# Patient Record
Sex: FEMALE | Race: WHITE | Employment: OTHER | URBAN - METROPOLITAN AREA
[De-identification: names, ages, dates, MRNs, and addresses within clinical notes are randomized per-mention and may not be internally consistent; named-entity substitution may affect disease eponyms.]

---

## 2017-06-19 ENCOUNTER — HOSPITAL ENCOUNTER (OUTPATIENT)
Dept: PREADMISSION TESTING | Age: 72
Discharge: HOME | DRG: 470 | End: 2017-06-19
Payer: MEDICARE

## 2017-06-19 VITALS
WEIGHT: 202.8 LBS | TEMPERATURE: 97.7 F | OXYGEN SATURATION: 93 % | HEART RATE: 82 BPM | HEIGHT: 66 IN | RESPIRATION RATE: 18 BRPM | DIASTOLIC BLOOD PRESSURE: 48 MMHG | BODY MASS INDEX: 32.59 KG/M2 | SYSTOLIC BLOOD PRESSURE: 109 MMHG

## 2017-06-19 LAB
ABO + RH BLD: NORMAL
ALBUMIN SERPL BCP-MCNC: 3.3 G/DL (ref 3.5–5)
ALBUMIN/GLOB SERPL: 0.9 {RATIO} (ref 1.1–2.2)
ALP SERPL-CCNC: 33 U/L (ref 45–117)
ALT SERPL-CCNC: 20 U/L (ref 12–78)
ANION GAP BLD CALC-SCNC: 5 MMOL/L (ref 5–15)
APPEARANCE UR: CLEAR
AST SERPL W P-5'-P-CCNC: 16 U/L (ref 15–37)
BACTERIA URNS QL MICRO: NEGATIVE /HPF
BILIRUB SERPL-MCNC: 0.3 MG/DL (ref 0.2–1)
BILIRUB UR QL: NEGATIVE
BLOOD GROUP ANTIBODIES SERPL: NORMAL
BUN SERPL-MCNC: 32 MG/DL (ref 6–20)
BUN/CREAT SERPL: 29 (ref 12–20)
CALCIUM SERPL-MCNC: 8.8 MG/DL (ref 8.5–10.1)
CHLORIDE SERPL-SCNC: 107 MMOL/L (ref 97–108)
CO2 SERPL-SCNC: 29 MMOL/L (ref 21–32)
COLOR UR: ABNORMAL
CREAT SERPL-MCNC: 1.11 MG/DL (ref 0.55–1.02)
EPITH CASTS URNS QL MICRO: ABNORMAL /LPF
ERYTHROCYTE [DISTWIDTH] IN BLOOD BY AUTOMATED COUNT: 14.1 % (ref 11.5–14.5)
EST. AVERAGE GLUCOSE BLD GHB EST-MCNC: 131 MG/DL
GLOBULIN SER CALC-MCNC: 3.6 G/DL (ref 2–4)
GLUCOSE SERPL-MCNC: 114 MG/DL (ref 65–100)
GLUCOSE UR STRIP.AUTO-MCNC: NEGATIVE MG/DL
HBA1C MFR BLD: 6.2 % (ref 4.2–6.3)
HCT VFR BLD AUTO: 42.4 % (ref 35–47)
HGB BLD-MCNC: 12.8 G/DL (ref 11.5–16)
HGB UR QL STRIP: NEGATIVE
INR PPP: 1 (ref 0.9–1.1)
KETONES UR QL STRIP.AUTO: NEGATIVE MG/DL
LEUKOCYTE ESTERASE UR QL STRIP.AUTO: ABNORMAL
MCH RBC QN AUTO: 28.4 PG (ref 26–34)
MCHC RBC AUTO-ENTMCNC: 30.2 G/DL (ref 30–36.5)
MCV RBC AUTO: 94 FL (ref 80–99)
MUCOUS THREADS URNS QL MICRO: ABNORMAL /LPF
NITRITE UR QL STRIP.AUTO: NEGATIVE
PH UR STRIP: 5.5 [PH] (ref 5–8)
PLATELET # BLD AUTO: 249 K/UL (ref 150–400)
POTASSIUM SERPL-SCNC: 4.2 MMOL/L (ref 3.5–5.1)
PROT SERPL-MCNC: 6.9 G/DL (ref 6.4–8.2)
PROT UR STRIP-MCNC: NEGATIVE MG/DL
PROTHROMBIN TIME: 10.2 SEC (ref 9–11.1)
RBC # BLD AUTO: 4.51 M/UL (ref 3.8–5.2)
RBC #/AREA URNS HPF: ABNORMAL /HPF (ref 0–5)
SODIUM SERPL-SCNC: 141 MMOL/L (ref 136–145)
SP GR UR REFRACTOMETRY: 1.02 (ref 1–1.03)
SPECIMEN EXP DATE BLD: NORMAL
UA: UC IF INDICATED,UAUC: ABNORMAL
UROBILINOGEN UR QL STRIP.AUTO: 0.2 EU/DL (ref 0.2–1)
WBC # BLD AUTO: 5.9 K/UL (ref 3.6–11)
WBC URNS QL MICRO: ABNORMAL /HPF (ref 0–4)

## 2017-06-19 RX ORDER — MECLIZINE HCL 12.5 MG 12.5 MG/1
12.5 TABLET ORAL
COMMUNITY
End: 2018-05-14 | Stop reason: SDUPTHER

## 2017-06-19 RX ORDER — ACETAMINOPHEN 500 MG
1000 TABLET ORAL ONCE
Status: CANCELLED | OUTPATIENT
Start: 2017-06-22 | End: 2017-06-22

## 2017-06-19 RX ORDER — OMEPRAZOLE 20 MG/1
20 CAPSULE, DELAYED RELEASE ORAL DAILY
COMMUNITY
End: 2017-11-28 | Stop reason: SDUPTHER

## 2017-06-19 RX ORDER — FENOFIBRATE 145 MG/1
145 TABLET, COATED ORAL DAILY
COMMUNITY
End: 2018-05-31 | Stop reason: SDUPTHER

## 2017-06-19 RX ORDER — CELECOXIB 200 MG/1
400 CAPSULE ORAL ONCE
Status: CANCELLED | OUTPATIENT
Start: 2017-06-22 | End: 2017-06-22

## 2017-06-19 RX ORDER — ASPIRIN 81 MG/1
81 TABLET ORAL DAILY
Status: ON HOLD | COMMUNITY
End: 2017-06-23

## 2017-06-19 RX ORDER — DEXTROMETHORPHAN HYDROBROMIDE, GUAIFENESIN 5; 100 MG/5ML; MG/5ML
650 LIQUID ORAL
COMMUNITY
End: 2017-06-25

## 2017-06-19 RX ORDER — SODIUM CHLORIDE, SODIUM LACTATE, POTASSIUM CHLORIDE, CALCIUM CHLORIDE 600; 310; 30; 20 MG/100ML; MG/100ML; MG/100ML; MG/100ML
25 INJECTION, SOLUTION INTRAVENOUS CONTINUOUS
Status: CANCELLED | OUTPATIENT
Start: 2017-06-22

## 2017-06-19 RX ORDER — FUROSEMIDE 20 MG/1
30 TABLET ORAL DAILY
Status: ON HOLD | COMMUNITY
End: 2017-06-23

## 2017-06-19 RX ORDER — CEFAZOLIN SODIUM IN 0.9 % NACL 2 G/100 ML
2 PLASTIC BAG, INJECTION (ML) INTRAVENOUS ONCE
Status: CANCELLED | OUTPATIENT
Start: 2017-06-22 | End: 2017-06-22

## 2017-06-19 RX ORDER — LISINOPRIL 10 MG/1
10 TABLET ORAL 2 TIMES DAILY
Status: ON HOLD | COMMUNITY
End: 2017-06-23

## 2017-06-19 RX ORDER — FLUTICASONE PROPIONATE 220 UG/1
2 AEROSOL, METERED RESPIRATORY (INHALATION) DAILY
COMMUNITY
End: 2018-05-21 | Stop reason: SDUPTHER

## 2017-06-19 RX ORDER — PREGABALIN 75 MG/1
75 CAPSULE ORAL ONCE
Status: CANCELLED | OUTPATIENT
Start: 2017-06-22 | End: 2017-06-22

## 2017-06-19 NOTE — PERIOP NOTES
[facility]  Preoperative Instructions        Surgery Date 6/22/2017          Time of Arrival : To Be Called per Dr Julienne Henning    1. On the day of your surgery, please report to the Surgical Services Registration Desk and sign in at your designated time. The Surgery Center is located to the right of the Emergency Room. 2. You must have someone with you to drive you home. You should not drive a car for 24 hours following surgery. Please make arrangements for a friend or family member to stay with you for the first 24 hours after your surgery. 3. Do not have anything to eat or drink (including water, gum, mints, coffee, juice) after midnight 6/21/2017              . This may not apply to medications prescribed by your physician. Please note special instructions, if applicable. If you are currently taking Plavix, Coumadin, or other blood-thinning agents, contact your surgeon for instructions. 4. We recommend you do not drink any alcoholic beverages for 24 hours before and after your surgery. 5. Have a list of all current medications, including vitamins, herbal supplements and any other over the counter medications. Stop all Aspirin and non-steroidal anti-inflammatory drugs (I.e. Advil, Aleve), as directed by your surgeon's office. Stop all vitamins and herbal supplements seven days prior to your surgery. 6. Wear comfortable clothes. Wear glasses instead of contacts. Do not bring any money or jewelry. Please bring picture ID, insurance card, and any prearranged co-payment or hospital payment. Do not wear make-up, particularly mascara the morning of your surgery. Do not wear nail polish, particularly if you are having foot /hand surgery. Wear your hair loose or down, no ponytails, buns, allegra pins or clips. All body piercings must be removed.   Please shower with antibacterial soap for three consecutive days before and on the morning of surgery, but do not apply any lotions, powders or deodorants after the shower on the day of surgery. Please use a fresh towels after each shower. Please sleep in clean clothes and change bed linens the night before surgery. Please do not shave for 48 hours prior to surgery. Shaving of the face is acceptable. 7. You should understand that if you do not follow these instructions your surgery may be cancelled. If your physical condition changes (I.e. fever, cold or flu) please contact your surgeon as soon as possible. 8. It is important that you be on time. If a situation occurs where you may be late, please call [phone] (OR Holding Area). 9. If you have any questions and or problems, please call [phone] (Pre-admission Testing). 10. Your surgery time may be subject to change. You will receive a phone call the evening prior if your time changes. 11.  If having outpatient surgery, you must have someone to drive you here, stay with you during the duration of your stay, and to drive you home at time of discharge. Special Instructions: Use your inhaler wether you need it or not and bring both with you to the hospital.  Stop Aspirin per Dr Manuel Dominguez instruction. MEDICATIONS TO TAKE THE MORNING OF SURGERY WITH A SIP OF WATER:Coreg, Meclizine as needed, omeprazole       I understand a pre-operative phone call will be made to verify my surgery time. In the event that I am not available, I give permission for a message to be left on my answering service and/or with another person?   Yes [phone]         ___________________      __________   _________    (Signature of Patient)             (Witness)                (Date and Time)

## 2017-06-19 NOTE — PROGRESS NOTES
[address] [facility]  Physical Therapy Pre-surgery evaluation  [address] [facility], [address] [facility]    physical Therapy pre TKR surgery EVALUATION  Patient: Ysabel Limon (43 y.o. female)  Date: 6/19/2017  Primary Diagnosis: rt total knee        Precautions:        ASSESSMENT :  Based on the objective data described below, the patient presents with impaired gait, balance, pain, and overall high level functional mobility due to end stage degenerative joint disease in the right knee. Pt reports independence w/ ambulation and ADLs however history of 2 falls over previous year. Pt reports baseline dizziness and SOB as a result of her CHF. Discussed anticipated disposition to home with possible discharge within a 1 to 2 day time frame post-surgery. Patient in agreement. Patient lives alone and states that she does not have any family who could stay with her or assist as daughter and granddaughter both work full-time. States she will be if any Jew friends/neightbors could assist.     GOALS: (Goals have been discussed and agreed upon with patient.)  DISCHARGE GOALS: Time Frame: 1 DAY  1. Patient will demonstrate increased strength, range of motion, and pain control via a home exercise program in order to minimize functional deficits in preparation for their upcoming surgery. This will be achieved by using education, demonstration and through the use of an informational handout including a home exercise program.  REHABILITATION POTENTIAL FOR STATED GOALS: Good     RECOMMENDATIONS AND PLANNED INTERVENTIONS: (Benefits and precautions of physical therapy have been discussed with the patient.)  1. Home Exercise Program  TREATMENT PLAN EFFECTIVE DATES: 6/19/2017 TO 6/19/2017  FREQUENCY/DURATION: Patient to continue to perform home exercise program at least twice daily until her surgery.      SUBJECTIVE:   Patient stated I've been going to [address] [place] [address] for PT.    OBJECTIVE DATA SUMMARY:   HISTORY: Past Medical History:   Diagnosis Date    Arthritis     Asthma     Mild to Moderate     Cancer ([facility] [address] 1997    Breast left    Chronic pain     Right knee    Diabetes ([facility] [address]     Pre-diabetic    GERD (gastroesophageal reflux disease)     Heart failure (HCC)     Cardiomyopathy, HX of MI 1999    Ill-defined condition     Vertigo    Ill-defined condition 2003    HX of Urosepsis complicated by respiratory failure and pneumonnia    Thromboembolus ([facility] [address] 1969    during 2nd pregnancy     Past Surgical History:   Procedure Laterality Date    HX GYN  1988    JONNY    HX GYN      Left Breast Mastectomy    HX HEENT  2015    Bilateral Cataract     HX VASCULAR ACCESS      Port a cath on the right     Prior Level of Function/Home Situation: Pt lives alone at home. Reports independence w/ ambulation and ADLs however history of 2 falls. Retired. Still driving. States baseline dizziness and SOB due to CHF  Personal factors and/or comorbidities impacting plan of care:     Patient [x]   does  []   does not state signs/symptoms of shortness of breath/dyspnea on exertion/respiratory distress. - baseline    Home Situation  Home Environment: Apartment  # Steps to Enter: 0  Wheelchair Ramp: No  One/Two Story Residence: One story  Living Alone: Yes  Support Systems: Family member(s)  Patient Expects to be Discharged to[de-identified] Private residence  Current DME Used/Available at Home: None    EXAMINATION/PRESENTATION/DECISION MAKING:     ADLs (Current Functional Status):    Bathing/Showering:   [x] Independent  [] Requires Assistance from Someone  [] Sponge Bath Only   Ambulation:  [x] Independent  [] Walk Indoors Only  [] Walk Outdoors  [] Use Assistive Device  [] Use Wheelchair Only     Dressing:  [x] Independent    Requires Assistance from Someone for:  [] Sock/Shoes  [] Pants  [] Everything   Household Activities:  [] Routine house and yard work  [x] Light Housework Only  [] None       Critical Behavior:                Strength: Strength: Within functional limits                    Tone & Sensation:   Tone: Normal              Sensation: Intact               Range Of Motion:  AROM: Within functional limits                       Coordination:  Coordination: Within functional limits    Functional Mobility:  Transfers:  Sit to Stand: Independent  Stand to Sit: Independent                       Balance:   Sitting: Intact  Standing: Intact  Ambulation/Gait Training:  Distance (ft): 40 Feet (ft)     Ambulation - Level of Assistance: Independent        Gait Abnormalities: Decreased step clearance        Base of Support: Widened     Speed/Tamiko: Pace decreased (<100 feet/min)                     Independent ambulation  Therapeutic Exercises:   The patient was educated in, has demonstrated, and has received written instructions to complete for their home exercise program per total knee replacement protocol. Functional Measure:  Lower Extremity Functional Scale (LEFS):      Score 17/80     Percentage of impairment CH  0% CI  1-19% CJ  20-39% CK  40-59% CL  60-79% CM  80-99% CN  100%   LEFS score:  0-80 80 64-79 47-63 31-46 16-30 1-15 0     Cutt-offs: None established  TKA and EJ:   (Sage et al, 2000)  MDC = 9 points   MCID = 9 points           G codes: In compliance with CMSs Claims Based Outcome Reporting, the following G-code set was chosen for this patient based on their primary functional limitation being treated: The outcome measure chosen to determine the severity of the functional limitation was the LEFS with a score of 17/80 which was correlated with the impairment scale.     ? Mobility - Walking and Moving Around:     - CURRENT STATUS: CL - 60%-79% impaired, limited or restricted    - GOAL STATUS: CL - 60%-79% impaired, limited or restricted    - D/C STATUS:  CL - 60%-79% impaired, limited or restricted     Pain:  Pain Scale 1: Numeric (0 - 10)  Pain Intensity 1: 0                Activity Tolerance:   VSS throughout on RA     COMMUNICATION/EDUCATION:   The patient was educated on:  [x]         Importance of post-operative mobility to achieve their desired outcomes and restore biological function  [x]         The key post-operative time frame to address ROM to prevent additional complications    The patients plan of care was discussed with:   [x]         The patient verbalized understanding of her plan in preparation for their upcoming surgery  []         The patient's  was present for this session  []         The patient reports that he/she does not have a  identified at this time  []         The  verbalized understanding of the education regarding the patient's upcoming surgery  [x]         Patient/family agree to work toward stated goals and plan of care. []         Patient understands intent and goals of therapy, but is neutral about his/her participation. []         Patient is unable to participate in goal setting and plan of care.     Thank you for this referral.  Zak Lutz, PT, DPT   Time Calculation: 23 mins

## 2017-06-20 LAB
BACTERIA SPEC CULT: ABNORMAL
BACTERIA SPEC CULT: ABNORMAL
BACTERIA SPEC CULT: NORMAL
CC UR VC: NORMAL
SERVICE CMNT-IMP: ABNORMAL
SERVICE CMNT-IMP: NORMAL

## 2017-06-21 ENCOUNTER — ANESTHESIA EVENT (OUTPATIENT)
Dept: SURGERY | Age: 72
DRG: 470 | End: 2017-06-21
Payer: MEDICARE

## 2017-06-21 NOTE — PERIOP NOTES
Called and spoke with Abdiel at Dr Garrett Jones' office about UC results tx or not and also about whether pt has been started on meds for MSSA, she said that she gave results to him but havent received any info back yet, he is in surgery now, but when he comes in, she will be sure to ask him.

## 2017-06-21 NOTE — PERIOP NOTES
Preop call made and patient given TOA. Patient instructed- may have water up to 0330 am and then NPO. Patient verbalizes understanding. Pt stated that she was on the way to get bactroban nasal ointment.  She is aware on how to start,

## 2017-06-21 NOTE — PERIOP NOTES
Spoke to Gloria in Dr. Janae Langford' office regarding apparent Staph Aureus nasal culture. Gloria will call in Mupirocin bid x 5days for patient to start today. Patient needs 2 applications today and 1 prior to arrival tomorrow. Will place on PTA med list to continue inpatient. Patient will receive Ancef only preop.

## 2017-06-22 ENCOUNTER — ANESTHESIA (OUTPATIENT)
Dept: SURGERY | Age: 72
DRG: 470 | End: 2017-06-22
Payer: MEDICARE

## 2017-06-22 ENCOUNTER — HOSPITAL ENCOUNTER (INPATIENT)
Age: 72
LOS: 3 days | Discharge: HOME HEALTH | DRG: 470 | End: 2017-06-25
Attending: ORTHOPAEDIC SURGERY | Admitting: ORTHOPAEDIC SURGERY
Payer: MEDICARE

## 2017-06-22 PROBLEM — M17.9 OA (OSTEOARTHRITIS) OF KNEE: Status: ACTIVE | Noted: 2017-06-22

## 2017-06-22 LAB
GLUCOSE BLD STRIP.AUTO-MCNC: 103 MG/DL (ref 65–100)
GLUCOSE BLD STRIP.AUTO-MCNC: 120 MG/DL (ref 65–100)
GLUCOSE BLD STRIP.AUTO-MCNC: 142 MG/DL (ref 65–100)
SERVICE CMNT-IMP: ABNORMAL

## 2017-06-22 PROCEDURE — 74011250636 HC RX REV CODE- 250/636: Performed by: ORTHOPAEDIC SURGERY

## 2017-06-22 PROCEDURE — C1713 ANCHOR/SCREW BN/BN,TIS/BN: HCPCS | Performed by: ORTHOPAEDIC SURGERY

## 2017-06-22 PROCEDURE — 0SRC0J9 REPLACEMENT OF RIGHT KNEE JOINT WITH SYNTHETIC SUBSTITUTE, CEMENTED, OPEN APPROACH: ICD-10-PCS | Performed by: ORTHOPAEDIC SURGERY

## 2017-06-22 PROCEDURE — 8E0YXBZ COMPUTER ASSISTED PROCEDURE OF LOWER EXTREMITY: ICD-10-PCS | Performed by: ORTHOPAEDIC SURGERY

## 2017-06-22 PROCEDURE — 74011250637 HC RX REV CODE- 250/637: Performed by: ORTHOPAEDIC SURGERY

## 2017-06-22 PROCEDURE — 74011000258 HC RX REV CODE- 258: Performed by: ORTHOPAEDIC SURGERY

## 2017-06-22 PROCEDURE — 77030016547 HC BLD SAW SAG1 STRY -B: Performed by: ORTHOPAEDIC SURGERY

## 2017-06-22 PROCEDURE — 97161 PT EVAL LOW COMPLEX 20 MIN: CPT

## 2017-06-22 PROCEDURE — 77030018673: Performed by: ORTHOPAEDIC SURGERY

## 2017-06-22 PROCEDURE — 76210000016 HC OR PH I REC 1 TO 1.5 HR: Performed by: ORTHOPAEDIC SURGERY

## 2017-06-22 PROCEDURE — 77030019707 HC TBNG LAVG CRBJT KINM -C: Performed by: ORTHOPAEDIC SURGERY

## 2017-06-22 PROCEDURE — 74011250636 HC RX REV CODE- 250/636: Performed by: ANESTHESIOLOGY

## 2017-06-22 PROCEDURE — 82962 GLUCOSE BLOOD TEST: CPT

## 2017-06-22 PROCEDURE — 77030033138 HC SUT PGA STRATFX J&J -B: Performed by: ORTHOPAEDIC SURGERY

## 2017-06-22 PROCEDURE — C1776 JOINT DEVICE (IMPLANTABLE): HCPCS | Performed by: ORTHOPAEDIC SURGERY

## 2017-06-22 PROCEDURE — 74011250636 HC RX REV CODE- 250/636

## 2017-06-22 PROCEDURE — 77030011640 HC PAD GRND REM COVD -A: Performed by: ORTHOPAEDIC SURGERY

## 2017-06-22 PROCEDURE — 76010000162 HC OR TIME 1.5 TO 2 HR INTENSV-TIER 1: Performed by: ORTHOPAEDIC SURGERY

## 2017-06-22 PROCEDURE — 77030018846 HC SOL IRR STRL H20 ICUM -A: Performed by: ORTHOPAEDIC SURGERY

## 2017-06-22 PROCEDURE — 77030008684 HC TU ET CUF COVD -B: Performed by: NURSE ANESTHETIST, CERTIFIED REGISTERED

## 2017-06-22 PROCEDURE — 77030020371 HC SUT POLYGLY VLOC COVD -B: Performed by: ORTHOPAEDIC SURGERY

## 2017-06-22 PROCEDURE — 77030034354: Performed by: ORTHOPAEDIC SURGERY

## 2017-06-22 PROCEDURE — 97530 THERAPEUTIC ACTIVITIES: CPT

## 2017-06-22 PROCEDURE — 77030020268 HC MISC GENERAL SUPPLY: Performed by: ORTHOPAEDIC SURGERY

## 2017-06-22 PROCEDURE — 74011250636 HC RX REV CODE- 250/636: Performed by: PHYSICIAN ASSISTANT

## 2017-06-22 PROCEDURE — 65270000029 HC RM PRIVATE

## 2017-06-22 PROCEDURE — 74011250637 HC RX REV CODE- 250/637: Performed by: PHYSICIAN ASSISTANT

## 2017-06-22 PROCEDURE — 74011000258 HC RX REV CODE- 258

## 2017-06-22 PROCEDURE — 74011250637 HC RX REV CODE- 250/637: Performed by: NURSE PRACTITIONER

## 2017-06-22 PROCEDURE — 77030031139 HC SUT VCRL2 J&J -A: Performed by: ORTHOPAEDIC SURGERY

## 2017-06-22 PROCEDURE — 77030036722: Performed by: ORTHOPAEDIC SURGERY

## 2017-06-22 PROCEDURE — 76060000034 HC ANESTHESIA 1.5 TO 2 HR: Performed by: ORTHOPAEDIC SURGERY

## 2017-06-22 PROCEDURE — 77030018836 HC SOL IRR NACL ICUM -A: Performed by: ORTHOPAEDIC SURGERY

## 2017-06-22 PROCEDURE — 77030032490 HC SLV COMPR SCD KNE COVD -B: Performed by: ORTHOPAEDIC SURGERY

## 2017-06-22 PROCEDURE — 77030034479 HC ADH SKN CLSR PRINEO J&J -B: Performed by: ORTHOPAEDIC SURGERY

## 2017-06-22 PROCEDURE — 77030033067 HC SUT PDO STRATFX SPIR J&J -B: Performed by: ORTHOPAEDIC SURGERY

## 2017-06-22 PROCEDURE — 74011000250 HC RX REV CODE- 250

## 2017-06-22 PROCEDURE — 77030010785: Performed by: ORTHOPAEDIC SURGERY

## 2017-06-22 DEVICE — COBALT HV BONE CEMENT 40GM
Type: IMPLANTABLE DEVICE | Site: KNEE | Status: FUNCTIONAL
Brand: DJO SURGICAL

## 2017-06-22 DEVICE — COMPONENT TIB CEM FIT 2.5F 2.5T LOGIC: Type: IMPLANTABLE DEVICE | Site: KNEE | Status: FUNCTIONAL

## 2017-06-22 DEVICE — CEMENT BNE GENTAMC HV R+G 40GM -- PALACOS R+G 5036964: Type: IMPLANTABLE DEVICE | Site: KNEE | Status: FUNCTIONAL

## 2017-06-22 DEVICE — IMPLANTABLE DEVICE: Type: IMPLANTABLE DEVICE | Site: KNEE | Status: FUNCTIONAL

## 2017-06-22 DEVICE — COMPONENT KNEE CEM STD POLYETH: Type: IMPLANTABLE DEVICE | Site: KNEE | Status: FUNCTIONAL

## 2017-06-22 RX ORDER — DIPHENHYDRAMINE HCL 12.5MG/5ML
12.5 ELIXIR ORAL
Status: ACTIVE | OUTPATIENT
Start: 2017-06-22 | End: 2017-06-23

## 2017-06-22 RX ORDER — SODIUM CHLORIDE, SODIUM LACTATE, POTASSIUM CHLORIDE, CALCIUM CHLORIDE 600; 310; 30; 20 MG/100ML; MG/100ML; MG/100ML; MG/100ML
125 INJECTION, SOLUTION INTRAVENOUS CONTINUOUS
Status: DISCONTINUED | OUTPATIENT
Start: 2017-06-22 | End: 2017-06-22 | Stop reason: HOSPADM

## 2017-06-22 RX ORDER — SODIUM CHLORIDE 0.9 % (FLUSH) 0.9 %
5-10 SYRINGE (ML) INJECTION AS NEEDED
Status: DISCONTINUED | OUTPATIENT
Start: 2017-06-22 | End: 2017-06-25 | Stop reason: HOSPADM

## 2017-06-22 RX ORDER — ONDANSETRON 2 MG/ML
INJECTION INTRAMUSCULAR; INTRAVENOUS AS NEEDED
Status: DISCONTINUED | OUTPATIENT
Start: 2017-06-22 | End: 2017-06-22 | Stop reason: HOSPADM

## 2017-06-22 RX ORDER — HYDROMORPHONE HYDROCHLORIDE 2 MG/ML
INJECTION, SOLUTION INTRAMUSCULAR; INTRAVENOUS; SUBCUTANEOUS AS NEEDED
Status: DISCONTINUED | OUTPATIENT
Start: 2017-06-22 | End: 2017-06-22 | Stop reason: HOSPADM

## 2017-06-22 RX ORDER — SODIUM CHLORIDE 0.9 % (FLUSH) 0.9 %
5-10 SYRINGE (ML) INJECTION EVERY 8 HOURS
Status: DISCONTINUED | OUTPATIENT
Start: 2017-06-22 | End: 2017-06-22 | Stop reason: HOSPADM

## 2017-06-22 RX ORDER — ONDANSETRON 2 MG/ML
4 INJECTION INTRAMUSCULAR; INTRAVENOUS
Status: DISPENSED | OUTPATIENT
Start: 2017-06-22 | End: 2017-06-23

## 2017-06-22 RX ORDER — LIDOCAINE HYDROCHLORIDE 20 MG/ML
INJECTION, SOLUTION EPIDURAL; INFILTRATION; INTRACAUDAL; PERINEURAL AS NEEDED
Status: DISCONTINUED | OUTPATIENT
Start: 2017-06-22 | End: 2017-06-22 | Stop reason: HOSPADM

## 2017-06-22 RX ORDER — SODIUM CHLORIDE, SODIUM LACTATE, POTASSIUM CHLORIDE, CALCIUM CHLORIDE 600; 310; 30; 20 MG/100ML; MG/100ML; MG/100ML; MG/100ML
25 INJECTION, SOLUTION INTRAVENOUS CONTINUOUS
Status: DISCONTINUED | OUTPATIENT
Start: 2017-06-22 | End: 2017-06-22 | Stop reason: HOSPADM

## 2017-06-22 RX ORDER — SODIUM CHLORIDE 9 MG/ML
125 INJECTION, SOLUTION INTRAVENOUS CONTINUOUS
Status: DISPENSED | OUTPATIENT
Start: 2017-06-22 | End: 2017-06-23

## 2017-06-22 RX ORDER — PHENYLEPHRINE HCL IN 0.9% NACL 0.4MG/10ML
SYRINGE (ML) INTRAVENOUS AS NEEDED
Status: DISCONTINUED | OUTPATIENT
Start: 2017-06-22 | End: 2017-06-22 | Stop reason: HOSPADM

## 2017-06-22 RX ORDER — AMOXICILLIN 250 MG
1 CAPSULE ORAL 2 TIMES DAILY
Status: DISCONTINUED | OUTPATIENT
Start: 2017-06-22 | End: 2017-06-25 | Stop reason: HOSPADM

## 2017-06-22 RX ORDER — ADHESIVE BANDAGE
30 BANDAGE TOPICAL DAILY PRN
Status: DISCONTINUED | OUTPATIENT
Start: 2017-06-23 | End: 2017-06-25 | Stop reason: HOSPADM

## 2017-06-22 RX ORDER — CARVEDILOL 12.5 MG/1
12.5 TABLET ORAL 2 TIMES DAILY
Status: DISCONTINUED | OUTPATIENT
Start: 2017-06-22 | End: 2017-06-25 | Stop reason: HOSPADM

## 2017-06-22 RX ORDER — OXYCODONE HYDROCHLORIDE 5 MG/1
5 TABLET ORAL
Status: DISCONTINUED | OUTPATIENT
Start: 2017-06-22 | End: 2017-06-23

## 2017-06-22 RX ORDER — FENTANYL CITRATE 50 UG/ML
INJECTION, SOLUTION INTRAMUSCULAR; INTRAVENOUS AS NEEDED
Status: DISCONTINUED | OUTPATIENT
Start: 2017-06-22 | End: 2017-06-22 | Stop reason: HOSPADM

## 2017-06-22 RX ORDER — ACETAMINOPHEN 325 MG/1
650 TABLET ORAL EVERY 6 HOURS
Status: DISCONTINUED | OUTPATIENT
Start: 2017-06-22 | End: 2017-06-23

## 2017-06-22 RX ORDER — NALOXONE HYDROCHLORIDE 0.4 MG/ML
0.4 INJECTION, SOLUTION INTRAMUSCULAR; INTRAVENOUS; SUBCUTANEOUS AS NEEDED
Status: DISCONTINUED | OUTPATIENT
Start: 2017-06-22 | End: 2017-06-25 | Stop reason: HOSPADM

## 2017-06-22 RX ORDER — MIDAZOLAM HYDROCHLORIDE 1 MG/ML
0.5 INJECTION, SOLUTION INTRAMUSCULAR; INTRAVENOUS
Status: DISCONTINUED | OUTPATIENT
Start: 2017-06-22 | End: 2017-06-22 | Stop reason: HOSPADM

## 2017-06-22 RX ORDER — HYDROMORPHONE HYDROCHLORIDE 1 MG/ML
0.2 INJECTION, SOLUTION INTRAMUSCULAR; INTRAVENOUS; SUBCUTANEOUS
Status: DISCONTINUED | OUTPATIENT
Start: 2017-06-22 | End: 2017-06-22 | Stop reason: HOSPADM

## 2017-06-22 RX ORDER — HYDROMORPHONE HYDROCHLORIDE 1 MG/ML
0.5 INJECTION, SOLUTION INTRAMUSCULAR; INTRAVENOUS; SUBCUTANEOUS
Status: ACTIVE | OUTPATIENT
Start: 2017-06-22 | End: 2017-06-23

## 2017-06-22 RX ORDER — ASPIRIN 325 MG
325 TABLET, DELAYED RELEASE (ENTERIC COATED) ORAL 2 TIMES DAILY
Status: DISCONTINUED | OUTPATIENT
Start: 2017-06-22 | End: 2017-06-23

## 2017-06-22 RX ORDER — ACETAMINOPHEN 500 MG
1000 TABLET ORAL ONCE
Status: COMPLETED | OUTPATIENT
Start: 2017-06-22 | End: 2017-06-22

## 2017-06-22 RX ORDER — FENTANYL CITRATE 50 UG/ML
50 INJECTION, SOLUTION INTRAMUSCULAR; INTRAVENOUS AS NEEDED
Status: DISCONTINUED | OUTPATIENT
Start: 2017-06-22 | End: 2017-06-22 | Stop reason: HOSPADM

## 2017-06-22 RX ORDER — LIDOCAINE HYDROCHLORIDE 10 MG/ML
0.1 INJECTION, SOLUTION EPIDURAL; INFILTRATION; INTRACAUDAL; PERINEURAL AS NEEDED
Status: DISCONTINUED | OUTPATIENT
Start: 2017-06-22 | End: 2017-06-22 | Stop reason: HOSPADM

## 2017-06-22 RX ORDER — MIDAZOLAM HYDROCHLORIDE 1 MG/ML
1 INJECTION, SOLUTION INTRAMUSCULAR; INTRAVENOUS AS NEEDED
Status: DISCONTINUED | OUTPATIENT
Start: 2017-06-22 | End: 2017-06-22 | Stop reason: HOSPADM

## 2017-06-22 RX ORDER — FENTANYL CITRATE 50 UG/ML
25 INJECTION, SOLUTION INTRAMUSCULAR; INTRAVENOUS
Status: DISCONTINUED | OUTPATIENT
Start: 2017-06-22 | End: 2017-06-22 | Stop reason: HOSPADM

## 2017-06-22 RX ORDER — SODIUM CHLORIDE 0.9 % (FLUSH) 0.9 %
5-10 SYRINGE (ML) INJECTION EVERY 8 HOURS
Status: DISCONTINUED | OUTPATIENT
Start: 2017-06-23 | End: 2017-06-25 | Stop reason: HOSPADM

## 2017-06-22 RX ORDER — PREGABALIN 75 MG/1
75 CAPSULE ORAL ONCE
Status: COMPLETED | OUTPATIENT
Start: 2017-06-22 | End: 2017-06-22

## 2017-06-22 RX ORDER — OXYCODONE AND ACETAMINOPHEN 5; 325 MG/1; MG/1
1 TABLET ORAL AS NEEDED
Status: DISCONTINUED | OUTPATIENT
Start: 2017-06-22 | End: 2017-06-22 | Stop reason: HOSPADM

## 2017-06-22 RX ORDER — PANTOPRAZOLE SODIUM 40 MG/1
40 TABLET, DELAYED RELEASE ORAL DAILY
Status: DISCONTINUED | OUTPATIENT
Start: 2017-06-23 | End: 2017-06-25 | Stop reason: HOSPADM

## 2017-06-22 RX ORDER — OXYCODONE HYDROCHLORIDE 5 MG/1
10 TABLET ORAL
Status: DISCONTINUED | OUTPATIENT
Start: 2017-06-22 | End: 2017-06-23

## 2017-06-22 RX ORDER — FENOFIBRATE 145 MG/1
145 TABLET, COATED ORAL DAILY
Status: DISCONTINUED | OUTPATIENT
Start: 2017-06-22 | End: 2017-06-25 | Stop reason: HOSPADM

## 2017-06-22 RX ORDER — PROPOFOL 10 MG/ML
INJECTION, EMULSION INTRAVENOUS AS NEEDED
Status: DISCONTINUED | OUTPATIENT
Start: 2017-06-22 | End: 2017-06-22 | Stop reason: HOSPADM

## 2017-06-22 RX ORDER — DEXAMETHASONE SODIUM PHOSPHATE 4 MG/ML
10 INJECTION, SOLUTION INTRA-ARTICULAR; INTRALESIONAL; INTRAMUSCULAR; INTRAVENOUS; SOFT TISSUE DAILY
Status: COMPLETED | OUTPATIENT
Start: 2017-06-23 | End: 2017-06-23

## 2017-06-22 RX ORDER — CEFAZOLIN SODIUM IN 0.9 % NACL 2 G/100 ML
2 PLASTIC BAG, INJECTION (ML) INTRAVENOUS EVERY 8 HOURS
Status: DISCONTINUED | OUTPATIENT
Start: 2017-06-22 | End: 2017-06-23 | Stop reason: DRUGHIGH

## 2017-06-22 RX ORDER — ONDANSETRON 2 MG/ML
4 INJECTION INTRAMUSCULAR; INTRAVENOUS AS NEEDED
Status: DISCONTINUED | OUTPATIENT
Start: 2017-06-22 | End: 2017-06-22 | Stop reason: HOSPADM

## 2017-06-22 RX ORDER — CEFAZOLIN SODIUM IN 0.9 % NACL 2 G/100 ML
2 PLASTIC BAG, INJECTION (ML) INTRAVENOUS EVERY 8 HOURS
Status: DISCONTINUED | OUTPATIENT
Start: 2017-06-22 | End: 2017-06-22

## 2017-06-22 RX ORDER — GLYCOPYRROLATE 0.2 MG/ML
INJECTION INTRAMUSCULAR; INTRAVENOUS AS NEEDED
Status: DISCONTINUED | OUTPATIENT
Start: 2017-06-22 | End: 2017-06-22 | Stop reason: HOSPADM

## 2017-06-22 RX ORDER — ROCURONIUM BROMIDE 10 MG/ML
INJECTION, SOLUTION INTRAVENOUS AS NEEDED
Status: DISCONTINUED | OUTPATIENT
Start: 2017-06-22 | End: 2017-06-22 | Stop reason: HOSPADM

## 2017-06-22 RX ORDER — CELECOXIB 200 MG/1
400 CAPSULE ORAL ONCE
Status: COMPLETED | OUTPATIENT
Start: 2017-06-22 | End: 2017-06-22

## 2017-06-22 RX ORDER — SODIUM CHLORIDE 0.9 % (FLUSH) 0.9 %
5-10 SYRINGE (ML) INJECTION AS NEEDED
Status: DISCONTINUED | OUTPATIENT
Start: 2017-06-22 | End: 2017-06-22 | Stop reason: HOSPADM

## 2017-06-22 RX ORDER — MUPIROCIN 20 MG/G
1 OINTMENT TOPICAL 2 TIMES DAILY
Status: DISCONTINUED | OUTPATIENT
Start: 2017-06-22 | End: 2017-06-25 | Stop reason: HOSPADM

## 2017-06-22 RX ORDER — GENTAMICIN SULFATE 80 MG/100ML
INJECTION, SOLUTION INTRAVENOUS AS NEEDED
Status: DISCONTINUED | OUTPATIENT
Start: 2017-06-22 | End: 2017-06-22 | Stop reason: HOSPADM

## 2017-06-22 RX ORDER — FAMOTIDINE 20 MG/1
10 TABLET, FILM COATED ORAL 2 TIMES DAILY
Status: DISCONTINUED | OUTPATIENT
Start: 2017-06-22 | End: 2017-06-23

## 2017-06-22 RX ORDER — VANCOMYCIN HYDROCHLORIDE 1 G/20ML
INJECTION, POWDER, LYOPHILIZED, FOR SOLUTION INTRAVENOUS AS NEEDED
Status: DISCONTINUED | OUTPATIENT
Start: 2017-06-22 | End: 2017-06-22 | Stop reason: HOSPADM

## 2017-06-22 RX ORDER — SODIUM CHLORIDE 9 MG/ML
25 INJECTION, SOLUTION INTRAVENOUS CONTINUOUS
Status: DISCONTINUED | OUTPATIENT
Start: 2017-06-22 | End: 2017-06-22 | Stop reason: HOSPADM

## 2017-06-22 RX ORDER — FACIAL-BODY WIPES
10 EACH TOPICAL DAILY PRN
Status: DISCONTINUED | OUTPATIENT
Start: 2017-06-24 | End: 2017-06-25 | Stop reason: HOSPADM

## 2017-06-22 RX ORDER — NEOSTIGMINE METHYLSULFATE 1 MG/ML
INJECTION INTRAVENOUS AS NEEDED
Status: DISCONTINUED | OUTPATIENT
Start: 2017-06-22 | End: 2017-06-22 | Stop reason: HOSPADM

## 2017-06-22 RX ORDER — DIPHENHYDRAMINE HYDROCHLORIDE 50 MG/ML
12.5 INJECTION, SOLUTION INTRAMUSCULAR; INTRAVENOUS AS NEEDED
Status: DISCONTINUED | OUTPATIENT
Start: 2017-06-22 | End: 2017-06-22 | Stop reason: HOSPADM

## 2017-06-22 RX ORDER — POLYETHYLENE GLYCOL 3350 17 G/17G
17 POWDER, FOR SOLUTION ORAL DAILY
Status: DISCONTINUED | OUTPATIENT
Start: 2017-06-22 | End: 2017-06-25 | Stop reason: HOSPADM

## 2017-06-22 RX ORDER — CEFAZOLIN SODIUM IN 0.9 % NACL 2 G/100 ML
2 PLASTIC BAG, INJECTION (ML) INTRAVENOUS ONCE
Status: COMPLETED | OUTPATIENT
Start: 2017-06-22 | End: 2017-06-22

## 2017-06-22 RX ORDER — MORPHINE SULFATE 10 MG/ML
2 INJECTION, SOLUTION INTRAMUSCULAR; INTRAVENOUS
Status: DISCONTINUED | OUTPATIENT
Start: 2017-06-22 | End: 2017-06-22 | Stop reason: HOSPADM

## 2017-06-22 RX ORDER — ALBUTEROL SULFATE 90 UG/1
1-2 AEROSOL, METERED RESPIRATORY (INHALATION)
Status: DISCONTINUED | OUTPATIENT
Start: 2017-06-22 | End: 2017-06-25 | Stop reason: HOSPADM

## 2017-06-22 RX ADMIN — FENOFIBRATE 145 MG: 145 TABLET ORAL at 13:01

## 2017-06-22 RX ADMIN — REGULAR STRENGTH 325 MG: 325 TABLET ORAL at 13:02

## 2017-06-22 RX ADMIN — CARVEDILOL 12.5 MG: 12.5 TABLET, FILM COATED ORAL at 17:37

## 2017-06-22 RX ADMIN — ACETAMINOPHEN 650 MG: 325 TABLET, FILM COATED ORAL at 23:28

## 2017-06-22 RX ADMIN — REGULAR STRENGTH 325 MG: 325 TABLET ORAL at 17:37

## 2017-06-22 RX ADMIN — SODIUM CHLORIDE, POTASSIUM CHLORIDE, SODIUM LACTATE AND CALCIUM CHLORIDE: 600; 310; 30; 20 INJECTION, SOLUTION INTRAVENOUS at 07:25

## 2017-06-22 RX ADMIN — FAMOTIDINE 10 MG: 20 TABLET ORAL at 13:02

## 2017-06-22 RX ADMIN — SODIUM CHLORIDE, SODIUM LACTATE, POTASSIUM CHLORIDE, AND CALCIUM CHLORIDE 25 ML/HR: 600; 310; 30; 20 INJECTION, SOLUTION INTRAVENOUS at 07:00

## 2017-06-22 RX ADMIN — NEOSTIGMINE METHYLSULFATE 3 MG: 1 INJECTION INTRAVENOUS at 08:57

## 2017-06-22 RX ADMIN — GLYCOPYRROLATE 0.6 MG: 0.2 INJECTION INTRAMUSCULAR; INTRAVENOUS at 08:51

## 2017-06-22 RX ADMIN — CELECOXIB 400 MG: 200 CAPSULE ORAL at 06:49

## 2017-06-22 RX ADMIN — LIDOCAINE HYDROCHLORIDE 100 MG: 20 INJECTION, SOLUTION EPIDURAL; INFILTRATION; INTRACAUDAL; PERINEURAL at 07:55

## 2017-06-22 RX ADMIN — FAMOTIDINE 10 MG: 20 TABLET ORAL at 17:36

## 2017-06-22 RX ADMIN — Medication 80 MCG: at 08:09

## 2017-06-22 RX ADMIN — FENTANYL CITRATE 50 MCG: 50 INJECTION, SOLUTION INTRAMUSCULAR; INTRAVENOUS at 07:55

## 2017-06-22 RX ADMIN — CEFAZOLIN 2 G: 10 INJECTION, POWDER, FOR SOLUTION INTRAVENOUS; PARENTERAL at 16:53

## 2017-06-22 RX ADMIN — MUPIROCIN 1 G: 20 OINTMENT TOPICAL at 21:18

## 2017-06-22 RX ADMIN — ACETAMINOPHEN 1000 MG: 500 TABLET ORAL at 06:48

## 2017-06-22 RX ADMIN — ONDANSETRON 4 MG: 2 INJECTION INTRAMUSCULAR; INTRAVENOUS at 08:40

## 2017-06-22 RX ADMIN — SODIUM CHLORIDE, POTASSIUM CHLORIDE, SODIUM LACTATE AND CALCIUM CHLORIDE: 600; 310; 30; 20 INJECTION, SOLUTION INTRAVENOUS at 09:00

## 2017-06-22 RX ADMIN — ROCURONIUM BROMIDE 30 MG: 10 INJECTION, SOLUTION INTRAVENOUS at 07:55

## 2017-06-22 RX ADMIN — ONDANSETRON HYDROCHLORIDE 4 MG: 2 INJECTION, SOLUTION INTRAMUSCULAR; INTRAVENOUS at 10:05

## 2017-06-22 RX ADMIN — FENTANYL CITRATE 50 MCG: 50 INJECTION, SOLUTION INTRAMUSCULAR; INTRAVENOUS at 07:51

## 2017-06-22 RX ADMIN — ACETAMINOPHEN 650 MG: 325 TABLET, FILM COATED ORAL at 17:36

## 2017-06-22 RX ADMIN — CEFAZOLIN 2 G: 10 INJECTION, POWDER, FOR SOLUTION INTRAVENOUS; PARENTERAL at 23:29

## 2017-06-22 RX ADMIN — PREGABALIN 75 MG: 75 CAPSULE ORAL at 06:49

## 2017-06-22 RX ADMIN — OXYCODONE HYDROCHLORIDE 5 MG: 5 TABLET ORAL at 13:02

## 2017-06-22 RX ADMIN — GENTAMICIN SULFATE 80 MG: 80 INJECTION, SOLUTION INTRAVENOUS at 07:54

## 2017-06-22 RX ADMIN — ACETAMINOPHEN 650 MG: 325 TABLET, FILM COATED ORAL at 13:02

## 2017-06-22 RX ADMIN — FLUTICASONE FUROATE 1 PUFF: 100 POWDER RESPIRATORY (INHALATION) at 13:15

## 2017-06-22 RX ADMIN — POLYETHYLENE GLYCOL 3350 17 G: 17 POWDER, FOR SOLUTION ORAL at 13:02

## 2017-06-22 RX ADMIN — CEFAZOLIN 2 G: 10 INJECTION, POWDER, FOR SOLUTION INTRAVENOUS; PARENTERAL at 07:51

## 2017-06-22 RX ADMIN — ROPIVACAINE HYDROCHLORIDE: 5 INJECTION, SOLUTION EPIDURAL; INFILTRATION; PERINEURAL at 09:03

## 2017-06-22 RX ADMIN — HYDROMORPHONE HYDROCHLORIDE 0.5 MG: 2 INJECTION, SOLUTION INTRAMUSCULAR; INTRAVENOUS; SUBCUTANEOUS at 08:28

## 2017-06-22 RX ADMIN — PROPOFOL 200 MG: 10 INJECTION, EMULSION INTRAVENOUS at 07:55

## 2017-06-22 RX ADMIN — SENNOSIDES AND DOCUSATE SODIUM 1 TABLET: 8.6; 5 TABLET ORAL at 13:02

## 2017-06-22 RX ADMIN — OXYCODONE HYDROCHLORIDE 5 MG: 5 TABLET ORAL at 17:36

## 2017-06-22 RX ADMIN — SODIUM CHLORIDE 125 ML/HR: 900 INJECTION, SOLUTION INTRAVENOUS at 09:40

## 2017-06-22 RX ADMIN — SENNOSIDES AND DOCUSATE SODIUM 1 TABLET: 8.6; 5 TABLET ORAL at 17:37

## 2017-06-22 RX ADMIN — OXYCODONE HYDROCHLORIDE 10 MG: 5 TABLET ORAL at 21:16

## 2017-06-22 NOTE — ANESTHESIA PREPROCEDURE EVALUATION
Anesthetic History   No history of anesthetic complications            Review of Systems / Medical History  Patient summary reviewed, nursing notes reviewed and pertinent labs reviewed    Pulmonary            Asthma        Neuro/Psych   Within defined limits           Cardiovascular          CHF: NYHA Classification I    Past MI    Exercise tolerance: >4 METS  Comments: No CP, SOB; EF now 45%, by MUGA 6/16: 50%; EKG: NSST changes, 1 PVC   GI/Hepatic/Renal     GERD: well controlled           Endo/Other        Arthritis     Other Findings              Physical Exam    Airway  Mallampati: II  TM Distance: > 6 cm  Neck ROM: normal range of motion   Mouth opening: Normal     Cardiovascular  Regular rate and rhythm,  S1 and S2 normal,  no murmur, click, rub, or gallop             Dental    Dentition: Edentulous and Full upper dentures     Pulmonary  Breath sounds clear to auscultation               Abdominal  GI exam deferred       Other Findings            Anesthetic Plan    ASA: 3  Anesthesia type: general          Induction: Intravenous  Anesthetic plan and risks discussed with: Patient      Disc mildly elevated cardiac risk with pt; all questions answered. Pt agrees to proceed with GA and surg.

## 2017-06-22 NOTE — PERIOP NOTES
TRANSFER - OUT REPORT:    Verbal report given to Gladis Verma RN on Silas Harmon  being transferred to 3212(unit) for routine post - op       Report consisted of patients Situation, Background, Assessment and   Recommendations(SBAR). Information from the following report(s) SBAR, OR Summary, Procedure Summary, Intake/Output, MAR and Cardiac Rhythm NSR WITH PVCS was reviewed with the receiving nurse. Opportunity for questions and clarification was provided.       Patient transported with:   O2 @ 3 liters  Tech

## 2017-06-22 NOTE — PERIOP NOTES
9028:  Dr. Vin Machado in to see the patient    0715:  Daughter &  at bedside    6332:  Dr. Kunal Stephens in to see the patient & discuss with her & daughter plan of care

## 2017-06-22 NOTE — H&P
Date of Surgery Update:  Megan Oliver was seen and examined on the day of surgery prior to the procedure. There were no significant clinical changes since the completion of the History and Physical.    Exam today prior to surgery showed no acute cardiac findings, no respiratory difficulty, and no abdominal complaints or pain. This patient is not a candidate for TXA. Documentation of Medical Necessity:    Symptoms: pain with activity and at rest, antalgia, interferes with ADLs  Conservative Treatment: activity modification, multiple medications, injections  Physical Findings: pain at joint line, antalgia on ambulation, crepitation  Imaging: significant OA, sclerosis and osteophytes    Indications:   Failure of conservative treatments with daily pain and functional limitations. Appropriate imaging demonstrating significant disease. Appropriate physical findings consistent with significant degenerative joint disease. All pertinent risks, benefits, and alternatives to operative management including continued conservative care were explained at length. The patient has elected to proceed with appropriately indicated and medically necessary total joint arthroplasty. They understand no guarantees can be given about the outcome.     Signed By: TORSTEN Gonzalez     June 22, 2017 7:21 AM

## 2017-06-22 NOTE — PERIOP NOTES
10:11 made Dr. Talia Alvarez made aware of pt c/o numbness right foot, pulses palpable, foot warm. Dr. Talia Alvarez reports numbness r/t local anesthetic injected in knee, and will monitor pt.    10:19 pt daughter Tabatha Velasquez received post op update)    10:20 awaiting return call from floor.

## 2017-06-22 NOTE — PROGRESS NOTES
Ortho/ NeuroSurgery NP Note    POD# 0  s/p RIGHT TOTAL KNEE ARTHROPLASTY WITH NAVIGATION     Pt resting in bed. No complaints. A little a pain relieved by oral pain med. A&O x4   VSS Afebrile. Patient has had something to eat. No nausea. Most Recent Labs:   Lab Results   Component Value Date/Time    HGB 12.8 06/19/2017 09:29 AM    Hemoglobin A1c 6.2 06/19/2017 09:29 AM     [facility] reviewed and no concerns noted. MRSA Screen Pre-op Negative Apparent staph noted on culture. Bactroban ordered per protocol. Patient did three doses outpatient and then med continued inpatient. U/A Screen Pre-Op Positive- mixed urogenital juwan. Body mass index is 32.91 kg/(m^2). BMI greater than 30 is classified as obesity and greater than 40 is classified as morbid obesity. STOP BANG Score: 4    Social History: No significant history. Bangura out. Patient needs to void today. Dressing c.d.i    Calves soft and supple; No pain with passive stretch  Patient still numb and immobile from surgery. SCDs for mechanical DVT proph    Plan:  1) PT BID starting today  2) Sintia-op Antibiotics Ancef  3) Aspirin 325 mg PO BID for DVT Prophylaxis   4) Discharge plans to home. Patient lives alone but has family support.      Kira Rosario NP

## 2017-06-22 NOTE — OP NOTES
Alejandra Medina MD - Adult Reconstruction and Total Joint Replacement    Megan Oliver - MRN [id number] - : 1945 (89 y.o.)    Date: 2017    PREOPERATIVE DIAGNOSIS:  Right knee degenerative joint disease    POSTOPERATIVE DIAGNOSIS:  Same    PROCEDURE: Total knee replacement with imageless computer navigation    Implants Used:     Implant Name Type Inv. Item Serial No.  Lot No. LRB No. Used Action   INSERT FEM PS STEFAN SZ2.5 RT -- [employer] - [id number]  INSERT FEM PS STEFAN SZ2.5 RT -- [employer] [id number] [employer] N/A Right 1 Implanted   TRAY FIT TIB STEFAN SZ 2.5F/2.5T -- [employer] REV - [id number]  TRAY FIT TIB STEFAN SZ 2.5F/2.5T -- [employer] REV [id number] [employer] N/A Right 1 Implanted   INSERT TIB PSC SZ2.5 13MM -- [employer] - [id number]  INSERT TIB PSC SZ2.5 13MM -- [employer] [id number] [employer] N/A Right 1 Implanted   CEMENT BNE SOFTPACK 40/20GM -- [employer] - [id number] - [id number]  CEMENT BNE SOFTPACK 40/20GM -- [employer] [address] [id number] Right 1 Implanted   CEMENT BNE GENTAMC HV R+G 40GM -- [employer] - [id number]   CEMENT BNE GENTAMC HV R+G 40GM -- [employer] -[id number] [employer] [id number] Right 1 Implanted       Anesthesia: General + local    Surgeon: Alejandra Medina     Assistant: TORSTEN Garcia    EBL: minimal    Drains: none     Specimens: none     Complications: none     Condition: stable to PACU     INDICATIONS: Longstanding knee pain unresponsive to conservative measures. The risks, benefits, and alternatives to the procedure were explained to the patient and they wished to proceed. They understood no guarantees could be given about the outcome of the procedure. DESCRIPTION OF PROCEDURE:  The patient was brought in to the operating room and placed supine on a standard OR table. Anesthesia was provided by the anesthesia team without difficulty.   A thigh tourniquet was applied to the operative limb which was then prepped and draped in the usual fashion. Pre-operative antibiotics were administered. An appropriate time-out was performed. The limb was exsanguinated with an Esmarch and tourniquet inflated. A standard medial parapatellar arthrotomy was used. The fat pad was excised. The patella was then subluxed laterally and attention turned to the femur. Navigation was used after the registration sequence to make the appropriate distal femoral cut, and drill for the lugs of the 4-in-1 guide. The femoral cut was confirmed with navigation. The cruciate ligaments were excised along with the anterior portions of the menisci. The  4-in-1 guide position was confirmed with navigation and pinned in parallel to the epicondylar axis and perpendicular to Leake's line. The anterior, posterior and chamfer cuts were performed, protecting the collateral ligaments at all times. The posterior capsule was cleared and any osteophytes were removed. The box cut was then made. Attention was then turned to the tibia. The navigation system was used to perform the tibial cut perpendicular to the mechanical axis. The remainder of the menisci were excised and the tibia sized. The patella was noted to be in good condition and was therefore not resurfaced. Selective denervation was performed. Trial components were then placed and the knee taken through a range of motion and found to have excellent range of motion, stability, and ligamentous balance. The rotation of the tibial tray was marked and the trials removed. The trial tibial tray was reinserted and the tibial prepared for the keel of the tray. All cut surfaces were thoroughly lavaged and dried. The final implants were then cemented into place with antibiotic cement and excess cement removed with a curette. The tibial tray liner was inserted into the locking mechanism and the knee reduced.  It was again taken through a range of motion and found to be stable in all planes with excellent tracking of the patella. The wound was thoroughly lavaged again. The extensor mechanism was repaired with heavy interrupted suture and a running stitch. Periarticular injection was performed. Skin closure was then performed in layers. Sterile compressive dressings were applied. The patient was awakened, moved to the stretcher and taken to the recovery room in stable condition. At the conclusion of the procedure, all counts were correct. There no immediate complications.

## 2017-06-22 NOTE — PERIOP NOTES
Handoff Report from Operating Room to PACU    Report received from [facility] and Elenita ANDREWS regarding Lizzeth Caruso. Surgeon(s):  Alicja Syed MD  And Procedure(s) (LRB):  RIGHT TOTAL KNEE ARTHROPLASTY WITH NAVIGATION (Right)  confirmed   with allergies and dressings discussed. Anesthesia type, drugs, patient history, complications, estimated blood loss, vital signs, intake and output, and last pain medication and reversal medications were reviewed.

## 2017-06-22 NOTE — IP AVS SNAPSHOT
[address] [facility] 
[phone] Patient: Clau Parker MRN: [id number] JBL:1[birth year]55[birth year]227 You are allergic to the following Allergen Reactions Morphine Anaphylaxis Recent Documentation Height Weight BMI OB Status Smoking Status 1.664 m 99.9 kg 36.1 kg/m2 Hysterectomy Former Smoker Emergency Contacts Name Discharge Info Relation Home Work Mobile Adri Panchal DISCHARGE CAREGIVER [3] Daughter [21] [phone] About your hospitalization You were admitted on:  June 22, 2017 You last received care in the:  [facility] 3 ORTHOPEDICS You were discharged on:  June 25, 2017 Unit phone number:  [phone] Why you were hospitalized Your primary diagnosis was:  Not on File Your diagnoses also included:  Oa (Osteoarthritis) Of Knee Providers Seen During Your Hospitalizations Provider Role Specialty Primary office phone Randy Alexander MD Attending Provider Orthopedic Surgery [phone] Your Primary Care Physician (PCP) Primary Care Physician Office Phone Office Fax Aleida Shawna [phone] Follow-up Information Follow up With Details Comments Contact Info Randy Alexander MD Schedule an appointment as soon as possible for a visit in 2 weeks  [address] [facility] [address] [facility] 
[phone] [address] [place] [address] Rd. 
1st Floor [facility] [address] 
[phone] Abby Ch MD   [address] [facility] [address] [place] [address] 
[phone] Current Discharge Medication List  
  
START taking these medications Dose & Instructions Dispensing Information Comments Morning Noon Evening Bedtime  
 cephALEXin 250 mg capsule Commonly known as:  Anise Curia Your last dose was:     
   
Your next dose is:    
   
   
 Dose:  500 mg  
 Take 2 Caps by mouth four (4) times daily for 14 days. Quantity:  112 Cap Refills:  0 HYDROcodone-acetaminophen 5-325 mg per tablet Commonly known as:  Trina Siddiqui Your last dose was: Your next dose is:    
   
   
 Dose:  1-2 Tab Take 1-2 Tabs by mouth every four (4) hours as needed. Max Daily Amount: 12 Tabs. Quantity:  80 Tab Refills:  0  
     
   
   
   
  
 polyethylene glycol 17 gram/dose powder Commonly known as:  Pitosukhi Goldsmith Your last dose was: Your next dose is:    
   
   
 Dose:  17 g Take 17 g by mouth daily for 15 days. Quantity:  255 g Refills:  0  
     
   
   
   
  
 senna-docusate 8.6-50 mg per tablet Commonly known as:  SENNA PLUS Your last dose was: Your next dose is:    
   
   
 Dose:  1 Tab Take 1 Tab by mouth two (2) times a day. Quantity:  60 Tab Refills:  0 CONTINUE these medications which have CHANGED Dose & Instructions Dispensing Information Comments Morning Noon Evening Bedtime * aspirin delayed-release 81 mg tablet What changed:  additional instructions Your last dose was: Your next dose is:    
   
   
 Dose:  81 mg Take 1 Tab by mouth daily. May resume in 30 days after completing twice daily Aspirin. Refills:  0  
     
   
   
   
  
 * aspirin delayed-release 81 mg tablet What changed: You were already taking a medication with the same name, and this prescription was added. Make sure you understand how and when to take each. Your last dose was: Your next dose is:    
   
   
 Dose:  81 mg Take 1 Tab by mouth two (2) times a day for 30 days. Quantity:  60 Tab Refills:  0  
     
   
   
   
  
 furosemide 20 mg tablet Commonly known as:  LASIX What changed:  additional instructions Your last dose was:     
   
Your next dose is:    
   
   
 Dose:  30 mg  
 Take 1.5 Tabs by mouth daily. May resume medication when b/p greater than 120/80. Refills:  0  
     
   
   
   
  
 lisinopril 10 mg tablet Commonly known as:  Elizabeth Valdes What changed:  additional instructions Your last dose was: Your next dose is:    
   
   
 Dose:  10 mg Take 1 Tab by mouth two (2) times a day. May resume medication when b/p greater than 120/80. Refills:  0  
     
   
   
   
  
 * Notice: This list has 2 medication(s) that are the same as other medications prescribed for you. Read the directions carefully, and ask your doctor or other care provider to review them with you. CONTINUE these medications which have NOT CHANGED Dose & Instructions Dispensing Information Comments Morning Noon Evening Bedtime  
 albuterol 90 mcg/actuation inhaler Commonly known as:  Bridger Ornelas Your last dose was: Your next dose is:    
   
   
 Dose:  1-2 Puff Take 1-2 Puffs by inhalation every four (4) hours as needed for Wheezing. Quantity:  17 g Refills:  0 COREG 12.5 mg tablet Generic drug:  carvedilol Your last dose was: Your next dose is:    
   
   
 Dose:  40 mg Take 40 mg by mouth daily. Refills:  0  
     
   
   
   
  
 fenofibrate nanocrystallized 145 mg tablet Commonly known as:  [employer] Your last dose was: Your next dose is:    
   
   
 Dose:  145 mg Take 145 mg by mouth daily. Refills:  0  
     
   
   
   
  
 FLOVENT  mcg/actuation inhaler Generic drug:  fluticasone Your last dose was: Your next dose is:    
   
   
 Dose:  2 Puff Take 2 Puffs by inhalation daily. Refills:  0  
     
   
   
   
  
 meclizine 12.5 mg tablet Commonly known as:  ANTIVERT Your last dose was: Your next dose is:    
   
   
 Dose:  12.5 mg Take 12.5 mg by mouth three (3) times daily as needed. Refills:  0 mupirocin calcium 2 % nasal ointment Commonly known as:  BigML [facility] Your last dose was: Your next dose is:    
   
   
 by Both Nostrils route two (2) times a day. Plain Staph Aureus nasal colonization  Patient to use twice today 6/21 and prior to arrival 6/22 will continue inpatient. Indications: Use bid x 5 days Refills:  0  
     
   
   
   
  
 omeprazole 20 mg capsule Commonly known as:  PRILOSEC Your last dose was: Your next dose is:    
   
   
 Dose:  20 mg Take 20 mg by mouth daily. Refills:  0 SINUS DECONGESTANT PO Your last dose was: Your next dose is:    
   
   
 Dose:  1 Tab Take 1 Tab by mouth. Refills:  0 STOP taking these medications TYLENOL ARTHRITIS PAIN 650 mg CR tablet Generic drug:  acetaminophen Where to Get Your Medications Information on where to get these meds will be given to you by the nurse or doctor. ! Ask your nurse or doctor about these medications  
  aspirin delayed-release 81 mg tablet  
 cephALEXin 250 mg capsule HYDROcodone-acetaminophen 5-325 mg per tablet  
 polyethylene glycol 17 gram/dose powder  
 senna-docusate 8.6-50 mg per tablet Discharge Instructions Terra Lagunas Surgery: Total Knee Replacement Surgeon:   Richard Russell MD 
Surgery Date:  6/22/2017 ? To relieve pain: ? Use ice/gel packs. ? Put the ice pack directly over the wound, or anywhere you are hurting or swollen. ? To control pain and swelling, keep ice on regularly, especially after physical activity. ? The packs should stay cold for 3-4 hours. When it is not cold anymore, rotate with the packs in the freezer. ? Elevate your leg. This will also keep swelling down. ? Rest for at least 20 minutes between activity or exercises.  
 
? To keep track of your pain medications, write down what you take and when you take it. ? The last dose of pain medication you got in the hospital was:    
 
Medication Dose Date & Time ? Choose your medications based on the pain scale below: ? To keep your pain under control, take Tylenol every 6 hours for 14 days - even if you feel like you dont need it. ? For mild to moderate pain (1-6 on pain scale), take one pain pill every 3-4 hours as needed. ? For severe pain (7-10 on pain scale), take two pain pills every 3-4 hours as needed. ? To prevent nausea, take your pain medications with food. Pain Scale ? As your pain lessens: 
 
? Slowly start taking less pain medication. You may do this by waiting longer between doses or by taking smaller doses. ? Stop using the pain medications as soon as you no longer need it, usually in 2-3 weeks. ? Aspirin ? To prevent blood clots, you will need to take Aspirin 325 mg twice a day for 30 days. ? To prevent stomach upset or bleeding: ? Do not take non-steroidal anti-inflammatory medications (Ibuprofen, Advil, Motrin, Naproxen, etc.) ? Take Pepcid 20 mg twice a day, or a similar home medication, while you are taking a blood thinner. OPSITE (Honeycomb dressing) ? Keep your clear, waterproof dressing in place for 5-7 days after your leave the hospital. 
 
? If you are still having drainage, you will need to change your dressing in 5-7 days. You will be given one extra dressing to use at home. ? If there is no more drainage from the wound, you may leave it open to the air. PRINEO DRESSING INSTRUCTIONS ? [place]field Muskrat is a type of skin glue and mesh that is keeping your wound together. ? Leave this covering alone. Do not peel it off.  
 
? Do not apply oils or lotions over it. ? You may shower with this dressing but do not soak it.   Gently pat your wound dry when you get out of the shower. ? DO NOTs: 
? Do not rub your surgical wound ? Do not put lotion or oils on your wound. ? Do not take a tub bath or go swimming until your doctor says it is ok. 
 
? You may shower with this dressing over your wound. ? After showering pat the dressing dry. ? If you have staples a home health nurse will remove them in about 10 days. ? To increase and promote healing: 
? Stop Smoking (or at least cut back on 
     Smoking). ? Eat a well-balanced diet (high in protein 
     and vitamin C). ? If you have a poor appetite, drink Ensure, Glucerna, or  
      [place] Instant Breakfast for the next 30 days. ? If you are diabetic, you should control your blood  
      sugars to prevent infection and help your wound  
      to heal. 
               
 
 
 
? To prevent constipation, stay active and drink plenty of fluid. ? While using pain medications, you should also take stool softeners and laxatives, such as Pericolace 
     and Miralax. ? If you are having too many bowel Movements, then you may need 
     to stop taking the laxatives. ? You should have a bowel movement 3-4 days  
     after surgery and then at least every other day while 
     on pain medication. ? To improve your recovery, you must be active! ? Use your walker and take short walks (in your home)  
      about every 2 hours during the day. ? Try to increase how far you walk each day. ? You can put as much weight on your leg as you can tolerate while walking. WBAT   
 
? To avoid getting a stiff knee, work on getting your knee bent and straight as soon as possible. ? Home health physical therapy will come to your 
      home a few times per week to teach you how to 
      get out of bed, to safely walk in your home, and 
      to do your exercises. ? NO DRIVING until your surgeon tells you it is ok. ? You can return to work when cleared by a physician. ? Please call your physician immediately if you have: 
 
? Constant bleeding from your wound. ? Increasing redness or swelling around your wound (Some warmth, bruising and swelling is normal). ? Change in wound drainage (increase in amount, color, or bad smell). ? Change in mental status (unusual behavior ? Temperature over 101.5 degrees Fahrenheit ? Pain or redness in the calf (back of your lower leg  see picture) ? Increased swelling of the thigh, ankle, calf, or foot. ? Emergency: CALL 911 if you have: 
 
? Shortness of breath ? Chest pain when you cough or taking a deep breath ? Please call your surgeons office at [phone] for a follow up appointment. _ 
? You should call as soon as you get home or the next day after discharge. Ask to make an appointment in 2 weeks. ? If you have questions or concerns during normal business hours, you may reach Dr. Eliazar Estes' Team at [phone]. Discharge Orders None Introducing [facility]! Teresa York introduces [employer] patient portal. Now you can access parts of your medical record, email your doctor's office, and request medication refills online. 1. In your internet browser, go to [email or web address] 2. Click on the First Time User? Click Here link in the Sign In box. You will see the New Member Sign Up page. 3. Enter your [employer] Access Code exactly as it appears below. You will not need to use this code after youve completed the sign-up process. If you do not sign up before the expiration date, you must request a new code. · [employer] Access Code: [email or web address] Expires: 9/17/2017  8:53 AM 
 
4. Enter the last four digits of your Social Security Number (xxxx) and Date of Birth (mm/dd/yyyy) as indicated and click Submit. You will be taken to the next sign-up page. 5. Create a [employer] ID. This will be your [employer] login ID and cannot be changed, so think of one that is secure and easy to remember. 6. Create a [employer] password. You can change your password at any time. 7. Enter your Password Reset Question and Answer. This can be used at a later time if you forget your password. 8. Enter your e-mail address. You will receive e-mail notification when new information is available in [address] 9. Click Sign Up. You can now view and download portions of your medical record. 10. Click the Download Summary menu link to download a portable copy of your medical information. If you have questions, please visit the Frequently Asked Questions section of the [employer] website. Remember, [employer] is NOT to be used for urgent needs. For medical emergencies, dial 911. Now available from your iPhone and Android! General Information Please provide this summary of care documentation to your next provider. Patient Signature:  ____________________________________________________________ Date:  ____________________________________________________________  
  
[facility] Provider Signature:  ____________________________________________________________ Date:  ____________________________________________________________

## 2017-06-22 NOTE — PROGRESS NOTES
Problem: Mobility Impaired (Adult and Pediatric)  Goal: *Acute Goals and Plan of Care (Insert Text)  Physical Therapy Goals  Initiated 6/22/2017    1. Patient will move from supine to sit and sit to supine in bed with modified independence within 4 days. 2. Patient will perform sit to stand with modified independence within 4 days. 3. Patient will ambulate with modified independence for 150 feet with the least restrictive device within 4 days. 4. Patient will perform home exercise program per protocol with supervision/set-up within 4 days. 5. Patient will demonstrate AROM 0-90 degrees in operative joint within 4 days. PHYSICAL THERAPY KNEE EVALUATION  Patient: Darling Prasad (77 y.o. female)  Date: 6/22/2017  Primary Diagnosis: RIGHT KNEE OSTEOARTHRITIS  OA (osteoarthritis) of knee  Procedure(s) (LRB):  RIGHT TOTAL KNEE ARTHROPLASTY WITH NAVIGATION (Right) Day of Surgery   Precautions:   WBAT, Fall      ASSESSMENT :  Based on the objective data described below, the patient presents with decreased ROM and strength in RLE, impaired functional mobility. Patient presented with absent R ankle dorsiflexion, poor to no quad contraction, impaired sensation from knee distally. Patient received supine in bed and agreeable to therapy. Patient completed supine to sit edge of bed with min assist to manage RLE on and off the bed. Patient able to sit edge of bed with supervision, no LOB, and VSS. OOB and standing deferred at this time given patient's inability to complete a quad contraction and absent dorsiflexion. Patient will continue to benefit from PT to progress mobility as tolerated and reach highest level of independence. D/C rec: anticipate home with HHPT if patient is able to progress well with acute therapies. Will continue to evaluate as patient is able to progress OOB. If patient regains strength and sensation would recommend RN staff attempt bed<>BSC transfer later this evening.       Patient will benefit from skilled intervention to address the above impairments. Patients rehabilitation potential is considered to be Good  Factors which may influence rehabilitation potential include:   [X]         None noted  [ ]         Mental ability/status  [ ]         Medical condition  [ ]         Home/family situation and support systems  [ ]         Safety awareness  [ ]         Pain tolerance/management  [ ]         Other:        PLAN :  Recommendations and Planned Interventions:  [X]           Bed Mobility Training             [X]    Neuromuscular Re-Education  [X]           Transfer Training                   [ ]    Orthotic/Prosthetic Training  [X]           Gait Training                         [ ]    Modalities  [X]           Therapeutic Exercises           [ ]    Edema Management/Control  [X]           Therapeutic Activities            [X]    Patient and Family Training/Education  [ ]           Other (comment):     Frequency/Duration: Patient will be followed by physical therapy twice daily to address goals. Discharge Recommendations: Home Health  Further Equipment Recommendations for Discharge: pt owns RW       SUBJECTIVE:   Patient stated I wish my leg was moving so I could get OOB.       OBJECTIVE DATA SUMMARY:   HISTORY:    Past Medical History:   Diagnosis Date    Arthritis      Asthma       Mild to Moderate     Cancer ([facility] [address] 1997     Breast left    Chronic pain       Right knee    Diabetes ([facility] [address]       Pre-diabetic    GERD (gastroesophageal reflux disease)      Heart failure (HCC)       Cardiomyopathy, HX of MI 1999    Ill-defined condition       Vertigo    Ill-defined condition 2003     HX of Urosepsis complicated by respiratory failure and pneumonnia    Thromboembolus ([facility] [address] 1969     during 2nd pregnancy     Past Surgical History:   Procedure Laterality Date    HX GYN   1988     JONNY    HX GYN         Left Breast Mastectomy    HX HEENT   2015     Bilateral Cataract     HX VASCULAR ACCESS         Port a cath on the right     Prior Level of Function/Home Situation: independent, ambulatory without assistive device, lives alone  Personal factors and/or comorbidities impacting plan of care:      Home Situation  Home Environment: Apartment  # Steps to Enter: 0  One/Two Story Residence: One story  Living Alone: Yes  Support Systems: Family member(s), Friends \ neighbors, [facility]  Patient Expects to be Discharged to[de-identified] Private residence  Current DME Used/Available at Home: Walker, rolling     EXAMINATION/PRESENTATION/DECISION MAKING:   Critical Behavior:  Neurologic State: Alert  Orientation Level: Oriented to person, Oriented to situation  Cognition: Follows commands     Hearing: Auditory  Auditory Impairment: None  Hearing Aids/Status: Does not own  Skin:    Range Of Motion:  AROM: Generally decreased, functional                       Strength:    Strength: Generally decreased, functional (no active dorsiflexion; no quad contraction)                    Tone & Sensation:                  Sensation: Impaired (impaired to light touch from knee to foot)               Coordination:     Vision:      Functional Mobility:  Bed Mobility:     Supine to Sit: Minimum assistance (to mobilize RLE)  Sit to Supine: Minimum assistance (to mobilize RLE)     Transfers:  Sit to Stand:  (not tested)     Balance:   Sitting: Intact  Ambulation/Gait Training:        Therapeutic Exercises:         Functional Measure:  Barthel Index:      Bathin  Bladder: 10  Bowels: 10  Groomin  Dressin  Feeding: 10  Mobility: 0  Stairs: 0  Toilet Use: 5  Transfer (Bed to Chair and Back): 5  Total: 50         Barthel and G-code impairment scale:  Percentage of impairment CH  0% CI  1-19% CJ  20-39% CK  40-59% CL  60-79% CM  80-99% CN  100%   Barthel Score 0-100 100 99-80 79-60 59-40 20-39 1-19    0   Barthel Score 0-20 20 17-19 13-16 9-12 5-8 1-4 0      The Barthel ADL Index: Guidelines  1.  The index should be used as a record of what a patient does, not as a record of what a patient could do. 2. The main aim is to establish degree of independence from any help, physical or verbal, however minor and for whatever reason. 3. The need for supervision renders the patient not independent. 4. A patient's performance should be established using the best available evidence. Asking the patient, friends/relatives and nurses are the usual sources, but direct observation and common sense are also important. However direct testing is not needed. 5. Usually the patient's performance over the preceding 24-48 hours is important, but occasionally longer periods will be relevant. 6. Middle categories imply that the patient supplies over 50 per cent of the effort. 7. Use of aids to be independent is allowed. Nora Brown., Barthel, DWiltonW. (0205). Functional evaluation: the Barthel Index. [address] [facility] (14)2. BRENDA Gallegos, Akira Eduardo., [facility] [place], [address] [facility] (1999). Measuring the change indisability after inpatient rehabilitation; comparison of the responsiveness of the Barthel Index and Functional [place] Measure. Journal of Neurology, Neurosurgery, and Psychiatry, 66(4), 050-174. Nile Skiff, N.J.A, MELVIN Oquendo.J.ANTHONY, & Louie Razo, M.A. (2004.) Assessment of post-stroke quality of life in cost-effectiveness studies: The usefulness of the Barthel Index and the EuroQoL-5D. Quality of Life Research, 13, 200-52            G codes: In compliance with CMSs Claims Based Outcome Reporting, the following G-code set was chosen for this patient based on their primary functional limitation being treated: The outcome measure chosen to determine the severity of the functional limitation was the Barthel index with a score of 50/100 which was correlated with the impairment scale.       · Mobility - Walking and Moving Around:               - CURRENT STATUS:    CK - 40%-59% impaired, limited or restricted               - GOAL STATUS:           CJ - 20%-39% impaired, limited or restricted               - D/C STATUS:                       ---------------To be determined---------------          Based on the above components, the patient evaluation is determined to be of the following complexity level: LOW      Pain:  Pain Scale 1: Numeric (0 - 10)  Pain Intensity 1: 6  Pain Location 1: Knee  Pain Orientation 1: Right  Pain Description 1: Aching; Sore  Pain Intervention(s) 1: Medication (see MAR); Ice  Activity Tolerance:   Good. VSS  Please refer to the flowsheet for vital signs taken during this treatment. After treatment:   [ ]         Patient left in no apparent distress sitting up in chair  [X]         Patient left in no apparent distress in bed  [X]         Call bell left within reach  [X]         Nursing notified  [X]         Caregiver present  [ ]         Bed alarm activated      COMMUNICATION/EDUCATION:   The patients plan of care was discussed with: Registered Nurse.  [X]         Fall prevention education was provided and the patient/caregiver indicated understanding. [X]         Patient/family have participated as able in goal setting and plan of care. [X]         Patient/family agree to work toward stated goals and plan of care. [ ]         Patient understands intent and goals of therapy, but is neutral about his/her participation. [ ]         Patient is unable to participate in goal setting and plan of care.      Thank you for this referral.  Junior Le, PT , DPT   Time Calculation: 20 mins

## 2017-06-22 NOTE — ANESTHESIA POSTPROCEDURE EVALUATION
Post-Anesthesia Evaluation and Assessment    Patient: Regan Bertrand MRN: [id number]  SSN: [id number]    YOB: 1945  Age: 67 y.o. Sex: female       Cardiovascular Function/Vital Signs  Visit Vitals    /65    Pulse 73    Temp 36.7 °C (98 °F)    Resp 9    Ht 5' 5.5\" (1.664 m)    Wt 91.1 kg (200 lb 13.4 oz)    SpO2 93%    BMI 32.91 kg/m2       Patient is status post general anesthesia for Procedure(s):  RIGHT TOTAL KNEE ARTHROPLASTY WITH NAVIGATION. Nausea/Vomiting: None    Postoperative hydration reviewed and adequate. Pain:  Pain Scale 1: Numeric (0 - 10) (06/22/17 1021)  Pain Intensity 1: 0 (06/22/17 1021)   Managed    Neurological Status:   Neuro (WDL): Within Defined Limits (06/22/17 4652)  Neuro  Neurologic State: Alert (06/22/17 1021)  Orientation Level: Oriented to person;Oriented to place;Oriented to situation (06/22/17 1021)  Cognition: Follows commands (06/22/17 1021)  Speech: Clear (06/22/17 1021)  LUE Motor Response: Purposeful (06/22/17 1021)  LLE Motor Response: Purposeful (06/22/17 1021)  RUE Motor Response: Purposeful (06/22/17 1021)  RLE Motor Response: Purposeful (06/22/17 1021)   At baseline    Mental Status and Level of Consciousness: Arousable    Pulmonary Status:   O2 Device: Nasal mask (06/22/17 0940)   Adequate oxygenation and airway patent    Complications related to anesthesia: None    Post-anesthesia assessment completed.  No concerns    Signed By: Sherry Whitfield MD     June 22, 2017

## 2017-06-23 LAB
ANION GAP BLD CALC-SCNC: 6 MMOL/L (ref 5–15)
BUN SERPL-MCNC: 34 MG/DL (ref 6–20)
BUN/CREAT SERPL: 21 (ref 12–20)
CALCIUM SERPL-MCNC: 8 MG/DL (ref 8.5–10.1)
CHLORIDE SERPL-SCNC: 107 MMOL/L (ref 97–108)
CO2 SERPL-SCNC: 27 MMOL/L (ref 21–32)
CREAT SERPL-MCNC: 1.61 MG/DL (ref 0.55–1.02)
GLUCOSE BLD STRIP.AUTO-MCNC: 126 MG/DL (ref 65–100)
GLUCOSE BLD STRIP.AUTO-MCNC: 138 MG/DL (ref 65–100)
GLUCOSE BLD STRIP.AUTO-MCNC: 145 MG/DL (ref 65–100)
GLUCOSE SERPL-MCNC: 112 MG/DL (ref 65–100)
HGB BLD-MCNC: 11 G/DL (ref 11.5–16)
POTASSIUM SERPL-SCNC: 4 MMOL/L (ref 3.5–5.1)
SERVICE CMNT-IMP: ABNORMAL
SODIUM SERPL-SCNC: 140 MMOL/L (ref 136–145)

## 2017-06-23 PROCEDURE — G8989 SELF CARE D/C STATUS: HCPCS | Performed by: OCCUPATIONAL THERAPIST

## 2017-06-23 PROCEDURE — 74011250637 HC RX REV CODE- 250/637: Performed by: PHYSICIAN ASSISTANT

## 2017-06-23 PROCEDURE — 82962 GLUCOSE BLOOD TEST: CPT

## 2017-06-23 PROCEDURE — 97535 SELF CARE MNGMENT TRAINING: CPT | Performed by: OCCUPATIONAL THERAPIST

## 2017-06-23 PROCEDURE — G8987 SELF CARE CURRENT STATUS: HCPCS | Performed by: OCCUPATIONAL THERAPIST

## 2017-06-23 PROCEDURE — 36415 COLL VENOUS BLD VENIPUNCTURE: CPT | Performed by: PHYSICIAN ASSISTANT

## 2017-06-23 PROCEDURE — 65270000029 HC RM PRIVATE

## 2017-06-23 PROCEDURE — 97530 THERAPEUTIC ACTIVITIES: CPT | Performed by: OCCUPATIONAL THERAPIST

## 2017-06-23 PROCEDURE — 97110 THERAPEUTIC EXERCISES: CPT

## 2017-06-23 PROCEDURE — 74011250636 HC RX REV CODE- 250/636: Performed by: PHYSICIAN ASSISTANT

## 2017-06-23 PROCEDURE — 74011250636 HC RX REV CODE- 250/636: Performed by: ORTHOPAEDIC SURGERY

## 2017-06-23 PROCEDURE — 74011250637 HC RX REV CODE- 250/637: Performed by: NURSE PRACTITIONER

## 2017-06-23 PROCEDURE — 97165 OT EVAL LOW COMPLEX 30 MIN: CPT | Performed by: OCCUPATIONAL THERAPIST

## 2017-06-23 PROCEDURE — 77030027138 HC INCENT SPIROMETER -A

## 2017-06-23 PROCEDURE — 80048 BASIC METABOLIC PNL TOTAL CA: CPT | Performed by: PHYSICIAN ASSISTANT

## 2017-06-23 PROCEDURE — 74011000258 HC RX REV CODE- 258: Performed by: ORTHOPAEDIC SURGERY

## 2017-06-23 PROCEDURE — G8988 SELF CARE GOAL STATUS: HCPCS | Performed by: OCCUPATIONAL THERAPIST

## 2017-06-23 PROCEDURE — 97116 GAIT TRAINING THERAPY: CPT

## 2017-06-23 PROCEDURE — 85018 HEMOGLOBIN: CPT | Performed by: PHYSICIAN ASSISTANT

## 2017-06-23 RX ORDER — AMOXICILLIN 250 MG
1 CAPSULE ORAL 2 TIMES DAILY
Qty: 60 TAB | Refills: 0 | Status: SHIPPED | OUTPATIENT
Start: 2017-06-23 | End: 2020-01-14 | Stop reason: ALTCHOICE

## 2017-06-23 RX ORDER — ASPIRIN 81 MG/1
81 TABLET ORAL 2 TIMES DAILY
Status: DISCONTINUED | OUTPATIENT
Start: 2017-06-23 | End: 2017-06-25 | Stop reason: HOSPADM

## 2017-06-23 RX ORDER — ASPIRIN 81 MG/1
81 TABLET ORAL DAILY
Status: SHIPPED | COMMUNITY
Start: 2017-06-23 | End: 2020-01-01

## 2017-06-23 RX ORDER — LISINOPRIL 10 MG/1
10 TABLET ORAL 2 TIMES DAILY
Status: SHIPPED | COMMUNITY
Start: 2017-06-23 | End: 2020-01-14 | Stop reason: ALTCHOICE

## 2017-06-23 RX ORDER — POLYETHYLENE GLYCOL 3350 17 G/17G
17 POWDER, FOR SOLUTION ORAL DAILY
Qty: 255 G | Refills: 0 | Status: SHIPPED | OUTPATIENT
Start: 2017-06-23 | End: 2017-07-08

## 2017-06-23 RX ORDER — HYDROCODONE BITARTRATE AND ACETAMINOPHEN 5; 325 MG/1; MG/1
2 TABLET ORAL
Status: DISCONTINUED | OUTPATIENT
Start: 2017-06-23 | End: 2017-06-25 | Stop reason: HOSPADM

## 2017-06-23 RX ORDER — FUROSEMIDE 20 MG/1
30 TABLET ORAL DAILY
Status: SHIPPED | COMMUNITY
Start: 2017-06-23 | End: 2020-01-14 | Stop reason: ALTCHOICE

## 2017-06-23 RX ORDER — CEPHALEXIN 250 MG/1
500 CAPSULE ORAL 4 TIMES DAILY
Qty: 112 CAP | Refills: 0 | Status: SHIPPED | OUTPATIENT
Start: 2017-06-23 | End: 2017-07-07

## 2017-06-23 RX ORDER — ASPIRIN 81 MG/1
81 TABLET ORAL 2 TIMES DAILY
Qty: 60 TAB | Refills: 0 | Status: SHIPPED | OUTPATIENT
Start: 2017-06-23 | End: 2017-07-23

## 2017-06-23 RX ORDER — HYDROCODONE BITARTRATE AND ACETAMINOPHEN 5; 325 MG/1; MG/1
1 TABLET ORAL
Status: DISCONTINUED | OUTPATIENT
Start: 2017-06-23 | End: 2017-06-25 | Stop reason: HOSPADM

## 2017-06-23 RX ORDER — HYDROCODONE BITARTRATE AND ACETAMINOPHEN 5; 325 MG/1; MG/1
1-2 TABLET ORAL
Qty: 80 TAB | Refills: 0 | Status: SHIPPED | OUTPATIENT
Start: 2017-06-23 | End: 2020-01-14 | Stop reason: ALTCHOICE

## 2017-06-23 RX ADMIN — Medication 10 ML: at 05:58

## 2017-06-23 RX ADMIN — ACETAMINOPHEN 650 MG: 325 TABLET, FILM COATED ORAL at 05:59

## 2017-06-23 RX ADMIN — HYDROCODONE BITARTRATE AND ACETAMINOPHEN 2 TABLET: 5; 325 TABLET ORAL at 20:46

## 2017-06-23 RX ADMIN — DEXAMETHASONE SODIUM PHOSPHATE 10 MG: 4 INJECTION, SOLUTION INTRAMUSCULAR; INTRAVENOUS at 08:38

## 2017-06-23 RX ADMIN — CEFAZOLIN SODIUM 1 G: 1 INJECTION, POWDER, FOR SOLUTION INTRAMUSCULAR; INTRAVENOUS at 20:46

## 2017-06-23 RX ADMIN — OXYCODONE HYDROCHLORIDE 10 MG: 5 TABLET ORAL at 04:25

## 2017-06-23 RX ADMIN — FLUTICASONE FUROATE 1 PUFF: 100 POWDER RESPIRATORY (INHALATION) at 08:55

## 2017-06-23 RX ADMIN — SENNOSIDES AND DOCUSATE SODIUM 1 TABLET: 8.6; 5 TABLET ORAL at 17:32

## 2017-06-23 RX ADMIN — OXYCODONE HYDROCHLORIDE 10 MG: 5 TABLET ORAL at 08:40

## 2017-06-23 RX ADMIN — FENOFIBRATE 145 MG: 145 TABLET ORAL at 08:39

## 2017-06-23 RX ADMIN — CEFAZOLIN 2 G: 10 INJECTION, POWDER, FOR SOLUTION INTRAVENOUS; PARENTERAL at 08:37

## 2017-06-23 RX ADMIN — POLYETHYLENE GLYCOL 3350 17 G: 17 POWDER, FOR SOLUTION ORAL at 08:37

## 2017-06-23 RX ADMIN — MUPIROCIN 1 G: 20 OINTMENT TOPICAL at 17:34

## 2017-06-23 RX ADMIN — PANTOPRAZOLE SODIUM 40 MG: 40 TABLET, DELAYED RELEASE ORAL at 08:37

## 2017-06-23 RX ADMIN — CARVEDILOL 12.5 MG: 12.5 TABLET, FILM COATED ORAL at 08:38

## 2017-06-23 RX ADMIN — ONDANSETRON HYDROCHLORIDE 4 MG: 2 INJECTION, SOLUTION INTRAMUSCULAR; INTRAVENOUS at 08:49

## 2017-06-23 RX ADMIN — CARVEDILOL 12.5 MG: 12.5 TABLET, FILM COATED ORAL at 17:32

## 2017-06-23 RX ADMIN — Medication 10 ML: at 20:47

## 2017-06-23 RX ADMIN — Medication 10 ML: at 13:35

## 2017-06-23 RX ADMIN — REGULAR STRENGTH 325 MG: 325 TABLET ORAL at 08:39

## 2017-06-23 RX ADMIN — FAMOTIDINE 10 MG: 20 TABLET ORAL at 08:39

## 2017-06-23 RX ADMIN — HYDROCODONE BITARTRATE AND ACETAMINOPHEN 2 TABLET: 5; 325 TABLET ORAL at 15:55

## 2017-06-23 RX ADMIN — ASPIRIN 81 MG: 81 TABLET, COATED ORAL at 17:32

## 2017-06-23 RX ADMIN — SENNOSIDES AND DOCUSATE SODIUM 1 TABLET: 8.6; 5 TABLET ORAL at 08:40

## 2017-06-23 RX ADMIN — MUPIROCIN 1 G: 20 OINTMENT TOPICAL at 08:56

## 2017-06-23 RX ADMIN — SODIUM CHLORIDE 500 ML: 900 INJECTION, SOLUTION INTRAVENOUS at 10:11

## 2017-06-23 NOTE — PROGRESS NOTES
Problem: Mobility Impaired (Adult and Pediatric)  Goal: *Acute Goals and Plan of Care (Insert Text)  Physical Therapy Goals  Initiated 6/22/2017    1. Patient will move from supine to sit and sit to supine in bed with modified independence within 4 days. 2. Patient will perform sit to stand with modified independence within 4 days. 3. Patient will ambulate with modified independence for 150 feet with the least restrictive device within 4 days. 4. Patient will perform home exercise program per protocol with supervision/set-up within 4 days. 5. Patient will demonstrate AROM 0-90 degrees in operative joint within 4 days. PHYSICAL THERAPY TREATMENT  Patient: Larry Rivera (45 y.o. female)  Date: 6/23/2017  Diagnosis: RIGHT KNEE OSTEOARTHRITIS  OA (osteoarthritis) of knee <principal problem not specified>  Procedure(s) (LRB):  RIGHT TOTAL KNEE ARTHROPLASTY WITH NAVIGATION (Right) 1 Day Post-Op  Precautions: WBAT      ASSESSMENT:  Pt was received in supine and cleared by nursing florinda mobilize. Noted BP has been low and receiving IV bolus. She performed supine exercises then came to EOB at a mod I level. BPs taken and listed in flow sheet. Sit<>stand was performed with CGA/SBA to come to full stand with cues for hand placement. When she stood BP dropped and she was cued to perform standing marches. Ambulated around the bed and returned to supine due to increased dizziness and BP. She will likely do well with therapy once BP under control. Recommending HHPT and she has RW. Progression toward goals:  [ ]      Improving appropriately and progressing toward goals  [X]      Improving slowly and progressing toward goals  [ ]      Not making progress toward goals and plan of care will be adjusted       PLAN:  Patient continues to benefit from skilled intervention to address the above impairments. Continue treatment per established plan of care.   Discharge Recommendations:  Home Health  Further Equipment Recommendations for Discharge: has rolling walker       SUBJECTIVE:   Patient stated Prince Cabrera is this happening.       OBJECTIVE DATA SUMMARY:   Critical Behavior:  Neurologic State: Alert  Orientation Level: Oriented X4  Cognition: Appropriate decision making, Appropriate for age attention/concentration, Appropriate safety awareness, Follows commands  Safety/Judgement: Awareness of environment, Insight into deficits  Range of Motion:  AROM: Within functional limits (with the exception of the R knee)                       Functional Mobility Training:  Bed Mobility:     Supine to Sit: Modified independent  Sit to Supine: Minimum assistance  Scooting: Independent        Transfers:  Sit to Stand: Supervision  Stand to Sit: Supervision        Bed to Chair: Supervision                    Balance:  Sitting: Intact  Standing: Intact; With support  Ambulation/Gait Training:  Distance (ft): 15 Feet (ft)  Assistive Device: Gait belt;Walker, rolling  Ambulation - Level of Assistance: Contact guard assistance        Gait Abnormalities: Decreased step clearance        Base of Support: Widened     Speed/Tamiko: Pace decreased (<100 feet/min)                    Therapeutic Exercises:     EXERCISE   Sets   Reps   Active Active Assist   Passive Self ROM   Comments   Ankle Pumps 1 20 [X]                                        [ ]                                        [ ]                                        [ ]                                            Quad Sets 1 15 [X]                                        [ ]                                        [ ]                                        [ ]                                            Hamstring Sets     [ ]                                        [ ]                                        [ ]                                        [ ]                                            Wadsworth Bud     [ ]                                        [ ]                                        [ ] [ ]                                            Alysha Hernandez       [ ]                                          [ ]                                          [ ]                                          [ ]                                            Michael Vickers     [ ]                                        [ ]                                        [ ]                                        [ ]                                            Lisette Concepcion     [ ]                                        [ ]                                        [ ]                                        [ ]                                            Simón Hidalgo     [ ]                                        [ ]                                        [ ]                                        [ ]                                            Straight Leg Raises 1 5 [X]                                        [ ]                                        [ ]                                        [ ]                                               Pain:  Pain Scale 1: Numeric (0 - 10)  Pain Intensity 1: 3  Pain Location 1: Knee  Pain Orientation 1: Left  Pain Description 1: Aching  Pain Intervention(s) 1: Medication (see MAR)  Activity Tolerance:   Hypotensive   Please refer to the flowsheet for vital signs taken during this treatment.   After treatment:   [ ] Patient left in no apparent distress sitting up in chair  [X] Patient left in no apparent distress in bed  [X] Call bell left within reach  [X] Nursing notified  [ ] Caregiver present  [ ] Bed alarm activated      COMMUNICATION/COLLABORATION:   The patients plan of care was discussed with: Registered Nurse     Suhail Villanueva, PT, DPT   Time Calculation: 24 mins

## 2017-06-23 NOTE — PROGRESS NOTES
Bedside and Verbal shift change report given to Mar LUNA (oncoming nurse) by Leanne Castellon (offgoing nurse). Report included the following information SBAR, Kardex, Intake/Output, MAR and Recent Results.

## 2017-06-23 NOTE — PROGRESS NOTES
Patient's course of treatment, hospital stay and discharge planning were discussed by the interdisciplinary team which includes, nursing, nurse manager, nurse practitioner, care management, education, quality department representative, spiritual care, rehab therapy representative, rehab liason, and pharmacy.

## 2017-06-23 NOTE — PROGRESS NOTES
Problem: Mobility Impaired (Adult and Pediatric)  Goal: *Acute Goals and Plan of Care (Insert Text)  Physical Therapy Goals  Initiated 6/22/2017    1. Patient will move from supine to sit and sit to supine in bed with modified independence within 4 days. 2. Patient will perform sit to stand with modified independence within 4 days. 3. Patient will ambulate with modified independence for 150 feet with the least restrictive device within 4 days. 4. Patient will perform home exercise program per protocol with supervision/set-up within 4 days. 5. Patient will demonstrate AROM 0-90 degrees in operative joint within 4 days. PHYSICAL THERAPY TREATMENT  Patient: Tigist Phillips (45 y.o. female)  Date: 6/23/2017  Diagnosis: RIGHT KNEE OSTEOARTHRITIS  OA (osteoarthritis) of knee <principal problem not specified>  Procedure(s) (LRB):  RIGHT TOTAL KNEE ARTHROPLASTY WITH NAVIGATION (Right) 1 Day Post-Op  Precautions: WBAT      ASSESSMENT:  Pt was received in supine and cleared by nursing to mobilize. Noted pt now on 2L of oxygen which she was on RA earlier in the day. Attempted to take pt off O2 and oxygen saturation dropped ti 83% on RA and HR 40. NP not present and nursing did not seem concerned, nursing stated she still smokes but pt denies smoking for 20 years. Vitals monitored closely and listed in flow sheet. Pt came to EOB, stood and ambulated around the bed with RW and CGA, good step through pattern. Oxygen saturation dropped to 87% on 2L with activity. (CONCERNED FOR PE????)  She sat in the chair and cued to PLB, oxygen increased to 93%. AROM 0-93 degrees. BP taken and dropped. Nursing made aware. Feet reclined and performed LE exercises listed below. She was left sitting up in the chair at the end of the session. Ice and compression applied. Recommending HHPT.       When left oxygen 95% on 2L, HR 45  and /65 Nursing aware of entire session and vitals     Progression toward goals:  [ ]      Improving appropriately and progressing toward goals  [X]      Improving slowly and progressing toward goals  [ ]      Not making progress toward goals and plan of care will be adjusted       PLAN:  Patient continues to benefit from skilled intervention to address the above impairments. Continue treatment per established plan of care. Discharge Recommendations:  Home Health  Further Equipment Recommendations for Discharge:  Has RW       SUBJECTIVE:   40 is low for my HR      OBJECTIVE DATA SUMMARY:   Range of Motion:  AROM: Within functional limits (with the exception of the R knee)                       Functional Mobility Training:  Bed Mobility:     Supine to Sit: Modified independent  Sit to Supine: Minimum assistance  Scooting: Independent        Transfers:  Sit to Stand: Supervision  Stand to Sit: Supervision        Bed to Chair: Supervision                    Ambulation/Gait Training:  Distance (ft): 20 Feet (ft)  Assistive Device: Gait belt;Walker, rolling  Ambulation - Level of Assistance: Contact guard assistance        Gait Abnormalities: Decreased step clearance        Base of Support: Widened     Speed/Tamiko: Pace decreased (<100 feet/min)                 Therapeutic Exercises:   Exercises performed per protocol. See morning treatment note for description. Pain:  Pain Scale 1: Numeric (0 - 10)  Pain Intensity 1: 2  Pain Location 1: Knee  Pain Orientation 1: Left  Pain Description 1: Aching  Pain Intervention(s) 1: Medication (see MAR)  Activity Tolerance:   Hypoxic, hypotensive, bradycardia   Please refer to the flowsheet for vital signs taken during this treatment.   After treatment:   [X] Patient left in no apparent distress sitting up in chair  [ ] Patient left in no apparent distress in bed  [X] Call bell left within reach  [X] Nursing notified  [ ] Caregiver present  [ ] Bed alarm activated      COMMUNICATION/COLLABORATION:   The patients plan of care was discussed with: Registered Nurse Gregorio Centeno Kevin Hauser, PT, DPT   Time Calculation: 31 mins

## 2017-06-23 NOTE — PROGRESS NOTES
Bedside and Verbal shift change report given to Aarti Garcia (oncoming nurse) by Erin Rodríguez (offgoing nurse). Report included the following information SBAR, Kardex, Intake/Output, MAR, Accordion, Recent Results and Med Rec Status.

## 2017-06-23 NOTE — DISCHARGE INSTRUCTIONS
[facility]  Surgery: Total Knee Replacement  Surgeon:   Mira Verduzco MD  Surgery Date:  6/22/2017     To relieve pain:   Use ice/gel packs.  Put the ice pack directly over the wound, or anywhere you are hurting or swollen.  To control pain and swelling, keep ice on regularly, especially after physical activity.  The packs should stay cold for 3-4 hours. When it is not cold anymore, rotate with the packs in the freezer.  Elevate your leg. This will also keep swelling down.  Rest for at least 20 minutes between activity or exercises.  To keep track of your pain medications, write down what you take and when you take it.  The last dose of pain medication you got in the hospital was:       Medication    Dose    Date & Time             Choose your medications based on the pain scale below:     To keep your pain under control, take Tylenol every 6 hours for 14 days - even if you feel like you dont need it.  For mild to moderate pain (1-6 on pain scale), take one pain pill every 3-4 hours as needed.  For severe pain (7-10 on pain scale), take two pain pills every 3-4 hours as needed.  To prevent nausea, take your pain medications with food. Pain Scale                   As your pain lessens:     Slowly start taking less pain medication. You may do this by waiting longer between doses or by taking smaller doses.  Stop using the pain medications as soon as you no longer need it, usually in 2-3 weeks.  Aspirin   To prevent blood clots, you will need to take Aspirin 325 mg twice a day for 30 days.  To prevent stomach upset or bleeding:   Do not take non-steroidal anti-inflammatory medications (Ibuprofen, Advil, Motrin, Naproxen, etc.)    Take Pepcid 20 mg twice a day, or a similar home medication, while you are taking a blood thinner.             OPSITE (Honeycomb dressing)     Keep your clear, waterproof dressing in place for 5-7 days after your leave the hospital.     If you are still having drainage, you will need to change your dressing in 5-7 days. You will be given one extra dressing to use at home.  If there is no more drainage from the wound, you may leave it open to the air. [address] is a type of skin glue and mesh that is keeping your wound together.  Leave this covering alone. Do not peel it off.  Do not apply oils or lotions over it.  You may shower with this dressing but do not soak it. Gently pat your wound dry when you get out of the shower.  DO NOTs:   Do not rub your surgical wound   Do not put lotion or oils on your wound.  Do not take a tub bath or go swimming until your doctor says it is ok.  You may shower with this dressing over your wound.  After showering pat the dressing dry.  If you have staples a home health nurse will remove them in about 10 days.  To increase and promote healing:   Stop Smoking (or at least cut back on       Smoking).  Eat a well-balanced diet (high in protein       and vitamin C).  If you have a poor appetite, drink Ensure, Glucerna, or         [place] Instant Breakfast for the next 30 days.  If you are diabetic, you should control your blood         sugars to prevent infection and help your wound         to heal.                         To prevent constipation, stay active and drink plenty of fluid.  While using pain medications, you should also take stool softeners and laxatives, such as Pericolace       and Miralax.  If you are having too many bowel       Movements, then you may need       to stop taking the laxatives.  You should have a bowel movement 3-4 days        after surgery and then at least every other day while       on pain medication.        To improve your recovery, you must be active!  Use your walker and take short walks (in your home)         about every 2 hours during the day.  Try to increase how far you walk each day.  You can put as much weight on your leg as you can tolerate while walking. WBAT       To avoid getting a stiff knee, work on getting your knee bent and straight as soon as possible.  Home health physical therapy will come to your        home a few times per week to teach you how to        get out of bed, to safely walk in your home, and        to do your exercises.  NO DRIVING until your surgeon tells you it is ok.  You can return to work when cleared by a physician.  Please call your physician immediately if you have:     Constant bleeding from your wound.  Increasing redness or swelling around your wound (Some warmth, bruising and swelling is normal).  Change in wound drainage (increase in amount, color, or bad smell).  Change in mental status (unusual behavior   Temperature over 101.5 degrees Fahrenheit      Pain or redness in the calf (back of your lower leg - see picture)     Increased swelling of the thigh, ankle, calf, or foot.  Emergency: CALL 911 if you have:     Shortness of breath     Chest pain when you cough or taking a deep breath     Please call your surgeons office at [phone] for a follow up appointment. _   You should call as soon as you get home or the next day after discharge. Ask to make an appointment in 2 weeks.  If you have questions or concerns during normal business hours, you may reach Dr. Lul Richard' Team at [phone].

## 2017-06-23 NOTE — PROGRESS NOTES
Occupational Therapy EVALUATION/discharge  Patient: Daniela Braden (89 y.o. female)  Date: 6/23/2017  Primary Diagnosis: RIGHT KNEE OSTEOARTHRITIS  OA (osteoarthritis) of knee  Procedure(s) (LRB):  RIGHT TOTAL KNEE ARTHROPLASTY WITH NAVIGATION (Right) 1 Day Post-Op   Precautions:  WBAT    ASSESSMENT:   Based on the objective data described below, the patient presents with mildly decreased balance and decreased safety awareness, as well as post op decreased R knee ROM and pain which is impacting her functional independence. Upon completion of all training and education provided during this evaluation the patient was able to perform ADLs at a supervision level, is mod I for becd mobility and is supervision for functional mobility with the exception of CGA being needed for tub transfers. Patient noted to demonstrate fair overall safety awareness during functional mobility, standing ADLs and when performing ADL setup and mobilizing with a RW, needing occasional safety cues. Further skilled acute occupational therapy is not indicated at this time, but she will benefit from [facility] for continued ADL and IADL related safety training upon anticipated discharge tomorrow. Also recommend supervision of family during her first few days at home, as she normally lives alone.    Discharge Recommendations: Home Health OT and PT, with supervision   Further Equipment Recommendations for Discharge: none        OBJECTIVE DATA SUMMARY:   HISTORY:   Past Medical History:   Diagnosis Date    Arthritis     Asthma     Mild to Moderate     Cancer ([address] 1997    Breast left    Chronic pain     Right knee    Diabetes ([address]     Pre-diabetic    GERD (gastroesophageal reflux disease)     Heart failure ([address]     Cardiomyopathy, HX of MI 1999    Ill-defined condition     Vertigo    Ill-defined condition 2003    HX of Urosepsis complicated by respiratory failure and pneumonnia    Thromboembolus ([address] 1969    during 2nd pregnancy     Past Surgical History:   Procedure Laterality Date    HX GYN  1988    JONNY    HX GYN      Left Breast Mastectomy    HX HEENT  2015    Bilateral Cataract     HX VASCULAR ACCESS      Port a cath on the right       Prior Level of Function/Home Situation: independent  Expanded or extensive additional review of patient history:     Home Situation  Home Environment: Apartment  # Steps to Enter: 0  One/Two Story Residence: One story  Living Alone: Yes  Support Systems: Family member(s), Friends \ neighbors, [place] / paty community  Patient Expects to be Discharged to[de-identified] Private residence  Current DME Used/Available at Home: Walker, rolling (raised toilet)  [x]  Right hand dominant   []  Left hand dominant    EXAMINATION OF PERFORMANCE DEFICITS:  Cognitive/Behavioral Status:  Neurologic State: Alert  Orientation Level: Oriented X4  Cognition: Appropriate decision making; Appropriate for age attention/concentration; Appropriate safety awareness; Follows commands  Perception: Appears intact  Perseveration: No perseveration noted  Safety/Judgement: Awareness of environment; Insight into deficits    Hearing: Auditory  Auditory Impairment: None  Hearing Aids/Status: Does not own    Vision/Perceptual:    Acuity: Within Defined Limits    Corrective Lenses: Reading glasses    Range of Motion:  AROM: Within functional limits (with the exception of the R knee)                         Strength:  Strength: Within functional limits (with the exception of the R knee)                Coordination:  Coordination: Within functional limits            Tone & Sensation:  Tone: Normal  Sensation: Intact                      Balance:  Sitting: Intact  Standing: Intact; With support    Functional Mobility and Transfers for ADLs:  Bed Mobility:  Supine to Sit: Modified independent  Sit to Supine: Minimum assistance  Scooting: Independent    Transfers:  Sit to Stand: Supervision  Stand to Sit: Supervision  Bed to Chair: Supervision  Toilet Transfer : Supervision  Tub Transfer: Contact guard assistance, side stepping in and out. ADL Assessment:  Feeding: Independent    Oral Facial Hygiene/Grooming: Independent    Bathing: Supervision    Upper Body Dressing: Supervision    Lower Body Dressing: Supervision    Toileting: Supervision                ADL Intervention and task modifications:  Provided LB dressing, toilet transfer, tub transfer and safety training related to ADL set up and light meal prep when mobilizing with a RW. Patient verbalized full understanding of safety training related to performance of light meal prep, and demonstrated performance of ADL setup involving retrieval of needed clothing items with minimal safety cues. Patient performed LB dressing with supervision, by dressing surgical leg first and using bending forward method, while seated EOB. Patient performed tub and toilet transfers with CGA, needing minimal cues for safety. Recommended supervision/CGA for showers and transfers in and out of tub. Functional Measure:  Barthel Index:    Bathin  Bladder: 10  Bowels: 10  Groomin  Dressin  Feeding: 10  Mobility: 0  Stairs: 0  Toilet Use: 5  Transfer (Bed to Chair and Back): 10  Total: 55       Barthel and G-code impairment scale:  Percentage of impairment CH  0% CI  1-19% CJ  20-39% CK  40-59% CL  60-79% CM  80-99% CN  100%   Barthel Score 0-100 100 99-80 79-60 59-40 20-39 1-19   0   Barthel Score 0-20 20 17-19 13-16 9-12 5-8 1-4 0      The Barthel ADL Index: Guidelines  1. The index should be used as a record of what a patient does, not as a record of what a patient could do. 2. The main aim is to establish degree of independence from any help, physical or verbal, however minor and for whatever reason. 3. The need for supervision renders the patient not independent. 4. A patient's performance should be established using the best available evidence.  Asking the patient, friends/relatives and nurses are the usual sources, but direct observation and common sense are also important. However direct testing is not needed. 5. Usually the patient's performance over the preceding 24-48 hours is important, but occasionally longer periods will be relevant. 6. Middle categories imply that the patient supplies over 50 per cent of the effort. 7. Use of aids to be independent is allowed. Clair Gerber., Barthel, D.W. (1324). Functional evaluation: the Barthel Index. [facility] (14)2. Velma adrianne BRENDA Zamudio, Sheri Lopez., Gurmeet Schmitz., [place], [facility] (1999). Measuring the change indisability after inpatient rehabilitation; comparison of the responsiveness of the Barthel Index and Functional Isabella Measure. Journal of Neurology, Neurosurgery, and Psychiatry, 66(4), 206-529. ABIOLA Sue, JEFFREY Oquendo, & Emily Alejandro M.A. (2004.) Assessment of post-stroke quality of life in cost-effectiveness studies: The usefulness of the Barthel Index and the EuroQoL-5D. Quality of Life Research, 13, 942-28       G codes: In compliance with CMSs Claims Based Outcome Reporting, the following G-code set was chosen for this patient based on their primary functional limitation being treated: The outcome measure chosen to determine the severity of the functional limitation was the Barthel Index with a score of 55/100 which was correlated with the impairment scale. ?  Self Care:     - CURRENT STATUS: CK - 40%-59% impaired, limited or restricted    - GOAL STATUS: CK - 40%-59% impaired, limited or restricted    - D/C STATUS:  CK - 40%-59% impaired, limited or restricted   Pain:  Pain Scale 1: Numeric (0 - 10)  Pain Intensity 1: 3  Pain Location 1: Knee  Pain Orientation 1: Left  Pain Description 1: Aching  Pain Intervention(s) 1: Medication (see MAR)    After treatment:   [x]  Patient left in no apparent distress sitting up in chair  []  Patient left in no apparent distress in bed  [x]  Call bell left within reach  [x]  Nursing notified  []  Caregiver present  []  Bed alarm activated    COMMUNICATION/EDUCATION:   Communication/Collaboration:  [x]      Home safety education was provided and the patient/caregiver indicated understanding. [x]      Patient/family have participated as able and agree with findings and recommendations. []      Patient is unable to participate in plan of care at this time.   Findings and recommendations were discussed with: Physical Therapist    Carlota Mares OTR/L  Time Calculation: 51 mins

## 2017-06-23 NOTE — PROGRESS NOTES
Pharmacy Automatic Renal Dosing Protocol - Antimicrobials    Indication for Antimicrobials: Surgical PPX - to be continued until discontinued by the provider due to hx of sepsis and post-operative infection  Current Regimen of Each Antimicrobial (Start Day & Day of Therapy):  Cefazolin 2g IV q8h (start  Day 2)    Significant Cultures: none  CAPD, Hemodialysis or Renal Replacement Therapy: none   Paralysis, amputations, malnutrition: none  Recent Labs      17   0352   CREA  1.61*   BUN  34*     Temp (24hrs), Av.5 °F (36.9 °C), Min:98.1 °F (36.7 °C), Max:99 °F (37.2 °C)    Creatinine Clearance (Creatinine Clearance (ml/min)): 29    Impression/Plan:   -Per original order - abx are to be continued until provider discontinues. - Will adjust Cefazolin to 1g IV q12h for declined renal fxn per hospital protocol. Pharmacy will follow daily and adjust medications as appropriate for renal function and/or serum levels.     Thank you,  Nicanor Echevarria, PHARMD     Renal Dosing Tables on [employer]

## 2017-06-23 NOTE — ROUTINE PROCESS
Bedside and Verbal shift change report given to Dia Santana (oncoming nurse) by Tiesha Bellamy RN (offgoing nurse). Report included the following information SBAR, Kardex, Intake/Output, MAR and Recent Results.

## 2017-06-23 NOTE — PROGRESS NOTES
Physical therapy reported a drop in pt. B/P and H/R, recheck v/s b/p 120/70 with H/R 80    Recheck B/P 121/49 with H/R 70 pt sitting in chair A&0 x4  Medicated for pain in right knee pain  8/10. family at bedside,deny any other discomfort,02 sat 90-93% 02 applied at 2L,pt stated she is a ex-smoker.

## 2017-06-23 NOTE — PROGRESS NOTES
Ortho/ NeuroSurgery NP Note    POD# 1 s/p RIGHT TOTAL KNEE ARTHROPLASTY WITH NAVIGATION     Pt resting in bed. No complaints. VSS Afebrile. Bangura removed. Patient is voiding in adequate amounts. Labs  Lab Results   Component Value Date/Time    HGB 11.0 06/23/2017 03:52 AM        Body mass index is 32.91 kg/(m^2). BMI greater than 30 is classified as obesity and greater than 40 is classified as morbid obesity. Dressing c.d.i, cryotherapy   Calves soft and supple; No pain with passive stretch  Sensation and motor intact  SCDs for mechanical DVT proph while in bed     PLAN:  1) PT BID  2) Aspirin 325 mg PO BID for DVT Prophylaxis   3) Plan d/c to home when cleared by PT.     Guru Yee NP

## 2017-06-24 LAB
ANION GAP BLD CALC-SCNC: 6 MMOL/L (ref 5–15)
BUN SERPL-MCNC: 35 MG/DL (ref 6–20)
BUN/CREAT SERPL: 24 (ref 12–20)
CALCIUM SERPL-MCNC: 8.4 MG/DL (ref 8.5–10.1)
CHLORIDE SERPL-SCNC: 108 MMOL/L (ref 97–108)
CO2 SERPL-SCNC: 25 MMOL/L (ref 21–32)
CREAT SERPL-MCNC: 1.44 MG/DL (ref 0.55–1.02)
GLUCOSE BLD STRIP.AUTO-MCNC: 110 MG/DL (ref 65–100)
GLUCOSE BLD STRIP.AUTO-MCNC: 116 MG/DL (ref 65–100)
GLUCOSE BLD STRIP.AUTO-MCNC: 118 MG/DL (ref 65–100)
GLUCOSE SERPL-MCNC: 117 MG/DL (ref 65–100)
HGB BLD-MCNC: 10.4 G/DL (ref 11.5–16)
POTASSIUM SERPL-SCNC: 4.6 MMOL/L (ref 3.5–5.1)
SERVICE CMNT-IMP: ABNORMAL
SODIUM SERPL-SCNC: 139 MMOL/L (ref 136–145)

## 2017-06-24 PROCEDURE — 74011250636 HC RX REV CODE- 250/636: Performed by: ORTHOPAEDIC SURGERY

## 2017-06-24 PROCEDURE — 65270000029 HC RM PRIVATE

## 2017-06-24 PROCEDURE — 85018 HEMOGLOBIN: CPT | Performed by: PHYSICIAN ASSISTANT

## 2017-06-24 PROCEDURE — 74011250637 HC RX REV CODE- 250/637: Performed by: NURSE PRACTITIONER

## 2017-06-24 PROCEDURE — 97116 GAIT TRAINING THERAPY: CPT

## 2017-06-24 PROCEDURE — 80048 BASIC METABOLIC PNL TOTAL CA: CPT | Performed by: PHYSICIAN ASSISTANT

## 2017-06-24 PROCEDURE — 82962 GLUCOSE BLOOD TEST: CPT

## 2017-06-24 PROCEDURE — 74011000258 HC RX REV CODE- 258: Performed by: ORTHOPAEDIC SURGERY

## 2017-06-24 PROCEDURE — 74011250637 HC RX REV CODE- 250/637: Performed by: PHYSICIAN ASSISTANT

## 2017-06-24 PROCEDURE — 97110 THERAPEUTIC EXERCISES: CPT

## 2017-06-24 PROCEDURE — 36415 COLL VENOUS BLD VENIPUNCTURE: CPT | Performed by: PHYSICIAN ASSISTANT

## 2017-06-24 RX ADMIN — FENOFIBRATE 145 MG: 145 TABLET ORAL at 08:17

## 2017-06-24 RX ADMIN — ASPIRIN 81 MG: 81 TABLET, COATED ORAL at 17:27

## 2017-06-24 RX ADMIN — PANTOPRAZOLE SODIUM 40 MG: 40 TABLET, DELAYED RELEASE ORAL at 08:18

## 2017-06-24 RX ADMIN — FLUTICASONE FUROATE 1 PUFF: 100 POWDER RESPIRATORY (INHALATION) at 08:21

## 2017-06-24 RX ADMIN — CEFAZOLIN SODIUM 1 G: 1 INJECTION, POWDER, FOR SOLUTION INTRAMUSCULAR; INTRAVENOUS at 08:16

## 2017-06-24 RX ADMIN — HYDROCODONE BITARTRATE AND ACETAMINOPHEN 2 TABLET: 5; 325 TABLET ORAL at 16:51

## 2017-06-24 RX ADMIN — SENNOSIDES AND DOCUSATE SODIUM 1 TABLET: 8.6; 5 TABLET ORAL at 08:17

## 2017-06-24 RX ADMIN — HYDROCODONE BITARTRATE AND ACETAMINOPHEN 2 TABLET: 5; 325 TABLET ORAL at 08:17

## 2017-06-24 RX ADMIN — CARVEDILOL 12.5 MG: 12.5 TABLET, FILM COATED ORAL at 17:27

## 2017-06-24 RX ADMIN — CARVEDILOL 12.5 MG: 12.5 TABLET, FILM COATED ORAL at 08:18

## 2017-06-24 RX ADMIN — MUPIROCIN 1 G: 20 OINTMENT TOPICAL at 17:35

## 2017-06-24 RX ADMIN — Medication 10 ML: at 22:32

## 2017-06-24 RX ADMIN — SENNOSIDES AND DOCUSATE SODIUM 1 TABLET: 8.6; 5 TABLET ORAL at 17:27

## 2017-06-24 RX ADMIN — CEFAZOLIN SODIUM 2 G: 1 INJECTION, POWDER, FOR SOLUTION INTRAMUSCULAR; INTRAVENOUS at 17:26

## 2017-06-24 RX ADMIN — POLYETHYLENE GLYCOL 3350 17 G: 17 POWDER, FOR SOLUTION ORAL at 08:16

## 2017-06-24 RX ADMIN — Medication 10 ML: at 06:41

## 2017-06-24 RX ADMIN — Medication 10 ML: at 16:51

## 2017-06-24 RX ADMIN — HYDROCODONE BITARTRATE AND ACETAMINOPHEN 2 TABLET: 5; 325 TABLET ORAL at 22:32

## 2017-06-24 RX ADMIN — ASPIRIN 81 MG: 81 TABLET, COATED ORAL at 08:17

## 2017-06-24 RX ADMIN — HYDROCODONE BITARTRATE AND ACETAMINOPHEN 2 TABLET: 5; 325 TABLET ORAL at 04:21

## 2017-06-24 RX ADMIN — MUPIROCIN 1 G: 20 OINTMENT TOPICAL at 08:20

## 2017-06-24 NOTE — PROGRESS NOTES
physical Therapy TREATMENT  Patient: Brittani Bear (57 y.o. female)  Date: 6/24/2017  Diagnosis: RIGHT KNEE OSTEOARTHRITIS  OA (osteoarthritis) of knee <principal problem not specified>  Procedure(s) (LRB):  RIGHT TOTAL KNEE ARTHROPLASTY WITH NAVIGATION (Right) 2 Days Post-Op  Precautions: WBAT    ASSESSMENT:  Pt cleared for session by RN. Pt received in bedside chair on 2L. Nursing recommends trial w/o O2 at pt is w/o O2 at home and has been doing well today. Pt basically at S level for functional mobility and gait with RW. She is still antalgic on R but uses step through gait with RW. On room air, sats at rest 91-94%. HR 70s. Pt amb ~70 feet and sats remain 91%, HR stable. At ~100', attempted monitoring of sats/HR but difficult to obtain consistent pleth, however, pt complained of feeling nauseated, dry heaves and requested to return immediately to room. Pt seated on EOB. Able to obtain consistent pleth with sats reading at 80% on room air and HR reading fluctuating 115-150. Pt given O2 again at 2L and sats returned to 97% and HR to 80s. Pt stated dry heaves passess after application of O2. No dizziness reported or CP. RN present and aware of situation. Pt left in bed with call bell in reach. At this time, there is concern for pt's response to activity/amb as noted and reason for poor tolerance with conern for d/c home alone; if not deemed to have an acute cause, pt may need O2 at home given above. She would need 24 hr S due to response and with new use of O2 tubing. She would also need HHPT to continue to monitor. Progression toward goals:  []      Improving appropriately and progressing toward goals  [x]      Improving slowly and progressing toward goals  []      Not making progress toward goals and plan of care will be adjusted     PLAN:  Patient continues to benefit from skilled intervention to address the above impairments. Continue treatment per established plan of care.   Discharge Recommendations:  Home Health  Further Equipment Recommendations for Discharge:  RW     SUBJECTIVE:   Patient stated I feel like I have the dry heaves.     OBJECTIVE DATA SUMMARY:   Critical Behavior:  Neurologic State: Alert, Eyes open spontaneously  Orientation Level: Oriented X4  Cognition: Follows commands  Safety/Judgement: Awareness of environment, Insight into deficits  Range of Motion:  AROM:  (R knee flexion to 95 degrees)                       Functional Mobility Training:  Bed Mobility:        Sit to Supine: Supervision           Transfers:  Sit to Stand: Supervision (cues for hand placement)  Stand to Sit: Supervision                             Balance:  Sitting: Intact  Standing: Impaired  Standing - Static: Good  Standing - Dynamic : Fair  Ambulation/Gait Training:  Distance (ft): 140 Feet (ft)  Assistive Device: Walker, rolling  Ambulation - Level of Assistance: Supervision        Gait Abnormalities: Antalgic;Decreased step clearance (antalgic R)        Base of Support: Shift to left  Stance: Right decreased; Left increased  Speed/Tamiko: Slow  Step Length: Other (comment) (step through with RW)                     Pain:  Pain Scale 1: Visual  Pain Intensity 1: 0  Pain Location 1: Knee  Pain Orientation 1: Left  Pain Description 1: Aching  Pain Intervention(s) 1: Cold pack;Repositioned  Activity Tolerance:   See above narrative    Please refer to the flowsheet for vital signs taken during this treatment.   After treatment:   [] Patient left in no apparent distress sitting up in chair  [x] Patient left in no apparent distress in bed  [x] Call bell left within reach  [x] Nursing notified  [] Caregiver present  [] Bed alarm activated    COMMUNICATION/COLLABORATION:   The patients plan of care was discussed with: Registered Nurse    Jessica Wynn, PT   Time Calculation: 23 mins

## 2017-06-24 NOTE — ROUTINE PROCESS
Resting without oxygen pt desated to 89% on room air. With 2 L of O2 pt o2% resting is 99%. Upon ambulation pt 02 sat on room air 80%. Upon ambulation with 2L o2 pt o2 sat 97%.

## 2017-06-24 NOTE — PROGRESS NOTES
Pharmacy Automatic Renal Dosing Protocol - Antimicrobials    Indication for Antimicrobials: surgical prophylaxis  Current Regimen of Each Antimicrobial (Start Day & Day of Therapy):  Cefazolin 1 gm q12h, day #3 for cefazolin ()    Significant Cultures: n/a  CAPD, Hemodialysis or Renal Replacement Therapy: n/a  Paralysis, amputations, malnutrition: n/a  Recent Labs      17   0300  17   0352   CREA  1.44*  1.61*   BUN  35*  34*     Temp (24hrs), Av.7 °F (36.5 °C), Min:97.4 °F (36.3 °C), Max:98.1 °F (36.7 °C)    Creatinine Clearance (Creatinine Clearance (ml/min)): ~ 41 ml/min    Impression/Plan: will adjust dose back to original dose of 2 gm q8h due to improvement in renal function       Pharmacy will follow daily and adjust medications as appropriate for renal function and/or serum levels.     Thank you,  Kenneth Lino

## 2017-06-24 NOTE — ROUTINE PROCESS
Bedside and Verbal shift change report given to Yosef Burns (oncoming nurse) by Susanna Castillo (offgoing nurse). Report included the following information SBAR, Kardex, Intake/Output, MAR and Recent Results.

## 2017-06-24 NOTE — PROGRESS NOTES
Spoke to TIFFANY SARMIENTO about pt desating down into 80s upon ambulation without o2 during pt today and yesterday.

## 2017-06-24 NOTE — PROGRESS NOTES
CM met with Pt to arrange home O2 and HH. FOC offered and Pt selected [phone] Referral sent for [facility] reporting anticipated D/C date to be for tomorrow June 26. [facility] is aware of need for Home O2, referral sent via [facility] and information for  to  tomorrow (order, stats, demographics, F2F). CM tomorrow will need to call [facility] to set up time for delivery. CM made nursing aware of plan of care.     Rupinder Torres, [facility]  Ext # [phone]

## 2017-06-24 NOTE — PROGRESS NOTES
ORTHO POST OP SPINE PROGRESS NOTE    2017  Admit Date: 2017  Admit Diagnosis: RIGHT KNEE OSTEOARTHRITIS  OA (osteoarthritis) of knee  Procedure: Procedure(s):  RIGHT TOTAL KNEE ARTHROPLASTY WITH NAVIGATION  Post Op day: 2 Days Post-Op    Subjective:     Clau Parker is a patient who has complaints of R knee pain s/p R TKR. POD #2. improving. pt states that she wants to go home . history of infections. Review of Systems: Pertinent items are noted in HPI. Objective:     PT/OT:   Distance Ambulated:           Time Ambulated (min):        Ambulation Response: Activity Response: Tolerated well  Assistive Device:              Assistive Device: Fall prevention device, Walker (comment)    Vital Signs:    Blood pressure 113/61, pulse 73, temperature 97.5 °F (36.4 °C), resp. rate 18, height 5' 5.5\" (1.664 m), weight 99.9 kg (220 lb 4.8 oz), SpO2 92 %. Temp (24hrs), Av °F (36.7 °C), Min:97.5 °F (36.4 °C), Max:98.3 °F (36.8 °C)          1901 -  0700  In: 2500 [P.O.:1200; I.V.:1300]  Out: 950 [Urine:950]    LAB:    Recent Labs      17   0300   HGB  10.4*       Wound/Drain Assessment:  Drain:      Dressing:     Physical Exam:  Incision clean, dry, and intact  NVI    Assessment:      Patient Active Problem List   Diagnosis Code    OA (osteoarthritis) of knee M17.10       Plan:     Continue PT/OT/Rehab  Discontinue: IV  Consult: PT  and OT    Discharge To: home. today.     keflex for 2 weeks    Signed By: TORSTEN Laurent

## 2017-06-24 NOTE — PROGRESS NOTES
Ortho:  Pt's O2 -80 on RA with activity - symptomatic -requiring pt to return to the room and sit down. Nasal canula applied, vitals return to nml, 97%  Pt has required constant  2L O2 via nasal canula since surgery to maintain sats above 90  On room air - at rest 89%.      Pt will require home 02-and home health ck when discharged      Yina Hernandes, [address] [place] [address]

## 2017-06-24 NOTE — PROGRESS NOTES
Problem: Mobility Impaired (Adult and Pediatric)  Goal: *Acute Goals and Plan of Care (Insert Text)  Physical Therapy Goals  Initiated 6/22/2017    1. Patient will move from supine to sit and sit to supine in bed with modified independence within 4 days. 2. Patient will perform sit to stand with modified independence within 4 days. 3. Patient will ambulate with modified independence for 150 feet with the least restrictive device within 4 days. 4. Patient will perform home exercise program per protocol with supervision/set-up within 4 days. 5. Patient will demonstrate AROM 0-90 degrees in operative joint within 4 days. PHYSICAL THERAPY TREATMENT  Patient: Maame Davies (89 y.o. female)  Date: 6/24/2017  Diagnosis: RIGHT KNEE OSTEOARTHRITIS  OA (osteoarthritis) of knee <principal problem not specified>  Procedure(s) (LRB):  RIGHT TOTAL KNEE ARTHROPLASTY WITH NAVIGATION (Right) 2 Days Post-Op  Precautions: WBAT      ASSESSMENT:  Pt seen for pm session. Now on 2L for session as well. MD is sending pt home with O2 and home health tomorrow per nurse. RN clears pt for session. Pt tolerating mobility much better with use of O2. Sats remain 100% with gait and exercise. Pt completed gait with RW with S and there ex as noted below. Dtr and family/friends present. Reviewed home safety issues with O2 cording and recommended family initially supervise pt and aid her in identifying any issues as well as following through with [facility] recommendations. Pt for d/c tomorrow with [facility] services. Progression toward goals:  [X]      Improving appropriately and progressing toward goals  [ ]      Improving slowly and progressing toward goals  [ ]      Not making progress toward goals and plan of care will be adjusted       PLAN:  Patient continues to benefit from skilled intervention to address the above impairments. Continue treatment per established plan of care.   Discharge Recommendations:  Home Health  Further Equipment Recommendations for Discharge:  RW       SUBJECTIVE:   Patient stated I feel better.       OBJECTIVE DATA SUMMARY:   Critical Behavior:  Neurologic State: Alert, Eyes open spontaneously  Orientation Level: Oriented X4  Cognition: Follows commands  Safety/Judgement: Awareness of environment, Insight into deficits  Range of Motion:  AROM:  (R knee flexion to 95 degrees)                       Functional Mobility Training:  Bed Mobility:     Supine to Sit: Independent  Sit to Supine: Independent           Transfers:  Sit to Stand: Modified independent  Stand to Sit: Modified independent                             Balance:  Sitting: Intact  Standing: Impaired  Standing - Static: Good  Standing - Dynamic : Good  Ambulation/Gait Training:  Distance (ft): 160 Feet (ft)  Assistive Device: Walker, rolling  Ambulation - Level of Assistance: Supervision        Gait Abnormalities: Antalgic        Base of Support: Shift to left  Stance: Right decreased; Left increased  Speed/Tamiko: Slow  Step Length:  Other (comment) (step through with RW)            Therapeutic Exercises:     EXERCISE   Sets   Reps   Active Active Assist   Passive Self ROM   Comments   Ankle Pumps     [X]                                        [ ]                                        [ ]                                        [ ]                                        Throughout day   Quad Sets     [ ]                                        [ ]                                        [ ]                                        [ ]                                            Hamstring Sets     [ ]                                        [ ]                                        [ ]                                        [ ]                                            Jorje Patch     [ ]                                        [ ]                                        [ ]                                        [ ] Knee Extension Stretch       [ ]                                          [ ]                                          [ ]                                          [ ]                                            Heel Slides 1 10 [X]                                        [ ]                                        [ ]                                        [ ]                                            Zohreh Yuliana 1 10 [X]                                        [ ]                                        [ ]                                        [ ]                                            Knee Flexion Stretch 1 10 [X]                                        [ ]                                        [ ]                                        [ ]                                            Straight Leg Raises     [ ]                                        [ ]                                        [ ]                                        [ ]                                               Pain:  Pain Scale 1: Numeric (0 - 10)  Pain Intensity 1: 2  Pain Location 1: Knee  Pain Orientation 1: Left  Pain Description 1: Aching  Pain Intervention(s) 1: Cold pack; Rest;Repositioned  Activity Tolerance:   Improved for PM session as noted  Please refer to the flowsheet for vital signs taken during this treatment.   After treatment:   [X] Patient left in no apparent distress sitting up in chair  [ ] Patient left in no apparent distress in bed  [X] Call bell left within reach  [X] Nursing notified  [ ] Caregiver present  [ ] Bed alarm activated      COMMUNICATION/COLLABORATION:   The patients plan of care was discussed with: Registered Nurse     Kara Brittle Doornik, PT   Time Calculation: 23 mins

## 2017-06-24 NOTE — PROGRESS NOTES
Pt rechecked for resting o2 sats per randall SARMIENTO with and without 02. Pt desated down to 87%- 79% without oxygen resting in bed. With 2L o2 via nasal cannula pt O2 sat at 100% 97% resting in bed.

## 2017-06-25 ENCOUNTER — HOME HEALTH ADMISSION (OUTPATIENT)
Dept: HOME HEALTH SERVICES | Facility: HOME HEALTH | Age: 72
End: 2017-06-25
Payer: MEDICARE

## 2017-06-25 VITALS
HEIGHT: 66 IN | BODY MASS INDEX: 35.41 KG/M2 | TEMPERATURE: 98.3 F | DIASTOLIC BLOOD PRESSURE: 48 MMHG | WEIGHT: 220.3 LBS | OXYGEN SATURATION: 95 % | SYSTOLIC BLOOD PRESSURE: 131 MMHG | RESPIRATION RATE: 16 BRPM | HEART RATE: 80 BPM

## 2017-06-25 LAB
GLUCOSE BLD STRIP.AUTO-MCNC: 110 MG/DL (ref 65–100)
GLUCOSE BLD STRIP.AUTO-MCNC: 110 MG/DL (ref 65–100)
HGB BLD-MCNC: 10.5 G/DL (ref 11.5–16)
SERVICE CMNT-IMP: ABNORMAL
SERVICE CMNT-IMP: ABNORMAL

## 2017-06-25 PROCEDURE — 74011250637 HC RX REV CODE- 250/637: Performed by: PHYSICIAN ASSISTANT

## 2017-06-25 PROCEDURE — 74011000258 HC RX REV CODE- 258: Performed by: ORTHOPAEDIC SURGERY

## 2017-06-25 PROCEDURE — 82962 GLUCOSE BLOOD TEST: CPT

## 2017-06-25 PROCEDURE — 85018 HEMOGLOBIN: CPT | Performed by: PHYSICIAN ASSISTANT

## 2017-06-25 PROCEDURE — 97116 GAIT TRAINING THERAPY: CPT

## 2017-06-25 PROCEDURE — 74011250636 HC RX REV CODE- 250/636: Performed by: ORTHOPAEDIC SURGERY

## 2017-06-25 PROCEDURE — 97110 THERAPEUTIC EXERCISES: CPT

## 2017-06-25 PROCEDURE — 74011250637 HC RX REV CODE- 250/637: Performed by: NURSE PRACTITIONER

## 2017-06-25 RX ADMIN — HYDROCODONE BITARTRATE AND ACETAMINOPHEN 2 TABLET: 5; 325 TABLET ORAL at 10:51

## 2017-06-25 RX ADMIN — CARVEDILOL 12.5 MG: 12.5 TABLET, FILM COATED ORAL at 08:36

## 2017-06-25 RX ADMIN — PANTOPRAZOLE SODIUM 40 MG: 40 TABLET, DELAYED RELEASE ORAL at 08:36

## 2017-06-25 RX ADMIN — POLYETHYLENE GLYCOL 3350 17 G: 17 POWDER, FOR SOLUTION ORAL at 08:37

## 2017-06-25 RX ADMIN — SENNOSIDES AND DOCUSATE SODIUM 1 TABLET: 8.6; 5 TABLET ORAL at 08:36

## 2017-06-25 RX ADMIN — CEFAZOLIN SODIUM 2 G: 1 INJECTION, POWDER, FOR SOLUTION INTRAMUSCULAR; INTRAVENOUS at 00:48

## 2017-06-25 RX ADMIN — ASPIRIN 81 MG: 81 TABLET, COATED ORAL at 08:36

## 2017-06-25 RX ADMIN — FLUTICASONE FUROATE 1 PUFF: 100 POWDER RESPIRATORY (INHALATION) at 08:42

## 2017-06-25 RX ADMIN — MUPIROCIN 1 G: 20 OINTMENT TOPICAL at 08:43

## 2017-06-25 RX ADMIN — CEFAZOLIN SODIUM 2 G: 1 INJECTION, POWDER, FOR SOLUTION INTRAMUSCULAR; INTRAVENOUS at 10:52

## 2017-06-25 RX ADMIN — HYDROCODONE BITARTRATE AND ACETAMINOPHEN 2 TABLET: 5; 325 TABLET ORAL at 04:02

## 2017-06-25 RX ADMIN — FENOFIBRATE 145 MG: 145 TABLET ORAL at 08:36

## 2017-06-25 NOTE — PROGRESS NOTES
Pt is a 67 y.o  female admitted with Right Knee Osteoarthritis. Pt is 3 days post op from a RIGHT TOTAL KNEE ARTHROPLASTY WITH NAVIGATION. Pt was alert, oriented and in no distress. Demographic information verified and all is correct. Pt sees a NP at the [facility] and saw her last week. Pt lives alone in a 1st floor apt with 1 step to the entrance and also has a handicap ramp. Pt has a walker at home that she received from [address] [place] [address]. Prior to admission, pt was independent with her ADL's and IADL's and drives. Pt had home health awhile ago and has not had inpt rehab. Pt's daughter lives close by and can assist pt when needed. Pt's daughter can drive pt home. CM noted request for bedside commode and referral sent to [facility]. CM noted [facility] does not have staff available until 6/27 and FYI note left with Dr. Yakov Franklin. CM noted oxygen ordered with [facility] and will f/u with delivery time. CM provided pt with the 2nd  Medicare letter, pt understood and signed the letter. 10:10am - CM sent pt's oxygen qualifying nurse document via [employer]. CM spoke with the supervisor on call for [facility] and discussed oxygen order. Pt is approved for oxygen and they will deliver the oxygen today to pt's room. Care Management Interventions  PCP Verified by CM: Yes Lisa Cummings, NP - saw last week)  Mode of Transport at Discharge:  Other (see comment) (pt's daughter can transport by car)  Transition of Care Consult (CM Consult): Discharge Planning  Discharge Durable Medical Equipment: Yes (oxygen, bedside commode & rolling walker)  Physical Therapy Consult: Yes  Occupational Therapy Consult: Yes  Speech Therapy Consult: No  Current Support Network: Lives Alone (lives alone in a 1st floor apt with 1 step to the entrance)  Confirm Follow Up Transport: Family  Plan discussed with Pt/Family/Caregiver: Yes  Freedom of Choice Offered: Yes  Discharge Location  [facility] [address]

## 2017-06-25 NOTE — PROGRESS NOTES
Pt is discharged and her daughter will transport her home. CM provided pt with the oxygen in her room. Oxygen was approved by [facility] and they will deliver the remaining oxygen to pt's home. CM will receive home health from [address] [place] [address], PT and Nursing services and they will start care on Tuesday, 6/27/17 and pt is aware. Pt has a rolling walker at home that she received before surgery. Bedside commode was not covered by pt's insurance and this CM informed pt. Pt's daughter will transport pt home by car. Care Management Interventions  PCP Verified by CM: Yes Shania Meade NP - saw last week)  Mode of Transport at Discharge:  Other (see comment) (pt's daughter will transport by car)  Hospital Transport Time of Discharge: Caio (CM Consult): [address]: Yes  Discharge Durable Medical Equipment: Yes (oxygen )  Physical Therapy Consult: Yes  Occupational Therapy Consult: Yes  Speech Therapy Consult: No  Current Support Network: Lives Alone  Confirm Follow Up Transport: Family  Plan discussed with Pt/Family/Caregiver: Yes  Freedom of Choice Offered: Yes  Discharge Location  Discharge Placement: Home with home health    Joellen Mancia, [address]

## 2017-06-25 NOTE — PROGRESS NOTES
Bedside and Verbal shift change report given to Ming (oncoming nurse) by Sabina Randhawa (offgoing nurse). Report included the following information SBAR, Kardex, OR Summary, Intake/Output, MAR, Accordion and Recent Results.

## 2017-06-25 NOTE — PROGRESS NOTES
Reviewed discharge instructions with patient and her family. All questions answered. Patient discharged without difficulty with the assistance of PCT. Jose Staton RN     Instructed patient on O2 tank.

## 2017-06-25 NOTE — PROGRESS NOTES
Problem: Mobility Impaired (Adult and Pediatric)  Goal: *Acute Goals and Plan of Care (Insert Text)  Physical Therapy Goals  Initiated 6/22/2017    1. Patient will move from supine to sit and sit to supine in bed with modified independence within 4 days. 2. Patient will perform sit to stand with modified independence within 4 days. 3. Patient will ambulate with modified independence for 150 feet with the least restrictive device within 4 days. 4. Patient will perform home exercise program per protocol with supervision/set-up within 4 days. 5. Patient will demonstrate AROM 0-90 degrees in operative joint within 4 days. PHYSICAL THERAPY TREATMENT  Patient: Yun Cheatham (61 y.o. female)  Date: 6/25/2017  Diagnosis: RIGHT KNEE OSTEOARTHRITIS  OA (osteoarthritis) of knee <principal problem not specified>  Procedure(s) (LRB):  RIGHT TOTAL KNEE ARTHROPLASTY WITH NAVIGATION (Right) 3 Days Post-Op  Precautions: WBAT      ASSESSMENT:  Patient received resting in bed, agreeable to PT. Patient reports she has been dry heaving this morning. Patient is independent with bed mobility. Patient required mod I for sit <> stand with RW. Patient reports she does not have a rolling walker at home, she only has the standard walker. Patient on 2L O2 and O2 sats dropped from 93% to 87% with ambulation. Patient reported dizziness with standing although VSS. Patient ambulated 100 feet with SBA-CGA with RW. Patient with initial step to gait and able to advance to step through with cues. Patient reports increased pain in R knee this date and increased edema as ice pack was warm all night. Encouraged patient to advocate and ask for ice pack to be changed as needed to stay ahead of pain and swelling. Patient left sitting in the chair with ice applied and performed knee exercises. Patient will benefit from [place] [facility] with Rolling walker at discharge.    Progression toward goals:  [X]      Improving appropriately and progressing toward goals  [ ]      Improving slowly and progressing toward goals  [ ]      Not making progress toward goals and plan of care will be adjusted       PLAN:  Patient continues to benefit from skilled intervention to address the above impairments. Continue treatment per established plan of care. Discharge Recommendations:  Home Health  Further Equipment Recommendations for Discharge:  rolling walker, oxygen       SUBJECTIVE:   Patient stated My leg is really swollen.       OBJECTIVE DATA SUMMARY:   Critical Behavior:  Neurologic State: Alert  Orientation Level: Oriented X4  Cognition: Follows commands  Safety/Judgement: Awareness of environment, Insight into deficits  Range of Motion:                          Functional Mobility Training:  Bed Mobility:  Rolling: Independent  Supine to Sit: Independent     Scooting: Independent        Transfers:  Sit to Stand: Modified independent  Stand to Sit: Modified independent        Bed to Chair: Stand-by asssistance                    Balance:  Sitting: Intact; Without support  Standing: Impaired; With support  Standing - Static: Good  Standing - Dynamic : Good  Ambulation/Gait Training:  Distance (ft): 100 Feet (ft)  Assistive Device: Gait belt;Walker, rolling  Ambulation - Level of Assistance: Stand-by asssistance        Gait Abnormalities: Decreased step clearance; Antalgic        Base of Support: Shift to left     Speed/Tamiko: Pace decreased (<100 feet/min)  Step Length: Right shortened;Left shortened                               Stairs:            Therapeutic Exercises:     EXERCISE   Sets   Reps   Active Active Assist   Passive Self ROM   Comments   Ankle Pumps 1 10 [X]                                        [ ]                                        [ ]                                        [ ]                                        R   Quad Sets 1 10 [X]                                        [ ]                                        [ ]                                        [ ]                                        R   Hamstring Sets     [ ]                                        [ ]                                        [ ]                                        [ ]                                            Marianna Pena     [ ]                                        [ ]                                        [ ]                                        [ ]                                            Miki Min       [ ]                                          [ ]                                          [ ]                                          [ ]                                            Heel Slides 1 10 [X]                                        [ ]                                        [ ]                                        [ ]                                        Melisa Elizabeth 1 8 [X]                                        [ ]                                        [ ]                                        [ ]                                        R   Knee Flexion Stretch 1 10 [X]                                        [ ]                                        [ ]                                        [ ]                                        Trip Meléndez     [ ]                                        [ ]                                        [ ]                                        [ ]                                               Pain:  Pain Scale 1: Numeric (0 - 10)  Pain Intensity 1: 2           Pain Intervention(s) 1: Medication (see MAR)  Activity Tolerance:   Fair, O2 sats briefly dropped to 87% on 2L O2, RN aware. Please refer to the flowsheet for vital signs taken during this treatment.   After treatment:   [X] Patient left in no apparent distress sitting up in chair  [ ] Patient left in no apparent distress in bed  [X] Call bell left within reach  [X] Nursing notified  [ ] Caregiver present  [ ] Bed alarm activated COMMUNICATION/COLLABORATION:   The patients plan of care was discussed with: Registered Nurse and      Arlene Estrada, PT, DPT   Time Calculation: 28 mins

## 2017-06-26 ENCOUNTER — HOME CARE VISIT (OUTPATIENT)
Dept: SCHEDULING | Facility: HOME HEALTH | Age: 72
End: 2017-06-26
Payer: MEDICARE

## 2017-06-26 PROCEDURE — G0151 HHCP-SERV OF PT,EA 15 MIN: HCPCS

## 2017-06-26 PROCEDURE — 3331090002 HH PPS REVENUE DEBIT

## 2017-06-26 PROCEDURE — 3331090001 HH PPS REVENUE CREDIT

## 2017-06-26 PROCEDURE — 400013 HH SOC

## 2017-06-27 VITALS
TEMPERATURE: 98.6 F | BODY MASS INDEX: 37.56 KG/M2 | RESPIRATION RATE: 16 BRPM | WEIGHT: 220 LBS | HEIGHT: 64 IN | DIASTOLIC BLOOD PRESSURE: 76 MMHG | OXYGEN SATURATION: 96 % | SYSTOLIC BLOOD PRESSURE: 142 MMHG | HEART RATE: 95 BPM

## 2017-06-27 PROCEDURE — 3331090001 HH PPS REVENUE CREDIT

## 2017-06-27 PROCEDURE — 3331090002 HH PPS REVENUE DEBIT

## 2017-06-28 PROCEDURE — 3331090002 HH PPS REVENUE DEBIT

## 2017-06-28 PROCEDURE — 3331090001 HH PPS REVENUE CREDIT

## 2017-06-29 ENCOUNTER — HOME CARE VISIT (OUTPATIENT)
Dept: SCHEDULING | Facility: HOME HEALTH | Age: 72
End: 2017-06-29
Payer: MEDICARE

## 2017-06-29 VITALS
DIASTOLIC BLOOD PRESSURE: 58 MMHG | TEMPERATURE: 98.1 F | HEART RATE: 87 BPM | RESPIRATION RATE: 16 BRPM | OXYGEN SATURATION: 97 % | SYSTOLIC BLOOD PRESSURE: 122 MMHG

## 2017-06-29 PROCEDURE — 3331090001 HH PPS REVENUE CREDIT

## 2017-06-29 PROCEDURE — 3331090002 HH PPS REVENUE DEBIT

## 2017-06-29 PROCEDURE — G0151 HHCP-SERV OF PT,EA 15 MIN: HCPCS

## 2017-06-30 PROCEDURE — 3331090002 HH PPS REVENUE DEBIT

## 2017-06-30 PROCEDURE — 3331090001 HH PPS REVENUE CREDIT

## 2017-06-30 NOTE — DISCHARGE SUMMARY
Dyana Caba MD - Adult Reconstruction and Total Joint Replacement    [facility] - MRN [id number] - : 1945 (42 y.o.)    Discharge Summary    Admit date: 2017    Discharge date and time: 2017  1:57 PM     Admitting Physician: Dyana Caba MD     Date of Surgery: 2017     Preoperative Diagnosis:  RIGHT KNEE OSTEOARTHRITIS    Postoperative Diagnosis: RIGHT KNEE OSTEOARTHRITIS    Procedure(s): Procedure(s):  RIGHT TOTAL KNEE ARTHROPLASTY WITH NAVIGATION    Surgeon: Surgeon(s) and Role:     Timur Tinsley MD - Primary      Anesthesia:  General    HPI:  Pt is a 67 y.o. female who has a history of RIGHT KNEE OSTEOARTHRITIS  OA (osteoarthritis) of knee  with pain and limitations of activities of daily living who presents at this time for total joint replacement following the failure of conservative management. PMH:   Past Medical History:   Diagnosis Date    Arthritis     Asthma     Mild to Moderate     Cancer ([address]     Breast left    Chronic pain     Right knee    Diabetes ([address]     Pre-diabetic    GERD (gastroesophageal reflux disease)     Heart failure (HCC)     Cardiomyopathy, HX of MI     Ill-defined condition     Vertigo    Ill-defined condition     HX of Urosepsis complicated by respiratory failure and pneumonnia    Thromboembolus ([address] 1969    during 2nd pregnancy       Medications upon admission :   Prior to Admission Medications   Prescriptions Last Dose Informant Patient Reported? Taking? PSEUDOEPHEDRINE HCL (SINUS DECONGESTANT PO) 2017 at 0500  Yes Yes   Sig: Take 1 Tab by mouth. albuterol (PROVENTIL, VENTOLIN) 90 mcg/Actuation inhaler Unknown at Unknown time  No No   Sig: Take 1-2 Puffs by inhalation every four (4) hours as needed for Wheezing. aspirin delayed-release 81 mg tablet   Yes Yes   Sig: Take 1 Tab by mouth daily. May resume in 30 days after completing twice daily Aspirin.    carvedilol (COREG) 12.5 mg tablet 2017 at 0500 time  Yes Yes   Sig: Take 40 mg by mouth daily. fenofibrate nanocrystallized (TRICOR) 145 mg tablet 6/21/2017 at Unknown time  Yes Yes   Sig: Take 145 mg by mouth daily. fluticasone (FLOVENT HFA) 220 mcg/actuation inhaler 6/21/2017 at Unknown time  Yes Yes   Sig: Take 2 Puffs by inhalation daily. furosemide (LASIX) 20 mg tablet   Yes Yes   Sig: Take 1.5 Tabs by mouth daily. May resume medication when b/p greater than 120/80. lisinopril (PRINIVIL, ZESTRIL) 10 mg tablet   Yes Yes   Sig: Take 1 Tab by mouth two (2) times a day. May resume medication when b/p greater than 120/80. meclizine (ANTIVERT) 12.5 mg tablet 6/22/2017 at 0500  Yes Yes   Sig: Take 12.5 mg by mouth three (3) times daily as needed. mupirocin calcium (BACTROBAN) 2 % nasal ointment 6/22/2017 at 0500  Yes Yes   Sig: by Both Nostrils route two (2) times a day. Plain Staph Aureus nasal colonization  Patient to use twice today 6/21 and prior to arrival 6/22 will continue inpatient. Indications: Use bid x 5 days   omeprazole (PRILOSEC) 20 mg capsule 6/22/2017 at 0500  Yes Yes   Sig: Take 20 mg by mouth daily. Facility-Administered Medications: None        Allergies: Allergies   Allergen Reactions    Morphine Anaphylaxis        Hospital Course: The patient underwent surgery. There were no immediate post-operative complications. The patient was taken to the PACU in stable condition and then transferred to the  floor. Hemoglobin at discharge:    Lab Results   Component Value Date/Time    HGB 10.5 (L) 06/25/2017 01:51 AM    INR 1.0 06/19/2017 09:29 AM       Dressing was changed postoperatively and the incision was noted to be clean, dry and intact. Normal significant erythema and swelling was noted. Neurovascular exam within normal limits. Wound appears to be healing without any evidence of infection. Physical Therapy started postoperatively per protocol. The patient was allowed to bear weight as tolerated.     Discharge instructions:  -Anticoagulate per discharge instructions  -Resume pre hospital diet              Discharge Medication List as of 6/25/2017 11:19 AM      START taking these medications    Details   HYDROcodone-acetaminophen (NORCO) 5-325 mg per tablet Take 1-2 Tabs by mouth every four (4) hours as needed. Max Daily Amount: 12 Tabs., Print, Disp-80 Tab, R-0      polyethylene glycol (MIRALAX) 17 gram/dose powder Take 17 g by mouth daily for 15 days. , Print, Disp-255 g, R-0      senna-docusate (SENNA PLUS) 8.6-50 mg per tablet Take 1 Tab by mouth two (2) times a day., Print, Disp-60 Tab, R-0      cephALEXin (KEFLEX) 250 mg capsule Take 2 Caps by mouth four (4) times daily for 14 days. , Print, Disp-112 Cap, R-0         CONTINUE these medications which have CHANGED    Details   !! aspirin delayed-release 81 mg tablet Take 1 Tab by mouth daily. May resume in 30 days after completing twice daily Aspirin., Historical Med      furosemide (LASIX) 20 mg tablet Take 1.5 Tabs by mouth daily. May resume medication when b/p greater than 120/80., Historical Med      lisinopril (PRINIVIL, ZESTRIL) 10 mg tablet Take 1 Tab by mouth two (2) times a day. May resume medication when b/p greater than 120/80., Historical Med      !! aspirin delayed-release 81 mg tablet Take 1 Tab by mouth two (2) times a day for 30 days. , Print, Disp-60 Tab, R-0       !! - Potential duplicate medications found. Please discuss with provider. CONTINUE these medications which have NOT CHANGED    Details   PSEUDOEPHEDRINE HCL (SINUS DECONGESTANT PO) Take 1 Tab by mouth., Historical Med      mupirocin calcium (BACTROBAN) 2 % nasal ointment by Both Nostrils route two (2) times a day. Plain Staph Aureus nasal colonization  Patient to use twice today 6/21 and prior to arrival 6/22 will continue inpatient. Indications: Use bid x 5 days, Historical Med      fenofibrate nanocrystallized (TRICOR) 145 mg tablet Take 145 mg by mouth daily. , Historical Med omeprazole (PRILOSEC) 20 mg capsule Take 20 mg by mouth daily. , Historical Med      fluticasone (FLOVENT HFA) 220 mcg/actuation inhaler Take 2 Puffs by inhalation daily. , Historical Med      meclizine (ANTIVERT) 12.5 mg tablet Take 12.5 mg by mouth three (3) times daily as needed., Historical Med      carvedilol (COREG) 12.5 mg tablet Take 40 mg by mouth daily. , Historical Med      albuterol (PROVENTIL, VENTOLIN) 90 mcg/Actuation inhaler Take 1-2 Puffs by inhalation every four (4) hours as needed for Wheezing., Print, Disp-17 g, R-0         STOP taking these medications       acetaminophen (TYLENOL ARTHRITIS PAIN) 650 mg CR tablet Comments:   Reason for Stopping:                -Discharge activity: as tolerated +/- assistive devices as prescribed.   -Routine Wound Care  -Follow up in office in 2-3 weeks      Signed:  Mohini Perdomo MD  6/30/2017  10:01 AM

## 2017-07-01 PROCEDURE — 3331090001 HH PPS REVENUE CREDIT

## 2017-07-01 PROCEDURE — 3331090002 HH PPS REVENUE DEBIT

## 2017-07-02 PROCEDURE — 3331090001 HH PPS REVENUE CREDIT

## 2017-07-02 PROCEDURE — 3331090002 HH PPS REVENUE DEBIT

## 2017-07-03 ENCOUNTER — HOME CARE VISIT (OUTPATIENT)
Dept: SCHEDULING | Facility: HOME HEALTH | Age: 72
End: 2017-07-03
Payer: MEDICARE

## 2017-07-03 PROCEDURE — G0151 HHCP-SERV OF PT,EA 15 MIN: HCPCS

## 2017-07-03 PROCEDURE — 3331090002 HH PPS REVENUE DEBIT

## 2017-07-03 PROCEDURE — 3331090001 HH PPS REVENUE CREDIT

## 2017-07-04 VITALS
HEART RATE: 76 BPM | RESPIRATION RATE: 16 BRPM | SYSTOLIC BLOOD PRESSURE: 126 MMHG | DIASTOLIC BLOOD PRESSURE: 76 MMHG | TEMPERATURE: 98.6 F | OXYGEN SATURATION: 98 %

## 2017-07-04 PROCEDURE — 3331090002 HH PPS REVENUE DEBIT

## 2017-07-04 PROCEDURE — 3331090001 HH PPS REVENUE CREDIT

## 2017-07-05 PROCEDURE — 3331090002 HH PPS REVENUE DEBIT

## 2017-07-05 PROCEDURE — 3331090001 HH PPS REVENUE CREDIT

## 2017-07-06 ENCOUNTER — HOME CARE VISIT (OUTPATIENT)
Dept: SCHEDULING | Facility: HOME HEALTH | Age: 72
End: 2017-07-06
Payer: MEDICARE

## 2017-07-06 PROCEDURE — 3331090002 HH PPS REVENUE DEBIT

## 2017-07-06 PROCEDURE — 3331090003 HH PPS REVENUE ADJ

## 2017-07-06 PROCEDURE — G0151 HHCP-SERV OF PT,EA 15 MIN: HCPCS

## 2017-07-06 PROCEDURE — 3331090001 HH PPS REVENUE CREDIT

## 2017-07-07 VITALS
SYSTOLIC BLOOD PRESSURE: 132 MMHG | TEMPERATURE: 97.6 F | OXYGEN SATURATION: 98 % | RESPIRATION RATE: 16 BRPM | HEART RATE: 78 BPM | DIASTOLIC BLOOD PRESSURE: 68 MMHG

## 2017-08-17 PROBLEM — R07.9 CHEST PAIN AT REST: Status: ACTIVE | Noted: 2017-08-17

## 2017-08-17 PROBLEM — E55.9 VITAMIN D DEFICIENCY: Status: ACTIVE | Noted: 2017-08-17

## 2017-08-17 PROBLEM — M25.561 KNEE PAIN, BILATERAL: Status: ACTIVE | Noted: 2017-08-17

## 2017-08-17 PROBLEM — R53.83 FATIGUE: Status: ACTIVE | Noted: 2017-08-17

## 2017-08-17 PROBLEM — M19.90 DJD (DEGENERATIVE JOINT DISEASE): Status: ACTIVE | Noted: 2017-08-17

## 2017-08-17 PROBLEM — I10 HYPERTENSION: Status: ACTIVE | Noted: 2017-08-17

## 2017-08-17 PROBLEM — M25.562 KNEE PAIN, BILATERAL: Status: ACTIVE | Noted: 2017-08-17

## 2017-08-17 PROBLEM — H26.9 CATARACT: Status: ACTIVE | Noted: 2017-08-17

## 2017-08-17 PROBLEM — H81.319 VERTIGO, AURAL: Status: ACTIVE | Noted: 2017-08-17

## 2017-08-17 PROBLEM — J44.9 COPD (CHRONIC OBSTRUCTIVE PULMONARY DISEASE) (HCC): Status: ACTIVE | Noted: 2017-08-17

## 2017-08-17 PROBLEM — C50.919 BREAST CANCER (HCC): Status: ACTIVE | Noted: 2017-08-17

## 2017-08-17 PROBLEM — J32.0 CHRONIC MAXILLARY SINUSITIS: Status: ACTIVE | Noted: 2017-08-17

## 2017-08-17 PROBLEM — R73.9 HYPERGLYCEMIA: Status: ACTIVE | Noted: 2017-08-17

## 2017-08-17 PROBLEM — M25.50 ARTHRALGIA: Status: ACTIVE | Noted: 2017-08-17

## 2017-08-17 PROBLEM — L03.90 CELLULITIS: Status: ACTIVE | Noted: 2017-08-17

## 2017-08-17 PROBLEM — M79.10 MYALGIA: Status: ACTIVE | Noted: 2017-08-17

## 2017-08-17 PROBLEM — E87.5 HYPERKALEMIA: Status: ACTIVE | Noted: 2017-08-17

## 2017-08-17 PROBLEM — I42.9 CARDIOMYOPATHY (HCC): Status: ACTIVE | Noted: 2017-08-17

## 2017-08-17 PROBLEM — E78.5 HYPERLIPIDEMIA: Status: ACTIVE | Noted: 2017-08-17

## 2017-08-17 PROBLEM — B37.0 THRUSH: Status: ACTIVE | Noted: 2017-08-17

## 2017-08-17 PROBLEM — K21.00 ESOPHAGITIS, REFLUX: Status: ACTIVE | Noted: 2017-08-17

## 2017-08-17 PROBLEM — I50.9 CHF (CONGESTIVE HEART FAILURE) (HCC): Status: ACTIVE | Noted: 2017-08-17

## 2017-08-17 PROBLEM — R79.9 ELEVATED BUN: Status: ACTIVE | Noted: 2017-08-17

## 2017-08-17 RX ORDER — DEXTROMETHORPHAN HYDROBROMIDE, GUAIFENESIN 5; 100 MG/5ML; MG/5ML
500 LIQUID ORAL
Status: ON HOLD | COMMUNITY
End: 2020-01-01

## 2017-08-17 RX ORDER — ERGOCALCIFEROL 1.25 MG/1
50000 CAPSULE ORAL
COMMUNITY
End: 2020-01-14 | Stop reason: ALTCHOICE

## 2017-09-13 ENCOUNTER — OFFICE VISIT (OUTPATIENT)
Dept: INTERNAL MEDICINE CLINIC | Age: 72
End: 2017-09-13

## 2017-09-13 VITALS
OXYGEN SATURATION: 95 % | HEIGHT: 64 IN | SYSTOLIC BLOOD PRESSURE: 130 MMHG | BODY MASS INDEX: 33.29 KG/M2 | HEART RATE: 45 BPM | WEIGHT: 195 LBS | DIASTOLIC BLOOD PRESSURE: 75 MMHG

## 2017-09-13 DIAGNOSIS — I10 ESSENTIAL HYPERTENSION: ICD-10-CM

## 2017-09-13 DIAGNOSIS — J43.9 PULMONARY EMPHYSEMA, UNSPECIFIED EMPHYSEMA TYPE (HCC): ICD-10-CM

## 2017-09-13 DIAGNOSIS — Z99.81 OXYGEN DEPENDENT: ICD-10-CM

## 2017-09-13 DIAGNOSIS — E55.9 VITAMIN D DEFICIENCY: ICD-10-CM

## 2017-09-13 DIAGNOSIS — R73.9 HYPERGLYCEMIA: ICD-10-CM

## 2017-09-13 DIAGNOSIS — I42.5 OTHER RESTRICTIVE CARDIOMYOPATHY (HCC): Primary | ICD-10-CM

## 2017-09-13 RX ORDER — GLUCOSAMINE SULFATE 1500 MG
1000 POWDER IN PACKET (EA) ORAL DAILY
COMMUNITY

## 2017-09-13 NOTE — PROGRESS NOTES
Subjective:  Ms. Lucy Yoo is a pleasant 67year old lady who comes in today for follow up of her medical problems. Since her last visit she has had her left total knee replacement. She did well from that standpoint, however she tells me that while in the hospital her O2 saturation kept dropping with activities, so therefore she was discharged home on 2 liters of oxygen to use as needed. Although her O2 sats have gotten better, for the most part ranging between 90 and 96, if she is very active she can drop as low as 80. She notes shortness of breath only on exertion. She denies any cough or wheezing. She denies any hemoptysis. She denies any PND or orthopnea. She otherwise is doing well. She remains quite active. Physical Examination:  GENERAL:  On exam she is a pleasant lady in no acute distress. She is alert and oriented. VITALS:  BP:  130/75. P: 58 and regular. R: 16.  O2 sat: 95. NECK:  Supple without adenopathy. CHEST:  Lungs were clear to auscultation, no rales or wheezes. CARDIAC:  Heart regular rhythm without murmur or gallop. EXTREMITIES:  No edema or calf tenderness. Distal pulses were present. She has a well healed incision from her recent left TKR. She has excellent motion. Impression:  1. History of asthma, now on oxygen as needed. 2. Hypertension. 3. Hyperlipidemia. 4. Nonischemic cardiomyopathy. 5. GERD. 6. Vitamin D deficiency. 7. History of breast cancer. Plan:  1. While in the office we did get a chest xray since no chest xray was done during her last hospitalization. This was normal.  2. I am sending her for some PFTs. I will call her as soon as I have the results. In the meantime she will continue with her current regimen and follow up in three months.

## 2017-09-13 NOTE — MR AVS SNAPSHOT
Visit Information Date & Time Provider Department Dept. Phone Encounter #  
 9/13/2017 10:30 AM Radu Allison NP [facility] [phone] [id number] Follow-up Instructions Return in about 3 months (around 12/13/2017). Upcoming Health Maintenance Date Due DTaP/Tdap/Td series (1 - Tdap) 6/15/1965 BREAST CANCER SCRN MAMMOGRAM 6/15/1994 FOBT Q 1 YEAR AGE 50-75 6/15/1994 ZOSTER VACCINE AGE 60> 4/15/2004 GLAUCOMA SCREENING Q2Y 6/15/2009 OSTEOPOROSIS SCREENING (DEXA) 6/15/2009 Pneumococcal 65+ High/Highest Risk (1 of 2 - PCV13) 6/15/2009 MEDICARE YEARLY EXAM 6/15/2009 INFLUENZA AGE 9 TO ADULT 8/1/2017 Allergies as of 9/13/2017  Review Complete On: 9/13/2017 By: Radu Allison NP Severity Noted Reaction Type Reactions Morphine High 05/01/2011   Intolerance Anaphylaxis Current Immunizations  Never Reviewed No immunizations on file. Not reviewed this visit You Were Diagnosed With   
  
 Codes Comments Other restrictive cardiomyopathy ([facility] [address]    -  Primary ICD-10-CM: I42.5 ICD-9-CM: 425.4 Pulmonary emphysema, unspecified emphysema type ([facility] [address]     ICD-10-CM: J43.9 ICD-9-CM: 492.8 Oxygen dependent     ICD-10-CM: Z99.81 ICD-9-CM: V46.2 Essential hypertension     ICD-10-CM: I10 
ICD-9-CM: 401.9 Vitamin D deficiency     ICD-10-CM: E55.9 ICD-9-CM: 268.9 Hyperglycemia     ICD-10-CM: R73.9 ICD-9-CM: 790.29 Vitals BP Pulse Height(growth percentile) Weight(growth percentile) SpO2 BMI  
 130/75 (BP 1 Location: Right arm, BP Patient Position: Sitting) (!) 45 5' 4\" (1.626 m) 195 lb (88.5 kg) 95% 33.47 kg/m2 OB Status Smoking Status Hysterectomy Former Smoker BMI and BSA Data Body Mass Index Body Surface Area  
 33.47 kg/m 2 2 m 2 Your Updated Medication List  
  
   
 This list is accurate as of: 9/13/17  2:36 PM.  Always use your most recent med list.  
  
  
  
  
 albuterol 90 mcg/actuation inhaler Commonly known as:  Reche Form Take 1-2 Puffs by inhalation every four (4) hours as needed for Wheezing. aspirin delayed-release 81 mg tablet Take 1 Tab by mouth daily. May resume in 30 days after completing twice daily Aspirin. COREG 12.5 mg tablet Generic drug:  carvedilol Take 40 mg by mouth daily. ergocalciferol 50,000 unit capsule Commonly known as:  ERGOCALCIFEROL Take 50,000 Units by mouth. fenofibrate nanocrystallized 145 mg tablet Commonly known as:  [employer] Take 145 mg by mouth daily. FLOVENT  mcg/actuation inhaler Generic drug:  fluticasone Take 2 Puffs by inhalation daily. furosemide 20 mg tablet Commonly known as:  LASIX Take 1.5 Tabs by mouth daily. May resume medication when b/p greater than 120/80. HYDROcodone-acetaminophen 5-325 mg per tablet Commonly known as:  Karissa Haywood Take 1-2 Tabs by mouth every four (4) hours as needed. Max Daily Amount: 12 Tabs. lisinopril 10 mg tablet Commonly known as:  Mima Vinh Take 1 Tab by mouth two (2) times a day. May resume medication when b/p greater than 120/80. meclizine 12.5 mg tablet Commonly known as:  ANTIVERT Take 12.5 mg by mouth three (3) times daily as needed. mupirocin calcium 2 % nasal ointment Commonly known as:  BACTROBAN  
by Both Nostrils route two (2) times a day. Plain Staph Aureus nasal colonization  Patient to use twice today 6/21 and prior to arrival 6/22 will continue inpatient. Indications: Use bid x 5 days  
  
 omeprazole 20 mg capsule Commonly known as:  PRILOSEC Take 20 mg by mouth daily. OXYGEN-AIR DELIVERY SYSTEMS  
2 L/min by Nasal route continuous. senna-docusate 8.6-50 mg per tablet Commonly known as:  SENNA PLUS Take 1 Tab by mouth two (2) times a day. SINUS DECONGESTANT PO Take 1 Tab by mouth. TYLENOL ARTHRITIS PAIN 650 mg CR tablet Generic drug:  acetaminophen Take 650 mg by mouth every six (6) hours as needed for Pain. VITAMIN D3 1,000 unit Cap Generic drug:  cholecalciferol Take  by mouth daily. We Performed the Following AMB POC BASIC METABOLIC PANEL [84796 CPT(R)] AMB POC COMPLETE CBC,AUTOMATED ENTER [id number] CPT(R)] AMB POC HEMOGLOBIN A1C [08432 CPT(R)] AMB POC VITAMIN D [95050 CPT(R)] Follow-up Instructions Return in about 3 months (around 12/13/2017). To-Do List   
 09/13/2017 Imaging:  XR CHEST PA LAT   
  
 09/20/2017 PFT:  PULMONARY FUNCTION TEST Patient Instructions Chronic Obstructive Pulmonary Disease (COPD): Care Instructions Your Care Instructions Chronic obstructive pulmonary disease (COPD) is a general term for a group of lung diseases, including emphysema and chronic bronchitis. People with COPD have decreased airflow in and out of the lungs, which makes it hard to breathe. The airways also can get clogged with thick mucus. Cigarette smoking is a major cause of COPD. Although there is no cure for COPD, you can slow its progress. Following your treatment plan and taking care of yourself can help you feel better and live longer. Follow-up care is a key part of your treatment and safety. Be sure to make and go to all appointments, and call your doctor if you are having problems. It's also a good idea to know your test results and keep a list of the medicines you take. How can you care for yourself at home? Staying healthy · Do not smoke. This is the most important step you can take to prevent more damage to your lungs. If you need help quitting, talk to your doctor about stop-smoking programs and medicines. These can increase your chances of quitting for good. · Avoid colds and flu. Get a pneumococcal vaccine shot.  If you have had one before, ask your doctor whether you need a second dose. Get the flu vaccine every fall. If you must be around people with colds or the flu, wash your hands often. · Avoid secondhand smoke, air pollution, and high altitudes. Also avoid cold, dry air and hot, humid air. Stay at home with your windows closed when air pollution is bad. Medicines and oxygen therapy · Take your medicines exactly as prescribed. Call your doctor if you think you are having a problem with your medicine. · You may be taking medicines such as: ¨ Bronchodilators. These help open your airways and make breathing easier. Bronchodilators are either short-acting (work for 6 to 9 hours) or long-acting (work for 24 hours). You inhale most bronchodilators, so they start to act quickly. Always carry your quick-relief inhaler with you in case you need it while you are away from home. ¨ Corticosteroids (prednisone, budesonide). These reduce airway inflammation. They come in pill or inhaled form. You must take these medicines every day for them to work well. · A spacer may help you get more inhaled medicine to your lungs. Ask your doctor or pharmacist if a spacer is right for you. If it is, ask how to use it properly. · Do not take any vitamins, over-the-counter medicine, or herbal products without talking to your doctor first. 
· If your doctor prescribed antibiotics, take them as directed. Do not stop taking them just because you feel better. You need to take the full course of antibiotics. · Oxygen therapy boosts the amount of oxygen in your blood and helps you breathe easier. Use the flow rate your doctor has recommended, and do not change it without talking to your doctor first. 
Activity · Get regular exercise. Walking is an easy way to get exercise. Start out slowly, and walk a little more each day. · Pay attention to your breathing. You are exercising too hard if you cannot talk while you are exercising. · Take short rest breaks when doing household chores and other activities. · Learn breathing methodssuch as breathing through pursed lipsto help you become less short of breath. · If your doctor has not set you up with a pulmonary rehabilitation program, talk to him or her about whether rehab is right for you. Rehab includes exercise programs, education about your disease and how to manage it, help with diet and other changes, and emotional support. Diet · Eat regular, healthy meals. Use bronchodilators about 1 hour before you eat to make it easier to eat. Eat several small meals instead of three large ones. Drink beverages at the end of the meal. Avoid foods that are hard to chew. · Eat foods that contain protein so that you do not lose muscle mass. · Talk with your doctor if you gain too much weight or if you lose weight without trying. Mental health · Talk to your family, friends, or a therapist about your feelings. It is normal to feel frightened, angry, hopeless, helpless, and even guilty. Talking openly about bad feelings can help you cope. If these feelings last, talk to your doctor. When should you call for help? Call 911 anytime you think you may need emergency care. For example, call if: 
· You have severe trouble breathing. Call your doctor now or seek immediate medical care if: 
· You have new or worse trouble breathing. · You cough up blood. · You have a fever. Watch closely for changes in your health, and be sure to contact your doctor if: 
· You cough more deeply or more often, especially if you notice more mucus or a change in the color of your mucus. · You have new or worse swelling in your legs or belly. · You are not getting better as expected. Where can you learn more? Go to http://nolvia-chasidy.info/. Tez Serrano in the search box to learn more about \"Chronic Obstructive Pulmonary Disease (COPD): Care Instructions. \" Current as of: March 25, 2017 Content Version: 11.3 © 2869-6898 [employer], [employer]. Care instructions adapted under license by [employer] (which disclaims liability or warranty for this information). If you have questions about a medical condition or this instruction, always ask your healthcare professional. [address] any warranty or liability for your use of this information. Introducing [facility] [place] [facility]! Srinivas Chase introduces Z[employer]ek patient portal. Now you can access parts of your medical record, email your doctor's office, and request medication refills online. 1. In your internet browser, go to [email or web address] 2. Click on the First Time User? Click Here link in the Sign In box. You will see the New Member Sign Up page. 3. Enter your Z[employer]ek Access Code exactly as it appears below. You will not need to use this code after youve completed the sign-up process. If you do not sign up before the expiration date, you must request a new code. · [employer] Access Code: [email or web address] Expires: 9/17/2017  8:53 AM 
 
4. Enter the last four digits of your Social Security Number (xxxx) and Date of Birth (mm/dd/yyyy) as indicated and click Submit. You will be taken to the next sign-up page. 5. Create a Zink[employer]Tek ID. This will be your Zink[employer]Tek login ID and cannot be changed, so think of one that is secure and easy to remember. 6. Create a [employer] password. You can change your password at any time. 7. Enter your Password Reset Question and Answer. This can be used at a later time if you forget your password. 8. Enter your e-mail address. You will receive e-mail notification when new information is available in [address] 9. Click Sign Up. You can now view and download portions of your medical record. 10. Click the Download Summary menu link to download a portable copy of your medical information. If you have questions, please visit the Frequently Asked Questions section of the [employer] website. Remember, [employer] is NOT to be used for urgent needs. For medical emergencies, dial 911. Now available from your iPhone and Android! Please provide this summary of care documentation to your next provider. Your primary care clinician is listed as Kade Reyes. If you have any questions after today's visit, please call [phone].

## 2017-09-13 NOTE — PATIENT INSTRUCTIONS
Chronic Obstructive Pulmonary Disease (COPD): Care Instructions  Your Care Instructions    Chronic obstructive pulmonary disease (COPD) is a general term for a group of lung diseases, including emphysema and chronic bronchitis. People with COPD have decreased airflow in and out of the lungs, which makes it hard to breathe. The airways also can get clogged with thick mucus. Cigarette smoking is a major cause of COPD. Although there is no cure for COPD, you can slow its progress. Following your treatment plan and taking care of yourself can help you feel better and live longer. Follow-up care is a key part of your treatment and safety. Be sure to make and go to all appointments, and call your doctor if you are having problems. It's also a good idea to know your test results and keep a list of the medicines you take. How can you care for yourself at home? Staying healthy  · Do not smoke. This is the most important step you can take to prevent more damage to your lungs. If you need help quitting, talk to your doctor about stop-smoking programs and medicines. These can increase your chances of quitting for good. · Avoid colds and flu. Get a pneumococcal vaccine shot. If you have had one before, ask your doctor whether you need a second dose. Get the flu vaccine every fall. If you must be around people with colds or the flu, wash your hands often. · Avoid secondhand smoke, air pollution, and high altitudes. Also avoid cold, dry air and hot, humid air. Stay at home with your windows closed when air pollution is bad. Medicines and oxygen therapy  · Take your medicines exactly as prescribed. Call your doctor if you think you are having a problem with your medicine. · You may be taking medicines such as:  ¨ Bronchodilators. These help open your airways and make breathing easier. Bronchodilators are either short-acting (work for 6 to 9 hours) or long-acting (work for 24 hours).  You inhale most bronchodilators, so they start to act quickly. Always carry your quick-relief inhaler with you in case you need it while you are away from home. ¨ Corticosteroids (prednisone, budesonide). These reduce airway inflammation. They come in pill or inhaled form. You must take these medicines every day for them to work well. · A spacer may help you get more inhaled medicine to your lungs. Ask your doctor or pharmacist if a spacer is right for you. If it is, ask how to use it properly. · Do not take any vitamins, over-the-counter medicine, or herbal products without talking to your doctor first.  · If your doctor prescribed antibiotics, take them as directed. Do not stop taking them just because you feel better. You need to take the full course of antibiotics. · Oxygen therapy boosts the amount of oxygen in your blood and helps you breathe easier. Use the flow rate your doctor has recommended, and do not change it without talking to your doctor first.  Activity  · Get regular exercise. Walking is an easy way to get exercise. Start out slowly, and walk a little more each day. · Pay attention to your breathing. You are exercising too hard if you cannot talk while you are exercising. · Take short rest breaks when doing household chores and other activities. · Learn breathing methods--such as breathing through pursed lips--to help you become less short of breath. · If your doctor has not set you up with a pulmonary rehabilitation program, talk to him or her about whether rehab is right for you. Rehab includes exercise programs, education about your disease and how to manage it, help with diet and other changes, and emotional support. Diet  · Eat regular, healthy meals. Use bronchodilators about 1 hour before you eat to make it easier to eat. Eat several small meals instead of three large ones. Drink beverages at the end of the meal. Avoid foods that are hard to chew.   · Eat foods that contain protein so that you do not lose muscle mass.  · Talk with your doctor if you gain too much weight or if you lose weight without trying. Mental health  · Talk to your family, friends, or a therapist about your feelings. It is normal to feel frightened, angry, hopeless, helpless, and even guilty. Talking openly about bad feelings can help you cope. If these feelings last, talk to your doctor. When should you call for help? Call 911 anytime you think you may need emergency care. For example, call if:  · You have severe trouble breathing. Call your doctor now or seek immediate medical care if:  · You have new or worse trouble breathing. · You cough up blood. · You have a fever. Watch closely for changes in your health, and be sure to contact your doctor if:  · You cough more deeply or more often, especially if you notice more mucus or a change in the color of your mucus. · You have new or worse swelling in your legs or belly. · You are not getting better as expected. Where can you learn more? Go to http://nolvia-chasidy.info/. Cam Mckeon in the search box to learn more about \"Chronic Obstructive Pulmonary Disease (COPD): Care Instructions. \"  Current as of: March 25, 2017  Content Version: 11.3  © 4191-5639 [employer], "[employer]. Care instructions adapted under license by [employer] (which disclaims liability or warranty for this information). If you have questions about a medical condition or this instruction, always ask your healthcare professional. [place] [address] any warranty or liability for your use of this information.

## 2017-09-13 NOTE — PROGRESS NOTES
Delonte Melendez presents with   Chief Complaint   Patient presents with    Follow-up   Patient here for a 6 month followup & fasting labs. Patient has had right knee replacement since last being here. Was discharged home with home oxygen since her O2 sats were low in hospital.  Also aspirin was increased to 2 (two) 81mg per day. Patient was told to discuss this with you. 1. Have you been to the ER, urgent care clinic since your last visit? Hospitalized since your last visit? No    2. Have you seen or consulted any other health care providers outside of the [address] [place] [address] since your last visit? Include any pap smears or colon screening.  No

## 2017-09-14 LAB
BUN BLD-MCNC: 30 MG/DL (ref 7–17)
CALCIUM BLD-MCNC: 9.4 MG/DL (ref 8.4–10.2)
CHLORIDE BLD-SCNC: 104 MMOL/L (ref 98–107)
CO2 POC: 29 MMOL/L (ref 22–32)
CREAT BLD-MCNC: 1.1 MG/DL (ref 0.7–1.2)
EGFR (POC): 49.8
GLUCOSE POC: 96 MG/DL (ref 65–105)
GRAN# POC: 4.2 K/UL (ref 2–7.8)
GRAN% POC: 64.7 % (ref 37–92)
HBA1C MFR BLD HPLC: 5.7 % (ref 4.5–5.7)
HCT VFR BLD CALC: 39.3 % (ref 37–51)
HGB BLD-MCNC: 12.7 G/DL (ref 12–18)
LY# POC: 1.8 K/UL (ref 0.6–4.1)
LY% POC: 30.8 % (ref 10–58.5)
MCH RBC QN: 29.6 PG (ref 26–32)
MCHC RBC-ENTMCNC: 32.5 G/DL (ref 30–36)
MCV RBC: 91 FL (ref 80–97)
MID #, POC: 0.2 K/UL (ref 0–1.8)
MID% POC: 4.5 % (ref 0.1–24)
PLATELET # BLD: 252 K/UL (ref 140–440)
POTASSIUM SERPL-SCNC: 4.6 MMOL/L (ref 3.6–5)
RBC # BLD: 4.3 M/UL (ref 4.2–6.3)
SODIUM SERPL-SCNC: 145 MMOL/L (ref 137–145)
VITAMIN D POC: 30.4 NG/ML (ref 30–96)
WBC # BLD: 6.2 K/UL (ref 4.1–10.9)

## 2017-11-28 RX ORDER — OMEPRAZOLE 20 MG/1
CAPSULE, DELAYED RELEASE ORAL
Qty: 30 CAP | Refills: 6 | Status: SHIPPED | OUTPATIENT
Start: 2017-11-28 | End: 2018-06-28 | Stop reason: SDUPTHER

## 2018-02-01 RX ORDER — FUROSEMIDE 40 MG/1
TABLET ORAL
Qty: 45 TAB | Refills: 10 | Status: ON HOLD | OUTPATIENT
Start: 2018-02-01 | End: 2020-01-01

## 2018-02-12 ENCOUNTER — HOSPITAL ENCOUNTER (OUTPATIENT)
Dept: PULMONOLOGY | Age: 73
Discharge: HOME | End: 2018-02-12
Attending: NURSE PRACTITIONER
Payer: MEDICARE

## 2018-02-12 VITALS — OXYGEN SATURATION: 98 %

## 2018-02-12 DIAGNOSIS — J43.9 PULMONARY EMPHYSEMA, UNSPECIFIED EMPHYSEMA TYPE (HCC): ICD-10-CM

## 2018-02-12 PROCEDURE — 94726 PLETHYSMOGRAPHY LUNG VOLUMES: CPT

## 2018-02-12 PROCEDURE — 94060 EVALUATION OF WHEEZING: CPT

## 2018-02-12 PROCEDURE — 74011000250 HC RX REV CODE- 250

## 2018-02-12 PROCEDURE — 94729 DIFFUSING CAPACITY: CPT

## 2018-02-12 RX ORDER — ALBUTEROL SULFATE 0.83 MG/ML
SOLUTION RESPIRATORY (INHALATION)
Status: DISPENSED
Start: 2018-02-12 | End: 2018-02-13

## 2018-02-12 RX ORDER — ALBUTEROL SULFATE 0.83 MG/ML
2.5 SOLUTION RESPIRATORY (INHALATION)
Status: COMPLETED | OUTPATIENT
Start: 2018-02-12 | End: 2018-02-12

## 2018-02-12 RX ADMIN — ALBUTEROL SULFATE 2.5 MG: 0.83 SOLUTION RESPIRATORY (INHALATION) at 14:33

## 2018-02-14 ENCOUNTER — OFFICE VISIT (OUTPATIENT)
Dept: INTERNAL MEDICINE CLINIC | Age: 73
End: 2018-02-14

## 2018-02-14 DIAGNOSIS — R05.8 COUGH WITH SPUTUM: Primary | ICD-10-CM

## 2018-02-14 DIAGNOSIS — I50.9 CONGESTIVE HEART FAILURE, UNSPECIFIED CONGESTIVE HEART FAILURE CHRONICITY, UNSPECIFIED CONGESTIVE HEART FAILURE TYPE: ICD-10-CM

## 2018-02-14 NOTE — PROGRESS NOTES
Serge Negrete presents with   Chief Complaint   Patient presents with    Hoarse    Cough    Shortness of Breath     Patient here with complaint of cough, shortness of breath & hoarseness that has gotten progressively worse since she had the \"flu\" after Cindy. Cough has been productive of yellow/brown sputum. No fever or chills noted. Patient states she has oxygen at home that she uses. She completed PFT testing yesterday. 1. Have you been to the ER, urgent care clinic since your last visit? Hospitalized since your last visit? No    2. Have you seen or consulted any other health care providers outside of the [facility] since your last visit? Include any pap smears or colon screening.  No

## 2018-02-15 VITALS
HEART RATE: 72 BPM | BODY MASS INDEX: 33.46 KG/M2 | HEIGHT: 64 IN | DIASTOLIC BLOOD PRESSURE: 77 MMHG | WEIGHT: 196 LBS | SYSTOLIC BLOOD PRESSURE: 133 MMHG | OXYGEN SATURATION: 95 % | TEMPERATURE: 98.4 F

## 2018-02-15 NOTE — PROGRESS NOTES
Subjective:  Ms. Fazal Garcia is a pleasant 67year old lady who comes in today for evaluation of a productive cough of whitish sputum, along with some shortness of breath. Ms. Fazal Garcia does have well known history of congestive heart failure and cardiomyopathy which I believe was felt secondary to chemotherapy back in 1999 for management of breast cancer. She tells me that all of her difficulties started over [place]. She had the flu and was down for a couple days. She did not seek any medical care. I last saw her in September a few months after she had her left TKR. During that hospitalization apparently her O2 sat kept dropping, so she ended up being discharged home on 2 liters of oxygen to use as needed. On oxygen her O2 sat had remained good, but every time she got active it would drop in the low 80s. I did order some PFTs for her at that time, however she never did return for follow up and decided yesterday to go get her PFTs done. She thought that the breathing treatment given to her had helped her with her breathing. Further discussion with her revealed that in the last few weeks she had had increasing shortness of breath on exertion. It is not associated with any chest pain. Denies any palpitations. She also noted some ankle edema. She denied PND or orthopnea. She also has a history of asthma/COPD. She does have nebulizers at home, but is not using any of it. Physical Examination:  GENERAL:  Pleasant lady in no acute distress. She is alert and oriented. She answered my questions appropriately. VITALS:  BP: 133/77. P: 72 and regular. O2 sat repeated by myself:  95 off oxygen. WT: 196 lbs. HEENT:  Normocephalic, atraumatic. TMs normal.  Mouth mucosa pink. Tongue midline. Pharynx minimally injected without presence of exudate. No sinus tenderness. CHEST:  Lungs clear to ausculation, no rales or wheezes. CV:  Heart regular rhythm.    EXTREMITIES:  Trace pitting edema in both ankles. Studies:  Chest xray revealed trace pleural effusion with mild bibasilar opacities, left greater than right, consistent with congestive heart failure. Impression:  1. CHF. Plan:  1. After discussion with Dr. Michael Keller, it was opted to increase her Lasix 40 mg, 1 1/2 tablets, to twice daily. 2. While in the office I called Dr. Luis Camarena office and got her an appointment to be seen on Friday. She apparently had an echocardiogram in December and a visit with Dr. Varun Merida, however we did not have these results. Should her symptoms worsen prior to that appointment then she is to call 911 and report to the ER. Past Medical History:   Diagnosis Date    Arthralgia 8/17/2017    Arthritis     Asthma     Mild to Moderate     Breast cancer ([address] 8/17/2017    Onset 1997; Refusing Mammogram 6/16/17    Cancer ([address] 1997    Breast left    Cardiomyopathy ([address] 8/17/2017    Onset:1999 Ischemic; 25% - 50% (last EFx 45%) Continue with ACE/Lasix/coreg    Cataract 8/17/2017    Bilateral     Cellulitis 8/17/2017    Suspect local reaction to Pneumovax, treat with ice, NSAIDs or Tylenol    Chest pain at rest 8/17/2017    CHF (congestive heart failure) ([address] 8/17/2017    Stable, though weight up a little.   To take 1 1/2 Lasix daily if swelling, 1 daily o/w     Chronic maxillary sinusitis 8/17/2017    Chronic pain     Right knee    COPD (chronic obstructive pulmonary disease) ([address] 8/17/2017    Continue with inhalers    Diabetes ([address]     Pre-diabetic    DJD (degenerative joint disease) 8/17/2017    Elevated BUN 8/17/2017    Esophagitis, reflux 8/17/2017    Fatigue 8/17/2017    GERD (gastroesophageal reflux disease)     Heart failure (HCC)     Cardiomyopathy, HX of MI 1999    Hyperglycemia 8/17/2017    Hyperkalemia 8/17/2017    Hyperlipidemia 8/17/2017    Hypertension 8/17/2017    Ill-defined condition     Vertigo    Ill-defined condition 2003    HX of Urosepsis complicated by respiratory failure and pneumonnia    Myalgia 8/17/2017    Thromboembolus ([address] 1969    during 2nd pregnancy    Thrush 8/17/2017    Vertigo, aural 8/17/2017    Vitamin D deficiency 8/17/2017     Past Surgical History:   Procedure Laterality Date    HX GYN  1988    JONNY    HX GYN      Left Breast Mastectomy    HX HEENT  2015    Bilateral Cataract     HX VASCULAR ACCESS      Port a cath on the right       Current Outpatient Prescriptions on File Prior to Visit   Medication Sig Dispense Refill    furosemide (LASIX) 40 mg tablet TAKE ONE AND ONE-HALF TABLETS BY MOUTH ONCE DAILY IN THE MORNING 45 Tab 10    omeprazole (PRILOSEC) 20 mg capsule TAKE ONE CAPSULE BY MOUTH ONCE DAILY 30 Cap 6    cholecalciferol (VITAMIN D3) 1,000 unit cap Take  by mouth daily.  acetaminophen (TYLENOL ARTHRITIS PAIN) 650 mg CR tablet Take 650 mg by mouth every six (6) hours as needed for Pain.  OXYGEN-AIR DELIVERY SYSTEMS 2 L/min by Nasal route continuous.  aspirin delayed-release 81 mg tablet Take 1 Tab by mouth daily. May resume in 30 days after completing twice daily Aspirin.  lisinopril (PRINIVIL, ZESTRIL) 10 mg tablet Take 1 Tab by mouth two (2) times a day. May resume medication when b/p greater than 120/80.  PSEUDOEPHEDRINE HCL (SINUS DECONGESTANT PO) Take 1 Tab by mouth.  mupirocin calcium (BACTROBAN) 2 % nasal ointment by Both Nostrils route two (2) times a day. Plain Staph Aureus nasal colonization  Patient to use twice today 6/21 and prior to arrival 6/22 will continue inpatient. Indications: Use bid x 5 days      fenofibrate nanocrystallized (TRICOR) 145 mg tablet Take 145 mg by mouth daily.  fluticasone (FLOVENT HFA) 220 mcg/actuation inhaler Take 2 Puffs by inhalation daily.  meclizine (ANTIVERT) 12.5 mg tablet Take 12.5 mg by mouth three (3) times daily as needed.  carvedilol (COREG) 12.5 mg tablet Take 40 mg by mouth daily.       albuterol (PROVENTIL, VENTOLIN) 90 mcg/Actuation inhaler Take 1-2 Puffs by inhalation every four (4) hours as needed for Wheezing. 17 g 0    ergocalciferol (ERGOCALCIFEROL) 50,000 unit capsule Take 50,000 Units by mouth.  furosemide (LASIX) 20 mg tablet Take 1.5 Tabs by mouth daily. May resume medication when b/p greater than 120/80.  HYDROcodone-acetaminophen (NORCO) 5-325 mg per tablet Take 1-2 Tabs by mouth every four (4) hours as needed. Max Daily Amount: 12 Tabs. 80 Tab 0    senna-docusate (SENNA PLUS) 8.6-50 mg per tablet Take 1 Tab by mouth two (2) times a day. 60 Tab 0     No current facility-administered medications on file prior to visit. Allergies   Allergen Reactions    Morphine Anaphylaxis     .

## 2018-02-23 NOTE — PROCEDURES
[facility]  PULMONARY FUNCTION    Name:Julio GONZALEZ  MR#: [id number]  : 1945  ACCOUNT #: [de-identified]   DATE OF SERVICE: 2018    REASON FOR THE TEST:  Dyspnea. Spirometry and lung volumes were performed and they revealed:  1. Severe airflow obstruction. 2.  No restrictive lung disease. 3.  Air trapping is present. 4.  Severe reduction in DLCO. 5.  Significant improvement on FEV1 after bronchodilators. 6.  Flow volume loop is consistent with airflow obstruction.       MD MAITE Naranjo / Anamaria James  D: 2018 11:16     T: 2018 11:27  JOB #: [id number]  CC: Kody RUEDA NP

## 2018-03-06 DIAGNOSIS — I50.9 CHRONIC CONGESTIVE HEART FAILURE, UNSPECIFIED CONGESTIVE HEART FAILURE TYPE: Primary | ICD-10-CM

## 2018-03-06 DIAGNOSIS — E66.9 OBESITY, UNSPECIFIED CLASSIFICATION, UNSPECIFIED OBESITY TYPE, UNSPECIFIED WHETHER SERIOUS COMORBIDITY PRESENT: ICD-10-CM

## 2018-03-06 DIAGNOSIS — I10 ESSENTIAL HYPERTENSION: ICD-10-CM

## 2018-03-06 DIAGNOSIS — J43.9 PULMONARY EMPHYSEMA, UNSPECIFIED EMPHYSEMA TYPE (HCC): ICD-10-CM

## 2018-03-06 NOTE — PROGRESS NOTES
PFT'S discussed with patient. She is feeling much better. Wants referral to nutritionist to help with diet in view of recent CHF.

## 2018-03-13 ENCOUNTER — HOSPITAL ENCOUNTER (OUTPATIENT)
Dept: NUTRITION | Age: 73
End: 2018-03-13

## 2018-05-31 RX ORDER — FENOFIBRATE 145 MG/1
TABLET, COATED ORAL
Qty: 90 TAB | Refills: 3 | Status: SHIPPED | OUTPATIENT
Start: 2018-05-31 | End: 2019-05-28 | Stop reason: SDUPTHER

## 2018-06-29 NOTE — TELEPHONE ENCOUNTER
Requested Prescriptions     Pending Prescriptions Disp Refills    carvedilol (COREG) 12.5 mg tablet       Sig: Take 3 Tabs by mouth daily.      Upcoming visit:  None  Recent visit:       02/14/2018

## 2018-06-30 RX ORDER — CARVEDILOL 12.5 MG/1
40 TABLET ORAL DAILY
OUTPATIENT
Start: 2018-06-30

## 2018-07-02 RX ORDER — CARVEDILOL PHOSPHATE 40 MG/1
40 CAPSULE, EXTENDED RELEASE ORAL
Qty: 90 CAP | Refills: 3 | Status: SHIPPED | OUTPATIENT
Start: 2018-07-02 | End: 2020-01-01

## 2018-11-17 NOTE — H&P
Yelena Childs MD - Adult Reconstruction and Total Joint Replacement     Orthopaedic History and Physical        NAME: Rip Olson       :         MRN:  [id number]        . ... Reason For Visit   CHIEF COMPLAINT:  Right knee pain / limitations.      HISTORY OF PRESENT ILLNESS:  Mary Perez is a 66year old female who presents today for evaluation of her right knee pain. She endorses diffuse anterior R knee pain. Both the previous corticosteroid injection and viscosupplementation injection offered relief for about a month. She does not believe she is getting adequate long term relief from corticosteroid injections and would like to continue with a R TKA. She would also like an injection before her upcoming trip. She notes milder L knee pain. She developed sepsis in  from bronchitis, pneumonia, and a bladder infection. She had wound healing complications and an infection following surgery for breast cancer. Recent EF was 50%. There are no active problems to display for this patient. Past Medical History:   Diagnosis Date    Arthritis     Asthma     Mild to Moderate     Cancer ([address]     Breast left    Chronic pain     Right knee    Diabetes ([address]     Pre-diabetic    GERD (gastroesophageal reflux disease)     Heart failure (HCC)     Cardiomyopathy, HX of MI     Ill-defined condition     Vertigo    Ill-defined condition     HX of Urosepsis complicated by respiratory failure and pneumonnia    Thromboembolus ([address] 1969    during 2nd pregnancy      Past Surgical History:   Procedure Laterality Date    HX GYN  1988    JONNY    HX GYN      Left Breast Mastectomy    HX HEENT  2015    Bilateral Cataract     HX VASCULAR ACCESS      Port a cath on the right      Prior to Admission medications    Medication Sig Start Date End Date Taking? Authorizing Provider   mupirocin calcium (BACTROBAN) 2 % nasal ointment by Both Nostrils route two (2) times a day.  Plain Staph Aureus nasal colonization  Patient to use twice today 6/21 and prior to arrival 6/22 will continue inpatient. Indications: Use bid x 5 days 6/21/17  Yes Historical Provider   lisinopril (PRINIVIL, ZESTRIL) 10 mg tablet Take 10 mg by mouth two (2) times a day. Historical Provider   furosemide (LASIX) 20 mg tablet Take 30 mg by mouth daily. Historical Provider   fenofibrate nanocrystallized (TRICOR) 145 mg tablet Take 145 mg by mouth daily. Historical Provider   omeprazole (PRILOSEC) 20 mg capsule Take 20 mg by mouth daily. Historical Provider   aspirin delayed-release 81 mg tablet Take 81 mg by mouth daily. Historical Provider   acetaminophen (TYLENOL ARTHRITIS PAIN) 650 mg CR tablet Take 650 mg by mouth every six (6) hours as needed for Pain. Historical Provider   fluticasone (FLOVENT HFA) 220 mcg/actuation inhaler Take 2 Puffs by inhalation daily. Historical Provider   meclizine (ANTIVERT) 12.5 mg tablet Take 12.5 mg by mouth three (3) times daily as needed. Historical Provider   carvedilol (COREG) 12.5 mg tablet Take 40 mg by mouth daily. Phys Other, MD   albuterol (PROVENTIL, VENTOLIN) 90 mcg/Actuation inhaler Take 1-2 Puffs by inhalation every four (4) hours as needed for Wheezing. 5/1/11   TORSTEN Joseph     Allergies   Allergen Reactions    Morphine Anaphylaxis      Social History   Substance Use Topics    Smoking status: Former Smoker     Packs/day: 2.00     Years: 25.00     Quit date: 1989    Smokeless tobacco: Not on file    Alcohol use No      Family History   Problem Relation Age of Onset    Cancer Mother     Other Father         REVIEW OF SYSTEMS: A comprehensive review of systems was negative except for that written in the HPI.     PHYSICAL EXAM:  Patient appears stated age. Alert and oriented. The patient is cooperative and in no acute distress. Appropriate response to questioning.      Body habitus is obese. Gait is antalgic centered around the R knee.  Assistive devices: none.      Bilateral knee exam with R>L medial JLT. Traced effusion on R. No instability. + crepitation bilaterally. + PF grind. Full AROM. No erythema or signs of infection. No calf TTP.     Grossly neurovascularly intact. No lymphedema or skin abnormalities.       IMAGING:  I reviewed right knee films from a previous office visit including a bilateral standing film, a lateral view, and a sunrise view:   She has severe tricompartmental degenerative changes throughout her knees bilaterally, worst on the R.       ASSESSMENT:  Bilateral tricompartmental knee arthritis, R>L  Obesity       PLAN:  At this time I have recommended a Toradol injection in the R knee and a R TKA. She understands that she would be at an increased for an infection given her hx of post-operative infections. Conservative treatments including activity modification, medication, and steroid injections have not successfully relieved pain. Surgery was discussed at length with the patient today. We went over all of the pertinent risks, benefits, and alternatives to the procedure. They understand no guarantees can be given about the outcome and would like to proceed. I discussed the pre-op total joint class and general recovery timeline with the patient. The patient understands the need for medical clearance from her PCP and a cardiologist. We will plan for TKA surgery in about 2 months. We will use antibiotic cement.        Scribed for Dr. Abner Guzman by Vilma Pearson.            TORSTEN Carrasco none

## 2018-11-22 ENCOUNTER — HOSPITAL ENCOUNTER (EMERGENCY)
Age: 73
Discharge: HOME | End: 2018-11-22
Attending: EMERGENCY MEDICINE | Admitting: EMERGENCY MEDICINE
Payer: MEDICARE

## 2018-11-22 ENCOUNTER — APPOINTMENT (OUTPATIENT)
Dept: GENERAL RADIOLOGY | Age: 73
End: 2018-11-22
Attending: EMERGENCY MEDICINE
Payer: MEDICARE

## 2018-11-22 VITALS
SYSTOLIC BLOOD PRESSURE: 99 MMHG | DIASTOLIC BLOOD PRESSURE: 55 MMHG | TEMPERATURE: 98.6 F | RESPIRATION RATE: 17 BRPM | HEIGHT: 65 IN | BODY MASS INDEX: 30.82 KG/M2 | OXYGEN SATURATION: 93 % | HEART RATE: 88 BPM | WEIGHT: 185 LBS

## 2018-11-22 DIAGNOSIS — E86.0 DEHYDRATION: ICD-10-CM

## 2018-11-22 DIAGNOSIS — R19.7 DIARRHEA OF PRESUMED INFECTIOUS ORIGIN: Primary | ICD-10-CM

## 2018-11-22 DIAGNOSIS — S62.102A CLOSED FRACTURE OF LEFT WRIST, INITIAL ENCOUNTER: ICD-10-CM

## 2018-11-22 LAB
ALBUMIN SERPL-MCNC: 3.1 G/DL (ref 3.5–5)
ALBUMIN/GLOB SERPL: 0.8 {RATIO} (ref 1.1–2.2)
ALP SERPL-CCNC: 43 U/L (ref 45–117)
ALT SERPL-CCNC: 23 U/L (ref 12–78)
ANION GAP SERPL CALC-SCNC: 9 MMOL/L (ref 5–15)
APPEARANCE UR: ABNORMAL
AST SERPL-CCNC: 29 U/L (ref 15–37)
BACTERIA URNS QL MICRO: ABNORMAL /HPF
BASOPHILS # BLD: 0 K/UL (ref 0–0.1)
BASOPHILS NFR BLD: 0 % (ref 0–1)
BILIRUB SERPL-MCNC: 0.6 MG/DL (ref 0.2–1)
BILIRUB UR QL CFM: NEGATIVE
BUN SERPL-MCNC: 40 MG/DL (ref 6–20)
BUN/CREAT SERPL: 25 (ref 12–20)
CALCIUM SERPL-MCNC: 8.8 MG/DL (ref 8.5–10.1)
CHLORIDE SERPL-SCNC: 104 MMOL/L (ref 97–108)
CO2 SERPL-SCNC: 26 MMOL/L (ref 21–32)
COLOR UR: ABNORMAL
CREAT SERPL-MCNC: 1.63 MG/DL (ref 0.55–1.02)
DIFFERENTIAL METHOD BLD: ABNORMAL
EOSINOPHIL # BLD: 0 K/UL (ref 0–0.4)
EOSINOPHIL NFR BLD: 0 % (ref 0–7)
EPITH CASTS URNS QL MICRO: ABNORMAL /LPF
ERYTHROCYTE [DISTWIDTH] IN BLOOD BY AUTOMATED COUNT: 13.5 % (ref 11.5–14.5)
GLOBULIN SER CALC-MCNC: 4.1 G/DL (ref 2–4)
GLUCOSE SERPL-MCNC: 131 MG/DL (ref 65–100)
GLUCOSE UR STRIP.AUTO-MCNC: NEGATIVE MG/DL
HCT VFR BLD AUTO: 42.6 % (ref 35–47)
HGB BLD-MCNC: 13.5 G/DL (ref 11.5–16)
HGB UR QL STRIP: ABNORMAL
IMM GRANULOCYTES # BLD: 0.1 K/UL (ref 0–0.04)
IMM GRANULOCYTES NFR BLD AUTO: 1 % (ref 0–0.5)
KETONES UR QL STRIP.AUTO: NEGATIVE MG/DL
LEUKOCYTE ESTERASE UR QL STRIP.AUTO: ABNORMAL
LYMPHOCYTES # BLD: 1.3 K/UL (ref 0.8–3.5)
LYMPHOCYTES NFR BLD: 9 % (ref 12–49)
MCH RBC QN AUTO: 28.5 PG (ref 26–34)
MCHC RBC AUTO-ENTMCNC: 31.7 G/DL (ref 30–36.5)
MCV RBC AUTO: 89.9 FL (ref 80–99)
MONOCYTES # BLD: 1.6 K/UL (ref 0–1)
MONOCYTES NFR BLD: 11 % (ref 5–13)
NEUTS SEG # BLD: 11.4 K/UL (ref 1.8–8)
NEUTS SEG NFR BLD: 79 % (ref 32–75)
NITRITE UR QL STRIP.AUTO: NEGATIVE
NRBC # BLD: 0 K/UL (ref 0–0.01)
NRBC BLD-RTO: 0 PER 100 WBC
PH UR STRIP: 5 [PH] (ref 5–8)
PLATELET # BLD AUTO: 189 K/UL (ref 150–400)
PMV BLD AUTO: 9.8 FL (ref 8.9–12.9)
POTASSIUM SERPL-SCNC: 3.7 MMOL/L (ref 3.5–5.1)
PROT SERPL-MCNC: 7.2 G/DL (ref 6.4–8.2)
PROT UR STRIP-MCNC: 30 MG/DL
RBC # BLD AUTO: 4.74 M/UL (ref 3.8–5.2)
RBC #/AREA URNS HPF: ABNORMAL /HPF (ref 0–5)
SODIUM SERPL-SCNC: 139 MMOL/L (ref 136–145)
SP GR UR REFRACTOMETRY: 1.02 (ref 1–1.03)
UA: UC IF INDICATED,UAUC: ABNORMAL
UROBILINOGEN UR QL STRIP.AUTO: 1 EU/DL (ref 0.2–1)
WBC # BLD AUTO: 14.4 K/UL (ref 3.6–11)
WBC URNS QL MICRO: ABNORMAL /HPF (ref 0–4)

## 2018-11-22 PROCEDURE — 74011250636 HC RX REV CODE- 250/636: Performed by: EMERGENCY MEDICINE

## 2018-11-22 PROCEDURE — 96360 HYDRATION IV INFUSION INIT: CPT

## 2018-11-22 PROCEDURE — 36415 COLL VENOUS BLD VENIPUNCTURE: CPT

## 2018-11-22 PROCEDURE — 96361 HYDRATE IV INFUSION ADD-ON: CPT

## 2018-11-22 PROCEDURE — 73110 X-RAY EXAM OF WRIST: CPT

## 2018-11-22 PROCEDURE — 81001 URINALYSIS AUTO W/SCOPE: CPT

## 2018-11-22 PROCEDURE — 80053 COMPREHEN METABOLIC PANEL: CPT

## 2018-11-22 PROCEDURE — 74011000250 HC RX REV CODE- 250: Performed by: EMERGENCY MEDICINE

## 2018-11-22 PROCEDURE — 75810000053 HC SPLINT APPLICATION

## 2018-11-22 PROCEDURE — 87086 URINE CULTURE/COLONY COUNT: CPT

## 2018-11-22 PROCEDURE — 74011250637 HC RX REV CODE- 250/637: Performed by: EMERGENCY MEDICINE

## 2018-11-22 PROCEDURE — 73100 X-RAY EXAM OF WRIST: CPT

## 2018-11-22 PROCEDURE — 87186 SC STD MICRODIL/AGAR DIL: CPT

## 2018-11-22 PROCEDURE — 99285 EMERGENCY DEPT VISIT HI MDM: CPT

## 2018-11-22 PROCEDURE — 87077 CULTURE AEROBIC IDENTIFY: CPT

## 2018-11-22 PROCEDURE — 85025 COMPLETE CBC W/AUTO DIFF WBC: CPT

## 2018-11-22 RX ORDER — LIDOCAINE HYDROCHLORIDE 10 MG/ML
10 INJECTION, SOLUTION EPIDURAL; INFILTRATION; INTRACAUDAL; PERINEURAL ONCE
Status: COMPLETED | OUTPATIENT
Start: 2018-11-22 | End: 2018-11-22

## 2018-11-22 RX ORDER — CIPROFLOXACIN 500 MG/1
500 TABLET ORAL
Status: COMPLETED | OUTPATIENT
Start: 2018-11-22 | End: 2018-11-22

## 2018-11-22 RX ORDER — KETOROLAC TROMETHAMINE 30 MG/ML
15 INJECTION, SOLUTION INTRAMUSCULAR; INTRAVENOUS
Status: DISCONTINUED | OUTPATIENT
Start: 2018-11-22 | End: 2018-11-22 | Stop reason: HOSPADM

## 2018-11-22 RX ORDER — BUPIVACAINE HYDROCHLORIDE 5 MG/ML
5 INJECTION, SOLUTION EPIDURAL; INTRACAUDAL
Status: COMPLETED | OUTPATIENT
Start: 2018-11-22 | End: 2018-11-22

## 2018-11-22 RX ORDER — METRONIDAZOLE 250 MG/1
500 TABLET ORAL
Status: COMPLETED | OUTPATIENT
Start: 2018-11-22 | End: 2018-11-22

## 2018-11-22 RX ORDER — CIPROFLOXACIN 500 MG/1
500 TABLET ORAL 2 TIMES DAILY
Qty: 14 TAB | Refills: 0 | Status: SHIPPED | OUTPATIENT
Start: 2018-11-22 | End: 2018-11-29

## 2018-11-22 RX ORDER — METRONIDAZOLE 500 MG/1
500 TABLET ORAL 2 TIMES DAILY
Qty: 14 TAB | Refills: 0 | Status: SHIPPED | OUTPATIENT
Start: 2018-11-22 | End: 2018-11-29

## 2018-11-22 RX ORDER — ACETAMINOPHEN 500 MG
1000 TABLET ORAL
Status: COMPLETED | OUTPATIENT
Start: 2018-11-22 | End: 2018-11-22

## 2018-11-22 RX ADMIN — LIDOCAINE HYDROCHLORIDE 10 ML: 10 INJECTION, SOLUTION EPIDURAL; INFILTRATION; INTRACAUDAL; PERINEURAL at 12:19

## 2018-11-22 RX ADMIN — BUPIVACAINE HYDROCHLORIDE 25 MG: 5 INJECTION, SOLUTION EPIDURAL; INTRACAUDAL; PERINEURAL at 12:19

## 2018-11-22 RX ADMIN — SODIUM CHLORIDE 1000 ML: 900 INJECTION, SOLUTION INTRAVENOUS at 13:48

## 2018-11-22 RX ADMIN — ACETAMINOPHEN 1000 MG: 500 TABLET ORAL at 12:18

## 2018-11-22 RX ADMIN — METRONIDAZOLE 500 MG: 250 TABLET ORAL at 12:19

## 2018-11-22 RX ADMIN — SODIUM CHLORIDE 1000 ML: 900 INJECTION, SOLUTION INTRAVENOUS at 11:30

## 2018-11-22 RX ADMIN — CIPROFLOXACIN HYDROCHLORIDE 500 MG: 500 TABLET, FILM COATED ORAL at 12:18

## 2018-11-22 NOTE — ED PROVIDER NOTES
Patient Name: Calos Lopez History of Presenting Illness Chief Complaint Patient presents with  Diarrhea Arrives via [facility] c/o diarrhea began Monday with vomiting onset Tues Pt reports fall yesterday and now with L wrist pain History Provided By: Patient and Patient's Daughter HPI: Calos Lopez, 75 y.o. female with PMHx significant for CHF, breast CA, HTN, and COPD, presents via wheelchair to the ED with cc of new onset diarrhea beginning 19 Nov 2018 and left wrist pain s/p fall today with associated sx of nausea, emesis, dizziness and weakness. Pt notes nausea and emesis began on 20 Nov 2018, but has since resolved. Pt states she experienced dizziness while walking back to living room today. Pt felt herself falling and tried to stop her fall by reaching out for nearby recliner chair. Chair tipped and fell with pt. Pt denies hitting head or LOC. Pt notes she has recently changed blood pressure and chf medications (entresto). Pt denies any recent abx or hospitalizations. Pt specifically denies abdominal pain, fever Social Hx: - (former) Tobacco, - EtOH, - Illicit Drugs There are no other complaints, changes, or physical findings at this time. PCP: Nicki Martínez NP Current Facility-Administered Medications Medication Dose Route Frequency Provider Last Rate Last Dose  sodium chloride 0.9 % bolus infusion 1,000 mL  1,000 mL IntraVENous ONCE Jarod Aceves, DO      
 ketorolac (TORADOL) injection 15 mg  15 mg IntraVENous NOW Jodie Bryant, DO      
 
Current Outpatient Medications Medication Sig Dispense Refill  ciprofloxacin HCl (CIPRO) 500 mg tablet Take 1 Tab by mouth two (2) times a day for 7 days. 14 Tab 0  
 metroNIDAZOLE (FLAGYL) 500 mg tablet Take 1 Tab by mouth two (2) times a day for 7 days. 14 Tab 0  carvedilol (COREG CR) 40 mg CR capsule Take 1 Cap by mouth daily (with breakfast).  90 Cap 3  
  omeprazole (PRILOSEC) 20 mg capsule TAKE ONE CAPSULE BY MOUTH ONCE DAILY 90 Cap 3  
 fenofibrate nanocrystallized (TRICOR) 145 mg tablet TAKE ONE TABLET BY MOUTH ONCE DAILY 90 Tab 3  
 FLOVENT  mcg/actuation inhaler INHALE TWO PUFFS BY MOUTH TWICE DAILY 3 Inhaler 2  
 meclizine (ANTIVERT) 25 mg tablet TAKE ONE TABLET BY MOUTH EVERY 6 HOURS AS NEEDED FOR DIZZINESS 90 Tab 2  
 furosemide (LASIX) 40 mg tablet TAKE ONE AND ONE-HALF TABLETS BY MOUTH ONCE DAILY IN THE MORNING 45 Tab 10  
 cholecalciferol (VITAMIN D3) 1,000 unit cap Take  by mouth daily.  ergocalciferol (ERGOCALCIFEROL) 50,000 unit capsule Take 50,000 Units by mouth.  acetaminophen (TYLENOL ARTHRITIS PAIN) 650 mg CR tablet Take 650 mg by mouth every six (6) hours as needed for Pain.  OXYGEN-AIR DELIVERY SYSTEMS 2 L/min by Nasal route continuous.  aspirin delayed-release 81 mg tablet Take 1 Tab by mouth daily. May resume in 30 days after completing twice daily Aspirin.  furosemide (LASIX) 20 mg tablet Take 1.5 Tabs by mouth daily. May resume medication when b/p greater than 120/80.  lisinopril (PRINIVIL, ZESTRIL) 10 mg tablet Take 1 Tab by mouth two (2) times a day. May resume medication when b/p greater than 120/80.  HYDROcodone-acetaminophen (NORCO) 5-325 mg per tablet Take 1-2 Tabs by mouth every four (4) hours as needed. Max Daily Amount: 12 Tabs. 80 Tab 0  
 senna-docusate (SENNA PLUS) 8.6-50 mg per tablet Take 1 Tab by mouth two (2) times a day. 60 Tab 0  
 PSEUDOEPHEDRINE HCL (SINUS DECONGESTANT PO) Take 1 Tab by mouth.  mupirocin calcium (BACTROBAN) 2 % nasal ointment by Both Nostrils route two (2) times a day. Plain Staph Aureus nasal colonization  Patient to use twice today 6/21 and prior to arrival 6/22 will continue inpatient. Indications: Use bid x 5 days  albuterol (PROVENTIL, VENTOLIN) 90 mcg/Actuation inhaler Take 1-2 Puffs by inhalation every four (4) hours as needed for Wheezing. 17 g 0 Past History Past Medical History: 
Past Medical History:  
Diagnosis Date  Arthralgia 8/17/2017  Arthritis  Asthma Mild to Moderate  Breast cancer ([address] 8/17/2017 Onset 1997; Refusing Mammogram 6/16/17  Cancer ([address] 1997 Breast left  Cardiomyopathy ([address] 8/17/2017 Onset:1999 Ischemic; 25% - 50% (last EFx 45%) Continue with ACE/Lasix/coreg  Cataract 8/17/2017 Bilateral   
 Cellulitis 8/17/2017 Suspect local reaction to Pneumovax, treat with ice, NSAIDs or Tylenol  Chest pain at rest 8/17/2017  CHF (congestive heart failure) ([address] 8/17/2017 Stable, though weight up a little. To take 1 1/2 Lasix daily if swelling, 1 daily o/w  Chronic maxillary sinusitis 8/17/2017  Chronic pain Right knee  COPD (chronic obstructive pulmonary disease) ([address] 8/17/2017 Continue with inhalers  Diabetes ([address] Pre-diabetic  DJD (degenerative joint disease) 8/17/2017  Elevated BUN 8/17/2017  Esophagitis, reflux 8/17/2017  Fatigue 8/17/2017  GERD (gastroesophageal reflux disease)  Heart failure ([address] Cardiomyopathy, HX of MI 1999  Hyperglycemia 8/17/2017  Hyperkalemia 8/17/2017  Hyperlipidemia 8/17/2017  Hypertension 8/17/2017  Ill-defined condition Vertigo  Ill-defined condition 2003 HX of Urosepsis complicated by respiratory failure and pneumonnia  Myalgia 8/17/2017  Thromboembolus ([address] 1969  
 during 2nd pregnancy [address] [place] [address] 8/17/2017  Vertigo, aural 8/17/2017  Vitamin D deficiency 8/17/2017 Past Surgical History: 
Past Surgical History:  
Procedure Laterality Date [address] JONNY  
 HX GYN Left Breast Mastectomy  HX HEENT  2015 Bilateral Cataract  HX VASCULAR ACCESS Port a cath on the right Family History: 
Family History Problem Relation Age of Onset  Cancer Mother  Other Father Social History: 
Social History Tobacco Use  Smoking status: Former Smoker Packs/day: 2.00 Years: 25.00 Pack years: 50.00 Last attempt to quit:  Years since quittin.9  Smokeless tobacco: Never Used Substance Use Topics  Alcohol use: No  
 Drug use: No  
 
 
Allergies: Allergies Allergen Reactions  Morphine Anaphylaxis Review of Systems Review of Systems Constitutional: Negative for chills and fever. HENT: Negative for congestion and sore throat. Eyes: Negative for visual disturbance. Respiratory: Negative for cough and shortness of breath. Cardiovascular: Negative for chest pain and leg swelling. Gastrointestinal: Positive for diarrhea, nausea and vomiting. Negative for abdominal pain and blood in stool. Endocrine: Negative for polyuria. Genitourinary: Negative for dysuria, flank pain, vaginal bleeding and vaginal discharge. Musculoskeletal: Positive for arthralgias (left wrist pain). Negative for myalgias. Skin: Negative for rash. Allergic/Immunologic: Negative for immunocompromised state. Neurological: Positive for dizziness and weakness. Negative for headaches. Psychiatric/Behavioral: Negative for confusion. Physical Exam  
Physical Exam  
Constitutional: She is oriented to person, place, and time. She appears well-developed and well-nourished. HENT:  
Head: Normocephalic and atraumatic. Moist mucous membranes Eyes: Conjunctivae are normal. Pupils are equal, round, and reactive to light. Right eye exhibits no discharge. Left eye exhibits no discharge. Neck: Normal range of motion. Neck supple. No tracheal deviation present. Cardiovascular: Normal rate, regular rhythm and normal heart sounds. No murmur heard. Pulmonary/Chest: Effort normal and breath sounds normal. No respiratory distress. She has no wheezes. She has no rales. Abdominal: Soft. Bowel sounds are normal. There is no tenderness. There is no rebound and no guarding. Musculoskeletal: She exhibits no edema or deformity. Left wrist: She exhibits decreased range of motion (limited wrist ROM) and tenderness (left wrist). Neurological: She is alert and oriented to person, place, and time. No sensory deficit (normal sensation of radial, ulnar, and median nerves (Left)). +weakned hand  (left) Skin: Skin is warm and dry. Bruising (medial portion of left wrist) and ecchymosis (posterior left wrist) noted. No rash noted. No erythema. Psychiatric: Her behavior is normal.  
Nursing note and vitals reviewed. Diagnostic Study Results Labs - Recent Results (from the past 12 hour(s)) CBC WITH AUTOMATED DIFF Collection Time: 11/22/18 11:28 AM  
Result Value Ref Range WBC 14.4 (H) 3.6 - 11.0 K/uL  
 RBC 4.74 3.80 - 5.20 M/uL  
 HGB 13.5 11.5 - 16.0 g/dL HCT 42.6 35.0 - 47.0 % MCV 89.9 80.0 - 99.0 FL  
 MCH 28.5 26.0 - 34.0 PG  
 MCHC 31.7 30.0 - 36.5 g/dL  
 RDW 13.5 11.5 - 14.5 % PLATELET 027 845 - 617 K/uL MPV 9.8 8.9 - 12.9 FL  
 NRBC 0.0 0  WBC ABSOLUTE NRBC 0.00 0.00 - 0.01 K/uL NEUTROPHILS 79 (H) 32 - 75 % LYMPHOCYTES 9 (L) 12 - 49 % MONOCYTES 11 5 - 13 % EOSINOPHILS 0 0 - 7 % BASOPHILS 0 0 - 1 % IMMATURE GRANULOCYTES 1 (H) 0.0 - 0.5 % ABS. NEUTROPHILS 11.4 (H) 1.8 - 8.0 K/UL  
 ABS. LYMPHOCYTES 1.3 0.8 - 3.5 K/UL  
 ABS. MONOCYTES 1.6 (H) 0.0 - 1.0 K/UL  
 ABS. EOSINOPHILS 0.0 0.0 - 0.4 K/UL  
 ABS. BASOPHILS 0.0 0.0 - 0.1 K/UL  
 ABS. IMM. GRANS. 0.1 (H) 0.00 - 0.04 K/UL  
 DF AUTOMATED METABOLIC PANEL, COMPREHENSIVE Collection Time: 11/22/18 11:28 AM  
Result Value Ref Range Sodium 139 136 - 145 mmol/L Potassium 3.7 3.5 - 5.1 mmol/L Chloride 104 97 - 108 mmol/L  
 CO2 26 21 - 32 mmol/L Anion gap 9 5 - 15 mmol/L Glucose 131 (H) 65 - 100 mg/dL  BUN 40 (H) 6 - 20 MG/DL  
 Creatinine 1.63 (H) 0.55 - 1.02 MG/DL  
 BUN/Creatinine ratio 25 (H) 12 - 20 GFR est AA 37 (L) >60 ml/min/1.73m2 GFR est non-AA 31 (L) >60 ml/min/1.73m2 Calcium 8.8 8.5 - 10.1 MG/DL Bilirubin, total 0.6 0.2 - 1.0 MG/DL  
 ALT (SGPT) 23 12 - 78 U/L  
 AST (SGOT) 29 15 - 37 U/L Alk. phosphatase 43 (L) 45 - 117 U/L Protein, total 7.2 6.4 - 8.2 g/dL Albumin 3.1 (L) 3.5 - 5.0 g/dL Globulin 4.1 (H) 2.0 - 4.0 g/dL A-G Ratio 0.8 (L) 1.1 - 2.2 URINALYSIS W/ REFLEX CULTURE Collection Time: 11/22/18 11:39 AM  
Result Value Ref Range Color DARK YELLOW Appearance TURBID (A) CLEAR Specific gravity 1.019 1.003 - 1.030    
 pH (UA) 5.0 5.0 - 8.0 Protein 30 (A) NEG mg/dL Glucose NEGATIVE  NEG mg/dL Ketone NEGATIVE  NEG mg/dL Blood MODERATE (A) NEG Urobilinogen 1.0 0.2 - 1.0 EU/dL Nitrites NEGATIVE  NEG Leukocyte Esterase LARGE (A) NEG    
 WBC 20-50 0 - 4 /hpf  
 RBC 0-5 0 - 5 /hpf Epithelial cells FEW FEW /lpf Bacteria 4+ (A) NEG /hpf  
 UA:UC IF INDICATED URINE CULTURE ORDERED (A) CNI    
BILIRUBIN, CONFIRM Collection Time: 11/22/18 11:39 AM  
Result Value Ref Range Bilirubin UA, confirm NEGATIVE  NEG Radiologic Studies -  
XR WRIST LT AP/LAT/OBL MIN 3V Final Result Initial Result:  
Impression:  
 IMPRESSION:  Impacted distal radial fracture. Narrative: EXAM: XR WRIST LT AP/LAT/OBL MIN 3V INDICATION:  fracture, FOOSH. 
 
COMPARISON: None. FINDINGS: Three  views of the left wrist demonstrate an impacted fracture of the 
distal radius.  There is soft tissue swelling. Medical Decision Making I am the first provider for this patient. I reviewed the vital signs, available nursing notes, past medical history, past surgical history, family history and social history. Vital Signs-Reviewed the patient's vital signs. Patient Vitals for the past 12 hrs: 
 Temp Pulse Resp BP SpO2 11/22/18 1200     93 % 11/22/18 1100    126/62 93 % 11/22/18 1045    111/66 93 % 11/22/18 1026 98.6 °F (37 °C) 100 18 (!) 126/105 95 % Pulse Oximetry Analysis - 95% on RA Cardiac Monitor:  
Rate: 100 bpm 
Rhythm: Sinus Tachycardia Records Reviewed: Nursing Notes and Old Medical Records Provider Notes (Medical Decision Making): DDx: fx, sprain, strain, possibly infectious diarrhea, dehydration; will check labs and treat imperically given time frame. ED Course:  
Initial assessment performed. The patients presenting problems have been discussed, and they are in agreement with the care plan formulated and outlined with them. I have encouraged them to ask questions as they arise throughout their visit. Procedure Note - Splint Placement: 
1:11 PM 
Performed by: Kerry Watson DO Neurovascularly intact prior to tx. An Orthoglass sugar tong splint was placed on pt's left forearm. Joint was placed in neutral position. Neurovascularly intact after tx. The procedure took 1-15 minutes, and pt tolerated well. 
 
2:42 PM 
Patient tolerating PO. Feeling Better, Okay for discharge. Will start on abx, should cover both urine and diarrheal source. Close return precautions discussed. Patient knows to return with fever, chills, worsening weakness, inability to take fluids/food/medicine by mouth. Critical Care Time: 0 minute Disposition: 
Discharge Note: 
1:12 PM 
The pt is ready for discharge. The pt's signs, symptoms, diagnosis, and discharge instructions have been discussed and pt has conveyed their understanding. The pt is to follow up as recommended or return to ER should their symptoms worsen. Plan has been discussed and pt is in agreement. PLAN: 
1. Discharge Home Current Discharge Medication List  
  
START taking these medications Details  
ciprofloxacin HCl (CIPRO) 500 mg tablet Take 1 Tab by mouth two (2) times a day for 7 days. Qty: 14 Tab, Refills: 0 metroNIDAZOLE (FLAGYL) 500 mg tablet Take 1 Tab by mouth two (2) times a day for 7 days. Qty: 14 Tab, Refills: 0  
  
  
 
2. Follow-up Information Follow up With Specialties Details Why Contact Info [facility]  In 1 week Regarding wrist fracture [address] [facility] [address] [facility] 
[phone] Parth Sparks NP Nurse Practitioner In 4 days Regarding diarrhea, dehydration. [address] 
[phone] [facility] EMERGENCY DEPT Emergency Medicine  If symptoms worsen [address] [facility] 
[phone] Return to ED if worse Diagnosis Clinical Impression: 1. Diarrhea of presumed infectious origin 2. Closed fracture of left wrist, initial encounter 3. Dehydration Attestations: This note is prepared by Shahana Martínez, acting as Scribe for Jewell Norton DO. The scribe's documentation has been prepared under my direction and personally reviewed by me in its entirety. I confirm that the note above accurately reflects all work, treatment, procedures, and medical decision making performed by me.  
Jewell Norton DO

## 2018-11-22 NOTE — DISCHARGE INSTRUCTIONS
Diarrhea: Care Instructions  Your Care Instructions    Diarrhea is loose, watery stools (bowel movements). The exact cause is often hard to find. Sometimes diarrhea is your body's way of getting rid of what caused an upset stomach. Viruses, food poisoning, and many medicines can cause diarrhea. Some people get diarrhea in response to emotional stress, anxiety, or certain foods. Almost everyone has diarrhea now and then. It usually isn't serious, and your stools will return to normal soon. The important thing to do is replace the fluids you have lost, so you can prevent dehydration. The doctor has checked you carefully, but problems can develop later. If you notice any problems or new symptoms, get medical treatment right away. Follow-up care is a key part of your treatment and safety. Be sure to make and go to all appointments, and call your doctor if you are having problems. It's also a good idea to know your test results and keep a list of the medicines you take. How can you care for yourself at home? · Watch for signs of dehydration, which means your body has lost too much water. Dehydration is a serious condition and should be treated right away. Signs of dehydration are:  ? Increasing thirst and dry eyes and mouth. ? Feeling faint or lightheaded. ? Darker urine, and a smaller amount of urine than normal.  · To prevent dehydration, drink plenty of fluids, enough so that your urine is light yellow or clear like water. Choose water and other caffeine-free clear liquids until you feel better. If you have kidney, heart, or liver disease and have to limit fluids, talk with your doctor before you increase the amount of fluids you drink. · Begin eating small amounts of mild foods the next day, if you feel like it. ? Try yogurt that has live cultures of Lactobacillus. (Check the label.)  ? Avoid spicy foods, fruits, alcohol, and caffeine until 48 hours after all symptoms are gone. ?  Avoid chewing gum that contains sorbitol. ? Avoid dairy products (except for yogurt with Lactobacillus) while you have diarrhea and for 3 days after symptoms are gone. · The doctor may recommend that you take over-the-counter medicine, such as loperamide (Imodium), if you still have diarrhea after 6 hours. Read and follow all instructions on the label. Do not use this medicine if you have bloody diarrhea, a high fever, or other signs of serious illness. Call your doctor if you think you are having a problem with your medicine. When should you call for help? Call 911 anytime you think you may need emergency care. For example, call if:    · You passed out (lost consciousness).     · Your stools are maroon or very bloody.    Call your doctor now or seek immediate medical care if:    · You are dizzy or lightheaded, or you feel like you may faint.     · Your stools are black and look like tar, or they have streaks of blood.     · You have new or worse belly pain.     · You have symptoms of dehydration, such as:  ? Dry eyes and a dry mouth. ? Passing only a little dark urine. ? Feeling thirstier than usual.     · You have a new or higher fever.    Watch closely for changes in your health, and be sure to contact your doctor if:    · Your diarrhea is getting worse.     · You see pus in the diarrhea.     · You are not getting better after 2 days (48 hours). Where can you learn more? Go to http://nolvia-chasidy.info/. Enter C238 in the search box to learn more about \"Diarrhea: Care Instructions. \"  Current as of: November 20, 2017  Content Version: 11.8  © 9699-0837 [employer]. Care instructions adapted under license by [employer] (which disclaims liability or warranty for this information).  If you have questions about a medical condition or this instruction, always ask your healthcare professional. [place] [address] any warranty or liability for your use of this information. Broken Wrist: Care Instructions  Your Care Instructions    Your wrist can break, or fracture, during sports, a fall, or other accidents. The break may happen when your wrist is hit or is used to protect you in a fall. Fractures can range from a small, hairline crack, to a bone or bones broken into two or more pieces. Your treatment depends on how bad the break is. Your doctor may have put your wrist in a cast or splint. This will help keep your wrist stable until your follow-up appointment. It may take weeks or months for your wrist to heal. You can help it heal with care at home. You heal best when you take good care of yourself. Eat a variety of healthy foods, and don't smoke. Follow-up care is a key part of your treatment and safety. Be sure to make and go to all appointments, and call your doctor if you are having problems. It's also a good idea to know your test results and keep a list of the medicines you take. How can you care for yourself at home? · Put ice or a cold pack on your wrist for 10 to 20 minutes at a time. Try to do this every 1 to 2 hours for the next 3 days (when you are awake). Put a thin cloth between the ice and your cast or splint. Keep your cast or splint dry. · Follow the splint or cast care instructions your doctor gives you. If you have a splint, do not take it off unless your doctor tells you to. Be careful not to put the splint on too tight. · Be safe with medicines. Take pain medicines exactly as directed. ? If the doctor gave you a prescription medicine for pain, take it as prescribed. ? If you are not taking a prescription pain medicine, ask your doctor if you can take an over-the-counter medicine. · Prop up your wrist on pillows when you sit or lie down in the first few days after the injury. Keep your wrist higher than the level of your heart. This will help reduce swelling.   · Move your fingers often to reduce swelling and stiffness, but do not use that hand to grab or carry anything. · Follow instructions for exercises to keep your arm strong. When should you call for help? Call your doctor now or seek immediate medical care if:    · You have new or worse pain.     · Your hand or fingers are cool or pale or change color.     · Your cast or splint feels too tight.     · You have tingling, weakness, or numbness in your hand or fingers.    Watch closely for changes in your health, and be sure to contact your doctor if:    · You do not get better as expected.     · You have problems with your cast or splint. Where can you learn more? Go to http://nolvia-chasidy.info/. Enter [id number] in the search box to learn more about \"Broken Wrist: Care Instructions. \"  Current as of: November 29, 2017  Content Version: 11.8  © 0573-0135 [employer]. Care instructions adapted under license by [employer] (which disclaims liability or warranty for this information). If you have questions about a medical condition or this instruction, always ask your healthcare professional. [address] any warranty or liability for your use of this information.

## 2018-11-22 NOTE — ED NOTES
Discharge instructions reviewed with the pt by the MD. Pt wheeled out and discharged home with the family.

## 2018-11-24 LAB
BACTERIA SPEC CULT: ABNORMAL
BACTERIA SPEC CULT: ABNORMAL
CC UR VC: ABNORMAL
SERVICE CMNT-IMP: ABNORMAL

## 2018-12-03 RX ORDER — MECLIZINE HYDROCHLORIDE 25 MG/1
TABLET ORAL
Qty: 90 TAB | Refills: 2 | Status: SHIPPED | OUTPATIENT
Start: 2018-12-03 | End: 2019-08-26 | Stop reason: SDUPTHER

## 2019-05-28 RX ORDER — FENOFIBRATE 145 MG/1
TABLET, COATED ORAL
Qty: 90 TAB | Refills: 3 | Status: SHIPPED | OUTPATIENT
Start: 2019-05-28 | End: 2020-01-01

## 2019-06-20 RX ORDER — OMEPRAZOLE 20 MG/1
CAPSULE, DELAYED RELEASE ORAL
Qty: 90 CAP | Refills: 3 | Status: SHIPPED | OUTPATIENT
Start: 2019-06-20 | End: 2020-01-01

## 2019-08-26 RX ORDER — MECLIZINE HYDROCHLORIDE 25 MG/1
TABLET ORAL
Qty: 90 TAB | Refills: 2 | Status: SHIPPED | OUTPATIENT
Start: 2019-08-26 | End: 2020-01-01

## 2020-01-01 ENCOUNTER — OFFICE VISIT (OUTPATIENT)
Dept: ONCOLOGY | Age: 75
End: 2020-01-01
Payer: MEDICARE

## 2020-01-01 ENCOUNTER — TELEPHONE (OUTPATIENT)
Dept: ONCOLOGY | Age: 75
End: 2020-01-01

## 2020-01-01 ENCOUNTER — APPOINTMENT (OUTPATIENT)
Dept: GENERAL RADIOLOGY | Age: 75
DRG: 457 | End: 2020-01-01
Attending: EMERGENCY MEDICINE
Payer: MEDICARE

## 2020-01-01 ENCOUNTER — ANESTHESIA (OUTPATIENT)
Dept: SURGERY | Age: 75
DRG: 457 | End: 2020-01-01
Payer: MEDICARE

## 2020-01-01 ENCOUNTER — PATIENT OUTREACH (OUTPATIENT)
Dept: INTERNAL MEDICINE CLINIC | Age: 75
End: 2020-01-01

## 2020-01-01 ENCOUNTER — HOSPITAL ENCOUNTER (INPATIENT)
Dept: NUCLEAR MEDICINE | Age: 75
Discharge: HOME | DRG: 457 | End: 2020-09-23
Attending: INTERNAL MEDICINE
Payer: MEDICARE

## 2020-01-01 ENCOUNTER — ANESTHESIA EVENT (OUTPATIENT)
Dept: SURGERY | Age: 75
DRG: 457 | End: 2020-01-01
Payer: MEDICARE

## 2020-01-01 ENCOUNTER — TELEPHONE (OUTPATIENT)
Dept: INTERNAL MEDICINE CLINIC | Age: 75
End: 2020-01-01

## 2020-01-01 ENCOUNTER — PATIENT OUTREACH (OUTPATIENT)
Dept: CASE MANAGEMENT | Age: 75
End: 2020-01-01

## 2020-01-01 ENCOUNTER — APPOINTMENT (OUTPATIENT)
Dept: CT IMAGING | Age: 75
End: 2020-01-01
Attending: EMERGENCY MEDICINE
Payer: MEDICARE

## 2020-01-01 ENCOUNTER — HOSPITAL ENCOUNTER (OUTPATIENT)
Dept: INFUSION THERAPY | Age: 75
Discharge: HOME | End: 2020-12-30
Payer: MEDICARE

## 2020-01-01 ENCOUNTER — APPOINTMENT (OUTPATIENT)
Dept: MRI IMAGING | Age: 75
DRG: 457 | End: 2020-01-01
Attending: INTERNAL MEDICINE
Payer: MEDICARE

## 2020-01-01 ENCOUNTER — APPOINTMENT (OUTPATIENT)
Dept: GENERAL RADIOLOGY | Age: 75
DRG: 457 | End: 2020-01-01
Attending: NEUROLOGICAL SURGERY
Payer: MEDICARE

## 2020-01-01 ENCOUNTER — HOSPITAL ENCOUNTER (OUTPATIENT)
Dept: INFUSION THERAPY | Age: 75
Discharge: HOME | End: 2020-12-16
Payer: MEDICARE

## 2020-01-01 ENCOUNTER — APPOINTMENT (OUTPATIENT)
Dept: GENERAL RADIOLOGY | Age: 75
DRG: 457 | End: 2020-01-01
Attending: ANESTHESIOLOGY
Payer: MEDICARE

## 2020-01-01 ENCOUNTER — APPOINTMENT (OUTPATIENT)
Dept: GENERAL RADIOLOGY | Age: 75
End: 2020-01-01
Attending: EMERGENCY MEDICINE
Payer: MEDICARE

## 2020-01-01 ENCOUNTER — OFFICE VISIT (OUTPATIENT)
Dept: INTERNAL MEDICINE CLINIC | Age: 75
End: 2020-01-01
Payer: MEDICARE

## 2020-01-01 ENCOUNTER — TELEPHONE (OUTPATIENT)
Dept: PALLATIVE CARE | Age: 75
End: 2020-01-01

## 2020-01-01 ENCOUNTER — HOSPITAL ENCOUNTER (INPATIENT)
Age: 75
LOS: 14 days | Discharge: NURSING, REHAB OR LONG-TERM CARE | DRG: 457 | End: 2020-10-05
Attending: EMERGENCY MEDICINE | Admitting: INTERNAL MEDICINE
Payer: MEDICARE

## 2020-01-01 ENCOUNTER — APPOINTMENT (OUTPATIENT)
Dept: NUCLEAR MEDICINE | Age: 75
DRG: 457 | End: 2020-01-01
Attending: INTERNAL MEDICINE
Payer: MEDICARE

## 2020-01-01 ENCOUNTER — HOSPITAL ENCOUNTER (EMERGENCY)
Age: 75
Discharge: HOME | End: 2020-04-17
Attending: EMERGENCY MEDICINE | Admitting: EMERGENCY MEDICINE
Payer: MEDICARE

## 2020-01-01 ENCOUNTER — TELEPHONE (OUTPATIENT)
Dept: CASE MANAGEMENT | Age: 75
End: 2020-01-01

## 2020-01-01 VITALS
DIASTOLIC BLOOD PRESSURE: 53 MMHG | HEART RATE: 77 BPM | BODY MASS INDEX: 30.35 KG/M2 | RESPIRATION RATE: 18 BRPM | WEIGHT: 182.4 LBS | TEMPERATURE: 97.6 F | SYSTOLIC BLOOD PRESSURE: 97 MMHG

## 2020-01-01 VITALS
WEIGHT: 188 LBS | BODY MASS INDEX: 31.32 KG/M2 | HEIGHT: 65 IN | OXYGEN SATURATION: 96 % | RESPIRATION RATE: 16 BRPM | HEART RATE: 84 BPM | SYSTOLIC BLOOD PRESSURE: 114 MMHG | DIASTOLIC BLOOD PRESSURE: 68 MMHG | TEMPERATURE: 98 F

## 2020-01-01 VITALS
HEART RATE: 69 BPM | RESPIRATION RATE: 16 BRPM | DIASTOLIC BLOOD PRESSURE: 62 MMHG | BODY MASS INDEX: 30.3 KG/M2 | OXYGEN SATURATION: 93 % | WEIGHT: 182.1 LBS | TEMPERATURE: 97.6 F | SYSTOLIC BLOOD PRESSURE: 125 MMHG

## 2020-01-01 VITALS
BODY MASS INDEX: 31.32 KG/M2 | HEIGHT: 65 IN | DIASTOLIC BLOOD PRESSURE: 71 MMHG | SYSTOLIC BLOOD PRESSURE: 117 MMHG | OXYGEN SATURATION: 93 % | HEART RATE: 81 BPM | TEMPERATURE: 97 F | WEIGHT: 188 LBS

## 2020-01-01 VITALS
TEMPERATURE: 97.6 F | DIASTOLIC BLOOD PRESSURE: 62 MMHG | BODY MASS INDEX: 30.32 KG/M2 | HEART RATE: 69 BPM | WEIGHT: 182 LBS | OXYGEN SATURATION: 93 % | SYSTOLIC BLOOD PRESSURE: 125 MMHG | HEIGHT: 65 IN

## 2020-01-01 VITALS
TEMPERATURE: 97.6 F | BODY MASS INDEX: 30.32 KG/M2 | WEIGHT: 182 LBS | DIASTOLIC BLOOD PRESSURE: 62 MMHG | HEART RATE: 69 BPM | HEIGHT: 65 IN | RESPIRATION RATE: 16 BRPM | SYSTOLIC BLOOD PRESSURE: 125 MMHG | OXYGEN SATURATION: 93 %

## 2020-01-01 VITALS
DIASTOLIC BLOOD PRESSURE: 63 MMHG | TEMPERATURE: 99.2 F | HEART RATE: 80 BPM | OXYGEN SATURATION: 93 % | SYSTOLIC BLOOD PRESSURE: 132 MMHG | WEIGHT: 197.8 LBS | HEIGHT: 65 IN | RESPIRATION RATE: 16 BRPM | BODY MASS INDEX: 32.96 KG/M2

## 2020-01-01 VITALS
SYSTOLIC BLOOD PRESSURE: 119 MMHG | TEMPERATURE: 98.7 F | OXYGEN SATURATION: 97 % | BODY MASS INDEX: 36.88 KG/M2 | WEIGHT: 221.34 LBS | DIASTOLIC BLOOD PRESSURE: 64 MMHG | HEIGHT: 65 IN | RESPIRATION RATE: 25 BRPM | HEART RATE: 83 BPM

## 2020-01-01 DIAGNOSIS — C79.51 BREAST CANCER METASTASIZED TO BONE, RIGHT (HCC): ICD-10-CM

## 2020-01-01 DIAGNOSIS — G89.3 PAIN DUE TO MALIGNANT NEOPLASM METASTATIC TO BONE (HCC): Primary | ICD-10-CM

## 2020-01-01 DIAGNOSIS — C50.919 METASTATIC BREAST CARCINOMA (HCC): ICD-10-CM

## 2020-01-01 DIAGNOSIS — M79.2 NEUROPATHIC PAIN: ICD-10-CM

## 2020-01-01 DIAGNOSIS — C50.912 MALIGNANT NEOPLASM OF LEFT FEMALE BREAST, UNSPECIFIED ESTROGEN RECEPTOR STATUS, UNSPECIFIED SITE OF BREAST (HCC): ICD-10-CM

## 2020-01-01 DIAGNOSIS — T45.1X5A CHEMOTHERAPY-INDUCED NAUSEA: ICD-10-CM

## 2020-01-01 DIAGNOSIS — E87.6 HYPOKALEMIA: Primary | ICD-10-CM

## 2020-01-01 DIAGNOSIS — C79.51 PAIN DUE TO MALIGNANT NEOPLASM METASTATIC TO BONE (HCC): ICD-10-CM

## 2020-01-01 DIAGNOSIS — G89.3 PAIN DUE TO MALIGNANT NEOPLASM METASTATIC TO BONE (HCC): ICD-10-CM

## 2020-01-01 DIAGNOSIS — C50.911 BREAST CANCER METASTASIZED TO BONE, RIGHT (HCC): Primary | ICD-10-CM

## 2020-01-01 DIAGNOSIS — C79.51 BREAST CANCER METASTASIZED TO BONE, RIGHT (HCC): Primary | ICD-10-CM

## 2020-01-01 DIAGNOSIS — S22.000A COMPRESSION FRACTURE OF THORACIC VERTEBRA, INITIAL ENCOUNTER, UNSPECIFIED THORACIC VERTEBRAL LEVEL: Primary | ICD-10-CM

## 2020-01-01 DIAGNOSIS — R53.1 GENERAL WEAKNESS: ICD-10-CM

## 2020-01-01 DIAGNOSIS — G89.18 ACUTE POST-OPERATIVE PAIN: ICD-10-CM

## 2020-01-01 DIAGNOSIS — E55.9 VITAMIN D DEFICIENCY: ICD-10-CM

## 2020-01-01 DIAGNOSIS — I10 ESSENTIAL HYPERTENSION: ICD-10-CM

## 2020-01-01 DIAGNOSIS — S22.040A COMPRESSION FRACTURE OF T4 VERTEBRA, INITIAL ENCOUNTER (HCC): ICD-10-CM

## 2020-01-01 DIAGNOSIS — J43.9 PULMONARY EMPHYSEMA, UNSPECIFIED EMPHYSEMA TYPE (HCC): ICD-10-CM

## 2020-01-01 DIAGNOSIS — R11.0 CHEMOTHERAPY-INDUCED NAUSEA: ICD-10-CM
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
## 2020-01-01 DIAGNOSIS — C79.51 BONE METASTASES (HCC): ICD-10-CM

## 2020-01-01 DIAGNOSIS — R10.9 ACUTE RIGHT FLANK PAIN: ICD-10-CM

## 2020-01-01 DIAGNOSIS — C79.51 BONY METASTASIS (HCC): ICD-10-CM

## 2020-01-01 DIAGNOSIS — K59.03 DRUG-INDUCED CONSTIPATION: ICD-10-CM

## 2020-01-01 DIAGNOSIS — Z85.3 HISTORY OF BREAST CANCER: ICD-10-CM

## 2020-01-01 DIAGNOSIS — Z79.899 LONG TERM CURRENT USE OF DIURETIC: ICD-10-CM

## 2020-01-01 DIAGNOSIS — C50.919 METASTATIC BREAST CARCINOMA (HCC): Primary | ICD-10-CM

## 2020-01-01 DIAGNOSIS — C50.911 BREAST CANCER METASTASIZED TO BONE, RIGHT (HCC): ICD-10-CM

## 2020-01-01 DIAGNOSIS — R29.898 WEAKNESS OF BOTH LEGS: ICD-10-CM

## 2020-01-01 DIAGNOSIS — M54.6 ACUTE RIGHT-SIDED THORACIC BACK PAIN: Primary | ICD-10-CM

## 2020-01-01 DIAGNOSIS — R07.89 ATYPICAL CHEST PAIN: ICD-10-CM

## 2020-01-01 DIAGNOSIS — I48.0 PAROXYSMAL ATRIAL FIBRILLATION (HCC): ICD-10-CM

## 2020-01-01 DIAGNOSIS — C79.51 PAIN DUE TO MALIGNANT NEOPLASM METASTATIC TO BONE (HCC): Primary | ICD-10-CM

## 2020-01-01 DIAGNOSIS — C50.912 MALIGNANT NEOPLASM OF LEFT FEMALE BREAST, UNSPECIFIED ESTROGEN RECEPTOR STATUS, UNSPECIFIED SITE OF BREAST (HCC): Primary | ICD-10-CM

## 2020-01-01 LAB
ABO + RH BLD: NORMAL
ALBUMIN SERPL-MCNC: 2.9 G/DL (ref 3.5–5)
ALBUMIN SERPL-MCNC: 3.1 G/DL (ref 3.5–5)
ALBUMIN SERPL-MCNC: 3.5 G/DL (ref 3.5–5)
ALBUMIN SERPL-MCNC: 3.6 G/DL (ref 3.5–5)
ALBUMIN/GLOB SERPL: 0.8 {RATIO} (ref 1.1–2.2)
ALBUMIN/GLOB SERPL: 0.8 {RATIO} (ref 1.1–2.2)
ALBUMIN/GLOB SERPL: 0.9 {RATIO} (ref 1.1–2.2)
ALBUMIN/GLOB SERPL: 0.9 {RATIO} (ref 1.1–2.2)
ALP SERPL-CCNC: 124 U/L (ref 45–117)
ALP SERPL-CCNC: 76 U/L (ref 45–117)
ALP SERPL-CCNC: 76 U/L (ref 45–117)
ALP SERPL-CCNC: 87 U/L (ref 45–117)
ALT SERPL-CCNC: 19 U/L (ref 12–78)
ALT SERPL-CCNC: 22 U/L (ref 12–78)
ALT SERPL-CCNC: 25 U/L (ref 12–78)
ALT SERPL-CCNC: 30 U/L (ref 12–78)
ANION GAP BLD CALC-SCNC: 15 MMOL/L (ref 10–20)
ANION GAP SERPL CALC-SCNC: 1 MMOL/L (ref 5–15)
ANION GAP SERPL CALC-SCNC: 1 MMOL/L (ref 5–15)
ANION GAP SERPL CALC-SCNC: 2 MMOL/L (ref 5–15)
ANION GAP SERPL CALC-SCNC: 2 MMOL/L (ref 5–15)
ANION GAP SERPL CALC-SCNC: 3 MMOL/L (ref 5–15)
ANION GAP SERPL CALC-SCNC: 3 MMOL/L (ref 5–15)
ANION GAP SERPL CALC-SCNC: 4 MMOL/L (ref 5–15)
ANION GAP SERPL CALC-SCNC: 5 MMOL/L (ref 5–15)
ANION GAP SERPL CALC-SCNC: 7 MMOL/L (ref 5–15)
ANION GAP SERPL CALC-SCNC: 7 MMOL/L (ref 5–15)
APPEARANCE UR: ABNORMAL
APTT PPP: 24.1 SEC (ref 22.1–32)
AST SERPL-CCNC: 26 U/L (ref 15–37)
AST SERPL-CCNC: 31 U/L (ref 15–37)
AST SERPL-CCNC: 34 U/L (ref 15–37)
AST SERPL-CCNC: 45 U/L (ref 15–37)
ATRIAL RATE: 76 BPM
ATRIAL RATE: 81 BPM
BACTERIA SPEC CULT: NORMAL
BACTERIA URNS QL MICRO: ABNORMAL /HPF
BASOPHILS # BLD: 0 K/UL (ref 0–0.1)
BASOPHILS # BLD: 0.1 K/UL (ref 0–0.1)
BASOPHILS NFR BLD: 0 % (ref 0–1)
BASOPHILS NFR BLD: 1 % (ref 0–1)
BASOPHILS NFR BLD: 2 % (ref 0–1)
BILIRUB DIRECT SERPL-MCNC: 0.1 MG/DL (ref 0–0.2)
BILIRUB SERPL-MCNC: 0.3 MG/DL (ref 0.2–1)
BILIRUB SERPL-MCNC: 0.3 MG/DL (ref 0.2–1)
BILIRUB SERPL-MCNC: 0.5 MG/DL (ref 0.2–1)
BILIRUB SERPL-MCNC: 0.5 MG/DL (ref 0.2–1)
BILIRUB UR QL CFM: NEGATIVE
BLD PROD TYP BPU: NORMAL
BLD PROD TYP BPU: NORMAL
BLOOD GROUP ANTIBODIES SERPL: NORMAL
BNP SERPL-MCNC: 264 PG/ML
BNP SERPL-MCNC: 524 PG/ML
BPU ID: NORMAL
BPU ID: NORMAL
BUN BLD-MCNC: 29 MG/DL (ref 9–20)
BUN SERPL-MCNC: 16 MG/DL (ref 6–20)
BUN SERPL-MCNC: 19 MG/DL (ref 6–20)
BUN SERPL-MCNC: 19 MG/DL (ref 6–20)
BUN SERPL-MCNC: 23 MG/DL (ref 6–20)
BUN SERPL-MCNC: 27 MG/DL (ref 6–20)
BUN SERPL-MCNC: 29 MG/DL (ref 6–20)
BUN SERPL-MCNC: 33 MG/DL (ref 6–20)
BUN SERPL-MCNC: 37 MG/DL (ref 6–20)
BUN SERPL-MCNC: 41 MG/DL (ref 6–20)
BUN SERPL-MCNC: 47 MG/DL (ref 6–20)
BUN SERPL-MCNC: 48 MG/DL (ref 6–20)
BUN SERPL-MCNC: 49 MG/DL (ref 6–20)
BUN/CREAT SERPL: 12 (ref 12–20)
BUN/CREAT SERPL: 18 (ref 12–20)
BUN/CREAT SERPL: 18 (ref 12–20)
BUN/CREAT SERPL: 20 (ref 12–20)
BUN/CREAT SERPL: 28 (ref 12–20)
BUN/CREAT SERPL: 29 (ref 12–20)
BUN/CREAT SERPL: 31 (ref 12–20)
BUN/CREAT SERPL: 31 (ref 12–20)
BUN/CREAT SERPL: 33 (ref 12–20)
BUN/CREAT SERPL: 34 (ref 12–20)
BUN/CREAT SERPL: 34 (ref 12–20)
BUN/CREAT SERPL: 46 (ref 12–20)
CA-I BLD-MCNC: 1.11 MMOL/L (ref 1.12–1.32)
CALCIUM SERPL-MCNC: 7.8 MG/DL (ref 8.5–10.1)
CALCIUM SERPL-MCNC: 8.5 MG/DL (ref 8.5–10.1)
CALCIUM SERPL-MCNC: 8.7 MG/DL (ref 8.5–10.1)
CALCIUM SERPL-MCNC: 8.8 MG/DL (ref 8.5–10.1)
CALCIUM SERPL-MCNC: 8.8 MG/DL (ref 8.5–10.1)
CALCIUM SERPL-MCNC: 9 MG/DL (ref 8.5–10.1)
CALCIUM SERPL-MCNC: 9.1 MG/DL (ref 8.5–10.1)
CALCIUM SERPL-MCNC: 9.3 MG/DL (ref 8.5–10.1)
CALCIUM SERPL-MCNC: 9.5 MG/DL (ref 8.5–10.1)
CALCIUM SERPL-MCNC: 9.5 MG/DL (ref 8.5–10.1)
CALCIUM SERPL-MCNC: 9.7 MG/DL (ref 8.5–10.1)
CALCIUM SERPL-MCNC: 9.9 MG/DL (ref 8.5–10.1)
CALCULATED P AXIS, ECG09: 57 DEGREES
CALCULATED P AXIS, ECG09: 62 DEGREES
CALCULATED R AXIS, ECG10: -10 DEGREES
CALCULATED R AXIS, ECG10: -21 DEGREES
CALCULATED T AXIS, ECG11: -33 DEGREES
CALCULATED T AXIS, ECG11: -36 DEGREES
CANCER AG27-29 SERPL-ACNC: 47 U/ML (ref 0–38.6)
CHLORIDE BLD-SCNC: 100 MMOL/L (ref 98–107)
CHLORIDE SERPL-SCNC: 100 MMOL/L (ref 97–108)
CHLORIDE SERPL-SCNC: 103 MMOL/L (ref 97–108)
CHLORIDE SERPL-SCNC: 104 MMOL/L (ref 97–108)
CHLORIDE SERPL-SCNC: 106 MMOL/L (ref 97–108)
CHLORIDE SERPL-SCNC: 107 MMOL/L (ref 97–108)
CK SERPL-CCNC: 90 U/L (ref 26–192)
CO2 BLD-SCNC: 28 MMOL/L (ref 21–32)
CO2 SERPL-SCNC: 29 MMOL/L (ref 21–32)
CO2 SERPL-SCNC: 30 MMOL/L (ref 21–32)
CO2 SERPL-SCNC: 31 MMOL/L (ref 21–32)
CO2 SERPL-SCNC: 31 MMOL/L (ref 21–32)
CO2 SERPL-SCNC: 32 MMOL/L (ref 21–32)
CO2 SERPL-SCNC: 32 MMOL/L (ref 21–32)
CO2 SERPL-SCNC: 33 MMOL/L (ref 21–32)
CO2 SERPL-SCNC: 34 MMOL/L (ref 21–32)
CO2 SERPL-SCNC: 35 MMOL/L (ref 21–32)
CO2 SERPL-SCNC: 35 MMOL/L (ref 21–32)
COLOR UR: ABNORMAL
COMMENT, HOLDF: NORMAL
COVID-19, XGCOVT: NOT DETECTED
CREAT BLD-MCNC: 2 MG/DL (ref 0.6–1.3)
CREAT SERPL-MCNC: 0.56 MG/DL (ref 0.55–1.02)
CREAT SERPL-MCNC: 0.68 MG/DL (ref 0.55–1.02)
CREAT SERPL-MCNC: 0.86 MG/DL (ref 0.55–1.02)
CREAT SERPL-MCNC: 0.9 MG/DL (ref 0.55–1.02)
CREAT SERPL-MCNC: 1.09 MG/DL (ref 0.55–1.02)
CREAT SERPL-MCNC: 1.31 MG/DL (ref 0.55–1.02)
CREAT SERPL-MCNC: 1.35 MG/DL (ref 0.55–1.02)
CREAT SERPL-MCNC: 1.43 MG/DL (ref 0.55–1.02)
CREAT SERPL-MCNC: 1.48 MG/DL (ref 0.55–1.02)
CREAT SERPL-MCNC: 1.55 MG/DL (ref 0.55–1.02)
CREAT SERPL-MCNC: 1.62 MG/DL (ref 0.55–1.02)
CREAT SERPL-MCNC: 1.86 MG/DL (ref 0.55–1.02)
CROSSMATCH RESULT,%XM: NORMAL
CROSSMATCH RESULT,%XM: NORMAL
DIAGNOSIS, 93000: NORMAL
DIAGNOSIS, 93000: NORMAL
DIFFERENTIAL METHOD BLD: ABNORMAL
DIFFERENTIAL METHOD BLD: NORMAL
DIFFERENTIAL METHOD BLD: NORMAL
EOSINOPHIL # BLD: 0 K/UL (ref 0–0.4)
EOSINOPHIL # BLD: 0.1 K/UL (ref 0–0.4)
EOSINOPHIL # BLD: 0.2 K/UL (ref 0–0.4)
EOSINOPHIL # BLD: 0.3 K/UL (ref 0–0.4)
EOSINOPHIL NFR BLD: 1 % (ref 0–7)
EOSINOPHIL NFR BLD: 2 % (ref 0–7)
EOSINOPHIL NFR BLD: 4 % (ref 0–7)
EOSINOPHIL NFR BLD: 5 % (ref 0–7)
EPITH CASTS URNS QL MICRO: ABNORMAL /LPF
ERYTHROCYTE [DISTWIDTH] IN BLOOD BY AUTOMATED COUNT: 12.4 % (ref 11.5–14.5)
ERYTHROCYTE [DISTWIDTH] IN BLOOD BY AUTOMATED COUNT: 12.5 % (ref 11.5–14.5)
ERYTHROCYTE [DISTWIDTH] IN BLOOD BY AUTOMATED COUNT: 12.5 % (ref 11.5–14.5)
ERYTHROCYTE [DISTWIDTH] IN BLOOD BY AUTOMATED COUNT: 12.7 % (ref 11.5–14.5)
ERYTHROCYTE [DISTWIDTH] IN BLOOD BY AUTOMATED COUNT: 12.7 % (ref 11.5–14.5)
ERYTHROCYTE [DISTWIDTH] IN BLOOD BY AUTOMATED COUNT: 12.9 % (ref 11.5–14.5)
ERYTHROCYTE [DISTWIDTH] IN BLOOD BY AUTOMATED COUNT: 13 % (ref 11.5–14.5)
ERYTHROCYTE [DISTWIDTH] IN BLOOD BY AUTOMATED COUNT: 13.1 % (ref 11.5–14.5)
ERYTHROCYTE [DISTWIDTH] IN BLOOD BY AUTOMATED COUNT: 15.9 % (ref 11.5–14.5)
ERYTHROCYTE [DISTWIDTH] IN BLOOD BY AUTOMATED COUNT: 18.4 % (ref 11.5–14.5)
GLOBULIN SER CALC-MCNC: 3.6 G/DL (ref 2–4)
GLOBULIN SER CALC-MCNC: 3.8 G/DL (ref 2–4)
GLOBULIN SER CALC-MCNC: 4.2 G/DL (ref 2–4)
GLOBULIN SER CALC-MCNC: 4.3 G/DL (ref 2–4)
GLUCOSE BLD STRIP.AUTO-MCNC: 100 MG/DL (ref 65–100)
GLUCOSE BLD STRIP.AUTO-MCNC: 100 MG/DL (ref 65–100)
GLUCOSE BLD STRIP.AUTO-MCNC: 101 MG/DL (ref 65–100)
GLUCOSE BLD STRIP.AUTO-MCNC: 113 MG/DL (ref 65–100)
GLUCOSE BLD STRIP.AUTO-MCNC: 115 MG/DL (ref 65–100)
GLUCOSE BLD STRIP.AUTO-MCNC: 118 MG/DL (ref 65–100)
GLUCOSE BLD STRIP.AUTO-MCNC: 123 MG/DL (ref 65–100)
GLUCOSE BLD STRIP.AUTO-MCNC: 125 MG/DL (ref 65–100)
GLUCOSE BLD STRIP.AUTO-MCNC: 125 MG/DL (ref 65–100)
GLUCOSE BLD STRIP.AUTO-MCNC: 133 MG/DL (ref 65–100)
GLUCOSE BLD STRIP.AUTO-MCNC: 156 MG/DL (ref 65–100)
GLUCOSE BLD STRIP.AUTO-MCNC: 158 MG/DL (ref 65–100)
GLUCOSE BLD STRIP.AUTO-MCNC: 159 MG/DL (ref 65–100)
GLUCOSE BLD STRIP.AUTO-MCNC: 93 MG/DL (ref 65–100)
GLUCOSE BLD-MCNC: 115 MG/DL (ref 65–100)
GLUCOSE SERPL-MCNC: 104 MG/DL (ref 65–100)
GLUCOSE SERPL-MCNC: 105 MG/DL (ref 65–100)
GLUCOSE SERPL-MCNC: 105 MG/DL (ref 65–100)
GLUCOSE SERPL-MCNC: 108 MG/DL (ref 65–100)
GLUCOSE SERPL-MCNC: 112 MG/DL (ref 65–100)
GLUCOSE SERPL-MCNC: 115 MG/DL (ref 65–100)
GLUCOSE SERPL-MCNC: 118 MG/DL (ref 65–100)
GLUCOSE SERPL-MCNC: 83 MG/DL (ref 65–100)
GLUCOSE SERPL-MCNC: 89 MG/DL (ref 65–100)
GLUCOSE SERPL-MCNC: 91 MG/DL (ref 65–100)
GLUCOSE SERPL-MCNC: 95 MG/DL (ref 65–100)
GLUCOSE SERPL-MCNC: 99 MG/DL (ref 65–100)
GLUCOSE UR STRIP.AUTO-MCNC: NEGATIVE MG/DL
HCT VFR BLD AUTO: 29.4 % (ref 35–47)
HCT VFR BLD AUTO: 30.1 % (ref 35–47)
HCT VFR BLD AUTO: 31.4 % (ref 35–47)
HCT VFR BLD AUTO: 34.2 % (ref 35–47)
HCT VFR BLD AUTO: 35.4 % (ref 35–47)
HCT VFR BLD AUTO: 37.2 % (ref 35–47)
HCT VFR BLD AUTO: 38.1 % (ref 35–47)
HCT VFR BLD AUTO: 41.5 % (ref 35–47)
HCT VFR BLD AUTO: 44.9 % (ref 35–47)
HCT VFR BLD AUTO: 45.3 % (ref 35–47)
HCT VFR BLD CALC: 37 % (ref 35–47)
HEALTH STATUS, XMCV2T: NORMAL
HGB BLD-MCNC: 10.2 G/DL (ref 11.5–16)
HGB BLD-MCNC: 10.8 G/DL (ref 11.5–16)
HGB BLD-MCNC: 11.4 G/DL (ref 11.5–16)
HGB BLD-MCNC: 11.6 G/DL (ref 11.5–16)
HGB BLD-MCNC: 12.7 G/DL (ref 11.5–16)
HGB BLD-MCNC: 13.8 G/DL (ref 11.5–16)
HGB BLD-MCNC: 13.9 G/DL (ref 11.5–16)
HGB BLD-MCNC: 8.8 G/DL (ref 11.5–16)
HGB BLD-MCNC: 8.9 G/DL (ref 11.5–16)
HGB BLD-MCNC: 9.7 G/DL (ref 11.5–16)
HGB UR QL STRIP: NEGATIVE
HISTORY CHECKED?,CKHIST: NORMAL
HYALINE CASTS URNS QL MICRO: >20 /LPF (ref 0–5)
IMM GRANULOCYTES # BLD AUTO: 0 K/UL (ref 0–0.04)
IMM GRANULOCYTES # BLD AUTO: 0.1 K/UL (ref 0–0.04)
IMM GRANULOCYTES # BLD AUTO: 0.1 K/UL (ref 0–0.04)
IMM GRANULOCYTES NFR BLD AUTO: 0 % (ref 0–0.5)
IMM GRANULOCYTES NFR BLD AUTO: 1 % (ref 0–0.5)
IMM GRANULOCYTES NFR BLD AUTO: 1 % (ref 0–0.5)
INR PPP: 1 (ref 0.9–1.1)
INR PPP: 1 (ref 0.9–1.1)
KETONES UR QL STRIP.AUTO: ABNORMAL MG/DL
LEUKOCYTE ESTERASE UR QL STRIP.AUTO: NEGATIVE
LIPASE SERPL-CCNC: 145 U/L (ref 73–393)
LYMPHOCYTES # BLD: 1 K/UL (ref 0.8–3.5)
LYMPHOCYTES # BLD: 1 K/UL (ref 0.8–3.5)
LYMPHOCYTES # BLD: 1.4 K/UL (ref 0.8–3.5)
LYMPHOCYTES # BLD: 1.5 K/UL (ref 0.8–3.5)
LYMPHOCYTES # BLD: 1.7 K/UL (ref 0.8–3.5)
LYMPHOCYTES # BLD: 1.7 K/UL (ref 0.8–3.5)
LYMPHOCYTES NFR BLD: 19 % (ref 12–49)
LYMPHOCYTES NFR BLD: 23 % (ref 12–49)
LYMPHOCYTES NFR BLD: 24 % (ref 12–49)
LYMPHOCYTES NFR BLD: 26 % (ref 12–49)
LYMPHOCYTES NFR BLD: 33 % (ref 12–49)
LYMPHOCYTES NFR BLD: 46 % (ref 12–49)
MAGNESIUM SERPL-MCNC: 1.8 MG/DL (ref 1.6–2.4)
MAGNESIUM SERPL-MCNC: 1.8 MG/DL (ref 1.6–2.4)
MAGNESIUM SERPL-MCNC: 1.9 MG/DL (ref 1.6–2.4)
MAGNESIUM SERPL-MCNC: 1.9 MG/DL (ref 1.6–2.4)
MCH RBC QN AUTO: 28.8 PG (ref 26–34)
MCH RBC QN AUTO: 29.1 PG (ref 26–34)
MCH RBC QN AUTO: 29.1 PG (ref 26–34)
MCH RBC QN AUTO: 29.2 PG (ref 26–34)
MCH RBC QN AUTO: 29.3 PG (ref 26–34)
MCH RBC QN AUTO: 29.4 PG (ref 26–34)
MCH RBC QN AUTO: 29.5 PG (ref 26–34)
MCH RBC QN AUTO: 29.7 PG (ref 26–34)
MCH RBC QN AUTO: 30.8 PG (ref 26–34)
MCH RBC QN AUTO: 31.4 PG (ref 26–34)
MCHC RBC AUTO-ENTMCNC: 29.6 G/DL (ref 30–36.5)
MCHC RBC AUTO-ENTMCNC: 29.8 G/DL (ref 30–36.5)
MCHC RBC AUTO-ENTMCNC: 29.9 G/DL (ref 30–36.5)
MCHC RBC AUTO-ENTMCNC: 29.9 G/DL (ref 30–36.5)
MCHC RBC AUTO-ENTMCNC: 30.5 G/DL (ref 30–36.5)
MCHC RBC AUTO-ENTMCNC: 30.5 G/DL (ref 30–36.5)
MCHC RBC AUTO-ENTMCNC: 30.6 G/DL (ref 30–36.5)
MCHC RBC AUTO-ENTMCNC: 30.9 G/DL (ref 30–36.5)
MCHC RBC AUTO-ENTMCNC: 31 G/DL (ref 30–36.5)
MCHC RBC AUTO-ENTMCNC: 31.2 G/DL (ref 30–36.5)
MCV RBC AUTO: 100.9 FL (ref 80–99)
MCV RBC AUTO: 105.2 FL (ref 80–99)
MCV RBC AUTO: 94.7 FL (ref 80–99)
MCV RBC AUTO: 95.1 FL (ref 80–99)
MCV RBC AUTO: 95.2 FL (ref 80–99)
MCV RBC AUTO: 95.4 FL (ref 80–99)
MCV RBC AUTO: 96.4 FL (ref 80–99)
MCV RBC AUTO: 97.2 FL (ref 80–99)
MCV RBC AUTO: 97.4 FL (ref 80–99)
MCV RBC AUTO: 97.7 FL (ref 80–99)
MONOCYTES # BLD: 0 K/UL (ref 0–1)
MONOCYTES # BLD: 0.4 K/UL (ref 0–1)
MONOCYTES # BLD: 0.6 K/UL (ref 0–1)
MONOCYTES # BLD: 0.7 K/UL (ref 0–1)
MONOCYTES NFR BLD: 1 % (ref 5–13)
MONOCYTES NFR BLD: 10 % (ref 5–13)
MONOCYTES NFR BLD: 10 % (ref 5–13)
MONOCYTES NFR BLD: 16 % (ref 5–13)
MONOCYTES NFR BLD: 8 % (ref 5–13)
MONOCYTES NFR BLD: 8 % (ref 5–13)
NEUTS SEG # BLD: 0.8 K/UL (ref 1.8–8)
NEUTS SEG # BLD: 1.9 K/UL (ref 1.8–8)
NEUTS SEG # BLD: 3.7 K/UL (ref 1.8–8)
NEUTS SEG # BLD: 3.7 K/UL (ref 1.8–8)
NEUTS SEG # BLD: 4.1 K/UL (ref 1.8–8)
NEUTS SEG # BLD: 6.3 K/UL (ref 1.8–8)
NEUTS SEG NFR BLD: 35 % (ref 32–75)
NEUTS SEG NFR BLD: 59 % (ref 32–75)
NEUTS SEG NFR BLD: 60 % (ref 32–75)
NEUTS SEG NFR BLD: 61 % (ref 32–75)
NEUTS SEG NFR BLD: 61 % (ref 32–75)
NEUTS SEG NFR BLD: 70 % (ref 32–75)
NITRITE UR QL STRIP.AUTO: NEGATIVE
NRBC # BLD: 0 K/UL (ref 0–0.01)
NRBC BLD-RTO: 0 PER 100 WBC
P-R INTERVAL, ECG05: 176 MS
P-R INTERVAL, ECG05: 178 MS
PH UR STRIP: 5 [PH] (ref 5–8)
PHOSPHATE SERPL-MCNC: 1.7 MG/DL (ref 2.6–4.7)
PHOSPHATE SERPL-MCNC: 3.4 MG/DL (ref 2.6–4.7)
PLATELET # BLD AUTO: 211 K/UL (ref 150–400)
PLATELET # BLD AUTO: 211 K/UL (ref 150–400)
PLATELET # BLD AUTO: 213 K/UL (ref 150–400)
PLATELET # BLD AUTO: 216 K/UL (ref 150–400)
PLATELET # BLD AUTO: 245 K/UL (ref 150–400)
PLATELET # BLD AUTO: 266 K/UL (ref 150–400)
PLATELET # BLD AUTO: 269 K/UL (ref 150–400)
PLATELET # BLD AUTO: 269 K/UL (ref 150–400)
PLATELET # BLD AUTO: 291 K/UL (ref 150–400)
PLATELET # BLD AUTO: 308 K/UL (ref 150–400)
PLATELET COMMENTS,PCOM: ABNORMAL
PMV BLD AUTO: 10 FL (ref 8.9–12.9)
PMV BLD AUTO: 10 FL (ref 8.9–12.9)
PMV BLD AUTO: 10.2 FL (ref 8.9–12.9)
PMV BLD AUTO: 10.5 FL (ref 8.9–12.9)
PMV BLD AUTO: 10.6 FL (ref 8.9–12.9)
PMV BLD AUTO: 9.6 FL (ref 8.9–12.9)
PMV BLD AUTO: 9.6 FL (ref 8.9–12.9)
PMV BLD AUTO: 9.7 FL (ref 8.9–12.9)
PMV BLD AUTO: 9.8 FL (ref 8.9–12.9)
PMV BLD AUTO: 9.9 FL (ref 8.9–12.9)
POTASSIUM BLD-SCNC: 4.3 MMOL/L (ref 3.5–5.1)
POTASSIUM SERPL-SCNC: 3.6 MMOL/L (ref 3.5–5.1)
POTASSIUM SERPL-SCNC: 3.8 MMOL/L (ref 3.5–5.1)
POTASSIUM SERPL-SCNC: 3.8 MMOL/L (ref 3.5–5.1)
POTASSIUM SERPL-SCNC: 4 MMOL/L (ref 3.5–5.1)
POTASSIUM SERPL-SCNC: 4.1 MMOL/L (ref 3.5–5.1)
POTASSIUM SERPL-SCNC: 4.2 MMOL/L (ref 3.5–5.1)
POTASSIUM SERPL-SCNC: 4.3 MMOL/L (ref 3.5–5.1)
POTASSIUM SERPL-SCNC: 4.4 MMOL/L (ref 3.5–5.1)
POTASSIUM SERPL-SCNC: 4.5 MMOL/L (ref 3.5–5.1)
POTASSIUM SERPL-SCNC: 4.6 MMOL/L (ref 3.5–5.1)
PROT SERPL-MCNC: 6.7 G/DL (ref 6.4–8.2)
PROT SERPL-MCNC: 6.7 G/DL (ref 6.4–8.2)
PROT SERPL-MCNC: 7.8 G/DL (ref 6.4–8.2)
PROT SERPL-MCNC: 7.8 G/DL (ref 6.4–8.2)
PROT UR STRIP-MCNC: 30 MG/DL
PROTHROMBIN TIME: 10.8 SEC (ref 9–11.1)
PROTHROMBIN TIME: 10.9 SEC (ref 9–11.1)
Q-T INTERVAL, ECG07: 400 MS
Q-T INTERVAL, ECG07: 426 MS
QRS DURATION, ECG06: 88 MS
QRS DURATION, ECG06: 94 MS
QTC CALCULATION (BEZET), ECG08: 464 MS
QTC CALCULATION (BEZET), ECG08: 479 MS
RBC # BLD AUTO: 3.01 M/UL (ref 3.8–5.2)
RBC # BLD AUTO: 3.09 M/UL (ref 3.8–5.2)
RBC # BLD AUTO: 3.25 M/UL (ref 3.8–5.2)
RBC # BLD AUTO: 3.29 M/UL (ref 3.8–5.2)
RBC # BLD AUTO: 3.51 M/UL (ref 3.8–5.2)
RBC # BLD AUTO: 3.91 M/UL (ref 3.8–5.2)
RBC # BLD AUTO: 3.92 M/UL (ref 3.8–5.2)
RBC # BLD AUTO: 4.36 M/UL (ref 3.8–5.2)
RBC # BLD AUTO: 4.7 M/UL (ref 3.8–5.2)
RBC # BLD AUTO: 4.74 M/UL (ref 3.8–5.2)
RBC #/AREA URNS HPF: ABNORMAL /HPF (ref 0–5)
RBC MORPH BLD: ABNORMAL
SAMPLES BEING HELD,HOLD: NORMAL
SARS-COV-2, COV2: NOT DETECTED
SARS-COV-2, COV2NT: NOT DETECTED
SERVICE CMNT-IMP: ABNORMAL
SERVICE CMNT-IMP: NORMAL
SODIUM BLD-SCNC: 138 MMOL/L (ref 136–145)
SODIUM SERPL-SCNC: 136 MMOL/L (ref 136–145)
SODIUM SERPL-SCNC: 137 MMOL/L (ref 136–145)
SODIUM SERPL-SCNC: 139 MMOL/L (ref 136–145)
SODIUM SERPL-SCNC: 140 MMOL/L (ref 136–145)
SODIUM SERPL-SCNC: 141 MMOL/L (ref 136–145)
SOURCE, COVRS: NORMAL
SP GR UR REFRACTOMETRY: 1.03 (ref 1–1.03)
SPECIMEN EXP DATE BLD: NORMAL
SPECIMEN SOURCE, FCOV2M: NORMAL
SPECIMEN TYPE, XMCV1T: NORMAL
STATUS OF UNIT,%ST: NORMAL
STATUS OF UNIT,%ST: NORMAL
THERAPEUTIC RANGE,PTTT: NORMAL SECS (ref 58–77)
TROPONIN I SERPL-MCNC: <0.05 NG/ML
TROPONIN I SERPL-MCNC: <0.05 NG/ML
UA: UC IF INDICATED,UAUC: ABNORMAL
UNIT DIVISION, %UDIV: 0
UNIT DIVISION, %UDIV: 0
UROBILINOGEN UR QL STRIP.AUTO: 1 EU/DL (ref 0.2–1)
VENTRICULAR RATE, ECG03: 76 BPM
VENTRICULAR RATE, ECG03: 81 BPM
WBC # BLD AUTO: 10.3 K/UL (ref 3.6–11)
WBC # BLD AUTO: 2.2 K/UL (ref 3.6–11)
WBC # BLD AUTO: 3.1 K/UL (ref 3.6–11)
WBC # BLD AUTO: 5 K/UL (ref 3.6–11)
WBC # BLD AUTO: 6.1 K/UL (ref 3.6–11)
WBC # BLD AUTO: 6.2 K/UL (ref 3.6–11)
WBC # BLD AUTO: 6.7 K/UL (ref 3.6–11)
WBC # BLD AUTO: 7.4 K/UL (ref 3.6–11)
WBC # BLD AUTO: 8.9 K/UL (ref 3.6–11)
WBC # BLD AUTO: 9.1 K/UL (ref 3.6–11)
WBC MORPH BLD: ABNORMAL
WBC URNS QL MICRO: ABNORMAL /HPF (ref 0–4)

## 2020-01-01 PROCEDURE — 86300 IMMUNOASSAY TUMOR CA 15-3: CPT

## 2020-01-01 PROCEDURE — 80048 BASIC METABOLIC PNL TOTAL CA: CPT

## 2020-01-01 PROCEDURE — 97535 SELF CARE MNGMENT TRAINING: CPT

## 2020-01-01 PROCEDURE — 74011250637 HC RX REV CODE- 250/637: Performed by: NEUROLOGICAL SURGERY

## 2020-01-01 PROCEDURE — 36415 COLL VENOUS BLD VENIPUNCTURE: CPT

## 2020-01-01 PROCEDURE — 94640 AIRWAY INHALATION TREATMENT: CPT

## 2020-01-01 PROCEDURE — G0463 HOSPITAL OUTPT CLINIC VISIT: HCPCS | Performed by: INTERNAL MEDICINE

## 2020-01-01 PROCEDURE — 77030013079 HC BLNKT BAIR HGGR 3M -A: Performed by: ANESTHESIOLOGY

## 2020-01-01 PROCEDURE — 85730 THROMBOPLASTIN TIME PARTIAL: CPT

## 2020-01-01 PROCEDURE — 83735 ASSAY OF MAGNESIUM: CPT

## 2020-01-01 PROCEDURE — 77010033678 HC OXYGEN DAILY

## 2020-01-01 PROCEDURE — 74011250637 HC RX REV CODE- 250/637: Performed by: INTERNAL MEDICINE

## 2020-01-01 PROCEDURE — 76060000041 HC ANESTHESIA 5 TO 5.5 HR: Performed by: NEUROLOGICAL SURGERY

## 2020-01-01 PROCEDURE — 99233 SBSQ HOSP IP/OBS HIGH 50: CPT | Performed by: NURSE PRACTITIONER

## 2020-01-01 PROCEDURE — 96374 THER/PROPH/DIAG INJ IV PUSH: CPT

## 2020-01-01 PROCEDURE — C1713 ANCHOR/SCREW BN/BN,TIS/BN: HCPCS | Performed by: NEUROLOGICAL SURGERY

## 2020-01-01 PROCEDURE — 99496 TRANSJ CARE MGMT HIGH F2F 7D: CPT | Performed by: NURSE PRACTITIONER

## 2020-01-01 PROCEDURE — 77030003028 HC SUT VCRL J&J -A: Performed by: NEUROLOGICAL SURGERY

## 2020-01-01 PROCEDURE — G8536 NO DOC ELDER MAL SCRN: HCPCS | Performed by: INTERNAL MEDICINE

## 2020-01-01 PROCEDURE — 86923 COMPATIBILITY TEST ELECTRIC: CPT

## 2020-01-01 PROCEDURE — 94760 N-INVAS EAR/PLS OXIMETRY 1: CPT

## 2020-01-01 PROCEDURE — 87635 SARS-COV-2 COVID-19 AMP PRB: CPT

## 2020-01-01 PROCEDURE — G8417 CALC BMI ABV UP PARAM F/U: HCPCS | Performed by: INTERNAL MEDICINE

## 2020-01-01 PROCEDURE — G8752 SYS BP LESS 140: HCPCS | Performed by: INTERNAL MEDICINE

## 2020-01-01 PROCEDURE — 1101F PT FALLS ASSESS-DOCD LE1/YR: CPT | Performed by: INTERNAL MEDICINE

## 2020-01-01 PROCEDURE — 74011000250 HC RX REV CODE- 250: Performed by: NURSE ANESTHETIST, CERTIFIED REGISTERED

## 2020-01-01 PROCEDURE — 72156 MRI NECK SPINE W/O & W/DYE: CPT

## 2020-01-01 PROCEDURE — 77030038269 HC DRN EXT URIN PURWCK BARD -A

## 2020-01-01 PROCEDURE — 74011250636 HC RX REV CODE- 250/636: Performed by: INTERNAL MEDICINE

## 2020-01-01 PROCEDURE — 77030018836 HC SOL IRR NACL ICUM -A: Performed by: NEUROLOGICAL SURGERY

## 2020-01-01 PROCEDURE — 72070 X-RAY EXAM THORAC SPINE 2VWS: CPT

## 2020-01-01 PROCEDURE — 74011250636 HC RX REV CODE- 250/636: Performed by: NEUROLOGICAL SURGERY

## 2020-01-01 PROCEDURE — 72100 X-RAY EXAM L-S SPINE 2/3 VWS: CPT

## 2020-01-01 PROCEDURE — 76000 FLUOROSCOPY <1 HR PHYS/QHP: CPT

## 2020-01-01 PROCEDURE — 74177 CT ABD & PELVIS W/CONTRAST: CPT

## 2020-01-01 PROCEDURE — 82962 GLUCOSE BLOOD TEST: CPT

## 2020-01-01 PROCEDURE — 74011000250 HC RX REV CODE- 250: Performed by: NEUROLOGICAL SURGERY

## 2020-01-01 PROCEDURE — 85027 COMPLETE CBC AUTOMATED: CPT

## 2020-01-01 PROCEDURE — 83880 ASSAY OF NATRIURETIC PEPTIDE: CPT

## 2020-01-01 PROCEDURE — 80076 HEPATIC FUNCTION PANEL: CPT

## 2020-01-01 PROCEDURE — 74011000250 HC RX REV CODE- 250: Performed by: INTERNAL MEDICINE

## 2020-01-01 PROCEDURE — 84100 ASSAY OF PHOSPHORUS: CPT

## 2020-01-01 PROCEDURE — 99232 SBSQ HOSP IP/OBS MODERATE 35: CPT | Performed by: INTERNAL MEDICINE

## 2020-01-01 PROCEDURE — G8754 DIAS BP LESS 90: HCPCS | Performed by: INTERNAL MEDICINE

## 2020-01-01 PROCEDURE — 99222 1ST HOSP IP/OBS MODERATE 55: CPT | Performed by: INTERNAL MEDICINE

## 2020-01-01 PROCEDURE — 77030002933 HC SUT MCRYL J&J -A: Performed by: NEUROLOGICAL SURGERY

## 2020-01-01 PROCEDURE — G0463 HOSPITAL OUTPT CLINIC VISIT: HCPCS | Performed by: NURSE PRACTITIONER

## 2020-01-01 PROCEDURE — 65270000029 HC RM PRIVATE

## 2020-01-01 PROCEDURE — 74011250636 HC RX REV CODE- 250/636: Performed by: NURSE ANESTHETIST, CERTIFIED REGISTERED

## 2020-01-01 PROCEDURE — 77030019908 HC STETH ESOPH SIMS -A: Performed by: ANESTHESIOLOGY

## 2020-01-01 PROCEDURE — 2709999900 HC NON-CHARGEABLE SUPPLY

## 2020-01-01 PROCEDURE — 76210000000 HC OR PH I REC 2 TO 2.5 HR: Performed by: NEUROLOGICAL SURGERY

## 2020-01-01 PROCEDURE — G8432 DEP SCR NOT DOC, RNG: HCPCS | Performed by: INTERNAL MEDICINE

## 2020-01-01 PROCEDURE — 74011250637 HC RX REV CODE- 250/637: Performed by: NURSE PRACTITIONER

## 2020-01-01 PROCEDURE — 77030005518 HC CATH URETH FOL 2W BARD -B

## 2020-01-01 PROCEDURE — 77030029099 HC BN WAX SSPC -A: Performed by: NEUROLOGICAL SURGERY

## 2020-01-01 PROCEDURE — 74011250636 HC RX REV CODE- 250/636: Performed by: NURSE PRACTITIONER

## 2020-01-01 PROCEDURE — G8400 PT W/DXA NO RESULTS DOC: HCPCS | Performed by: INTERNAL MEDICINE

## 2020-01-01 PROCEDURE — 0RG70K1 FUSION OF 2 TO 7 THORACIC VERTEBRAL JOINTS WITH NONAUTOLOGOUS TISSUE SUBSTITUTE, POSTERIOR APPROACH, POSTERIOR COLUMN, OPEN APPROACH: ICD-10-PCS | Performed by: NEUROLOGICAL SURGERY

## 2020-01-01 PROCEDURE — 77030038552 HC DRN WND MDII -A: Performed by: NEUROLOGICAL SURGERY

## 2020-01-01 PROCEDURE — 74011250637 HC RX REV CODE- 250/637: Performed by: ANESTHESIOLOGY

## 2020-01-01 PROCEDURE — 85025 COMPLETE CBC W/AUTO DIFF WBC: CPT

## 2020-01-01 PROCEDURE — 65660000000 HC RM CCU STEPDOWN

## 2020-01-01 PROCEDURE — 73521 X-RAY EXAM HIPS BI 2 VIEWS: CPT

## 2020-01-01 PROCEDURE — 74011000272 HC RX REV CODE- 272: Performed by: NEUROLOGICAL SURGERY

## 2020-01-01 PROCEDURE — 77030014007 HC SPNG HEMSTAT J&J -B: Performed by: NEUROLOGICAL SURGERY

## 2020-01-01 PROCEDURE — 77030040507 HC RESVR DRN WND MDII -B: Performed by: NEUROLOGICAL SURGERY

## 2020-01-01 PROCEDURE — 81001 URINALYSIS AUTO W/SCOPE: CPT

## 2020-01-01 PROCEDURE — 4A11X4G MONITORING OF PERIPHERAL NERVOUS ELECTRICAL ACTIVITY, INTRAOPERATIVE, EXTERNAL APPROACH: ICD-10-PCS | Performed by: NEUROLOGICAL SURGERY

## 2020-01-01 PROCEDURE — G8427 DOCREV CUR MEDS BY ELIG CLIN: HCPCS | Performed by: INTERNAL MEDICINE

## 2020-01-01 PROCEDURE — 74011250636 HC RX REV CODE- 250/636: Performed by: EMERGENCY MEDICINE

## 2020-01-01 PROCEDURE — 99214 OFFICE O/P EST MOD 30 MIN: CPT | Performed by: INTERNAL MEDICINE

## 2020-01-01 PROCEDURE — 97530 THERAPEUTIC ACTIVITIES: CPT

## 2020-01-01 PROCEDURE — 77030013474 HC CRD BPLR DISP ADLR -A: Performed by: NEUROLOGICAL SURGERY

## 2020-01-01 PROCEDURE — 97166 OT EVAL MOD COMPLEX 45 MIN: CPT

## 2020-01-01 PROCEDURE — 71046 X-RAY EXAM CHEST 2 VIEWS: CPT

## 2020-01-01 PROCEDURE — 1090F PRES/ABSN URINE INCON ASSESS: CPT | Performed by: INTERNAL MEDICINE

## 2020-01-01 PROCEDURE — 99284 EMERGENCY DEPT VISIT MOD MDM: CPT

## 2020-01-01 PROCEDURE — 88377 M/PHMTRC ALYS ISHQUANT/SEMIQ: CPT

## 2020-01-01 PROCEDURE — 97116 GAIT TRAINING THERAPY: CPT

## 2020-01-01 PROCEDURE — 74011250636 HC RX REV CODE- 250/636: Performed by: ANESTHESIOLOGY

## 2020-01-01 PROCEDURE — 99223 1ST HOSP IP/OBS HIGH 75: CPT | Performed by: NURSE PRACTITIONER

## 2020-01-01 PROCEDURE — 77030008684 HC TU ET CUF COVD -B: Performed by: ANESTHESIOLOGY

## 2020-01-01 PROCEDURE — 2709999900 HC NON-CHARGEABLE SUPPLY: Performed by: NEUROLOGICAL SURGERY

## 2020-01-01 PROCEDURE — 0RG40K1 FUSION OF CERVICOTHORACIC VERTEBRAL JOINT WITH NONAUTOLOGOUS TISSUE SUBSTITUTE, POSTERIOR APPROACH, POSTERIOR COLUMN, OPEN APPROACH: ICD-10-PCS | Performed by: NEUROLOGICAL SURGERY

## 2020-01-01 PROCEDURE — 8E0W0CZ ROBOTIC ASSISTED PROCEDURE OF TRUNK REGION, OPEN APPROACH: ICD-10-PCS | Performed by: NEUROLOGICAL SURGERY

## 2020-01-01 PROCEDURE — 96372 THER/PROPH/DIAG INJ SC/IM: CPT

## 2020-01-01 PROCEDURE — 77030026438 HC STYL ET INTUB CARD -A: Performed by: ANESTHESIOLOGY

## 2020-01-01 PROCEDURE — 0RG20K1 FUSION OF 2 OR MORE CERVICAL VERTEBRAL JOINTS WITH NONAUTOLOGOUS TISSUE SUBSTITUTE, POSTERIOR APPROACH, POSTERIOR COLUMN, OPEN APPROACH: ICD-10-PCS | Performed by: NEUROLOGICAL SURGERY

## 2020-01-01 PROCEDURE — 97110 THERAPEUTIC EXERCISES: CPT

## 2020-01-01 PROCEDURE — 99285 EMERGENCY DEPT VISIT HI MDM: CPT

## 2020-01-01 PROCEDURE — 77030018846 HC SOL IRR STRL H20 ICUM -A: Performed by: NEUROLOGICAL SURGERY

## 2020-01-01 PROCEDURE — 72158 MRI LUMBAR SPINE W/O & W/DYE: CPT

## 2020-01-01 PROCEDURE — 88307 TISSUE EXAM BY PATHOLOGIST: CPT

## 2020-01-01 PROCEDURE — 87086 URINE CULTURE/COLONY COUNT: CPT

## 2020-01-01 PROCEDURE — 84484 ASSAY OF TROPONIN QUANT: CPT

## 2020-01-01 PROCEDURE — 71275 CT ANGIOGRAPHY CHEST: CPT

## 2020-01-01 PROCEDURE — 36593 DECLOT VASCULAR DEVICE: CPT

## 2020-01-01 PROCEDURE — 77030040361 HC SLV COMPR DVT MDII -B: Performed by: NEUROLOGICAL SURGERY

## 2020-01-01 PROCEDURE — 80053 COMPREHEN METABOLIC PANEL: CPT

## 2020-01-01 PROCEDURE — L0120 CERV FLEX N/ADJ FOAM PRE OTS: HCPCS | Performed by: NEUROLOGICAL SURGERY

## 2020-01-01 PROCEDURE — 97162 PT EVAL MOD COMPLEX 30 MIN: CPT

## 2020-01-01 PROCEDURE — 88360 TUMOR IMMUNOHISTOCHEM/MANUAL: CPT

## 2020-01-01 PROCEDURE — 77030014650 HC SEAL MTRX FLOSEL BAXT -C: Performed by: NEUROLOGICAL SURGERY

## 2020-01-01 PROCEDURE — 80047 BASIC METABLC PNL IONIZED CA: CPT

## 2020-01-01 PROCEDURE — 99233 SBSQ HOSP IP/OBS HIGH 50: CPT | Performed by: INTERNAL MEDICINE

## 2020-01-01 PROCEDURE — 88342 IMHCHEM/IMCYTCHM 1ST ANTB: CPT

## 2020-01-01 PROCEDURE — 77030040356 HC CORD BPLR FRCP COVD -A: Performed by: NEUROLOGICAL SURGERY

## 2020-01-01 PROCEDURE — 77030030722 HC PIN SKULL MAYFLD INLC -B: Performed by: NEUROLOGICAL SURGERY

## 2020-01-01 PROCEDURE — 77030003029 HC SUT VCRL J&J -B: Performed by: NEUROLOGICAL SURGERY

## 2020-01-01 PROCEDURE — 74011000250 HC RX REV CODE- 250: Performed by: EMERGENCY MEDICINE

## 2020-01-01 PROCEDURE — 77030008462 HC STPLR SKN PROX J&J -A: Performed by: NEUROLOGICAL SURGERY

## 2020-01-01 PROCEDURE — 82550 ASSAY OF CK (CPK): CPT

## 2020-01-01 PROCEDURE — 85610 PROTHROMBIN TIME: CPT

## 2020-01-01 PROCEDURE — G8427 DOCREV CUR MEDS BY ELIG CLIN: HCPCS | Performed by: NURSE PRACTITIONER

## 2020-01-01 PROCEDURE — A9575 INJ GADOTERATE MEGLUMI 0.1ML: HCPCS | Performed by: GENERAL ACUTE CARE HOSPITAL

## 2020-01-01 PROCEDURE — 74011636320 HC RX REV CODE- 636/320: Performed by: EMERGENCY MEDICINE

## 2020-01-01 PROCEDURE — 83690 ASSAY OF LIPASE: CPT

## 2020-01-01 PROCEDURE — 99215 OFFICE O/P EST HI 40 MIN: CPT | Performed by: INTERNAL MEDICINE

## 2020-01-01 PROCEDURE — 77030026132 HC BN CANC CHP LIFV -C: Performed by: NEUROLOGICAL SURGERY

## 2020-01-01 PROCEDURE — A9575 INJ GADOTERATE MEGLUMI 0.1ML: HCPCS | Performed by: INTERNAL MEDICINE

## 2020-01-01 PROCEDURE — 71045 X-RAY EXAM CHEST 1 VIEW: CPT

## 2020-01-01 PROCEDURE — 76010000177 HC OR TIME 5 TO 5.5 HR INTENSV-TIER 1: Performed by: NEUROLOGICAL SURGERY

## 2020-01-01 PROCEDURE — 88374 M/PHMTRC ALYS ISHQUANT/SEMIQ: CPT

## 2020-01-01 PROCEDURE — 93005 ELECTROCARDIOGRAM TRACING: CPT

## 2020-01-01 PROCEDURE — 86901 BLOOD TYPING SEROLOGIC RH(D): CPT

## 2020-01-01 PROCEDURE — 77030040830 HC CATH URETH FOL MDII -A: Performed by: NEUROLOGICAL SURGERY

## 2020-01-01 PROCEDURE — 77030004391 HC BUR FLUT MEDT -C: Performed by: NEUROLOGICAL SURGERY

## 2020-01-01 PROCEDURE — 72157 MRI CHEST SPINE W/O & W/DYE: CPT

## 2020-01-01 PROCEDURE — 77030038692 HC WND DEB SYS IRMX -B: Performed by: NEUROLOGICAL SURGERY

## 2020-01-01 PROCEDURE — 88311 DECALCIFY TISSUE: CPT

## 2020-01-01 PROCEDURE — 74011250636 HC RX REV CODE- 250/636: Performed by: GENERAL ACUTE CARE HOSPITAL

## 2020-01-01 PROCEDURE — A9503 TC99M MEDRONATE: HCPCS

## 2020-01-01 DEVICE — MULTI AXIAL SCREW 3604030 4.0 X 30MM
Type: IMPLANTABLE DEVICE | Site: SPINE THORACIC | Status: FUNCTIONAL
Brand: INFINITY™ OCCIPITOCERVICAL UPPER THORACIC SYSTEM

## 2020-01-01 DEVICE — BONE CHIP CANC CRSH 1-8MM 20ML -- PCAN1/4: Type: IMPLANTABLE DEVICE | Site: SPINE THORACIC | Status: FUNCTIONAL

## 2020-01-01 DEVICE — DBM T43105 5CC GRAFTON PUTTY
Type: IMPLANTABLE DEVICE | Site: SPINE THORACIC | Status: FUNCTIONAL
Brand: GRAFTON®AND GRAFTON PLUS®DEMINERALIZED BONE MATRIX (DBM)

## 2020-01-01 RX ORDER — AMOXICILLIN 250 MG
1 CAPSULE ORAL DAILY
Qty: 30 TAB | Refills: 1 | Status: SHIPPED
Start: 2020-01-01 | End: 2020-01-01

## 2020-01-01 RX ORDER — FUROSEMIDE 20 MG/1
20 TABLET ORAL DAILY
Status: DISCONTINUED | OUTPATIENT
Start: 2020-01-01 | End: 2020-01-01 | Stop reason: HOSPADM

## 2020-01-01 RX ORDER — FUROSEMIDE 40 MG/1
40 TABLET ORAL DAILY
Status: DISCONTINUED | OUTPATIENT
Start: 2020-01-01 | End: 2020-01-01

## 2020-01-01 RX ORDER — BUDESONIDE 0.25 MG/2ML
250 INHALANT ORAL 2 TIMES DAILY
Status: DISCONTINUED | OUTPATIENT
Start: 2020-01-01 | End: 2020-01-01

## 2020-01-01 RX ORDER — KETOROLAC TROMETHAMINE 30 MG/ML
15 INJECTION, SOLUTION INTRAMUSCULAR; INTRAVENOUS
Status: COMPLETED | OUTPATIENT
Start: 2020-01-01 | End: 2020-01-01

## 2020-01-01 RX ORDER — TRAMADOL HYDROCHLORIDE 50 MG/1
50 TABLET ORAL
Qty: 60 TAB | Refills: 0 | Status: SHIPPED | OUTPATIENT
Start: 2020-01-01 | End: 2020-01-01 | Stop reason: ALTCHOICE

## 2020-01-01 RX ORDER — BACITRACIN ZINC 500 UNIT/G
OINTMENT (GRAM) TOPICAL ONCE
Status: COMPLETED | OUTPATIENT
Start: 2020-01-01 | End: 2020-01-01

## 2020-01-01 RX ORDER — OXYCODONE AND ACETAMINOPHEN 5; 325 MG/1; MG/1
2 TABLET ORAL
Status: DISCONTINUED | OUTPATIENT
Start: 2020-01-01 | End: 2020-01-01 | Stop reason: HOSPADM

## 2020-01-01 RX ORDER — FUROSEMIDE 20 MG/1
20 TABLET ORAL DAILY
Qty: 30 TAB | Refills: 0 | Status: SHIPPED
Start: 2020-01-01 | End: 2020-01-01

## 2020-01-01 RX ORDER — PROPOFOL 10 MG/ML
INJECTION, EMULSION INTRAVENOUS AS NEEDED
Status: DISCONTINUED | OUTPATIENT
Start: 2020-01-01 | End: 2020-01-01 | Stop reason: HOSPADM

## 2020-01-01 RX ORDER — LETROZOLE 2.5 MG/1
2.5 TABLET, FILM COATED ORAL DAILY
Qty: 30 TAB | Refills: 1 | Status: SHIPPED | OUTPATIENT
Start: 2020-01-01 | End: 2020-01-01

## 2020-01-01 RX ORDER — KETAMINE HYDROCHLORIDE 10 MG/ML
INJECTION, SOLUTION INTRAMUSCULAR; INTRAVENOUS AS NEEDED
Status: DISCONTINUED | OUTPATIENT
Start: 2020-01-01 | End: 2020-01-01 | Stop reason: HOSPADM

## 2020-01-01 RX ORDER — CARVEDILOL 3.12 MG/1
3.12 TABLET ORAL 2 TIMES DAILY WITH MEALS
Status: DISCONTINUED | OUTPATIENT
Start: 2020-01-01 | End: 2020-01-01 | Stop reason: HOSPADM

## 2020-01-01 RX ORDER — ONDANSETRON 2 MG/ML
4 INJECTION INTRAMUSCULAR; INTRAVENOUS
Status: DISCONTINUED | OUTPATIENT
Start: 2020-01-01 | End: 2020-01-01 | Stop reason: HOSPADM

## 2020-01-01 RX ORDER — ONDANSETRON 2 MG/ML
INJECTION INTRAMUSCULAR; INTRAVENOUS AS NEEDED
Status: DISCONTINUED | OUTPATIENT
Start: 2020-01-01 | End: 2020-01-01 | Stop reason: HOSPADM

## 2020-01-01 RX ORDER — OXYCODONE HYDROCHLORIDE 5 MG/1
5 TABLET ORAL
Qty: 7 TAB | Refills: 0 | Status: SHIPPED | OUTPATIENT
Start: 2020-01-01 | End: 2020-01-01

## 2020-01-01 RX ORDER — SODIUM CHLORIDE 9 MG/ML
50 INJECTION, SOLUTION INTRAVENOUS CONTINUOUS
Status: DISPENSED | OUTPATIENT
Start: 2020-01-01 | End: 2020-01-01

## 2020-01-01 RX ORDER — AMIODARONE HYDROCHLORIDE 200 MG/1
200 TABLET ORAL DAILY
Status: DISCONTINUED | OUTPATIENT
Start: 2020-01-01 | End: 2020-01-01 | Stop reason: HOSPADM

## 2020-01-01 RX ORDER — AMOXICILLIN 250 MG
1 CAPSULE ORAL DAILY
Status: DISCONTINUED | OUTPATIENT
Start: 2020-01-01 | End: 2020-01-01 | Stop reason: HOSPADM

## 2020-01-01 RX ORDER — SODIUM CHLORIDE 0.9 % (FLUSH) 0.9 %
5-40 SYRINGE (ML) INJECTION EVERY 8 HOURS
Status: DISCONTINUED | OUTPATIENT
Start: 2020-01-01 | End: 2020-01-01 | Stop reason: HOSPADM

## 2020-01-01 RX ORDER — POLYETHYLENE GLYCOL 3350 17 G/17G
17 POWDER, FOR SOLUTION ORAL DAILY
Status: DISCONTINUED | OUTPATIENT
Start: 2020-01-01 | End: 2020-01-01 | Stop reason: HOSPADM

## 2020-01-01 RX ORDER — PROCHLORPERAZINE MALEATE 10 MG
5 TABLET ORAL
Qty: 60 TAB | Refills: 3 | Status: SHIPPED | OUTPATIENT
Start: 2020-01-01

## 2020-01-01 RX ORDER — OXYCODONE HYDROCHLORIDE 5 MG/1
5 TABLET ORAL
Qty: 60 TAB | Refills: 0 | Status: CANCELLED | OUTPATIENT
Start: 2020-01-01 | End: 2021-01-01

## 2020-01-01 RX ORDER — SODIUM CHLORIDE 0.9 % (FLUSH) 0.9 %
5-40 SYRINGE (ML) INJECTION AS NEEDED
Status: DISCONTINUED | OUTPATIENT
Start: 2020-01-01 | End: 2020-01-01

## 2020-01-01 RX ORDER — ACETAMINOPHEN 650 MG/1
650 SUPPOSITORY RECTAL
Status: DISCONTINUED | OUTPATIENT
Start: 2020-01-01 | End: 2020-01-01 | Stop reason: HOSPADM

## 2020-01-01 RX ORDER — SODIUM CHLORIDE 9 MG/ML
INJECTION, SOLUTION INTRAVENOUS
Status: DISCONTINUED | OUTPATIENT
Start: 2020-01-01 | End: 2020-01-01 | Stop reason: HOSPADM

## 2020-01-01 RX ORDER — PROPOFOL 10 MG/ML
INJECTION, EMULSION INTRAVENOUS
Status: DISCONTINUED | OUTPATIENT
Start: 2020-01-01 | End: 2020-01-01 | Stop reason: HOSPADM

## 2020-01-01 RX ORDER — OXYCODONE HYDROCHLORIDE 5 MG/1
5 TABLET ORAL
Qty: 60 TAB | Refills: 0 | Status: SHIPPED | OUTPATIENT
Start: 2020-01-01 | End: 2021-01-01 | Stop reason: SDUPTHER

## 2020-01-01 RX ORDER — FENOFIBRATE 48 MG/1
48 TABLET, COATED ORAL DAILY
Status: DISCONTINUED | OUTPATIENT
Start: 2020-01-01 | End: 2020-01-01 | Stop reason: HOSPADM

## 2020-01-01 RX ORDER — MECLIZINE HYDROCHLORIDE 25 MG/1
TABLET ORAL
Qty: 90 TAB | Refills: 0 | Status: SHIPPED | OUTPATIENT
Start: 2020-01-01 | End: 2020-01-01

## 2020-01-01 RX ORDER — SODIUM CHLORIDE, SODIUM LACTATE, POTASSIUM CHLORIDE, CALCIUM CHLORIDE 600; 310; 30; 20 MG/100ML; MG/100ML; MG/100ML; MG/100ML
25 INJECTION, SOLUTION INTRAVENOUS CONTINUOUS
Status: DISCONTINUED | OUTPATIENT
Start: 2020-01-01 | End: 2020-01-01 | Stop reason: HOSPADM

## 2020-01-01 RX ORDER — ACETAMINOPHEN 325 MG/1
650 TABLET ORAL
Status: DISCONTINUED | OUTPATIENT
Start: 2020-01-01 | End: 2020-01-01 | Stop reason: HOSPADM

## 2020-01-01 RX ORDER — OXYCODONE AND ACETAMINOPHEN 5; 325 MG/1; MG/1
2 TABLET ORAL
Qty: 10 TAB | Refills: 0 | Status: SHIPPED | OUTPATIENT
Start: 2020-01-01 | End: 2020-01-01

## 2020-01-01 RX ORDER — AMIODARONE HYDROCHLORIDE 100 MG/1
100 TABLET ORAL DAILY
COMMUNITY

## 2020-01-01 RX ORDER — GABAPENTIN 100 MG/1
100 CAPSULE ORAL 3 TIMES DAILY
Status: DISCONTINUED | OUTPATIENT
Start: 2020-01-01 | End: 2020-01-01

## 2020-01-01 RX ORDER — LIDOCAINE HYDROCHLORIDE 20 MG/ML
INJECTION, SOLUTION EPIDURAL; INFILTRATION; INTRACAUDAL; PERINEURAL AS NEEDED
Status: DISCONTINUED | OUTPATIENT
Start: 2020-01-01 | End: 2020-01-01 | Stop reason: HOSPADM

## 2020-01-01 RX ORDER — FENTANYL CITRATE 50 UG/ML
25 INJECTION, SOLUTION INTRAMUSCULAR; INTRAVENOUS
Status: DISCONTINUED | OUTPATIENT
Start: 2020-01-01 | End: 2020-01-01 | Stop reason: HOSPADM

## 2020-01-01 RX ORDER — THROMBIN, TOPICAL (BOVINE) 20000 UNIT
20000 KIT TOPICAL ONCE
Status: DISCONTINUED | OUTPATIENT
Start: 2020-01-01 | End: 2020-01-01 | Stop reason: HOSPADM

## 2020-01-01 RX ORDER — GABAPENTIN 100 MG/1
100 CAPSULE ORAL 2 TIMES DAILY
Status: DISCONTINUED | OUTPATIENT
Start: 2020-01-01 | End: 2020-01-01

## 2020-01-01 RX ORDER — ALBUTEROL SULFATE 0.83 MG/ML
2.5 SOLUTION RESPIRATORY (INHALATION)
Status: DISCONTINUED | OUTPATIENT
Start: 2020-01-01 | End: 2020-01-01 | Stop reason: HOSPADM

## 2020-01-01 RX ORDER — NALOXONE HYDROCHLORIDE 0.4 MG/ML
0.4 INJECTION, SOLUTION INTRAMUSCULAR; INTRAVENOUS; SUBCUTANEOUS
Status: DISCONTINUED | OUTPATIENT
Start: 2020-01-01 | End: 2020-01-01

## 2020-01-01 RX ORDER — FENTANYL CITRATE 50 UG/ML
50 INJECTION, SOLUTION INTRAMUSCULAR; INTRAVENOUS
Status: DISCONTINUED | OUTPATIENT
Start: 2020-01-01 | End: 2020-01-01

## 2020-01-01 RX ORDER — SODIUM CHLORIDE 9 MG/ML
125 INJECTION, SOLUTION INTRAVENOUS CONTINUOUS
Status: DISPENSED | OUTPATIENT
Start: 2020-01-01 | End: 2020-01-01

## 2020-01-01 RX ORDER — PANTOPRAZOLE SODIUM 40 MG/1
40 TABLET, DELAYED RELEASE ORAL
Status: DISCONTINUED | OUTPATIENT
Start: 2020-01-01 | End: 2020-01-01 | Stop reason: HOSPADM

## 2020-01-01 RX ORDER — SODIUM CHLORIDE 0.9 % (FLUSH) 0.9 %
10 SYRINGE (ML) INJECTION
Status: COMPLETED | OUTPATIENT
Start: 2020-01-01 | End: 2020-01-01

## 2020-01-01 RX ORDER — ONDANSETRON 4 MG/1
4 TABLET, ORALLY DISINTEGRATING ORAL
Status: DISCONTINUED | OUTPATIENT
Start: 2020-01-01 | End: 2020-01-01 | Stop reason: HOSPADM

## 2020-01-01 RX ORDER — HEPARIN 100 UNIT/ML
300 SYRINGE INTRAVENOUS AS NEEDED
Status: DISCONTINUED | OUTPATIENT
Start: 2020-01-01 | End: 2020-01-01 | Stop reason: HOSPADM

## 2020-01-01 RX ORDER — SODIUM CHLORIDE 0.9 % (FLUSH) 0.9 %
5-40 SYRINGE (ML) INJECTION AS NEEDED
Status: DISCONTINUED | OUTPATIENT
Start: 2020-01-01 | End: 2020-01-01 | Stop reason: HOSPADM

## 2020-01-01 RX ORDER — AMIODARONE HYDROCHLORIDE 200 MG/1
400 TABLET ORAL 3 TIMES DAILY
Status: COMPLETED | OUTPATIENT
Start: 2020-01-01 | End: 2020-01-01

## 2020-01-01 RX ORDER — IBUPROFEN 600 MG/1
600 TABLET ORAL
Qty: 20 TAB | Refills: 0 | Status: ON HOLD | OUTPATIENT
Start: 2020-01-01 | End: 2020-01-01

## 2020-01-01 RX ORDER — POLYETHYLENE GLYCOL 3350 17 G/17G
17 POWDER, FOR SOLUTION ORAL DAILY
Status: SHIPPED | COMMUNITY
Start: 2020-01-01

## 2020-01-01 RX ORDER — OXYCODONE HYDROCHLORIDE 5 MG/1
5 TABLET ORAL
Qty: 60 TAB | Refills: 0 | Status: SHIPPED | OUTPATIENT
Start: 2020-01-01 | End: 2020-01-01 | Stop reason: SDUPTHER

## 2020-01-01 RX ORDER — FACIAL-BODY WIPES
10 EACH TOPICAL DAILY PRN
Status: DISCONTINUED | OUTPATIENT
Start: 2020-01-01 | End: 2020-01-01 | Stop reason: HOSPADM

## 2020-01-01 RX ORDER — FUROSEMIDE 20 MG/1
60 TABLET ORAL DAILY
COMMUNITY
End: 2020-01-01

## 2020-01-01 RX ORDER — CARVEDILOL 6.25 MG/1
6.25 TABLET ORAL 2 TIMES DAILY WITH MEALS
Status: DISCONTINUED | OUTPATIENT
Start: 2020-01-01 | End: 2020-01-01

## 2020-01-01 RX ORDER — NALOXONE HYDROCHLORIDE 0.4 MG/ML
0.4 INJECTION, SOLUTION INTRAMUSCULAR; INTRAVENOUS; SUBCUTANEOUS AS NEEDED
Status: DISCONTINUED | OUTPATIENT
Start: 2020-01-01 | End: 2020-01-01 | Stop reason: HOSPADM

## 2020-01-01 RX ORDER — UMECLIDINIUM BROMIDE AND VILANTEROL TRIFENATATE 62.5; 25 UG/1; UG/1
1 POWDER RESPIRATORY (INHALATION) DAILY
COMMUNITY
Start: 2020-01-01

## 2020-01-01 RX ORDER — AMIODARONE HYDROCHLORIDE 200 MG/1
200 TABLET ORAL DAILY
Qty: 30 TAB | Refills: 1 | Status: SHIPPED | OUTPATIENT
Start: 2020-01-01 | End: 2020-01-01

## 2020-01-01 RX ORDER — ASPIRIN 81 MG/1
TABLET ORAL DAILY
COMMUNITY

## 2020-01-01 RX ORDER — POTASSIUM CHLORIDE 1500 MG/1
TABLET, FILM COATED, EXTENDED RELEASE ORAL
COMMUNITY
End: 2020-01-01 | Stop reason: SDUPTHER

## 2020-01-01 RX ORDER — LETROZOLE 2.5 MG/1
2.5 TABLET, FILM COATED ORAL DAILY
Status: DISCONTINUED | OUTPATIENT
Start: 2020-01-01 | End: 2020-01-01 | Stop reason: HOSPADM

## 2020-01-01 RX ORDER — BUDESONIDE 0.25 MG/2ML
250 INHALANT ORAL
Status: DISCONTINUED | OUTPATIENT
Start: 2020-01-01 | End: 2020-01-01 | Stop reason: HOSPADM

## 2020-01-01 RX ORDER — POLYETHYLENE GLYCOL 3350 17 G/17G
17 POWDER, FOR SOLUTION ORAL DAILY PRN
Status: DISCONTINUED | OUTPATIENT
Start: 2020-01-01 | End: 2020-01-01

## 2020-01-01 RX ORDER — ROCURONIUM BROMIDE 10 MG/ML
INJECTION, SOLUTION INTRAVENOUS AS NEEDED
Status: DISCONTINUED | OUTPATIENT
Start: 2020-01-01 | End: 2020-01-01 | Stop reason: HOSPADM

## 2020-01-01 RX ORDER — AMOXICILLIN 250 MG
1 CAPSULE ORAL DAILY
Status: DISCONTINUED | OUTPATIENT
Start: 2020-01-01 | End: 2020-01-01

## 2020-01-01 RX ORDER — GADOTERATE MEGLUMINE 376.9 MG/ML
18 INJECTION INTRAVENOUS
Status: COMPLETED | OUTPATIENT
Start: 2020-01-01 | End: 2020-01-01

## 2020-01-01 RX ORDER — SUCCINYLCHOLINE CHLORIDE 20 MG/ML
INJECTION INTRAMUSCULAR; INTRAVENOUS AS NEEDED
Status: DISCONTINUED | OUTPATIENT
Start: 2020-01-01 | End: 2020-01-01 | Stop reason: HOSPADM

## 2020-01-01 RX ORDER — METHOCARBAMOL 750 MG/1
750 TABLET, FILM COATED ORAL ONCE
Status: DISCONTINUED | OUTPATIENT
Start: 2020-01-01 | End: 2020-01-01

## 2020-01-01 RX ORDER — LETROZOLE AND RIBOCICLIB 600-2.5 MG
600 KIT ORAL DAILY
Qty: 91 TAB | Refills: 11 | Status: SHIPPED | OUTPATIENT
Start: 2020-01-01

## 2020-01-01 RX ORDER — ALBUTEROL SULFATE 2.5 MG/.5ML
2.5 SOLUTION RESPIRATORY (INHALATION)
Status: DISCONTINUED | OUTPATIENT
Start: 2020-01-01 | End: 2020-01-01

## 2020-01-01 RX ORDER — BUPIVACAINE HYDROCHLORIDE AND EPINEPHRINE 5; 5 MG/ML; UG/ML
30 INJECTION, SOLUTION EPIDURAL; INTRACAUDAL; PERINEURAL ONCE
Status: COMPLETED | OUTPATIENT
Start: 2020-01-01 | End: 2020-01-01

## 2020-01-01 RX ORDER — CARVEDILOL 3.12 MG/1
3.12 TABLET ORAL 2 TIMES DAILY WITH MEALS
Status: DISCONTINUED | OUTPATIENT
Start: 2020-01-01 | End: 2020-01-01

## 2020-01-01 RX ORDER — HEPARIN SODIUM 5000 [USP'U]/ML
5000 INJECTION, SOLUTION INTRAVENOUS; SUBCUTANEOUS EVERY 8 HOURS
Status: COMPLETED | OUTPATIENT
Start: 2020-01-01 | End: 2020-01-01

## 2020-01-01 RX ORDER — ACETAMINOPHEN 325 MG/1
650 TABLET ORAL EVERY 6 HOURS
Status: DISCONTINUED | OUTPATIENT
Start: 2020-01-01 | End: 2020-01-01

## 2020-01-01 RX ORDER — SODIUM CHLORIDE 0.9 % (FLUSH) 0.9 %
5-40 SYRINGE (ML) INJECTION EVERY 8 HOURS
Status: DISCONTINUED | OUTPATIENT
Start: 2020-01-01 | End: 2020-01-01

## 2020-01-01 RX ORDER — MELATONIN
1000 DAILY
Status: DISCONTINUED | OUTPATIENT
Start: 2020-01-01 | End: 2020-01-01 | Stop reason: HOSPADM

## 2020-01-01 RX ORDER — GLYCOPYRROLATE 0.2 MG/ML
INJECTION INTRAMUSCULAR; INTRAVENOUS AS NEEDED
Status: DISCONTINUED | OUTPATIENT
Start: 2020-01-01 | End: 2020-01-01 | Stop reason: HOSPADM

## 2020-01-01 RX ORDER — DULOXETIN HYDROCHLORIDE 20 MG/1
20 CAPSULE, DELAYED RELEASE ORAL DAILY
Qty: 30 CAP | Refills: 0 | Status: SHIPPED
Start: 2020-01-01 | End: 2020-01-01

## 2020-01-01 RX ORDER — LIDOCAINE HYDROCHLORIDE 10 MG/ML
0.1 INJECTION, SOLUTION EPIDURAL; INFILTRATION; INTRACAUDAL; PERINEURAL AS NEEDED
Status: DISCONTINUED | OUTPATIENT
Start: 2020-01-01 | End: 2020-01-01 | Stop reason: HOSPADM

## 2020-01-01 RX ORDER — GABAPENTIN 100 MG/1
100 CAPSULE ORAL
Status: DISCONTINUED | OUTPATIENT
Start: 2020-01-01 | End: 2020-01-01 | Stop reason: HOSPADM

## 2020-01-01 RX ORDER — DEXAMETHASONE SODIUM PHOSPHATE 4 MG/ML
INJECTION, SOLUTION INTRA-ARTICULAR; INTRALESIONAL; INTRAMUSCULAR; INTRAVENOUS; SOFT TISSUE AS NEEDED
Status: DISCONTINUED | OUTPATIENT
Start: 2020-01-01 | End: 2020-01-01 | Stop reason: HOSPADM

## 2020-01-01 RX ORDER — OXYCODONE AND ACETAMINOPHEN 5; 325 MG/1; MG/1
1 TABLET ORAL
Status: DISCONTINUED | OUTPATIENT
Start: 2020-01-01 | End: 2020-01-01

## 2020-01-01 RX ORDER — GLUCOSAMINE SULFATE 1500 MG
1000 POWDER IN PACKET (EA) ORAL DAILY
Status: DISCONTINUED | OUTPATIENT
Start: 2020-01-01 | End: 2020-01-01

## 2020-01-01 RX ORDER — SODIUM CHLORIDE 9 MG/ML
100 INJECTION, SOLUTION INTRAVENOUS CONTINUOUS
Status: DISCONTINUED | OUTPATIENT
Start: 2020-01-01 | End: 2020-01-01

## 2020-01-01 RX ORDER — CARVEDILOL 3.12 MG/1
3.12 TABLET ORAL 2 TIMES DAILY WITH MEALS
Qty: 60 TAB | Refills: 1 | Status: SHIPPED | OUTPATIENT
Start: 2020-01-01 | End: 2020-01-01

## 2020-01-01 RX ORDER — POTASSIUM CHLORIDE 1500 MG/1
20 TABLET, FILM COATED, EXTENDED RELEASE ORAL DAILY
Qty: 30 TAB | Refills: 0 | Status: SHIPPED | OUTPATIENT
Start: 2020-01-01

## 2020-01-01 RX ORDER — DULOXETIN HYDROCHLORIDE 20 MG/1
20 CAPSULE, DELAYED RELEASE ORAL DAILY
COMMUNITY
End: 2021-01-01 | Stop reason: SDUPTHER

## 2020-01-01 RX ORDER — DIPHENHYDRAMINE HYDROCHLORIDE 50 MG/ML
12.5 INJECTION, SOLUTION INTRAMUSCULAR; INTRAVENOUS AS NEEDED
Status: DISCONTINUED | OUTPATIENT
Start: 2020-01-01 | End: 2020-01-01 | Stop reason: HOSPADM

## 2020-01-01 RX ORDER — LIDOCAINE 4 G/100G
1 PATCH TOPICAL
Status: DISCONTINUED | OUTPATIENT
Start: 2020-01-01 | End: 2020-01-01 | Stop reason: HOSPADM

## 2020-01-01 RX ORDER — SACUBITRIL AND VALSARTAN 24; 26 MG/1; MG/1
1 TABLET, FILM COATED ORAL 2 TIMES DAILY
COMMUNITY

## 2020-01-01 RX ORDER — ACETAMINOPHEN 500 MG
500 TABLET ORAL
COMMUNITY

## 2020-01-01 RX ORDER — DILTIAZEM HYDROCHLORIDE 5 MG/ML
10 INJECTION INTRAVENOUS ONCE
Status: COMPLETED | OUTPATIENT
Start: 2020-01-01 | End: 2020-01-01

## 2020-01-01 RX ORDER — MECLIZINE HCL 12.5 MG 12.5 MG/1
TABLET ORAL
COMMUNITY

## 2020-01-01 RX ORDER — DULOXETIN HYDROCHLORIDE 20 MG/1
20 CAPSULE, DELAYED RELEASE ORAL DAILY
Status: DISCONTINUED | OUTPATIENT
Start: 2020-01-01 | End: 2020-01-01 | Stop reason: HOSPADM

## 2020-01-01 RX ORDER — POLYETHYLENE GLYCOL 3350 17 G/17G
17 POWDER, FOR SOLUTION ORAL DAILY
Status: DISCONTINUED | OUTPATIENT
Start: 2020-01-01 | End: 2020-01-01

## 2020-01-01 RX ORDER — PHENYLEPHRINE HCL IN 0.9% NACL 0.4MG/10ML
SYRINGE (ML) INTRAVENOUS AS NEEDED
Status: DISCONTINUED | OUTPATIENT
Start: 2020-01-01 | End: 2020-01-01 | Stop reason: HOSPADM

## 2020-01-01 RX ORDER — ADHESIVE BANDAGE
30 BANDAGE TOPICAL ONCE
Status: COMPLETED | OUTPATIENT
Start: 2020-01-01 | End: 2020-01-01

## 2020-01-01 RX ADMIN — PANTOPRAZOLE SODIUM 40 MG: 40 TABLET, DELAYED RELEASE ORAL at 09:07

## 2020-01-01 RX ADMIN — GABAPENTIN 100 MG: 100 CAPSULE ORAL at 21:27

## 2020-01-01 RX ADMIN — Medication 10 ML: at 15:18

## 2020-01-01 RX ADMIN — SODIUM CHLORIDE 100 ML/HR: 900 INJECTION, SOLUTION INTRAVENOUS at 08:53

## 2020-01-01 RX ADMIN — DOCUSATE SODIUM 50MG AND SENNOSIDES 8.6MG 1 TABLET: 8.6; 5 TABLET, FILM COATED ORAL at 09:01

## 2020-01-01 RX ADMIN — Medication 10 ML: at 21:48

## 2020-01-01 RX ADMIN — DOCUSATE SODIUM 50MG AND SENNOSIDES 8.6MG 1 TABLET: 8.6; 5 TABLET, FILM COATED ORAL at 09:36

## 2020-01-01 RX ADMIN — PANTOPRAZOLE SODIUM 40 MG: 40 TABLET, DELAYED RELEASE ORAL at 08:12

## 2020-01-01 RX ADMIN — CARVEDILOL 6.25 MG: 6.25 TABLET, FILM COATED ORAL at 20:37

## 2020-01-01 RX ADMIN — SODIUM CHLORIDE, SODIUM LACTATE, POTASSIUM CHLORIDE, AND CALCIUM CHLORIDE: 600; 310; 30; 20 INJECTION, SOLUTION INTRAVENOUS at 07:25

## 2020-01-01 RX ADMIN — FENOFIBRATE 48 MG: 48 TABLET, FILM COATED ORAL at 08:59

## 2020-01-01 RX ADMIN — VITAMIN D, TAB 1000IU (100/BT) 1 TABLET: 25 TAB at 09:51

## 2020-01-01 RX ADMIN — DULOXETINE 20 MG: 20 CAPSULE, DELAYED RELEASE ORAL at 08:58

## 2020-01-01 RX ADMIN — OXYCODONE HYDROCHLORIDE AND ACETAMINOPHEN 1 TABLET: 5; 325 TABLET ORAL at 08:32

## 2020-01-01 RX ADMIN — FUROSEMIDE 40 MG: 40 TABLET ORAL at 09:07

## 2020-01-01 RX ADMIN — AMIODARONE HYDROCHLORIDE 400 MG: 200 TABLET ORAL at 17:14

## 2020-01-01 RX ADMIN — KETOROLAC TROMETHAMINE 15 MG: 30 INJECTION, SOLUTION INTRAMUSCULAR at 11:27

## 2020-01-01 RX ADMIN — GABAPENTIN 100 MG: 100 CAPSULE ORAL at 22:06

## 2020-01-01 RX ADMIN — Medication 10 ML: at 13:56

## 2020-01-01 RX ADMIN — LETROZOLE 2.5 MG: 2.5 TABLET ORAL at 09:27

## 2020-01-01 RX ADMIN — HEPARIN SODIUM 5000 UNITS: 5000 INJECTION INTRAVENOUS; SUBCUTANEOUS at 20:37

## 2020-01-01 RX ADMIN — FUROSEMIDE 20 MG: 20 TABLET ORAL at 09:26

## 2020-01-01 RX ADMIN — Medication 1 AMPULE: at 21:19

## 2020-01-01 RX ADMIN — FUROSEMIDE 40 MG: 40 TABLET ORAL at 08:52

## 2020-01-01 RX ADMIN — FENTANYL CITRATE 25 MCG: 50 INJECTION, SOLUTION INTRAMUSCULAR; INTRAVENOUS at 13:54

## 2020-01-01 RX ADMIN — DULOXETINE 20 MG: 20 CAPSULE, DELAYED RELEASE ORAL at 08:52

## 2020-01-01 RX ADMIN — OXYCODONE HYDROCHLORIDE AND ACETAMINOPHEN 2 TABLET: 5; 325 TABLET ORAL at 13:14

## 2020-01-01 RX ADMIN — CARVEDILOL 3.12 MG: 3.12 TABLET, FILM COATED ORAL at 08:50

## 2020-01-01 RX ADMIN — GABAPENTIN 100 MG: 100 CAPSULE ORAL at 21:34

## 2020-01-01 RX ADMIN — CARVEDILOL 3.12 MG: 3.12 TABLET, FILM COATED ORAL at 17:14

## 2020-01-01 RX ADMIN — BUDESONIDE 250 MCG: 0.25 INHALANT RESPIRATORY (INHALATION) at 21:17

## 2020-01-01 RX ADMIN — SODIUM CHLORIDE 125 ML/HR: 900 INJECTION, SOLUTION INTRAVENOUS at 09:22

## 2020-01-01 RX ADMIN — ACETAMINOPHEN 650 MG: 325 TABLET ORAL at 09:06

## 2020-01-01 RX ADMIN — WATER 2 G: 1 INJECTION INTRAMUSCULAR; INTRAVENOUS; SUBCUTANEOUS at 08:25

## 2020-01-01 RX ADMIN — Medication 10 ML: at 15:27

## 2020-01-01 RX ADMIN — Medication 30 ML: at 06:52

## 2020-01-01 RX ADMIN — DULOXETINE 20 MG: 20 CAPSULE, DELAYED RELEASE ORAL at 09:36

## 2020-01-01 RX ADMIN — Medication 10 ML: at 21:31

## 2020-01-01 RX ADMIN — PROPOFOL 150 MG: 10 INJECTION, EMULSION INTRAVENOUS at 07:41

## 2020-01-01 RX ADMIN — GADOTERATE MEGLUMINE 18 ML: 376.9 INJECTION INTRAVENOUS at 20:00

## 2020-01-01 RX ADMIN — AMIODARONE HYDROCHLORIDE 400 MG: 200 TABLET ORAL at 21:24

## 2020-01-01 RX ADMIN — CEFAZOLIN SODIUM 2 G: 1 INJECTION, POWDER, FOR SOLUTION INTRAMUSCULAR; INTRAVENOUS at 00:06

## 2020-01-01 RX ADMIN — HEPARIN SODIUM 5000 UNITS: 5000 INJECTION INTRAVENOUS; SUBCUTANEOUS at 13:15

## 2020-01-01 RX ADMIN — Medication 1 AMPULE: at 09:35

## 2020-01-01 RX ADMIN — AMIODARONE HYDROCHLORIDE 400 MG: 200 TABLET ORAL at 08:12

## 2020-01-01 RX ADMIN — GABAPENTIN 100 MG: 100 CAPSULE ORAL at 08:29

## 2020-01-01 RX ADMIN — ACETAMINOPHEN 650 MG: 325 TABLET ORAL at 00:07

## 2020-01-01 RX ADMIN — DEXTROSE MONOHYDRATE 5 MG/HR: 50 INJECTION, SOLUTION INTRAVENOUS at 20:31

## 2020-01-01 RX ADMIN — CARVEDILOL 3.12 MG: 3.12 TABLET, FILM COATED ORAL at 17:41

## 2020-01-01 RX ADMIN — CARVEDILOL 6.25 MG: 6.25 TABLET, FILM COATED ORAL at 16:06

## 2020-01-01 RX ADMIN — FENOFIBRATE 48 MG: 48 TABLET, FILM COATED ORAL at 09:27

## 2020-01-01 RX ADMIN — GABAPENTIN 100 MG: 100 CAPSULE ORAL at 08:32

## 2020-01-01 RX ADMIN — BUDESONIDE 250 MCG: 0.25 INHALANT RESPIRATORY (INHALATION) at 07:48

## 2020-01-01 RX ADMIN — GABAPENTIN 100 MG: 100 CAPSULE ORAL at 21:24

## 2020-01-01 RX ADMIN — GABAPENTIN 100 MG: 100 CAPSULE ORAL at 13:20

## 2020-01-01 RX ADMIN — OXYCODONE HYDROCHLORIDE AND ACETAMINOPHEN 1 TABLET: 5; 325 TABLET ORAL at 20:37

## 2020-01-01 RX ADMIN — DENOSUMAB 120 MG: 120 INJECTION SUBCUTANEOUS at 15:35

## 2020-01-01 RX ADMIN — FENTANYL CITRATE 25 MCG: 50 INJECTION, SOLUTION INTRAMUSCULAR; INTRAVENOUS at 13:46

## 2020-01-01 RX ADMIN — PANTOPRAZOLE SODIUM 40 MG: 40 TABLET, DELAYED RELEASE ORAL at 09:01

## 2020-01-01 RX ADMIN — Medication 10 ML: at 21:25

## 2020-01-01 RX ADMIN — PANTOPRAZOLE SODIUM 40 MG: 40 TABLET, DELAYED RELEASE ORAL at 08:32

## 2020-01-01 RX ADMIN — SODIUM CHLORIDE 125 ML/HR: 900 INJECTION, SOLUTION INTRAVENOUS at 00:06

## 2020-01-01 RX ADMIN — SODIUM CHLORIDE, SODIUM LACTATE, POTASSIUM CHLORIDE, AND CALCIUM CHLORIDE 25 ML/HR: 600; 310; 30; 20 INJECTION, SOLUTION INTRAVENOUS at 07:06

## 2020-01-01 RX ADMIN — Medication 120 MCG: at 07:43

## 2020-01-01 RX ADMIN — DOCUSATE SODIUM 50MG AND SENNOSIDES 8.6MG 1 TABLET: 8.6; 5 TABLET, FILM COATED ORAL at 08:28

## 2020-01-01 RX ADMIN — CARVEDILOL 3.12 MG: 3.12 TABLET, FILM COATED ORAL at 17:20

## 2020-01-01 RX ADMIN — OXYCODONE HYDROCHLORIDE AND ACETAMINOPHEN 1 TABLET: 5; 325 TABLET ORAL at 20:31

## 2020-01-01 RX ADMIN — HEPARIN 300 UNITS: 100 SYRINGE at 18:12

## 2020-01-01 RX ADMIN — DULOXETINE 20 MG: 20 CAPSULE, DELAYED RELEASE ORAL at 15:34

## 2020-01-01 RX ADMIN — Medication 10 ML: at 05:31

## 2020-01-01 RX ADMIN — BUDESONIDE 250 MCG: 0.25 INHALANT RESPIRATORY (INHALATION) at 07:21

## 2020-01-01 RX ADMIN — Medication 1 AMPULE: at 21:25

## 2020-01-01 RX ADMIN — Medication 10 ML: at 22:09

## 2020-01-01 RX ADMIN — DOCUSATE SODIUM 50MG AND SENNOSIDES 8.6MG 1 TABLET: 8.6; 5 TABLET, FILM COATED ORAL at 09:27

## 2020-01-01 RX ADMIN — AMIODARONE HYDROCHLORIDE 400 MG: 200 TABLET ORAL at 15:59

## 2020-01-01 RX ADMIN — DILTIAZEM HYDROCHLORIDE 10 MG: 5 INJECTION INTRAVENOUS at 19:22

## 2020-01-01 RX ADMIN — BUDESONIDE 250 MCG: 0.25 INHALANT RESPIRATORY (INHALATION) at 07:51

## 2020-01-01 RX ADMIN — OXYCODONE HYDROCHLORIDE AND ACETAMINOPHEN 2 TABLET: 5; 325 TABLET ORAL at 09:00

## 2020-01-01 RX ADMIN — GLYCOPYRROLATE AND FORMOTEROL FUMARATE 2 PUFF: 9; 4.8 AEROSOL, METERED RESPIRATORY (INHALATION) at 07:50

## 2020-01-01 RX ADMIN — FENOFIBRATE 48 MG: 48 TABLET, FILM COATED ORAL at 08:58

## 2020-01-01 RX ADMIN — Medication 10 ML: at 21:39

## 2020-01-01 RX ADMIN — PANTOPRAZOLE SODIUM 40 MG: 40 TABLET, DELAYED RELEASE ORAL at 09:20

## 2020-01-01 RX ADMIN — GABAPENTIN 100 MG: 100 CAPSULE ORAL at 09:20

## 2020-01-01 RX ADMIN — CARVEDILOL 3.12 MG: 3.12 TABLET, FILM COATED ORAL at 09:27

## 2020-01-01 RX ADMIN — APIXABAN 5 MG: 5 TABLET, FILM COATED ORAL at 09:50

## 2020-01-01 RX ADMIN — SODIUM CHLORIDE 125 ML/HR: 900 INJECTION, SOLUTION INTRAVENOUS at 00:54

## 2020-01-01 RX ADMIN — Medication 10 ML: at 13:55

## 2020-01-01 RX ADMIN — OXYCODONE HYDROCHLORIDE AND ACETAMINOPHEN 2 TABLET: 5; 325 TABLET ORAL at 05:37

## 2020-01-01 RX ADMIN — GABAPENTIN 100 MG: 100 CAPSULE ORAL at 08:57

## 2020-01-01 RX ADMIN — PANTOPRAZOLE SODIUM 40 MG: 40 TABLET, DELAYED RELEASE ORAL at 08:52

## 2020-01-01 RX ADMIN — POLYETHYLENE GLYCOL 3350 17 G: 17 POWDER, FOR SOLUTION ORAL at 09:50

## 2020-01-01 RX ADMIN — OXYCODONE HYDROCHLORIDE AND ACETAMINOPHEN 2 TABLET: 5; 325 TABLET ORAL at 04:08

## 2020-01-01 RX ADMIN — GABAPENTIN 100 MG: 100 CAPSULE ORAL at 17:41

## 2020-01-01 RX ADMIN — KETOROLAC TROMETHAMINE 15 MG: 30 INJECTION, SOLUTION INTRAMUSCULAR at 16:45

## 2020-01-01 RX ADMIN — BUDESONIDE 250 MCG: 0.25 INHALANT RESPIRATORY (INHALATION) at 07:46

## 2020-01-01 RX ADMIN — FUROSEMIDE 40 MG: 40 TABLET ORAL at 08:53

## 2020-01-01 RX ADMIN — GABAPENTIN 100 MG: 100 CAPSULE ORAL at 16:00

## 2020-01-01 RX ADMIN — Medication 3 AMPULE: at 07:06

## 2020-01-01 RX ADMIN — KETAMINE HYDROCHLORIDE 10 MG: 10 INJECTION, SOLUTION INTRAMUSCULAR; INTRAVENOUS at 10:00

## 2020-01-01 RX ADMIN — GABAPENTIN 100 MG: 100 CAPSULE ORAL at 17:06

## 2020-01-01 RX ADMIN — SODIUM CHLORIDE 40 MCG/MIN: 900 INJECTION, SOLUTION INTRAVENOUS at 08:40

## 2020-01-01 RX ADMIN — Medication 10 ML: at 22:07

## 2020-01-01 RX ADMIN — BUDESONIDE 250 MCG: 0.25 INHALANT RESPIRATORY (INHALATION) at 22:23

## 2020-01-01 RX ADMIN — CARVEDILOL 3.12 MG: 3.12 TABLET, FILM COATED ORAL at 08:53

## 2020-01-01 RX ADMIN — DEXTROSE MONOHYDRATE 10 MG/HR: 50 INJECTION, SOLUTION INTRAVENOUS at 05:08

## 2020-01-01 RX ADMIN — GLYCOPYRROLATE AND FORMOTEROL FUMARATE 2 PUFF: 9; 4.8 AEROSOL, METERED RESPIRATORY (INHALATION) at 19:25

## 2020-01-01 RX ADMIN — Medication 10 ML: at 21:58

## 2020-01-01 RX ADMIN — GABAPENTIN 100 MG: 100 CAPSULE ORAL at 16:40

## 2020-01-01 RX ADMIN — Medication 10 ML: at 16:00

## 2020-01-01 RX ADMIN — OXYCODONE HYDROCHLORIDE AND ACETAMINOPHEN 1 TABLET: 5; 325 TABLET ORAL at 17:08

## 2020-01-01 RX ADMIN — SODIUM CHLORIDE: 900 INJECTION, SOLUTION INTRAVENOUS at 08:15

## 2020-01-01 RX ADMIN — FUROSEMIDE 40 MG: 40 TABLET ORAL at 08:32

## 2020-01-01 RX ADMIN — OXYCODONE HYDROCHLORIDE AND ACETAMINOPHEN 2 TABLET: 5; 325 TABLET ORAL at 21:24

## 2020-01-01 RX ADMIN — HEPARIN 300 UNITS: 100 SYRINGE at 11:29

## 2020-01-01 RX ADMIN — POLYETHYLENE GLYCOL 3350 17 G: 17 POWDER, FOR SOLUTION ORAL at 08:32

## 2020-01-01 RX ADMIN — KETAMINE HYDROCHLORIDE 10 MG: 10 INJECTION, SOLUTION INTRAMUSCULAR; INTRAVENOUS at 10:57

## 2020-01-01 RX ADMIN — BUDESONIDE 250 MCG: 0.25 INHALANT RESPIRATORY (INHALATION) at 21:04

## 2020-01-01 RX ADMIN — GABAPENTIN 100 MG: 100 CAPSULE ORAL at 08:50

## 2020-01-01 RX ADMIN — OXYCODONE HYDROCHLORIDE AND ACETAMINOPHEN 1 TABLET: 5; 325 TABLET ORAL at 05:27

## 2020-01-01 RX ADMIN — APIXABAN 5 MG: 5 TABLET, FILM COATED ORAL at 09:27

## 2020-01-01 RX ADMIN — MAGNESIUM HYDROXIDE 30 ML: 400 SUSPENSION ORAL at 09:52

## 2020-01-01 RX ADMIN — Medication 10 ML: at 05:10

## 2020-01-01 RX ADMIN — POLYETHYLENE GLYCOL 3350 17 G: 17 POWDER, FOR SOLUTION ORAL at 08:57

## 2020-01-01 RX ADMIN — OXYCODONE HYDROCHLORIDE AND ACETAMINOPHEN 2 TABLET: 5; 325 TABLET ORAL at 04:13

## 2020-01-01 RX ADMIN — AMIODARONE HYDROCHLORIDE 400 MG: 200 TABLET ORAL at 21:25

## 2020-01-01 RX ADMIN — FUROSEMIDE 20 MG: 20 TABLET ORAL at 09:00

## 2020-01-01 RX ADMIN — POLYETHYLENE GLYCOL 3350 17 G: 17 POWDER, FOR SOLUTION ORAL at 09:37

## 2020-01-01 RX ADMIN — DULOXETINE 20 MG: 20 CAPSULE, DELAYED RELEASE ORAL at 08:32

## 2020-01-01 RX ADMIN — OXYCODONE HYDROCHLORIDE AND ACETAMINOPHEN 2 TABLET: 5; 325 TABLET ORAL at 09:20

## 2020-01-01 RX ADMIN — Medication 10 ML: at 15:16

## 2020-01-01 RX ADMIN — FUROSEMIDE 40 MG: 40 TABLET ORAL at 15:34

## 2020-01-01 RX ADMIN — BUDESONIDE 250 MCG: 0.25 INHALANT RESPIRATORY (INHALATION) at 08:27

## 2020-01-01 RX ADMIN — Medication 40 ML: at 05:43

## 2020-01-01 RX ADMIN — HEPARIN 300 UNITS: 100 SYRINGE at 05:12

## 2020-01-01 RX ADMIN — WATER 2 MG: 1 INJECTION INTRAMUSCULAR; INTRAVENOUS; SUBCUTANEOUS at 17:09

## 2020-01-01 RX ADMIN — DOCUSATE SODIUM 50MG AND SENNOSIDES 8.6MG 1 TABLET: 8.6; 5 TABLET, FILM COATED ORAL at 08:52

## 2020-01-01 RX ADMIN — POLYETHYLENE GLYCOL 3350 17 G: 17 POWDER, FOR SOLUTION ORAL at 09:23

## 2020-01-01 RX ADMIN — BUDESONIDE 250 MCG: 0.25 INHALANT RESPIRATORY (INHALATION) at 19:56

## 2020-01-01 RX ADMIN — GLYCOPYRROLATE AND FORMOTEROL FUMARATE 2 PUFF: 9; 4.8 AEROSOL, METERED RESPIRATORY (INHALATION) at 08:59

## 2020-01-01 RX ADMIN — DOCUSATE SODIUM 50MG AND SENNOSIDES 8.6MG 1 TABLET: 8.6; 5 TABLET, FILM COATED ORAL at 08:57

## 2020-01-01 RX ADMIN — LETROZOLE 2.5 MG: 2.5 TABLET ORAL at 10:40

## 2020-01-01 RX ADMIN — FUROSEMIDE 40 MG: 40 TABLET ORAL at 08:28

## 2020-01-01 RX ADMIN — BUDESONIDE 250 MCG: 0.25 INHALANT RESPIRATORY (INHALATION) at 07:55

## 2020-01-01 RX ADMIN — OXYCODONE HYDROCHLORIDE AND ACETAMINOPHEN 2 TABLET: 5; 325 TABLET ORAL at 08:50

## 2020-01-01 RX ADMIN — DOCUSATE SODIUM 50MG AND SENNOSIDES 8.6MG 1 TABLET: 8.6; 5 TABLET, FILM COATED ORAL at 09:19

## 2020-01-01 RX ADMIN — VITAMIN D, TAB 1000IU (100/BT) 1 TABLET: 25 TAB at 09:26

## 2020-01-01 RX ADMIN — DULOXETINE 20 MG: 20 CAPSULE, DELAYED RELEASE ORAL at 08:31

## 2020-01-01 RX ADMIN — POLYETHYLENE GLYCOL 3350 17 G: 17 POWDER, FOR SOLUTION ORAL at 09:27

## 2020-01-01 RX ADMIN — CARVEDILOL 6.25 MG: 6.25 TABLET, FILM COATED ORAL at 16:34

## 2020-01-01 RX ADMIN — OXYCODONE HYDROCHLORIDE AND ACETAMINOPHEN 1 TABLET: 5; 325 TABLET ORAL at 12:55

## 2020-01-01 RX ADMIN — Medication 10 ML: at 21:34

## 2020-01-01 RX ADMIN — BUDESONIDE 250 MCG: 0.25 INHALANT RESPIRATORY (INHALATION) at 20:59

## 2020-01-01 RX ADMIN — ACETAMINOPHEN 650 MG: 325 TABLET ORAL at 15:42

## 2020-01-01 RX ADMIN — GABAPENTIN 100 MG: 100 CAPSULE ORAL at 16:28

## 2020-01-01 RX ADMIN — VITAMIN D, TAB 1000IU (100/BT) 1 TABLET: 25 TAB at 08:12

## 2020-01-01 RX ADMIN — PANTOPRAZOLE SODIUM 40 MG: 40 TABLET, DELAYED RELEASE ORAL at 09:36

## 2020-01-01 RX ADMIN — DULOXETINE 20 MG: 20 CAPSULE, DELAYED RELEASE ORAL at 08:12

## 2020-01-01 RX ADMIN — Medication 1 AMPULE: at 09:49

## 2020-01-01 RX ADMIN — Medication 1 AMPULE: at 21:26

## 2020-01-01 RX ADMIN — DEXAMETHASONE SODIUM PHOSPHATE 8 MG: 4 INJECTION, SOLUTION INTRAMUSCULAR; INTRAVENOUS at 08:05

## 2020-01-01 RX ADMIN — LETROZOLE 2.5 MG: 2.5 TABLET ORAL at 09:00

## 2020-01-01 RX ADMIN — FENTANYL CITRATE 25 MCG: 50 INJECTION, SOLUTION INTRAMUSCULAR; INTRAVENOUS at 14:07

## 2020-01-01 RX ADMIN — GLYCOPYRROLATE 0.2 MG: 0.2 INJECTION, SOLUTION INTRAMUSCULAR; INTRAVENOUS at 07:25

## 2020-01-01 RX ADMIN — GABAPENTIN 100 MG: 100 CAPSULE ORAL at 09:37

## 2020-01-01 RX ADMIN — OXYCODONE HYDROCHLORIDE AND ACETAMINOPHEN 1 TABLET: 5; 325 TABLET ORAL at 17:46

## 2020-01-01 RX ADMIN — OXYCODONE HYDROCHLORIDE AND ACETAMINOPHEN 1 TABLET: 5; 325 TABLET ORAL at 13:51

## 2020-01-01 RX ADMIN — SODIUM CHLORIDE 1000 ML: 900 INJECTION, SOLUTION INTRAVENOUS at 16:44

## 2020-01-01 RX ADMIN — DOCUSATE SODIUM 50MG AND SENNOSIDES 8.6MG 1 TABLET: 8.6; 5 TABLET, FILM COATED ORAL at 08:32

## 2020-01-01 RX ADMIN — OXYCODONE HYDROCHLORIDE AND ACETAMINOPHEN 1 TABLET: 5; 325 TABLET ORAL at 22:09

## 2020-01-01 RX ADMIN — GLYCOPYRROLATE AND FORMOTEROL FUMARATE 2 PUFF: 9; 4.8 AEROSOL, METERED RESPIRATORY (INHALATION) at 20:01

## 2020-01-01 RX ADMIN — CARVEDILOL 6.25 MG: 6.25 TABLET, FILM COATED ORAL at 15:34

## 2020-01-01 RX ADMIN — BUDESONIDE 250 MCG: 0.25 INHALANT RESPIRATORY (INHALATION) at 08:15

## 2020-01-01 RX ADMIN — CARVEDILOL 3.12 MG: 3.12 TABLET, FILM COATED ORAL at 09:37

## 2020-01-01 RX ADMIN — OXYCODONE HYDROCHLORIDE AND ACETAMINOPHEN 1 TABLET: 5; 325 TABLET ORAL at 15:34

## 2020-01-01 RX ADMIN — Medication 10 ML: at 14:18

## 2020-01-01 RX ADMIN — Medication 10 ML: at 13:19

## 2020-01-01 RX ADMIN — ONDANSETRON HYDROCHLORIDE 4 MG: 2 INJECTION, SOLUTION INTRAMUSCULAR; INTRAVENOUS at 11:40

## 2020-01-01 RX ADMIN — PANTOPRAZOLE SODIUM 40 MG: 40 TABLET, DELAYED RELEASE ORAL at 08:50

## 2020-01-01 RX ADMIN — Medication 10 ML: at 06:23

## 2020-01-01 RX ADMIN — GABAPENTIN 100 MG: 100 CAPSULE ORAL at 08:53

## 2020-01-01 RX ADMIN — GABAPENTIN 100 MG: 100 CAPSULE ORAL at 16:06

## 2020-01-01 RX ADMIN — CARVEDILOL 3.12 MG: 3.12 TABLET, FILM COATED ORAL at 16:00

## 2020-01-01 RX ADMIN — GLYCOPYRROLATE AND FORMOTEROL FUMARATE 2 PUFF: 9; 4.8 AEROSOL, METERED RESPIRATORY (INHALATION) at 21:04

## 2020-01-01 RX ADMIN — OXYCODONE HYDROCHLORIDE AND ACETAMINOPHEN 1 TABLET: 5; 325 TABLET ORAL at 00:03

## 2020-01-01 RX ADMIN — AMIODARONE HYDROCHLORIDE 400 MG: 200 TABLET ORAL at 18:03

## 2020-01-01 RX ADMIN — OXYCODONE HYDROCHLORIDE AND ACETAMINOPHEN 1 TABLET: 5; 325 TABLET ORAL at 03:08

## 2020-01-01 RX ADMIN — Medication 10 ML: at 18:02

## 2020-01-01 RX ADMIN — Medication 10 ML: at 13:49

## 2020-01-01 RX ADMIN — Medication 10 ML: at 05:46

## 2020-01-01 RX ADMIN — DOCUSATE SODIUM 50MG AND SENNOSIDES 8.6MG 1 TABLET: 8.6; 5 TABLET, FILM COATED ORAL at 13:20

## 2020-01-01 RX ADMIN — CARVEDILOL 6.25 MG: 6.25 TABLET, FILM COATED ORAL at 08:29

## 2020-01-01 RX ADMIN — CARVEDILOL 6.25 MG: 6.25 TABLET, FILM COATED ORAL at 09:20

## 2020-01-01 RX ADMIN — CARVEDILOL 3.12 MG: 3.12 TABLET, FILM COATED ORAL at 09:01

## 2020-01-01 RX ADMIN — OXYCODONE HYDROCHLORIDE AND ACETAMINOPHEN 1 TABLET: 5; 325 TABLET ORAL at 21:04

## 2020-01-01 RX ADMIN — OXYCODONE HYDROCHLORIDE AND ACETAMINOPHEN 1 TABLET: 5; 325 TABLET ORAL at 02:04

## 2020-01-01 RX ADMIN — OXYCODONE HYDROCHLORIDE AND ACETAMINOPHEN 1 TABLET: 5; 325 TABLET ORAL at 21:31

## 2020-01-01 RX ADMIN — BUDESONIDE 250 MCG: 0.25 INHALANT RESPIRATORY (INHALATION) at 20:01

## 2020-01-01 RX ADMIN — FENOFIBRATE 48 MG: 48 TABLET, FILM COATED ORAL at 09:20

## 2020-01-01 RX ADMIN — Medication 10 ML: at 21:09

## 2020-01-01 RX ADMIN — OXYCODONE HYDROCHLORIDE AND ACETAMINOPHEN 1 TABLET: 5; 325 TABLET ORAL at 08:52

## 2020-01-01 RX ADMIN — VITAMIN D, TAB 1000IU (100/BT) 1 TABLET: 25 TAB at 11:26

## 2020-01-01 RX ADMIN — BUDESONIDE 250 MCG: 0.25 INHALANT RESPIRATORY (INHALATION) at 08:23

## 2020-01-01 RX ADMIN — AMIODARONE HYDROCHLORIDE 400 MG: 200 TABLET ORAL at 08:59

## 2020-01-01 RX ADMIN — AMIODARONE HYDROCHLORIDE 200 MG: 200 TABLET ORAL at 10:05

## 2020-01-01 RX ADMIN — BUDESONIDE 250 MCG: 0.25 INHALANT RESPIRATORY (INHALATION) at 20:31

## 2020-01-01 RX ADMIN — PANTOPRAZOLE SODIUM 40 MG: 40 TABLET, DELAYED RELEASE ORAL at 09:51

## 2020-01-01 RX ADMIN — GADOTERATE MEGLUMINE 18 ML: 376.9 INJECTION INTRAVENOUS at 23:00

## 2020-01-01 RX ADMIN — FUROSEMIDE 40 MG: 40 TABLET ORAL at 09:20

## 2020-01-01 RX ADMIN — POLYETHYLENE GLYCOL 3350 17 G: 17 POWDER, FOR SOLUTION ORAL at 08:59

## 2020-01-01 RX ADMIN — Medication 10 ML: at 15:36

## 2020-01-01 RX ADMIN — GLYCOPYRROLATE AND FORMOTEROL FUMARATE 2 PUFF: 9; 4.8 AEROSOL, METERED RESPIRATORY (INHALATION) at 19:36

## 2020-01-01 RX ADMIN — GABAPENTIN 100 MG: 100 CAPSULE ORAL at 17:24

## 2020-01-01 RX ADMIN — VITAMIN D, TAB 1000IU (100/BT) 1 TABLET: 25 TAB at 08:58

## 2020-01-01 RX ADMIN — OXYCODONE HYDROCHLORIDE AND ACETAMINOPHEN 2 TABLET: 5; 325 TABLET ORAL at 15:18

## 2020-01-01 RX ADMIN — FENTANYL CITRATE 50 MCG: 50 INJECTION, SOLUTION INTRAMUSCULAR; INTRAVENOUS at 16:40

## 2020-01-01 RX ADMIN — Medication 1 AMPULE: at 09:00

## 2020-01-01 RX ADMIN — LIDOCAINE HYDROCHLORIDE 100 MG: 20 INJECTION, SOLUTION INTRAVENOUS at 07:41

## 2020-01-01 RX ADMIN — Medication 1 AMPULE: at 15:19

## 2020-01-01 RX ADMIN — Medication 10 ML: at 07:10

## 2020-01-01 RX ADMIN — GABAPENTIN 100 MG: 100 CAPSULE ORAL at 21:38

## 2020-01-01 RX ADMIN — CARVEDILOL 6.25 MG: 6.25 TABLET, FILM COATED ORAL at 09:06

## 2020-01-01 RX ADMIN — GABAPENTIN 100 MG: 100 CAPSULE ORAL at 22:14

## 2020-01-01 RX ADMIN — OXYCODONE HYDROCHLORIDE AND ACETAMINOPHEN 2 TABLET: 5; 325 TABLET ORAL at 15:59

## 2020-01-01 RX ADMIN — KETAMINE HYDROCHLORIDE 10 MG: 10 INJECTION, SOLUTION INTRAMUSCULAR; INTRAVENOUS at 09:00

## 2020-01-01 RX ADMIN — Medication 1 AMPULE: at 22:06

## 2020-01-01 RX ADMIN — DULOXETINE 20 MG: 20 CAPSULE, DELAYED RELEASE ORAL at 08:59

## 2020-01-01 RX ADMIN — Medication 10 ML: at 13:16

## 2020-01-01 RX ADMIN — DULOXETINE 20 MG: 20 CAPSULE, DELAYED RELEASE ORAL at 09:19

## 2020-01-01 RX ADMIN — GLYCOPYRROLATE AND FORMOTEROL FUMARATE 2 PUFF: 9; 4.8 AEROSOL, METERED RESPIRATORY (INHALATION) at 08:27

## 2020-01-01 RX ADMIN — HEPARIN SODIUM 5000 UNITS: 5000 INJECTION INTRAVENOUS; SUBCUTANEOUS at 05:10

## 2020-01-01 RX ADMIN — Medication 10 ML: at 21:05

## 2020-01-01 RX ADMIN — PANTOPRAZOLE SODIUM 40 MG: 40 TABLET, DELAYED RELEASE ORAL at 15:34

## 2020-01-01 RX ADMIN — OXYCODONE HYDROCHLORIDE AND ACETAMINOPHEN 2 TABLET: 5; 325 TABLET ORAL at 13:55

## 2020-01-01 RX ADMIN — GABAPENTIN 100 MG: 100 CAPSULE ORAL at 08:12

## 2020-01-01 RX ADMIN — AMIODARONE HYDROCHLORIDE 400 MG: 200 TABLET ORAL at 22:18

## 2020-01-01 RX ADMIN — DEXTROSE MONOHYDRATE 12.5 MG/HR: 50 INJECTION, SOLUTION INTRAVENOUS at 02:42

## 2020-01-01 RX ADMIN — FENOFIBRATE 48 MG: 48 TABLET, FILM COATED ORAL at 08:12

## 2020-01-01 RX ADMIN — Medication 10 ML: at 05:11

## 2020-01-01 RX ADMIN — OXYCODONE HYDROCHLORIDE AND ACETAMINOPHEN 1 TABLET: 5; 325 TABLET ORAL at 09:36

## 2020-01-01 RX ADMIN — FUROSEMIDE 20 MG: 20 TABLET ORAL at 10:05

## 2020-01-01 RX ADMIN — OXYCODONE HYDROCHLORIDE AND ACETAMINOPHEN 2 TABLET: 5; 325 TABLET ORAL at 09:57

## 2020-01-01 RX ADMIN — BUDESONIDE 250 MCG: 0.25 INHALANT RESPIRATORY (INHALATION) at 07:38

## 2020-01-01 RX ADMIN — BUDESONIDE 250 MCG: 0.25 INHALANT RESPIRATORY (INHALATION) at 07:35

## 2020-01-01 RX ADMIN — GABAPENTIN 100 MG: 100 CAPSULE ORAL at 21:31

## 2020-01-01 RX ADMIN — LETROZOLE 2.5 MG: 2.5 TABLET ORAL at 10:10

## 2020-01-01 RX ADMIN — GLYCOPYRROLATE AND FORMOTEROL FUMARATE 2 PUFF: 9; 4.8 AEROSOL, METERED RESPIRATORY (INHALATION) at 08:15

## 2020-01-01 RX ADMIN — SACUBITRIL AND VALSARTAN 2 TABLET: 49; 51 TABLET, FILM COATED ORAL at 20:37

## 2020-01-01 RX ADMIN — GABAPENTIN 100 MG: 100 CAPSULE ORAL at 09:01

## 2020-01-01 RX ADMIN — BUDESONIDE 250 MCG: 0.25 INHALANT RESPIRATORY (INHALATION) at 21:09

## 2020-01-01 RX ADMIN — DOCUSATE SODIUM 50MG AND SENNOSIDES 8.6MG 1 TABLET: 8.6; 5 TABLET, FILM COATED ORAL at 08:59

## 2020-01-01 RX ADMIN — POLYETHYLENE GLYCOL 3350 17 G: 17 POWDER, FOR SOLUTION ORAL at 08:52

## 2020-01-01 RX ADMIN — CARVEDILOL 3.12 MG: 3.12 TABLET, FILM COATED ORAL at 09:00

## 2020-01-01 RX ADMIN — BUDESONIDE 250 MCG: 0.25 INHALANT RESPIRATORY (INHALATION) at 21:38

## 2020-01-01 RX ADMIN — OXYCODONE HYDROCHLORIDE AND ACETAMINOPHEN 1 TABLET: 5; 325 TABLET ORAL at 17:41

## 2020-01-01 RX ADMIN — Medication 10 ML: at 05:38

## 2020-01-01 RX ADMIN — OXYCODONE HYDROCHLORIDE AND ACETAMINOPHEN 2 TABLET: 5; 325 TABLET ORAL at 03:52

## 2020-01-01 RX ADMIN — Medication 1 AMPULE: at 11:31

## 2020-01-01 RX ADMIN — FENTANYL CITRATE 25 MCG: 50 INJECTION, SOLUTION INTRAMUSCULAR; INTRAVENOUS at 13:30

## 2020-01-01 RX ADMIN — CARVEDILOL 6.25 MG: 6.25 TABLET, FILM COATED ORAL at 16:40

## 2020-01-01 RX ADMIN — BUDESONIDE 250 MCG: 0.25 INHALANT RESPIRATORY (INHALATION) at 19:36

## 2020-01-01 RX ADMIN — Medication 10 ML: at 05:14

## 2020-01-01 RX ADMIN — GABAPENTIN 100 MG: 100 CAPSULE ORAL at 17:20

## 2020-01-01 RX ADMIN — OXYCODONE HYDROCHLORIDE AND ACETAMINOPHEN 2 TABLET: 5; 325 TABLET ORAL at 05:08

## 2020-01-01 RX ADMIN — OXYCODONE HYDROCHLORIDE AND ACETAMINOPHEN 1 TABLET: 5; 325 TABLET ORAL at 14:35

## 2020-01-01 RX ADMIN — GLYCOPYRROLATE AND FORMOTEROL FUMARATE 2 PUFF: 9; 4.8 AEROSOL, METERED RESPIRATORY (INHALATION) at 16:59

## 2020-01-01 RX ADMIN — KETAMINE HYDROCHLORIDE 30 MG: 10 INJECTION, SOLUTION INTRAMUSCULAR; INTRAVENOUS at 08:15

## 2020-01-01 RX ADMIN — Medication 10 ML: at 14:35

## 2020-01-01 RX ADMIN — GABAPENTIN 100 MG: 100 CAPSULE ORAL at 15:19

## 2020-01-01 RX ADMIN — CARVEDILOL 3.12 MG: 3.12 TABLET, FILM COATED ORAL at 18:04

## 2020-01-01 RX ADMIN — DOCUSATE SODIUM 50MG AND SENNOSIDES 8.6MG 1 TABLET: 8.6; 5 TABLET, FILM COATED ORAL at 08:12

## 2020-01-01 RX ADMIN — HEPARIN 300 UNITS: 100 SYRINGE at 13:29

## 2020-01-01 RX ADMIN — VITAMIN D, TAB 1000IU (100/BT) 1 TABLET: 25 TAB at 08:59

## 2020-01-01 RX ADMIN — Medication 1 AMPULE: at 10:24

## 2020-01-01 RX ADMIN — GABAPENTIN 100 MG: 100 CAPSULE ORAL at 18:26

## 2020-01-01 RX ADMIN — OXYCODONE HYDROCHLORIDE AND ACETAMINOPHEN 2 TABLET: 5; 325 TABLET ORAL at 20:24

## 2020-01-01 RX ADMIN — Medication 10 ML: at 18:39

## 2020-01-01 RX ADMIN — BUDESONIDE 250 MCG: 0.25 INHALANT RESPIRATORY (INHALATION) at 21:07

## 2020-01-01 RX ADMIN — Medication 10 ML: at 05:28

## 2020-01-01 RX ADMIN — DULOXETINE 20 MG: 20 CAPSULE, DELAYED RELEASE ORAL at 09:51

## 2020-01-01 RX ADMIN — GABAPENTIN 100 MG: 100 CAPSULE ORAL at 22:07

## 2020-01-01 RX ADMIN — DULOXETINE 20 MG: 20 CAPSULE, DELAYED RELEASE ORAL at 08:50

## 2020-01-01 RX ADMIN — CARVEDILOL 3.12 MG: 3.12 TABLET, FILM COATED ORAL at 17:24

## 2020-01-01 RX ADMIN — IOPAMIDOL 80 ML: 755 INJECTION, SOLUTION INTRAVENOUS at 18:39

## 2020-01-01 RX ADMIN — OXYCODONE HYDROCHLORIDE AND ACETAMINOPHEN 1 TABLET: 5; 325 TABLET ORAL at 08:31

## 2020-01-01 RX ADMIN — PANTOPRAZOLE SODIUM 40 MG: 40 TABLET, DELAYED RELEASE ORAL at 08:58

## 2020-01-01 RX ADMIN — Medication 1 AMPULE: at 21:09

## 2020-01-01 RX ADMIN — AMIODARONE HYDROCHLORIDE 400 MG: 200 TABLET ORAL at 21:08

## 2020-01-01 RX ADMIN — Medication 10 ML: at 22:22

## 2020-01-01 RX ADMIN — DOCUSATE SODIUM 50MG AND SENNOSIDES 8.6MG 1 TABLET: 8.6; 5 TABLET, FILM COATED ORAL at 09:51

## 2020-01-01 RX ADMIN — BUDESONIDE 250 MCG: 0.25 INHALANT RESPIRATORY (INHALATION) at 07:37

## 2020-01-01 RX ADMIN — PANTOPRAZOLE SODIUM 40 MG: 40 TABLET, DELAYED RELEASE ORAL at 09:00

## 2020-01-01 RX ADMIN — BUDESONIDE 250 MCG: 0.25 INHALANT RESPIRATORY (INHALATION) at 09:30

## 2020-01-01 RX ADMIN — Medication 10 ML: at 05:40

## 2020-01-01 RX ADMIN — POLYETHYLENE GLYCOL 3350 17 G: 17 POWDER, FOR SOLUTION ORAL at 08:12

## 2020-01-01 RX ADMIN — Medication 10 ML: at 13:15

## 2020-01-01 RX ADMIN — OXYCODONE HYDROCHLORIDE AND ACETAMINOPHEN 2 TABLET: 5; 325 TABLET ORAL at 09:51

## 2020-01-01 RX ADMIN — PROPOFOL 50 MCG/KG/MIN: 10 INJECTION, EMULSION INTRAVENOUS at 08:00

## 2020-01-01 RX ADMIN — CARVEDILOL 6.25 MG: 6.25 TABLET, FILM COATED ORAL at 08:32

## 2020-01-01 RX ADMIN — SODIUM CHLORIDE 125 ML/HR: 900 INJECTION, SOLUTION INTRAVENOUS at 14:32

## 2020-01-01 RX ADMIN — BUDESONIDE 250 MCG: 0.25 INHALANT RESPIRATORY (INHALATION) at 19:26

## 2020-01-01 RX ADMIN — PANTOPRAZOLE SODIUM 40 MG: 40 TABLET, DELAYED RELEASE ORAL at 08:29

## 2020-01-01 RX ADMIN — ROCURONIUM BROMIDE 10 MG: 10 INJECTION INTRAVENOUS at 07:50

## 2020-01-01 RX ADMIN — APIXABAN 5 MG: 5 TABLET, FILM COATED ORAL at 18:18

## 2020-01-01 RX ADMIN — SUCCINYLCHOLINE CHLORIDE 140 MG: 20 INJECTION, SOLUTION INTRAMUSCULAR; INTRAVENOUS at 07:41

## 2020-01-01 RX ADMIN — POLYETHYLENE GLYCOL 3350 17 G: 17 POWDER, FOR SOLUTION ORAL at 08:31

## 2020-01-01 RX ADMIN — OXYCODONE HYDROCHLORIDE AND ACETAMINOPHEN 1 TABLET: 5; 325 TABLET ORAL at 08:50

## 2020-01-01 RX ADMIN — Medication 10 ML: at 06:14

## 2020-01-01 RX ADMIN — CEFAZOLIN SODIUM 2 G: 1 INJECTION, POWDER, FOR SOLUTION INTRAMUSCULAR; INTRAVENOUS at 16:40

## 2020-01-01 RX ADMIN — GABAPENTIN 100 MG: 100 CAPSULE ORAL at 22:22

## 2020-01-01 RX ADMIN — AMIODARONE HYDROCHLORIDE 400 MG: 200 TABLET ORAL at 09:27

## 2020-01-01 RX ADMIN — DULOXETINE 20 MG: 20 CAPSULE, DELAYED RELEASE ORAL at 09:27

## 2020-01-01 RX ADMIN — PANTOPRAZOLE SODIUM 40 MG: 40 TABLET, DELAYED RELEASE ORAL at 09:27

## 2020-01-13 NOTE — TELEPHONE ENCOUNTER
PCP: Dennys Joshi NP    Last appt: 2/14/2018  Future Appointments   Date Time Provider Isabel Andrea   1/14/2020  2:00 PM Dayna Azar NP [facility]       Requested Prescriptions     Pending Prescriptions Disp Refills    fluticasone propionate (FLOVENT HFA) 220 mcg/actuation inhaler 1 Inhaler 6     Sig: INHALE 2 PUFFS BY MOUTH TWICE DAILY       Prior labs and Blood pressures:  BP Readings from Last 3 Encounters:   11/22/18 99/55   02/14/18 133/77   09/13/17 130/75     Lab Results   Component Value Date/Time    Sodium 139 11/22/2018 11:28 AM    Potassium 3.7 11/22/2018 11:28 AM    Chloride 104 11/22/2018 11:28 AM    CO2 26 11/22/2018 11:28 AM    Anion gap 9 11/22/2018 11:28 AM    Glucose 131 (H) 11/22/2018 11:28 AM    BUN 40 (H) 11/22/2018 11:28 AM    Creatinine 1.63 (H) 11/22/2018 11:28 AM    BUN/Creatinine ratio 25 (H) 11/22/2018 11:28 AM    GFR est AA 37 (L) 11/22/2018 11:28 AM    GFR est non-AA 31 (L) 11/22/2018 11:28 AM    Calcium 8.8 11/22/2018 11:28 AM     Lab Results   Component Value Date/Time    Hemoglobin A1c 6.2 06/19/2017 09:29 AM    Hemoglobin A1c (POC) 5.7 09/13/2017 11:20 AM     No results found for: CHOL, CHOLPOCT, CHOLX, CHLST, CHOLV, HDL, HDLPOC, HDLP, LDL, LDLCPOC, LDLC, DLDLP, VLDLC, VLDL, TGLX, TRIGL, TRIGP, TGLPOCT, CHHD, CHHDX  No results found for: VITD3, XQVID2, XQVID3, XQVID, VD3RIA    No results found for: TSH, TSH2, TSH3, TSHP, TSHEXT

## 2020-01-14 ENCOUNTER — OFFICE VISIT (OUTPATIENT)
Dept: INTERNAL MEDICINE CLINIC | Age: 75
End: 2020-01-14

## 2020-01-14 VITALS
WEIGHT: 210.3 LBS | RESPIRATION RATE: 18 BRPM | DIASTOLIC BLOOD PRESSURE: 64 MMHG | OXYGEN SATURATION: 95 % | SYSTOLIC BLOOD PRESSURE: 128 MMHG | TEMPERATURE: 98.4 F | BODY MASS INDEX: 35.04 KG/M2 | HEART RATE: 73 BPM | HEIGHT: 65 IN

## 2020-01-14 DIAGNOSIS — E55.9 VITAMIN D DEFICIENCY: ICD-10-CM

## 2020-01-14 DIAGNOSIS — R73.02 IGT (IMPAIRED GLUCOSE TOLERANCE): ICD-10-CM

## 2020-01-14 DIAGNOSIS — E78.2 MIXED HYPERLIPIDEMIA: ICD-10-CM

## 2020-01-14 DIAGNOSIS — I50.9 CONGESTIVE HEART FAILURE, UNSPECIFIED HF CHRONICITY, UNSPECIFIED HEART FAILURE TYPE (HCC): ICD-10-CM

## 2020-01-14 DIAGNOSIS — E66.01 SEVERE OBESITY (HCC): ICD-10-CM

## 2020-01-14 DIAGNOSIS — Z00.00 WELCOME TO MEDICARE PREVENTIVE VISIT: Primary | ICD-10-CM

## 2020-01-14 DIAGNOSIS — I10 ESSENTIAL HYPERTENSION: ICD-10-CM

## 2020-01-14 DIAGNOSIS — C50.912 MALIGNANT NEOPLASM OF LEFT FEMALE BREAST, UNSPECIFIED ESTROGEN RECEPTOR STATUS, UNSPECIFIED SITE OF BREAST (HCC): ICD-10-CM

## 2020-01-14 DIAGNOSIS — R53.83 FATIGUE, UNSPECIFIED TYPE: ICD-10-CM

## 2020-01-14 RX ORDER — FLUTICASONE PROPIONATE 220 UG/1
AEROSOL, METERED RESPIRATORY (INHALATION)
Qty: 1 INHALER | Refills: 6 | Status: ON HOLD | OUTPATIENT
Start: 2020-01-14 | End: 2020-01-01

## 2020-01-14 NOTE — PROGRESS NOTES
This is a \"Welcome to [facility]"  Initial Preventive Physical Examination (IPPE) providing Personalized Prevention Plan Services (Performed in the first 12 months of enrollment) I have reviewed the patient's medical history in detail and updated the computerized patient record. History Past Medical History:  
Diagnosis Date  Arthralgia 8/17/2017  Arthritis  Asthma Mild to Moderate  Breast cancer ([address] 8/17/2017 Onset 1997; Refusing Mammogram 6/16/17  Cancer ([address] 1997 Breast left  Cardiomyopathy ([address] 8/17/2017 Onset:1999 Ischemic; 25% - 50% (last EFx 45%) Continue with ACE/Lasix/coreg  Cataract 8/17/2017 Bilateral   
 Cellulitis 8/17/2017 Suspect local reaction to Pneumovax, treat with ice, NSAIDs or Tylenol  Chest pain at rest 8/17/2017  CHF (congestive heart failure) ([address] 8/17/2017 Stable, though weight up a little. To take 1 1/2 Lasix daily if swelling, 1 daily o/w  Chronic maxillary sinusitis 8/17/2017  Chronic pain Right knee  COPD (chronic obstructive pulmonary disease) ([address] 8/17/2017 Continue with inhalers  Diabetes ([address] Pre-diabetic  DJD (degenerative joint disease) 8/17/2017  Elevated BUN 8/17/2017  Esophagitis, reflux 8/17/2017  Fatigue 8/17/2017  GERD (gastroesophageal reflux disease)  Heart failure ([address] Cardiomyopathy, HX of MI 1999  Hyperglycemia 8/17/2017  Hyperkalemia 8/17/2017  Hyperlipidemia 8/17/2017  Hypertension 8/17/2017  Ill-defined condition Vertigo  Ill-defined condition 2003 HX of Urosepsis complicated by respiratory failure and pneumonnia  Myalgia 8/17/2017  Thromboembolus ([address] 1969  
 during 2nd pregnancy [facility] 8/17/2017  Vertigo, aural 8/17/2017  Vitamin D deficiency 8/17/2017 Past Surgical History:  
Procedure Laterality Date [address] [facility] JONNY  
 HX GYN Left Breast Mastectomy  HX HEENT  2015 Bilateral Cataract  HX ORTHOPAEDIC Right 06/2018  
 knee replacement  HX ORTHOPAEDIC Left 11/2018  
 wrist surgery  HX VASCULAR ACCESS Port a cath on the right Current Outpatient Medications Medication Sig Dispense Refill  fluticasone propionate (FLOVENT HFA) 220 mcg/actuation inhaler INHALE 2 PUFFS BY MOUTH TWICE DAILY 1 Inhaler 6  
 meclizine (ANTIVERT) 25 mg tablet TAKE 1 TABLET BY MOUTH EVERY 6 HOURS AS NEEDED FOR DIZZINESS 90 Tab 2  
 omeprazole (PRILOSEC) 20 mg capsule TAKE 1 CAPSULE BY MOUTH ONCE DAILY 90 Cap 3  
 fenofibrate nanocrystallized (TRICOR) 145 mg tablet TAKE 1 TABLET BY MOUTH ONCE DAILY 90 Tab 3  
 sacubitril-valsartan (ENTRESTO) 49 mg/51 mg tablet Take 2 Tabs by mouth two (2) times a day.  carvedilol (COREG CR) 40 mg CR capsule Take 1 Cap by mouth daily (with breakfast). 90 Cap 3  furosemide (LASIX) 40 mg tablet TAKE ONE AND ONE-HALF TABLETS BY MOUTH ONCE DAILY IN THE MORNING 45 Tab 10  
 cholecalciferol (VITAMIN D3) 1,000 unit cap Take  by mouth daily.  acetaminophen (TYLENOL ARTHRITIS PAIN) 650 mg CR tablet Take 650 mg by mouth every six (6) hours as needed for Pain.  OXYGEN-AIR DELIVERY SYSTEMS 2 L/min by Nasal route continuous.  aspirin delayed-release 81 mg tablet Take 1 Tab by mouth daily. May resume in 30 days after completing twice daily Aspirin.  PSEUDOEPHEDRINE HCL (SINUS DECONGESTANT PO) Take 1 Tab by mouth.  albuterol (PROVENTIL, VENTOLIN) 90 mcg/Actuation inhaler Take 1-2 Puffs by inhalation every four (4) hours as needed for Wheezing. 17 g 0  
 ergocalciferol (ERGOCALCIFEROL) 50,000 unit capsule Take 50,000 Units by mouth.  furosemide (LASIX) 20 mg tablet Take 1.5 Tabs by mouth daily. May resume medication when b/p greater than 120/80.  lisinopril (PRINIVIL, ZESTRIL) 10 mg tablet Take 1 Tab by mouth two (2) times a day. May resume medication when b/p greater than 120/80.  HYDROcodone-acetaminophen (NORCO) 5-325 mg per tablet Take 1-2 Tabs by mouth every four (4) hours as needed. Max Daily Amount: 12 Tabs. 80 Tab 0  
 senna-docusate (SENNA PLUS) 8.6-50 mg per tablet Take 1 Tab by mouth two (2) times a day. 60 Tab 0  
 mupirocin calcium (BACTROBAN) 2 % nasal ointment by Both Nostrils route two (2) times a day. Plain Staph Aureus nasal colonization  Patient to use twice today  and prior to arrival  will continue inpatient. Indications: Use bid x 5 days Allergies Allergen Reactions  Morphine Anaphylaxis Family History Problem Relation Age of Onset  Cancer Mother  Other Father Social History Tobacco Use  Smoking status: Former Smoker Packs/day: 2.00 Years: 25.00 Pack years: 50.00 Last attempt to quit:  Years since quittin.0  Smokeless tobacco: Never Used Substance Use Topics  Alcohol use: No  
 
 
Depression Risk Screen 3 most recent PHQ Screens 2020 Little interest or pleasure in doing things Not at all Feeling down, depressed, irritable, or hopeless Not at all Total Score PHQ 2 0 Alcohol Risk Factor Screening: Do you average 1 drink per night or more than 7 drinks a week:  No 
 
On any one occasion in the past three months have you have had more than 3 drinks containing alcohol:  No 
 
 
Functional Ability and Level of Safety Diet: No special diet Hearing: Hearing is good. Vision Screening: 
Vision is good. No exam data present Activities of Daily Living: The home contains: grab bars Patient does total self care Ambulation: with no difficulty Exercise level: sedentary Fall Risk Screen: 
Fall Risk Assessment, last 12 mths 2020 Able to walk? Yes Fall in past 12 months? No  
 
 
Abuse Screen: 
Patient is not abused Screening EKG EKG order placed: No 
 
Patient Care Team  
Patient Care Team: Kelsey Garcia NP as PCP - General (Nurse Practitioner) Kelsey Garcia NP as PCP - [facility] Empaneled Provider End of Life Planning Advanced care planning directives were discussed with the patient and /or family/caregiver. Assessment/Plan Education and counseling provided: 
Are appropriate based on today's review and evaluation Diagnoses and all orders for this visit: 1. Severe obesity ([address] Health Maintenance Due Topic Date Due  
 DTaP/Tdap/Td series (1 - Tdap) 06/15/1955  Shingrix Vaccine Age 50> (1 of 2) 06/15/1994  GLAUCOMA SCREENING Q2Y  06/15/2009  Bone Densitometry (Dexa) Screening  06/15/2009  Pneumococcal 65+ years (1 of 1 - PPSV23) 06/15/2009  MEDICARE YEARLY EXAM  03/14/2018  Influenza Age 5 to Adult  08/01/2019

## 2020-01-14 NOTE — PROGRESS NOTES
Selvin He is a 75 y.o. female Chief Complaint Patient presents with Young Valerio Annual Wellness Visit  
  medicare wellness Visit Vitals /64 (BP 1 Location: Left arm, BP Patient Position: Sitting) Pulse 73 Temp 98.4 °F (36.9 °C) (Oral) Resp 18 Ht 5' 5\" (1.651 m) Wt 210 lb 4.8 oz (95.4 kg) SpO2 95% BMI 35.00 kg/m² Health Maintenance Due Topic Date Due  
 DTaP/Tdap/Td series (1 - Tdap) 06/15/1955  Shingrix Vaccine Age 50> (1 of 2) 06/15/1994  GLAUCOMA SCREENING Q2Y  06/15/2009  Bone Densitometry (Dexa) Screening  06/15/2009  Pneumococcal 65+ years (1 of 1 - PPSV23) 06/15/2009  MEDICARE YEARLY EXAM  03/14/2018  Influenza Age 5 to Adult  08/01/2019 1. Have you been to the ER, urgent care clinic since your last visit? Hospitalized since your last visit? No 
 
2. Have you seen or consulted any other health care providers outside of the [address] [place] [address] since your last visit? Include any pap smears or colon screening.  No

## 2020-01-14 NOTE — PATIENT INSTRUCTIONS
Medicare Wellness Visit, Female The best way to live healthy is to have a lifestyle where you eat a well-balanced diet, exercise regularly, limit alcohol use, and quit all forms of tobacco/nicotine, if applicable. Regular preventive services are another way to keep healthy. Preventive services (vaccines, screening tests, monitoring & exams) can help personalize your care plan, which helps you manage your own care. Screening tests can find health problems at the earliest stages, when they are easiest to treat. [facility] follows the current, evidence-based guidelines published by the [facility] ([facility]) when recommending preventive services for our patients. Because we follow these guidelines, sometimes recommendations change over time as research supports it. (For example, mammograms used to be recommended annually. Even though Medicare will still pay for an annual mammogram, the newer guidelines recommend a mammogram every two years for women of average risk). Of course, you and your doctor may decide to screen more often for some diseases, based on your risk and your co-morbidities (chronic disease you are already diagnosed with). Preventive services for you include: - Medicare offers their members a free annual wellness visit, which is time for you and your primary care provider to discuss and plan for your preventive service needs. Take advantage of this benefit every year! 
-All adults over the age of 72 should receive the recommended pneumonia vaccines. Current USPSTF guidelines recommend a series of two vaccines for the best pneumonia protection.  
-All adults should have a flu vaccine yearly and a tetanus vaccine every 10 years.  
-All adults age 48 and older should receive the shingles vaccines (series of two vaccines). -All adults age 38-68 who are overweight should have a diabetes screening test once every three years. -All adults born between [birth year] and 1965 should be screened once for Hepatitis C. 
-Other screening tests and preventive services for persons with diabetes include: an eye exam to screen for diabetic retinopathy, a kidney function test, a foot exam, and stricter control over your cholesterol.  
-Cardiovascular screening for adults with routine risk involves an electrocardiogram (ECG) at intervals determined by your doctor.  
-Colorectal cancer screenings should be done for adults age 54-65 with no increased risk factors for colorectal cancer. There are a number of acceptable methods of screening for this type of cancer. Each test has its own benefits and drawbacks. Discuss with your doctor what is most appropriate for you during your annual wellness visit. The different tests include: colonoscopy (considered the best screening method), a fecal occult blood test, a fecal DNA test, and sigmoidoscopy. 
 
-A bone mass density test is recommended when a woman turns 65 to screen for osteoporosis. This test is only recommended one time, as a screening. Some providers will use this same test as a disease monitoring tool if you already have osteoporosis. -Breast cancer screenings are recommended every other year for women of normal risk, age 54-69. 
-Cervical cancer screenings for women over age 72 are only recommended with certain risk factors. Here is a list of your current Health Maintenance items (your personalized list of preventive services) with a due date: 
Health Maintenance Due Topic Date Due  
 DTaP/Tdap/Td  (1 - Tdap) 06/15/1955  Shingles Vaccine (1 of 2) 06/15/1994  Glaucoma Screening   06/15/2009  Bone Mineral Density   06/15/2009  Pneumococcal Vaccine (1 of 1 - PPSV23) 06/15/2009 [employer] Annual Well Visit  03/14/2018  Flu Vaccine  08/01/2019

## 2020-01-15 NOTE — PROGRESS NOTES
Subjective:  Ms. Miguel Joseph is a pleasant 75year old lady who comes in today after almost a two year absence. She is here for an updated history and physical and Medicare wellness. She has no complaints. Past Medical History:  
Diagnosis Date  Arthralgia 8/17/2017  Arthritis  Asthma Mild to Moderate  Breast cancer ([address] 8/17/2017 Onset 1997; Refusing Mammogram 6/16/17  Cancer ([address] 1997 Breast left  Cardiomyopathy ([address] 8/17/2017 Onset:1999 Ischemic; 25% - 50% (last EFx 45%) Continue with ACE/Lasix/coreg  Cataract 8/17/2017 Bilateral   
 Cellulitis 8/17/2017 Suspect local reaction to Pneumovax, treat with ice, NSAIDs or Tylenol  Chest pain at rest 8/17/2017  CHF (congestive heart failure) ([address] 8/17/2017 Stable, though weight up a little. To take 1 1/2 Lasix daily if swelling, 1 daily o/w  Chronic maxillary sinusitis 8/17/2017  Chronic pain Right knee  COPD (chronic obstructive pulmonary disease) ([address] 8/17/2017 Continue with inhalers  Diabetes ([address] Pre-diabetic  DJD (degenerative joint disease) 8/17/2017  Elevated BUN 8/17/2017  Esophagitis, reflux 8/17/2017  Fatigue 8/17/2017  GERD (gastroesophageal reflux disease)  Heart failure ([address] Cardiomyopathy, HX of MI 1999  Hyperglycemia 8/17/2017  Hyperkalemia 8/17/2017  Hyperlipidemia 8/17/2017  Hypertension 8/17/2017  Ill-defined condition Vertigo  Ill-defined condition 2003 HX of Urosepsis complicated by respiratory failure and pneumonnia  Myalgia 8/17/2017  Thromboembolus ([address] 1969  
 during 2nd pregnancy [facility] 8/17/2017  Vertigo, aural 8/17/2017  Vitamin D deficiency 8/17/2017 Past Surgical History:  
Procedure Laterality Date [facility] JONNY  
 HX GYN Left Breast Mastectomy  HX HEENT  2015 Bilateral Cataract  HX ORTHOPAEDIC Right 06/2018  
 knee replacement  HX ORTHOPAEDIC Left 11/2018 wrist surgery  HX VASCULAR ACCESS Port a cath on the right Current Outpatient Medications on File Prior to Visit Medication Sig Dispense Refill  fluticasone propionate (FLOVENT HFA) 220 mcg/actuation inhaler INHALE 2 PUFFS BY MOUTH TWICE DAILY 1 Inhaler 6  
 meclizine (ANTIVERT) 25 mg tablet TAKE 1 TABLET BY MOUTH EVERY 6 HOURS AS NEEDED FOR DIZZINESS 90 Tab 2  
 omeprazole (PRILOSEC) 20 mg capsule TAKE 1 CAPSULE BY MOUTH ONCE DAILY 90 Cap 3  
 fenofibrate nanocrystallized (TRICOR) 145 mg tablet TAKE 1 TABLET BY MOUTH ONCE DAILY 90 Tab 3  
 sacubitril-valsartan (ENTRESTO) 49 mg/51 mg tablet Take 2 Tabs by mouth two (2) times a day.  carvedilol (COREG CR) 40 mg CR capsule Take 1 Cap by mouth daily (with breakfast). 90 Cap 3  furosemide (LASIX) 40 mg tablet TAKE ONE AND ONE-HALF TABLETS BY MOUTH ONCE DAILY IN THE MORNING 45 Tab 10  
 cholecalciferol (VITAMIN D3) 1,000 unit cap Take  by mouth daily.  acetaminophen (TYLENOL ARTHRITIS PAIN) 650 mg CR tablet Take 650 mg by mouth every six (6) hours as needed for Pain.  OXYGEN-AIR DELIVERY SYSTEMS 2 L/min by Nasal route continuous.  aspirin delayed-release 81 mg tablet Take 1 Tab by mouth daily. May resume in 30 days after completing twice daily Aspirin.  PSEUDOEPHEDRINE HCL (SINUS DECONGESTANT PO) Take 1 Tab by mouth.  albuterol (PROVENTIL, VENTOLIN) 90 mcg/Actuation inhaler Take 1-2 Puffs by inhalation every four (4) hours as needed for Wheezing. 17 g 0  
 mupirocin calcium (BACTROBAN) 2 % nasal ointment by Both Nostrils route two (2) times a day. Plain Staph Aureus nasal colonization  Patient to use twice today 6/21 and prior to arrival 6/22 will continue inpatient. Indications: Use bid x 5 days No current facility-administered medications on file prior to visit. Allergies Allergen Reactions  Morphine Anaphylaxis Past Medical History/Surgeries: 1. Right total knee arthroplasty. 2. Bilateral cataract extraction. 3. Left breast mastectomy in , followed by chemotherapy. There has been no recurrence. 4. Placement of a Port-A-Cath. Illnesses: 
1. CHF/nonischemic cardiomyopathy with prior EF of 25% by echo in 2018. She sees Dr. Derick Pelayo almost every two to three months. Unfortunately, I have not gotten any notes from Dr. Derick Pelayo in the last year. She tells me that Dr. Derick Pelayo is [facility]. She is now using oxygen at 2 liters full time at night. She has had her O2 sat as low as 79. With the oxygen she remains around 95%. 2. Hyperlipidemia. 3. No significant coronary artery disease by cath in , negative nuclear stress test in . 
4. Borderline diabetes. 5. Mild to moderate asthma. 6. Breast cancer, status post left lumpectomy as noted previously. Family History:  Father  at 61 from complications of the flu. Mother  at 68 of colon cancer. One brother  of CHF. One sister is living and well. Social History:  She is , lives alone. She is retired. She has three children living. One daughter has had thyroid cancer, but he is doing well. Allergies:  Morphine, anaphylactic reaction. Medications: 1. Flovent, two puffs twice daily. 2. Meclizine 25 mg every six hours as needed for vertigo. 3. Omeprazole 20 mg daily. 4. Fenofibrate 145 mg daily. 5. Entresto 49 mg/51 mg, three tablets by mouth twice daily. 6. Carvedilol 40 mg daily. 7. Furosemide 40 mg, although she tells me that she is unsure of the mg on the Furosemide. 8. Vitamin D 3,000 units daily. 9. Tylenol Arthritis prn. 10. Aspirin 81 mg daily. 11. Albuterol inhaler, one to two puffs every four hours as needed for wheezing. Habits:  Nonsmoker and a non drinker. Review of Systems: 
HEENT:  Does note occasional sinus headaches, but denies any dizziness or blurred vision. She is in need of getting her eyes examined. CVR:  Denies any chest pain, but does note occasional palpitations. She is chronically short of breath. As noted previously, she is now O2 dependent for the most part at night. She denies any cough, wheezing, hemoptysis, PND or orthopnea. Does note occasional ankle edema. GI:  Appetite is good, weight is stable. Does have a normal bowel pattern without presence of blood in stools or melena. :  Denies any urinary symptoms. Physical Examination: 
GENERAL:  Pleasant lady in no acute distress. She is alert and oriented. She answers my questions appropriately. VITALS:  BP: 128/64. Pulse:  73. Temperature:  98.4. O2 sat:  95 on room air. Weight:  210 lbs. HEENT:  Normocephalic, atraumatic. S/P bilateral cataract extractions. EOMI. TMs normal.  Mouth mucosa pink. Tongue midline. Pharynx normal. 
NECK:  Supple without adenopathy, thyromegaly or carotid bruits. CHEST:  Lungs clear to auscultation, no rales or wheezes. CV:  Heart regular rhythm without murmur or gallop. ABDOMEN:  Soft, non tender, no organomegaly or masses. No CVA tenderness. EXTREMITIES:  No edema or calf tenderness. Distal pulses were present. NEUROLOGIC:  Cranial nerves II-XII intact. Excellent strength in the upper and lower extremities against resistance. Sensation is preserved. She had excellent coordination. Romberg is negative. Impression: 1. Nonischemic cardiomyopathy. 2. CHF. 3. Hyperlipidemia. 4. No significant CAD by cath in 1997 with a negative nuclear stress in 2016. 
5. Mild to moderate asthma. 6. History of breast cancer. 7. History of vertigo. 8. GERD. Plan: 1. She was not fasting today so she will be returning at a later date to get some fasting lab studies. In the meantime she will continue with her current regimen and I will get all of her records from Dr. Elle Whitlock so we can update her health maintenance. 2. She declined a flu vaccine today.   We discussed the need for Shingrix vaccination. 3. She is also in need of getting a bone densitometry. 4. We discussed the importance of a prudent diet, mild eczema and weight loss to keep BMI within acceptable level.

## 2020-01-16 ENCOUNTER — LAB ONLY (OUTPATIENT)
Dept: INTERNAL MEDICINE CLINIC | Age: 75
End: 2020-01-16

## 2020-01-16 DIAGNOSIS — R53.83 FATIGUE, UNSPECIFIED TYPE: ICD-10-CM

## 2020-01-16 DIAGNOSIS — I10 ESSENTIAL HYPERTENSION: ICD-10-CM

## 2020-01-16 DIAGNOSIS — E78.2 MIXED HYPERLIPIDEMIA: ICD-10-CM

## 2020-01-16 DIAGNOSIS — R73.02 IGT (IMPAIRED GLUCOSE TOLERANCE): ICD-10-CM

## 2020-01-16 DIAGNOSIS — E55.9 VITAMIN D DEFICIENCY: ICD-10-CM

## 2020-01-17 LAB
25(OH)D3+25(OH)D2 SERPL-MCNC: 27.3 NG/ML (ref 30–100)
ALBUMIN SERPL-MCNC: 4.4 G/DL (ref 3.5–4.8)
ALBUMIN/GLOB SERPL: 1.9 {RATIO} (ref 1.2–2.2)
ALP SERPL-CCNC: 68 IU/L (ref 39–117)
ALT SERPL-CCNC: 10 IU/L (ref 0–32)
APPEARANCE UR: CLEAR
AST SERPL-CCNC: 17 IU/L (ref 0–40)
BASOPHILS # BLD AUTO: 0 X10E3/UL (ref 0–0.2)
BASOPHILS NFR BLD AUTO: 1 %
BILIRUB SERPL-MCNC: 0.3 MG/DL (ref 0–1.2)
BILIRUB UR QL STRIP: NEGATIVE
BUN SERPL-MCNC: 21 MG/DL (ref 8–27)
BUN/CREAT SERPL: 20 (ref 12–28)
CALCIUM SERPL-MCNC: 9.8 MG/DL (ref 8.7–10.3)
CHLORIDE SERPL-SCNC: 99 MMOL/L (ref 96–106)
CHOLEST SERPL-MCNC: 168 MG/DL (ref 100–199)
CO2 SERPL-SCNC: 32 MMOL/L (ref 20–29)
COLOR UR: YELLOW
CREAT SERPL-MCNC: 1.07 MG/DL (ref 0.57–1)
EOSINOPHIL # BLD AUTO: 0.2 X10E3/UL (ref 0–0.4)
EOSINOPHIL NFR BLD AUTO: 3 %
ERYTHROCYTE [DISTWIDTH] IN BLOOD BY AUTOMATED COUNT: 12.1 % (ref 11.7–15.4)
EST. AVERAGE GLUCOSE BLD GHB EST-MCNC: 114 MG/DL
GLOBULIN SER CALC-MCNC: 2.3 G/DL (ref 1.5–4.5)
GLUCOSE SERPL-MCNC: 100 MG/DL (ref 65–99)
GLUCOSE UR QL: NEGATIVE
HBA1C MFR BLD: 5.6 % (ref 4.8–5.6)
HCT VFR BLD AUTO: 44.7 % (ref 34–46.6)
HDLC SERPL-MCNC: 52 MG/DL
HGB BLD-MCNC: 14.5 G/DL (ref 11.1–15.9)
HGB UR QL STRIP: NEGATIVE
IMM GRANULOCYTES # BLD AUTO: 0 X10E3/UL (ref 0–0.1)
IMM GRANULOCYTES NFR BLD AUTO: 0 %
KETONES UR QL STRIP: NEGATIVE
LDLC SERPL CALC-MCNC: 95 MG/DL (ref 0–99)
LEUKOCYTE ESTERASE UR QL STRIP: NEGATIVE
LYMPHOCYTES # BLD AUTO: 1.5 X10E3/UL (ref 0.7–3.1)
LYMPHOCYTES NFR BLD AUTO: 29 %
MCH RBC QN AUTO: 29.8 PG (ref 26.6–33)
MCHC RBC AUTO-ENTMCNC: 32.4 G/DL (ref 31.5–35.7)
MCV RBC AUTO: 92 FL (ref 79–97)
MICRO URNS: NORMAL
MONOCYTES # BLD AUTO: 0.4 X10E3/UL (ref 0.1–0.9)
MONOCYTES NFR BLD AUTO: 8 %
NEUTROPHILS # BLD AUTO: 3 X10E3/UL (ref 1.4–7)
NEUTROPHILS NFR BLD AUTO: 59 %
NITRITE UR QL STRIP: NEGATIVE
PH UR STRIP: 6.5 [PH] (ref 5–7.5)
PLATELET # BLD AUTO: 274 X10E3/UL (ref 150–450)
POTASSIUM SERPL-SCNC: 4.5 MMOL/L (ref 3.5–5.2)
PROT SERPL-MCNC: 6.7 G/DL (ref 6–8.5)
PROT UR QL STRIP: NEGATIVE
RBC # BLD AUTO: 4.87 X10E6/UL (ref 3.77–5.28)
SODIUM SERPL-SCNC: 145 MMOL/L (ref 134–144)
SP GR UR: 1.01 (ref 1–1.03)
T4 FREE SERPL-MCNC: 1.32 NG/DL (ref 0.82–1.77)
TRIGL SERPL-MCNC: 103 MG/DL (ref 0–149)
TSH SERPL DL<=0.005 MIU/L-ACNC: 2.06 UIU/ML (ref 0.45–4.5)
UROBILINOGEN UR STRIP-MCNC: 0.2 MG/DL (ref 0.2–1)
VLDLC SERPL CALC-MCNC: 21 MG/DL (ref 5–40)
WBC # BLD AUTO: 5.2 X10E3/UL (ref 3.4–10.8)

## 2020-01-23 ENCOUNTER — HOSPITAL ENCOUNTER (OUTPATIENT)
Dept: GENERAL RADIOLOGY | Age: 75
Discharge: HOME | End: 2020-01-23
Payer: MEDICARE

## 2020-01-23 DIAGNOSIS — R06.02 SOB (SHORTNESS OF BREATH): ICD-10-CM

## 2020-01-23 PROCEDURE — 71046 X-RAY EXAM CHEST 2 VIEWS: CPT

## 2020-04-17 NOTE — ED TRIAGE NOTES
Pt arrives via EMS c/o sharp right flank pain that began 3 days ago. Hx of cholecystitis. Denies any n/v/d or urinary sx. Also c/o chronic SOB and cough d/t COPD. States she has felt feverish at home with intermittent chills, but has not taken her temperature.

## 2020-04-17 NOTE — ED PROVIDER NOTES
EMERGENCY DEPARTMENT HISTORY AND PHYSICAL EXAM 
 
 
Date: 4/17/2020 Patient Name: Nathan Montoya History of Presenting Illness Chief Complaint Patient presents with  Flank Pain  Shortness of Breath History Provided By: Patient Symptoms have been present for the past 2 to 3 days, progressively worsening. HPI: Nathan Montoya, 75 y.o. female with history of DVT, ischemic cardiomyopathy, hypertension, diabetes, COPD, prior history of breast cancer presents to the ED with cc of right flank pain and right-sided abdominal pain. Symptoms are constant but significantly worsened with positional changes. Pain is located to the right flank with radiation into the right thoracolumbar back as well as into the generalized right side abdomen. She denies pain under the right rib cage. She denies any chest pain or shortness of breath. Denies cough, fevers, nausea, vomiting, change in bowel habits. She has been tolerating normal p.o. intake and this does not exacerbate her pain. Denies any exposure to sick contacts or any recent illness. She does not recall any recent injury or trauma. Symptoms are unfamiliar to her. There are no other complaints, changes, or physical findings at this time. PCP: Justice River NP No current facility-administered medications on file prior to encounter. Current Outpatient Medications on File Prior to Encounter Medication Sig Dispense Refill  Anoro Ellipta 62.5-25 mcg/actuation inhaler 1 Puff daily.     
 fluticasone propionate (FLOVENT HFA) 220 mcg/actuation inhaler INHALE 2 PUFFS BY MOUTH TWICE DAILY 1 Inhaler 6  
 meclizine (ANTIVERT) 25 mg tablet TAKE 1 TABLET BY MOUTH EVERY 6 HOURS AS NEEDED FOR DIZZINESS 90 Tab 2  
 omeprazole (PRILOSEC) 20 mg capsule TAKE 1 CAPSULE BY MOUTH ONCE DAILY 90 Cap 3  
 fenofibrate nanocrystallized (TRICOR) 145 mg tablet TAKE 1 TABLET BY MOUTH ONCE DAILY 90 Tab 3  
  sacubitril-valsartan (ENTRESTO) 49 mg/51 mg tablet Take 2 Tabs by mouth two (2) times a day.  carvedilol (COREG CR) 40 mg CR capsule Take 1 Cap by mouth daily (with breakfast). 90 Cap 3  furosemide (LASIX) 40 mg tablet TAKE ONE AND ONE-HALF TABLETS BY MOUTH ONCE DAILY IN THE MORNING 45 Tab 10  
 cholecalciferol (VITAMIN D3) 1,000 unit cap Take  by mouth daily.  acetaminophen (TYLENOL ARTHRITIS PAIN) 650 mg CR tablet Take 650 mg by mouth every six (6) hours as needed for Pain.  OXYGEN-AIR DELIVERY SYSTEMS 2 L/min by Nasal route continuous.  aspirin delayed-release 81 mg tablet Take 1 Tab by mouth daily. May resume in 30 days after completing twice daily Aspirin.  PSEUDOEPHEDRINE HCL (SINUS DECONGESTANT PO) Take 1 Tab by mouth.  mupirocin calcium (BACTROBAN) 2 % nasal ointment by Both Nostrils route two (2) times a day. Plain Staph Aureus nasal colonization  Patient to use twice today 6/21 and prior to arrival 6/22 will continue inpatient. Indications: Use bid x 5 days  albuterol (PROVENTIL, VENTOLIN) 90 mcg/Actuation inhaler Take 1-2 Puffs by inhalation every four (4) hours as needed for Wheezing. 17 g 0 Past History Past Medical History: 
Past Medical History:  
Diagnosis Date  Arthralgia 8/17/2017  Arthritis  Asthma Mild to Moderate  Breast cancer ([address] 8/17/2017 Onset 1997; Refusing Mammogram 6/16/17  Cancer ([address] 1997 Breast left  Cardiomyopathy ([address] 8/17/2017 Onset:1999 Ischemic; 25% - 50% (last EFx 45%) Continue with ACE/Lasix/coreg  Cataract 8/17/2017 Bilateral   
 Cellulitis 8/17/2017 Suspect local reaction to Pneumovax, treat with ice, NSAIDs or Tylenol  Chest pain at rest 8/17/2017  CHF (congestive heart failure) ([address] 8/17/2017 Stable, though weight up a little. To take 1 1/2 Lasix daily if swelling, 1 daily o/w  Chronic maxillary sinusitis 8/17/2017  Chronic pain Right knee  COPD (chronic obstructive pulmonary disease) ([address] 2017 Continue with inhalers  Diabetes ([address] Pre-diabetic  DJD (degenerative joint disease) 2017  Elevated BUN 2017  Esophagitis, reflux 2017  Fatigue 2017  GERD (gastroesophageal reflux disease)  Heart failure ([address] Cardiomyopathy, HX of MI   Hyperglycemia 2017  Hyperkalemia 2017  Hyperlipidemia 2017  Hypertension 2017  Ill-defined condition Vertigo  Ill-defined condition  HX of Urosepsis complicated by respiratory failure and pneumonnia  Myalgia 2017  Thromboembolus ([address] 1969  
 during 2nd pregnancy Ryleigh.Caldron Edwyna Sheng 2017  Vertigo, aural 2017  Vitamin D deficiency 2017 Past Surgical History: 
Past Surgical History:  
Procedure Laterality Date [address] JONNY  
 HX GYN Left Breast Mastectomy  HX HEENT  2015 Bilateral Cataract  HX ORTHOPAEDIC Right 2018  
 knee replacement  HX ORTHOPAEDIC Left 2018  
 wrist surgery  HX VASCULAR ACCESS Port a cath on the right Family History: 
Family History Problem Relation Age of Onset  Cancer Mother  Other Father Social History: 
Social History Tobacco Use  Smoking status: Former Smoker Packs/day: 2.00 Years: 25.00 Pack years: 50.00 Last attempt to quit:  Years since quittin.3  Smokeless tobacco: Never Used Substance Use Topics  Alcohol use: No  
 Drug use: No  
 
 
Allergies: Allergies Allergen Reactions  Morphine Anaphylaxis Review of Systems Review of Systems Constitutional: Negative for chills and fever. HENT: Negative. Eyes: Negative for visual disturbance. Respiratory: Negative for cough and shortness of breath. Cardiovascular: Negative for chest pain and leg swelling. Gastrointestinal: Positive for abdominal pain.  Negative for constipation, diarrhea, nausea and vomiting. Genitourinary: Positive for flank pain. Negative for dysuria, frequency, hematuria and urgency. Musculoskeletal: Positive for back pain. Negative for gait problem. Skin: Negative for color change and rash. Neurological: Negative for dizziness, weakness, light-headedness and headaches. Hematological: Does not bruise/bleed easily. Physical Exam  
Physical Exam 
Vitals signs and nursing note reviewed. Constitutional:   
   General: She is not in acute distress. Appearance: Normal appearance. She is obese. She is not ill-appearing, toxic-appearing or diaphoretic. HENT:  
   Head: Normocephalic and atraumatic. Mouth/Throat:  
   Mouth: Mucous membranes are moist.  
Neck: Musculoskeletal: Normal range of motion and neck supple. Vascular: No JVD. Cardiovascular:  
   Rate and Rhythm: Normal rate and regular rhythm. Heart sounds: Normal heart sounds. No murmur. Pulmonary:  
   Effort: Pulmonary effort is normal. No respiratory distress. Breath sounds: Normal breath sounds. No wheezing. Abdominal:  
   Palpations: Abdomen is soft. Tenderness: There is no abdominal tenderness. There is no guarding or rebound. Comments: There is no reproducible TTP throughout the abdomen, no peritoneal findings. No guarding. Negative Nava sign. Negative McBurney's point. Musculoskeletal:  
   Comments: There is no right CVAT to percussion however she does have diffuse right flank, right thoracolumbar paraspinal TTP. Skin: 
   General: Skin is warm and dry. Findings: No erythema or rash. Neurological:  
   General: No focal deficit present. Mental Status: She is alert and oriented to person, place, and time. Diagnostic Study Results Labs - Labs Reviewed METABOLIC PANEL, COMPREHENSIVE - Abnormal; Notable for the following components:  
    Result Value Glucose 112 (*) BUN 29 (*) Creatinine 1.43 (*) GFR est AA 43 (*)   
 GFR est non-AA 36 (*) Globulin 4.2 (*) A-G Ratio 0.9 (*) All other components within normal limits URINALYSIS W/ REFLEX CULTURE - Abnormal; Notable for the following components:  
 Appearance CLOUDY (*) Protein 30 (*) Ketone TRACE (*) Epithelial cells MANY (*) Bacteria 1+ (*)   
 UA:UC IF INDICATED URINE CULTURE ORDERED (*) Hyaline cast >20 (*) All other components within normal limits NT-PRO BNP - Abnormal; Notable for the following components:  
 NT pro- (*) All other components within normal limits CULTURE, URINE  
SAMPLES BEING HELD  
CBC WITH AUTOMATED DIFF  
LIPASE  
TROPONIN I  
BILIRUBIN, CONFIRM Radiologic Studies -  
CTA CHEST W OR W WO CONT Final Result IMPRESSION: No acute process or evidence of pulmonary embolism. Tiny sclerotic  
foci are scattered throughout the vertebral bodies. ABDOMEN/PELVIC CT:  
  
TECHNIQUE:   
Following the uneventful intravenous administration of 100 cc Isovue-370, thin  
axial images were obtained through the abdomen and pelvis. Coronal and sagittal  
reconstructions were generated. Oral contrast was not administered. CT dose  
reduction was achieved through use of a standardized protocol tailored for this  
examination and automatic exposure control for dose modulation. FINDINGS:   
LIVER: No mass or biliary dilatation. GALLBLADDER: Unremarkable. SPLEEN: No mass. PANCREAS: No mass or ductal dilatation. ADRENALS: Unremarkable. KIDNEYS: Left renal cysts, the largest of which measures 2.8 cm. Scarring left  
kidney. STOMACH: Unremarkable. SMALL BOWEL: No dilatation or wall thickening. COLON: Sigmoid diverticulosis. APPENDIX: Unremarkable. PERITONEUM: No ascites or pneumoperitoneum. RETROPERITONEUM: No lymphadenopathy or aortic aneurysm. REPRODUCTIVE ORGANS: The uterus is surgically absent. URINARY BLADDER: No mass or calculus. BONES: Sclerotic lesions are noted in the iliac wings bilaterally, sacrum, and  
lumbar spine. ADDITIONAL COMMENTS: N/A IMPRESSION:  
No acute abnormality. Sclerotic foci are noted concerning for osseous metastatic  
disease. Correlation with bone scan is recommended. CT ABD PELV W CONT Final Result IMPRESSION: No acute process or evidence of pulmonary embolism. Tiny sclerotic  
foci are scattered throughout the vertebral bodies. ABDOMEN/PELVIC CT:  
  
TECHNIQUE:   
Following the uneventful intravenous administration of 100 cc Isovue-370, thin  
axial images were obtained through the abdomen and pelvis. Coronal and sagittal  
reconstructions were generated. Oral contrast was not administered. CT dose  
reduction was achieved through use of a standardized protocol tailored for this  
examination and automatic exposure control for dose modulation. FINDINGS:   
LIVER: No mass or biliary dilatation. GALLBLADDER: Unremarkable. SPLEEN: No mass. PANCREAS: No mass or ductal dilatation. ADRENALS: Unremarkable. KIDNEYS: Left renal cysts, the largest of which measures 2.8 cm. Scarring left  
kidney. STOMACH: Unremarkable. SMALL BOWEL: No dilatation or wall thickening. COLON: Sigmoid diverticulosis. APPENDIX: Unremarkable. PERITONEUM: No ascites or pneumoperitoneum. RETROPERITONEUM: No lymphadenopathy or aortic aneurysm. REPRODUCTIVE ORGANS: The uterus is surgically absent. URINARY BLADDER: No mass or calculus. BONES: Sclerotic lesions are noted in the iliac wings bilaterally, sacrum, and  
lumbar spine. ADDITIONAL COMMENTS: N/A IMPRESSION:  
No acute abnormality. Sclerotic foci are noted concerning for osseous metastatic  
disease. Correlation with bone scan is recommended. XR CHEST PORT Final Result IMPRESSION: No acute findings. CT Results  (Last 48 hours) 04/17/20 1839  CTA [facility] CONT Final result Impression:  IMPRESSION: No acute process or evidence of pulmonary embolism. Tiny sclerotic  
foci are scattered throughout the vertebral bodies. ABDOMEN/PELVIC CT:  
   
TECHNIQUE:   
Following the uneventful intravenous administration of 100 cc Isovue-370, thin  
axial images were obtained through the abdomen and pelvis. Coronal and sagittal  
reconstructions were generated. Oral contrast was not administered. CT dose  
reduction was achieved through use of a standardized protocol tailored for this  
examination and automatic exposure control for dose modulation. FINDINGS:   
LIVER: No mass or biliary dilatation. GALLBLADDER: Unremarkable. SPLEEN: No mass. PANCREAS: No mass or ductal dilatation. ADRENALS: Unremarkable. KIDNEYS: Left renal cysts, the largest of which measures 2.8 cm. Scarring left  
kidney. STOMACH: Unremarkable. SMALL BOWEL: No dilatation or wall thickening. COLON: Sigmoid diverticulosis. APPENDIX: Unremarkable. PERITONEUM: No ascites or pneumoperitoneum. RETROPERITONEUM: No lymphadenopathy or aortic aneurysm. REPRODUCTIVE ORGANS: The uterus is surgically absent. URINARY BLADDER: No mass or calculus. BONES: Sclerotic lesions are noted in the iliac wings bilaterally, sacrum, and  
lumbar spine. ADDITIONAL COMMENTS: N/A IMPRESSION:  
No acute abnormality. Sclerotic foci are noted concerning for osseous metastatic  
disease. Correlation with bone scan is recommended. Narrative:  EXAM:  CTA CHEST W OR W WO CONT, CT ABD PELV W CONT INDICATION:   RUQ pain, R thoracic back pain, SOB, h/o DVT  
   
COMPARISON: 8/26/2013. CHEST CTA:  
   
TECHNIQUE:   
Precontrast  images were obtained to localize the volume for acquisition. Multislice helical CT arteriography was performed from the diaphragm to the  
thoracic inlet during uneventful rapid bolus of 100 cc Isovue-370. Lung and soft tissue windows were generated. Coronal and sagittal images were generated and 3D post processing consisting of coronal maximum intensity images was performed. CT dose reduction was achieved through use of a standardized protocol tailored  
for this examination and automatic exposure control for dose modulation. FINDINGS:  
CHEST:  
THYROID: No nodule. MEDIASTINUM: No mass or lymphadenopathy. DOROTHY: No mass or lymphadenopathy. THORACIC AORTA: No dissection or aneurysm. MAIN PULMONARY ARTERY: There is no evidence of pulmonary embolism. TRACHEA/BRONCHI: Patent. ESOPHAGUS: No wall thickening or dilatation. HEART: Normal in size. PLEURA: No effusion or pneumothorax. LUNGS: No nodule, mass, or airspace disease. BONES: Degenerative changes are seen in the thoracic spine. Tiny sclerotic foci  
are scattered throughout the thoracic vertebral bodies. 04/17/20 1839  CT ABD PELV W CONT Final result Impression:  IMPRESSION: No acute process or evidence of pulmonary embolism. Tiny sclerotic  
foci are scattered throughout the vertebral bodies. ABDOMEN/PELVIC CT:  
   
TECHNIQUE:   
Following the uneventful intravenous administration of 100 cc Isovue-370, thin  
axial images were obtained through the abdomen and pelvis. Coronal and sagittal  
reconstructions were generated. Oral contrast was not administered. CT dose  
reduction was achieved through use of a standardized protocol tailored for this  
examination and automatic exposure control for dose modulation. FINDINGS:   
LIVER: No mass or biliary dilatation. GALLBLADDER: Unremarkable. SPLEEN: No mass. PANCREAS: No mass or ductal dilatation. ADRENALS: Unremarkable. KIDNEYS: Left renal cysts, the largest of which measures 2.8 cm. Scarring left  
kidney. STOMACH: Unremarkable. SMALL BOWEL: No dilatation or wall thickening. COLON: Sigmoid diverticulosis. APPENDIX: Unremarkable. PERITONEUM: No ascites or pneumoperitoneum. RETROPERITONEUM: No lymphadenopathy or aortic aneurysm. REPRODUCTIVE ORGANS: The uterus is surgically absent. URINARY BLADDER: No mass or calculus. BONES: Sclerotic lesions are noted in the iliac wings bilaterally, sacrum, and  
lumbar spine. ADDITIONAL COMMENTS: N/A IMPRESSION:  
No acute abnormality. Sclerotic foci are noted concerning for osseous metastatic  
disease. Correlation with bone scan is recommended. Narrative:  EXAM:  CTA CHEST W OR W WO CONT, CT ABD PELV W CONT INDICATION:   RUQ pain, R thoracic back pain, SOB, h/o DVT  
   
COMPARISON: 8/26/2013. CHEST CTA:  
   
TECHNIQUE:   
Precontrast  images were obtained to localize the volume for acquisition. Multislice helical CT arteriography was performed from the diaphragm to the  
thoracic inlet during uneventful rapid bolus of 100 cc Isovue-370. Lung and soft  
tissue windows were generated. Coronal and sagittal images were generated and 3D post processing consisting of coronal maximum intensity images was performed. CT dose reduction was achieved through use of a standardized protocol tailored  
for this examination and automatic exposure control for dose modulation. FINDINGS:  
CHEST:  
THYROID: No nodule. MEDIASTINUM: No mass or lymphadenopathy. DOROTHY: No mass or lymphadenopathy. THORACIC AORTA: No dissection or aneurysm. MAIN PULMONARY ARTERY: There is no evidence of pulmonary embolism. TRACHEA/BRONCHI: Patent. ESOPHAGUS: No wall thickening or dilatation. HEART: Normal in size. PLEURA: No effusion or pneumothorax. LUNGS: No nodule, mass, or airspace disease. BONES: Degenerative changes are seen in the thoracic spine. Tiny sclerotic foci  
are scattered throughout the thoracic vertebral bodies. CXR Results  (Last 48 hours) 04/17/20 1502  XR CHEST PORT Final result Impression:  IMPRESSION: No acute findings. Narrative:  EXAM: XR CHEST PORT INDICATION: SOB, flank pain COMPARISON: None. FINDINGS: A portable AP radiograph of the chest was obtained at 14:54 hours. The  
patient is on a cardiac monitor. No significant change in expected appearance of  
right-sided Port-A-Cath with tip in the region of the atriocaval junction. The  
lungs are clear. The cardiac and mediastinal contours and pulmonary vascularity  
are normal.  Atherosclerotic calcifications affect the aortic arch and the  
thoracic aorta is tortuous. The chest wall structures and visualized upper  
abdomen show no acute findings with incidental note of degenerative spine  
changes. Medical Decision Making I am the first provider for this patient. I reviewed the vital signs, available nursing notes, past medical history, past surgical history, family history and social history. Vital Signs-Reviewed the patient's vital signs. Visit Vitals /64 Pulse 83 Temp 98.7 °F (37.1 °C) Resp 25 Ht 5' 5\" (1.651 m) Wt 100.4 kg (221 lb 5.5 oz) SpO2 97% BMI 36.83 kg/m² Records Reviewed: Nursing Notes Provider Notes (Medical Decision Making):  
18-year-old female presenting with right flank pain, right back pain, right abdominal pain. On examination she is in no acute distress. She is afebrile vital signs stable through entire ED stay. There is no increased work of breathing or hypoxia. Laboratory evaluation does not demonstrate any significant leukocytosis. Chemistry within normal limits without elevation of lipase or severe electrolyte derangement. Troponin negative and proBNP within normal limits. CT angiogram of the chest was obtained which was negative for acute PE or airspace disease. Additionally CT abdomen and pelvis does not show any acute process. Urinalysis negative for infection.   CT imaging does demonstrate sclerotic lesions concerning for possible bony metastasis. She does have history of breast cancer and unclear if this is a new finding for her. She will require follow-up with her PCP for further surveillance of these lesions and patient agrees with this plan. Patient was treated in ED with Toradol and lidocaine patch and on reevaluation she is feeling significantly improved. Symptoms may be MSK etiology. Patient encouraged to continue using lidocaine patches, ibuprofen,  Tylenol at home and to follow-up closely with her PCP. She was given strict return ED precautions and agrees plan as above. All questions answered. ED Course:  
Initial assessment performed. The patients presenting problems have been discussed, and they are in agreement with the care plan formulated and outlined with them. I have encouraged them to ask questions as they arise throughout their visit. ED Course as of Apr 19 1352 Fri Apr 17, 2020  
1506 EKG per my interpretation normal sinus rhythm, rate 76 bpm, leftward axis, no acute ischemic changes, mild prolonged  ms. Laurent Clayton ED Course User Index Marie Nick, Rosaline Mcmahon MD  
 
Discharge Note: The patient has been re-evaluated and is ready for discharge. Reviewed available results with patient. Counseled patient on diagnosis and care plan. Patient has expressed understanding, and all questions have been answered. Patient agrees with plan and agrees to follow up as recommended, or to return to the ED if their symptoms worsen. Discharge instructions have been provided and explained to the patient, along with reasons to return to the ED. Disposition: 
Home DISCHARGE PLAN: 
1. Discharge Medication List as of 4/17/2020  8:57 PM  
  
START taking these medications Details  
ibuprofen (MOTRIN) 600 mg tablet Take 1 Tab by mouth every six (6) hours as needed for Pain., Print, Disp-20 Tab, R-0  
  
  
CONTINUE these medications which have NOT CHANGED Details Anoro Ellipta 62.5-25 mcg/actuation inhaler 1 Puff daily. , Historical Med, WILFREDO  
  
fluticasone propionate (FLOVENT HFA) 220 mcg/actuation inhaler INHALE 2 PUFFS BY MOUTH TWICE DAILY, Normal, Disp-1 Inhaler, R-6  
  
meclizine (ANTIVERT) 25 mg tablet TAKE 1 TABLET BY MOUTH EVERY 6 HOURS AS NEEDED FOR DIZZINESS, Normal, Disp-90 Tab, R-2Please consider 90 day supplies to promote better adherence  
  
omeprazole (PRILOSEC) 20 mg capsule TAKE 1 CAPSULE BY MOUTH ONCE DAILY, Normal, Disp-90 Cap, R-3  
  
fenofibrate nanocrystallized (TRICOR) 145 mg tablet TAKE 1 TABLET BY MOUTH ONCE DAILY, Normal, Disp-90 Tab, R-3  
  
sacubitril-valsartan (ENTRESTO) 49 mg/51 mg tablet Take 2 Tabs by mouth two (2) times a day., Historical Med  
  
carvedilol (COREG CR) 40 mg CR capsule Take 1 Cap by mouth daily (with breakfast). , Normal, Disp-90 Cap, R-3  
  
furosemide (LASIX) 40 mg tablet TAKE ONE AND ONE-HALF TABLETS BY MOUTH ONCE DAILY IN THE MORNING, NormalPlease consider 90 day supplies to promote better adherenceDisp-45 Tab, R-10  
  
cholecalciferol (VITAMIN D3) 1,000 unit cap Take  by mouth daily. , Historical Med  
  
acetaminophen (TYLENOL ARTHRITIS PAIN) 650 mg CR tablet Take 650 mg by mouth every six (6) hours as needed for Pain., Historical Med OXYGEN-AIR DELIVERY SYSTEMS 2 L/min by Nasal route continuous. , Historical Med  
  
aspirin delayed-release 81 mg tablet Take 1 Tab by mouth daily. May resume in 30 days after completing twice daily Aspirin., Historical Med PSEUDOEPHEDRINE HCL (SINUS DECONGESTANT PO) Take 1 Tab by mouth., Historical Med  
  
mupirocin calcium (BACTROBAN) 2 % nasal ointment by Both Nostrils route two (2) times a day. Plain Staph Aureus nasal colonization  Patient to use twice today 6/21 and prior to arrival 6/22 will continue inpatient.    Indications: Use bid x 5 days, Historical Med  
  
albuterol (PROVENTIL, VENTOLIN) 90 mcg/Actuation inhaler Take 1-2 Puffs by inhalation every four (4) hours as needed for Wheezing., Print, Disp-17 g, R-0  
  
  
 
2. Follow-up Information Follow up With Specialties Details Why Contact Info Anaya Regalado NP Nurse Practitioner Schedule an appointment as soon as possible for a visit  As needed, If symptoms worsen [address] [place] 
[phone] 3. Return to ED if worse Diagnosis Clinical Impression: 1. Acute right-sided thoracic back pain 2. Acute right flank pain Attestations: 
 
Luane Mcburney, MD 
 
Please note that this dictation was completed with [employer], the [employer] voice recognition software. Quite often unanticipated grammatical, syntax, homophones, and other interpretive errors are inadvertently transcribed by the computer software. Please disregard these errors. Please excuse any errors that have escaped final proofreading. Thank you.

## 2020-04-17 NOTE — PROGRESS NOTES
Nurse Clarification of the Prior to Admission Medication Regimen The patient was NOT interviewed regarding clarification of the prior to admission medication regimen because patient SOB. Spoke with pharmacist at pharmacy listed in patient chart. The individual(s) listed above were questioned regarding the patient's use of any other inhalers, topical products, over the counter medications, herbal medications, vitamin products or ophthalmic/nasal/otic medication use. Information Obtained From: Pharmacist 
 
Recommendations/Findings: The following amendments were made to the patient's active medication list on file at [facility]:  
 
1) Additions:  
? None 2) Removals:  
? None 3) Changes: 
? None PTA medication list was corrected to the following:  
 
Prior to Admission Medications Prescriptions Last Dose Informant Taking? Anoro Ellipta 62.5-25 mcg/actuation inhaler   Yes Si Puff daily. OXYGEN-AIR DELIVERY SYSTEMS Unknown at Unknown time  No  
Si L/min by Nasal route continuous. PSEUDOEPHEDRINE HCL (SINUS DECONGESTANT PO) Unknown at Unknown time  No  
Sig: Take 1 Tab by mouth. acetaminophen (TYLENOL ARTHRITIS PAIN) 650 mg CR tablet Unknown at Unknown time  No  
Sig: Take 650 mg by mouth every six (6) hours as needed for Pain. albuterol (PROVENTIL, VENTOLIN) 90 mcg/Actuation inhaler Unknown at Unknown time  No  
Sig: Take 1-2 Puffs by inhalation every four (4) hours as needed for Wheezing. aspirin delayed-release 81 mg tablet Unknown at Unknown time  No  
Sig: Take 1 Tab by mouth daily. May resume in 30 days after completing twice daily Aspirin. carvedilol (COREG CR) 40 mg CR capsule   Yes Sig: Take 1 Cap by mouth daily (with breakfast). cholecalciferol (VITAMIN D3) 1,000 unit cap Unknown at Unknown time  No  
Sig: Take  by mouth daily. fenofibrate nanocrystallized (TRICOR) 145 mg tablet   Yes Sig: TAKE 1 TABLET BY MOUTH ONCE DAILY fluticasone propionate (FLOVENT HFA) 220 mcg/actuation inhaler   Yes Sig: INHALE 2 PUFFS BY MOUTH TWICE DAILY  
furosemide (LASIX) 40 mg tablet   Yes Sig: TAKE ONE AND ONE-HALF TABLETS BY MOUTH ONCE DAILY IN THE MORNING  
meclizine (ANTIVERT) 25 mg tablet   Yes Sig: TAKE 1 TABLET BY MOUTH EVERY 6 HOURS AS NEEDED FOR DIZZINESS  
mupirocin calcium (BACTROBAN) 2 % nasal ointment Unknown at Unknown time  No  
Sig: by Both Nostrils route two (2) times a day. Plain Staph Aureus nasal colonization  Patient to use twice today 6/21 and prior to arrival 6/22 will continue inpatient. Indications: Use bid x 5 days  
omeprazole (PRILOSEC) 20 mg capsule   Yes Sig: TAKE 1 CAPSULE BY MOUTH ONCE DAILY  
sacubitril-valsartan (ENTRESTO) 49 mg/51 mg tablet   Yes Sig: Take 2 Tabs by mouth two (2) times a day. Facility-Administered Medications: None Thank you, Alta Lehman RN

## 2020-04-18 NOTE — DISCHARGE INSTRUCTIONS
You were evaluated in the emergency department for right sided flank pain. Your examination was reassuring as was your work-up including blood work, ekg, urinalysis, and CT scans. It will be important for you to follow-up with your primary care physician in 3-5 days. If you develop worsening symptoms such as worsening pain, chest pain, or shortness of breath, please return to the emergency department immediately. You can use Tylenol 1000 mg every 6 hours as needed for pain, please do not exceed 4000 mg in a single day. You can use ibuprofen 600 mg every 6 hours as needed for pain, please do not exceed 2400 milligrams in a single day. You can use lidocaine patches (over-the-counter) in the affected area every 12 hours as needed for pain. Please use heat pads and stretching to help alleviate the pain.

## 2020-04-18 NOTE — ED NOTES
Discharge summary and prescriptions reviewed with patient. Verbalized understanding. All questions welcomed and answered. Assisted off unit in wheelchair.

## 2020-04-18 NOTE — ED NOTES
Pt placed on 2L per pt request. Pt reports having O2 at home to use as needed. Dr. Suresh Ross notified.

## 2020-04-23 NOTE — TELEPHONE ENCOUNTER
PCP: Paz Lopez NP    Last appt: 1/14/2020  No future appointments.     Requested Prescriptions     Pending Prescriptions Disp Refills    meclizine (ANTIVERT) 25 mg tablet [Pharmacy Med Name: Meclizine HCl 25 MG Oral Tablet] 90 Tab 0     Sig: TAKE 1 TABLET BY MOUTH EVERY 6 HOURS AS NEEDED FOR DIZZINESS       Prior labs and Blood pressures:  BP Readings from Last 3 Encounters:   04/17/20 119/64   01/14/20 128/64   11/22/18 99/55     Lab Results   Component Value Date/Time    Sodium 139 04/17/2020 02:36 PM    Potassium 4.4 04/17/2020 02:36 PM    Chloride 103 04/17/2020 02:36 PM    CO2 29 04/17/2020 02:36 PM    Anion gap 7 04/17/2020 02:36 PM    Glucose 112 (H) 04/17/2020 02:36 PM    BUN 29 (H) 04/17/2020 02:36 PM    Creatinine 1.43 (H) 04/17/2020 02:36 PM    BUN/Creatinine ratio 20 04/17/2020 02:36 PM    GFR est AA 43 (L) 04/17/2020 02:36 PM    GFR est non-AA 36 (L) 04/17/2020 02:36 PM    Calcium 9.5 04/17/2020 02:36 PM     Lab Results   Component Value Date/Time    Hemoglobin A1c 5.6 01/16/2020 11:32 AM    Hemoglobin A1c (POC) 5.7 09/13/2017 11:20 AM     Lab Results   Component Value Date/Time    Cholesterol, total 168 01/16/2020 11:32 AM    HDL Cholesterol 52 01/16/2020 11:32 AM    LDL, calculated 95 01/16/2020 11:32 AM    VLDL, calculated 21 01/16/2020 11:32 AM    Triglyceride 103 01/16/2020 11:32 AM     Lab Results   Component Value Date/Time    VITAMIN D, 25-HYDROXY 27.3 (L) 01/16/2020 11:32 AM       Lab Results   Component Value Date/Time    TSH 2.060 01/16/2020 11:32 AM

## 2020-09-21 PROBLEM — S22.000A THORACIC COMPRESSION FRACTURE (HCC): Status: ACTIVE | Noted: 2020-01-01

## 2020-09-21 PROBLEM — C79.51 BONE METASTASES (HCC): Status: ACTIVE | Noted: 2020-01-01

## 2020-09-21 PROBLEM — Z85.3 HISTORY OF BREAST CANCER: Status: ACTIVE | Noted: 2020-01-01

## 2020-09-21 NOTE — PROGRESS NOTES
[facility] 
at [facility], [facility], [address] 
[phone] Brief Note Consult received from Dr. Nathan Sorenson @ 2:30. I confirmed with the covering ED doc for Dr. Nathan Sorenson Consult place for myself and mistakenly named [facility] on. I will see the patient today. Signed by: Reny Hua MD 
                   September 21, 2020

## 2020-09-21 NOTE — ED NOTES
TRANSFER - OUT REPORT: 
 
Verbal report given on Vaishnavi Coelho  being transferred to Ortho (unit) for routine progression of care Report consisted of patients Situation, Background, Assessment and  
Recommendations(SBAR). Information from the following report(s) ED Summary, MAR and Recent Results was reviewed with the receiving nurse. Lines:  
Peripheral IV 09/21/20 Right Antecubital (Active) Site Assessment Clean, dry, & intact 09/21/20 1108 Phlebitis Assessment 0 09/21/20 1108 Infiltration Assessment 0 09/21/20 1108 Dressing Status Clean, dry, & intact 09/21/20 1108 Dressing Type Tape;Transparent 09/21/20 1108 Hub Color/Line Status Pink;Patent; Flushed 09/21/20 1108 Action Taken Blood drawn 09/21/20 1108 Alcohol Cap Used Yes 09/21/20 1108 Opportunity for questions and clarification was provided. Patient transported with: 
 [employer]

## 2020-09-21 NOTE — H&P
Hospitalist Admission Note NAME: Zia Clinton :  1945 MRN:  [id number] Date/Time:  2020 3:20 PM 
 
Patient PCP: Devon Somers NP 
________________________________________________________________________ My assessment of this patient's clinical condition and my plan of care is as follows. Assessment / Plan: 
Acute thoracic compression fracture likely pathological given history of osseous metastasis -Start Percocet for pain control. 
-We will get recommendation from oncology about her history of cancer before considering kyphoplasty Osseous metastasis 
-Patient has a history of breast cancer about 18 years ago and underwent chemotherapy with mastectomy 
-She reports that she was told she had a bone cancer about 2 years after breast cancer was cured by a physician in [facility]. She reports that she had repeat imaging shortly after that and was told she had no cancer 
-On chart review here, patient had a CT chest and abdomen in 2020 which showed evidence of osseous metastasis and patient was advised outpatient follow-up but patient reports that she did not follow-up with any oncologist 
-We will consult oncology to get more information regarding her osseous metastasis 
-Consider CT chest and abdomen based on recommendations from oncology History of COPD not in exacerbation 
-Resume home inhalers Possible mild systolic congestive heart failure 
-Resume home Lasix, Coreg and Entresto 
-Consult pharmacy for medication reconciliation Hypertension GERD 
-Continue home Coreg 
-Continue home PPI Code Status: Full code Surrogate Decision Maker: Daughter DVT Prophylaxis: Lovenox Baseline: From home, independent of ADLs Subjective: CHIEF COMPLAINT: Generalized weakness and falls HISTORY OF PRESENT ILLNESS:    
Iris Riedel is a 67 y.o.   female who presents with a past medical history of COPD, congestive heart failure, dyslipidemia, gastroesophageal reflux disease coming the hospital chief complaints of falls and also generalized weakness. Patient reports being in her usual state of health until about a month ago when she started not feeling well. She reports weakness is generalized involving both her arms and legs. She also reports decreased exercise intolerance. She fell at least 3 times in the last 2 weeks. She fell yesterday and started having lower back pain which is about 5 x 10, increased with movement, radiating down to the legs and without any relieving factors. She does not report any dizziness or lightheadedness. She does not report any chest pain. Does not report any focal weakness, tingling or numbness. On arrival she was noted to have a blood pressure 145/68. On labs she was noted to have a normal CBC. BMP showed a creatinine of 1.31. LFTs are normal.  Troponin is 0.05.  proBNP is 264. Chest x-ray showed new thoracic compression fracture with diffuse osseous metastasis and clear lungs. X-ray hips were within normal limits. We were asked to admit for work up and evaluation of the above problems. Past Medical History:  
Diagnosis Date  Arthralgia 8/17/2017  Arthritis  Asthma Mild to Moderate  Breast cancer ([address] 8/17/2017 Onset 1997; Refusing Mammogram 6/16/17  Cancer ([address] 1997 Breast left  Cardiomyopathy ([address] 8/17/2017 Onset:1999 Ischemic; 25% - 50% (last EFx 45%) Continue with ACE/Lasix/coreg  Cataract 8/17/2017 Bilateral   
 Cellulitis 8/17/2017 Suspect local reaction to Pneumovax, treat with ice, NSAIDs or Tylenol  Chest pain at rest 8/17/2017  CHF (congestive heart failure) ([address] 8/17/2017 Stable, though weight up a little. To take 1 1/2 Lasix daily if swelling, 1 daily o/w  Chronic maxillary sinusitis 8/17/2017  Chronic pain Right knee  COPD (chronic obstructive pulmonary disease) ([facility] 2017 Continue with inhalers  Diabetes ([facility] Pre-diabetic  DJD (degenerative joint disease) 2017  Elevated BUN 2017  Esophagitis, reflux 2017  Fatigue 2017  GERD (gastroesophageal reflux disease)  Heart failure ([address] Cardiomyopathy, HX of MI   Hyperglycemia 2017  Hyperkalemia 2017  Hyperlipidemia 2017  Hypertension 2017  Ill-defined condition Vertigo  Ill-defined condition  HX of Urosepsis complicated by respiratory failure and pneumonnia  Myalgia 2017  Thromboembolus ([facility] 1969  
 during 2nd pregnancy [place] 2017  Vertigo, aural 2017  Vitamin D deficiency 2017 Past Surgical History:  
Procedure Laterality Date [facility] JONNY  
 HX GYN Left Breast Mastectomy  HX HEENT   Bilateral Cataract  HX ORTHOPAEDIC Right 2018  
 knee replacement  HX ORTHOPAEDIC Left 2018  
 wrist surgery  HX VASCULAR ACCESS Port a cath on the right Social History Tobacco Use  Smoking status: Former Smoker Packs/day: 2.00 Years: 25.00 Pack years: 50.00 Last attempt to quit:  Years since quittin.7  Smokeless tobacco: Never Used Substance Use Topics  Alcohol use: No  
  
 
Family History Problem Relation Age of Onset  Cancer Mother  Other Father Allergies Allergen Reactions  Morphine Anaphylaxis Prior to Admission medications Medication Sig Start Date End Date Taking?  Authorizing Provider  
omeprazole (PRILOSEC) 20 mg capsule Take 1 capsule by mouth once daily 20  Yes Elizabeth Azar NP  
fenofibrate nanocrystallized (TRICOR) 145 mg tablet Take 1 tablet by mouth once daily 20  Yes Svetlana Azar R, NP  
meclizine (ANTIVERT) 25 mg tablet TAKE 1 TABLET BY MOUTH EVERY 6 HOURS AS NEEDED FOR DIZZINESS 4/27/20  Yes Óscar Jeronimo NP Anoro Ellipta 62.5-25 mcg/actuation inhaler Take 1 Puff by inhalation daily. 3/23/20  Yes Faizan Henry MD  
sacubitril-valsartan (ENTRESTO) 49 mg/51 mg tablet Take 2 Tabs by mouth two (2) times a day. Yes Other, MD Faizan  
carvedilol (COREG CR) 40 mg CR capsule Take 1 Cap by mouth daily (with breakfast). 7/2/18  Yes Milli Azar, NP  
furosemide (LASIX) 40 mg tablet TAKE ONE AND ONE-HALF TABLETS BY MOUTH ONCE DAILY IN THE MORNING 2/1/18  Yes Luis Azar, GALLO  
cholecalciferol (VITAMIN D3) 1,000 unit cap Take 1,000 Units by mouth daily. Yes Provider, Historical  
acetaminophen (TYLENOL ARTHRITIS PAIN) 650 mg CR tablet Take 650 mg by mouth every six (6) hours as needed for Pain. Yes Provider, Historical  
aspirin delayed-release 81 mg tablet Take 1 Tab by mouth daily. May resume in 30 days after completing twice daily Aspirin. 6/23/17  Yes Kiko Mendez NP  
PSEUDOEPHEDRINE HCL (SINUS DECONGESTANT PO) Take 1 Tab by mouth. Yes Provider, Historical  
ibuprofen (MOTRIN) 600 mg tablet Take 1 Tab by mouth every six (6) hours as needed for Pain. 4/17/20   Ora Reilly MD  
fluticasone propionate (FLOVENT HFA) 220 mcg/actuation inhaler INHALE 2 PUFFS BY MOUTH TWICE DAILY 1/14/20   Milli Azar, NP  
mupirocin calcium (BACTROBAN) 2 % nasal ointment by Both Nostrils route two (2) times a day. Plain Staph Aureus nasal colonization  Patient to use twice today 6/21 and prior to arrival 6/22 will continue inpatient. Indications: Use bid x 5 days 6/21/17   Provider, Historical  
albuterol (PROVENTIL, VENTOLIN) 90 mcg/Actuation inhaler Take 1-2 Puffs by inhalation every four (4) hours as needed for Wheezing. 5/1/11   Janusz Calvo PA  
 
 
REVIEW OF SYSTEMS:    
I am not able to complete the review of systems because: The patient is intubated and sedated The patient has altered mental status due to his acute medical problems The patient has baseline aphasia from prior stroke(s) The patient has baseline dementia and is not reliable historian The patient is in acute medical distress and unable to provide information Total of 12 systems reviewed as follows:   
   POSITIVE= underlined text  Negative = text not underlined General:  fever, chills, sweats, generalized weakness, weight loss/gain,  
   loss of appetite Eyes:    blurred vision, eye pain, loss of vision, double vision ENT:    rhinorrhea, pharyngitis Respiratory:   cough, sputum production, SOB, TAVAREZ, wheezing, pleuritic pain  
Cardiology:   chest pain, palpitations, orthopnea, PND, edema, syncope Gastrointestinal:  abdominal pain , N/V, diarrhea, dysphagia, constipation, bleeding Genitourinary:  frequency, urgency, dysuria, hematuria, incontinence Muskuloskeletal :  arthralgia, myalgia, back pain Hematology:  easy bruising, nose or gum bleeding, lymphadenopathy Dermatological: rash, ulceration, pruritis, color change / jaundice Endocrine:   hot flashes or polydipsia Neurological:  headache, dizziness, confusion, focal weakness, paresthesia, Speech difficulties, memory loss, gait difficulty Psychological: Feelings of anxiety, depression, agitation Objective: VITALS:   
Visit Vitals BP (!) 125/55 Pulse 73 Temp 97.8 °F (36.6 °C) Resp 14 Ht 5' 5\" (1.651 m) Wt 86 kg (189 lb 9.5 oz) SpO2 96% BMI 31.55 kg/m² PHYSICAL EXAM: 
 
General:    Alert, cooperative, no distress, appears stated age. HEENT: Atraumatic, anicteric sclerae, pink conjunctivae No oral ulcers, mucosa moist 
Neck:  Supple, symmetrical,  thyroid: non tender Lungs:   Clear to auscultation bilaterally. No Wheezing or Rhonchi. No rales. Chest wall:  No tenderness  No Accessory muscle use. Heart:   Regular  rhythm,  No  murmur   No edema Abdomen:   Soft, non-tender. Not distended. Bowel sounds normal 
Extremities: No cyanosis. No clubbing,   
  Skin turgor normal, Capillary refill normal, Radial dial pulse 2+ Skin:     Not pale. Not Jaundiced  No rashes Psych:  Not anxious or agitated. Neurologic: EOMs intact. No facial asymmetry. No aphasia or slurred speech. Symmetrical strength, Sensation grossly intact. Alert and oriented X 4.  
 
_______________________________________________________________________ Care Plan discussed with: 
  Comments Patient y Family RN y   
Care Manager Consultant:     
_______________________________________________________________________ Expected  Disposition:  
Home with Family y HH/PT/OT/RN   
SNF/LTC   
ANATOLIY   
________________________________________________________________________ TOTAL TIME:  60 Minutes Critical Care Provided     Minutes non procedure based Comments  
 y Reviewed previous records  
>50% of visit spent in counseling and coordination of care y Discussion with patient and/or family and questions answered 
  
 
________________________________________________________________________ Signed: Michael Desir MD 
 
Procedures: see electronic medical records for all procedures/Xrays and details which were not copied into this note but were reviewed prior to creation of Plan. LAB DATA REVIEWED:   
Recent Results (from the past 24 hour(s)) EKG, 12 LEAD, INITIAL Collection Time: 09/21/20 10:57 AM  
Result Value Ref Range Ventricular Rate 81 BPM  
 Atrial Rate 81 BPM  
 P-R Interval 176 ms QRS Duration 88 ms Q-T Interval 400 ms QTC Calculation (Bezet) 464 ms Calculated P Axis 62 degrees Calculated R Axis -21 degrees Calculated T Axis -33 degrees Diagnosis Sinus rhythm with frequent premature ventricular complexes Minimal voltage criteria for LVH, may be normal variant Nonspecific ST and T wave abnormality When compared with ECG of 17-APR-2020 14:09, No significant change was found Confirmed by Santiago Quintanilla ([id number]) on 9/21/2020 1:24:13 PM 
  
CBC WITH AUTOMATED DIFF Collection Time: 09/21/20 11:05 AM  
Result Value Ref Range WBC 6.7 3.6 - 11.0 K/uL  
 RBC 4.70 3.80 - 5.20 M/uL  
 HGB 13.8 11.5 - 16.0 g/dL HCT 45.3 35.0 - 47.0 % MCV 96.4 80.0 - 99.0 FL  
 MCH 29.4 26.0 - 34.0 PG  
 MCHC 30.5 30.0 - 36.5 g/dL  
 RDW 12.9 11.5 - 14.5 % PLATELET 478 233 - 279 K/uL MPV 9.6 8.9 - 12.9 FL  
 NRBC 0.0 0  WBC ABSOLUTE NRBC 0.00 0.00 - 0.01 K/uL NEUTROPHILS 61 32 - 75 % LYMPHOCYTES 26 12 - 49 % MONOCYTES 8 5 - 13 % EOSINOPHILS 4 0 - 7 % BASOPHILS 1 0 - 1 % IMMATURE GRANULOCYTES 0 0.0 - 0.5 % ABS. NEUTROPHILS 4.1 1.8 - 8.0 K/UL  
 ABS. LYMPHOCYTES 1.7 0.8 - 3.5 K/UL  
 ABS. MONOCYTES 0.6 0.0 - 1.0 K/UL  
 ABS. EOSINOPHILS 0.3 0.0 - 0.4 K/UL  
 ABS. BASOPHILS 0.0 0.0 - 0.1 K/UL  
 ABS. IMM. GRANS. 0.0 0.00 - 0.04 K/UL  
 DF AUTOMATED METABOLIC PANEL, COMPREHENSIVE Collection Time: 09/21/20 11:05 AM  
Result Value Ref Range Sodium 141 136 - 145 mmol/L Potassium 3.8 3.5 - 5.1 mmol/L Chloride 103 97 - 108 mmol/L  
 CO2 34 (H) 21 - 32 mmol/L Anion gap 4 (L) 5 - 15 mmol/L Glucose 105 (H) 65 - 100 mg/dL BUN 23 (H) 6 - 20 MG/DL Creatinine 1.31 (H) 0.55 - 1.02 MG/DL  
 BUN/Creatinine ratio 18 12 - 20 GFR est AA 48 (L) >60 ml/min/1.73m2 GFR est non-AA 39 (L) >60 ml/min/1.73m2 Calcium 9.5 8.5 - 10.1 MG/DL Bilirubin, total 0.5 0.2 - 1.0 MG/DL  
 ALT (SGPT) 25 12 - 78 U/L  
 AST (SGOT) 31 15 - 37 U/L Alk. phosphatase 124 (H) 45 - 117 U/L Protein, total 7.8 6.4 - 8.2 g/dL Albumin 3.5 3.5 - 5.0 g/dL Globulin 4.3 (H) 2.0 - 4.0 g/dL A-G Ratio 0.8 (L) 1.1 - 2.2    
TROPONIN I Collection Time: 09/21/20 11:05 AM  
Result Value Ref Range Troponin-I, Qt. <0.05 <0.05 ng/mL CK W/ REFLX CKMB  Collection Time: 09/21/20 11:05 AM  
 Result Value Ref Range CK 90 26 - 192 U/L  
NT-PRO BNP Collection Time: 09/21/20 11:05 AM  
Result Value Ref Range NT pro- <450 PG/ML  
PTT Collection Time: 09/21/20 11:05 AM  
Result Value Ref Range aPTT 24.1 22.1 - 32.0 sec  
 aPTT, therapeutic range     58.0 - 77.0 SECS  
PROTHROMBIN TIME + INR Collection Time: 09/21/20 11:05 AM  
Result Value Ref Range INR 1.0 0.9 - 1.1 Prothrombin time 10.8 9.0 - 11.1 sec

## 2020-09-21 NOTE — PROGRESS NOTES
-Please complete MRI History and Safety Screening Form for this patient using [employer] only under Orders Requiring a Screening Form: 
 

## 2020-09-21 NOTE — PROGRESS NOTES
PARRISH:   SNF vs IPR (PT/OT to be ordered and evaluation completed) Med Assist to evaluate to determine if patient can submit Medicaid application Medicaid LTSS screening needed Advance Medical Directive to be completed with patient Reason for Admission: Thoracic Compression Fracture RUR Score:   Not calculated in ED prior to assessment PCP: First and Last name:  Kaylen Raymond Name of Practice:  
 Are you a current patient: Yes/No:   Yes Approximate date of last visit:   Approximately 10-11 months ago Can you participate in a virtual visit if needed:   Yes Do you (patient/family) have any concerns for transition/discharge? Pt concerned about returning home alone and needing personal care aides as she having more difficulty with ambulation and more frequent falls. She can not live with daughter due to living area upstairs and no bathroom available for her downstairs. Plan for utilizing home health:   Pt would like to go to IPR or SNF at discharge prior to returning home. Pt may need HH after rehab. Current Advanced Directive/Advance Care Plan:   No ACP on file. Pt would like to complete one prior to discharge. Transition of Care Plan:      IPR vs SNF depending on medical needs and PT/OT evaluations. Pt is a 67year old female, being admitted to [facility] for recent fall and compression fracture. ED physician expresses concern that patient may have cancer of the spine. Pt reports residing at a low income senior apartment on the ground floor. No steps. She has a rolling walker and home O2 (on 3L at night and use on an off during the day). Pt has 2 daughters and a son. Only one daughter resides in [facility] ([phone]). Pt confirms PCP is Dr. Kaylen Raymond but has not seen her in close to a year. Pt states she would like to obtain a new PCP.   She confirms that she has Medicare A & B with a  secondary insurance. Pt has not applied for Medicaid but would like to have a screening to start an application as she will need LTC services either at home or go into a LTC facility. CM will complete LTSS Screening for patient prior to discharge. Pt obtains her prescriptions from [facility] on [address]. CM to follow patient during hospitalization and work on appropriate discharge plan. Pt will need LTSS Screening, Medicaid screening/application and Advance Medical Directive completed prior to discharge. Care Management Interventions PCP Verified by CM: Yes(it has been close to a year since last visit. Pt would like new PCP) Current Support Network: Lives Alone Discharge Location Discharge Placement: Skilled nursing facility [facility] Care Manager - ED [facility] Advanced Steps ACP Facilitator Zone Phone: [phone]

## 2020-09-21 NOTE — ED PROVIDER NOTES
EMERGENCY DEPARTMENT HISTORY AND PHYSICAL EXAM 
 
 
Please note that this dictation was completed with the assistance of \"Dragon\", the computer voice recognition software. Quite often unanticipated grammatical, syntax, homophones, and other interpretive errors are inadvertently transcribed by the computer software. Please disregard these errors and any errors that have escaped final proofreading. Thank you. Patient Name: Yordan Sky : 1945 MRN: [id number] History of Presenting Illness Chief Complaint Patient presents with  Fatigue  
  x one week with c/o multiple falls; c/o low grade fever History Provided By: Patient and EMS 
 
HPI: Yordan Sky, 67 y.o. female with past medical history as documented below presents to the ED with c/o acute onset of generalized fatigue, multiple falls. Patient reports having at least three falls this week. Pt reports no preceding chest pain, SOB, palpitations. Denies syncope or head injury. She reports \"my legs just feel weak and I fall. \" She denies numbness, focal weakness, loss of bladder/bowel function or saddle anesthesia. Pt reports a remote hx of breast cancer in  s/p chemotherapy and mastectomy. She reports her Oncologist was at [facility]. She reports having follow-up imaging in the early  and was told she \"may have bone cancer\" but she said another oncologist said this wasn't true and hasn't had follow-up since. Denies any shortness of breath, sick contacts or exposure to COVID-19. Pt denies any other alleviating or exacerbating factors. Additionally, pt specifically denies any recent fever, chills, headache, nausea, vomiting, abdominal pain, CP, SOB, lightheadedness, dizziness, numbness, lower extremity swelling, heart palpitations, urinary sxs, diarrhea, constipation, melena, hematochezia, cough, or congestion. There are no other complaints, changes or physical findings pertinent to the HPI at this time. PCP: Ely Felder NP Past History Past Medical History: 
Past Medical History:  
Diagnosis Date  Arthralgia 8/17/2017  Arthritis  Asthma Mild to Moderate  Breast cancer ([address] 8/17/2017 Onset 1997; Refusing Mammogram 6/16/17  Cancer ([address] 1997 Breast left  Cardiomyopathy ([address] 8/17/2017 Onset:1999 Ischemic; 25% - 50% (last EFx 45%) Continue with ACE/Lasix/coreg  Cataract 8/17/2017 Bilateral   
 Cellulitis 8/17/2017 Suspect local reaction to Pneumovax, treat with ice, NSAIDs or Tylenol  Chest pain at rest 8/17/2017  CHF (congestive heart failure) ([address] 8/17/2017 Stable, though weight up a little. To take 1 1/2 Lasix daily if swelling, 1 daily o/w  Chronic maxillary sinusitis 8/17/2017  Chronic pain Right knee  COPD (chronic obstructive pulmonary disease) ([address] 8/17/2017 Continue with inhalers  Diabetes ([address] Pre-diabetic  DJD (degenerative joint disease) 8/17/2017  Elevated BUN 8/17/2017  Esophagitis, reflux 8/17/2017  Fatigue 8/17/2017  GERD (gastroesophageal reflux disease)  Heart failure ([address] Cardiomyopathy, HX of MI 1999  Hyperglycemia 8/17/2017  Hyperkalemia 8/17/2017  Hyperlipidemia 8/17/2017  Hypertension 8/17/2017  Ill-defined condition Vertigo  Ill-defined condition 2003 HX of Urosepsis complicated by respiratory failure and pneumonnia  Myalgia 8/17/2017  Thromboembolus ([address] 1969  
 during 2nd pregnancy Halavinia Ding Pilling 8/17/2017  Vertigo, aural 8/17/2017  Vitamin D deficiency 8/17/2017 Past Surgical History: 
Past Surgical History:  
Procedure Laterality Date [address] [place]  
 HX GYN Left Breast Mastectomy  HX HEENT  2015 Bilateral Cataract  HX ORTHOPAEDIC Right 06/2018  
 knee replacement  HX ORTHOPAEDIC Left 11/2018  
 wrist surgery  HX VASCULAR ACCESS Port a cath on the right Family History: Family History Problem Relation Age of Onset  Cancer Mother  Other Father Social History: 
Social History Tobacco Use  Smoking status: Former Smoker Packs/day: 2.00 Years: 25.00 Pack years: 50.00 Last attempt to quit:  Years since quittin.7  Smokeless tobacco: Never Used Substance Use Topics  Alcohol use: No  
 Drug use: No  
 
 
Allergies: Allergies Allergen Reactions  Morphine Anaphylaxis Current Medications: No current facility-administered medications on file prior to encounter. Current Outpatient Medications on File Prior to Encounter Medication Sig Dispense Refill  omeprazole (PRILOSEC) 20 mg capsule Take 1 capsule by mouth once daily 90 Cap 3  
 fenofibrate nanocrystallized (TRICOR) 145 mg tablet Take 1 tablet by mouth once daily 90 Tab 3  
 meclizine (ANTIVERT) 25 mg tablet TAKE 1 TABLET BY MOUTH EVERY 6 HOURS AS NEEDED FOR DIZZINESS 90 Tab 0  Anoro Ellipta 62.5-25 mcg/actuation inhaler 1 Puff daily.  sacubitril-valsartan (ENTRESTO) 49 mg/51 mg tablet Take 2 Tabs by mouth two (2) times a day.  carvedilol (COREG CR) 40 mg CR capsule Take 1 Cap by mouth daily (with breakfast). 90 Cap 3  furosemide (LASIX) 40 mg tablet TAKE ONE AND ONE-HALF TABLETS BY MOUTH ONCE DAILY IN THE MORNING 45 Tab 10  
 cholecalciferol (VITAMIN D3) 1,000 unit cap Take  by mouth daily.  acetaminophen (TYLENOL ARTHRITIS PAIN) 650 mg CR tablet Take 650 mg by mouth every six (6) hours as needed for Pain.  OXYGEN-AIR DELIVERY SYSTEMS 2 L/min by Nasal route continuous.  aspirin delayed-release 81 mg tablet Take 1 Tab by mouth daily. May resume in 30 days after completing twice daily Aspirin.  PSEUDOEPHEDRINE HCL (SINUS DECONGESTANT PO) Take 1 Tab by mouth.  ibuprofen (MOTRIN) 600 mg tablet Take 1 Tab by mouth every six (6) hours as needed for Pain.  20 Tab 0  
  fluticasone propionate (FLOVENT HFA) 220 mcg/actuation inhaler INHALE 2 PUFFS BY MOUTH TWICE DAILY 1 Inhaler 6  
 mupirocin calcium (BACTROBAN) 2 % nasal ointment by Both Nostrils route two (2) times a day. Plain Staph Aureus nasal colonization  Patient to use twice today 6/21 and prior to arrival 6/22 will continue inpatient. Indications: Use bid x 5 days  albuterol (PROVENTIL, VENTOLIN) 90 mcg/Actuation inhaler Take 1-2 Puffs by inhalation every four (4) hours as needed for Wheezing. 17 g 0 Review of Systems Review of Systems Constitutional: Positive for fatigue. Negative for chills and fever. HENT: Negative. Negative for congestion, facial swelling, rhinorrhea, sore throat, trouble swallowing and voice change. Eyes: Negative. Respiratory: Negative. Negative for apnea, cough, chest tightness, shortness of breath and wheezing. Cardiovascular: Negative. Negative for chest pain, palpitations and leg swelling. Gastrointestinal: Negative. Negative for abdominal distention, abdominal pain, blood in stool, constipation, diarrhea, nausea and vomiting. Endocrine: Negative. Negative for cold intolerance, heat intolerance and polyuria. Genitourinary: Negative. Negative for difficulty urinating, dysuria, flank pain, frequency, hematuria and urgency. Musculoskeletal: Negative. Negative for arthralgias, back pain, myalgias, neck pain and neck stiffness. Skin: Negative. Negative for color change and rash. Neurological: Positive for weakness. Negative for dizziness, syncope, facial asymmetry, speech difficulty, light-headedness, numbness and headaches. Hematological: Negative. Does not bruise/bleed easily. Psychiatric/Behavioral: Negative. Negative for confusion and self-injury. The patient is not nervous/anxious. Physical Exam  
Physical Exam 
Vitals signs and nursing note reviewed. Constitutional:   
   General: She is not in acute distress. Appearance: She is well-developed. She is not diaphoretic. HENT:  
   Head: Normocephalic and atraumatic. Mouth/Throat:  
   Pharynx: No oropharyngeal exudate. Eyes:  
   Conjunctiva/sclera: Conjunctivae normal.  
   Pupils: Pupils are equal, round, and reactive to light. Neck: Musculoskeletal: Normal range of motion. Cardiovascular:  
   Rate and Rhythm: Normal rate and regular rhythm. Heart sounds: Normal heart sounds. No murmur. No friction rub. No gallop. Pulmonary:  
   Effort: Pulmonary effort is normal. No respiratory distress. Breath sounds: Normal breath sounds. No wheezing or rales. Chest:  
   Chest wall: No tenderness. Abdominal:  
   General: Bowel sounds are normal. There is no distension. Palpations: Abdomen is soft. There is no mass. Tenderness: There is no abdominal tenderness. There is no guarding or rebound. Musculoskeletal: Normal range of motion. General: Tenderness (TTP lower T spine, no stepoffs, no rash, no CVAT) present. No deformity. Skin: 
   General: Skin is warm. Findings: No rash. Neurological:  
   Mental Status: She is alert and oriented to person, place, and time. Cranial Nerves: No cranial nerve deficit. Motor: No abnormal muscle tone. Coordination: Coordination normal.  
   Deep Tendon Reflexes: Reflexes normal.  
 
 
 
Diagnostic Study Results Labs - 
I have personally reviewed and interpreted all laboratory results. Gilford Brew, MD 
Recent Results (from the past 24 hour(s)) EKG, 12 LEAD, INITIAL Collection Time: 09/21/20 10:57 AM  
Result Value Ref Range Ventricular Rate 81 BPM  
 Atrial Rate 81 BPM  
 P-R Interval 176 ms QRS Duration 88 ms Q-T Interval 400 ms QTC Calculation (Bezet) 464 ms Calculated P Axis 62 degrees Calculated R Axis -21 degrees Calculated T Axis -33 degrees Diagnosis Sinus rhythm with frequent premature ventricular complexes Minimal voltage criteria for LVH, may be normal variant Nonspecific ST and T wave abnormality When compared with ECG of 17-APR-2020 14:09, No significant change was found Confirmed by Eric Sprague ([id number]) on 9/21/2020 1:24:13 PM 
  
CBC WITH AUTOMATED DIFF Collection Time: 09/21/20 11:05 AM  
Result Value Ref Range WBC 6.7 3.6 - 11.0 K/uL  
 RBC 4.70 3.80 - 5.20 M/uL  
 HGB 13.8 11.5 - 16.0 g/dL HCT 45.3 35.0 - 47.0 % MCV 96.4 80.0 - 99.0 FL  
 MCH 29.4 26.0 - 34.0 PG  
 MCHC 30.5 30.0 - 36.5 g/dL  
 RDW 12.9 11.5 - 14.5 % PLATELET 787 411 - 078 K/uL MPV 9.6 8.9 - 12.9 FL  
 NRBC 0.0 0  WBC ABSOLUTE NRBC 0.00 0.00 - 0.01 K/uL NEUTROPHILS 61 32 - 75 % LYMPHOCYTES 26 12 - 49 % MONOCYTES 8 5 - 13 % EOSINOPHILS 4 0 - 7 % BASOPHILS 1 0 - 1 % IMMATURE GRANULOCYTES 0 0.0 - 0.5 % ABS. NEUTROPHILS 4.1 1.8 - 8.0 K/UL  
 ABS. LYMPHOCYTES 1.7 0.8 - 3.5 K/UL  
 ABS. MONOCYTES 0.6 0.0 - 1.0 K/UL  
 ABS. EOSINOPHILS 0.3 0.0 - 0.4 K/UL  
 ABS. BASOPHILS 0.0 0.0 - 0.1 K/UL  
 ABS. IMM. GRANS. 0.0 0.00 - 0.04 K/UL  
 DF AUTOMATED METABOLIC PANEL, COMPREHENSIVE Collection Time: 09/21/20 11:05 AM  
Result Value Ref Range Sodium 141 136 - 145 mmol/L Potassium 3.8 3.5 - 5.1 mmol/L Chloride 103 97 - 108 mmol/L  
 CO2 34 (H) 21 - 32 mmol/L Anion gap 4 (L) 5 - 15 mmol/L Glucose 105 (H) 65 - 100 mg/dL BUN 23 (H) 6 - 20 MG/DL Creatinine 1.31 (H) 0.55 - 1.02 MG/DL  
 BUN/Creatinine ratio 18 12 - 20 GFR est AA 48 (L) >60 ml/min/1.73m2 GFR est non-AA 39 (L) >60 ml/min/1.73m2 Calcium 9.5 8.5 - 10.1 MG/DL Bilirubin, total 0.5 0.2 - 1.0 MG/DL  
 ALT (SGPT) 25 12 - 78 U/L  
 AST (SGOT) 31 15 - 37 U/L Alk. phosphatase 124 (H) 45 - 117 U/L Protein, total 7.8 6.4 - 8.2 g/dL Albumin 3.5 3.5 - 5.0 g/dL Globulin 4.3 (H) 2.0 - 4.0 g/dL A-G Ratio 0.8 (L) 1.1 - 2.2    
TROPONIN I Collection Time: 09/21/20 11:05 AM  
Result Value Ref Range Troponin-I, Qt. <0.05 <0.05 ng/mL CK W/ REFLX CKMB Collection Time: 09/21/20 11:05 AM  
Result Value Ref Range CK 90 26 - 192 U/L  
NT-PRO BNP Collection Time: 09/21/20 11:05 AM  
Result Value Ref Range NT pro- <450 PG/ML  
PTT Collection Time: 09/21/20 11:05 AM  
Result Value Ref Range aPTT 24.1 22.1 - 32.0 sec  
 aPTT, therapeutic range     58.0 - 77.0 SECS  
PROTHROMBIN TIME + INR Collection Time: 09/21/20 11:05 AM  
Result Value Ref Range INR 1.0 0.9 - 1.1 Prothrombin time 10.8 9.0 - 11.1 sec Radiologic Studies -  
I have personally reviewed and interpreted all imaging studies and agree with radiology interpretation and report. Jeannette Sultana MD 
XR CHEST PA LAT Final Result IMPRESSION:   
1. New lower thoracic compression fracture. 2. Diffuse osseous metastases, better seen on prior CT. 3. Clear lungs. XR SPINE LUMB 2 OR 3 V    (Results Pending) XR HIPS BI W OR WO AP PELV    (Results Pending) CT Results  (Last 48 hours) None CXR Results  (Last 48 hours) 09/21/20 1432  XR CHEST PA LAT Final result Impression:  IMPRESSION:   
1. New lower thoracic compression fracture. 2. Diffuse osseous metastases, better seen on prior CT. 3. Clear lungs. Narrative:  PA AND LATERAL CHEST RADIOGRAPHS: 9/21/2020 1:53 PM  
   
INDICATION: Acute chest pain. COMPARISON: 4/17/2020, 2/14/2018. CT chest 4/17/2020. TECHNIQUE: Frontal and lateral radiographs of the chest.  
   
FINDINGS:  
The lungs are clear. The central airways are patent. The heart size is normal.  
No pneumothorax or pleural effusion. A right subclavian ported catheter  
terminates in appropriate position in the cavoatrial junction. A lower thoracic compression fracture is new from 4/17/2020. Diffuse osseous  
metastases were better seen on prior chest CT. Medical Decision Making I reviewed the vital signs, available nursing notes, past medical history, past surgical history, family history and social history. Vital Signs-Reviewed the patient's vital signs. Patient Vitals for the past 24 hrs: 
 Temp Pulse Resp BP SpO2  
09/21/20 1300  73 14 (!) 125/55   
09/21/20 1233  73 14 (!) 131/46   
09/21/20 1049 97.8 °F (36.6 °C) 81 16 (!) 145/68 96 % Pulse Oximetry Analysis - 96% on RA Cardiac Monitor:  
Rate: 73 bpm 
Rhythm: Normal Sinus Rhythm ED EKG interpretation: 
Rhythm: normal sinus rhythm and PVC's; and regular . Rate (approx.): 81; Axis: normal; P wave: normal; QRS interval: normal ; ST/T wave: normal; Other findings: normal. This EKG was interpreted by Shawn Sandoval M.D. Records Reviewed: Nursing Notes, Old Medical Records, Previous electrocardiograms, Previous Radiology Studies and Previous Laboratory Studies Provider Notes (Medical Decision Making):  
Patient presenting with generalized fatigue/weakness. Stable vitals and nontoxic appearing. DDx: infection, anemia, electrolyte anomoly (hypo or hyperkalemia, hypomagnesemia), hypothyroid, dehydration, depression, CA, ACS. Will obtain EKG, UA, labwork for any urgent/emergent pathology. Will reassess and monitor while in ED. ED Course:  
I am the first provider for this patient's ED visit today. Initial assessment performed. I discussed presenting problems, concerns and my formulated plan for today's visit with the patient and any available family members at bedside. I encouraged them to ask questions as they arise throughout the visit. HYPERTENSION COUNSELING For 10 minutes, education was provided to the patient today regarding their hypertension. Patient is made aware of their elevated blood pressure and is instructed to follow up this week with their Primary Care for a recheck.  Patient is counseled regarding consequences of chronic, uncontrolled hypertension including kidney disease, heart disease, stroke or even death. Patient states their understanding and agrees to follow up this week. Additionally, during their visit, I discussed sodium restriction, maintaining ideal body weight and regular exercise program as physiologic means to achieve blood pressure control. The patient will strive towards this. I reviewed our electronic medical record system for any past medical records that were available that may contribute to the patient's current condition, the nursing notes and vital signs from today's visit. Heather Marcus MD 
 
ED Orders Placed : 
Orders Placed This Encounter  XR SPINE LUMB 2 OR 3 V  
 XR HIPS BI W OR WO AP PELV  XR CHEST PA LAT  MRI [facility] SPINE W WO CONT  MRI LUMB SPINE W WO CONT  
 IR KYPHOPLASTY THORACIC  
 NM BONE SCAN WH BODY  
 IR ABLATE BONE TUMOR PERC W CRYO  
 IR KYPHOPLASTY LUMBAR  
 CBC WITH AUTOMATED DIFF  
 METABOLIC PANEL, COMPREHENSIVE  
 TROPONIN I  
 CK W/REFLEX CKMB  NT-PRO BNP  
 PTT  PT  
 METABOLIC PANEL, BASIC  CBC W/O DIFF  
 CA 84.51  
 METABOLIC PANEL, BASIC  
 DIET CARDIAC Regular  DIET NPO  
 DIET NPO  
 NOTIFY PROVIDER: SPECIFY Notify provider on pt's arrival to floor ONE TIME STAT  
 BEDREST, COMPLETE  
 INTAKE AND OUTPUT  VITAL SIGNS PER UNIT ROUTINE  Cardiac Monitor [address]  BEDREST, COMPLETE  APPLY/MAINTAIN SEQUENTIAL COMPRESSION DEVICE  
 OXYGEN CANNULA  FULL CODE  
 GLUCOSE, POC  EKG 12 LEAD INITIAL  
 INSERT PERIPHERAL IV ONE TIME STAT  ketorolac (TORADOL) injection 15 mg  
 DISCONTD: methocarbamoL (ROBAXIN) tablet 750 mg  
 DISCONTD: fentaNYL citrate (PF) injection 50 mcg  ondansetron (ZOFRAN) injection 4 mg  sodium chloride (NS) flush 5-40 mL  sodium chloride (NS) flush 5-40 mL  OR Linked Order Group   acetaminophen (TYLENOL) tablet 650 mg  
  acetaminophen (TYLENOL) suppository 650 mg  
  polyethylene glycol (MIRALAX) packet 17 g  
 oxyCODONE-acetaminophen (PERCOCET) 5-325 mg per tablet 1 Tab  albuterol CONCENTRATE 2.5mg/0.5 mL neb soln  DISCONTD: carvediloL (COREG) tablet 6.25 mg  
 budesonide (PULMICORT) 250 mcg/2ml nebulizer susp  furosemide (LASIX) tablet 40 mg  
 pantoprazole (PROTONIX) tablet 40 mg  
 sacubitriL-valsartan (ENTRESTO) 49-51 mg tablet 2 Tab  heparin (porcine) injection 5,000 Units  gadoterate meglumine (DOTAREM) 0.5 mmol/mL (376.9 mg/mL) contrast solution 18 mL  carvediloL (COREG) tablet 3.125 mg  
 gabapentin (NEURONTIN) capsule 100 mg  
 senna-docusate (PERICOLACE) 8.6-50 mg per tablet 1 Tab  acetaminophen (TYLENOL) 500 mg tablet  furosemide (LASIX) 20 mg tablet  INITIAL PHYSICIAN ORDER: INPATIENT Telemetry; 3. Patient receiving treatment that can only be provided in an inpatient setting (further clarification in H&P documentation)  IP CONSULT TO CASE MANAGEMENT  Pharmacy to Dose Consult ED Medications Administered: 
Medications  
ondansetron (ZOFRAN) injection 4 mg (has no administration in time range)  
sodium chloride (NS) flush 5-40 mL (10 mL IntraVENous Given 9/22/20 1316)  
sodium chloride (NS) flush 5-40 mL (has no administration in time range)  
acetaminophen (TYLENOL) tablet 650 mg (650 mg Oral Given 9/22/20 0906) Or  
acetaminophen (TYLENOL) suppository 650 mg ( Rectal See Alternative 9/22/20 0906) polyethylene glycol (MIRALAX) packet 17 g (has no administration in time range)  
oxyCODONE-acetaminophen (PERCOCET) 5-325 mg per tablet 1 Tab (1 Tab Oral Given 9/22/20 1708)  
albuterol CONCENTRATE 2.5mg/0.5 mL neb soln (has no administration in time range) budesonide (PULMICORT) 250 mcg/2ml nebulizer susp (250 mcg Nebulization Given 9/22/20 2109) furosemide (LASIX) tablet 40 mg ( Oral Automatically Held 10/6/20 0900) pantoprazole (PROTONIX) tablet 40 mg (40 mg Oral Given 9/22/20 0907) sacubitriL-valsartan (ENTRESTO) 49-51 mg tablet 2 Tab ( Oral Automatically Held 10/6/20 2000)  
carvediloL (COREG) tablet 3.125 mg (3.125 mg Oral Refused 9/22/20 1700) gabapentin (NEURONTIN) capsule 100 mg (100 mg Oral Given 9/22/20 1706) senna-docusate (PERICOLACE) 8.6-50 mg per tablet 1 Tab (1 Tab Oral Given 9/22/20 1320)  
ketorolac (TORADOL) injection 15 mg (15 mg IntraVENous Given 9/21/20 1127)  
heparin (porcine) injection 5,000 Units (5,000 Units SubCUTAneous Given 9/22/20 2037)  
gadoterate meglumine (DOTAREM) 0.5 mmol/mL (376.9 mg/mL) contrast solution 18 mL (18 mL IntraVENous Given 9/21/20 2300) Consult Note: 
Ede Lennon MD spoke with Loren Guo Specialty: Case Management Discussed pt's hx, disposition, and available diagnostic and imaging results. Reviewed care plans. Agree with management and plan thus far. Consultant will evaluate pt. Progress note: 
Reviewed workup with patient, x-rays concerning for thoracic compression fracture (new) and persistent osseous mets, pt without evidence of cauda equina, discussed with Hospitalist, will admit for pain control, MRI imaging and Oncology consultation (1st call). Progress Note: 
I have just re-evaluated the patient. Patient reports improved pain control   I have reviewed Her vital signs and determined there is currently no worsening in their condition or physical exam. Results have been reviewed with them and their questions have been answered. We will continue to review further results as they come available. Progress Note: 
Imaging reviewed, concern for acute thoracic compression fracture likely pathological given history of osseous metastasis, check MRI T and L spine, consult Oncology and PT/OT. Progress Note: 
Pt states a week ago she was ambulatory without assistance, now requiring a walker and cane, given high concern for decompensation if discharged, will admit to Hospitalist service. Progress Note: Per chart review, patient did have a CT chest and abdomen back in April 2020 which showed evidence of osseous metastasis, patient however did not follow-up with her primary care doctor regarding this. Consult Note: 
Francoise Ash MD spoke with Dr. Leisa Santos, Specialty: Hospitalist 
Discussed pt's hx, disposition, and available diagnostic and imaging results. Reviewed care plans. Agree with management and plan thus far. Consultant will evaluate pt for admission. Disposition: Admit Patient is being admitted to the hospital. The results of their tests and reasons for their admission have been discussed with them and/or available family. I have discussed the case with the admitting specialist and expressed my high concern for decompensation if patient is discharged from the ED. The patient and/or available family convey agreement and understanding for the need to be admitted and for their admission diagnosis. Consultation has been made with the inpatient physician specialist for hospitalization. Diagnosis Clinical Impression: 1. Compression fracture of thoracic vertebra, initial encounter, unspecified thoracic vertebral level (HCC) 2. General weakness 3. Fall from ground level 4. Atypical chest pain 5. Bony metastasis ([address] 6. Bone metastases ([address] 7. Compression fracture of T4 vertebra, initial encounter ([address] 8. History of breast cancer 9. Malignant neoplasm of left female breast, unspecified estrogen receptor status, unspecified site of breast ([address] Attestation: 
Eva Bermudez MD, am the attending of record for this patient. I personally performed the services described in this documentation on this date, 9/21/2020 for patient, Arabella Curiel. I have reviewed the chart and verified that the record is accurate and complete. This note will not be viewable in [address]

## 2020-09-21 NOTE — CONSULTS
[facility] 
at [place] [address], [facility], [address] 
[phone] Oncology Consultation Note Patient: Kelvin Leavitt MRN: [id number]  SSN: [id number] YOB: 1945  Age: 67 y.o. Sex: female Subjective:  
  
Kelvin Leavitt is a 67 y.o. female who I am seeing in consultation on request of Dr. Salvatore Charles. Ms. Eliseo Urbano received treatment for left sided breast cancer in the 1990's. She received systemic chemotherapy and mastectomy. She was noted to have sclerotic lesions in the bone in April and then today in the x-rays. She has scattered and non-specific pain in the left arm, back. She has fallen a few times. She has burning sensation in the left arm. Review of Systems: 
 
Constitutional: positive for fatigue Eyes: negative Ears, Nose, Mouth, Throat, and Face: negative Respiratory: negative Cardiovascular: negative Gastrointestinal: negative Genitourinary:negative Integument/Breast: negative Hematologic/Lymphatic: negative Musculoskeletal:positive for back pain and bone pain Neurological: negative Past Medical History:  
Diagnosis Date  Arthralgia 8/17/2017  Arthritis  Asthma Mild to Moderate  Breast cancer ([address] 8/17/2017 Onset 1997; Refusing Mammogram 6/16/17  Cancer ([address] 1997 Breast left  Cardiomyopathy ([address] 8/17/2017 Onset:1999 Ischemic; 25% - 50% (last EFx 45%) Continue with ACE/Lasix/coreg  Cataract 8/17/2017 Bilateral   
 Cellulitis 8/17/2017 Suspect local reaction to Pneumovax, treat with ice, NSAIDs or Tylenol  Chest pain at rest 8/17/2017  CHF (congestive heart failure) ([address] 8/17/2017 Stable, though weight up a little. To take 1 1/2 Lasix daily if swelling, 1 daily o/w  Chronic maxillary sinusitis 8/17/2017  Chronic pain Right knee  COPD (chronic obstructive pulmonary disease) ([address] 2017 Continue with inhalers  Diabetes ([address] Pre-diabetic  DJD (degenerative joint disease) 2017  Elevated BUN 2017  Esophagitis, reflux 2017  Fatigue 2017  GERD (gastroesophageal reflux disease)  Heart failure ([address] Cardiomyopathy, HX of MI   Hyperglycemia 2017  Hyperkalemia 2017  Hyperlipidemia 2017  Hypertension 2017  Ill-defined condition Vertigo  Ill-defined condition  HX of Urosepsis complicated by respiratory failure and pneumonnia  Myalgia 2017  Thromboembolus ([address] 1969  
 during 2nd pregnancy Tiffanie Sanchez Pal 2017  Vertigo, aural 2017  Vitamin D deficiency 2017 Past Surgical History:  
Procedure Laterality Date [address] JONNY  
 HX GYN Left Breast Mastectomy  HX HEENT   Bilateral Cataract  HX ORTHOPAEDIC Right 2018  
 knee replacement  HX ORTHOPAEDIC Left 2018  
 wrist surgery  HX VASCULAR ACCESS Port a cath on the right Family History Problem Relation Age of Onset  Cancer Mother  Other Father Social History Tobacco Use  Smoking status: Former Smoker Packs/day: 2.00 Years: 25.00 Pack years: 50.00 Last attempt to quit:  Years since quittin.7  Smokeless tobacco: Never Used Substance Use Topics  Alcohol use: No  
  
Prior to Admission medications Medication Sig Start Date End Date Taking?  Authorizing Provider  
omeprazole (PRILOSEC) 20 mg capsule Take 1 capsule by mouth once daily 20  Yes Milli Azar, NP  
fenofibrate nanocrystallized (TRICOR) 145 mg tablet Take 1 tablet by mouth once daily 20  Yes Luis Azar Shoulder R, NP  
meclizine (ANTIVERT) 25 mg tablet TAKE 1 TABLET BY MOUTH EVERY 6 HOURS AS NEEDED FOR DIZZINESS 20  Yes Milli Azar, NP  
 Anoro Ellipta 62.5-25 mcg/actuation inhaler Take 1 Puff by inhalation daily. 3/23/20  Yes Faizan Henry MD  
sacubitril-valsartan (ENTRESTO) 49 mg/51 mg tablet Take 2 Tabs by mouth two (2) times a day. Yes Other, MD Faizan  
carvedilol (COREG CR) 40 mg CR capsule Take 1 Cap by mouth daily (with breakfast). 7/2/18  Yes Carlos Azar NP  
furosemide (LASIX) 40 mg tablet TAKE ONE AND ONE-HALF TABLETS BY MOUTH ONCE DAILY IN THE MORNING 2/1/18  Yes Dean Azar NP  
cholecalciferol (VITAMIN D3) 1,000 unit cap Take 1,000 Units by mouth daily. Yes Provider, Historical  
acetaminophen (TYLENOL ARTHRITIS PAIN) 650 mg CR tablet Take 650 mg by mouth every six (6) hours as needed for Pain. Yes Provider, Historical  
aspirin delayed-release 81 mg tablet Take 1 Tab by mouth daily. May resume in 30 days after completing twice daily Aspirin. 6/23/17  Yes Priscilla Sevilla NP  
PSEUDOEPHEDRINE HCL (SINUS DECONGESTANT PO) Take 1 Tab by mouth. Yes Provider, Historical  
ibuprofen (MOTRIN) 600 mg tablet Take 1 Tab by mouth every six (6) hours as needed for Pain. 4/17/20   Caron Chapman MD  
fluticasone propionate (FLOVENT HFA) 220 mcg/actuation inhaler INHALE 2 PUFFS BY MOUTH TWICE DAILY 1/14/20   Carlos Azar NP  
mupirocin calcium (BACTROBAN) 2 % nasal ointment by Both Nostrils route two (2) times a day. Plain Staph Aureus nasal colonization  Patient to use twice today 6/21 and prior to arrival 6/22 will continue inpatient. Indications: Use bid x 5 days 6/21/17   Provider, Historical  
albuterol (PROVENTIL, VENTOLIN) 90 mcg/Actuation inhaler Take 1-2 Puffs by inhalation every four (4) hours as needed for Wheezing. 5/1/11   TORSTEN Angelo Allergies Allergen Reactions  Morphine Anaphylaxis Objective:  
 
Vitals:  
 09/21/20 1049 09/21/20 1233 09/21/20 1300 09/21/20 1535 BP: (!) 145/68 (!) 131/46 (!) 125/55 (!) 140/33 Pulse: 81 73 73 81 Resp: 16 14 14 18 Temp: 97.8 °F (36.6 °C)   98.8 °F (37.1 °C) SpO2: 96%   97% Weight: 189 lb 9.5 oz (86 kg) Height: 5' 5\" (1.651 m) Physical Exam: 
 
GENERAL: alert, cooperative, no distress, appears stated age EYE: conjunctivae/corneas clear. PERRL, EOM's intact LYMPHATIC: Cervical, supraclavicular, and axillary nodes normal.  
THROAT & NECK: normal and no erythema or exudates noted. LUNG: clear to auscultation bilaterally HEART: regular rate and rhythm, S1, S2 normal, no murmur, click, rub or gallop ABDOMEN: soft, non-tender. Bowel sounds normal. No masses,  no organomegaly EXTREMITIES:  extremities normal, atraumatic, no cyanosis or edema SKIN: Normal. 
NEUROLOGIC: AOx3. Gait normal. Reflexes and motor strength normal and symmetric. Cranial nerves 2-12 and sensation grossly intact. CT Results (most recent): 
Results from [facility] [place] encounter on 04/17/20 CT ABD PELV W CONT Narrative EXAM:  CTA CHEST W OR W WO CONT, CT ABD PELV W CONT INDICATION:   RUQ pain, R thoracic back pain, SOB, h/o DVT 
 
COMPARISON: 8/26/2013. CHEST CTA: 
 
TECHNIQUE:  
Precontrast  images were obtained to localize the volume for acquisition. Multislice helical CT arteriography was performed from the diaphragm to the 
thoracic inlet during uneventful rapid bolus of 100 cc Isovue-370. Lung and soft 
tissue windows were generated. Coronal and sagittal images were generated and 3D post processing consisting of coronal maximum intensity images was performed. CT dose reduction was achieved through use of a standardized protocol tailored 
for this examination and automatic exposure control for dose modulation. FINDINGS: 
CHEST: 
THYROID: No nodule. MEDIASTINUM: No mass or lymphadenopathy. DOROTHY: No mass or lymphadenopathy. THORACIC AORTA: No dissection or aneurysm. MAIN PULMONARY ARTERY: There is no evidence of pulmonary embolism. TRACHEA/BRONCHI: Patent. ESOPHAGUS: No wall thickening or dilatation. HEART: Normal in size. PLEURA: No effusion or pneumothorax. LUNGS: No nodule, mass, or airspace disease. BONES: Degenerative changes are seen in the thoracic spine. Tiny sclerotic foci 
are scattered throughout the thoracic vertebral bodies. Impression IMPRESSION: No acute process or evidence of pulmonary embolism. Tiny sclerotic 
foci are scattered throughout the vertebral bodies. ABDOMEN/PELVIC CT: 
 
TECHNIQUE:  
Following the uneventful intravenous administration of 100 cc Isovue-370, thin 
axial images were obtained through the abdomen and pelvis. Coronal and sagittal 
reconstructions were generated. Oral contrast was not administered. CT dose 
reduction was achieved through use of a standardized protocol tailored for this 
examination and automatic exposure control for dose modulation. FINDINGS:  
LIVER: No mass or biliary dilatation. GALLBLADDER: Unremarkable. SPLEEN: No mass. PANCREAS: No mass or ductal dilatation. ADRENALS: Unremarkable. KIDNEYS: Left renal cysts, the largest of which measures 2.8 cm. Scarring left 
kidney. STOMACH: Unremarkable. SMALL BOWEL: No dilatation or wall thickening. COLON: Sigmoid diverticulosis. APPENDIX: Unremarkable. PERITONEUM: No ascites or pneumoperitoneum. RETROPERITONEUM: No lymphadenopathy or aortic aneurysm. REPRODUCTIVE ORGANS: The uterus is surgically absent. URINARY BLADDER: No mass or calculus. BONES: Sclerotic lesions are noted in the iliac wings bilaterally, sacrum, and 
lumbar spine. ADDITIONAL COMMENTS: N/A IMPRESSION: 
No acute abnormality. Sclerotic foci are noted concerning for osseous metastatic 
disease. Correlation with bone scan is recommended. I personally reviewed CT and x-rays. Sclerotic lesions. Vertebral fracture Lab Results Component Value Date/Time  WBC 6.7 09/21/2020 11:05 AM  
 HGB (POC) 12.7 09/13/2017 11:20 AM  
 HGB 13.8 09/21/2020 11:05 AM  
 HCT (POC) 39.3 09/13/2017 11:20 AM  
 HCT 45.3 09/21/2020 11:05 AM  
 PLATELET 435 48/11/5630 11:05 AM  
 MCV 96.4 09/21/2020 11:05 AM  
 
 
 
Lab Results Component Value Date/Time Sodium 141 09/21/2020 11:05 AM  
 Potassium 3.8 09/21/2020 11:05 AM  
 Chloride 103 09/21/2020 11:05 AM  
 CO2 34 (H) 09/21/2020 11:05 AM  
 Anion gap 4 (L) 09/21/2020 11:05 AM  
 Glucose 105 (H) 09/21/2020 11:05 AM  
 BUN 23 (H) 09/21/2020 11:05 AM  
 Creatinine 1.31 (H) 09/21/2020 11:05 AM  
 BUN/Creatinine ratio 18 09/21/2020 11:05 AM  
 GFR est AA 48 (L) 09/21/2020 11:05 AM  
 GFR est non-AA 39 (L) 09/21/2020 11:05 AM  
 Calcium 9.5 09/21/2020 11:05 AM  
 Bilirubin, total 0.5 09/21/2020 11:05 AM  
 Alk. phosphatase 124 (H) 09/21/2020 11:05 AM  
 Protein, total 7.8 09/21/2020 11:05 AM  
 Albumin 3.5 09/21/2020 11:05 AM  
 Globulin 4.3 (H) 09/21/2020 11:05 AM  
 A-G Ratio 0.8 (L) 09/21/2020 11:05 AM  
 ALT (SGPT) 25 09/21/2020 11:05 AM  
 AST (SGOT) 31 09/21/2020 11:05 AM  
 
 
 
Assessment:  
 
1. Back pain 2. Burning pain in the left arm 3. Sclerotic bony lesions 4. Vertebral fractures History of breast cancer in the 1990's She has worsening pain and fractures in the vertebrae. With sclerotic lesions, this is suspicious for metastatic cancer. We shall get MRI of T-L spine Pain control Plan: 1. Tumor marker 2. MRI T-L spine 3. Pain control 4. Bone scan Signed By: Lazaro Leblanc MD   
 September 21, 2020

## 2020-09-21 NOTE — ACP (ADVANCE CARE PLANNING)
Advance Care Planning Note NAME: Eliud Crooks :  1945 MRN:  [id number] Date/Time:  2020 7:08 PM 
 
Active Diagnoses: 
Hospital Problems  Date Reviewed: 2017 Codes Class Noted POA Bone metastases ([facility] ICD-10-CM: C79.51 
ICD-9-CM: 198.5  2020 Unknown Thoracic compression fracture ([facility] ICD-10-CM: S22.000A ICD-9-CM: 805.2  2020 Unknown History of breast cancer ICD-10-CM: Z85.3 ICD-9-CM: V10.3  2020 Unknown These active diagnoses are of sufficient risk that focused discussion on advance care planning is indicated in order to allow the patient to thoughtfully consider personal goals of care, and if situations arise that prevent the ability to personally give input, to ensure appropriate representation of their personal desires for different levels and aggressiveness of care. Discussion:  
Code status addressed and wants to be a Full Code. Patient wants central line and vasopressors if needed. Patient  would like to assign [facility]  as the surrogate decision maker. Persons present and participating in discussion: Gregorio Castro MD . Time Spent:  
Total time spent face-to-face in education and discussion:  16  minutes.   
 
 
 
Tony Holloway MD  
Hospitalist

## 2020-09-22 NOTE — PROGRESS NOTES
Hospitalist Progress Note NAME: Yordan Sky :  1945 MRN:  [id number] Assessment / Plan: 
Acute thoracic compression fracture POA- likely pathological given history of osseous metastasis L Upper extremity Burning sensation POA Cont PO Percocet for pain control. Will add stool softner MRI noted for Mid thoracic Compression fracture IP IR consult for Kyphoplasty intervention Will start gabapentin trial to see if helpful with burning sensation in L UE 
  
Osseous metastasis POA History of breast cancer about 18 years ago and underwent chemotherapy with mastectomy 
-She reports that she was told she had a bone cancer about 2 years after breast cancer was cured by a physician in [facility]. She reports that she had repeat imaging shortly after that and was told she had no cancer CT chest and abdomen in 2020 which showed evidence of osseous metastasis and patient was advised outpatient follow-up but patient reports that she did not follow-up with any oncologist 
 
[address] [place] [address] Oncology consulted- following, MRI ordered, CA 27.29 ordered 
  
History of COPD not in exacerbation 
-Resume home inhalers 
  
Chronic systolic congestive heart failure POA- well compensated, CXR clear Hypotension today AM noted H/o Hypertension Holding home Lasix, Entresto due to hypotension Decrease Coreg to 3.125 BID for now 
  
 
GERD 
-Continue home PPI 
  
Code Status: Full code Surrogate Decision Maker: Daughter 
  
DVT Prophylaxis: Lovenox 
  
  
Baseline: From home, independent of ADLs 30.0 - 39.9 Obese / Body mass index is 33.08 kg/m². Weightloss recommended Recommended Disposition: SNF/LTC and  PT, OT, RN?? TBD Subjective: Chief Complaint / Reason for Physician Visit :F/U Gen Weakness, Falls , Back pain, L arm burning sensation \"I am ok but have burning sensation in L arm\". Discussed with RN events overnight. Review of Systems: Symptom Y/N Comments  Symptom Y/N Comments Fever/Chills n   Chest Pain n   
Poor Appetite y   Edema n   
Cough n   Abdominal Pain Sputum n   Joint Pain y   
SOB/TAVAREZ n   Pruritis/Rash Nausea/vomit n   Tolerating PT/OT Diarrhea    Tolerating Diet Constipation    Other y L arm burning sensation Could NOT obtain due to:   
 
Objective: VITALS:  
Last 24hrs VS reviewed since prior progress note. Most recent are: 
Patient Vitals for the past 24 hrs: 
 Temp Pulse Resp BP SpO2  
09/22/20 1127 98.1 °F (36.7 °C) 76 18 93/67 98 %  
09/22/20 0802 99.1 °F (37.3 °C) 78 16 (!) 102/57 100 % 09/22/20 0758  77  (!) 89/53   
09/22/20 0722     97 % 09/22/20 0303 98.7 °F (37.1 °C) 79 18 111/71 97 % 09/21/20 2312 98 °F (36.7 °C) 78 18 (!) 132/58 96 %  
09/21/20 1915 98.2 °F (36.8 °C) 87 18 121/61 95 % 09/21/20 1647 98.7 °F (37.1 °C) 90 18 (!) 119/56 98 %  
09/21/20 1535 98.8 °F (37.1 °C) 81 18 (!) 140/33 97 % 09/21/20 1300  73 14 (!) 125/55   
09/21/20 1233  73 14 (!) 131/46  Intake/Output Summary (Last 24 hours) at 9/22/2020 1148 Last data filed at 9/21/2020 2118 Gross per 24 hour Intake 250 ml Output  Net 250 ml PHYSICAL EXAM: 
General: WD, WN. Alert, cooperative, no acute distress   
EENT:  EOMI. Anicteric sclerae. MMM Resp:  CTA bilaterally, no wheezing or rales. No accessory muscle use CV:  Regular  rhythm,  No edema GI:  Soft, Non distended, Non tender.  +Bowel sounds Neurologic:  Alert and oriented X 3, normal speech, Psych:   Good insight. Not anxious nor agitated Skin:  No rashes. No jaundice Reviewed most current lab test results and cultures  YES Reviewed most current radiology test results   YES Review and summation of old records today    NO Reviewed patient's current orders and MAR    YES 
PMH/SH reviewed - no change compared to H&P 
________________________________________________________________________ Care Plan discussed with: 
 Comments Patient x Family RN x Care Manager x Bandar Consultant     
                 x Multidiciplinary team rounds were held today with , nursing, pharmacist and clinical coordinator. Patient's plan of care was discussed; medications were reviewed and discharge planning was addressed. ________________________________________________________________________ Total NON critical care TIME:  36   Minutes Total CRITICAL CARE TIME Spent:   Minutes non procedure based Comments >50% of visit spent in counseling and coordination of care    
________________________________________________________________________ Katerin Brennan MD  
 
Procedures: see electronic medical records for all procedures/Xrays and details which were not copied into this note but were reviewed prior to creation of Plan. LABS: 
I reviewed today's most current labs and imaging studies. Pertinent labs include: 
Recent Labs  
  09/22/20 
0133 09/21/20 
1105 WBC 7.4 6.7 HGB 12.7 13.8 HCT 41.5 45.3  291 Recent Labs  
  09/22/20 
0133 09/21/20 
1105  141  
K 3.6 3.8  103 CO2 31 34* * 105* BUN 33* 23* CREA 1.86* 1.31* CA 9.1 9.5 ALB  --  3.5 TBILI  --  0.5 ALT  --  25 INR  --  1.0 Signed: Katerin Brennan MD

## 2020-09-22 NOTE — PROGRESS NOTES
[address] [facility] 
at [place] [address], [facility] [address] [place] [id number] Oncology progress Note Patient: Noel Singer MRN: [id number]  SSN: [id number] YOB: 1945  Age: 67 y.o. Sex: female Subjective:  
  
Noel Singer is a 67 y.o. female who I am seeing in consultation on request of Dr. Brittany Núñez. Ms. Vijaya Pelayo received treatment for left sided breast cancer in the 1990's. She received systemic chemotherapy and mastectomy. She was noted to have sclerotic lesions in the bone in April and then today in the x-rays. She has scattered and non-specific pain in the left arm, back. She has fallen a few times. She has burning sensation in the left arm. Interval History: We discussed findings of the MRI with Ms. Vijaya Pelayo today. She says she has been complaining of pain in the back since 2003 when she lived in the [facility]. She believes she had a PET scan between 6721-1600 at an oncology practice located in the hospital. She now has pain in her right upper thigh area. She has left lymphedema from her prior lymph node excision. She notes she was on hormonal medication, Premarin prior to her diagnosis of breast cancer. She had chemotherapy, mastectomy and LN excision ( all negative ), but never took an aromatase inhibitor or tamoxifen. She has not had a mammogram for years, former smoker (quit in 1989) and lives by herself. She follows with Dr. Zoë Romo for cardiology and Dr. Edith Person for her COPD. Review of Systems: 
 
All systems are negative except what is listed in the HPI Past Medical History:  
Diagnosis Date  Arthralgia 8/17/2017  Arthritis  Asthma Mild to Moderate  Breast cancer ([address] 8/17/2017 Onset 1997; Refusing Mammogram 6/16/17  Cancer ([address] 1997 Breast left  Cardiomyopathy ([address] 8/17/2017 Onset: Ischemic; 25% - 50% (last EFx 45%) Continue with ACE/Lasix/coreg  Cataract 2017 Bilateral   
 Cellulitis 2017 Suspect local reaction to Pneumovax, treat with ice, NSAIDs or Tylenol  Chest pain at rest 2017  CHF (congestive heart failure) ([address] 2017 Stable, though weight up a little. To take 1 1/2 Lasix daily if swelling, 1 daily o/w  Chronic maxillary sinusitis 2017  Chronic pain Right knee  COPD (chronic obstructive pulmonary disease) ([address] 2017 Continue with inhalers  Diabetes ([address] Pre-diabetic  DJD (degenerative joint disease) 2017  Elevated BUN 2017  Esophagitis, reflux 2017  Fatigue 2017  GERD (gastroesophageal reflux disease)  Heart failure ([address] Cardiomyopathy, HX of MI   Hyperglycemia 2017  Hyperkalemia 2017  Hyperlipidemia 2017  Hypertension 2017  Ill-defined condition Vertigo  Ill-defined condition  HX of Urosepsis complicated by respiratory failure and pneumonnia  Myalgia 2017  Thromboembolus ([address] 1969  
 during 2nd pregnancy [facility] 2017  Vertigo, aural 2017  Vitamin D deficiency 2017 Past Surgical History:  
Procedure Laterality Date [address] [facility] JONNY  
 HX GYN Left Breast Mastectomy  HX HEENT   Bilateral Cataract  HX ORTHOPAEDIC Right 2018  
 knee replacement  HX ORTHOPAEDIC Left 2018  
 wrist surgery  HX VASCULAR ACCESS Port a cath on the right Family History Problem Relation Age of Onset  Cancer Mother  Other Father Social History Tobacco Use  Smoking status: Former Smoker Packs/day: 2.00 Years: 25.00 Pack years: 50.00 Last attempt to quit:  Years since quittin.7  Smokeless tobacco: Never Used Substance Use Topics  Alcohol use: No  
  
Prior to Admission medications Medication Sig Start Date End Date Taking? Authorizing Provider  
omeprazole (PRILOSEC) 20 mg capsule Take 1 capsule by mouth once daily 6/9/20  Yes Nadeau-Deckert, Nannette Collet, NP  
fenofibrate nanocrystallized (TRICOR) 145 mg tablet Take 1 tablet by mouth once daily 5/22/20  Yes Hakeem Azar NP  
meclizine (ANTIVERT) 25 mg tablet TAKE 1 TABLET BY MOUTH EVERY 6 HOURS AS NEEDED FOR DIZZINESS 4/27/20  Yes Nadeau-Deckert, Nannette Collet, NP Anoro Ellipta 62.5-25 mcg/actuation inhaler Take 1 Puff by inhalation daily. 3/23/20  Yes Faizan Henry MD  
sacubitril-valsartan (ENTRESTO) 49 mg/51 mg tablet Take 2 Tabs by mouth two (2) times a day. Yes Other, MD Faizan  
carvedilol (COREG CR) 40 mg CR capsule Take 1 Cap by mouth daily (with breakfast). 7/2/18  Yes Nadeau-Deckert, Nannette Collet, NP  
furosemide (LASIX) 40 mg tablet TAKE ONE AND ONE-HALF TABLETS BY MOUTH ONCE DAILY IN THE MORNING 2/1/18  Yes Hakeem Azar NP  
cholecalciferol (VITAMIN D3) 1,000 unit cap Take 1,000 Units by mouth daily. Yes Provider, Historical  
acetaminophen (TYLENOL ARTHRITIS PAIN) 650 mg CR tablet Take 650 mg by mouth every six (6) hours as needed for Pain. Yes Provider, Historical  
aspirin delayed-release 81 mg tablet Take 1 Tab by mouth daily. May resume in 30 days after completing twice daily Aspirin. 6/23/17  Yes Florencia Mendoza NP  
PSEUDOEPHEDRINE HCL (SINUS DECONGESTANT PO) Take 1 Tab by mouth. Yes Provider, Historical  
ibuprofen (MOTRIN) 600 mg tablet Take 1 Tab by mouth every six (6) hours as needed for Pain. 4/17/20   Julia Jones MD  
fluticasone propionate (FLOVENT HFA) 220 mcg/actuation inhaler INHALE 2 PUFFS BY MOUTH TWICE DAILY 1/14/20   Nadeau-Deckert, Nannette Collet, NP  
mupirocin calcium (BACTROBAN) 2 % nasal ointment by Both Nostrils route two (2) times a day. Plain Staph Aureus nasal colonization  Patient to use twice today 6/21 and prior to arrival 6/22 will continue inpatient. Indications: Use bid x 5 days 6/21/17   Provider, Historical  
albuterol (PROVENTIL, VENTOLIN) 90 mcg/Actuation inhaler Take 1-2 Puffs by inhalation every four (4) hours as needed for Wheezing. 5/1/11   TORSTEN Bales Allergies Allergen Reactions  Morphine Anaphylaxis Objective:  
 
Vitals:  
 09/22/20 4670 09/22/20 5294 09/22/20 0802 09/22/20 1127 BP:  (!) 89/53 (!) 102/57 93/67 Pulse:  77 78 76 Resp:   16 18 Temp:   99.1 °F (37.3 °C) 98.1 °F (36.7 °C) SpO2: 97%  100% 98% Weight:      
Height:      
  
 
 
 
Physical Exam: 
 
GENERAL: alert, cooperative, no distress, appears stated age EYE: conjunctivae/corneas clear. PERRL, EOM's intact LYMPHATIC: Cervical, supraclavicular, and axillary nodes normal.  
THROAT & NECK: normal and no erythema or exudates noted. LUNG: clear to auscultation bilaterally HEART: regular rate and rhythm, S1, S2 normal, no murmur, click, rub or gallop ABDOMEN: soft, non-tender. Bowel sounds normal. No masses,  no organomegaly EXTREMITIES:  extremities normal, atraumatic, no cyanosis or edema SKIN: Normal. 
NEUROLOGIC: AOx3. Gait normal. Reflexes and motor strength normal and symmetric. Cranial nerves 2-12 and sensation grossly intact. CT Results (most recent): 
[place] [facility]17[place]20 CT ABD PELV W CONT Narrative EXAM:  CTA CHEST W OR W WO CONT, CT ABD PELV W CONT INDICATION:   RUQ pain, R thoracic back pain, SOB, h/o DVT 
 
COMPARISON: 8/26/2013. CHEST CTA: 
 
TECHNIQUE:  
Precontrast  images were obtained to localize the volume for acquisition. Multislice helical CT arteriography was performed from the diaphragm to the 
thoracic inlet during uneventful rapid bolus of 100 cc Isovue-370. Lung and soft 
tissue windows were generated. Coronal and sagittal images were generated and 3D post processing consisting of coronal maximum intensity images was performed. CT dose reduction was achieved through use of a standardized protocol tailored 
for this examination and automatic exposure control for dose modulation. FINDINGS: 
CHEST: 
THYROID: No nodule. MEDIASTINUM: No mass or lymphadenopathy. DOROTHY: No mass or lymphadenopathy. THORACIC AORTA: No dissection or aneurysm. MAIN PULMONARY ARTERY: There is no evidence of pulmonary embolism. TRACHEA/BRONCHI: Patent. ESOPHAGUS: No wall thickening or dilatation. HEART: Normal in size. PLEURA: No effusion or pneumothorax. LUNGS: No nodule, mass, or airspace disease. BONES: Degenerative changes are seen in the thoracic spine. Tiny sclerotic foci 
are scattered throughout the thoracic vertebral bodies. Impression IMPRESSION: No acute process or evidence of pulmonary embolism. Tiny sclerotic 
foci are scattered throughout the vertebral bodies. ABDOMEN/PELVIC CT: 
 
TECHNIQUE:  
Following the uneventful intravenous administration of 100 cc Isovue-370, thin 
axial images were obtained through the abdomen and pelvis. Coronal and sagittal 
reconstructions were generated. Oral contrast was not administered. CT dose 
reduction was achieved through use of a standardized protocol tailored for this 
examination and automatic exposure control for dose modulation. FINDINGS:  
LIVER: No mass or biliary dilatation. GALLBLADDER: Unremarkable. SPLEEN: No mass. PANCREAS: No mass or ductal dilatation. ADRENALS: Unremarkable. KIDNEYS: Left renal cysts, the largest of which measures 2.8 cm. Scarring left 
kidney. STOMACH: Unremarkable. SMALL BOWEL: No dilatation or wall thickening. COLON: Sigmoid diverticulosis. APPENDIX: Unremarkable. PERITONEUM: No ascites or pneumoperitoneum. RETROPERITONEUM: No lymphadenopathy or aortic aneurysm. REPRODUCTIVE ORGANS: The uterus is surgically absent. URINARY BLADDER: No mass or calculus.  
BONES: Sclerotic lesions are noted in the iliac wings bilaterally, sacrum, and 
 lumbar spine. ADDITIONAL COMMENTS: N/A IMPRESSION: 
No acute abnormality. Sclerotic foci are noted concerning for osseous metastatic 
disease. Correlation with bone scan is recommended. I personally reviewed CT and x-rays. Sclerotic lesions. Vertebral fracture Lab Results Component Value Date/Time WBC 7.4 09/22/2020 01:33 AM  
 HGB (POC) 12.7 09/13/2017 11:20 AM  
 HGB 12.7 09/22/2020 01:33 AM  
 HCT (POC) 39.3 09/13/2017 11:20 AM  
 HCT 41.5 09/22/2020 01:33 AM  
 PLATELET 812 31/68/9517 01:33 AM  
 MCV 95.2 09/22/2020 01:33 AM  
 
 
 
Lab Results Component Value Date/Time Sodium 140 09/22/2020 01:33 AM  
 Potassium 3.6 09/22/2020 01:33 AM  
 Chloride 104 09/22/2020 01:33 AM  
 CO2 31 09/22/2020 01:33 AM  
 Anion gap 5 09/22/2020 01:33 AM  
 Glucose 105 (H) 09/22/2020 01:33 AM  
 BUN 33 (H) 09/22/2020 01:33 AM  
 Creatinine 1.86 (H) 09/22/2020 01:33 AM  
 BUN/Creatinine ratio 18 09/22/2020 01:33 AM  
 GFR est AA 32 (L) 09/22/2020 01:33 AM  
 GFR est non-AA 26 (L) 09/22/2020 01:33 AM  
 Calcium 9.1 09/22/2020 01:33 AM  
 Bilirubin, total 0.5 09/21/2020 11:05 AM  
 Alk. phosphatase 124 (H) 09/21/2020 11:05 AM  
 Protein, total 7.8 09/21/2020 11:05 AM  
 Albumin 3.5 09/21/2020 11:05 AM  
 Globulin 4.3 (H) 09/21/2020 11:05 AM  
 A-G Ratio 0.8 (L) 09/21/2020 11:05 AM  
 ALT (SGPT) 25 09/21/2020 11:05 AM  
 AST (SGOT) 31 09/21/2020 11:05 AM  
 
 
 
Assessment:  
 
1. Back pain 2. Burning pain in the left arm 3. Sclerotic bony lesions 4. Vertebral fractures History of breast cancer in the 1990's. Left mastectomy with LN dissection ( negative LN) at [facility] - Dr. Krissy Lee. Had chemotherapy, but does not remember the oncologist. States she never took a pill following chemotherapy or surgery. Followed up with oncology in the [facility] until 2005 when she moved back to [facility]. Has not had a mammogram since that time. Former smoker, quit in [facility]. Plan: 1. CA 27-29 - pending 2. IR ablate bone L4 tomorrow 3. IR Kyphoplasty L4 tomorrow 4. NPO after midnight 5. Consult palliative medicine for pain management 6. NM Bone scan for staging Signed By: Eufemia Herrera NP September 22, 2020 Attending Physician Note:  
 
I have reviewed the history, physical examination, assessment and the plan of the care of the patient. I saw the patient with Cayetano Moseley and I participated in the examination and critical decision making. I agree with the note as stated above. Ms. Marty Dumont is a women with new diagnosis of metastatic carcinoma. She has L4 fracture. She will undergo a bone biopsy and kyphoplasty tomorrow. She needs PT/OT. She would also benefit from palliative care referral for pain management. She appears well. No cervical or axillary adenopathy. Chest is clear and heart sounds are regular. No focal neurological deficits. Signed by: Oneida Moore MD 
                   September 22, 2020

## 2020-09-22 NOTE — PROGRESS NOTES
Pharmacy Medication Reconciliation The patient was interviewed regarding current PTA medication list, use and drug allergies. The patient was questioned regarding use of any other inhalers, topical products, over the counter medications, herbal medications, vitamin products or ophthalmic/nasal/otic medication use. Allergy Update: Morphine Recommendations/Findings: The following amendments were made to the patient's active medication list on file at [facility]:  
1) Additions: none 2) Deletions: Fluticasone propionate (Flovent HFA) 200 mcg/actuation inhaler Ibuprofen 600 mg PO q6h prn pain Mupirocin (Bactroban) 2% nasal ointment 3) Changes: Acetaminophen changed from 650 mg to 500 mg PO q6h prn pain Pseudoephedrine 30 mg every day prn congestion Pertinent Findings:  
 
-Clarified PTA med list with the patient. PTA medication list was corrected to the following:  
 
Prior to Admission Medications Prescriptions Last Dose Informant Taking? Anoro Ellipta 62.5-25 mcg/actuation inhaler 9/21/2020 at Unknown time  Yes Sig: Take 1 Puff by inhalation daily. PSEUDOEPHEDRINE HCL (SINUS DECONGESTANT PO) 9/21/2020 at Unknown time  Yes Sig: Take 30 mg by mouth daily as needed. acetaminophen (TYLENOL) 500 mg tablet   Yes Sig: Take 500 mg by mouth every six (6) hours as needed for Pain. albuterol (PROVENTIL, VENTOLIN) 90 mcg/Actuation inhaler Unknown at Unknown time  No  
Sig: Take 1-2 Puffs by inhalation every four (4) hours as needed for Wheezing. aspirin delayed-release 81 mg tablet 9/21/2020 at Unknown time  Yes Sig: Take 1 Tab by mouth daily. May resume in 30 days after completing twice daily Aspirin. carvedilol (COREG CR) 40 mg CR capsule 9/21/2020 at Unknown time  Yes Sig: Take 1 Cap by mouth daily (with breakfast). cholecalciferol (VITAMIN D3) 1,000 unit cap 9/21/2020 at Unknown time  Yes Sig: Take 1,000 Units by mouth daily. fenofibrate nanocrystallized (TRICOR) 145 mg tablet 9/21/2020 at Unknown time  Yes Sig: Take 1 tablet by mouth once daily  
furosemide (LASIX) 20 mg tablet   Yes Sig: Take 60 mg by mouth daily. meclizine (ANTIVERT) 25 mg tablet 9/21/2020 at Unknown time  Yes Sig: TAKE 1 TABLET BY MOUTH EVERY 6 HOURS AS NEEDED FOR DIZZINESS  
omeprazole (PRILOSEC) 20 mg capsule 9/21/2020 at Unknown time  Yes Sig: Take 1 capsule by mouth once daily  
sacubitril-valsartan (ENTRESTO) 49 mg/51 mg tablet 9/21/2020 at Unknown time  Yes Sig: Take 2 Tabs by mouth two (2) times a day. Facility-Administered Medications: None Thank you, 
Donovan Wyman, PHARMD

## 2020-09-23 PROBLEM — R29.898 WEAKNESS OF BOTH LEGS: Status: ACTIVE | Noted: 2020-01-01

## 2020-09-23 PROBLEM — M79.2 NEUROPATHIC PAIN: Status: ACTIVE | Noted: 2020-01-01

## 2020-09-23 PROBLEM — K59.03 DRUG-INDUCED CONSTIPATION: Status: ACTIVE | Noted: 2020-01-01

## 2020-09-23 PROBLEM — C79.51 PAIN DUE TO MALIGNANT NEOPLASM METASTATIC TO BONE (HCC): Status: ACTIVE | Noted: 2020-01-01

## 2020-09-23 PROBLEM — G89.3 PAIN DUE TO MALIGNANT NEOPLASM METASTATIC TO BONE (HCC): Status: ACTIVE | Noted: 2020-01-01

## 2020-09-23 NOTE — CONSULTS
Palliative Medicine Consult Hanna: [phone]) Patient Name: Kelvin Leavitt YOB: 1945 Date of Initial Consult: 9/23/2020 Reason for Consult: overwhelming sxs Requesting Provider: Keturah Uribe MD  
Primary Care Physician: Babita Ramires NP 
 
 SUMMARY:  
Kelvin Leavitt is a 67 y.o. with a past history of COPD, congestive heart failure, dyslipidemia, gastroesophageal reflux disease, who was admitted on 9/21/2020 from home with a diagnosis of compression fracture of thoracic vertebra. Current medical issues leading to Palliative Medicine involvement include: pain management, newly diagnosed spine lesions. Psychosocial: lives in low income senior apt. Has 2 dtrs and 1 son. PALLIATIVE DIAGNOSES:  
1. Generalized weakness 2. Falls 3. Fatigue 4. Weakness of extremities 5. Back pain 6. Thoracic vertebral fracture (T4) 
7. Sclerotic bony lesions 8. burning pain in left arm 9. Constipation 10. Care decisions PLAN:  
1. Prior to visit, I completed an extensive review of patient's medical records, including medical documentation, vital signs, MARs, and results of various labs and other diagnostics. 2. PAIN:  pt describes neuropathic pain in left arm, bone pain in her spine, rates both at 6/10. She has only been getting Percocet 5/325mg once a day because she was not aware that she had to ask for it. Counseled pt that her other medications will come without asking, but the opiate pain medication she has to ask for. 1. Percocet 5/325mg q. 4 hours prn.  
2. Gabapentin 100mg bid, titrate up q. 2 days as tolerated. 3. Cymbalta 20mg daily for pain and depression. 4. Narcan 0.4mg IVP q. 2 min prn RR<8 and/or per protocol. 3. CONSTIPATION: pt states she is constipated, last BM 3 days ago. 1. Continue pericolace 1 tab daily. 2. Miralax 17 gm daily.   
4. Initial consult note routed to primary continuity provider and/or primary health care team members 5. Communicated plan of care with: Palliative Jig[facility] SNYDER [facility] Team 
 
 GOALS OF CARE / TREATMENT PREFERENCES:  
 
GOALS OF CARE:  Pending workup Patient/Health Care Proxy Stated Goals: Prolong life TREATMENT PREFERENCES:  
Code Status: Full Code Advance Care Planning: 
[x] The [facility] Interdisciplinary Team has updated the ACP Navigator with [place] and Patient Capacity Primary Decision Maker (Active): Sujatha Dunne - Daughter - [phone] Advance Care Planning 9/21/2020 Patient's Healthcare Decision Maker is: Verbal statement (Legal Next of Kin remains as decision maker) Primary Decision Maker Name -  
Primary Decision Maker Phone Number -  
Primary Decision Maker Relationship to Patient -  
Confirm Advance Directive None Patient Would Like to Complete Advance Directive - Other Instructions: Other: As far as possible, the palliative care team has discussed with patient / health care proxy about goals of care / treatment preferences for patient. HISTORY:  
 
History obtained from: chart, patient CHIEF COMPLAINT: LE weakness and falls HPI/SUBJECTIVE: The patient is:  
[x] Verbal and participatory [] Non-participatory due to:  
 
9/21: presented to ED with acute onset of generalized fatigue, multiple falls (at least 3 falls this week), \"my legs just feel weak and I fall. \"  She started having lower back pain after falling yesterday, that is exacerbated with movement and radiates down her legs. No relieving factors. No numbness, tingling, or bowel or bladder issues. Pt was in her usual state of health until about 1 month ago when she started not feeling well, with generalized weakness involving her 4 extremities, and decreased exercise intolerance. Pt has remote h/o breast cancer in 1999, s/p chemo and mastectomy.   She reports that she had follow-up imaging in early 2000's and was told she might have bone cancer, but then another oncologist said it wasn't true and she hasn't followed up since. CXR reveals new lower thoracic compression fracture and diffuse osseous mets. Chest CT reveals new lower thoracic 9/23: pt reports that she has had numbness in her left chest and arm since mastectomy, but starting 2 months ago she's started experiencing burning pain in the same area, the pain comes and goes and she doesn't know what exacerbates it, sometimes lying down will help. Since her last fall her low back has been very painful, achy. Clinical Pain Assessment (nonverbal scale for severity on nonverbal patients):  
Clinical Pain Assessment Severity: 6 Location: and achy back pain Character: burning in her chest/arm, achy in her back Duration: 2 months, days Effect: limits mobility, BLE weakness Frequency: constant Activity (Movement): Restless, excessive activity and/or withdrawal reflexes Duration: for how long has pt been experiencing pain (e.g., 2 days, 1 month, years) Frequency: how often pain is an issue (e.g., several times per day, once every few days, constant) FUNCTIONAL ASSESSMENT:  
 
Palliative Performance Scale (PPS): PPS: 30 
 
 
 PSYCHOSOCIAL/SPIRITUAL SCREENING:  
 
Palliative IDT has assessed this patient for cultural preferences / practices and a referral made as appropriate to needs (Cultural Services, Patient Advocacy, Ethics, etc.) Any spiritual / Jewish concerns: 
[] Yes /  [x] No 
 
Caregiver Burnout: 
[] Yes /  [x] No /  [] No Caregiver Present Anticipatory grief assessment:  
[x] Normal  / [] Maladaptive ESAS Anxiety: Anxiety: 2 ESAS Depression: Depression: 3 REVIEW OF SYSTEMS:  
 
Positive and pertinent negative findings in ROS are noted above in HPI. The following systems were [x] reviewed / [] unable to be reviewed as noted in HPI Other findings are noted below. Systems: constitutional, ears/nose/mouth/throat, respiratory, gastrointestinal, genitourinary, musculoskeletal, integumentary, neurologic, psychiatric, endocrine. Positive findings noted below. Modified ESAS Completed by: provider Fatigue: 6 Drowsiness: 0 Depression: 3 Pain: 6 Anxiety: 2 Nausea: 0 Anorexia: 0 Dyspnea: 0 Constipation: Yes PHYSICAL EXAM:  
 
From RN flowsheet: 
Wt Readings from Last 3 Encounters:  
09/23/20 209 lb 7 oz (95 kg) 09/23/20 198 lb (89.8 kg) 04/17/20 221 lb 5.5 oz (100.4 kg) Blood pressure 121/60, pulse 78, temperature 98.9 °F (37.2 °C), resp. rate 16, height 5' 5\" (1.651 m), weight 209 lb 7 oz (95 kg), SpO2 95 %. Pain Scale 1: Numeric (0 - 10) Pain Intensity 1: 0 Pain Onset 1: post fall Pain Location 1: Back Pain Orientation 1: Lower, Mid 
Pain Description 1: Aching Pain Intervention(s) 1: Medication (see MAR) Last bowel movement, if known:  
 
Constitutional: WD, WN, NAD, pleasant and cooperative, AAOx3 Eyes: pupils equal, anicteric ENMT: no nasal discharge, moist mucous membranes Cardiovascular: regular rhythm, distal pulses intact Respiratory: breathing not labored, symmetric Gastrointestinal: soft non-tender, +bowel sounds Musculoskeletal: no deformity, low spine tenderness to palpation Skin: warm, dry Neurologic: following commands, moving all extremities Psychiatric: full affect, no hallucinations Other: 
 
 
 HISTORY:  
 
Active Problems: 
  Bone metastases ([address] (9/21/2020) Thoracic compression fracture ([address] (9/21/2020) History of breast cancer (9/21/2020) Past Medical History:  
Diagnosis Date  Arthralgia 8/17/2017  Arthritis  Asthma Mild to Moderate  Breast cancer ([address] 8/17/2017 Onset 1997; Refusing Mammogram 6/16/17  Cancer ([address] 1997 Breast left  Cardiomyopathy ([address] 8/17/2017 Onset:1999 Ischemic; 25% - 50% (last EFx 45%) Continue with ACE/Lasix/coreg  Cataract 8/17/2017 Bilateral   
 Cellulitis 2017 Suspect local reaction to Pneumovax, treat with ice, NSAIDs or Tylenol  Chest pain at rest 2017  CHF (congestive heart failure) ([address] 2017 Stable, though weight up a little. To take 1 1/2 Lasix daily if swelling, 1 daily o/w  Chronic maxillary sinusitis 2017  Chronic pain Right knee  COPD (chronic obstructive pulmonary disease) ([address] 2017 Continue with inhalers  Diabetes ([address] Pre-diabetic  DJD (degenerative joint disease) 2017  Elevated BUN 2017  Esophagitis, reflux 2017  Fatigue 2017  GERD (gastroesophageal reflux disease)  Heart failure ([address] Cardiomyopathy, HX of MI   Hyperglycemia 2017  Hyperkalemia 2017  Hyperlipidemia 2017  Hypertension 2017  Ill-defined condition Vertigo  Ill-defined condition  HX of Urosepsis complicated by respiratory failure and pneumonnia  Myalgia 2017  Thromboembolus ([address] 1969  
 during 2nd pregnancy [facility] 2017  Vertigo, aural 2017  Vitamin D deficiency 2017 Past Surgical History:  
Procedure Laterality Date [facility]  
 HX GYN Left Breast Mastectomy  HX HEENT   Bilateral Cataract  HX ORTHOPAEDIC Right 2018  
 knee replacement  HX ORTHOPAEDIC Left 2018  
 wrist surgery  HX VASCULAR ACCESS Port a cath on the right Family History Problem Relation Age of Onset  Cancer Mother  Other Father History reviewed, no pertinent family history. Social History Tobacco Use  Smoking status: Former Smoker Packs/day: 2.00 Years: 25.00 Pack years: 50.00 Last attempt to quit:  Years since quittin.7  Smokeless tobacco: Never Used Substance Use Topics  Alcohol use: No  
 
Allergies Allergen Reactions  Morphine Anaphylaxis Current Facility-Administered Medications Medication Dose Route Frequency  budesonide (PULMICORT) 250 mcg/2ml nebulizer susp  250 mcg Nebulization BID RT  
 0.9% sodium chloride infusion  100 mL/hr IntraVENous CONTINUOUS  
 oxyCODONE-acetaminophen (PERCOCET) 5-325 mg per tablet 1 Tab  1 Tab Oral Q4H PRN  
 [START ON 9/24/2020] DULoxetine (CYMBALTA) capsule 20 mg  20 mg Oral DAILY  [START ON 9/24/2020] polyethylene glycol (MIRALAX) packet 17 g  17 g Oral DAILY  naloxone (NARCAN) syringe 0.4 mg  0.4 mg IntraVENous EVERY 2 MINUTES AS NEEDED  carvediloL (COREG) tablet 3.125 mg  3.125 mg Oral BID WITH MEALS  gabapentin (NEURONTIN) capsule 100 mg  100 mg Oral BID  senna-docusate (PERICOLACE) 8.6-50 mg per tablet 1 Tab  1 Tab Oral DAILY  ondansetron (ZOFRAN) injection 4 mg  4 mg IntraVENous Q4H PRN  
 sodium chloride (NS) flush 5-40 mL  5-40 mL IntraVENous Q8H  
 sodium chloride (NS) flush 5-40 mL  5-40 mL IntraVENous PRN  
 acetaminophen (TYLENOL) tablet 650 mg  650 mg Oral Q6H PRN Or  
 acetaminophen (TYLENOL) suppository 650 mg  650 mg Rectal Q6H PRN  
 albuterol CONCENTRATE 2.5mg/0.5 mL neb soln  2.5 mg Nebulization Q4H PRN  
 [Held by provider] furosemide (LASIX) tablet 40 mg  40 mg Oral DAILY  pantoprazole (PROTONIX) tablet 40 mg  40 mg Oral ACB  [Held by provider] sacubitriL-valsartan (ENTRESTO) 49-51 mg tablet 2 Tab  2 Tab Oral BID  
 
 
 
 LAB AND IMAGING FINDINGS:  
 
Lab Results Component Value Date/Time WBC 7.4 09/22/2020 01:33 AM  
 HGB 12.7 09/22/2020 01:33 AM  
 PLATELET 692 25/49/8569 01:33 AM  
 
Lab Results Component Value Date/Time Sodium 139 09/23/2020 04:48 AM  
 Potassium 3.8 09/23/2020 04:48 AM  
 Chloride 104 09/23/2020 04:48 AM  
 CO2 31 09/23/2020 04:48 AM  
 BUN 47 (H) 09/23/2020 04:48 AM  
 Creatinine 1.62 (H) 09/23/2020 04:48 AM  
 Calcium 8.8 09/23/2020 04:48 AM  
  
Lab Results Component Value Date/Time Alk. phosphatase 124 (H) 09/21/2020 11:05 AM  
 Protein, total 7.8 09/21/2020 11:05 AM  
 Albumin 3.5 09/21/2020 11:05 AM  
 Globulin 4.3 (H) 09/21/2020 11:05 AM  
 
Lab Results Component Value Date/Time INR 1.0 09/21/2020 11:05 AM  
 Prothrombin time 10.8 09/21/2020 11:05 AM  
 aPTT 24.1 09/21/2020 11:05 AM  
  
No results found for: IRON, FE, TIBC, IBCT, PSAT, FERR No results found for: PH, PCO2, PO2 No components found for: [place] No results found for: CPK, CKMB Total time:  
Counseling / coordination time, spent as noted above:  
> 50% counseling / coordination?:  
 
Prolonged service was provided for  []30 min   []75 min in face to face time in the presence of the patient, spent as noted above. Time Start:  
Time End:  
Note: this can only be billed with 80362 (initial) or 06066 (follow up). If multiple start / stop times, list each separately.

## 2020-09-23 NOTE — PROGRESS NOTES
[facility] 
at [place] [address], [facility], [address] 
[phone] Oncology progress Note Patient: Annabella Burns MRN: [id number]  SSN: [id number] YOB: 1945  Age: 67 y.o. Sex: female Subjective:  
  
Annabella Burns is a 67 y.o. female who I am seeing in consultation on request of Dr. Joseluis Rodriguez. Ms. Cierra Portillo received treatment for left sided breast cancer in the 1990's. She received systemic chemotherapy and mastectomy. She was noted to have sclerotic lesions in the bone in April and then today in the x-rays. She has scattered and non-specific pain in the left arm, back. She has fallen a few times. She has burning sensation in the left arm. Interval History: 
 
9/22/2020 We discussed findings of the MRI with Ms. Cierra Portillo today. She says she has been complaining of pain in the back since 2003 when she lived in the [facility]. She believes she had a PET scan between 8301-1008 at an oncology practice located in the hospital. She now has pain in her right upper thigh area. She has left lymphedema from her prior lymph node excision. She notes she was on hormonal medication, Premarin prior to her diagnosis of breast cancer. She had chemotherapy, mastectomy and LN excision ( all negative ), but never took an aromatase inhibitor or tamoxifen. She has not had a mammogram for years, former smoker (quit in 1989) and lives by herself. She follows with Dr. Evelyn Diaz for cardiology and Dr. Scottie Pennington for her COPD.  
 
9/23/2020 Ms. Cierra Portillo was scheduled to have a kyphoplasty and ablation today, but this was cancelled and a neurosurgical consult was ordered for potential stabilization surgery. Review of Systems: 
 
All systems are negative except what is listed in the HPI Past Medical History:  
Diagnosis Date  Arthralgia 8/17/2017  Arthritis  Asthma Mild to Moderate  Breast cancer ([address] 8/17/2017 Onset 1997; Refusing Mammogram 6/16/17  Cancer ([address] 1997 Breast left  Cardiomyopathy ([address] 8/17/2017 Onset:1999 Ischemic; 25% - 50% (last EFx 45%) Continue with ACE/Lasix/coreg  Cataract 8/17/2017 Bilateral   
 Cellulitis 8/17/2017 Suspect local reaction to Pneumovax, treat with ice, NSAIDs or Tylenol  Chest pain at rest 8/17/2017  CHF (congestive heart failure) ([address] 8/17/2017 Stable, though weight up a little. To take 1 1/2 Lasix daily if swelling, 1 daily o/w  Chronic maxillary sinusitis 8/17/2017  Chronic pain Right knee  COPD (chronic obstructive pulmonary disease) ([address] 8/17/2017 Continue with inhalers  Diabetes ([address] Pre-diabetic  DJD (degenerative joint disease) 8/17/2017  Elevated BUN 8/17/2017  Esophagitis, reflux 8/17/2017  Fatigue 8/17/2017  GERD (gastroesophageal reflux disease)  Heart failure ([address] Cardiomyopathy, HX of MI 1999  Hyperglycemia 8/17/2017  Hyperkalemia 8/17/2017  Hyperlipidemia 8/17/2017  Hypertension 8/17/2017  Ill-defined condition Vertigo  Ill-defined condition 2003 HX of Urosepsis complicated by respiratory failure and pneumonnia  Myalgia 8/17/2017  Thromboembolus ([address] 1969  
 during 2nd pregnancy [facility] 8/17/2017  Vertigo, aural 8/17/2017  Vitamin D deficiency 8/17/2017 Past Surgical History:  
Procedure Laterality Date [facility]  
 HX GYN Left Breast Mastectomy  HX HEENT  2015 Bilateral Cataract  HX ORTHOPAEDIC Right 06/2018  
 knee replacement  HX ORTHOPAEDIC Left 11/2018  
 wrist surgery  HX VASCULAR ACCESS Port a cath on the right Family History Problem Relation Age of Onset  Cancer Mother  Other Father Social History Tobacco Use  Smoking status: Former Smoker Packs/day: 2.00 Years: 25.00 Pack years: 50.00 Last attempt to quit: 1989 Years since quittin.7  Smokeless tobacco: Never Used Substance Use Topics  Alcohol use: No  
  
Prior to Admission medications Medication Sig Start Date End Date Taking? Authorizing Provider  
acetaminophen (TYLENOL) 500 mg tablet Take 500 mg by mouth every six (6) hours as needed for Pain. Yes Provider, Historical  
furosemide (LASIX) 20 mg tablet Take 60 mg by mouth daily. Yes Provider, Historical  
omeprazole (PRILOSEC) 20 mg capsule Take 1 capsule by mouth once daily 20  Yes Elizabeth Azar NP  
fenofibrate nanocrystallized (TRICOR) 145 mg tablet Take 1 tablet by mouth once daily 20  Yes Nissa Parthenia Mode R, GALLO  
meclizine (ANTIVERT) 25 mg tablet TAKE 1 TABLET BY MOUTH EVERY 6 HOURS AS NEEDED FOR DIZZINESS 20  Yes Elizabeth Azar NP Anoro Ellipta 62.5-25 mcg/actuation inhaler Take 1 Puff by inhalation daily. 3/23/20  Yes Other, MD Faizan  
sacubitril-valsartan (ENTRESTO) 49 mg/51 mg tablet Take 2 Tabs by mouth two (2) times a day. Yes Other, MD Faizan  
carvedilol (COREG CR) 40 mg CR capsule Take 1 Cap by mouth daily (with breakfast). 18  Yes Elizabeth Azar NP  
cholecalciferol (VITAMIN D3) 1,000 unit cap Take 1,000 Units by mouth daily. Yes Provider, Historical  
aspirin delayed-release 81 mg tablet Take 1 Tab by mouth daily. May resume in 30 days after completing twice daily Aspirin. 17  Yes Fawn Zelaya NP  
PSEUDOEPHEDRINE HCL (SINUS DECONGESTANT PO) Take 30 mg by mouth daily as needed. Yes Provider, Historical  
albuterol (PROVENTIL, VENTOLIN) 90 mcg/Actuation inhaler Take 1-2 Puffs by inhalation every four (4) hours as needed for Wheezing. 11   TORSTEN Fu Allergies Allergen Reactions  Morphine Anaphylaxis Objective:  
 
Vitals:  
 20 0528 20 0738 20 0740 20 1400 BP:   111/62 (!) 109/52 Pulse:   73 76 Resp:    Temp:   98.1 °F (36.7 °C) 98.5 °F (36.9 °C) SpO2:  97% 100% 97% Weight: 209 lb 7 oz (95 kg) Height:      
  
 
Physical Exam: 
 
GENERAL: alert, cooperative, no distress, appears stated age EYE: conjunctivae/corneas clear. PERRL, EOM's intact LYMPHATIC: Cervical, supraclavicular, and axillary nodes normal.  
THROAT & NECK: normal and no erythema or exudates noted. LUNG: clear to auscultation bilaterally HEART: regular rate and rhythm, S1, S2 normal, no murmur, click, rub or gallop ABDOMEN: soft, non-tender. Bowel sounds normal. No masses,  no organomegaly EXTREMITIES:  extremities normal, atraumatic, no cyanosis or edema SKIN: Normal. 
NEUROLOGIC: AOx3. Gait normal. Reflexes and motor strength normal and symmetric. Cranial nerves 2-12 and sensation grossly intact. CT Results (most recent): 
[place] [facility] [place] 04/17/20 CT ABD PELV W CONT Narrative EXAM:  CTA CHEST W OR W WO CONT, CT ABD PELV W CONT INDICATION:   RUQ pain, R thoracic back pain, SOB, h/o DVT 
 
COMPARISON: 8/26/2013. CHEST CTA: 
 
TECHNIQUE:  
Precontrast  images were obtained to localize the volume for acquisition. Multislice helical CT arteriography was performed from the diaphragm to the 
thoracic inlet during uneventful rapid bolus of 100 cc Isovue-370. Lung and soft 
tissue windows were generated. Coronal and sagittal images were generated and 3D post processing consisting of coronal maximum intensity images was performed. CT dose reduction was achieved through use of a standardized protocol tailored 
for this examination and automatic exposure control for dose modulation. FINDINGS: 
CHEST: 
THYROID: No nodule. MEDIASTINUM: No mass or lymphadenopathy. DOROTHY: No mass or lymphadenopathy. THORACIC AORTA: No dissection or aneurysm. MAIN PULMONARY ARTERY: There is no evidence of pulmonary embolism. TRACHEA/BRONCHI: Patent. ESOPHAGUS: No wall thickening or dilatation. HEART: Normal in size. PLEURA: No effusion or pneumothorax. LUNGS: No nodule, mass, or airspace disease. BONES: Degenerative changes are seen in the thoracic spine. Tiny sclerotic foci 
are scattered throughout the thoracic vertebral bodies. Impression IMPRESSION: No acute process or evidence of pulmonary embolism. Tiny sclerotic 
foci are scattered throughout the vertebral bodies. ABDOMEN/PELVIC CT: 
 
TECHNIQUE:  
Following the uneventful intravenous administration of 100 cc Isovue-370, thin 
axial images were obtained through the abdomen and pelvis. Coronal and sagittal 
reconstructions were generated. Oral contrast was not administered. CT dose 
reduction was achieved through use of a standardized protocol tailored for this 
examination and automatic exposure control for dose modulation. FINDINGS:  
LIVER: No mass or biliary dilatation. GALLBLADDER: Unremarkable. SPLEEN: No mass. PANCREAS: No mass or ductal dilatation. ADRENALS: Unremarkable. KIDNEYS: Left renal cysts, the largest of which measures 2.8 cm. Scarring left 
kidney. STOMACH: Unremarkable. SMALL BOWEL: No dilatation or wall thickening. COLON: Sigmoid diverticulosis. APPENDIX: Unremarkable. PERITONEUM: No ascites or pneumoperitoneum. RETROPERITONEUM: No lymphadenopathy or aortic aneurysm. REPRODUCTIVE ORGANS: The uterus is surgically absent. URINARY BLADDER: No mass or calculus. BONES: Sclerotic lesions are noted in the iliac wings bilaterally, sacrum, and 
lumbar spine. ADDITIONAL COMMENTS: N/A IMPRESSION: 
No acute abnormality. Sclerotic foci are noted concerning for osseous metastatic 
disease. Correlation with bone scan is recommended. I personally reviewed CT and x-rays. Sclerotic lesions. Vertebral fracture Lab Results Component Value Date/Time  WBC 7.4 09/22/2020 01:33 AM  
 HGB (POC) 12.7 09/13/2017 11:20 AM  
 HGB 12.7 09/22/2020 01:33 AM  
 HCT (POC) 39.3 09/13/2017 11:20 AM  
 HCT 41.5 09/22/2020 01:33 AM  
 PLATELET 048 89/58/7224 01:33 AM  
 MCV 95.2 09/22/2020 01:33 AM  
 
 
 
Lab Results Component Value Date/Time Sodium 139 09/23/2020 04:48 AM  
 Potassium 3.8 09/23/2020 04:48 AM  
 Chloride 104 09/23/2020 04:48 AM  
 CO2 31 09/23/2020 04:48 AM  
 Anion gap 4 (L) 09/23/2020 04:48 AM  
 Glucose 91 09/23/2020 04:48 AM  
 BUN 47 (H) 09/23/2020 04:48 AM  
 Creatinine 1.62 (H) 09/23/2020 04:48 AM  
 BUN/Creatinine ratio 29 (H) 09/23/2020 04:48 AM  
 GFR est AA 37 (L) 09/23/2020 04:48 AM  
 GFR est non-AA 31 (L) 09/23/2020 04:48 AM  
 Calcium 8.8 09/23/2020 04:48 AM  
 Bilirubin, total 0.5 09/21/2020 11:05 AM  
 Alk. phosphatase 124 (H) 09/21/2020 11:05 AM  
 Protein, total 7.8 09/21/2020 11:05 AM  
 Albumin 3.5 09/21/2020 11:05 AM  
 Globulin 4.3 (H) 09/21/2020 11:05 AM  
 A-G Ratio 0.8 (L) 09/21/2020 11:05 AM  
 ALT (SGPT) 25 09/21/2020 11:05 AM  
 AST (SGOT) 31 09/21/2020 11:05 AM  
 
 
 
Assessment:  
 
1. Back pain 2. Burning pain in the left arm 3. Sclerotic bony lesions 4. Vertebral fractures History of breast cancer in the 1990's. Left mastectomy with LN dissection ( negative LN) at [facility] - Dr. Nicholas Motta. Had chemotherapy, but does not remember the oncologist. States she never took a pill following chemotherapy or surgery. Followed up with oncology in the [facility] until 2005 when she moved back to [place]. Has not had a mammogram since that time. Former smoker, quit in [facility]. CA 27.29 - 47.0 Kyphoplasty and ablation scheduled for today was cancelled. Neurosurgery has been consulted and MRI cervical spine ordered to rule out cord compression. Plan: 1. MRI cervical spine ordered 2. Neurosurgical consult  - will see her tomorrow 3. She is no longer NPO Signed By: Sharon Medina NP September 23, 2020

## 2020-09-23 NOTE — PROGRESS NOTES
Orthopedic End of Shift Note Bedside and Verbal shift change report given to Lobo BURNETT (oncoming nurse) by Nino Jolly (offgoing nurse). Report included the following information SBAR, Kardex, MAR and Recent Results. POD# Significant issues during shift: none Issues for Physician to address: none Activity This Shift 
(check all that apply) [] chair 
[] dangle 
 [] bathroom 
[] bedside commode [] hallway [x] bedrest  
Nausea/Vomiting [] yes [x] no    
Voiding Status [x] void [] [place] [] I&O Cath Bowel Movements [] yes [x] no Foot Pumps or SCD [x] yes [] no Ice Pack [] yes    [x] no Incentive Spirometer [x] yes [] no Volume:   800 Telemetry Monitoring   [x] yes [] no Rhythm: NSR Supplemental O2 [x] yes [] no Sat off O2:   94%

## 2020-09-23 NOTE — PROGRESS NOTES
Spiritual Care Assessment/Progress Note [address] 
 
 
NAME: Miguel Ángel Oro      MRN: [id number] AGE: 67 y.o. SEX: female Jewish Affiliation: Yarsanism  
Language: English  
 
9/23/2020     Total Time (in minutes): 20 Spiritual Assessment begun in [facility] through conversation with: 
  
    [x]Patient        [] Family    [] Friend(s) Reason for Consult: Palliative Care, Initial/Spiritual Assessment Spiritual beliefs: (Please include comment if needed) [x] Identifies with a paty tradition:     
   [] Supported by a paty community:        
   [] Claims no spiritual orientation:       
   [] Seeking spiritual identity:            
   [] Adheres to an individual form of spirituality:       
   [] Not able to assess:                   
 
    
Identified resources for coping:  
   [x] Prayer                           
   [] Music                  [] Guided Imagery [x] Family/friends                 [] Pet visits [] Devotional reading                         [] Unknown 
   [] Other:                                         
 
 
Interventions offered during this visit: (See comments for more details) Patient Interventions: Affirmation of emotions/emotional suffering, Affirmation of paty, Catharsis/review of pertinent events in supportive environment, Coping skills reviewed/reinforced, Iconic (affirming the presence of God/Higher Power), Normalization of emotional/spiritual concerns, Prayer (assurance of) Plan of Care: 
 
 [] Support spiritual and/or cultural needs  
 [] Support AMD and/or advance care planning process    
 [] Support grieving process 
 [] Coordinate Rites and/or Rituals  
 [] Coordination with community clergy [] No spiritual needs identified at this time 
 [] Detailed Plan of Care below (See Comments)  [] Make referral to Music Therapy 
[] Make referral to Pet Therapy    
[] Make referral to Addiction services [] Make referral to [facility] 
[] Make referral to Spiritual Care Partner 
[] No future visits requested       
[x] Follow up visits as needed Visited pt for initial spiritual assessment as she is a new Palliative Medicine consult. Pt just had a doctor's visit and received news about a new ca diagnosis. She spoke of an earlier ca and listed a number of people in her family (multi generational) who have had or currently have ca. She was quite positive in spite of the news and hopes to be able to find out that it is treatable. She has good family support and her Buddhist is supportive as well. Assured her of ongoing  support as needed. Chaplain Selma, MDiv, MS, [facility] 
[phone])

## 2020-09-23 NOTE — PROGRESS NOTES
Transition of Care IPR vs SNF: Referrals were sent to [facility] and [facility] BLS/ [facility] transport CM met with Pt to discuss possible discharge plans. Pt was receptive to the possibility of having to discharge to a rehab facility. Pt stated that she would like to go to 1 [facility] if IPR is recommended or [facility] if SNF is recommended. CM has sent to referrals to 1 [facility] and [facility] for review. CM followed up with Pt regarding  Medicaid inquiry. Pt is hoping to receive some help in the home as she reports that her daughter works and does not have a home that is accessible to her. Pt reports to have 2 other children who lives out of state. Pt provided CM with  her Medicaid card. Pt reports to have Medicaid insurance and  Has reported to have had card for more than a year. If Pt's Medicaid is active, Pt will need a LTSS screening form completed for personal care services/LTC. CM has made a copy of Medicaid card and has placed on Pt's chart. LAURIE Zhang, [facility]

## 2020-09-23 NOTE — PROGRESS NOTES
Spoke with Dr Stephon Iglesias (oncology) about this pt. Multiple areas of the spine are involved with metastatic disease, worse at the T2-3 area. If surgery were to be contemplated, it would require likely fusion fron lower C to upper T spine  Agree that C spine MRI should be done  Will see pt after it has been performed, likely by tomorrow  Dr Stephon Iglesias will order the MRI  Overall poor prognosis given the imaging studies thus far

## 2020-09-23 NOTE — PROGRESS NOTES
Occupational Therapy Note: 
 
Orders acknowledged and chart reviewed. Note pt was supposed to have kyphoplasty today but that is currently on hold. Per Dr. Jerson Gómez hold OT evaluation until after kyphoplasty. Will defer today and follow up with OT evaluation as appropriate. Thank you.  
 
Yony Lazaro, OTR/L

## 2020-09-23 NOTE — PROGRESS NOTES
Nuclear Med - Pt. Dosed for Bone Scan today. Please hydrate x3 hrs for study. Please have pt. Empty bladder before being transported to [facility] MED @ 11:00am today

## 2020-09-23 NOTE — PROGRESS NOTES
Hospitalist Progress Note NAME: Chinmay Estevez :  1945 MRN:  [id number] Assessment / Plan: 
Acute thoracic compression fracture POA- likely pathological given history of osseous metastasis L Upper extremity Burning sensation POA Cont PO Percocet for pain control. Added stool softner MRI noted for Mid thoracic Compression fracture IP IR consult for Kyphoplasty intervention- today, NPO, will need bone biopsy too Cont gabapentin trial to see if helpful with burning sensation in L UE 
  
Osseous metastasis POA History of breast cancer about 18 years ago and underwent chemotherapy with mastectomy 
-She reports that she was told she had a bone cancer about 2 years after breast cancer was cured by a physician in [facility]. She reports that she had repeat imaging shortly after that and was told she had no cancer CT chest and abdomen in 2020 which showed evidence of osseous metastasis and patient was advised outpatient follow-up but patient reports that she did not follow-up with any oncologist 
 
[address] [place] [address] Oncology consulted- following, MRI ordered, CA 27.29 ordered Bone scan today 
 
  
History of COPD not in exacerbation 
-Resume home inhalers 
  
Chronic systolic congestive heart failure POA- well compensated, CXR clear Hypotension today AM noted H/o Hypertension Keep Holding home Lasix, Entresto due to hypotension Cont Decreased Coreg to 3.125 BID for now 
  
 
GERD 
-Continue home PPI 
  
Code Status: Full code Surrogate Decision Maker: Daughter 
  
DVT Prophylaxis: Lovenox 
  
  
Baseline: From home, independent of ADLs 30.0 - 39.9 Obese / Body mass index is 34.85 kg/m². Weightloss recommended Recommended Disposition: SNF/LTC and  PT, OT, RN?? TBD- PT/OT eval post procedure today Subjective: Chief Complaint / Reason for Physician Visit :F/U Gen Weakness, Falls , Back pain, L arm burning sensation \"I am ok but have burning sensation in L arm\". Discussed with RN events overnight. Review of Systems: 
Symptom Y/N Comments  Symptom Y/N Comments Fever/Chills n   Chest Pain n   
Poor Appetite y   Edema n   
Cough n   Abdominal Pain Sputum n   Joint Pain y   
SOB/TAVAREZ n   Pruritis/Rash Nausea/vomit n   Tolerating PT/OT n Due to pain Diarrhea    Tolerating Diet y NPO Constipation    Other y L arm burning sensation Could NOT obtain due to:   
 
Objective: VITALS:  
Last 24hrs VS reviewed since prior progress note. Most recent are: 
Patient Vitals for the past 24 hrs: 
 Temp Pulse Resp BP SpO2  
09/23/20 0740 98.1 °F (36.7 °C) 73 18 111/62 100 % 09/23/20 0738     97 % 09/23/20 0428 98.9 °F (37.2 °C) 73 18 (!) 114/51 96 %  
09/22/20 2325 99.2 °F (37.3 °C) 78 18 (!) 118/56 96 %  
09/22/20 2109     98 %  
09/22/20 1955 99.5 °F (37.5 °C) 76 18 115/61 98 %  
09/22/20 1651 99.7 °F (37.6 °C) 76 16 116/60 98 %  
09/22/20 1127 98.1 °F (36.7 °C) 76 18 93/67 98 % No intake or output data in the 24 hours ending 09/23/20 1047 PHYSICAL EXAM: 
General: WD, WN. Alert, cooperative, no acute distress   
EENT:  EOMI. Anicteric sclerae. MMM Resp:  CTA bilaterally, no wheezing or rales. No accessory muscle use CV:  Regular  rhythm,  No edema GI:  Soft, Non distended, Non tender.  +Bowel sounds Neurologic:  Alert and oriented X 3, normal speech, Psych:   Good insight. Not anxious nor agitated Skin:  No rashes. No jaundice Reviewed most current lab test results and cultures  YES Reviewed most current radiology test results   YES Review and summation of old records today    NO Reviewed patient's current orders and MAR    YES 
PMH/SH reviewed - no change compared to H&P 
________________________________________________________________________ Care Plan discussed with: 
  Comments Patient x Family RN x Care Manager x Bandar Consultant x Multidiciplinary team rounds were held today with , nursing, pharmacist and clinical coordinator. Patient's plan of care was discussed; medications were reviewed and discharge planning was addressed. ________________________________________________________________________ Total NON critical care TIME:  26   Minutes Total CRITICAL CARE TIME Spent:   Minutes non procedure based Comments >50% of visit spent in counseling and coordination of care    
________________________________________________________________________ Katerin Brennan MD  
 
Procedures: see electronic medical records for all procedures/Xrays and details which were not copied into this note but were reviewed prior to creation of Plan. LABS: 
I reviewed today's most current labs and imaging studies. Pertinent labs include: 
Recent Labs  
  09/22/20 
0133 09/21/20 
1105 WBC 7.4 6.7 HGB 12.7 13.8 HCT 41.5 45.3  291 Recent Labs  
  09/23/20 
0448 09/22/20 
0133 09/21/20 
1105  140 141  
K 3.8 3.6 3.8  104 103 CO2 31 31 34* GLU 91 105* 105* BUN 47* 33* 23* CREA 1.62* 1.86* 1.31* CA 8.8 9.1 9.5 ALB  --   --  3.5 TBILI  --   --  0.5 ALT  --   --  25 INR  --   --  1.0 Signed: Katerin Brennan MD

## 2020-09-23 NOTE — PROGRESS NOTES
ADULT PROTOCOL: JET AEROSOL  REASSESSMENT Patient  Kathie Lagos     67 y.o.   female     9/23/2020  7:45 AM 
 
Breath Sounds Pre Procedure: Right Breath Sounds: Clear Left Breath Sounds: Clear Breath Sounds Post Procedure: Right Breath Sounds: Clear Left Breath Sounds: Clear Breathing pattern: Pre procedure Breathing Pattern: Regular Post procedure Breathing Pattern: Regular Heart Rate: Pre procedure Pulse: 75 Post procedure Pulse: 75 Resp Rate: Pre procedure Respirations: 16 Post procedure Respirations: 16 
 
 
Cough: Pre procedure Cough: Non-productive Post procedure Cough: Non-productive Oxygen: O2 Device: Nasal cannula   2 lpm; Home O2 2 lpm 
   Changed: NO SpO2: Pre procedure SpO2: 97 %   with oxygen Post procedure SpO2: 100 %  with oxygen Nebulizer Therapy: Current medications Aerosolized Medications: Pulmicort Changed: NO 
 
 
 
Problem List:  
Patient Active Problem List  
Diagnosis Code  OA (osteoarthritis) of knee M17.10  Cardiomyopathy ([facility] I42.9  [facility] C50.919  
 Hypertension I10  Vitamin D deficiency E55.9  Hyperlipidemia E78.5  Knee pain, bilateral M25.561, M25.562  Fatigue R53.83  
 COPD (chronic obstructive pulmonary disease) ([facility]) J44.9  Vertigo, aural H81.319  Thrush B37.0  DJD (degenerative joint disease) M19.90  Chest pain at rest R07.9  Myalgia M79.10  Cataract H26.9  Hyperkalemia E87.5  CHF (congestive heart failure) ([facility]) I50.9  Cellulitis L03.90  Chronic maxillary sinusitis J32.0  Esophagitis, reflux K21.0  Elevated BUN R79.9  Hyperglycemia R73.9  Severe obesity ([facility]) E66.01  
 Bone metastases ([facility]) C79.51  Thoracic compression fracture ([facility]) S22.000A  History of breast cancer Z85.3 Respiratory Therapist: Nancy Conn RT

## 2020-09-23 NOTE — PROGRESS NOTES
Physical Therapy PT consult received and chart reviewed. Per Dr Nina Rice hold PT evaluation until after kyphoplasty. Kyphoplasty was supposed to happen today but was put on hold currently. IF it is decided there will be no kyphoplasty then PT can proceed with evaluation.    
Isabela Nova, PT, DPT

## 2020-09-24 NOTE — NURSE NAVIGATOR
ONCOLOGY NURSE NAVIGATOR Diogo Faustin 68 yo Dx: Metastatic Breast Ca; Cord compression ONN introduced self and role. Hx of breast cancer, seen in ER 4/20 CT scan possible thoracic mets States wishes to go to [address] or [address] [facility] [facility] when DC for rehab. Shares she will need assistance either at home or LTC. Medicare/ Has Medicaid card, used to have SNAP $9/mo so she gave up. Energy assistance. TC to [facility] to verify, no Medicaid # on file. TC to dtr (w/ pt's permission) to provide information on applying for Medicaid. Palliative consult.

## 2020-09-24 NOTE — PROGRESS NOTES
Bedside and Verbal shift change report given to Jatin Rutledge (oncoming nurse) by Karen Davis RN (offgoing nurse). Report included the following information SBAR, Kardex, Intake/Output and MAR.

## 2020-09-24 NOTE — PROGRESS NOTES
Physical Therapy Note: 
 
Orders acknowledged and chart reviewed. Note pt was supposed to have kyphoplasty yesterday but that is currently on hold. Per Dr. Alejandro Johnson hold PT evaluation until after kyphoplasty. Will defer today and follow up with PT evaluation as appropriate. Thank you.

## 2020-09-24 NOTE — PROGRESS NOTES
Problem: Falls - Risk of 
Goal: *Absence of Falls Description: Document Brittany Johnson Fall Risk and appropriate interventions in the flowsheet. Outcome: Progressing Towards Goal 
Note: Fall Risk Interventions: 
Mobility Interventions: Patient to call before getting OOB, Communicate number of staff needed for ambulation/transfer Medication Interventions: Patient to call before getting OOB Elimination Interventions: Call light in reach History of Falls Interventions: Evaluate medications/consider consulting pharmacy Problem: Patient Education: Go to Patient Education Activity Goal: Patient/Family Education Outcome: Progressing Towards Goal 
  
Problem: Pressure Injury - Risk of 
Goal: *Prevention of pressure injury Description: Document Shan Scale and appropriate interventions in the flowsheet. Outcome: Progressing Towards Goal 
Note: Pressure Injury Interventions: 
Sensory Interventions: Assess changes in LOC, Keep linens dry and wrinkle-free, Minimize linen layers Moisture Interventions: Absorbent underpads, Internal/External urinary devices Activity Interventions: Pressure redistribution bed/mattress(bed type), Increase time out of bed Mobility Interventions: Float heels, HOB 30 degrees or less Nutrition Interventions: Document food/fluid/supplement intake Problem: Patient Education: Go to Patient Education Activity Goal: Patient/Family Education Outcome: Progressing Towards Goal

## 2020-09-24 NOTE — PROGRESS NOTES
Palliative Medicine [place]: [phone] ([phone]) [facility]: [phone] ([phone]) LCSW met with daughter and patient to introduce role of Palliative LCSW, assisted in completing AMD and POST (see ACP note) and spent some time with patient and daughter assessing patient's/ family's needs. Patient lives on her own, in a senior apartment. She has support of daughter Kunal Zapata, who lives just a few miles away. Unsure of history as to why patient did not follow through on treatment recommendations. Cecily appears to be helpful and supportive, she did note that she would bring her mother home with her, but they don't have a first floor bedroom or bathroom. She likely would assist if needed. Patient has a good understanding of her general medical issues, but may need ongoing education regarding what doctors are recommending for her. Cecily felt that patient did not fully understand what was going to be done in regards to her surgery. She noted that it would be helpful if doctors are able to talk to her as well, so she is aware. Patient may be overwhelmed at times and may have some difficulty processing all the information that she is receiving. Patient is very open about her wishes and goals. She shared that she was intubated in the past and if this helps her in some way, that she is open to it. However at end of life she wants to focus on her comfort and pain control. At this time, patient is open to any treatment recommendations that are proposed. She would like to be able to be more functional than she is right now. Patient is well supported by her [facility]), her Alf Shane would like to come and visit her. Will need to see if he can come and not be \"counted\" as her visitor for the day. LCSW remains available to her for emotional support if needed. Will follow up with patient next week.  Daughter would like to be able to speak to the primary treatment team.

## 2020-09-24 NOTE — PROGRESS NOTES
Full note dictated   Spoke with Dr Ariel Walls and pt with her daughter  I feel we can decompress and stabilize the thoracic kyphosis. Described risks and benefits and she agrees to proceed

## 2020-09-24 NOTE — PROGRESS NOTES
[facility] 
at [place] [address], [facility], [address] 
[phone] Oncology progress Note Patient: Mar Gracia MRN: [id number]  SSN: [id number] YOB: 1945  Age: 67 y.o. Sex: female Subjective:  
  
Mar Gracia is a 67 y.o. female who I am seeing in consultation on request of Dr. Jorge Box. Ms. Sade Fernandez received treatment for left sided breast cancer in the 1990's. She received systemic chemotherapy and mastectomy. She was noted to have sclerotic lesions in the bone in April and then today in the x-rays. She has scattered and non-specific pain in the left arm, back. She has fallen a few times. She has burning sensation in the left arm. Interval History: 
 
9/22/2020 We discussed findings of the MRI with Ms. Sade Fernandez today. She says she has been complaining of pain in the back since 2003 when she lived in the [facility]. She believes she had a PET scan between 6411-2579 at an oncology practice located in the hospital. She now has pain in her right upper thigh area. She has left lymphedema from her prior lymph node excision. She notes she was on hormonal medication, Premarin prior to her diagnosis of breast cancer. She had chemotherapy, mastectomy and LN excision ( all negative ), but never took an aromatase inhibitor or tamoxifen. She has not had a mammogram for years, former smoker (quit in 1989) and lives by herself. She follows with Dr. Louie Cortes for cardiology and Dr. Michael Sanz for her COPD.  
 
9/23/2020 Ms. Sade Fernandez was scheduled to have a kyphoplasty and ablation today, but this was cancelled and a neurosurgical consult was ordered for potential stabilization surgery. 9/24/2020 MRI of the cervical spine completed. Patient has not been ambulating since inpatient. Neurosurgical consult to be completed today. Review of Systems: All systems are negative except what is listed in the HPI Past Medical History:  
Diagnosis Date  Arthralgia 8/17/2017  Arthritis  Asthma Mild to Moderate  Breast cancer ([address] 8/17/2017 Onset 1997; Refusing Mammogram 6/16/17  Cancer ([address] 1997 Breast left  Cardiomyopathy ([address] 8/17/2017 Onset:1999 Ischemic; 25% - 50% (last EFx 45%) Continue with ACE/Lasix/coreg  Cataract 8/17/2017 Bilateral   
 Cellulitis 8/17/2017 Suspect local reaction to Pneumovax, treat with ice, NSAIDs or Tylenol  Chest pain at rest 8/17/2017  CHF (congestive heart failure) ([address] 8/17/2017 Stable, though weight up a little. To take 1 1/2 Lasix daily if swelling, 1 daily o/w  Chronic maxillary sinusitis 8/17/2017  Chronic pain Right knee  COPD (chronic obstructive pulmonary disease) ([address] 8/17/2017 Continue with inhalers  Diabetes ([address] Pre-diabetic  DJD (degenerative joint disease) 8/17/2017  Elevated BUN 8/17/2017  Esophagitis, reflux 8/17/2017  Fatigue 8/17/2017  GERD (gastroesophageal reflux disease)  Heart failure ([address] Cardiomyopathy, HX of MI 1999  Hyperglycemia 8/17/2017  Hyperkalemia 8/17/2017  Hyperlipidemia 8/17/2017  Hypertension 8/17/2017  Ill-defined condition Vertigo  Ill-defined condition 2003 HX of Urosepsis complicated by respiratory failure and pneumonnia  Myalgia 8/17/2017  Thromboembolus ([address] 1969  
 during 2nd pregnancy Devialexis Andi Nab 8/17/2017  Vertigo, aural 8/17/2017  Vitamin D deficiency 8/17/2017 Past Surgical History:  
Procedure Laterality Date [address] JONNY  
 HX GYN Left Breast Mastectomy  HX HEENT  2015 Bilateral Cataract  HX ORTHOPAEDIC Right 06/2018  
 knee replacement  HX ORTHOPAEDIC Left 11/2018  
 wrist surgery  HX VASCULAR ACCESS Port a cath on the right Family History Problem Relation Age of Onset  Cancer Mother  Other Father Social History Tobacco Use  Smoking status: Former Smoker Packs/day: 2.00 Years: 25.00 Pack years: 50.00 Last attempt to quit:  Years since quittin.7  Smokeless tobacco: Never Used Substance Use Topics  Alcohol use: No  
  
Prior to Admission medications Medication Sig Start Date End Date Taking? Authorizing Provider  
acetaminophen (TYLENOL) 500 mg tablet Take 500 mg by mouth every six (6) hours as needed for Pain. Yes Provider, Historical  
furosemide (LASIX) 20 mg tablet Take 60 mg by mouth daily. Yes Provider, Historical  
omeprazole (PRILOSEC) 20 mg capsule Take 1 capsule by mouth once daily 20  Yes Vicky Azar NP  
fenofibrate nanocrystallized (TRICOR) 145 mg tablet Take 1 tablet by mouth once daily 20  Yes Yonathan Azar NP  
meclizine (ANTIVERT) 25 mg tablet TAKE 1 TABLET BY MOUTH EVERY 6 HOURS AS NEEDED FOR DIZZINESS 20  Yes Vicky Azar NP Anoro Ellipta 62.5-25 mcg/actuation inhaler Take 1 Puff by inhalation daily. 3/23/20  Yes Other, MD Faizan  
sacubitril-valsartan (ENTRESTO) 49 mg/51 mg tablet Take 2 Tabs by mouth two (2) times a day. Yes Other, MD Faizan  
carvedilol (COREG CR) 40 mg CR capsule Take 1 Cap by mouth daily (with breakfast). 18  Yes Vicky Azar NP  
cholecalciferol (VITAMIN D3) 1,000 unit cap Take 1,000 Units by mouth daily. Yes Provider, Historical  
aspirin delayed-release 81 mg tablet Take 1 Tab by mouth daily. May resume in 30 days after completing twice daily Aspirin. 17  Yes Juanita Elizondo NP  
PSEUDOEPHEDRINE HCL (SINUS DECONGESTANT PO) Take 30 mg by mouth daily as needed. Yes Provider, Historical  
albuterol (PROVENTIL, VENTOLIN) 90 mcg/Actuation inhaler Take 1-2 Puffs by inhalation every four (4) hours as needed for Wheezing. 11   Saintclair Capra., PA Allergies Allergen Reactions  Morphine Anaphylaxis Objective:  
 
Vitals:  
 09/24/20 0600 09/24/20 0735 09/24/20 0800 09/24/20 1209 BP:   (!) 97/57 (!) 93/51 Pulse:   77 77 Resp:   20 18 Temp:   98.5 °F (36.9 °C) 98.4 °F (36.9 °C) SpO2:  98% 95% 97% Weight: 200 lb (90.7 kg) Height:      
  
 
Physical Exam: 
 
GENERAL: alert, cooperative, no distress, appears stated age EYE: conjunctivae/corneas clear. PERRL, EOM's intact LYMPHATIC: Cervical, supraclavicular, and axillary nodes normal.  
THROAT & NECK: normal and no erythema or exudates noted. LUNG: clear to auscultation bilaterally HEART: regular rate and rhythm, S1, S2 normal, no murmur, click, rub or gallop ABDOMEN: soft, non-tender. Bowel sounds normal. No masses,  no organomegaly EXTREMITIES:  extremities normal, atraumatic, no cyanosis or edema SKIN: Normal. 
NEUROLOGIC: AOx3. Gait normal. Reflexes and motor strength normal and symmetric. Cranial nerves 2-12 and sensation grossly intact. CT Results (most recent): 
Results from [facility] [place] Encounter encounter on 04/17/20 CT ABD PELV W CONT Narrative EXAM:  CTA CHEST W OR W WO CONT, CT ABD PELV W CONT INDICATION:   RUQ pain, R thoracic back pain, SOB, h/o DVT 
 
COMPARISON: 8/26/2013. CHEST CTA: 
 
TECHNIQUE:  
Precontrast  images were obtained to localize the volume for acquisition. Multislice helical CT arteriography was performed from the diaphragm to the 
thoracic inlet during uneventful rapid bolus of 100 cc Isovue-370. Lung and soft 
tissue windows were generated. Coronal and sagittal images were generated and 3D post processing consisting of coronal maximum intensity images was performed. CT dose reduction was achieved through use of a standardized protocol tailored 
for this examination and automatic exposure control for dose modulation. FINDINGS: 
CHEST: 
THYROID: No nodule. MEDIASTINUM: No mass or lymphadenopathy. DOROTHY: No mass or lymphadenopathy. THORACIC AORTA: No dissection or aneurysm. MAIN PULMONARY ARTERY: There is no evidence of pulmonary embolism. TRACHEA/BRONCHI: Patent. ESOPHAGUS: No wall thickening or dilatation. HEART: Normal in size. PLEURA: No effusion or pneumothorax. LUNGS: No nodule, mass, or airspace disease. BONES: Degenerative changes are seen in the thoracic spine. Tiny sclerotic foci 
are scattered throughout the thoracic vertebral bodies. Impression IMPRESSION: No acute process or evidence of pulmonary embolism. Tiny sclerotic 
foci are scattered throughout the vertebral bodies. ABDOMEN/PELVIC CT: 
 
TECHNIQUE:  
Following the uneventful intravenous administration of 100 cc Isovue-370, thin 
axial images were obtained through the abdomen and pelvis. Coronal and sagittal 
reconstructions were generated. Oral contrast was not administered. CT dose 
reduction was achieved through use of a standardized protocol tailored for this 
examination and automatic exposure control for dose modulation. FINDINGS:  
LIVER: No mass or biliary dilatation. GALLBLADDER: Unremarkable. SPLEEN: No mass. PANCREAS: No mass or ductal dilatation. ADRENALS: Unremarkable. KIDNEYS: Left renal cysts, the largest of which measures 2.8 cm. Scarring left 
kidney. STOMACH: Unremarkable. SMALL BOWEL: No dilatation or wall thickening. COLON: Sigmoid diverticulosis. APPENDIX: Unremarkable. PERITONEUM: No ascites or pneumoperitoneum. RETROPERITONEUM: No lymphadenopathy or aortic aneurysm. REPRODUCTIVE ORGANS: The uterus is surgically absent. URINARY BLADDER: No mass or calculus. BONES: Sclerotic lesions are noted in the iliac wings bilaterally, sacrum, and 
lumbar spine. ADDITIONAL COMMENTS: N/A IMPRESSION: 
No acute abnormality. Sclerotic foci are noted concerning for osseous metastatic 
disease. Correlation with bone scan is recommended. I personally reviewed CT and x-rays. Sclerotic lesions. Vertebral fracture Lab Results Component Value Date/Time WBC 7.4 09/22/2020 01:33 AM  
 HGB (POC) 12.7 09/13/2017 11:20 AM  
 HGB 12.7 09/22/2020 01:33 AM  
 HCT (POC) 39.3 09/13/2017 11:20 AM  
 HCT 41.5 09/22/2020 01:33 AM  
 PLATELET 453 36/55/9573 01:33 AM  
 MCV 95.2 09/22/2020 01:33 AM  
 
 
 
Lab Results Component Value Date/Time Sodium 140 09/24/2020 02:06 AM  
 Potassium 4.2 09/24/2020 02:06 AM  
 Chloride 106 09/24/2020 02:06 AM  
 CO2 32 09/24/2020 02:06 AM  
 Anion gap 2 (L) 09/24/2020 02:06 AM  
 Glucose 108 (H) 09/24/2020 02:06 AM  
 BUN 37 (H) 09/24/2020 02:06 AM  
 Creatinine 1.09 (H) 09/24/2020 02:06 AM  
 BUN/Creatinine ratio 34 (H) 09/24/2020 02:06 AM  
 GFR est AA 59 (L) 09/24/2020 02:06 AM  
 GFR est non-AA 49 (L) 09/24/2020 02:06 AM  
 Calcium 8.7 09/24/2020 02:06 AM  
 Bilirubin, total 0.5 09/21/2020 11:05 AM  
 Alk. phosphatase 124 (H) 09/21/2020 11:05 AM  
 Protein, total 7.8 09/21/2020 11:05 AM  
 Albumin 3.5 09/21/2020 11:05 AM  
 Globulin 4.3 (H) 09/21/2020 11:05 AM  
 A-G Ratio 0.8 (L) 09/21/2020 11:05 AM  
 ALT (SGPT) 25 09/21/2020 11:05 AM  
 AST (SGOT) 31 09/21/2020 11:05 AM  
 
 
 
Assessment:  
 
1. Back pain 2. Burning pain in the left arm 3. Sclerotic bony lesions 4. Vertebral fractures History of breast cancer in the 1990's. Left mastectomy with LN dissection ( negative LN) at [facility] - Dr. Paty John. Had chemotherapy, but does not remember the oncologist. States she never took a pill following chemotherapy or surgery. Followed up with oncology in the [facility] until 2005 when she moved back to [facility]. Has not had a mammogram since that time. Former smoker, quit in [facility]. CA 27.29 - 47.0 Need pathology to confirm diagnosis Kyphoplasty and ablation scheduled was cancelled. Neurosurgery has been consulted Plan: 1. Neurosurgical consult  - today 2. Need pathology for diagnosis 3. Plan of care to be discussed after diagnosis Signed By: Maricruz Ash, GALLO September 24, 2020

## 2020-09-24 NOTE — PROGRESS NOTES
Palliative Medicine Consult Jacques: [phone] ([phone]) Patient Name: Jaz Kendall YOB: 1945 Date of Initial Consult: 9/23/2020 Reason for Consult: overwhelming sxs Requesting Provider: Jose Gonzalez MD  
Primary Care Physician: Shana Murrell NP 
 
 SUMMARY:  
Jaz Kendall is a 67 y.o. with a past history of COPD, congestive heart failure, dyslipidemia, gastroesophageal reflux disease, who was admitted on 9/21/2020 from home with a diagnosis of compression fracture of thoracic vertebra. Current medical issues leading to Palliative Medicine involvement include: pain management, newly diagnosed spine lesions. Psychosocial: lives in low income senior apt. Has 2 dtrs and 1 son. PALLIATIVE DIAGNOSES:  
1. Generalized weakness 2. Falls 3. Fatigue 4. Weakness of extremities 5. Back pain 6. Thoracic vertebral fracture (T4) 
7. Sclerotic bony lesions 8. burning pain in left arm 9. Constipation 10. Care decisions PLAN:  
1. Prior to visit, I completed a review of patient's medical records, including medical documentation, vital signs, MARs, and results of various labs and other diagnostics. 2. PAIN:  pt describes neuropathic pain in left arm, bone pain in her spine, rates both at 4/10. She has asked for and received 3 doses of percocet in the past 24 hours. 1. Percocet 5/325mg q. 4 hours prn.  
2. Increase Gabapentin 100mg bid to tid. 3. Cymbalta 20mg daily for pain and depression. 4. Narcan 0.4mg IVP q. 2 min prn RR<8 and/or per protocol. 3. CONSTIPATION: pt states she is constipated, last BM 4 days ago. She tells me she is afraid to go because it hurts her back too much to twist and wipe. I assured her that the RNs would help her with this and post-operatively PT/OT will teach her what to do. 1. Continue pericolace 1 tab daily. 2. Miralax 17 gm daily. 4. GOALS OF CARE:  Palliative SW will meet with pt today to assist with AMD/POST. 5. Initial consult note routed to primary continuity provider and/or primary health care team members 6. Communicated plan of care with: Palliative Jigar SNYDER [facility] Team 
 
 GOALS OF CARE / TREATMENT PREFERENCES:  
 
GOALS OF CARE:  Pending workup Patient/Health Care Proxy Stated Goals: Prolong life TREATMENT PREFERENCES:  
Code Status: Full Code Advance Care Planning: 
[x] [facility] Interdisciplinary Team has updated the ACP Navigator with [employer] and Patient Capacity Primary Decision Maker (Active): Ermias Goss - Daughter - [phone] Secondary Decision Maker: Noemi Rodriguez Daughter - [phone] Secondary Decision Maker: Nevin Lombardo - Son - [phone] Advance Care Planning 9/24/2020 Patient's Healthcare Decision Maker is: Named in scanned ACP document Primary Decision Maker Name -  
Primary Decision Maker Phone Number -  
Primary Decision Maker Relationship to Patient -  
Confirm Advance Directive Yes, on file Patient Would Like to Complete Advance Directive - Does the patient have other document types Do Not Resuscitate Other Instructions: Other: As far as possible, the palliative care team has discussed with patient / health care proxy about goals of care / treatment preferences for patient. HISTORY:  
 
History obtained from: chart, patient CHIEF COMPLAINT: LE weakness and falls HPI/SUBJECTIVE: The patient is:  
[x] Verbal and participatory [] Non-participatory due to:  
 
9/21: presented to ED with acute onset of generalized fatigue, multiple falls (at least 3 falls this week), \"my legs just feel weak and I fall. \"  She started having lower back pain after falling yesterday, that is exacerbated with movement and radiates down her legs. No relieving factors. No numbness, tingling, or bowel or bladder issues.   Pt was in her usual state of health until about 1 month ago when she started not feeling well, with generalized weakness involving her 4 extremities, and decreased exercise intolerance. Pt has remote h/o breast cancer in 1999, s/p chemo and mastectomy. She reports that she had follow-up imaging in early 2000's and was told she might have bone cancer, but then another oncologist said it wasn't true and she hasn't followed up since. CXR reveals new lower thoracic compression fracture and diffuse osseous mets. Chest CT reveals new lower thoracic 9/23: pt reports that she has had numbness in her left chest and arm since mastectomy, but starting 2 months ago she's started experiencing burning pain in the same area, the pain comes and goes and she doesn't know what exacerbates it, sometimes lying down will help. Since her last fall her low back has been very painful, achy. Clinical Pain Assessment (nonverbal scale for severity on nonverbal patients):  
Clinical Pain Assessment Severity: 4 Location: and achy back pain Character: burning in her chest/arm, achy in her back Duration: 2 months, days Effect: limits mobility, BLE weakness Frequency: constant Activity (Movement): Restless, excessive activity and/or withdrawal reflexes Duration: for how long has pt been experiencing pain (e.g., 2 days, 1 month, years) Frequency: how often pain is an issue (e.g., several times per day, once every few days, constant) FUNCTIONAL ASSESSMENT:  
 
Palliative Performance Scale (PPS): PPS: 20 
 
 
 PSYCHOSOCIAL/SPIRITUAL SCREENING:  
 
Palliative IDT has assessed this patient for cultural preferences / practices and a referral made as appropriate to needs (Cultural Services, Patient Advocacy, Ethics, etc.) Any spiritual / Advent concerns: 
[] Yes /  [x] No 
 
Caregiver Burnout: 
[] Yes /  [x] No /  [] No Caregiver Present Anticipatory grief assessment:  
[x] Normal  / [] Maladaptive ESAS Anxiety: Anxiety: 0 
 
ESAS Depression: Depression: 3 REVIEW OF SYSTEMS:  
 
Positive and pertinent negative findings in ROS are noted above in HPI. The following systems were [x] reviewed / [] unable to be reviewed as noted in HPI Other findings are noted below. Systems: constitutional, ears/nose/mouth/throat, respiratory, gastrointestinal, genitourinary, musculoskeletal, integumentary, neurologic, psychiatric, endocrine. Positive findings noted below. Modified ESAS Completed by: provider Fatigue: 4 Drowsiness: 0 Depression: 3 Pain: 4 Anxiety: 0 Nausea: 0 Anorexia: 0 Dyspnea: 0 Constipation: Yes Stool Occurrence(s): 1 PHYSICAL EXAM:  
 
From RN flowsheet: 
Wt Readings from Last 3 Encounters:  
09/24/20 200 lb (90.7 kg) 09/23/20 198 lb (89.8 kg) 04/17/20 221 lb 5.5 oz (100.4 kg) Blood pressure 110/61, pulse 65, temperature 97.9 °F (36.6 °C), resp. rate 18, height 5' 5\" (1.651 m), weight 200 lb (90.7 kg), SpO2 98 %. Pain Scale 1: Numeric (0 - 10) Pain Intensity 1: 7 Pain Onset 1: movement Pain Location 1: Back Pain Orientation 1: Lower Pain Description 1: Aching Pain Intervention(s) 1: Medication (see MAR) Last bowel movement, if known:  
 
Constitutional: WD, WN, NAD, pleasant and cooperative, AAOx3 Eyes: pupils equal, anicteric ENMT: no nasal discharge, moist mucous membranes Cardiovascular: regular rhythm, distal pulses intact Respiratory: breathing not labored, symmetric Gastrointestinal: soft non-tender, +bowel sounds Musculoskeletal: no deformity, low spine tenderness to palpation Skin: warm, dry Neurologic: following commands, moving all extremities Psychiatric: full affect, no hallucinations Other: 
 
 
 HISTORY:  
 
Active Problems: 
  Bone metastases ([address] (9/21/2020) Thoracic compression fracture ([address] (9/21/2020) History of breast cancer (9/21/2020) Pain due to malignant neoplasm metastatic to bone ([address] (9/23/2020) Neuropathic pain (9/23/2020) Drug-induced constipation (9/23/2020) Weakness of both legs (9/23/2020) Past Medical History:  
Diagnosis Date  Arthralgia 8/17/2017  Arthritis  Asthma Mild to Moderate  Breast cancer ([address] 8/17/2017 Onset 1997; Refusing Mammogram 6/16/17  Cancer ([address] 1997 Breast left  Cardiomyopathy ([address] 8/17/2017 Onset:1999 Ischemic; 25% - 50% (last EFx 45%) Continue with ACE/Lasix/coreg  Cataract 8/17/2017 Bilateral   
 Cellulitis 8/17/2017 Suspect local reaction to Pneumovax, treat with ice, NSAIDs or Tylenol  Chest pain at rest 8/17/2017  CHF (congestive heart failure) ([address] 8/17/2017 Stable, though weight up a little. To take 1 1/2 Lasix daily if swelling, 1 daily o/w  Chronic maxillary sinusitis 8/17/2017  Chronic pain Right knee  COPD (chronic obstructive pulmonary disease) ([address] 8/17/2017 Continue with inhalers  Diabetes ([address] Pre-diabetic  DJD (degenerative joint disease) 8/17/2017  Elevated BUN 8/17/2017  Esophagitis, reflux 8/17/2017  Fatigue 8/17/2017  GERD (gastroesophageal reflux disease)  Heart failure ([address] Cardiomyopathy, HX of MI 1999  Hyperglycemia 8/17/2017  Hyperkalemia 8/17/2017  Hyperlipidemia 8/17/2017  Hypertension 8/17/2017  Ill-defined condition Vertigo  Ill-defined condition 2003 HX of Urosepsis complicated by respiratory failure and pneumonnia  Myalgia 8/17/2017  Thromboembolus ([address] 1969  
 during 2nd pregnancy [place] 8/17/2017  Vertigo, aural 8/17/2017  Vitamin D deficiency 8/17/2017 Past Surgical History:  
Procedure Laterality Date [facility] JONNY  
 HX GYN Left Breast Mastectomy  HX HEENT  2015 Bilateral Cataract  HX ORTHOPAEDIC Right 06/2018  
 knee replacement  HX ORTHOPAEDIC Left 11/2018  
 wrist surgery  HX VASCULAR ACCESS Port a cath on the right Family History Problem Relation Age of Onset  Cancer Mother  Other Father History reviewed, no pertinent family history. Social History Tobacco Use  Smoking status: Former Smoker Packs/day: 2.00 Years: 25.00 Pack years: 50.00 Last attempt to quit:  Years since quittin.7  Smokeless tobacco: Never Used Substance Use Topics  Alcohol use: No  
 
Allergies Allergen Reactions  Morphine Anaphylaxis Current Facility-Administered Medications Medication Dose Route Frequency  albuterol (PROVENTIL VENTOLIN) nebulizer solution 2.5 mg  2.5 mg Nebulization Q4H PRN  
 gabapentin (NEURONTIN) capsule 100 mg  100 mg Oral TID  budesonide (PULMICORT) 250 mcg/2ml nebulizer susp  250 mcg Nebulization BID RT  
 oxyCODONE-acetaminophen (PERCOCET) 5-325 mg per tablet 1 Tab  1 Tab Oral Q4H PRN  
 DULoxetine (CYMBALTA) capsule 20 mg  20 mg Oral DAILY  polyethylene glycol (MIRALAX) packet 17 g  17 g Oral DAILY  naloxone (NARCAN) injection 0.4 mg  0.4 mg IntraVENous EVERY 2 MINUTES AS NEEDED  carvediloL (COREG) tablet 3.125 mg  3.125 mg Oral BID WITH MEALS  senna-docusate (PERICOLACE) 8.6-50 mg per tablet 1 Tab  1 Tab Oral DAILY  ondansetron (ZOFRAN) injection 4 mg  4 mg IntraVENous Q4H PRN  
 sodium chloride (NS) flush 5-40 mL  5-40 mL IntraVENous Q8H  
 sodium chloride (NS) flush 5-40 mL  5-40 mL IntraVENous PRN  
 acetaminophen (TYLENOL) tablet 650 mg  650 mg Oral Q6H PRN Or  
 acetaminophen (TYLENOL) suppository 650 mg  650 mg Rectal Q6H PRN  
 [Held by provider] furosemide (LASIX) tablet 40 mg  40 mg Oral DAILY  pantoprazole (PROTONIX) tablet 40 mg  40 mg Oral ACB  [Held by provider] sacubitriL-valsartan (ENTRESTO) 49-51 mg tablet 2 Tab  2 Tab Oral BID  
 
 
 
 LAB AND IMAGING FINDINGS:  
 
Lab Results Component Value Date/Time WBC 7.4 2020 01:33 AM  
 HGB 12.7 2020 01:33 AM  
 PLATELET 374  01:33 AM  
 
Lab Results Component Value Date/Time Sodium 140 09/24/2020 02:06 AM  
 Potassium 4.2 09/24/2020 02:06 AM  
 Chloride 106 09/24/2020 02:06 AM  
 CO2 32 09/24/2020 02:06 AM  
 BUN 37 (H) 09/24/2020 02:06 AM  
 Creatinine 1.09 (H) 09/24/2020 02:06 AM  
 Calcium 8.7 09/24/2020 02:06 AM  
  
Lab Results Component Value Date/Time Alk. phosphatase 124 (H) 09/21/2020 11:05 AM  
 Protein, total 7.8 09/21/2020 11:05 AM  
 Albumin 3.5 09/21/2020 11:05 AM  
 Globulin 4.3 (H) 09/21/2020 11:05 AM  
 
Lab Results Component Value Date/Time INR 1.0 09/21/2020 11:05 AM  
 Prothrombin time 10.8 09/21/2020 11:05 AM  
 aPTT 24.1 09/21/2020 11:05 AM  
  
No results found for: IRON, FE, TIBC, IBCT, PSAT, FERR No results found for: PH, PCO2, PO2 No components found for: [place] No results found for: CPK, CKMB Total time:  
Counseling / coordination time, spent as noted above:  
> 50% counseling / coordination?:  
 
Prolonged service was provided for  []30 min   []75 min in face to face time in the presence of the patient, spent as noted above. Time Start:  
Time End:  
Note: this can only be billed with 92801 (initial) or 29015 (follow up). If multiple start / stop times, list each separately.

## 2020-09-24 NOTE — PROGRESS NOTES
Orthopedic End of Shift Note 
  Bedside and Verbal shift change report given to  (oncoming nurse) by Lucho Gutierrez (offgoing nurse). Report included the following information SBAR, Kardex, MAR and Recent Results.  
  
POD#   
Significant issues during shift: none    
  
Issues for Physician to address: none 
  
Activity This Shift 
(check all that apply) []? chair 
[]? dangle 
  []? bathroom 
[]? bedside commode []? hallway [x]? bedrest  
Nausea/Vomiting []? yes [x]? no     
Voiding Status [x]? void []? Bangura []? I&O Cath Bowel Movements []? yes [x]? no    
Foot Pumps or SCD [x]? yes []? no    
Ice Pack []? yes    [x]? no    
Incentive Spirometer [x]? yes []? no Volume:   800 Telemetry Monitoring   [x]? yes []? no Rhythm: NSR Supplemental O2 [x]?  yes []? no Sat off O2:   94%

## 2020-09-24 NOTE — PROGRESS NOTES
Hospitalist Progress Note NAME: Vaishnavi Coelho :  1945 MRN:  [id number] Assessment / Plan: 
Acute thoracic compression fracture POA- likely pathological given history of osseous metastasis L Upper extremity Burning sensation POA 
R/o Cervical Cord compression Bone scan noted- Multiple skeletal metastases MRI C spine noted for severe spinal stenosis with Compression fractures MRI noted for Mid thoracic Compression fracture Cont PO Percocet for pain control. Palliative Pain consulted- following Added stool softner Plans of Kyphoplasty on hold now- Neuro surgery consulted- Dr Gricel Fall to see pt today Cont gabapentin trial to see if helpful with burning sensation in L UE 
  
Osseous metastasis POA History of breast cancer about 18 years ago and underwent chemotherapy with mastectomy 
-She reports that she was told she had a bone cancer about 2 years after breast cancer was cured by a physician in [facility]. She reports that she had repeat imaging shortly after that and was told she had no cancer CT chest and abdomen in 2020 which showed evidence of osseous metastasis and patient was advised outpatient follow-up but patient reports that she did not follow-up with any oncologist 
 
[address] [place] [address] Oncology consulted- recommends neurosurgery eval- Dr Gricel Fall for thoracic Laminectomy to stabilize spine & get diagnostic biopsy 
 
  
History of COPD not in exacerbation 
-Resume home inhalers 
  
Chronic systolic congestive heart failure POA- well compensated, CXR clear Hypotension today AM noted H/o Hypertension Keep Holding home Lasix, Entresto due to hypotension Cont Decreased Coreg to 3.125 BID for now 
  
 
GERD 
-Continue home PPI 
  
Code Status: Full code Surrogate Decision Maker: Daughter 
  
DVT Prophylaxis: Lovenox 
  
  
Baseline: From home, independent of ADLs 30.0 - 39.9 Obese / Body mass index is 34.85 kg/m². Weightloss recommended Recommended Disposition: SNF/LTC and  PT, OT, RN?? TBD- PT/OT eval held for now till cleared by Neurosurgery COVID test for placement done  9/23 PM- pending Subjective: Chief Complaint / Reason for Physician Visit :F/U Gen Weakness, Falls , Back pain, L arm burning sensation \"I am ok but have burning sensation in L arm\". Discussed with RN events overnight. Review of Systems: 
Symptom Y/N Comments  Symptom Y/N Comments Fever/Chills n   Chest Pain n   
Poor Appetite y   Edema n   
Cough n   Abdominal Pain Sputum n   Joint Pain y   
SOB/TAVAREZ n   Pruritis/Rash Nausea/vomit n   Tolerating PT/OT n Due to pain Diarrhea    Tolerating Diet y Constipation    Other y L arm burning sensation Could NOT obtain due to:   
 
Objective: VITALS:  
Last 24hrs VS reviewed since prior progress note. Most recent are: 
Patient Vitals for the past 24 hrs: 
 Temp Pulse Resp BP SpO2  
09/24/20 0735     98 %  
09/24/20 0326 98.2 °F (36.8 °C) 76 18 116/62 98 %  
09/23/20 2304 98.4 °F (36.9 °C) 78 18 (!) 110/54 97 % 09/23/20 2108     95 % 09/23/20 2034 98.8 °F (37.1 °C) 78 20 122/66 96 %  
09/23/20 1720 98.9 °F (37.2 °C) 78 16 121/60 95 % 09/23/20 1400 98.5 °F (36.9 °C) 76 18 (!) 109/52 97 % Intake/Output Summary (Last 24 hours) at 9/24/2020 5832 Last data filed at 9/23/2020 2125 Gross per 24 hour Intake  Output 1300 ml Net -1300 ml PHYSICAL EXAM: 
General: WD, WN. Alert, cooperative, no acute distress   
EENT:  EOMI. Anicteric sclerae. MMM Resp:  CTA bilaterally, no wheezing or rales. No accessory muscle use CV:  Regular  rhythm,  No edema GI:  Soft, Non distended, Non tender.  +Bowel sounds Neurologic:  Alert and oriented X 3, normal speech, Psych:   Good insight. Not anxious nor agitated Skin:  No rashes. No jaundice Reviewed most current lab test results and cultures  YES Reviewed most current radiology test results   YES 
 Review and summation of old records today    NO Reviewed patient's current orders and MAR    YES 
PMH/SH reviewed - no change compared to H&P 
________________________________________________________________________ Care Plan discussed with: 
  Comments Patient x Family RN x Care Manager x Leslie Consultant     
                 x Multidiciplinary team rounds were held today with , nursing, pharmacist and clinical coordinator. Patient's plan of care was discussed; medications were reviewed and discharge planning was addressed. ________________________________________________________________________ Total NON critical care TIME:  26   Minutes Total CRITICAL CARE TIME Spent:   Minutes non procedure based Comments >50% of visit spent in counseling and coordination of care    
________________________________________________________________________ Josué Casas MD  
 
Procedures: see electronic medical records for all procedures/Xrays and details which were not copied into this note but were reviewed prior to creation of Plan. LABS: 
I reviewed today's most current labs and imaging studies. Pertinent labs include: 
Recent Labs  
  09/22/20 
0133 09/21/20 
1105 WBC 7.4 6.7 HGB 12.7 13.8 HCT 41.5 45.3  291 Recent Labs  
  09/24/20 
0206 09/23/20 
0448 09/22/20 
0133 09/21/20 
1105  139 140 141  
K 4.2 3.8 3.6 3.8  104 104 103 CO2 32 31 31 34* * 91 105* 105* BUN 37* 47* 33* 23* CREA 1.09* 1.62* 1.86* 1.31* CA 8.7 8.8 9.1 9.5 ALB  --   --   --  3.5 TBILI  --   --   --  0.5 ALT  --   --   --  25 INR  --   --   --  1.0 Signed: Josué Casas MD

## 2020-09-24 NOTE — CONSULTS
[facility] Name:  Gay Roth 
MR#:  [id number] :  1945 ACCOUNT #:  [de-identified] DATE OF SERVICE:  2020 REQUESTING PHYSICIAN:  Dr. Tete Brannon in Oncology. CHIEF COMPLAINT:  A 79-year-old woman with a history of breast cancer who now presents with multiple osseous metastases to the spine including a kyphosis in the upper thoracic spine. HISTORY OF PRESENT ILLNESS:  She is admitted for pain control. The diagnosis of breast cancer was 18 years ago. She underwent chemotherapy and mastectomy at that time. She was here in April when she was told that there were only a few small sites in the lower spine that may be consistent with metastatic disease. She comes back now. She has fallen a couple of times saying now that she is unaware of where her feet are, but she slips and falls. She has a history of hypertension and mild systolic congestive heart failure. She is on Lasix, Coreg, and Entresto. She has COPD. She has had a left breast mastectomy, bilateral cataract surgery, knee surgery, wrist surgery, Port-A-Cath on the right, and a hysterectomy. MEDICATIONS:  Prilosec, Antivert, Entresto, Coreg, Lasix, vitamin D3, Tylenol, Sudafed, Flovent, Proventil. HABITS:  Former smoker, 50-pack year history. No alcohol use. REVIEW OF SYSTEMS:  Otherwise noncontributory. No other GI, , respiratory, cardiac, musculoskeletal, dermatologic, endocrinologic, or neurologic complaints. PHYSICAL EXAMINATION: 
VITAL SIGNS:  On admission, her blood pressure was 125/55, pulse rate 73, respiratory rate 14. NEUROLOGIC:  She is awake, alert. Cranial nerve function II to XII normal.  Oriented to person, place, and time. Moves all four extremities equally with normal strength and tone, although she complains of subjective sensory loss in the lower extremities compared to the upper. The toes are downgoing. There is no clonus at the ankles.   Gait and station are deferred. ASSESSMENT AND PLAN:  She has a pathologic fracture with a kyphosis at about T2-T3. There appears to be on the cervical MRI at C6 and C7 enough normal bone and normal bone below the level of the kyphosis to suggest and recommend a stabilization posteriorly with instrumentation. I described the risks, benefits, and alternatives with her and her daughter, and she wishes to proceed. I spoke with Dr. Angela Mcclure myself and let him know that we could go ahead and put her on the operating schedule. While not an emergency, we can certainly do it with this hospitalization. We will go ahead and start looking for operating room time. Thank you for asking me to see the patient. Brittany Zepeda MD 
 
 
[id number] 
D:  09/24/2020 16:01 
T:  09/24/2020 18:08 JOB #:  [id number] CC:  Tierra Burt MD

## 2020-09-24 NOTE — ACP (ADVANCE CARE PLANNING)
Primary Decision Maker (Active): Jeremiah Guerra - Daughter - [phone] Secondary Decision Maker: Anastasia Almazan Daughter - [phone] Secondary Decision Maker: Deepak Chan - Son - [phone] Advance Care Planning 9/24/2020 Patient's Healthcare Decision Maker is: Named in scanned ACP document Primary Decision Maker Name -  
Primary Decision Maker Phone Number -  
Primary Decision Maker Relationship to Patient -  
Confirm Advance Directive Yes, on file Patient Would Like to Complete Advance Directive - Does the patient have other document types Do Not Resuscitate LCSW met with patient and her daughter Amor Pederson to discuss goals of care, AMD and via conversation, POST document appeared to be appropriate to complete, hence was completed. Bryn Umanzor NP, met with patient earlier in the day. Telma Colindres, is alert, oriented, decisional and very open to talking about her wishes regarding her health. She is very clear that she does not want her life prolonged unnecessarily if there is no hope for recovery, at end of life. She does not want her life prolonged if she is unresponsive with no hope for recovery. She is not an organ donor, \"I was at one time, but after my cancer and my heart issues, I changed my mind\"> Patient also completed the POST form: 1. DNR 2. Full Interventions 3. Feeding Tube for a defined time period. Copies made of all documents, given to patient and daughter. Original POST placed on hard chart, to travel with her.

## 2020-09-25 NOTE — PROGRESS NOTES
Orthopedic End of Shift Note 
  Bedside and Verbal shift change report given to eric (oncoming nurse) by Angel (offgoing nurse). Report included the following information SBAR, Kardex, MAR and Recent Results.  
  
POD#   
Significant issues during shift: none    
  
Issues for Physician to address: none 
  
Activity This Shift 
(check all that apply) []?? chair 
[]?? dangle 
  []? ? bathroom []? ? bedside commode []?? hallway [x]? ? bedrest  
Nausea/Vomiting []? ? yes [x]? ? no     
Voiding Status [x]? ? void []? ? Bangura []? ? I&O Cath Bowel Movements []? ? yes [x]? ? no    
Foot Pumps or SCD [x]? ? yes []?? no    
Ice Pack []? ?Jamil Sol [x]? ? no    
Incentive Spirometer [x]? ? yes []?? no Volume:   800 Telemetry Monitoring   [x]? ? yes []?? no Rhythm: NSR Supplemental O2 [x]? ? yes []?? no Sat off O2:   94%

## 2020-09-25 NOTE — PROGRESS NOTES
Palliative Medicine Consult Jacques: [phone] ([phone]) Patient Name: Arla Baumgarten YOB: 1945 Date of Initial Consult: 9/23/2020 Reason for Consult: overwhelming sxs Requesting Provider: Moraima Powers MD  
Primary Care Physician: Sophia Guajardo NP 
 
 SUMMARY:  
Arla Baumgarten is a 67 y.o. with a past history of COPD, congestive heart failure, dyslipidemia, gastroesophageal reflux disease, who was admitted on 9/21/2020 from home with a diagnosis of compression fracture of thoracic vertebra. Current medical issues leading to Palliative Medicine involvement include: pain management, newly diagnosed spine lesions. Psychosocial: lives in low income senior apt. Has 2 dtrs and 1 son. PALLIATIVE DIAGNOSES:  
1. Generalized weakness 2. Falls 3. Fatigue 4. Weakness of extremities 5. Back pain 6. Thoracic vertebral fracture (T4) 
7. Sclerotic bony lesions 8. burning pain in left arm 9. Constipation 10. Care decisions PLAN:  
1. Prior to visit, I completed a review of patient's medical records, including medical documentation, vital signs, MARs, and results of various labs and other diagnostics. 2. PAIN:  pt describes neuropathic pain in left arm, bone pain in her spine, rates both at 5/10. Patient states her pain is tolerable but not as good as she'd like. We talked about not increasing the opiates now, rather wait until after surgery so that it will work better post-operatively. She is agreeable to waiting. 1. Percocet 5/325mg q. 4 hours prn.  
2. Increase Gabapentin 100mg bid to tid. 3. Cymbalta 20mg daily for pain and depression. 4. Narcan 0.4mg IVP q. 2 min prn RR<8 and/or per protocol. 3. CONSTIPATION:  BM yesterday. 1. Continue pericolace 1 tab daily. 2. Miralax 17 gm daily. 4. GOALS OF CARE:  Provided 45 min supportive listening as pt shared her life story.  While she is worried about her future, she has paty in God that she will be alright regardless of the outcome. Patient has completed documents indicating DNR status, I will change her code status after her spine surgery. 5. Initial consult note routed to primary continuity provider and/or primary health care team members 6. Communicated plan of care with: Palliative Jigar SNYDER 192 Team 
 
 GOALS OF CARE / TREATMENT PREFERENCES:  
 
GOALS OF CARE:  Pending workup Patient/Health Care Proxy Stated Goals: Prolong life TREATMENT PREFERENCES:  
Code Status: Full Code Advance Care Planning: 
[x] [facility] Interdisciplinary Team has updated the ACP Navigator with Daigle Tesha and Patient Capacity Primary Decision Maker (Active): Rufus Vickers - Daughter - [phone] Secondary Decision Maker: Benji Guevara Daughter - [phone] Secondary Decision Maker: Buddy Ramirez - Son - [phone] Advance Care Planning 9/24/2020 Patient's Healthcare Decision Maker is: Named in scanned ACP document Primary Decision Maker Name -  
Primary Decision Maker Phone Number -  
Primary Decision Maker Relationship to Patient -  
Confirm Advance Directive Yes, on file Patient Would Like to Complete Advance Directive - Does the patient have other document types Do Not Resuscitate Other Instructions: Other: As far as possible, the palliative care team has discussed with patient / health care proxy about goals of care / treatment preferences for patient. HISTORY:  
 
History obtained from: chart, patient CHIEF COMPLAINT: LE weakness and falls HPI/SUBJECTIVE: The patient is:  
[x] Verbal and participatory [] Non-participatory due to:  
 
9/21: presented to ED with acute onset of generalized fatigue, multiple falls (at least 3 falls this week), \"my legs just feel weak and I fall. \"  She started having lower back pain after falling yesterday, that is exacerbated with movement and radiates down her legs. No relieving factors. No numbness, tingling, or bowel or bladder issues. Pt was in her usual state of health until about 1 month ago when she started not feeling well, with generalized weakness involving her 4 extremities, and decreased exercise intolerance. Pt has remote h/o breast cancer in 1999, s/p chemo and mastectomy. She reports that she had follow-up imaging in early 2000's and was told she might have bone cancer, but then another oncologist said it wasn't true and she hasn't followed up since. CXR reveals new lower thoracic compression fracture and diffuse osseous mets. Chest CT reveals new lower thoracic 9/23: pt reports that she has had numbness in her left chest and arm since mastectomy, but starting 2 months ago she's started experiencing burning pain in the same area, the pain comes and goes and she doesn't know what exacerbates it, sometimes lying down will help. Since her last fall her low back has been very painful, achy. Clinical Pain Assessment (nonverbal scale for severity on nonverbal patients):  
Clinical Pain Assessment Severity: 4 Location: and achy back pain Character: burning in her chest/arm, achy in her back Duration: 2 months, days Effect: limits mobility, BLE weakness Frequency: constant Activity (Movement): Restless, excessive activity and/or withdrawal reflexes Duration: for how long has pt been experiencing pain (e.g., 2 days, 1 month, years) Frequency: how often pain is an issue (e.g., several times per day, once every few days, constant) FUNCTIONAL ASSESSMENT:  
 
Palliative Performance Scale (PPS): PPS: 20 
 
 
 PSYCHOSOCIAL/SPIRITUAL SCREENING:  
 
Palliative IDT has assessed this patient for cultural preferences / practices and a referral made as appropriate to needs (Cultural Services, Patient Advocacy, Ethics, etc.) Any spiritual / Latter day concerns: 
[] Yes /  [x] No 
 
 Caregiver Burnout: 
[] Yes /  [x] No /  [] No Caregiver Present Anticipatory grief assessment:  
[x] Normal  / [] Maladaptive ESAS Anxiety: Anxiety: 0 
 
ESAS Depression: Depression: 3 REVIEW OF SYSTEMS:  
 
Positive and pertinent negative findings in ROS are noted above in HPI. The following systems were [x] reviewed / [] unable to be reviewed as noted in HPI Other findings are noted below. Systems: constitutional, ears/nose/mouth/throat, respiratory, gastrointestinal, genitourinary, musculoskeletal, integumentary, neurologic, psychiatric, endocrine. Positive findings noted below. Modified ESAS Completed by: provider Fatigue: 4 Drowsiness: 0 Depression: 3 Pain: 4 Anxiety: 0 Nausea: 0 Anorexia: 0 Dyspnea: 0 Constipation: Yes Stool Occurrence(s): 1 PHYSICAL EXAM:  
 
From RN flowsheet: 
Wt Readings from Last 3 Encounters:  
09/25/20 204 lb (92.5 kg) 09/23/20 198 lb (89.8 kg) 04/17/20 221 lb 5.5 oz (100.4 kg) Blood pressure (!) 98/54, pulse 74, temperature 98.3 °F (36.8 °C), resp. rate 17, height 5' 5\" (1.651 m), weight 204 lb (92.5 kg), SpO2 93 %. Pain Scale 1: Numeric (0 - 10) Pain Intensity 1: 6 Pain Onset 1: movement Pain Location 1: Back Pain Orientation 1: Lower Pain Description 1: Aching Pain Intervention(s) 1: Medication (see MAR) Last bowel movement, if known:  
 
Constitutional: WD, WN, NAD, pleasant and cooperative, AAOx3 Eyes: pupils equal, anicteric ENMT: no nasal discharge, moist mucous membranes Cardiovascular: regular rhythm, distal pulses intact Respiratory: breathing not labored, symmetric Gastrointestinal: soft non-tender, +bowel sounds Musculoskeletal: no deformity, low spine tenderness to palpation Skin: warm, dry Neurologic: following commands, moving all extremities Psychiatric: full affect, no hallucinations Other: 
 
 
 HISTORY:  
 
Active Problems: 
  Bone metastases ([facility] (9/21/2020) Thoracic compression fracture ([address] (9/21/2020) History of breast cancer (9/21/2020) Pain due to malignant neoplasm metastatic to bone ([address] (9/23/2020) Neuropathic pain (9/23/2020) Drug-induced constipation (9/23/2020) Weakness of both legs (9/23/2020) Past Medical History:  
Diagnosis Date  Arthralgia 8/17/2017  Arthritis  Asthma Mild to Moderate  Breast cancer ([address] 8/17/2017 Onset 1997; Refusing Mammogram 6/16/17  Cancer ([address] 1997 Breast left  Cardiomyopathy ([address] 8/17/2017 Onset:1999 Ischemic; 25% - 50% (last EFx 45%) Continue with ACE/Lasix/coreg  Cataract 8/17/2017 Bilateral   
 Cellulitis 8/17/2017 Suspect local reaction to Pneumovax, treat with ice, NSAIDs or Tylenol  Chest pain at rest 8/17/2017  CHF (congestive heart failure) ([address] 8/17/2017 Stable, though weight up a little. To take 1 1/2 Lasix daily if swelling, 1 daily o/w  Chronic maxillary sinusitis 8/17/2017  Chronic pain Right knee  COPD (chronic obstructive pulmonary disease) ([address] 8/17/2017 Continue with inhalers  Diabetes ([address] Pre-diabetic  DJD (degenerative joint disease) 8/17/2017  Elevated BUN 8/17/2017  Esophagitis, reflux 8/17/2017  Fatigue 8/17/2017  GERD (gastroesophageal reflux disease)  Heart failure ([address] Cardiomyopathy, HX of MI 1999  Hyperglycemia 8/17/2017  Hyperkalemia 8/17/2017  Hyperlipidemia 8/17/2017  Hypertension 8/17/2017  Ill-defined condition Vertigo  Ill-defined condition 2003 HX of Urosepsis complicated by respiratory failure and pneumonnia  Myalgia 8/17/2017  Thromboembolus ([address] 1969  
 during 2nd pregnancy Xiomara Grimaldo 8/17/2017  Vertigo, aural 8/17/2017  Vitamin D deficiency 8/17/2017 Past Surgical History:  
Procedure Laterality Date [facility] JONNY  
 HX GYN Left Breast Mastectomy  HX HEENT  2015 Bilateral Cataract  HX ORTHOPAEDIC Right 2018  
 knee replacement  HX ORTHOPAEDIC Left 2018  
 wrist surgery  HX VASCULAR ACCESS Port a cath on the right Family History Problem Relation Age of Onset  Cancer Mother  Other Father History reviewed, no pertinent family history. Social History Tobacco Use  Smoking status: Former Smoker Packs/day: 2.00 Years: 25.00 Pack years: 50.00 Last attempt to quit:  Years since quittin.7  Smokeless tobacco: Never Used Substance Use Topics  Alcohol use: No  
 
Allergies Allergen Reactions  Morphine Anaphylaxis Current Facility-Administered Medications Medication Dose Route Frequency  albuterol (PROVENTIL VENTOLIN) nebulizer solution 2.5 mg  2.5 mg Nebulization Q4H PRN  
 gabapentin (NEURONTIN) capsule 100 mg  100 mg Oral TID  budesonide (PULMICORT) 250 mcg/2ml nebulizer susp  250 mcg Nebulization BID RT  
 oxyCODONE-acetaminophen (PERCOCET) 5-325 mg per tablet 1 Tab  1 Tab Oral Q4H PRN  
 DULoxetine (CYMBALTA) capsule 20 mg  20 mg Oral DAILY  polyethylene glycol (MIRALAX) packet 17 g  17 g Oral DAILY  naloxone (NARCAN) injection 0.4 mg  0.4 mg IntraVENous EVERY 2 MINUTES AS NEEDED  carvediloL (COREG) tablet 3.125 mg  3.125 mg Oral BID WITH MEALS  senna-docusate (PERICOLACE) 8.6-50 mg per tablet 1 Tab  1 Tab Oral DAILY  ondansetron (ZOFRAN) injection 4 mg  4 mg IntraVENous Q4H PRN  
 sodium chloride (NS) flush 5-40 mL  5-40 mL IntraVENous Q8H  
 sodium chloride (NS) flush 5-40 mL  5-40 mL IntraVENous PRN  
 acetaminophen (TYLENOL) tablet 650 mg  650 mg Oral Q6H PRN Or  
 acetaminophen (TYLENOL) suppository 650 mg  650 mg Rectal Q6H PRN  
 furosemide (LASIX) tablet 40 mg  40 mg Oral DAILY  pantoprazole (PROTONIX) tablet 40 mg  40 mg Oral ACB  [Held by provider] sacubitriL-valsartan (ENTRESTO) 49-51 mg tablet 2 Tab  2 Tab Oral BID  
 
 
 
 LAB AND IMAGING FINDINGS:  
 
 Lab Results Component Value Date/Time WBC 5.0 09/25/2020 03:14 AM  
 HGB 11.4 (L) 09/25/2020 03:14 AM  
 PLATELET 729 31/44/4121 03:14 AM  
 
Lab Results Component Value Date/Time Sodium 139 09/25/2020 03:14 AM  
 Potassium 4.5 09/25/2020 03:14 AM  
 Chloride 106 09/25/2020 03:14 AM  
 CO2 32 09/25/2020 03:14 AM  
 BUN 27 (H) 09/25/2020 03:14 AM  
 Creatinine 0.86 09/25/2020 03:14 AM  
 Calcium 9.7 09/25/2020 03:14 AM  
  
Lab Results Component Value Date/Time Alk. phosphatase 124 (H) 09/21/2020 11:05 AM  
 Protein, total 7.8 09/21/2020 11:05 AM  
 Albumin 3.5 09/21/2020 11:05 AM  
 Globulin 4.3 (H) 09/21/2020 11:05 AM  
 
Lab Results Component Value Date/Time INR 1.0 09/21/2020 11:05 AM  
 Prothrombin time 10.8 09/21/2020 11:05 AM  
 aPTT 24.1 09/21/2020 11:05 AM  
  
No results found for: IRON, FE, TIBC, IBCT, PSAT, FERR No results found for: PH, PCO2, PO2 No components found for: [place] No results found for: CPK, CKMB Total time:  
Counseling / coordination time, spent as noted above:  
> 50% counseling / coordination?:  
 
Prolonged service was provided for  []30 min   []75 min in face to face time in the presence of the patient, spent as noted above. Time Start:  
Time End:  
Note: this can only be billed with 43000 (initial) or 78325 (follow up). If multiple start / stop times, list each separately.

## 2020-09-25 NOTE — PROGRESS NOTES
Hospitalist Progress Note NAME: Rivas Bird :  1945 MRN:  [id number] Assessment / Plan: 
Acute thoracic compression fracture POA- likely pathological given history of osseous metastasis L Upper extremity Burning sensation POA Cervical Cord compression ruled out with MRI Bone scan noted- Multiple skeletal metastases MRI C spine noted for severe spinal stenosis with Compression fractures MRI noted for Mid thoracic Compression fracture Cont PO Percocet for pain control. Palliative Pain consulted- following Added stool softner Plans of Kyphoplasty cancelled- s/p IP Neuro surgery consult- Dr Beti Siegel noted- plan for  OR noted- Thoracic spine stabilization- ?OR date to be determined Cont gabapentin trial to see if helpful with burning sensation in L UE 
  
Osseous metastasis POA History of breast cancer about 18 years ago and underwent chemotherapy with mastectomy 
-She reports that she was told she had a bone cancer about 2 years after breast cancer was cured by a physician in [facility]. She reports that she had repeat imaging shortly after that and was told she had no cancer CT chest and abdomen in 2020 which showed evidence of osseous metastasis and patient was advised outpatient follow-up but patient reports that she did not follow-up with any oncologist 
 
[address] [place] [address] Oncology consulted- recommended neurosurgery eval- Dr Beti Siegel for thoracic Laminectomy to stabilize spine & get diagnostic biopsy- beng planned now as per Dr Beti Siegel 
 
  
History of COPD not in exacerbation 
-Resume home inhalers 
  
Chronic systolic congestive heart failure POA- well compensated, CXR clear Hypotension today AM noted H/o Hypertension 
resume Lasix today Cont holding Entresto for now Cont Decreased Coreg to 3.125 BID for now 
  
 
GERD 
-Continue home PPI 
  
Code Status: Full code Surrogate Decision Maker: Daughter 
  
DVT Prophylaxis: Lovenox 
  
  
 Baseline: From home, independent of ADLs 30.0 - 39.9 Obese / Body mass index is 33.95 kg/m². Weightloss recommended Recommended Disposition: SNF/LTC and HH PT, OT, RN?? TBD- PT/OT eval held for now till cleared by Neurosurgery COVID test for placement done  9/23 PM- still pending Subjective: Chief Complaint / Reason for Physician Visit :F/U Gen Weakness, Falls , Back pain, L arm burning sensation \"I am ok but have burning sensation in L arm\". Discussed with RN events overnight. Review of Systems: 
Symptom Y/N Comments  Symptom Y/N Comments Fever/Chills n   Chest Pain n   
Poor Appetite y   Edema n   
Cough n   Abdominal Pain Sputum n   Joint Pain y   
SOB/TAVAREZ n   Pruritis/Rash Nausea/vomit n   Tolerating PT/OT n Due to pain Diarrhea    Tolerating Diet y Constipation    Other y L arm burning sensation Could NOT obtain due to:   
 
Objective: VITALS:  
Last 24hrs VS reviewed since prior progress note. Most recent are: 
Patient Vitals for the past 24 hrs: 
 Temp Pulse Resp BP SpO2  
09/25/20 0752 98.3 °F (36.8 °C) 82 18 (!) 148/66 96 %  
09/25/20 0746     96 %  
09/25/20 0309 98.2 °F (36.8 °C) 78 18 118/66 96 %  
09/24/20 2230 98 °F (36.7 °C) 76 18 (!) 112/56 98 %  
09/24/20 2117     100 % 09/24/20 1923 98.2 °F (36.8 °C) 81 18 (!) 115/56 98 %  
09/24/20 1740 97.9 °F (36.6 °C) 65 18 110/61 98 %  
09/24/20 1209 98.4 °F (36.9 °C) 77 18 (!) 93/51 97 % Intake/Output Summary (Last 24 hours) at 9/25/2020 9877 Last data filed at 9/24/2020 1014 Gross per 24 hour Intake  Output 725 ml Net -725 ml PHYSICAL EXAM: 
General: WD, WN. Alert, cooperative, no acute distress   
EENT:  EOMI. Anicteric sclerae. MMM Resp:  CTA bilaterally, no wheezing or rales. No accessory muscle use CV:  Regular  rhythm,  No edema GI:  Soft, Non distended, Non tender.  +Bowel sounds Neurologic:  Alert and oriented X 3, normal speech,  
 Psych:   Good insight. Not anxious nor agitated Skin:  No rashes. No jaundice Reviewed most current lab test results and cultures  YES Reviewed most current radiology test results   YES Review and summation of old records today    NO Reviewed patient's current orders and MAR    YES 
PMH/SH reviewed - no change compared to H&P 
________________________________________________________________________ Care Plan discussed with: 
  Comments Patient x Family RN x Care Manager x Bandar Consultant     
                 x Multidiciplinary team rounds were held today with , nursing, pharmacist and clinical coordinator. Patient's plan of care was discussed; medications were reviewed and discharge planning was addressed. ________________________________________________________________________ Total NON critical care TIME:  26   Minutes Total CRITICAL CARE TIME Spent:   Minutes non procedure based Comments >50% of visit spent in counseling and coordination of care    
________________________________________________________________________ More Leyva MD  
 
Procedures: see electronic medical records for all procedures/Xrays and details which were not copied into this note but were reviewed prior to creation of Plan. LABS: 
I reviewed today's most current labs and imaging studies. Pertinent labs include: 
Recent Labs  
  09/25/20 0314 WBC 5.0 HGB 11.4* HCT 38.1  Recent Labs  
  09/25/20 
0314 09/24/20 
0206 09/23/20 
0448  140 139  
K 4.5 4.2 3.8  106 104 CO2 32 32 31 * 108* 91 BUN 27* 37* 47* CREA 0.86 1.09* 1.62* CA 9.7 8.7 8.8 Signed: More Leyva MD

## 2020-09-25 NOTE — ROUTINE PROCESS
Bedside shift change report given to [facility] (oncoming nurse) by Sanjay VILLANUEVA RN (offgoing nurse). Report included the following information SBAR, Kardex and MAR.

## 2020-09-25 NOTE — PROGRESS NOTES
Ortho/Neurosurgery Note: 
 
Pt resting in bed, very pleasant. Feeling well today. Back pain relieved/improved with prn Percocet Burning pain in LUE unchanged - receiving gabapentin 100mg TID Reports constant numbness right thigh, intermittent numbness in left thigh and moshe feet LLE: PF/DF/ 4/5, +EHL 
RLE: PF/DF 4+/5, +EHL Visit Vitals BP (!) 98/54 Pulse 74 Temp 98.3 °F (36.8 °C) Resp 17 Ht 5' 5\" (1.651 m) Wt 92.5 kg (204 lb) SpO2 93% BMI 33.95 kg/m² BP soft after lasix resumed this morning. Entresto still on hold. Remote Tele: NSR with PVCs. 2L NC. PTA, she wears 2.5l at night and prn during day Good appetite, eating majority of meals. Abd soft, +flauts, LBM 9/24 Voiding without difficulty, purwick in place Plans for OR next week, date/time TBD. Lives alone with family nearby. [place] was significantly limited by recent issues with BLE, reports 3 falls PTA. May need IP Rehab at discharge. Will continue to follow.   
 
Janae Escobedo NP

## 2020-09-25 NOTE — PROGRESS NOTES
ADULT PROTOCOL: JET AEROSOL ASSESSMENT Patient  [facility]     67 y.o.   female     9/25/2020  8:29 AM 
 
Breath Sounds Pre Procedure: Right Breath Sounds: Wheezing Left Breath Sounds: Wheezing Breath Sounds Post Procedure: Right Breath Sounds: Diminished Left Breath Sounds: Diminished Breathing pattern: Pre procedure Breathing Pattern: Regular Post procedure Breathing Pattern: Regular Heart Rate: Pre procedure Pulse: 75 Post procedure Pulse: 78 Resp Rate: Pre procedure Respirations: 16 Post procedure Respirations: 16 Incentive Spirometry:  Actual Volume (ml): 500 ml Cough: Pre procedure Cough: Non-productive Post procedure Cough: Non-productive Oxygen: 2.5L/NC Changed:NO SpO2: Pre procedure SpO2: 96 %               Post procedure SpO2: 96 % Nebulizer Therapy: Current medications Aerosolized Medications: Pulmicort Changed: NO 
 
 
Problem List:  
Patient Active Problem List  
Diagnosis Code  OA (osteoarthritis) of knee M17.10  Cardiomyopathy ([facility] I42.9  Breast cancer ([facility] C50.919  
 Hypertension I10  Vitamin D deficiency E55.9  Hyperlipidemia E78.5  Knee pain, bilateral M25.561, M25.562  Fatigue R53.83  
 COPD (chronic obstructive pulmonary disease) ([facility]) J44.9  Vertigo, aural H81.319  Thrush B37.0  DJD (degenerative joint disease) M19.90  Chest pain at rest R07.9  Myalgia M79.10  Cataract H26.9  Hyperkalemia E87.5  CHF (congestive heart failure) ([facility]) I50.9  Cellulitis L03.90  Chronic maxillary sinusitis J32.0  Esophagitis, reflux K21.0  Elevated BUN R79.9  Hyperglycemia R73.9  Severe obesity ([facility]) E66.01  
 Bone metastases ([facility]) C79.51  Thoracic compression fracture ([facility]) S22.000A  History of breast cancer Z85.3  Pain due to malignant neoplasm metastatic to bone ([facility]) G89.3, C79.51  
  Neuropathic pain M79.2  Drug-induced constipation K59.03  
 Weakness of both legs R29.898 Respiratory Therapist: Della Flores RT

## 2020-09-25 NOTE — PROGRESS NOTES
Stable neuro exam  Plan Cervical-thoracic fusion posteriorly to stabilize area over the fracture next Wednesday  Keep in hospital so that we may optimize her condition to undergo surgery.

## 2020-09-26 NOTE — PROGRESS NOTES
Problem: Falls - Risk of 
Goal: *Absence of Falls Description: Document Katy Huston Fall Risk and appropriate interventions in the flowsheet. Outcome: Progressing Towards Goal 
Note: Fall Risk Interventions: 
Mobility Interventions: Patient to call before getting OOB, PT Consult for mobility concerns, Utilize walker, cane, or other assistive device Medication Interventions: Patient to call before getting OOB, Teach patient to arise slowly Elimination Interventions: Call light in reach, Patient to call for help with toileting needs, Toilet paper/wipes in reach History of Falls Interventions: Room close to nurse's station

## 2020-09-26 NOTE — PROGRESS NOTES
Bedside shift change report given to Katy Echevarria RN (oncoming nurse) by Pamella Schaumann, RN (offgoing nurse). Report included the following information SBAR, Kardex, Intake/Output and Recent Results.

## 2020-09-26 NOTE — PROGRESS NOTES
Bedside shift change report given to JEREMY Koehler (oncoming nurse) by [facility] (offgoing nurse). Report included the following information SBAR, Kardex, Procedure Summary, Intake/Output, MAR and Recent Results.

## 2020-09-26 NOTE — PROGRESS NOTES
Hospitalist Progress Note NAME: Miguel Ángel Oro :  1945 MRN:  [id number] Assessment / Plan: 
Acute thoracic compression fracture POA- likely pathological given history of osseous metastasis L Upper extremity Burning sensation POA Cervical Cord compression ruled out with MRI Bone scan noted- Multiple skeletal metastases MRI C spine noted for severe spinal stenosis with Compression fractures MRI noted for Mid thoracic Compression fracture COVID test - came back negative Cont PO Percocet for pain control. Palliative Pain consulted- following Added stool softner Plans of Kyphoplasty cancelled- s/p IP Neuro surgery consult- Dr Gee Iqbal noted- plan for  OR noted- Thoracic spine stabilization- ?OR date to be determined Cont gabapentin trial to see if helpful with burning sensation in L UE 
  
Osseous metastasis POA History of breast cancer about 18 years ago and underwent chemotherapy with mastectomy 
-She reports that she was told she had a bone cancer about 2 years after breast cancer was cured by a physician in [facility]. She reports that she had repeat imaging shortly after that and was told she had no cancer CT chest and abdomen in 2020 which showed evidence of osseous metastasis and patient was advised outpatient follow-up but patient reports that she did not follow-up with any oncologist 
 
[address] [place] [address] Oncology consulted- recommended neurosurgery eval- Dr Gee Iqbal for thoracic Laminectomy to stabilize spine & get diagnostic biopsy- beng planned now as per Dr Gee Iqbal- tentatively looking into Wed next week 
 
  
History of COPD not in exacerbation 
-Resume home inhalers 
  
Chronic systolic congestive heart failure POA- well compensated, CXR clear Hypotension today AM noted H/o Hypertension 
resumed Lasix  Cont holding Entresto for now Cont Coreg, increase to 6.25 BID today 
  
 
GERD 
-Continue home PPI 
  
Code Status: Full code Surrogate Decision Maker: Daughter 
  
DVT Prophylaxis: Lovenox 
  
  
Baseline: From home, independent of ADLs 30.0 - 39.9 Obese / Body mass index is 35.04 kg/m². Weightloss recommended Recommended Disposition: SNF/LTC and HH PT, OT, RN?? TBD- PT/OT eval held for now till cleared by Neurosurgery Subjective: Chief Complaint / Reason for Physician Visit :F/U Gen Weakness, Falls , Back pain, L arm burning sensation \"I am ok but have burning sensation in L arm\". Discussed with RN events overnight. Review of Systems: 
Symptom Y/N Comments  Symptom Y/N Comments Fever/Chills n   Chest Pain n   
Poor Appetite y   Edema n   
Cough n   Abdominal Pain Sputum n   Joint Pain y   
SOB/TAVAREZ n   Pruritis/Rash Nausea/vomit n   Tolerating PT/OT n Due to pain Diarrhea    Tolerating Diet y Constipation    Other y L arm burning sensation Could NOT obtain due to:   
 
Objective: VITALS:  
Last 24hrs VS reviewed since prior progress note. Most recent are: 
Patient Vitals for the past 24 hrs: 
 Temp Pulse Resp BP SpO2  
09/26/20 0749 98.6 °F (37 °C) 84 12 (!) 139/97 95 % 09/26/20 0737     95 % 09/26/20 0336 98.5 °F (36.9 °C) 72 18 (!) 114/56 95 % 09/25/20 2305 98.5 °F (36.9 °C) 77 18 120/62   
09/25/20 2222     96 %  
09/25/20 1949 98.2 °F (36.8 °C) 79 18 (!) 119/58 96 %  
09/25/20 1515 99.3 °F (37.4 °C) 75 18 (!) 102/59 96 %  
09/25/20 1124 98.3 °F (36.8 °C) 74 17 (!) 98/54 93 % Intake/Output Summary (Last 24 hours) at 9/26/2020 1001 Last data filed at 9/26/2020 4299 Gross per 24 hour Intake 660 ml Output 1200 ml Net -540 ml PHYSICAL EXAM: 
General: WD, WN. Alert, cooperative, no acute distress   
EENT:  EOMI. Anicteric sclerae. MMM Resp:  CTA bilaterally, no wheezing or rales. No accessory muscle use CV:  Regular  rhythm,  No edema GI:  Soft, Non distended, Non tender.  +Bowel sounds Neurologic:  Alert and oriented X 3, normal speech,  
 Psych:   Good insight. Not anxious nor agitated Skin:  No rashes. No jaundice Reviewed most current lab test results and cultures  YES Reviewed most current radiology test results   YES Review and summation of old records today    NO Reviewed patient's current orders and MAR    YES 
PMH/SH reviewed - no change compared to H&P 
________________________________________________________________________ Care Plan discussed with: 
  Comments Patient x Family RN x Care Manager Consultant Multidiciplinary team rounds were held today with , nursing, pharmacist and clinical coordinator. Patient's plan of care was discussed; medications were reviewed and discharge planning was addressed. ________________________________________________________________________ Total NON critical care TIME:  26   Minutes Total CRITICAL CARE TIME Spent:   Minutes non procedure based Comments >50% of visit spent in counseling and coordination of care    
________________________________________________________________________ Colonel Valeria MD  
 
Procedures: see electronic medical records for all procedures/Xrays and details which were not copied into this note but were reviewed prior to creation of Plan. LABS: 
I reviewed today's most current labs and imaging studies. Pertinent labs include: 
Recent Labs  
  09/25/20 
0314 WBC 5.0 HGB 11.4* HCT 38.1  Recent Labs  
  09/25/20 
0314 09/24/20 
0206  140  
K 4.5 4.2  106 CO2 32 32 * 108* BUN 27* 37* CREA 0.86 1.09* CA 9.7 8.7 Signed: Colonel Valeria MD

## 2020-09-27 NOTE — PROGRESS NOTES
ADULT PROTOCOL: JET AEROSOL  REASSESSMENT Patient  Jackson Encarnacion     67 y.o.   female     9/27/2020  11:01 AM 
 
Breath Sounds Pre Procedure: Right Breath Sounds: Diminished Left Breath Sounds: Diminished Breath Sounds Post Procedure: Right Breath Sounds: Diminished Left Breath Sounds: Diminished Breathing pattern: Pre procedure Breathing Pattern: Regular Post procedure Breathing Pattern: Regular Heart Rate: Pre procedure Pulse: 78 Post procedure Pulse: 81 Resp Rate: Pre procedure Respirations: 18 Post procedure Respirations: 18 Incentive Spirometry:  Actual Volume (ml): 500 ml Cough: Pre procedure Cough: Non-productive Post procedure Cough: Non-productive Oxygen: 2.5L/NC Changed: NO SpO2: Pre procedure SpO2: 95 % Post procedure SpO2: 95 % Nebulizer Therapy: Current medications Aerosolized Medications: Pulmicort Changed: NO 
 
 
Problem List:  
Patient Active Problem List  
Diagnosis Code  OA (osteoarthritis) of knee M17.10  Cardiomyopathy ([facility] I42.9  Breast cancer ([facility] C50.919  
 Hypertension I10  Vitamin D deficiency E55.9  Hyperlipidemia E78.5  Knee pain, bilateral M25.561, M25.562  Fatigue R53.83  
 COPD (chronic obstructive pulmonary disease) ([facility]) J44.9  Vertigo, aural H81.319  Thrush B37.0  DJD (degenerative joint disease) M19.90  Chest pain at rest R07.9  Myalgia M79.10  Cataract H26.9  Hyperkalemia E87.5  CHF (congestive heart failure) ([facility]) I50.9  Cellulitis L03.90  Chronic maxillary sinusitis J32.0  Esophagitis, reflux K21.0  Elevated BUN R79.9  Hyperglycemia R73.9  Severe obesity ([facility]) E66.01  
 Bone metastases ([facility]) C79.51  Thoracic compression fracture ([facility]) S22.000A  History of breast cancer Z85.3  Pain due to malignant neoplasm metastatic to bone ([facility]) G89.3, C79.51  
 Neuropathic pain M79.2  Drug-induced constipation K59.03  
 Weakness of both legs R29.898 Respiratory Therapist: Deonte Fuller, RT

## 2020-09-27 NOTE — PROGRESS NOTES
Bedside and Verbal shift change report given to Radha Gaines (oncoming nurse) by Iveth Leigh (offgoing nurse). Report included the following information SBAR, Kardex, Intake/Output, MAR, Med Rec Status and Cardiac Rhythm NSR.

## 2020-09-27 NOTE — PROGRESS NOTES
Hospitalist Progress Note NAME: Fco Salcedo :  1945 MRN:  [id number] Assessment / Plan: 
Acute thoracic compression fracture POA- likely pathological given history of osseous metastasis L Upper extremity Burning sensation POA Cervical Cord compression ruled out with MRI Bone scan noted- Multiple skeletal metastases MRI C spine noted for severe spinal stenosis with Compression fractures MRI noted for Mid thoracic Compression fracture COVID test - came back negative Cont PO Percocet for pain control. Palliative Pain consulted- following Added stool softner Plans of Kyphoplasty cancelled- s/p IP Neuro surgery consult- Dr Jacklyn Lawson noted- plan for  OR noted- Thoracic spine stabilization- ?OR date to be determined Cont gabapentin trial to see if helpful with burning sensation in L UE 
  
Osseous metastasis POA History of breast cancer about 18 years ago and underwent chemotherapy with mastectomy 
-She reports that she was told she had a bone cancer about 2 years after breast cancer was cured by a physician in [facility]. She reports that she had repeat imaging shortly after that and was told she had no cancer CT chest and abdomen in 2020 which showed evidence of osseous metastasis and patient was advised outpatient follow-up but patient reports that she did not follow-up with any oncologist 
 
[address] [place] [address] Oncology consulted- recommended neurosurgery eval- Dr Jacklyn Lawson for thoracic Laminectomy to stabilize spine & get diagnostic biopsy- beng planned now as per Dr Jacklyn Lawson- tentatively looking into Wed next week 
 
  
History of COPD not in exacerbation 
-Resume home inhalers 
  
Chronic systolic congestive heart failure POA- well compensated, CXR clear Hypotension H/o Hypertension 
resumed Lasix  Cont holding Entresto for now Cont Coreg, increased to 6.25 BID  
  
 
GERD 
-Continue home PPI 
  
Code Status: Full code Surrogate Decision Maker: Daughter 
  
 DVT Prophylaxis: Lovenox 
  
  
Baseline: From home, independent of ADLs 30.0 - 39.9 Obese / Body mass index is 35.04 kg/m². Weightloss recommended Recommended Disposition: SNF/LTC and HH PT, OT, RN?? TBD- PT/OT eval held for now till cleared by Neurosurgery Subjective: Chief Complaint / Reason for Physician Visit :F/U Gen Weakness, Falls , Back pain, L arm burning sensation \"I am ok but have burning sensation in L arm\". Discussed with RN events overnight. Review of Systems: 
Symptom Y/N Comments  Symptom Y/N Comments Fever/Chills n   Chest Pain n   
Poor Appetite y   Edema n   
Cough n   Abdominal Pain Sputum n   Joint Pain y   
SOB/TAVAREZ n   Pruritis/Rash Nausea/vomit n   Tolerating PT/OT n Due to pain Diarrhea    Tolerating Diet y Constipation    Other y L arm burning sensation Could NOT obtain due to:   
 
Objective: VITALS:  
Last 24hrs VS reviewed since prior progress note. Most recent are: 
Patient Vitals for the past 24 hrs: 
 Temp Pulse Resp BP SpO2  
09/27/20 0828  79  (!) 129/56   
09/27/20 0755     96 %  
09/27/20 0328 98 °F (36.7 °C) 76 18 124/69 96 %  
09/26/20 2330 98 °F (36.7 °C) 80 18 (!) 130/54 98 %  
09/26/20 2137     98 %  
09/1945 98.4 °F (36.9 °C) 70 16 (!) 97/48 96 %  
09/26/20 1636 98.4 °F (36.9 °C) 80 16 (!) 116/56 97 % 09/26/20 1453 98.3 °F (36.8 °C) 77 16 (!) 126/56 98 %  
09/26/20 1200 98.1 °F (36.7 °C) 77 16 (!) 110/57 96 % Intake/Output Summary (Last 24 hours) at 9/27/2020 1025 Last data filed at 9/26/2020 1247 Gross per 24 hour Intake 240 ml Output 400 ml Net -160 ml PHYSICAL EXAM: 
General: WD, WN. Alert, cooperative, no acute distress   
EENT:  EOMI. Anicteric sclerae. MMM Resp:  CTA bilaterally, no wheezing or rales. No accessory muscle use CV:  Regular  rhythm,  No edema GI:  Soft, Non distended, Non tender.  +Bowel sounds Neurologic:  Alert and oriented X 3, normal speech,  
 Psych:   Good insight. Not anxious nor agitated Skin:  No rashes. No jaundice Reviewed most current lab test results and cultures  YES Reviewed most current radiology test results   YES Review and summation of old records today    NO Reviewed patient's current orders and MAR    YES 
PMH/SH reviewed - no change compared to H&P 
________________________________________________________________________ Care Plan discussed with: 
  Comments Patient x Family RN x Care Manager Consultant Multidiciplinary team rounds were held today with , nursing, pharmacist and clinical coordinator. Patient's plan of care was discussed; medications were reviewed and discharge planning was addressed. ________________________________________________________________________ Total NON critical care TIME:  26   Minutes Total CRITICAL CARE TIME Spent:   Minutes non procedure based Comments >50% of visit spent in counseling and coordination of care    
________________________________________________________________________ Ashlie Rangel MD  
 
Procedures: see electronic medical records for all procedures/Xrays and details which were not copied into this note but were reviewed prior to creation of Plan. LABS: 
I reviewed today's most current labs and imaging studies. Pertinent labs include: 
Recent Labs  
  09/25/20 
0314 WBC 5.0 HGB 11.4* HCT 38.1  Recent Labs  
  09/25/20 
9300   
K 4.5  
 CO2 32 * BUN 27* CREA 0.86 CA 9.7 Signed: Ashlie Rangel MD

## 2020-09-27 NOTE — PROGRESS NOTES
Bedside and Verbal shift change report given to Tami Rogers RN (oncoming nurse) by Santosh Hurst RN (offgoing nurse). Report included the following information SBAR, Kardex, Intake/Output, MAR, Recent Results and Med Rec Status.

## 2020-09-28 NOTE — ANESTHESIA PREPROCEDURE EVALUATION
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 
 
Airway Mallampati: III 
TM Distance: 4 - 6 cm Neck ROM: decreased range of motion Mouth opening: Normal 
 
 Cardiovascular Regular rate and rhythm,  S1 and S2 normal,  no murmur, click, rub, or gallop Dental 
 
Dentition: Edentulous and Full upper dentures Pulmonary Breath sounds clear to auscultation Abdominal 
GI exam deferred Other Findings Anesthetic Plan ASA: 3 Anesthesia type: general 
 
Monitoring Plan: BIS Induction: Intravenous Anesthetic plan and risks discussed with: Patient

## 2020-09-28 NOTE — PROGRESS NOTES
PARRISH 
1) SNF vs IPR pending progress post-op 2) will need COVID test within 7 days of d/c  
 
CM acknowledged patient to go to OR tomorrow afternoon. Disposition still pending on progress post-op. Patient has not worked with therapy yet as they are waiting for procedure to take place first.  
 
Enrique Bowden, [facility]

## 2020-09-28 NOTE — PROGRESS NOTES
Comprehensive Nutrition Assessment Type and Reason for Visit: Initial, RD nutrition re-screen/LOS Nutrition Recommendations/Plan:  
· Continue current diet · Start ONS, Ensure Enlive BID (Strawberry and Chocolate) · Please document % meals and supplements consumed in flowsheet I/O's under intake Nutrition Assessment:    LOS. Chart reviewed. Med noted for Thoracic compression fracture, bone metastases, leg weakness, breast cancer hx, neuropathic pain, drug-induced constipation. Past Hx includes HTN, dyslipidemia, Vit. D deficiency, COPD, fatigue, CHF, Esophagitis, hyperkalemia, hyperglycemia, obesity cardiomyopathy. Per EMR, 9% Weight Loss in 5 months (21 lbs). Pt reports weight recently stable. Scheduled for NPO prior to procedure on 9/29. Current Diet: \"CC Diabetic with Options 2000 kcal; no concentrated sweets. \" RD/intern visited at bedside. Pt reports recent decreased PO intake due to decreased ambulating ability and ability to cook PTA. Pt reports taking Vit. D supplement PTA. Pt reports poor appetite since admit, not fond of CC Diet Recommendation. EMR shows 0-50% PO intake. RD suggested pt consider ONS BID. Patient agrees, prefers chocolate and strawberry. Pt reports constipation prior to [facility] 9/28. A1C 5.6 (1/16/2020), consider loosening diet restrictions after procedure to assist with PO intake. Pt reported being pre-diabetic 2 years ago, went on diet, lost weight, was \"cleared\", resumed normal diet, and regained weight. Wt Readings from Last 5 Encounters:  
09/28/20 90.8 kg (200 lb 3.2 oz)  
09/23/20 89.8 kg (198 lb) 04/17/20 100.4 kg (221 lb 5.5 oz) 01/14/20 95.4 kg (210 lb 4.8 oz)  
11/22/18 83.9 kg (185 lb)  
] Patient Vitals for the past 72 hrs: 
 % Diet Eaten  
09/26/20 0908 0 %  
09/25/20 1725 50 % Estimated Daily Nutrient Needs: 
Energy (kcal):  1570kcals/day (BMR x 1.3 AF -250 kcals) Protein (g):  73-91 g/day (0.8-1.0 g/kg/d) Fluid (ml/day):  1600 ml/day Nutrition Related Findings: Labs: , 131 (9/27); Meds: Pericolase, miralax, Oxycodone, Dirosemide, albuterol, albuterol, budosenide, carvedilol; BM 9/28; A1C 5.6 (1/20) Current Nutrition Therapies: DIET DIABETIC WITH OPTIONS Consistent Carb 2000kcal; Regular; No Conc. Sweets DIET NPO Anthropometric Measures: 
· Height:  5' 5\" (165.1 cm) · Current Body Wt:  90.8 kg (200 lb 2.8 oz) · Ideal Body Wt:  125 lbs:  160.1 % · BMI Category:  Obese class 1 (BMI 30.0-34. 9) Nutrition Diagnosis:  
· Inadequate protein-energy intake related to lack or limited access to food(trouble ambulating and preparing food, low appetite since admit) as evidenced by NPO or clear liquid status due to medical condition, intake 26-50%, weight loss, constipation Nutrition Interventions:  
Food and/or Nutrient Delivery: Continue NPO, Continue current diet, Start oral nutrition supplement Nutrition Education and Counseling: No recommendations at this time Coordination of Nutrition Care: No recommendation at this time Goals: 
PO Intake <50% meals and ONS within 2-4 days Nutrition Monitoring and Evaluation:  
Food/Nutrient Intake Outcomes: Diet advancement/tolerance, Food and nutrient intake, Supplement intake Physical Signs/Symptoms Outcomes: Biochemical data, Constipation, Meal time behavior, Weight Discharge Planning: Too soon to determine Electronically signed by Jim Bernal on 9/28/2020 at 2:40 PM

## 2020-09-28 NOTE — PROGRESS NOTES
Hospitalist Progress Note NAME: Lynnette Rasmussen :  1945 MRN:  [id number] Assessment / Plan: 
Acute thoracic compression fracture POA- likely pathological given history of osseous metastasis L Upper extremity Burning sensation POA Cervical Cord compression ruled out with MRI Bone scan noted- Multiple skeletal metastases MRI C spine noted for severe spinal stenosis with Compression fractures MRI noted for Mid thoracic Compression fracture COVID test - came back negative Cont PO Percocet for pain control. Palliative Pain consulted- following Added stool softner Plans of Kyphoplasty cancelled- s/p IP Neuro surgery consult- Dr Wilver Salter noted- plan for  OR noted- Thoracic spine stabilization- ?OR date - ? Wednesday Cont gabapentin trial to see if helpful with burning sensation in L UE 
  
Osseous metastasis POA History of breast cancer about 18 years ago and underwent chemotherapy with mastectomy 
-She reports that she was told she had a bone cancer about 2 years after breast cancer was cured by a physician in [facility] [place]. She reports that she had repeat imaging shortly after that and was told she had no cancer CT chest and abdomen in 2020 which showed evidence of osseous metastasis and patient was advised outpatient follow-up but patient reports that she did not follow-up with any oncologist 
 
[address] [place] [address] Oncology consulted- recommended neurosurgery eval- Dr Wilver Salter for thoracic Laminectomy to stabilize spine & get diagnostic biopsy- beng planned now as per Dr Wilver Salter- tentatively looking into Wed this week 
 
  
History of COPD not in exacerbation 
-Resume home inhalers 
  
Chronic systolic congestive heart failure POA- well compensated, CXR clear Hypotension H/o Hypertension 
resumed Lasix  Cont holding Entresto for now Cont Coreg, increased to 6.25 BID - cont for now 
  
 
GERD 
-Continue home PPI 
  
Code Status: Full code Surrogate Decision Maker: Daughter 
  
DVT Prophylaxis: Lovenox 
  
  
Baseline: From home, independent of ADLs 30.0 - 39.9 Obese / Body mass index is 33.32 kg/m². Weightloss recommended Recommended Disposition: SNF/LTC and HH PT, OT, RN?? TBD- PT/OT eval held for now till cleared by Neurosurgery Subjective: Chief Complaint / Reason for Physician Visit :F/U Gen Weakness, Falls , Back pain, L arm burning sensation \"I am ok but have burning sensation in L arm\". Discussed with RN events overnight. Review of Systems: 
Symptom Y/N Comments  Symptom Y/N Comments Fever/Chills n   Chest Pain n   
Poor Appetite y   Edema n   
Cough n   Abdominal Pain Sputum n   Joint Pain y   
SOB/TAVAREZ n   Pruritis/Rash Nausea/vomit n   Tolerating PT/OT n Due to pain Diarrhea    Tolerating Diet y Constipation    Other y L arm burning sensation Could NOT obtain due to:   
 
Objective: VITALS:  
Last 24hrs VS reviewed since prior progress note. Most recent are: 
Patient Vitals for the past 24 hrs: 
 Temp Pulse Resp BP SpO2  
09/28/20 0749     94 % 09/28/20 0743 98.3 °F (36.8 °C) 77 16 124/61 95 % 09/28/20 0403 97.9 °F (36.6 °C) 83 16 (!) 121/54 97 % 09/27/20 2353 98.2 °F (36.8 °C) 82 16 (!) 113/52 95 % 09/27/20 2059     94 % 09/27/20 1935 98.2 °F (36.8 °C) 85 18 (!) 109/45 98 %  
09/27/20 1436 97.7 °F (36.5 °C) 76 18 123/78 97 % 09/27/20 1145 98 °F (36.7 °C) 73 18 117/63 99 % Intake/Output Summary (Last 24 hours) at 9/28/2020 8472 Last data filed at 9/28/2020 6193 Gross per 24 hour Intake  Output 600 ml Net -600 ml PHYSICAL EXAM: 
General: WD, WN. Alert, cooperative, no acute distress   
EENT:  EOMI. Anicteric sclerae. MMM Resp:  CTA bilaterally, no wheezing or rales. No accessory muscle use CV:  Regular  rhythm,  No edema GI:  Soft, Non distended, Non tender.  +Bowel sounds Neurologic:  Alert and oriented X 3, normal speech,  
 Psych:   Good insight. Not anxious nor agitated Skin:  No rashes. No jaundice Reviewed most current lab test results and cultures  YES Reviewed most current radiology test results   YES Review and summation of old records today    NO Reviewed patient's current orders and MAR    YES 
PMH/SH reviewed - no change compared to H&P 
________________________________________________________________________ Care Plan discussed with: 
  Comments Patient x Family RN x Care Manager x Consultant     
                 x Multidiciplinary team rounds were held today with , nursing, pharmacist and clinical coordinator. Patient's plan of care was discussed; medications were reviewed and discharge planning was addressed. ________________________________________________________________________ Total NON critical care TIME:  26   Minutes Total CRITICAL CARE TIME Spent:   Minutes non procedure based Comments >50% of visit spent in counseling and coordination of care    
________________________________________________________________________ Yue Rich MD  
 
Procedures: see electronic medical records for all procedures/Xrays and details which were not copied into this note but were reviewed prior to creation of Plan. LABS: 
I reviewed today's most current labs and imaging studies. Pertinent labs include: No results for input(s): WBC, HGB, HCT, PLT, HGBEXT, HCTEXT, PLTEXT, HGBEXT, HCTEXT, PLTEXT in the last 72 hours. No results for input(s): NA, K, CL, CO2, GLU, BUN, CREA, CA, MG, PHOS, ALB, TBIL, TBILI, ALT, INR, INREXT, INREXT in the last 72 hours. No lab exists for component: SGOT Signed: Yue Rich MD

## 2020-09-28 NOTE — PROGRESS NOTES
Bedside and Verbal shift change report given to Zakia LUNA (oncoming nurse) by Odin Salinas (offgoing nurse). Report included the following information SBAR, Kardex, Procedure Summary, Intake/Output, MAR, Accordion and Recent Results.

## 2020-09-28 NOTE — PROGRESS NOTES
Bedside and Verbal shift change report given to Alice Sullivan RN (oncoming nurse) by Tami Rogers RN (offgoing nurse). Report included the following information SBAR, Kardex, Intake/Output and MAR.

## 2020-09-28 NOTE — PROGRESS NOTES
Palliative Medicine [place]: [phone] ([phone] [facility] ED: [phone] LCSW went by to visit with patient, for emotional support. Patient with her Cathy Hair, will come by again tomorrow to see patient per her wish. Patient did not seem to be in any distress, appeared comfortable.

## 2020-09-28 NOTE — PROGRESS NOTES
Music Therapy Assessment [address] [id number] 1945  67 y.o.  female Patient Telephone Number: [phone] (home) Catholic Affiliation: [facility]  
Language: [place] Patient Active Problem List  
 Diagnosis Date Noted  Pain due to malignant neoplasm metastatic to bone ([facility] 09/23/2020  Neuropathic pain 09/23/2020  Drug-induced constipation 09/23/2020  Weakness of both legs 09/23/2020  Bone metastases ([address] 09/21/2020  Thoracic compression fracture ([address] 09/21/2020  
 History of breast cancer 09/21/2020  Severe obesity ([address] 01/14/2020  Cardiomyopathy ([address] 08/17/2017  Breast cancer ([address] 08/17/2017  Hypertension 08/17/2017  Vitamin D deficiency 08/17/2017  Hyperlipidemia 08/17/2017  Knee pain, bilateral 08/17/2017  Fatigue 08/17/2017  COPD (chronic obstructive pulmonary disease) ([address] 08/17/2017  Vertigo, aural 08/17/2017 Grandin Zeinab 08/17/2017  DJD (degenerative joint disease) 08/17/2017  Chest pain at rest 08/17/2017  Myalgia 08/17/2017  Cataract 08/17/2017  Hyperkalemia 08/17/2017  CHF (congestive heart failure) ([address] 08/17/2017  Cellulitis 08/17/2017  Chronic maxillary sinusitis 08/17/2017  Esophagitis, reflux 08/17/2017  Elevated BUN 08/17/2017  Hyperglycemia 08/17/2017  OA (osteoarthritis) of knee 06/22/2017 Date: 9/28/2020            Total Time (in minutes): 30          MRM 3 ORTHOPEDICS Mental Status:   [x] Alert [  ] Yael Jay [  ]  Confused  [  ] Minimally responsive  [  ] Sleeping Communication Status: [  ] Impaired Speech [  ] Nonverbal -N/A Physical Status:   [  ] Oxygen in use  [  ] Hard of Hearing [  ] Vision Impaired [  ] Ambulatory  [  ] Ambulatory with assistance [  ] Non-ambulatory  -N/A Music Preferences, Background: Traditional Gospel, [address], including Darlin.  Pt sang in her [employer] choir with the choir and adebayo, pt said she \"plunked\" the piano, and that her sister and mother played in Jewish. Pt loved to sing, and is saddened that her voice changed in a way she doesn't prefer two years ago. Clinical Problem addressed: Emotional support, increased self-expression, support healthy coping, spiritual support. Goal(s) met in session: 
Physical/Pain management (Scale of 1-10): Pre-session rating: Pt denied pain. Post-session rating: Pt didn't report on pain. [  ] Increased relaxation   [  ] Affected breathing patterns [  ] Decreased muscle tension   [  ] Decreased agitation [  ] Affected heart rate    [  ] Increased alertness Emotional/Psychological: 
[x] Increased self-expression   [  ] Decreased aggressive behavior [  ] Decreased feelings of stress  [  ] Discussed healthy coping skills [x] Improved mood    [  ] Decreased withdrawn behavior Social: 
[  ] Decreased feelings of isolation/loneliness [x] Positive social interaction [  ] Provided support and/or comfort for family/friends Spiritual: 
[x] Spiritual support    [  ] Expressed peace [x] Expressed paty    [  ] Discussed beliefs Techniques Utilized (Check all that apply):  
[  ] Procedural support MT [  ] Music for relaxation [x] Patient preferred music 
[  ] Ciara analysis  [x] Song choice  [x] Music for validation [  ] Entrainment  [x] Movement to music [  ] Guided visualization [  ] Fatmata Mera  [  ] Patient instrument playing [  ] Higinio Border writing 
[x] Megha Lauri along   [  ] Patricia Peck  [  ] Sensory stimulation 
[x] Active Listening  [x] Music for spiritual support [  ] Making of CDs as gifts Session Observations:  Referral from Betsy Deluca, Palliative NP. Patient (pt) was alert sitting up in bed. Her  was at bedside. After asking pt how she was feeling, this music therapist and music therapy intern (MT Team) asked pt about her music preferences.  As pt shared these, she also shared about her music background. MT sat bedside and pt requested the [employer]. MT Team sang and played this with guitar. Pt increased self-expression in response to the music as evidenced by (AEB) singing along. Her  also sang along, and then prayed over the pt before exiting. Pt chose to hear How Great Thou Art. She sang along with harmonies as MT Team sang and played this with guitar. MT Team suggested a [facility] (given pt's music preferences) and pt was agreeable to this. MT Team sang and played [facility] with guitar. Pt sang along and tapped her feet to the beat. She expressed some nerves about her upcoming surgery tomorrow, and also expressed paty that the [facility] would care for her. MT Team provided words of validation. Pt thanked MT Team for the visit. Will follow as able. Li Frias MT-BC (Music Therapist-Board Certified) Spiritual Care Department Referral-based service

## 2020-09-29 NOTE — ANESTHESIA PROCEDURE NOTES
Central Line Placement Start time: 9/29/2020 7:48 AM 
End time: 9/29/2020 8:07 AM 
Performed by: Mercedes Gonzalez MD 
Authorized by: Mercedes Gonzalez MD  
 
Indications: vascular access Preanesthetic Checklist: patient identified, risks and benefits discussed, anesthesia consent, site marked, patient being monitored and timeout performed Timeout Time: 07:48 Pre-procedure: All elements of maximal sterile barrier technique followed? Yes   
2% Chlorhexidine for cutaneous antisepsis, Hand hygiene performed prior to catheter insertion and Ultrasound guidance Sterile Ultrasound Technique followed?: Yes Procedure:  
Prep:  ChloraPrep Orientation:  Left Patient position:  Trendelenburg Catheter type:  Triple lumen Catheter size:  7 Fr Catheter length:  16 cm Number of attempts:  3 (Attempt to cannulate left subclavian x 2 unsuccessful.) Successful placement: Yes Assessment:  
Post-procedure:  Catheter secured and sterile dressing with CHG applied Assessment:  Blood return through all ports, free fluid flow and guidewire removal verified Insertion:  Uncomplicated Patient tolerance:  Patient tolerated the procedure well with no immediate complications Central Line Procedure Note Indication: Inadequate venous access Risks, benefits, alternatives explained and patient agrees to proceed. Patient positioned in Trendelenburg. 7-Step Sterility Protocol followed. (cap, mask sterile gown, sterile gloves, large sterile sheet, hand hygiene, 2% chlorhexidine for cutaneous antisepsis) 5 mL 1% Lidocaine placed at insertion site. Left subclavian vein cannulation attempted x 2 utilizing the Seldinger technique with return of arterial blood. Pressure held x 3 minutes. Patient re-prepped and draped. Left internal jugular cannulated x 1 attempt(s) utilizing the Seldinger technique under ultrasound guidance. Catheter secured & Biopatch applied. Sterile Tegaderm placed. CXR pending.    
Care turned over to covering Attending MD.

## 2020-09-29 NOTE — PROGRESS NOTES
Palliative Medicine Notified by pt that she is in her room and her pain is 10/10. Per order review, she has the percocet 5/325mg q. 4 hours prn that she had been taking preoperatively, nothing else. Confirmed with pt that the morphine allergy was not anaphylaxis, rather nausea and vomiting. She apparently tolerated the fentanyl post-op earlier today, therefore, will add Fentanyl 50mcg IV q. 2 hours prn severe pain, and increase the percocet to two 5/325mg tabs q. 4 hours prn pain. Discussed with bedside JEREMY Carrero.

## 2020-09-29 NOTE — PROGRESS NOTES
For posterior cervical-thoracic fusion for stabilization across kyphotic deformity secondary to metastatic disease of the spine  Risks,  (including worsening neurological deficits), benefits and procedure explained and she agrees to proceed

## 2020-09-29 NOTE — PROGRESS NOTES
Problem: Falls - Risk of 
Goal: *Absence of Falls Description: Document Lacey Valerioann Fall Risk and appropriate interventions in the flowsheet. Outcome: Progressing Towards Goal 
Note: Fall Risk Interventions: 
Mobility Interventions: Communicate number of staff needed for ambulation/transfer Medication Interventions: Patient to call before getting OOB, Teach patient to arise slowly Elimination Interventions: Call light in reach, Patient to call for help with toileting needs History of Falls Interventions: Utilize gait belt for transfer/ambulation, Door open when patient unattended Problem: Patient Education: Go to Patient Education Activity Goal: Patient/Family Education Outcome: Progressing Towards Goal

## 2020-09-29 NOTE — ANESTHESIA POSTPROCEDURE EVALUATION
Procedure(s): POSTERIOR CERVICAL FUSION SEGMENTAL WITH INSTRUMENTATION C6-T5/ T2,T3 LAMINECTOMY/ ARTHRODESIS C6-T5 (O-ARM) SPINE LUMBAR LAMINECTOMY WITH INSTRUMENTATION AND IMAGE GUIDANCE. general 
 
Anesthesia Post Evaluation Multimodal analgesia: multimodal analgesia used between 6 hours prior to anesthesia start to PACU discharge Patient location during evaluation: bedside Patient participation: complete - patient participated Level of consciousness: awake Pain management: adequate Airway patency: patent Anesthetic complications: no 
Cardiovascular status: acceptable Respiratory status: acceptable Hydration status: acceptable Post anesthesia nausea and vomiting:  controlled Final Post Anesthesia Temperature Assessment:  Normothermia (36.0-37.5 degrees C) INITIAL Post-op Vital signs:  
Vitals Value Taken Time /50 9/29/2020  2:00 PM  
Temp 37.6 °C (99.6 °F) 9/29/2020 12:40 PM  
Pulse 92 9/29/2020  2:15 PM  
Resp 19 9/29/2020  2:15 PM  
SpO2 93 % 9/29/2020  2:15 PM  
Vitals shown include unvalidated device data.

## 2020-09-29 NOTE — ROUTINE PROCESS
Patient: Lynnette Rasmussne MRN: [id number]  SSN: [id number] YOB: 1945  Age: 67 y.o. Sex: female Patient is status post Procedure(s): POSTERIOR CERVICAL FUSION SEGMENTAL WITH INSTRUMENTATION C6-T5/POSSIBLE T2,T3 LAMINECTOMY/ ARTHRODESIS C6-T5 (O-ARM) SPINE LUMBAR LAMINECTOMY WITH INSTRUMENTATION AND IMAGE GUIDANCE. Surgeon(s) and Role: * oRsa Hansen MD - Primary Local/Dose/Irrigation:  20 ML LOCAL Triple Lumen 09/29/20 Left (Active) Peripheral IV 09/28/20 Right Forearm (Active) Site Assessment Clean, dry, & intact 09/29/20 0300 Phlebitis Assessment 0 09/29/20 0655 Infiltration Assessment 0 09/29/20 0655 Dressing Status Clean, dry, & intact 09/29/20 4589 Dressing Type Transparent;Tape 09/29/20 5480 Hub Color/Line Status Blue; Infusing 09/29/20 2305 Action Taken Other (comment) 09/28/20 3268 Hemovac Upper Back (Active) Site Assessment Clean, dry, & intact 09/29/20 1133 Dressing Status Clean, dry, & intact 09/29/20 1133 Drainage Description Serosanguinous 09/29/20 1133 Status Charged; Patent 09/29/20 1133 Airway - Endotracheal Tube 09/29/20 Oral (Active) Line Pankaj Lips 09/29/20 0000 Dressing/Packing:  Wound Back-Dressing Type: Staples; Other (Comment)(OPTIFOAM  GENTLE POST OP DRESSING) (09/26/20 2331) Wound Back Upper;Right HEMOVAC SITE-Dressing Type: Transparent film; Other (Comment)(BIOPATCH) (09/26/20 2331) Splint/Cast: Wound Back-Splint Type/Material: Other(Comment)] Other:  RAMIREZ CATH IN PLACE.

## 2020-09-29 NOTE — PERIOP NOTES
[address]  [facility] #[id number], EXP06/30/2022. [facility] IRRIGATION LOT # [id number], EXP1 JAN 2022 FLOSEAL 10 ML LOT #[id number], EXP01/29/2022

## 2020-09-29 NOTE — PROGRESS NOTES
Dr. Clyde Kyle on call for Dr. Waylon John notified that the daughter wants to be called with an update and test results. Delicia Ortiz (daughter) [phone]. Dr. Clyde Kyle called back and voiced that he forwarded the daughter's name and number to Dr. Waylon John.

## 2020-09-29 NOTE — PERIOP NOTES
TRANSFER - OUT REPORT: 
 
Verbal report given to Lobo BURNETT RN on Thiago Camps  being transferred to [address] for routine post - op Report consisted of patients Situation, Background, Assessment and  
Recommendations(SBAR). Information from the following report(s) SBAR, Kardex, OR Summary, Intake/Output, MAR and Cardiac Rhythm NSR was reviewed with the receiving nurse. Opportunity for questions and clarification was provided. Patient transported with: 
 O2 @ 2 liters Tech

## 2020-09-29 NOTE — PERIOP NOTES
Handoff Report from Operating Room to PACU Report received from JEREMY Mckee and CAROLIN Rodriguez CRNA regarding Eliud Crooks. Surgeon(s): 
Nadia Son MD  And Procedure(s) (LRB): POSTERIOR CERVICAL FUSION SEGMENTAL WITH INSTRUMENTATION C6-T5/ T2,T3 LAMINECTOMY/ ARTHRODESIS C6-T5 (O-ARM) (N/A) SPINE LUMBAR LAMINECTOMY WITH INSTRUMENTATION AND IMAGE GUIDANCE (N/A)  confirmed  
with allergies, drains and dressings discussed. Anesthesia type, drugs, patient history, complications, estimated blood loss, vital signs, intake and output, and last pain medication, lines, reversal medications and temperature were reviewed.

## 2020-09-29 NOTE — PROGRESS NOTES
Ortho / Neurosurgery NP Note POD# 0  s/p POSTERIOR CERVICAL FUSION SEGMENTAL WITH INSTRUMENTATION C6-T5/ T2,T3 LAMINECTOMY/ ARTHRODESIS C6-T5 (O-ARM), SPINE LUMBAR LAMINECTOMY WITH INSTRUMENTATION AND IMAGE GUIDANCE Pt seen with daughter at bedside. Pt resting in bed. Drowsy/groggy, POSS-3, awakes and follows commands and quickly drifts off to sleep. Asked if she has pain \"I don't think so\", appears in NAD. VSS Afebrile. 2L NC Continuous pulse ox in place Visit Vitals /71 Pulse 86 Temp 98.4 °F (36.9 °C) Resp 14 Ht 5' 5\" (1.651 m) Wt 90.8 kg (200 lb 3.2 oz) SpO2 95% BMI 33.32 kg/m² Voiding status: due to void Output (mL) Urine Voided: 200 ml (09/27/20 1427) Last Bowel Movement Date: 09/28/20 (09/29/20 4981) Unmeasurable Output Urine Occurrence(s): 1 (09/28/20 8447) Stool Occurrence(s): 1 (09/28/20 2036) Labs Lab Results Component Value Date/Time HGB 11.6 09/29/2020 03:04 AM  
  
Lab Results Component Value Date/Time INR 1.0 09/29/2020 03:04 AM  
  
Lab Results Component Value Date/Time Sodium 136 09/29/2020 03:04 AM  
 Potassium 4.2 09/29/2020 03:04 AM  
 Chloride 100 09/29/2020 03:04 AM  
 CO2 29 09/29/2020 03:04 AM  
 Glucose 115 (H) 09/29/2020 03:04 AM  
 BUN 49 (H) 09/29/2020 03:04 AM  
 Creatinine 1.48 (H) 09/29/2020 03:04 AM  
 Calcium 9.3 09/29/2020 03:04 AM  
 
Recent Glucose Results:  
Lab Results Component Value Date/Time  (H) 09/29/2020 03:04 AM  
 GLUCPOC 158 (H) 09/29/2020 12:49 PM  
 GLUCPOC 156 (H) 09/28/2020 09:20 PM  
 
 
 
 
Body mass index is 33.32 kg/m². : A BMI > 30 is classified as obesity and > 40 is classified as morbid obesity. Left IJ infusing Soft cervical collar in place Hemovac drain charged Calves soft and supple; No pain with passive stretch Moves all extremities on commands, moshe  equal. Assessment limited due to drowsiness SCDs for mechanical DVT proph while in bed PLAN: 
 1) Neurovascular assessment q4 hours 2) PT/OT - soft collar, RW. Pt will likely need rehab at discharge, SNF vs IP Rehab TBD 3) Pain control - scheduled tylenol, gabapentin, prn percocet 4) Readniess for discharge: 
   [x] Vital Signs stable  
 [] Hgb stable  
 [] + Voiding  
 [x] Wound intact, drainage minimal  
 [] Tolerating PO intake   
 [] Cleared by PT (OT if applicable) [] Stair training completed (if applicable) [] Independent / Contact Guard Assist (household distance) [] Bed mobility [] Car transfers  
  [] ADLs [x] Adequate pain control on oral medication alone Monitor overnight. Begin PT/OT tomorrow.   
 
Mirian Asher NP

## 2020-09-29 NOTE — OP NOTES
[address] 
OPERATIVE REPORT Name:  James Em 
MR#:  [id number] :  1945 ACCOUNT #:  [de-identified] DATE OF SERVICE:  2020 PREOPERATIVE DIAGNOSIS:  Metastatic spinal disease, secondary to history of breast cancer. POSTOPERATIVE DIAGNOSIS:  Metastatic spinal disease, secondary to history of breast cancer. PROCEDURE PERFORMED:  Segmental posterior cervicothoracic fusion C5 to T5, and arthrodesis C5 to T5 with cancellous bone chips and DBM putty, and partial laminectomy T2, and use of neuronavigation/O-arm. SURGEON:  Celestine hKan MD 
 
ASSISTANT:  Alex Lyles SA 
 
ANESTHESIA:  General. 
 
COMPLICATIONS:  None. SPECIMENS REMOVED:  Bone/tumor. IMPLANTS:  [employer] lateral mass cervical screws and pedicle screws and rods. ESTIMATED BLOOD LOSS:  Minimal. 
 
OPERATIVE PROCEDURE:  Review of imaging studies revealed a kyphotic deformity at T1 to T2 secondary to metastatic disease to the spine. In an effort to stabilize the kyphosis, after discussing with the patient risks, benefits, and alternatives, she was taken to the operating room where she was induced under general endotracheal anesthesia, placed on the operating table/Tono table with the head secured in the three-point [place] head escoto. Posterior cervicothoracic region was then shaved, scrubbed, prepped, and draped in usual sterile fashion. A time-out was performed to identify the correct patient and procedure. Appropriate antibiotics were administered and sequential stockings were applied for DVT prophylaxis. A midline vertical incision was made centered over the midline at the beginning of the lower cervical spine down to the approximate C6 area. The muscle was taken down in subperiosteal fashion on either side. A towel clamp was applied to spinous process at C7 and x-ray obtained to confirm position.   Self retaining retractors were inserted into the edges of the incision. The navigation array was applied to the spinous process of T6. The O-arm was brought into the operative field. A spin was performed after performing preliminary x-rays. Under loupe magnification, lateral mass screws were placed in the C5 and C6 lateral masses on either side respectively without complications. A 14-mm long screws were chosen. When using the navigation system, pedicle screws were placed in T4 and T5. I could not put them in T1, T2, and T3 because of their involvement significantly with metastatic disease. The screws felt like they were embedded in the bone in the thoracic region quite strongly, although the bone was a little softer on the left side at T4. Two rods, 150-mm long, were contoured to accommodate the kyphosis. I did not make an attempt to pull back on the vertebral bodies, so the alignment was left in place as it was preoperatively. Screw caps were used to lock the rods to the screws. The entire constructs felt quite strong on either side. I decorticated at multiple levels including the facet joints at C4-C5 and C5-C6, and then also at T4-T5 and T3-T4 and placed cancellous bone chips with DBM [place] putty to aid in the arthrodesis. The area had been irrigated copiously with Irrisept irrigation several times during the operation and then irrigated with saline. A drain was placed and brought out through a separate stab incision. The fascia was closed with 0 Vicryl and the subcutaneous tissue with 3-0 interrupted inverted Vicryl sutures and staples were applied to the skin edges. Sterile dressing was applied. The needle, sponge, and instrument count were correct at the end of the procedure. The patient was taken out of the head escoto and turned on to the stretcher in stable condition.   Throughout the entire procedure, physiologic neuromonitoring was performed including SSEP and motor evokes and they remained stable throughout the entire procedure, although the motor evokes were diminished in the lower extremities. The [place] flipped indicating and confirming the presence of the leakage that she had before surgery. Juany Flor MD 
 
 
[id number] 
D:  09/29/2020 13:20 
T:  09/29/2020 18:01 
JOB #:  [id number] CC:  Tanya Negrete MD

## 2020-09-29 NOTE — PROGRESS NOTES
Hospitalist Progress Note NAME: Miguel Ángel Oro :  1945 MRN:  [id number] Assessment / Plan: 
Acute thoracic compression fracture POA- likely pathological given history of osseous metastasis L Upper extremity Burning sensation POA Cervical Cord compression ruled out with MRI Bone scan noted- Multiple skeletal metastases MRI C spine noted for severe spinal stenosis with Compression fractures MRI noted for Mid thoracic Compression fracture COVID test - came back negative Cont PO Percocet for pain control. Palliative Pain consulted- following Added stool softner Plans of Kyphoplasty cancelled- s/p IP Neuro surgery consult- Dr Gee Iqbal noted- plan for  OR noted- Thoracic spine stabilization- For posterior cervical-thoracic fusion for stabilization across kyphotic deformity secondary to metastatic disease of the spine today by Dr Gee Iqbal Cont gabapentin trial to see if helpful with burning sensation in L UE 
  
Osseous metastasis POA History of breast cancer about 18 years ago and underwent chemotherapy with mastectomy 
-She reports that she was told she had a bone cancer about 2 years after breast cancer was cured by a physician in [facility]. She reports that she had repeat imaging shortly after that and was told she had no cancer CT chest and abdomen in 2020 which showed evidence of osseous metastasis and patient was advised outpatient follow-up but patient reports that she did not follow-up with any oncologist 
 
[address] [place] [address] Oncology consulted- recommended neurosurgery eval- Dr Gee Iqbal for thoracic Laminectomy to stabilize spine & get diagnostic biopsy- beng planned now as per Dr Gee Iqbal- OR today 
 
  
History of COPD not in exacerbation 
-Resume home inhalers 
  
Chronic systolic congestive heart failure POA- well compensated, CXR clear Hypotension H/o Hypertension 
resumed Lasix - Cr has creeped up to 1.4 today- close to her recent baseline in 2020 will observe post op, BMP in AM 
Cont holding Entresto for now Cont Coreg, increased to 6.25 BID 9/26- cont for now 
  
 
GERD 
-Continue home PPI 
  
Code Status: Full code Surrogate Decision Maker: Daughter 
  
DVT Prophylaxis: Lovenox 
  
  
Baseline: From home, independent of ADLs 30.0 - 39.9 Obese / Body mass index is 33.32 kg/m². Weightloss recommended Recommended Disposition: SNF/LTC and HH PT, OT, RN?? TBD- PT/OT eval held for now till cleared by Neurosurgery Subjective: Chief Complaint / Reason for Physician Visit :F/U Gen Weakness, Falls , Back pain, L arm burning sensation \"I am ok but have burning sensation in L arm\". Discussed with RN events overnight. Review of Systems: 
Symptom Y/N Comments  Symptom Y/N Comments Fever/Chills n   Chest Pain n   
Poor Appetite y   Edema n   
Cough n   Abdominal Pain Sputum n   Joint Pain y   
SOB/TAVAREZ n   Pruritis/Rash Nausea/vomit n   Tolerating PT/OT n Due to pain Diarrhea    Tolerating Diet y NPO for OR today Constipation    Other y L arm burning sensation Could NOT obtain due to:   
 
Objective: VITALS:  
Last 24hrs VS reviewed since prior progress note. Most recent are: 
Patient Vitals for the past 24 hrs: 
 Temp Pulse Resp BP SpO2  
09/29/20 0648 99.2 °F (37.3 °C) 88 17 (!) 116/51 97 % 09/29/20 0330 98.5 °F (36.9 °C) 82 18 102/63 91 %  
09/29/20 0000  88     
09/28/20 2311 98.6 °F (37 °C) 92 18 107/79 90 % 09/28/20 2031 99.3 °F (37.4 °C) 86 18 134/62 92 %  
09/28/20 1956     94 % 09/28/20 1430 98.5 °F (36.9 °C) 80 18 (!) 125/54 94 % 09/28/20 1146 98.8 °F (37.1 °C) 78 16 (!) 122/56 91 % No intake or output data in the 24 hours ending 09/29/20 0817 PHYSICAL EXAM: 
General: WD, WN. Alert, cooperative, no acute distress   
EENT:  EOMI. Anicteric sclerae. MMM Resp:  CTA bilaterally, no wheezing or rales. No accessory muscle use CV:  Regular  rhythm,  No edema GI:  Soft, Non distended, Non tender.  +Bowel sounds Neurologic:  Alert and oriented X 3, normal speech, Psych:   Good insight. Not anxious nor agitated Skin:  No rashes. No jaundice Reviewed most current lab test results and cultures  YES Reviewed most current radiology test results   YES Review and summation of old records today    NO Reviewed patient's current orders and MAR    YES 
PMH/SH reviewed - no change compared to H&P 
________________________________________________________________________ Care Plan discussed with: 
  Comments Patient x Family RN x Care Manager x Consultant     
                 x Multidiciplinary team rounds were held today with , nursing, pharmacist and clinical coordinator. Patient's plan of care was discussed; medications were reviewed and discharge planning was addressed. ________________________________________________________________________ Total NON critical care TIME:  26   Minutes Total CRITICAL CARE TIME Spent:   Minutes non procedure based Comments >50% of visit spent in counseling and coordination of care    
________________________________________________________________________ Cleave MD Ramandeep  
 
Procedures: see electronic medical records for all procedures/Xrays and details which were not copied into this note but were reviewed prior to creation of Plan. LABS: 
I reviewed today's most current labs and imaging studies. Pertinent labs include: 
Recent Labs  
  09/29/20 
0304 WBC 9.1 HGB 11.6 HCT 37.2  Recent Labs  
  09/29/20 
0304   
K 4.2  CO2 29 * BUN 49* CREA 1.48* CA 9.3 INR 1.0 Signed: Cleave Salvage, MD

## 2020-09-30 PROBLEM — G89.18 ACUTE POST-OPERATIVE PAIN: Status: ACTIVE | Noted: 2020-01-01

## 2020-09-30 NOTE — PROGRESS NOTES
Palliative Medicine Consult Jacques: [phone]) Patient Name: Kathie Lagos YOB: 1945 Date of Initial Consult: 9/23/2020 Reason for Consult: overwhelming sxs Requesting Provider: Nancy Simon MD  
Primary Care Physician: Danelle Jamison NP 
 
 SUMMARY:  
Kathie Lagos is a 67 y.o. with a past history of COPD, congestive heart failure, dyslipidemia, gastroesophageal reflux disease, who was admitted on 9/21/2020 from home with a diagnosis of compression fracture of thoracic vertebra. Current medical issues leading to Palliative Medicine involvement include: pain management, newly diagnosed spine lesions. 9/29: underwent posterior cervical fusion segmental with instrumentation cC-T5/T2, T3 laminectomy/arthrodesis C6-T5 (O-arm), spine lumbar laminectomy with instrumention and image guidance. 9/30: pain is well controlled as long as she is lying still, with percocet two 5/325mg tabs. She reports the neuropathic pain in her left upper arm and chest is much better and the ROM is better too. Psychosocial: lives in low income senior apt. Has 2 dtrs and 1 son. PALLIATIVE DIAGNOSES:  
1. Generalized weakness 2. Falls 3. Fatigue 4. Weakness of extremities 5. Back pain 6. Thoracic vertebral fracture (T4) 
7. Sclerotic bony lesions 8. burning pain in left arm 9. Constipation 10. Care decisions PLAN:  
1. Prior to visit, I completed a review of patient's medical records, including medical documentation, vital signs, MARs, and results of various labs and other diagnostics. 2. PAIN:  Pt reports that she was in severe pain 10/10  yesterday afternoon after arriving to the floor, the dose of 50mcg fentanyl IV helped bring her pain down to a 4/10, and has since been reasonably controlled with the increase in percocet.   We had a long discussion about what to expect from her post-op pain: pain medication will make her pain tolerable, but not go away, getting up out of bed will cause her pain to get worse, and today will be the worse day, but each day it will get a little better, it helps to breathe through it during that time, post-op pain has a purpose, it reminds her that she just had major surgery and should be resting and minimizing her activity. 1. Continue percocet two 5/325mg tabs q. 4 hours prn. If she takes this every 4 hours the total daily acetaminophen dose is 3900mg. 2. Continue Gabapentin 100mg tid. If the neuropathic pain continues to improve, will discontinue this medication. 3. Will reduce Fentanyl 50mcg IV q. 2 hours to 25mcg tid prn rescue dose, prior to or after PT/OT or other activity. 4. Cymbalta 20mg daily for pain and depression. 5. Narcan 0.4mg IVP q. 2 min prn RR<8 and/or per protocol. 6. RN can call me if pain medication needs adjusting. 3. CONSTIPATION:  Few loose stools for the past 2 days, last BM 9/29. 
1. Continue pericolace 1 tab daily. 2. Continue Miralax 17 gm daily. 4. GOALS OF CARE:  Pt asking about \"what now? Are they going to kick me out once I'm up with PT?\"  Assured pt that no one is going to make her leave until she is medically ready, that typically pt's stay in the hospital for 3-5 days after a procedure like hers, but everyone is different and some people can leave sooner, others might need more time. We talked about her going to rehab from here unless she shows she is able to manage self care and independence with all ADLs. 5.  Patient has completed documents indicating DNR status, I will change her code status after her spine surgery. 6. Initial consult note routed to primary continuity provider and/or primary health care team members 7. Communicated plan of care with: Palliative [facility] Team 
 
 GOALS OF CARE / TREATMENT PREFERENCES:  
 
GOALS OF CARE:  Pending workup Patient/Health Care Proxy Stated Goals: Prolong life TREATMENT PREFERENCES:  
 Code Status: Full Code Advance Care Planning: 
[x] [facility] Interdisciplinary Team has updated the ACP Navigator with [place] and Patient Capacity Primary Decision Maker (Active): Priscila Martinez - Daughter - [phone] Secondary Decision Maker: Cristian Vazquez Daughter - [phone] Secondary Decision Maker: Stanislav Pablo - Son - [phone] Advance Care Planning 9/24/2020 Patient's Healthcare Decision Maker is: Named in scanned ACP document Primary Decision Maker Name -  
Primary Decision Maker Phone Number -  
Primary Decision Maker Relationship to Patient -  
Confirm Advance Directive Yes, on file Patient Would Like to Complete Advance Directive - Does the patient have other document types Do Not Resuscitate Other Instructions: Other: As far as possible, the palliative care team has discussed with patient / health care proxy about goals of care / treatment preferences for patient. HISTORY:  
 
History obtained from: chart, patient CHIEF COMPLAINT: LE weakness and falls HPI/SUBJECTIVE: The patient is:  
[x] Verbal and participatory [] Non-participatory due to:  
 
9/21: presented to ED with acute onset of generalized fatigue, multiple falls (at least 3 falls this week), \"my legs just feel weak and I fall. \"  She started having lower back pain after falling yesterday, that is exacerbated with movement and radiates down her legs. No relieving factors. No numbness, tingling, or bowel or bladder issues. Pt was in her usual state of health until about 1 month ago when she started not feeling well, with generalized weakness involving her 4 extremities, and decreased exercise intolerance. Pt has remote h/o breast cancer in 1999, s/p chemo and mastectomy.   She reports that she had follow-up imaging in early 2000's and was told she might have bone cancer, but then another oncologist said it wasn't true and she hasn't followed up since. CXR reveals new lower thoracic compression fracture and diffuse osseous mets. Chest CT reveals new lower thoracic 9/23: pt reports that she has had numbness in her left chest and arm since mastectomy, but starting 2 months ago she's started experiencing burning pain in the same area, the pain comes and goes and she doesn't know what exacerbates it, sometimes lying down will help. Since her last fall her low back has been very painful, achy. Clinical Pain Assessment (nonverbal scale for severity on nonverbal patients):  
Clinical Pain Assessment Severity: 4 Location: neck and back post op pain Character: sharp, stabbing Duration: 1 day Effect: limited mobility Factors: movement Frequency: constant Activity (Movement): Restless, excessive activity and/or withdrawal reflexes Duration: for how long has pt been experiencing pain (e.g., 2 days, 1 month, years) Frequency: how often pain is an issue (e.g., several times per day, once every few days, constant) FUNCTIONAL ASSESSMENT:  
 
Palliative Performance Scale (PPS): PPS: 30 
 
 
 PSYCHOSOCIAL/SPIRITUAL SCREENING:  
 
Palliative IDT has assessed this patient for cultural preferences / practices and a referral made as appropriate to needs (Cultural Services, Patient Advocacy, Ethics, etc.) Any spiritual / Pentec[employer]al concerns: 
[] Yes /  [x] No 
 
Caregiver Burnout: 
[] Yes /  [x] No /  [] No Caregiver Present Anticipatory grief assessment:  
[x] Normal  / [] Maladaptive ESAS Anxiety: Anxiety: 0 
 
ESAS Depression: Depression: 2 REVIEW OF SYSTEMS:  
 
Positive and pertinent negative findings in ROS are noted above in HPI. The following systems were [x] reviewed / [] unable to be reviewed as noted in HPI Other findings are noted below.  
Systems: constitutional, ears/nose/mouth/throat, respiratory, gastrointestinal, genitourinary, musculoskeletal, integumentary, neurologic, psychiatric, endocrine. Positive findings noted below. Modified ESAS Completed by: provider Fatigue: 4 Drowsiness: 1 Depression: 2 Pain: 4 Anxiety: 0 Nausea: 0 Anorexia: 4 Dyspnea: 0 Constipation: No  
  Stool Occurrence(s): 1 PHYSICAL EXAM:  
 
From RN flowsheet: 
Wt Readings from Last 3 Encounters:  
09/28/20 200 lb 3.2 oz (90.8 kg) 09/23/20 198 lb (89.8 kg) 04/17/20 221 lb 5.5 oz (100.4 kg) Blood pressure 111/72, pulse 86, temperature 98.4 °F (36.9 °C), resp. rate 18, height 5' 5\" (1.651 m), weight 200 lb 3.2 oz (90.8 kg), SpO2 94 %. Pain Scale 1: Numeric (0 - 10) Pain Intensity 1: 4 Pain Onset 1: post op Pain Location 1: Neck Pain Orientation 1: Posterior, Left Pain Description 1: Aching Pain Intervention(s) 1: Medication (see MAR) Last bowel movement, if known:  
 
Constitutional: WD, WN, NAD, pleasant and cooperative, AAOx3, lying in bed with soft cervical collar on Eyes: pupils equal, anicteric ENMT: no nasal discharge, moist mucous membranes Cardiovascular: regular rhythm, distal pulses intact Respiratory: breathing not labored, symmetric Gastrointestinal: soft non-tender, +bowel sounds Musculoskeletal: no deformity Skin: warm, dry Neurologic: following commands, moving all extremities Psychiatric: full affect, no hallucinations Other: 
 
 
 HISTORY:  
 
Active Problems: 
  Bone metastases ([address] (9/21/2020) Thoracic compression fracture ([address] (9/21/2020) History of breast cancer (9/21/2020) Pain due to malignant neoplasm metastatic to bone ([address] (9/23/2020) Neuropathic pain (9/23/2020) Drug-induced constipation (9/23/2020) Weakness of both legs (9/23/2020) Past Medical History:  
Diagnosis Date  Arthralgia 8/17/2017  Arthritis  Asthma Mild to Moderate  Breast cancer ([address] 8/17/2017 Onset 1997; Refusing Mammogram 6/16/17  Cancer ([address] 1997 Breast left  Cardiomyopathy ([address] 8/17/2017 Onset: Ischemic; 25% - 50% (last EFx 45%) Continue with ACE/Lasix/coreg  Cataract 2017 Bilateral   
 Cellulitis 2017 Suspect local reaction to Pneumovax, treat with ice, NSAIDs or Tylenol  Chest pain at rest 2017  CHF (congestive heart failure) ([address] 2017 Stable, though weight up a little. To take 1 1/2 Lasix daily if swelling, 1 daily o/w  Chronic maxillary sinusitis 2017  Chronic pain Right knee  COPD (chronic obstructive pulmonary disease) ([address] 2017 Continue with inhalers  Diabetes ([address] Pre-diabetic  DJD (degenerative joint disease) 2017  Elevated BUN 2017  Esophagitis, reflux 2017  Fatigue 2017  GERD (gastroesophageal reflux disease)  Heart failure ([address] Cardiomyopathy, HX of MI   Hyperglycemia 2017  Hyperkalemia 2017  Hyperlipidemia 2017  Hypertension 2017  Ill-defined condition Vertigo  Ill-defined condition  HX of Urosepsis complicated by respiratory failure and pneumonnia  Myalgia 2017  Thromboembolus ([address] 1969  
 during 2nd pregnancy [facility] 2017  Vertigo, aural 2017  Vitamin D deficiency 2017 Past Surgical History:  
Procedure Laterality Date [facility] JONNY  
 HX GYN Left Breast Mastectomy  HX HEENT   Bilateral Cataract  HX ORTHOPAEDIC Right 2018  
 knee replacement  HX ORTHOPAEDIC Left 2018  
 wrist surgery  HX VASCULAR ACCESS Port a cath on the right Family History Problem Relation Age of Onset  Cancer Mother  Other Father History reviewed, no pertinent family history. Social History Tobacco Use  Smoking status: Former Smoker Packs/day: 2.00 Years: 25.00 Pack years: 50.00 Last attempt to quit:  Years since quittin.7  Smokeless tobacco: Never Used Substance Use Topics  Alcohol use: No  
 
Allergies Allergen Reactions  Morphine Nausea and Vomiting Current Facility-Administered Medications Medication Dose Route Frequency  cholecalciferol (VITAMIN D3) (1000 Units /25 mcg) tablet 1 Tab  1,000 Units Oral DAILY  fentaNYL citrate (PF) injection 25 mcg  25 mcg IntraVENous TID PRN  
 alcohol 62% (NOZIN) nasal  1 Ampule  1 Ampule Topical Q12H  
 glycopyrrolate-formoterol (BEVESPI AEROSPHERE) 9 mcg-4.8 mcg inhaler  2 Puff Inhalation BID  cholecalciferol (VITAMIN D3) capsule 1,000 Units  1,000 Units Oral DAILY  fenofibrate nanocrystallized (TRICOR) tablet 48 mg  48 mg Oral DAILY  0.9% sodium chloride infusion  125 mL/hr IntraVENous CONTINUOUS  
 sodium chloride (NS) flush 5-40 mL  5-40 mL IntraVENous Q8H  
 sodium chloride (NS) flush 5-40 mL  5-40 mL IntraVENous PRN  
 naloxone (NARCAN) injection 0.4 mg  0.4 mg IntraVENous PRN  
 ondansetron (ZOFRAN ODT) tablet 4 mg  4 mg Oral Q4H PRN  
 bisacodyL (DULCOLAX) suppository 10 mg  10 mg Rectal DAILY PRN  
 oxyCODONE-acetaminophen (PERCOCET) 5-325 mg per tablet 2 Tab  2 Tab Oral Q4H PRN  
 carvediloL (COREG) tablet 6.25 mg  6.25 mg Oral BID WITH MEALS  
 albuterol (PROVENTIL VENTOLIN) nebulizer solution 2.5 mg  2.5 mg Nebulization Q4H PRN  
 gabapentin (NEURONTIN) capsule 100 mg  100 mg Oral TID  budesonide (PULMICORT) 250 mcg/2ml nebulizer susp  250 mcg Nebulization BID RT  
 DULoxetine (CYMBALTA) capsule 20 mg  20 mg Oral DAILY  polyethylene glycol (MIRALAX) packet 17 g  17 g Oral DAILY  naloxone (NARCAN) injection 0.4 mg  0.4 mg IntraVENous EVERY 2 MINUTES AS NEEDED  
 senna-docusate (PERICOLACE) 8.6-50 mg per tablet 1 Tab  1 Tab Oral DAILY  ondansetron (ZOFRAN) injection 4 mg  4 mg IntraVENous Q4H PRN  
 sodium chloride (NS) flush 5-40 mL  5-40 mL IntraVENous Q8H  
 sodium chloride (NS) flush 5-40 mL  5-40 mL IntraVENous PRN  
  acetaminophen (TYLENOL) tablet 650 mg  650 mg Oral Q6H PRN Or  
 acetaminophen (TYLENOL) suppository 650 mg  650 mg Rectal Q6H PRN  
 furosemide (LASIX) tablet 40 mg  40 mg Oral DAILY  pantoprazole (PROTONIX) tablet 40 mg  40 mg Oral ACB  [Held by provider] sacubitriL-valsartan (ENTRESTO) 49-51 mg tablet 2 Tab  2 Tab Oral BID  
 
 
 
 LAB AND IMAGING FINDINGS:  
 
Lab Results Component Value Date/Time WBC 10.3 09/30/2020 02:59 AM  
 HGB 9.7 (L) 09/30/2020 02:59 AM  
 PLATELET 663 99/55/3047 02:59 AM  
 
Lab Results Component Value Date/Time Sodium 140 09/30/2020 02:59 AM  
 Potassium 4.3 09/30/2020 02:59 AM  
 Chloride 104 09/30/2020 02:59 AM  
 CO2 33 (H) 09/30/2020 02:59 AM  
 BUN 48 (H) 09/30/2020 02:59 AM  
 Creatinine 1.55 (H) 09/30/2020 02:59 AM  
 Calcium 8.5 09/30/2020 02:59 AM  
  
Lab Results Component Value Date/Time Alk. phosphatase 124 (H) 09/21/2020 11:05 AM  
 Protein, total 7.8 09/21/2020 11:05 AM  
 Albumin 3.5 09/21/2020 11:05 AM  
 Globulin 4.3 (H) 09/21/2020 11:05 AM  
 
Lab Results Component Value Date/Time INR 1.0 09/29/2020 03:04 AM  
 Prothrombin time 10.9 09/29/2020 03:04 AM  
 aPTT 24.1 09/21/2020 11:05 AM  
  
No results found for: IRON, FE, TIBC, IBCT, PSAT, FERR No results found for: PH, PCO2, PO2 No components found for: [place] No results found for: CPK, CKMB Total time:  
Counseling / coordination time, spent as noted above:  
> 50% counseling / coordination?:  
 
Prolonged service was provided for  []30 min   []75 min in face to face time in the presence of the patient, spent as noted above. Time Start:  
Time End:  
Note: this can only be billed with 82871 (initial) or 25459 (follow up). If multiple start / stop times, list each separately.

## 2020-09-30 NOTE — PROGRESS NOTES
PT Note: 
 
Orders received, chart reviewed and attempted to see Pt for therapy. Pt stating she feels very groggy and loopy from her pain medications, and requests therapy return later today.  Will try back shortly to complete PT evaluation per Pt request. 
 
Marquez Dumas, PT

## 2020-09-30 NOTE — PROGRESS NOTES
Comprehensive Nutrition Assessment Type and Reason for Visit: David Saunders Nutrition Recommendations/Plan:  
· Liberalize to Regular Diet to encourage PO intake · Continue nutrition supplements, Ensure Enlive BID · Please document % meals and supplements consumed in flowsheet I/O's under intake Nutrition Assessment:     
9/30- Chart reviewed. S/P POD# 1 POSTERIOR CERVICAL FUSION SEGMENTAL, LAMINECTOMY/ ARTHRODESIS, SPINE LUMBAR LAMINECTOMY. Intern/RD visited at bedside. Pt reports increasing PO intake to 50% meals and nutrition supplements since admit and will ask nurse to help her open ONS bottles. Pt stated she has ordered regular meals, RD will liberalize diet to Regular and encouraged Pt to eat as able to enhance surgical wound recovery. 9/28- Chart reviewed. Med noted for Thoracic compression fracture. Past Hx includes Bone metastasis, Breast cancer, neuropathic pain, weakness, HTN, dyslipidemia, Vit. D deficiency, COPD, fatigue, CHF, Esophagitis, hyperkalemia, hyperglycemia, obesity cardiomyopathy. Per EMR, 9% Weight Loss in 5 months (21 lbs). Pt reports weight recently stable. Scheduled for NPO prior to procedure on 9/29. Current Diet: \"CC Diabetic with Options 2000 kcal; no concentrated sweets. \" RD/intern visited at bedside. Pt reports recent decreased PO intake due to decreased ambulating ability and ability to cook PTA. Pt reports taking Vit. D supplement PTA. Pt reports poor appetite since admit, not fond of CC Diet Recommendation. EMR shows 0-50% PO intake. RD suggested pt consider ONS BID. Patient agrees, prefers chocolate and strawberry. Pt reports constipation prior to [facility] 9/28. A1C 5.6 (1/16/2020), consider loosening diet restrictions after procedure to assist with PO intake. Pt reported being pre-diabetic 2 years ago, went on diet, lost weight, was \"cleared\", resumed normal diet, and regained weight. Estimated Daily Nutrient Needs: Energy (kcal):  1570kcals/day (BMR x 1.3 AF -250 kcals) Protein (g):  73-91 g/day (0.8-1.0 g/kg/d) Fluid (ml/day):  1600 ml/day Nutrition Related Findings:  Labs: BUN 48, Cr 1.55, POC -159 s/p surgical procedure; Meds: Pericolase, miralax, oxycodone, albuterol, carvedilol, dirosemide; BM 9/28; Surgical Wound Wounds:   
Surgical wound Current Nutrition Therapies: DIET NUTRITIONAL SUPPLEMENTS Breakfast, Dinner; Ensure [employer] DIET REGULAR Anthropometric Measures: 
· Height:  5' 5\" (165.1 cm) · Current Body Wt:  90.8 kg (200 lb 2.8 oz) · Ideal Body Wt:  125 lbs:  160.1 % · BMI Category:  Obese class 1 (BMI 30.0-34. 9) Nutrition Diagnosis:  
· Increased nutrient needs related to (surgical wound) as evidenced by (increased pro/energy to optimize wound healing) Nutrition Interventions:  
Food and/or Nutrient Delivery: Modify current diet, Continue oral nutrition supplement Nutrition Education and Counseling: No recommendations at this time Coordination of Nutrition Care: Continued inpatient monitoring Goals: 
Continue PO Intake >50% meals +240 mL ONS within 5-7 days Nutrition Monitoring and Evaluation:  
Food/Nutrient Intake Outcomes: Food and nutrient intake, Supplement intake Physical Signs/Symptoms Outcomes: Biochemical data, Constipation, Skin, Weight Discharge Planning:   
Continue oral nutrition supplement, Continue current diet Electronically signed by Jaki Diaz on 9/30/2020 at 12:17 PM

## 2020-09-30 NOTE — PROGRESS NOTES
Hospitalist Progress Note NAME: Star Castro :  1945 MRN:  [id number] Assessment / Plan: 
Acute thoracic compression fracture POA- likely pathological given history of osseous metastasis L Upper extremity Burning sensation POA Cervical Cord compression ruled out with MRI Bone scan noted- Multiple skeletal metastases MRI C spine noted for severe spinal stenosis with Compression fractures MRI noted for Mid thoracic Compression fracture COVID test - came back negative Cont PO Percocet for pain control. Palliative Pain consulted- following Added stool softner Plans of Kyphoplasty cancelled- s/p IP Neuro surgery consult- Dr Giovanny Valdez noted- plan for  OR noted- Thoracic spine stabilization- s/p posterior cervical-thoracic fusion for stabilization across kyphotic deformity secondary to metastatic disease of the spine by Dr Giovanny Valdez () Cont gabapentin trial to see if helpful with burning sensation in L UE 
  
Osseous metastasis POA History of breast cancer about 18 years ago and underwent chemotherapy with mastectomy 
-She reports that she was told she had a bone cancer about 2 years after breast cancer was cured by a physician in [facility]. She reports that she had repeat imaging shortly after that and was told she had no cancer CT chest and abdomen in 2020 which showed evidence of osseous metastasis and patient was advised outpatient follow-up but patient reports that she did not follow-up with any oncologist 
 
[address] [place] [address] Oncology consulted- recommended neurosurgery eval- Dr Giovanny Valdez for thoracic Laminectomy to stabilize spine & get diagnostic biopsy- s/p OR - cleared for PT/OT eval - DC planning 
 
  
History of COPD not in exacerbation 
-Resume home inhalers 
  
Chronic systolic congestive heart failure POA- well compensated, CXR clear Hypotension H/o Hypertension 
resumed Lasix - Cr close to 1.5- close to her recent baseline in 2020 Cr stable at 1.5 today, BMP in AM 
Cont holding Entresto for now Cont Coreg, increased to 6.25 BID 9/26- cont for now 
  
 
GERD 
-Continue home PPI 
  
Code Status: Full code Surrogate Decision Maker: Daughter 
  
DVT Prophylaxis: Lovenox 
  
  
Baseline: From home, independent of ADLs 30.0 - 39.9 Obese / Body mass index is 33.32 kg/m². Weightloss recommended Recommended Disposition: SNF/LTC and HH PT, OT, RN?? TBD- PT/OT eval now- CM working on DC planning Subjective: Chief Complaint / Reason for Physician Visit :F/U Gen Weakness, Falls , Back pain, L arm burning sensation \"I am ok but have burning sensation in L arm\". Discussed with RN events overnight. Review of Systems: 
Symptom Y/N Comments  Symptom Y/N Comments Fever/Chills n   Chest Pain n   
Poor Appetite y   Edema n   
Cough n   Abdominal Pain Sputum n   Joint Pain y   
SOB/TAVAREZ n   Pruritis/Rash Nausea/vomit n   Tolerating PT/OT n Due to pain Diarrhea    Tolerating Diet y Constipation    Other y L arm burning sensation improved Could NOT obtain due to:   
 
Objective: VITALS:  
Last 24hrs VS reviewed since prior progress note. Most recent are: 
Patient Vitals for the past 24 hrs: 
 Temp Pulse Resp BP SpO2  
09/30/20 0828     94 % 09/30/20 0818 98.4 °F (36.9 °C) 86 18 111/72 95 % 09/30/20 0325 98.1 °F (36.7 °C) 79 18 (!) 129/49 95 % 09/30/20 0000 98.5 °F (36.9 °C) 82 16 (!) 112/47 94 % 09/29/20 2023  82  (!) 134/56   
09/29/20 2003     95 % 09/29/20 1935 98.4 °F (36.9 °C) 86 16 (!) 124/59 95 % 09/29/20 1828 98.1 °F (36.7 °C) 85 16 (!) 111/56 96 %  
09/29/20 1800 98 °F (36.7 °C) 86 16 (!) 112/59 97 % 09/29/20 1721 98 °F (36.7 °C) 86 16 (!) 109/55 95 % 09/29/20 1642 98.2 °F (36.8 °C) 86 16 (!) 117/56 95 % 09/29/20 1615 98.4 °F (36.9 °C) 88 14 (!) 107/55 95 % 09/29/20 1543 98.4 °F (36.9 °C) 86 14 122/71 95 % 09/29/20 1514 98.4 °F (36.9 °C) 86 14 (!) 112/57 96 % 09/29/20 1500  92 12 (!) 117/53 96 %  
09/29/20 1445  91 16 (!) 117/52 95 % 09/29/20 1430  90 13 (!) 116/54 94 % 09/29/20 1415  92 19 (!) 119/36 93 % 09/29/20 1400  94 17 (!) 106/50 92 %  
09/29/20 1345  93 19 (!) 117/50 96 %  
09/29/20 1330  91 16 (!) 120/54 95 % 09/29/20 1315  92 17 (!) 114/51 94 % 09/29/20 1300  92 17 (!) 117/50 94 % 09/29/20 1255  95 19 (!) 112/48 94 % 09/29/20 1250  93 22 (!) 107/45 94 % 09/29/20 1245  93 18 (!) 108/46 95 % 09/29/20 1240 99.6 °F (37.6 °C) 89 18 (!) 110/45 98 %  
09/29/20 1239    (!) 113/46  Intake/Output Summary (Last 24 hours) at 9/30/2020 1127 Last data filed at 9/30/2020 6513 Gross per 24 hour Intake 1400 ml Output 1575 ml Net -175 ml PHYSICAL EXAM: 
General: WD, WN. Alert, cooperative, no acute distress   
EENT:  EOMI. Anicteric sclerae. MMM Resp:  CTA bilaterally, no wheezing or rales. No accessory muscle use CV:  Regular  rhythm,  No edema GI:  Soft, Non distended, Non tender.  +Bowel sounds Neurologic:  Alert and oriented X 3, normal speech, Psych:   Good insight. Not anxious nor agitated Skin:  No rashes. No jaundice Reviewed most current lab test results and cultures  YES Reviewed most current radiology test results   YES Review and summation of old records today    NO Reviewed patient's current orders and MAR    YES 
PMH/SH reviewed - no change compared to H&P 
________________________________________________________________________ Care Plan discussed with: 
  Comments Patient x Family  x Family at bedside yesterday RN x Care Manager x Consultant     
                 x Multidiciplinary team rounds were held today with , nursing, pharmacist and clinical coordinator. Patient's plan of care was discussed; medications were reviewed and discharge planning was addressed. ________________________________________________________________________ Total NON critical care TIME:  26   Minutes Total CRITICAL CARE TIME Spent:   Minutes non procedure based Comments >50% of visit spent in counseling and coordination of care    
________________________________________________________________________ Minh Bran MD  
 
Procedures: see electronic medical records for all procedures/Xrays and details which were not copied into this note but were reviewed prior to creation of Plan. LABS: 
I reviewed today's most current labs and imaging studies. Pertinent labs include: 
Recent Labs  
  09/30/20 0259 09/29/20 
0304 WBC 10.3 9.1 HGB 9.7* 11.6 HCT 31.4* 37.2  213 Recent Labs  
  09/30/20 0259 09/29/20 
0304  136  
K 4.3 4.2  100 CO2 33* 29 * 115* BUN 48* 49* CREA 1.55* 1.48* CA 8.5 9.3 INR  --  1.0 Signed: Minh Bran MD

## 2020-09-30 NOTE — PROGRESS NOTES
ADULT PROTOCOL: JET AEROSOL  REASSESSMENT Patient  Joey Diaz     67 y.o.   female     9/30/2020  4:40 PM 
 
Breath Sounds Pre Procedure: Right Breath Sounds: Diminished Left Breath Sounds: Diminished Breath Sounds Post Procedure: Right Breath Sounds: Diminished Left Breath Sounds: Diminished Breathing pattern: Pre procedure Breathing Pattern: Regular Post procedure Breathing Pattern: Regular Heart Rate: Pre procedure Pulse: 90 
         Post procedure Pulse: 89 Resp Rate: Pre procedure Respirations: 20 
         Post procedure Respirations: 16 Incentive Spirometry:  Actual Volume (ml): 500 ml Cough: Pre procedure Cough: Non-productive Post procedure Cough: Non-productive Oxygen: O2 Device: Nasal cannula Changed: no 
 
SpO2: Pre procedure SpO2: 94 % Post procedure SpO2: 95 % Nebulizer Therapy: Current medications Aerosolized Medications: Pulmicort, Other (comment)(bevespi) Changed: no 
 
Problem List:  
Patient Active Problem List  
Diagnosis Code  OA (osteoarthritis) of knee M17.10  Cardiomyopathy ([facility] I42.9  [facility] C50.919  
 Hypertension I10  Vitamin D deficiency E55.9  Hyperlipidemia E78.5  Knee pain, bilateral M25.561, M25.562  Fatigue R53.83  
 COPD (chronic obstructive pulmonary disease) ([facility]) J44.9  Vertigo, aural H81.319  Thrush B37.0  DJD (degenerative joint disease) M19.90  Chest pain at rest R07.9  Myalgia M79.10  Cataract H26.9  Hyperkalemia E87.5  CHF (congestive heart failure) ([facility]) I50.9  Cellulitis L03.90  Chronic maxillary sinusitis J32.0  Esophagitis, reflux K21.0  Elevated BUN R79.9  Hyperglycemia R73.9  Severe obesity ([facility]) E66.01  
 Bone metastases ([facility]) C79.51  Thoracic compression fracture ([facility]) S22.000A  History of breast cancer Z85.3  Pain due to malignant neoplasm metastatic to bone ([facility]) G89.3, C79.51  
 Neuropathic pain M79.2  Drug-induced constipation K59.03  
 Weakness of both legs R29.898  Acute post-operative pain G89.18 Respiratory Therapist: Theresa Graves

## 2020-09-30 NOTE — PROGRESS NOTES
Problem: Mobility Impaired (Adult and Pediatric) Goal: *Acute Goals and Plan of Care (Insert Text) Description: FUNCTIONAL STATUS PRIOR TO ADMISSION: Pt reports independence at baseline, with a significant a decline over the last 2 weeks and having multiple falls. Pt reports wearing 2.5L of O2 at night and prn during the day. HOME SUPPORT PRIOR TO ADMISSION: The patient lived alone with support from family as needed. Physical Therapy Goals Initiated 9/30/2020 1. Patient will move from supine to sit and sit to supine , scoot up and down, and roll side to side in bed with minimal assistance/contact guard assist within 7 day(s). 2.  Patient will transfer from bed to chair and chair to bed with minimal assistance/contact guard assist using the least restrictive device within 7 day(s). 3.  Patient will perform sit to stand with moderate assist within 7 day(s). 4.  Patient will ambulate with minimal assistance/contact guard assist for 20 feet with the least restrictive device within 7 day(s). Outcome: Progressing Towards Goal 
 PHYSICAL THERAPY EVALUATION Patient: Yordan Syk (7 y.o. female) Date: 9/30/2020 Primary Diagnosis: Thoracic compression fracture ([facility] [address] [S22.000A] Bone metastases ([facility] [address] [C79.51] Procedure(s) (LRB): POSTERIOR CERVICAL FUSION SEGMENTAL WITH INSTRUMENTATION C6-T5/ T2,T3 LAMINECTOMY/ ARTHRODESIS C6-T5 (O-ARM) (N/A) SPINE LUMBAR LAMINECTOMY WITH INSTRUMENTATION AND IMAGE GUIDANCE (N/A) 1 Day Post-Op Precautions:   Fall, Spinal(Log Roll; Soft Collar, no lifting >5lbs) ASSESSMENT Based on the objective data described below, the patient presents with pain, impaired mobility, inability to stand/walk, and decreased activity tolerance. Pt reports B toe numbness, but improvement with LUE burning and AROM post surgery.   Reviewed precautions to avoid extreme motions at C spine and thoracic area, no lifting >5 lbs and log roll technique for bed mobility. Pt with difficulty moving requiring mod a x 1 to move supine to sitting eob and mod a x 2 to move sitting eob to supine. Pt able to sit EOB x approx 5 min, then requesting to lie back down due to pain. Once in supine, pt total a to scoot hob. Pt will need rehab at discharge. Current Level of Function Impacting Discharge (mobility/balance): mod to max a x 1-2 Functional Outcome Measure: The patient scored 30/100 on the Barthel Index outcome measure which is indicative of 70% impaired function/adls Other factors to consider for discharge: medical status, prognosis, falls PTA, I at baseline Patient will benefit from skilled therapy intervention to address the above noted impairments. PLAN : 
Recommendations and Planned Interventions: bed mobility training, transfer training, gait training, therapeutic exercises, patient and family training/education, and therapeutic activities Frequency/Duration: Patient will be followed by physical therapy:  daily to address goals. Recommendation for discharge: (in order for the patient to meet his/her long term goals) Therapy up to 5 days/week in SNF setting This discharge recommendation: A follow-up discussion with the attending provider and/or case management is planned IF patient discharges home will need the following DME: to be determined (TBD) SUBJECTIVE:  
Patient stated I gotta lay back down.  OBJECTIVE DATA SUMMARY:  
HISTORY:   
Past Medical History:  
Diagnosis Date Arthralgia 8/17/2017 Arthritis Asthma Mild to Moderate Breast cancer ([address] 8/17/2017 Onset 1997; Refusing Mammogram 6/16/17 Cancer [facility]) 1997 Breast left Cardiomyopathy ([facility] [address] 8/17/2017 Onset:1999 Ischemic; 25% - 50% (last EFx 45%) Continue with ACE/Lasix/coreg Cataract 8/17/2017 Bilateral   
 Cellulitis 8/17/2017 Suspect local reaction to Pneumovax, treat with ice, NSAIDs or Tylenol Chest pain at rest 8/17/2017 CHF (congestive heart failure) ([address] 8/17/2017 Stable, though weight up a little. To take 1 1/2 Lasix daily if swelling, 1 daily o/w Chronic maxillary sinusitis 8/17/2017 Chronic pain Right knee COPD (chronic obstructive pulmonary disease) ([address] 8/17/2017 Continue with inhalers Diabetes ([address] Pre-diabetic DJD (degenerative joint disease) 8/17/2017 Elevated BUN 8/17/2017 Esophagitis, reflux 8/17/2017 Fatigue 8/17/2017 GERD (gastroesophageal reflux disease) Heart failure ([address] Cardiomyopathy, HX of MI 1999 Hyperglycemia 8/17/2017 Hyperkalemia 8/17/2017 Hyperlipidemia 8/17/2017 Hypertension 8/17/2017 Ill-defined condition Vertigo Ill-defined condition 2003 HX of Urosepsis complicated by respiratory failure and pneumonnia Myalgia 8/17/2017 Thromboembolus ([address] 1969  
 during 2nd pregnancy Thrush 8/17/2017 Vertigo, aural 8/17/2017 Vitamin D deficiency 8/17/2017 Past Surgical History:  
Procedure Laterality Date [address] [facility] JONNY  
 HX GYN Left Breast Mastectomy HX HEENT  2015 Bilateral Cataract HX ORTHOPAEDIC Right 06/2018  
 knee replacement HX ORTHOPAEDIC Left 11/2018  
 wrist surgery HX VASCULAR ACCESS Port a cath on the right Personal factors and/or comorbidities impacting plan of care: diagnosis, pain, bone metastases Home Situation Home Environment: Private residence One/Two Story Residence: One story Living Alone: Yes Support Systems: Child(vicky) Patient Expects to be Discharged to[de-identified] Rehabilitation facility Current DME Used/Available at Home: Shower chair Tub or Shower Type: Tub/Shower combination EXAMINATION/PRESENTATION/DECISION MAKING:  
Critical Behavior: 
Neurologic State: Alert Orientation Level: Oriented X4 Cognition: Follows commands Hearing: Auditory Auditory Impairment: None Hearing Aids/Status: Does not own Range Of Motion: 
AROM: Generally decreased, functional 
  
  
  
  
  
  
  
Strength:   
Strength: Generally decreased, functional 
  
  
  
  
  
  
Tone & Sensation:  
Tone: Normal 
  
  
  
  
Sensation: Intact(B toes numb) Coordination: 
Coordination: Generally decreased, functional 
Vision:  
Acuity: Within Defined Limits Functional Mobility: 
Bed Mobility: 
  
Supine to Sit: Moderate assistance; Additional time;Assist x1 Sit to Supine: Minimum assistance; Moderate assistance;Assist x2 Scooting: Minimum assistance; Moderate assistance;Assist x1;Assist x2 Transfers: 
Sit to Stand: (did not occur) Balance:  
Sitting: Impaired Sitting - Static: Fair (occasional) Sitting - Dynamic: Poor (constant support) Standing: (did not occur; Pt unable to stand) Functional Measure: 
 
Barthel Index: 
 
Bathin Bladder: 5 Bowels: 10 
Groomin Dressin Feedin Mobility: 0 Stairs: 0 Toilet Use: 5 Transfer (Bed to Chair and Back): 0 Total: 30/100 The Barthel ADL Index: Guidelines 1. The index should be used as a record of what a patient does, not as a record of what a patient could do. 2. The main aim is to establish degree of independence from any help, physical or verbal, however minor and for whatever reason. 3. The need for supervision renders the patient not independent. 4. A patient's performance should be established using the best available evidence. Asking the patient, friends/relatives and nurses are the usual sources, but direct observation and common sense are also important. However direct testing is not needed. 5. Usually the patient's performance over the preceding 24-48 hours is important, but occasionally longer periods will be relevant. 6. Middle categories imply that the patient supplies over 50 per cent of the effort. 7. Use of aids to be independent is allowed. Sang Persaud., Barthel, D.W. (8521). Functional evaluation: the Barthel Index. [address] (14)2. RBENDA De Los Santos, Rogelio Logan., Rupinder Bowman., Preethi Olvera, [address] (). Measuring the change indisability after inpatient rehabilitation; comparison of the responsiveness of the Barthel Index and Functional [place] Measure. Journal of Neurology, Neurosurgery, and Psychiatry, 66(4), 665-504. ABIOLA Juarez, JEFFREY Oquendo, & Thad Acuna M.A. (2004.) Assessment of post-stroke quality of life in cost-effectiveness studies: The usefulness of the Barthel Index and the EuroQoL-5D. [facility], 17, 778-16 Physical Therapy Evaluation Charge Determination History Examination Presentation Decision-Making MEDIUM  Complexity : 1-2 comorbidities / personal factors will impact the outcome/ POC  MEDIUM Complexity : 3 Standardized tests and measures addressing body structure, function, activity limitation and / or participation in recreation  MEDIUM Complexity : Evolving with changing characteristics  MEDIUM Complexity : FOTO score of 26-74 Based on the above components, the patient evaluation is determined to be of the following complexity level: MEDIUM Pain Ratin/10, increased to 9/10 with mobility Activity Tolerance:  
Fair and requires rest breaks Please refer to the flowsheet for vital signs taken during this treatment. After treatment patient left in no apparent distress:  
Supine in bed, Call bell within reach, Caregiver / family present, and Side rails x 3 
 
COMMUNICATION/EDUCATION:  
The patients plan of care was discussed with: Occupational therapist and Registered nurse. Fall prevention education was provided and the patient/caregiver indicated understanding., Patient/family have participated as able in goal setting and plan of care. , and Patient/family agree to work toward stated goals and plan of care.  
 
Thank you for this referral. 
 Boni Wheeler, PT Time Calculation: 33 mins

## 2020-09-30 NOTE — PROGRESS NOTES
Ortho / Neurosurgery NP Note POD# 1  s/p POSTERIOR CERVICAL FUSION SEGMENTAL WITH INSTRUMENTATION C6-T5/ T2,T3 LAMINECTOMY/ ARTHRODESIS C6-T5 (O-ARM), SPINE LUMBAR LAMINECTOMY WITH INSTRUMENTATION AND IMAGE GUIDANCE Pt resting in bed. Awake and alert today. Reports 4/10 neck and shoulder pain relieved by prn percocet. Has sat on edge of bed this morning, not oob yet. Denies LUE burning, states LLE numbness has improved as well. VSS Afebrile. 2L NC. Encourage hourly use of IS while awake (PTA- wears 2L at night) Visit Vitals /72 (BP 1 Location: Right arm, BP Patient Position: At rest) Pulse 86 Temp 98.4 °F (36.9 °C) Resp 18 Ht 5' 5\" (1.651 m) Wt 90.8 kg (200 lb 3.2 oz) SpO2 94% BMI 33.32 kg/m² Voiding status: [place] - remove Output (mL) Urine Voided: 275 ml (09/30/20 0541) Last Bowel Movement Date: 09/28/20 (09/30/20 0325) Unmeasurable Output Urine Occurrence(s): 1 (09/28/20 2516) Stool Occurrence(s): 1 (09/28/20 2036) Labs Lab Results Component Value Date/Time HGB 9.7 (L) 09/30/2020 02:59 AM  
  
Lab Results Component Value Date/Time INR 1.0 09/29/2020 03:04 AM  
  
Lab Results Component Value Date/Time Sodium 140 09/30/2020 02:59 AM  
 Potassium 4.3 09/30/2020 02:59 AM  
 Chloride 104 09/30/2020 02:59 AM  
 CO2 33 (H) 09/30/2020 02:59 AM  
 Glucose 118 (H) 09/30/2020 02:59 AM  
 BUN 48 (H) 09/30/2020 02:59 AM  
 Creatinine 1.55 (H) 09/30/2020 02:59 AM  
 Calcium 8.5 09/30/2020 02:59 AM  
 
Recent Glucose Results:  
Lab Results Component Value Date/Time  (H) 09/30/2020 02:59 AM  
 GLUCPOC 115 (H) 09/30/2020 07:23 AM  
 GLUCPOC 133 (H) 09/29/2020 09:13 PM  
 GLUCPOC 159 (H) 09/29/2020 04:42 PM  
 
 
 
 
Body mass index is 33.32 kg/m². : A BMI > 30 is classified as obesity and > 40 is classified as morbid obesity. Left IJ capped- remove if PIV works? Soft cervical collar in place.  Optifoam dressing c/d/i 
 Hemovac drain charged - drained 200ml overnight. Keep in and monitor output Calves soft and supple; No pain with passive stretch LLE PF/DF 4-/5, EHL intact RLE PF/DF 4/5, EHL intact SCDs for mechanical DVT proph while in bed Abd soft, LBM 9/28 PLAN: 
1) Neurovascular assessment q4 hours 2) PT/OT - soft collar, RW. Pt will likely need rehab at discharge, SNF vs IP Rehab TBD 3) Pain control - scheduled tylenol, gabapentin, prn percocet 4) Expected Acute blood loss post-op anemia - Hgb 9.7. EBL 50ml, HV output 200ml since surgery. No active signs of bleeding. 5) Readniess for discharge: 
   [x] Vital Signs stable  
 [x] Hgb stable  
 [x] + Voiding  
 [x] Wound intact, drainage minimal  
 [x] Tolerating PO intake   
 [] Cleared by PT (OT if applicable) [] Stair training completed (if applicable) [] Independent / Contact Guard Assist (household distance) [] Bed mobility [] Car transfers  
  [] ADLs [x] Adequate pain control on oral medication alone Begin PT/OT, discharge disposition TBD. Kory Cruz NP

## 2020-09-30 NOTE — PROGRESS NOTES
Bedside and Verbal shift change report given to Agueda Jean (oncoming nurse) by Chitra Lara (offgoing nurse). Report included the following information SBAR, Kardex, Procedure Summary, Intake/Output, MAR, Accordion and Recent Results.

## 2020-09-30 NOTE — PROGRESS NOTES
OT Note: 
 
Orders received, chart reviewed and attempted to see Pt for therapy. Pt stating she feels very groggy and loopy from her pain medications, and requests therapy return later today.  Will try back shortly to complete OT evaluation per Pt request. 
 
Kaykay Howe, OTR/L

## 2020-09-30 NOTE — PROGRESS NOTES
Problem: Self Care Deficits Care Plan (Adult) Goal: *Acute Goals and Plan of Care (Insert Text) Description: FUNCTIONAL STATUS PRIOR TO ADMISSION: Patient was independent and active without use of DME. Reports significant decline in past 2 weeks with multiple falls. Was driving. Mostly sedentary at home. HOME SUPPORT: Lives alone with supportive daughter nearby to assist PRN. Occupational Therapy Goals Initiated 9/30/2020 1. Patient will perform grooming tasks seated EOB for at least 5 minutes with good sitting balance to maximize within 7 days. 2.  Patient will perform upper body bathing while seated with Min A within 7 days. 3.  Patient will perform lower body dressing with Mod A using AE PRN within 7 days. 4.  Patient will bathing while seated with Mod A using AE PRN for distal LB aspects within 7 days. 5.  Patient will perform BSC transfers with Mod A using least restrictive device within 7 days. Outcome: Not Met OCCUPATIONAL THERAPY EVALUATION Patient: Jw Mancera (25 y.o. female) Date: 9/30/2020 Primary Diagnosis: Thoracic compression fracture ([facility] [address] [S22.000A] Bone metastases ([facility] [address] [C79.51] Procedure(s) (LRB): POSTERIOR CERVICAL FUSION SEGMENTAL WITH INSTRUMENTATION C6-T5/ T2,T3 LAMINECTOMY/ ARTHRODESIS C6-T5 (O-ARM) (N/A) SPINE LUMBAR LAMINECTOMY WITH INSTRUMENTATION AND IMAGE GUIDANCE (N/A) 1 Day Post-Op Precautions: Fall, Spinal, Log Roll, Soft Collar ASSESSMENT Based on the objective data described below, the patient presents with 6/10 pain in cervical/thoracic area, GW, decreased activity tolerance, decreased unsupported sitting balance, significantly decreased functional mobility, and dependence on caregivers for basic ADLs following surgery. Pt was received supine in bed with soft collar donned. Reviewed precautions to avoid extreme motions at C spine and thoracic area, no lifting >5 lbs and log roll technique for bed mobility.  Pt was unable to cross legs at EOB d/t decreased b/l knee ROM from body habitus and prior R TKR. She will need long handled AE to complete LB ADLs once sitting balance improves. She sat EOB for ~5 minutes prior to requesting to return supine d/t pain. Pt will need SNF rehab at discharge. Current Level of Function Impacting Discharge (ADLs/self-care): Min to Total A with ADLs; Mod to [employer] x2 with bed mobility Functional Outcome Measure: The patient scored 30/100 on the Barthel Index outcome measure which is indicative of requiring up to 70% assist with basic ADLs and related mobility. Other factors to consider for discharge: Falls PTA; Metastatic CA; Independent PLOF; Unable to transfer OOB at this time. Patient will benefit from skilled therapy intervention to address the above noted impairments. PLAN : 
Recommendations and Planned Interventions: self care training, functional mobility training, balance training, therapeutic activities, endurance activities, patient education, and home safety training Frequency/Duration: Patient will be followed by occupational therapy 4 times a week to address goals. Recommendation for discharge: (in order for the patient to meet his/her long term goals) Therapy up to 5 days/week in SNF setting This discharge recommendation: 
Has been made in collaboration with the attending provider and/or case management IF patient discharges home will need the following DME: Likely needs long handled AE for LB ADLs SUBJECTIVE:  
Patient stated I'm so sorry, I tried\" (re: attempts to stand) and \"My toes feel a little numb. \" OBJECTIVE DATA SUMMARY:  
HISTORY:  
Past Medical History:  
Diagnosis Date  Arthralgia 8/17/2017  Arthritis  Asthma Mild to Moderate  Breast cancer ([facility] [address] 8/17/2017 Onset 1997; Refusing Mammogram 6/16/17  Cancer ([facility] [address] 1997 Breast left  Cardiomyopathy ([facility] [address] 8/17/2017 Onset:1999 Ischemic; 25% - 50% (last EFx 45%) Continue with ACE/Lasix/coreg  Cataract 8/17/2017 Bilateral   
 Cellulitis 8/17/2017 Suspect local reaction to Pneumovax, treat with ice, NSAIDs or Tylenol  Chest pain at rest 8/17/2017  CHF (congestive heart failure) ([address] 8/17/2017 Stable, though weight up a little. To take 1 1/2 Lasix daily if swelling, 1 daily o/w  Chronic maxillary sinusitis 8/17/2017  Chronic pain Right knee  COPD (chronic obstructive pulmonary disease) ([address] 8/17/2017 Continue with inhalers  Diabetes ([address] Pre-diabetic  DJD (degenerative joint disease) 8/17/2017  Elevated BUN 8/17/2017  Esophagitis, reflux 8/17/2017  Fatigue 8/17/2017  GERD (gastroesophageal reflux disease)  Heart failure ([address] Cardiomyopathy, HX of MI 1999  Hyperglycemia 8/17/2017  Hyperkalemia 8/17/2017  Hyperlipidemia 8/17/2017  Hypertension 8/17/2017  Ill-defined condition Vertigo  Ill-defined condition 2003 HX of Urosepsis complicated by respiratory failure and pneumonnia  Myalgia 8/17/2017  Thromboembolus ([address] 1969  
 during 2nd pregnancy Yvette Bonilla Cure 8/17/2017  Vertigo, aural 8/17/2017  Vitamin D deficiency 8/17/2017 Past Surgical History:  
Procedure Laterality Date [address] JONNY  
 HX GYN Left Breast Mastectomy  HX HEENT  2015 Bilateral Cataract  HX ORTHOPAEDIC Right 06/2018  
 knee replacement  HX ORTHOPAEDIC Left 11/2018  
 wrist surgery  HX VASCULAR ACCESS Port a cath on the right Expanded or extensive additional review of patient history: Breast CA Home Situation Home Environment: Private residence One/Two Story Residence: One story Living Alone: Yes Support Systems: Child(vicky) Patient Expects to be Discharged to[de-identified] Rehabilitation facility Current DME Used/Available at Home: Shower chair Tub or Shower Type: Tub/Shower combination Hand dominance: Right EXAMINATION OF PERFORMANCE DEFICITS: 
Cognitive/Behavioral Status: 
Neurologic State: Alert Orientation Level: Oriented X4 Cognition: Follows commands Skin: Surgical dressing and drain intact Edema: None observed Hearing: Auditory Auditory Impairment: None Hearing Aids/Status: Does not own Vision/Perceptual:   
    
    
    
  
    
Acuity: Within Defined Limits Range of Motion: 
AROM: Generally decreased, functional 
  
  
  
  
  
  
  
 
Strength: 
Strength: Generally decreased, functional 
  
  
  
  
 
Coordination: 
Coordination: Generally decreased, functional 
Fine Motor Skills-Upper: Left Intact; Right Intact Gross Motor Skills-Upper: Left Intact; Right Intact Tone & Sensation: 
Tone: Normal 
Sensation: Intact(B toes numb) Balance: 
Sitting: Impaired Sitting - Static: Fair (occasional) Sitting - Dynamic: Poor (constant support) Standing: (did not occur; Pt unable to stand) Functional Mobility and Transfers for ADLs: 
Bed Mobility: 
Supine to Sit: Moderate assistance; Additional time;Assist x1 Sit to Supine: Minimum assistance; Moderate assistance;Assist x2 Scooting: Minimum assistance; Moderate assistance;Assist x1;Assist x2 Transfers: 
Sit to Stand: (did not occur) ADL Assessment: 
Feeding: Minimum assistance(up to Min A d/t soft collar) Oral Facial Hygiene/Grooming: Moderate assistance(d/t c/o pain, decreased sitting balance and w/ soft collar) Bathing: Maximum assistance Upper Body Dressing: Moderate assistance Lower Body Dressing: Total assistance Toileting: Moderate assistance ADL Intervention and task modifications: 
 
Provided education re: spinal precautions and log roll technique for safe bed mobility. Educated Pt on benefits to potential use of long handled AE for LB ADLs d/t pts inability to cross legs from prior knee surgery.  Educated Pt on progression from tolerating seated self-care tasks prior to attempting transfers and standing ADLs. Patient instructed and demonstrated cervical spine precautions to avoid extreme motions at cervical region during ADLs and use log roll for bed mobility with verbal/visual cues. Patient instructed and indicated understanding the benefits of maintaining activity tolerance, functional mobility, and independence with self care tasks during acute stay  to ensure safe return home and to baseline. Encouraged patient to increase frequency and duration OOB, not sitting longer than 30 mins without marching/walking with staff, be out of bed for all meals, perform daily ADLs (as approved by RN/MD regarding bathing etc), and performing functional mobility to/from bathroom. Patient instruction and indicated understanding on body mechanics, ergonomics and gravitational force on the spine during different body positions to plan activities in prep for return home to complete basic ADLs, instrumental ADLs and back to work safely. Bathing: Patient instructed and indicated understanding when bathing to not submerge wound in water, stand to shower or sponge bathe, cover wound with plastic and tape to ensure no water reaches bandage/wound without cues. Dressing brace: Patient instructed and demonstrated while in front of mirror to don/doff velcro on brace using dominant side, keeping non-dominant side intact. Instruction and indicated understanding in removal of fabric pieces, placement of clean pieces, don brace, then can hand wash and allow air dry. Dressing lower body: Patient instructed to don brace first and on the benefits to remain seated to don all clothing to increase independence with precautions and pain management. Patient instructed and demonstrated tailor sitting for lower body dressing. Toileting: Patient instructed on the benefits of using flushable wet wipes and toilet tongs if decreased reach or pain for ania care.  Also, the benefits of a reacher to aid in clothing management. Functional Measure: 
Barthel Index: 
 
Bathin Bladder: 5 Bowels: 10 
Groomin Dressin Feedin Mobility: 0 Stairs: 0 Toilet Use: 5 Transfer (Bed to Chair and Back): 0 Total: 30/100 The Barthel ADL Index: Guidelines 1. The index should be used as a record of what a patient does, not as a record of what a patient could do. 2. The main aim is to establish degree of independence from any help, physical or verbal, however minor and for whatever reason. 3. The need for supervision renders the patient not independent. 4. A patient's performance should be established using the best available evidence. Asking the patient, friends/relatives and nurses are the usual sources, but direct observation and common sense are also important. However direct testing is not needed. 5. Usually the patient's performance over the preceding 24-48 hours is important, but occasionally longer periods will be relevant. 6. Middle categories imply that the patient supplies over 50 per cent of the effort. 7. Use of aids to be independent is allowed. Marychuy Roldan., Barthel, D.W. ([id number]). Functional evaluation: the Barthel Index. [address] [facility] (14)2. Юлия Ledezma adrianne Lizz, FELICIAF, Rosina Felder., Funmi Reina., Liset Odonnell, [address] [place] [address] (). Measuring the change indisability after inpatient rehabilitation; comparison of the responsiveness of the Barthel Index and Functional [place] Measure. Journal of Neurology, Neurosurgery, and Psychiatry, 66(4), 831-929. Jodi Torres, [place].FAISAL, JEFFREY Oquendo, & Maya Rhodes MWiltonA. (2004.) Assessment of post-stroke quality of life in cost-effectiveness studies: The usefulness of the Barthel Index and the EuroQoL-5D. [facility], 13, [phone] Occupational Therapy Evaluation Charge Determination History Examination Decision-Making MEDIUM Complexity : Expanded review of history including physical, cognitive and psychosocial  history  MEDIUM Complexity : 3-5 performance deficits relating to physical, cognitive , or psychosocial skils that result in activity limitations and / or participation restrictions MEDIUM Complexity : Patient may present with comorbidities that affect occupational performnce. Miniml to moderate modification of tasks or assistance (eg, physical or verbal ) with assesment(s) is necessary to enable patient to complete evaluation Based on the above components, the patient evaluation is determined to be of the following complexity level: MEDIUM Pain Rating: 
Reported 6/10 cervical p! RN aware and administered pain medication. Activity Tolerance:  
Poor Please refer to the flowsheet for vital signs taken during this treatment. After treatment patient left in no apparent distress:   
Supine in bed, Call bell within reach, Caregiver / family present, and Side rails x 3 
 
COMMUNICATION/EDUCATION:  
The patients plan of care was discussed with: Physical therapist, Registered nurse, and Patient . Home safety education was provided and the patient/caregiver indicated understanding., Patient/family have participated as able in goal setting and plan of care. , and Patient/family agree to work toward stated goals and plan of care. Thank you for this referral. 
Tony Chakraborty, OTR/L Time Calculation: 32 mins

## 2020-09-30 NOTE — PROGRESS NOTES
Patient requested pain medication (Percocet) BP 89/38. Recheck 101/42. Dr. Leonel Balbuena made aware of situation. Coreg and Lasix held. Normal saline 125 ml/hr continued. Patient still unable to void. Multiple bladder scans performed. All under 400 ML. Alerted MD of our policy regarding straight cath if urine was over 400 per bladder scan. [place] reinserted per MD request due to possible neurogenic bladder.

## 2020-10-01 PROBLEM — C50.911 BREAST CANCER METASTASIZED TO BONE, RIGHT (HCC): Status: ACTIVE | Noted: 2020-01-01

## 2020-10-01 PROBLEM — C79.51 BREAST CANCER METASTASIZED TO BONE, RIGHT (HCC): Status: ACTIVE | Noted: 2020-01-01

## 2020-10-01 NOTE — PROGRESS NOTES
Problem: Mobility Impaired (Adult and Pediatric) Goal: *Acute Goals and Plan of Care (Insert Text) Description: FUNCTIONAL STATUS PRIOR TO ADMISSION: Pt reports independence at baseline, with a significant a decline over the last 2 weeks and having multiple falls. Pt reports wearing 2.5L of O2 at night and prn during the day. HOME SUPPORT PRIOR TO ADMISSION: The patient lived alone with support from family as needed. Physical Therapy Goals Initiated 9/30/2020 1. Patient will move from supine to sit and sit to supine , scoot up and down, and roll side to side in bed with minimal assistance/contact guard assist within 7 day(s). 2.  Patient will transfer from bed to chair and chair to bed with minimal assistance/contact guard assist using the least restrictive device within 7 day(s). 3.  Patient will perform sit to stand with moderate assist within 7 day(s). 4.  Patient will ambulate with minimal assistance/contact guard assist for 20 feet with the least restrictive device within 7 day(s). Outcome: Progressing Towards Goal 
 PHYSICAL THERAPY TREATMENT Patient: Arabella Curiel (58 y.o. female) Date: 10/1/2020 Diagnosis: Thoracic compression fracture ([facility] [address] [S22.000A] Bone metastases ([facility] [address] [C79.51] <principal problem not specified> Procedure(s) (LRB): POSTERIOR CERVICAL FUSION SEGMENTAL WITH INSTRUMENTATION C6-T5/ T2,T3 LAMINECTOMY/ ARTHRODESIS C6-T5 (O-ARM) (N/A) SPINE LUMBAR LAMINECTOMY WITH INSTRUMENTATION AND IMAGE GUIDANCE (N/A) 2 Days Post-Op Precautions: Fall, Spinal(Log Roll; Soft Collar, no lifting >5lbs) Chart, physical therapy assessment, plan of care and goals were reviewed. ASSESSMENT Pt continues to participate in skilled PT and is slowly progressing toward goals. Pt anxious about participation in therapy, good response to reassurance and gentle encouragement. Reviewed spinal precautions as pt unable to recall. Pt required increased time to complete functional tasks due to pain and need for rest breaks; demo good effort throughout session. Pt tolerated EOB, static stand with RW x 2 trials, initiation of sidestepping to R with RW; noted stooped posture and decreased stance control bilat knees with sidestepping. Pt remains below functional baseline and requires 2 person assist to mobilize at this time. Will benefit from continued mobility progression with acute PT and follow up SNF level rehab at d/c. Current Level of Function Impacting Discharge (mobility/balance): supine to sit max A of 1 and CGA of another, sit to supine mod A x 2, transfer sit to stand to RW mod/ max A x 2, side stepping with RW mod A x 2 Other factors to consider for discharge: recent decline in function, lives alone, currently requires 2 person assist to mobilize PLAN : 
Patient continues to benefit from skilled intervention to address the above impairments. Continue treatment per established plan of care. to address goals. Recommendation for discharge: (in order for the patient to meet his/her long term goals) Therapy up to 5 days/week in SNF setting This discharge recommendation: 
Has been made in collaboration with the attending provider and/or case management IF patient discharges home will need the following DME: to be determined (TBD) SUBJECTIVE:  
Patient stated I'm not sure that I can do very much.  OBJECTIVE DATA SUMMARY:  
Critical Behavior: 
Neurologic State: Alert Orientation Level: Oriented X4 Cognition: Appropriate safety awareness, Follows commands Functional Mobility Training: 
Bed Mobility: 
Rolling: Maximum assistance Supine to Sit: Maximum assistance(R sidelying to sit; max A of 1, CGA of another) Sit to Supine: Moderate assistance;Assist x2(sit to R sidelying) Transfers: 
Sit to Stand: Maximum assistance;Assist x2(improved to mod A x 2 w/elevated bed height) Stand to Sit: Moderate assistance;Assist x2 Balance: 
Sitting: Intact Standing: Impaired; With support Standing - Static: Poor;Constant support Standing - Dynamic : Poor;Constant support Ambulation/Gait Training: 
  
 Initiated sidestepping to R with RW and 2 person A Pain Rating: 
Pt reports pain in neck/ shoulders, does not rate Activity Tolerance:  
Fair and requires rest breaks Please refer to the flowsheet for vital signs taken during this treatment. After treatment patient left in no apparent distress:  
Call bell within reach, Side rails x 3, and semi-reclined in bed COMMUNICATION/COLLABORATION:  
The patients plan of care was discussed with: Occupational therapist and Registered nurse. Jair Zhu, PT Time Calculation: 34 mins

## 2020-10-01 NOTE — PROGRESS NOTES
[facility] 
at [place] [address], [facility], [address] 
[phone] Oncology progress Note Patient: Mar Garcia MRN: [id number]  SSN: [id number] YOB: 1945  Age: 67 y.o. Sex: female Subjective:  
  
Mar Gracia is a 67 y.o. female who I am seeing in follow up for metastatic cancer. Ms. Sade Fernandez received treatment for left sided breast cancer in the 1990's. She received systemic chemotherapy and mastectomy. She was noted to have sclerotic lesions in the bone in April and then today in the x-rays. She has scattered and non-specific pain in the left arm, back. She has fallen a few times. She has burning sensation in the left arm. She underwent a laminectomy from T2 - T4. Pathology shows ER +ve mBC. She will start hormonal therapy. She feels fair. She was able to stand today. Prior History: We discussed findings of the MRI with Ms. Sade Fernandez today. She says she has been complaining of pain in the back since 2003 when she lived in the [facility]. She believes she had a PET scan between [id number] at an oncology practice located in the hospital. She now has pain in her right upper thigh area. She has left lymphedema from her prior lymph node excision. She notes she was on hormonal medication, Premarin prior to her diagnosis of breast cancer. She had chemotherapy, mastectomy and LN excision ( all negative ), but never took an aromatase inhibitor or tamoxifen. She has not had a mammogram for years, former smoker (quit in 1989) and lives by herself. She follows with Dr. Louie Cortes for cardiology and Dr. Michael Sanz for her COPD. Review of Systems: 
 
All systems are negative except what is listed in the HPI Past Medical History:  
Diagnosis Date  Arthralgia 8/17/2017  Arthritis  Asthma Mild to Moderate  Breast cancer ([facility] [address] 8/17/2017 Onset ; Refusing Mammogram 17  Cancer ([facility] [address] 1997 Breast left  Cardiomyopathy ([address] 2017 Onset: Ischemic; 25% - 50% (last EFx 45%) Continue with ACE/Lasix/coreg  Cataract 2017 Bilateral   
 Cellulitis 2017 Suspect local reaction to Pneumovax, treat with ice, NSAIDs or Tylenol  Chest pain at rest 2017  CHF (congestive heart failure) ([address] 2017 Stable, though weight up a little. To take 1 1/2 Lasix daily if swelling, 1 daily o/w  Chronic maxillary sinusitis 2017  Chronic pain Right knee  COPD (chronic obstructive pulmonary disease) ([address] 2017 Continue with inhalers  Diabetes ([address] Pre-diabetic  DJD (degenerative joint disease) 2017  Elevated BUN 2017  Esophagitis, reflux 2017  Fatigue 2017  GERD (gastroesophageal reflux disease)  Heart failure ([address] Cardiomyopathy, HX of MI   Hyperglycemia 2017  Hyperkalemia 2017  Hyperlipidemia 2017  Hypertension 2017  Ill-defined condition Vertigo  Ill-defined condition  HX of Urosepsis complicated by respiratory failure and pneumonnia  Myalgia 2017  Thromboembolus ([address] 1969  
 during 2nd pregnancy Srinivasan Josy Rising 2017  Vertigo, aural 2017  Vitamin D deficiency 2017 Past Surgical History:  
Procedure Laterality Date [facility] JONNY  
 HX GYN Left Breast Mastectomy  HX HEENT   Bilateral Cataract  HX ORTHOPAEDIC Right 2018  
 knee replacement  HX ORTHOPAEDIC Left 2018  
 wrist surgery  HX VASCULAR ACCESS Port a cath on the right Family History Problem Relation Age of Onset  Cancer Mother  Other Father Social History Tobacco Use  Smoking status: Former Smoker Packs/day: 2.00 Years: 25.00 Pack years: 50.00 Last attempt to quit:  Years since quittin.7  Smokeless tobacco: Never Used Substance Use Topics  Alcohol use: No  
  
Prior to Admission medications Medication Sig Start Date End Date Taking? Authorizing Provider  
acetaminophen (TYLENOL) 500 mg tablet Take 500 mg by mouth every six (6) hours as needed for Pain. Yes Provider, Historical  
furosemide (LASIX) 20 mg tablet Take 60 mg by mouth daily. Yes Provider, Historical  
omeprazole (PRILOSEC) 20 mg capsule Take 1 capsule by mouth once daily 6/9/20  Yes Cirilo Azar NP  
fenofibrate nanocrystallized (TRICOR) 145 mg tablet Take 1 tablet by mouth once daily 5/22/20  Yes Lizbeth Azar, GALLO  
meclizine (ANTIVERT) 25 mg tablet TAKE 1 TABLET BY MOUTH EVERY 6 HOURS AS NEEDED FOR DIZZINESS 4/27/20  Yes Cirilo Azar NP Anoro Ellipta 62.5-25 mcg/actuation inhaler Take 1 Puff by inhalation daily. 3/23/20  Yes Other, MD Faizan  
sacubitril-valsartan (ENTRESTO) 49 mg/51 mg tablet Take 2 Tabs by mouth two (2) times a day. Yes Other, MD Faizan  
carvedilol (COREG CR) 40 mg CR capsule Take 1 Cap by mouth daily (with breakfast). 7/2/18  Yes Cirilo Azar NP  
cholecalciferol (VITAMIN D3) 1,000 unit cap Take 1,000 Units by mouth daily. Yes Provider, Historical  
aspirin delayed-release 81 mg tablet Take 1 Tab by mouth daily. May resume in 30 days after completing twice daily Aspirin. 6/23/17  Yes Denisse Godinez NP  
PSEUDOEPHEDRINE HCL (SINUS DECONGESTANT PO) Take 30 mg by mouth daily as needed. Yes Provider, Historical  
albuterol (PROVENTIL, VENTOLIN) 90 mcg/Actuation inhaler Take 1-2 Puffs by inhalation every four (4) hours as needed for Wheezing. 5/1/11   TORSTEN Ferrari Allergies Allergen Reactions  Morphine Nausea and Vomiting Objective:  
 
Vitals:  
 10/01/20 0723 10/01/20 0930 10/01/20 1300 10/01/20 1643 BP: (!) 110/44  (!) 110/41 (!) 129/55 Pulse: 86  88 92 Resp:   16 16 Temp: 98.6 °F (37 °C)  98.3 °F (36.8 °C) 97.7 °F (36.5 °C) SpO2: 95% 95% 98% 98% Weight:      
Height:      
  
 
 
 
Physical Exam: 
 
GENERAL: alert, cooperative, no distress, appears stated age EYE: conjunctivae/corneas clear. PERRL, EOM's intact LYMPHATIC: Cervical, supraclavicular, and axillary nodes normal.  
THROAT & NECK: Neck collar in place LUNG: clear to auscultation bilaterally HEART: regular rate and rhythm, S1, S2 normal, no murmur, click, rub or gallop ABDOMEN: soft, non-tender. Bowel sounds normal. No masses,  no organomegaly EXTREMITIES:  extremities normal, atraumatic, no cyanosis or edema SKIN: Normal. 
NEUROLOGIC: AOx3. Gait normal. Reflexes and motor strength normal and symmetric. Cranial nerves 2-12 and sensation grossly intact. CT Results (most recent): 
Results from [facility] Encounter encounter on 04/17/20 CT ABD PELV W CONT Narrative EXAM:  CTA CHEST W OR W WO CONT, CT ABD PELV W CONT INDICATION:   RUQ pain, R thoracic back pain, SOB, h/o DVT 
 
COMPARISON: 8/26/2013. CHEST CTA: 
 
TECHNIQUE:  
Precontrast  images were obtained to localize the volume for acquisition. Multislice helical CT arteriography was performed from the diaphragm to the 
thoracic inlet during uneventful rapid bolus of 100 cc Isovue-370. Lung and soft 
tissue windows were generated. Coronal and sagittal images were generated and 3D post processing consisting of coronal maximum intensity images was performed. CT dose reduction was achieved through use of a standardized protocol tailored 
for this examination and automatic exposure control for dose modulation. FINDINGS: 
CHEST: 
THYROID: No nodule. MEDIASTINUM: No mass or lymphadenopathy. DOROTHY: No mass or lymphadenopathy. THORACIC AORTA: No dissection or aneurysm. MAIN PULMONARY ARTERY: There is no evidence of pulmonary embolism. TRACHEA/BRONCHI: Patent. ESOPHAGUS: No wall thickening or dilatation. HEART: Normal in size. PLEURA: No effusion or pneumothorax. LUNGS: No nodule, mass, or airspace disease. BONES: Degenerative changes are seen in the thoracic spine. Tiny sclerotic foci 
are scattered throughout the thoracic vertebral bodies. Impression IMPRESSION: No acute process or evidence of pulmonary embolism. Tiny sclerotic 
foci are scattered throughout the vertebral bodies. ABDOMEN/PELVIC CT: 
 
TECHNIQUE:  
Following the uneventful intravenous administration of 100 cc Isovue-370, thin 
axial images were obtained through the abdomen and pelvis. Coronal and sagittal 
reconstructions were generated. Oral contrast was not administered. CT dose 
reduction was achieved through use of a standardized protocol tailored for this 
examination and automatic exposure control for dose modulation. FINDINGS:  
LIVER: No mass or biliary dilatation. GALLBLADDER: Unremarkable. SPLEEN: No mass. PANCREAS: No mass or ductal dilatation. ADRENALS: Unremarkable. KIDNEYS: Left renal cysts, the largest of which measures 2.8 cm. Scarring left 
kidney. STOMACH: Unremarkable. SMALL BOWEL: No dilatation or wall thickening. COLON: Sigmoid diverticulosis. APPENDIX: Unremarkable. PERITONEUM: No ascites or pneumoperitoneum. RETROPERITONEUM: No lymphadenopathy or aortic aneurysm. REPRODUCTIVE ORGANS: The uterus is surgically absent. URINARY BLADDER: No mass or calculus. BONES: Sclerotic lesions are noted in the iliac wings bilaterally, sacrum, and 
lumbar spine. ADDITIONAL COMMENTS: N/A IMPRESSION: 
No acute abnormality. Sclerotic foci are noted concerning for osseous metastatic 
disease. Correlation with bone scan is recommended. I personally reviewed CT and x-rays. Sclerotic lesions. Vertebral fracture Lab Results Component Value Date/Time  WBC 10.3 09/30/2020 02:59 AM  
 HGB (POC) 12.7 09/13/2017 11:20 AM  
 HGB 9.7 (L) 09/30/2020 02:59 AM  
 HCT (POC) 39.3 09/13/2017 11:20 AM  
 HCT 31.4 (L) 09/30/2020 02:59 AM  
 PLATELET 048 29/17/6578 02:59 AM  
 MCV 95.4 09/30/2020 02:59 AM  
 
 
 
Lab Results Component Value Date/Time Sodium 141 10/01/2020 04:24 AM  
 Potassium 4.2 10/01/2020 04:24 AM  
 Chloride 107 10/01/2020 04:24 AM  
 CO2 30 10/01/2020 04:24 AM  
 Anion gap 4 (L) 10/01/2020 04:24 AM  
 Glucose 99 10/01/2020 04:24 AM  
 BUN 41 (H) 10/01/2020 04:24 AM  
 Creatinine 0.90 10/01/2020 04:24 AM  
 BUN/Creatinine ratio 46 (H) 10/01/2020 04:24 AM  
 GFR est AA >60 10/01/2020 04:24 AM  
 GFR est non-AA >60 10/01/2020 04:24 AM  
 Calcium 8.8 10/01/2020 04:24 AM  
 Bilirubin, total 0.5 09/21/2020 11:05 AM  
 Alk. phosphatase 124 (H) 09/21/2020 11:05 AM  
 Protein, total 7.8 09/21/2020 11:05 AM  
 Albumin 3.5 09/21/2020 11:05 AM  
 Globulin 4.3 (H) 09/21/2020 11:05 AM  
 A-G Ratio 0.8 (L) 09/21/2020 11:05 AM  
 ALT (SGPT) 25 09/21/2020 11:05 AM  
 AST (SGOT) 31 09/21/2020 11:05 AM  
 
 
 
Assessment: 1. Right sided breast carcinoma metastatic to the bone, liver and lung History of breast cancer in the 1990's. Left mastectomy with LN dissection ( negative LN) at [facility] - Dr. Imani De Oliveira. Had chemotherapy, but does not remember the oncologist. States she never took a pill following chemotherapy or surgery. Followed up with oncology in the [facility] until 2005 when she moved back to [place]. Has not had a mammogram since that time. Former smoker, quit in [facility]. S/P thoracic laminectomy T2 - T4 Patient will go to rehab Pathology - ER +ve breast carcinoma Treatment goal - palliative I will start Letrozole today. Later we shall add Will George. Plan: 1. Pain control 2. PT/OT/Rehab 3. Start Letrozole Signed By: Karen Hernandez MD   
 October 1, 2020

## 2020-10-01 NOTE — PROGRESS NOTES
MD (Dakota Packer) notified of telemetry notification - patient has no s/sx of acute distress. Hospital paging system notified for contact of MD Grant Meckel, MD on unit at patient bedside; new orders received for transfer of patient to stepdown

## 2020-10-01 NOTE — PROGRESS NOTES
Hospitalist 
 
Called to see patient with new onset atrial fib with RVR Admitted for C spine surgery, BP lower side this AM 
Coreg and Entresto held, prior history of cardiomyopathy Developed atrial fib on monitor, HRs to 140s Feels fine, no CP or SOB Last LVEF 50% I could find in the computer Pt denies prior atrial fib Transfer to stepdown Cardizem gtt Hold AC with recent C-spine surgery Consult Dr Mannie Dias, I paged the geovanny CORTES 
Check EKG 
D/W Dr Domitila Naqvi as well Spoke with daughter above events by phone Rocio Pinedo MD

## 2020-10-01 NOTE — PROGRESS NOTES
Palliative Medicine [place]: [phone] ([phone]) [facility]: [phone] ([phone]) LCSW in to see patient briefly, she was visiting with a family member. She noted she was \"not doing well\". When asked further what was bothering her, besides her neck brace, she shared that she was having some pain. Patient is hesitant to take pain medication. She does not want to get \"groggy and loopy\", she wants to be able to talk to and visit with her family. She has not had or asked for any pain medication since this morning (8:30 am). Briefly educated patient about pain and \"catching up/ needing more medication\" if she waits till it gets really bad. She was agreeable to taking some medication at this time. She gave me permission to talk to her bedside RN. LCSW spoke to RN, patient was to receive pain medication shortly. Let patient know that I would return tomorrow to see her.

## 2020-10-01 NOTE — PROGRESS NOTES
Ortho / Neurosurgery NP Note POD# 2  s/p POSTERIOR CERVICAL FUSION SEGMENTAL WITH INSTRUMENTATION C6-T5/ T2,T3 LAMINECTOMY/ ARTHRODESIS C6-T5 (O-ARM), SPINE LUMBAR LAMINECTOMY WITH INSTRUMENTATION AND IMAGE GUIDANCE Pt resting in bed. Drowsy, eyes open to voice. Reports pain in neck and left shoulder relieved by prn percocet. Hesitant to work with PT/OT states \"I just want to lay here\" - explained importance of moving and trying to participate some today with thearpy Tolerating regular diet. Denies nausea. VSS Afebrile. 2L NC. Encourage hourly use of IS while awake (PTA- wears 2L at night) Remote Tele: Noted frequent PVCs/bigeminy then returned to NSR with rare PVCs. Visit Vitals BP (!) 110/44 Pulse 86 Temp 98.6 °F (37 °C) Resp 18 Ht 5' 5\" (1.651 m) Wt 90.8 kg (200 lb 3.2 oz) SpO2 95% BMI 33.32 kg/m² Voiding status: [place] replaced last night Output (mL) Urine Voided: 275 ml (09/30/20 0541) Last Bowel Movement Date: 09/28/20 (10/01/20 3389) Unmeasurable Output Urine Occurrence(s): 1 (09/28/20 4271) Stool Occurrence(s): 1 (09/28/20 2036) Labs Lab Results Component Value Date/Time HGB 9.7 (L) 09/30/2020 02:59 AM  
  
Lab Results Component Value Date/Time INR 1.0 09/29/2020 03:04 AM  
  
Lab Results Component Value Date/Time Sodium 141 10/01/2020 04:24 AM  
 Potassium 4.2 10/01/2020 04:24 AM  
 Chloride 107 10/01/2020 04:24 AM  
 CO2 30 10/01/2020 04:24 AM  
 Glucose 99 10/01/2020 04:24 AM  
 BUN 41 (H) 10/01/2020 04:24 AM  
 Creatinine 0.90 10/01/2020 04:24 AM  
 Calcium 8.8 10/01/2020 04:24 AM  
 
Recent Glucose Results:  
Lab Results Component Value Date/Time GLU 99 10/01/2020 04:24 AM  
 GLUCPOC 118 (H) 09/30/2020 09:03 PM  
 GLUCPOC 125 (H) 09/30/2020 03:59 PM  
 
 
 
 
Body mass index is 33.32 kg/m². : A BMI > 30 is classified as obesity and > 40 is classified as morbid obesity. Left IJ capped- remove if PIV works? Soft cervical collar in place. Optifoam dressing c/d/i Hemovac drain charged - drained 125ml - will discuss with Dr Carmelo Crockett on removing Calves soft and supple; No pain with passive stretch LLE PF/DF 4/5, EHL intact RLE PF/DF 4/5, EHL intact SCDs for mechanical DVT proph while in bed Abd soft, LBM 9/28 PLAN: 
1) Neurovascular assessment q4 hours 2) PT/OT - soft collar, RW. Very slow progress with PT/OT  - Recommendations for SNF placement. 3) Pain control - scheduled tylenol, gabapentin, prn percocet 4) Expected Acute blood loss post-op anemia - Hgb 9.7 (9/30). EBL 50ml. No active signs of bleeding. 5) Readniess for discharge: 
   [x] Vital Signs stable  
 [x] Hgb stable  
 [x] + Voiding  
 [x] Wound intact, drainage minimal  
 [x] Tolerating PO intake   
 [] Cleared by PT (OT if applicable) [] Stair training completed (if applicable) [] Independent / Contact Guard Assist (household distance) [] Bed mobility [] Car transfers  
  [] ADLs [x] Adequate pain control on oral medication alone Will discuss removing HV today with Dr Karen Godfrey. SNF placement.    
 
Vilma Wetzel NP

## 2020-10-01 NOTE — PROGRESS NOTES
Palliative Medicine Consult Jacques: [phone]) Patient Name: Clari Marshall YOB: 1945 Date of Initial Consult: 9/23/2020 Reason for Consult: overwhelming sxs Requesting Provider: Steve Ibrahim MD  
Primary Care Physician: Aleyda Lee NP 
 
 SUMMARY:  
Clari Marshall is a 67 y.o. with a past history of COPD, congestive heart failure, dyslipidemia, gastroesophageal reflux disease, who was admitted on 9/21/2020 from home with a diagnosis of compression fracture of thoracic vertebra. Current medical issues leading to Palliative Medicine involvement include: pain management, newly diagnosed spine lesions. 9/29: underwent posterior cervical fusion segmental with instrumentation cC-T5/T2, T3 laminectomy/arthrodesis C6-T5 (O-arm), spine lumbar laminectomy with instrumention and image guidance. 9/30: pain is well controlled as long as she is lying still, with percocet two 5/325mg tabs. She reports the neuropathic pain in her left upper arm and chest is much better and the ROM is better too. 10/1: pain is well controlled except when she woke up, but once the percocet started working it was much better (she had gone 15 hours between doses). She reports that her thighs remain very weak and feel very heavy. She voices fear that she will be made to leave the hospital before she is ready. Psychosocial: lives in low income senior apt. Has 2 dtrs and 1 son. PALLIATIVE DIAGNOSES:  
1. Generalized weakness 2. Falls 3. Fatigue 4. Weakness of extremities 5. Back pain 6. Thoracic vertebral fracture (T4) 
7. Sclerotic bony lesions 8. burning pain in left arm 9. Constipation 10. Care decisions PLAN:  
1. Prior to visit, I completed a review of patient's medical records, including medical documentation, vital signs, MARs, and results of various labs and other diagnostics. 1. PAIN: well controlled. 2. Continue percocet two 5/325mg tabs q. 4 hours prn. If she takes this every 4 hours the total daily acetaminophen dose is 3900mg. 3. Continue Gabapentin 100mg tid. If the neuropathic pain continues to improve, will discontinue this medication. 4. Fentanyl  25mcg tid prn rescue dose, prior to or after PT/OT or other activity. 5. Cymbalta 20mg daily for pain and depression. 6. Narcan 0.4mg IVP q. 2 min prn RR<8 and/or per protocol. 7. RN can call me if pain medication needs adjusting. [phone] 
2. CONSTIPATION:   last BM 9/29. 
1. Continue pericolace 1 tab daily. 2. Continue Miralax 17 gm daily. 3. GOALS OF CARE:  I have asked my  to meet with pt to discuss future options as I doubt she will ever be able to return to independent living. 4.  Patient has completed documents indicating DNR status. DNR order placed. 5. Initial consult note routed to primary continuity provider and/or primary health care team members 6. Communicated plan of care with: Palliative IDT, [facility] Team 
 
 GOALS OF CARE / TREATMENT PREFERENCES:  
 
GOALS OF CARE:  Pending workup Patient/Health Care Proxy Stated Goals: Prolong life TREATMENT PREFERENCES:  
Code Status: DNR Advance Care Planning: 
[x] [facility] Interdisciplinary Team has updated the ACP Navigator with [employer] and Patient Capacity Primary Decision Maker (Active): Rooney Meigs - Daughter - [phone] Secondary Decision Maker: Michelle Dominguez Daughter - [phone] Secondary Decision Maker: Miranda Mar - Son - [phone] Advance Care Planning 9/24/2020 Patient's Healthcare Decision Maker is: Named in scanned ACP document Primary Decision Maker Name -  
Primary Decision Maker Phone Number -  
Primary Decision Maker Relationship to Patient -  
Confirm Advance Directive Yes, on file Patient Would Like to Complete Advance Directive -  
 Does the patient have other document types Do Not Resuscitate Other Instructions: Other: As far as possible, the palliative care team has discussed with patient / health care proxy about goals of care / treatment preferences for patient. HISTORY:  
 
History obtained from: chart, patient CHIEF COMPLAINT: LE weakness and falls HPI/SUBJECTIVE: The patient is:  
[x] Verbal and participatory [] Non-participatory due to:  
 
9/21: presented to ED with acute onset of generalized fatigue, multiple falls (at least 3 falls this week), \"my legs just feel weak and I fall. \"  She started having lower back pain after falling yesterday, that is exacerbated with movement and radiates down her legs. No relieving factors. No numbness, tingling, or bowel or bladder issues. Pt was in her usual state of health until about 1 month ago when she started not feeling well, with generalized weakness involving her 4 extremities, and decreased exercise intolerance. Pt has remote h/o breast cancer in 1999, s/p chemo and mastectomy. She reports that she had follow-up imaging in early 2000's and was told she might have bone cancer, but then another oncologist said it wasn't true and she hasn't followed up since. CXR reveals new lower thoracic compression fracture and diffuse osseous mets. Chest CT reveals new lower thoracic 9/23: pt reports that she has had numbness in her left chest and arm since mastectomy, but starting 2 months ago she's started experiencing burning pain in the same area, the pain comes and goes and she doesn't know what exacerbates it, sometimes lying down will help. Since her last fall her low back has been very painful, achy. Clinical Pain Assessment (nonverbal scale for severity on nonverbal patients):  
Clinical Pain Assessment Severity: 5 Location: neck and back post op pain Character: sharp, stabbing Duration: 1 day Effect: limited mobility Factors: movement Frequency: constant Activity (Movement): Restless, excessive activity and/or withdrawal reflexes Duration: for how long has pt been experiencing pain (e.g., 2 days, 1 month, years) Frequency: how often pain is an issue (e.g., several times per day, once every few days, constant) FUNCTIONAL ASSESSMENT:  
 
Palliative Performance Scale (PPS): PPS: 30 
 
 
 PSYCHOSOCIAL/SPIRITUAL SCREENING:  
 
Palliative IDT has assessed this patient for cultural preferences / practices and a referral made as appropriate to needs (Cultural Services, Patient Advocacy, Ethics, etc.) Any spiritual / [employer]al concerns: 
[] Yes /  [x] No 
 
Caregiver Burnout: 
[] Yes /  [x] No /  [] No Caregiver Present Anticipatory grief assessment:  
[x] Normal  / [] Maladaptive ESAS Anxiety: Anxiety: 0 
 
ESAS Depression: Depression: 3 REVIEW OF SYSTEMS:  
 
Positive and pertinent negative findings in ROS are noted above in HPI. The following systems were [x] reviewed / [] unable to be reviewed as noted in HPI Other findings are noted below. Systems: constitutional, ears/nose/mouth/throat, respiratory, gastrointestinal, genitourinary, musculoskeletal, integumentary, neurologic, psychiatric, endocrine. Positive findings noted below. Modified ESAS Completed by: provider Fatigue: 4 Drowsiness: 1 Depression: 3 Pain: 5 Anxiety: 0 Nausea: 0 Anorexia: 0 Dyspnea: 0 Constipation: No  
  Stool Occurrence(s): 1 PHYSICAL EXAM:  
 
From RN flowsheet: 
Wt Readings from Last 3 Encounters:  
09/28/20 200 lb 3.2 oz (90.8 kg) 09/23/20 198 lb (89.8 kg) 04/17/20 221 lb 5.5 oz (100.4 kg) Blood pressure (!) 110/44, pulse 86, temperature 98.6 °F (37 °C), resp. rate 18, height 5' 5\" (1.651 m), weight 200 lb 3.2 oz (90.8 kg), SpO2 95 %. Pain Scale 1: Numeric (0 - 10) Pain Intensity 1: 9 Pain Onset 1: post-op Pain Location 1: Neck, Shoulder Pain Orientation 1: Posterior, Left Pain Description 1: Aching Pain Intervention(s) 1: Medication (see MAR) Last bowel movement, if known:  
 
Constitutional: WD, WN, NAD, pleasant and cooperative, AAOx3, lying in bed with soft cervical collar on Eyes: pupils equal, anicteric ENMT: no nasal discharge, moist mucous membranes Cardiovascular: regular rhythm, distal pulses intact Respiratory: breathing not labored, symmetric Gastrointestinal: soft non-tender, +bowel sounds Musculoskeletal: no deformity Skin: warm, dry Neurologic: following commands, moving all extremities Psychiatric: full affect, no hallucinations Other: 
 
 
 HISTORY:  
 
Active Problems: 
  Bone metastases ([address] (9/21/2020) Thoracic compression fracture ([address] (9/21/2020) History of breast cancer (9/21/2020) Pain due to malignant neoplasm metastatic to bone ([address] (9/23/2020) Neuropathic pain (9/23/2020) Drug-induced constipation (9/23/2020) Weakness of both legs (9/23/2020) Acute post-operative pain (9/30/2020) Past Medical History:  
Diagnosis Date  Arthralgia 8/17/2017  Arthritis  Asthma Mild to Moderate  Breast cancer ([address] 8/17/2017 Onset 1997; Refusing Mammogram 6/16/17  Cancer ([address] 1997 Breast left  Cardiomyopathy ([address] 8/17/2017 Onset:1999 Ischemic; 25% - 50% (last EFx 45%) Continue with ACE/Lasix/coreg  Cataract 8/17/2017 Bilateral   
 Cellulitis 8/17/2017 Suspect local reaction to Pneumovax, treat with ice, NSAIDs or Tylenol  Chest pain at rest 8/17/2017  CHF (congestive heart failure) ([address] 8/17/2017 Stable, though weight up a little. To take 1 1/2 Lasix daily if swelling, 1 daily o/w  Chronic maxillary sinusitis 8/17/2017  Chronic pain Right knee  COPD (chronic obstructive pulmonary disease) ([address] 8/17/2017 Continue with inhalers  Diabetes ([address] Pre-diabetic  DJD (degenerative joint disease) 8/17/2017  Elevated BUN 8/17/2017  Esophagitis, reflux 2017  Fatigue 2017  GERD (gastroesophageal reflux disease)  Heart failure ([address] Cardiomyopathy, HX of MI   Hyperglycemia 2017  Hyperkalemia 2017  Hyperlipidemia 2017  Hypertension 2017  Ill-defined condition Vertigo  Ill-defined condition  HX of Urosepsis complicated by respiratory failure and pneumonnia  Myalgia 2017  Thromboembolus ([address] 1969  
 during 2nd pregnancy [place] [place] 2017  Vertigo, aural 2017  Vitamin D deficiency 2017 Past Surgical History:  
Procedure Laterality Date [facility] JONNY  
 HX GYN Left Breast Mastectomy  HX HEENT   Bilateral Cataract  HX ORTHOPAEDIC Right 2018  
 knee replacement  HX ORTHOPAEDIC Left 2018  
 wrist surgery  HX VASCULAR ACCESS Port a cath on the right Family History Problem Relation Age of Onset  Cancer Mother  Other Father History reviewed, no pertinent family history. Social History Tobacco Use  Smoking status: Former Smoker Packs/day: 2.00 Years: 25.00 Pack years: 50.00 Last attempt to quit:  Years since quittin.7  Smokeless tobacco: Never Used Substance Use Topics  Alcohol use: No  
 
Allergies Allergen Reactions  Morphine Nausea and Vomiting Current Facility-Administered Medications Medication Dose Route Frequency  cholecalciferol (VITAMIN D3) (1000 Units /25 mcg) tablet 1 Tab  1,000 Units Oral DAILY  fentaNYL citrate (PF) injection 25 mcg  25 mcg IntraVENous TID PRN  
 0.9% sodium chloride infusion  125 mL/hr IntraVENous CONTINUOUS  
 glycopyrrolate-formoterol (BEVESPI AEROSPHERE) 9 mcg-4.8 mcg inhaler  2 Puff Inhalation BID RT  
 alcohol 62% (NOZIN) nasal  1 Ampule  1 Ampule Topical Q12H  
 fenofibrate nanocrystallized (TRICOR) tablet 48 mg  48 mg Oral DAILY  sodium chloride (NS) flush 5-40 mL  5-40 mL IntraVENous Q8H  
 sodium chloride (NS) flush 5-40 mL  5-40 mL IntraVENous PRN  
 naloxone (NARCAN) injection 0.4 mg  0.4 mg IntraVENous PRN  
 ondansetron (ZOFRAN ODT) tablet 4 mg  4 mg Oral Q4H PRN  
 bisacodyL (DULCOLAX) suppository 10 mg  10 mg Rectal DAILY PRN  
 oxyCODONE-acetaminophen (PERCOCET) 5-325 mg per tablet 2 Tab  2 Tab Oral Q4H PRN  
 [Held by provider] carvediloL (COREG) tablet 6.25 mg  6.25 mg Oral BID WITH MEALS  
 albuterol (PROVENTIL VENTOLIN) nebulizer solution 2.5 mg  2.5 mg Nebulization Q4H PRN  
 gabapentin (NEURONTIN) capsule 100 mg  100 mg Oral TID  budesonide (PULMICORT) 250 mcg/2ml nebulizer susp  250 mcg Nebulization BID RT  
 DULoxetine (CYMBALTA) capsule 20 mg  20 mg Oral DAILY  polyethylene glycol (MIRALAX) packet 17 g  17 g Oral DAILY  senna-docusate (PERICOLACE) 8.6-50 mg per tablet 1 Tab  1 Tab Oral DAILY  ondansetron (ZOFRAN) injection 4 mg  4 mg IntraVENous Q4H PRN  
 acetaminophen (TYLENOL) tablet 650 mg  650 mg Oral Q6H PRN Or  
 acetaminophen (TYLENOL) suppository 650 mg  650 mg Rectal Q6H PRN  
 [Held by provider] furosemide (LASIX) tablet 40 mg  40 mg Oral DAILY  pantoprazole (PROTONIX) tablet 40 mg  40 mg Oral ACB  [Held by provider] sacubitriL-valsartan (ENTRESTO) 49-51 mg tablet 2 Tab  2 Tab Oral BID  
 
 
 
 LAB AND IMAGING FINDINGS:  
 
Lab Results Component Value Date/Time WBC 10.3 09/30/2020 02:59 AM  
 HGB 9.7 (L) 09/30/2020 02:59 AM  
 PLATELET 481 79/77/3318 02:59 AM  
 
Lab Results Component Value Date/Time Sodium 141 10/01/2020 04:24 AM  
 Potassium 4.2 10/01/2020 04:24 AM  
 Chloride 107 10/01/2020 04:24 AM  
 CO2 30 10/01/2020 04:24 AM  
 BUN 41 (H) 10/01/2020 04:24 AM  
 Creatinine 0.90 10/01/2020 04:24 AM  
 Calcium 8.8 10/01/2020 04:24 AM  
  
Lab Results Component Value Date/Time Alk.  phosphatase 124 (H) 09/21/2020 11:05 AM  
 Protein, total 7.8 09/21/2020 11:05 AM  
 Albumin 3.5 09/21/2020 11:05 AM  
 Globulin 4.3 (H) 09/21/2020 11:05 AM  
 
Lab Results Component Value Date/Time INR 1.0 09/29/2020 03:04 AM  
 Prothrombin time 10.9 09/29/2020 03:04 AM  
 aPTT 24.1 09/21/2020 11:05 AM  
  
No results found for: IRON, FE, TIBC, IBCT, PSAT, FERR No results found for: PH, PCO2, PO2 No components found for: [place] No results found for: CPK, CKMB Total time:  
Counseling / coordination time, spent as noted above:  
> 50% counseling / coordination?: y 
 
Prolonged service was provided for  []30 min   []75 min in face to face time in the presence of the patient, spent as noted above. Time Start:  
Time End:  
Note: this can only be billed with 63646 (initial) or 25826 (follow up). If multiple start / stop times, list each separately.

## 2020-10-01 NOTE — PROGRESS NOTES
ADULT PROTOCOL: JET AEROSOL  REASSESSMENT Patient  India Almanzar     67 y.o.   female     10/1/2020  4:58 PM 
 
Breath Sounds Pre Procedure: Right Breath Sounds: Diminished Left Breath Sounds: Diminished Breath Sounds Post Procedure: Right Breath Sounds: Clear, Diminished Left Breath Sounds: Clear, Diminished Breathing pattern: Pre procedure Breathing Pattern: Regular Post procedure Breathing Pattern: Regular Heart Rate: Pre procedure Pulse: 93 
         Post procedure Pulse: 89 Resp Rate: Pre procedure Respirations: 18 Post procedure Respirations: 18 
 
 
Oxygen: O2 Device: Nasal cannula Changed: NO SpO2: Pre procedure SpO2: 95 %   with oxygen Post procedure SpO2: 95 %  with oxygen Nebulizer Therapy: Current medications Aerosolized Medications: Pulmicort Changed: NO 
 
 
 
Problem List:  
Patient Active Problem List  
Diagnosis Code  OA (osteoarthritis) of knee M17.10  Cardiomyopathy [facility] I42.9  Breast cancer ([facility] C50.919  
 Hypertension I10  Vitamin D deficiency E55.9  Hyperlipidemia E78.5  Knee pain, bilateral M25.561, M25.562  Fatigue R53.83  
 COPD (chronic obstructive pulmonary disease) ([facility]) J44.9  Vertigo, aural H81.319  Thrush B37.0  DJD (degenerative joint disease) M19.90  Chest pain at rest R07.9  Myalgia M79.10  Cataract H26.9  Hyperkalemia E87.5  CHF (congestive heart failure) ([facility]) I50.9  Cellulitis L03.90  Chronic maxillary sinusitis J32.0  Esophagitis, reflux K21.00  Elevated BUN R79.9  Hyperglycemia R73.9  Severe obesity ([facility]) E66.01  
 Bone metastases ([facility]) C79.51  Thoracic compression fracture ([facility]) S22.000A  History of breast cancer Z85.3  Pain due to malignant neoplasm metastatic to bone ([facility]) G89.3, C79.51  
 Neuropathic pain M79.2  Drug-induced constipation K59.03  
  Weakness of both legs R29.898  Acute post-operative pain G89.18 Respiratory Therapist: Fariba Richardson RT

## 2020-10-01 NOTE — PROGRESS NOTES
Problem: Self Care Deficits Care Plan (Adult) Goal: *Acute Goals and Plan of Care (Insert Text) Description: FUNCTIONAL STATUS PRIOR TO ADMISSION: Patient was independent and active without use of DME. Reports significant decline in past 2 weeks with multiple falls. Was driving. Mostly sedentary at home. HOME SUPPORT: Lives alone with supportive daughter nearby to assist PRN. Occupational Therapy Goals Initiated 9/30/2020 1. Patient will perform grooming tasks seated EOB for at least 5 minutes with good sitting balance to maximize within 7 days. 2.  Patient will perform upper body bathing while seated with Min A within 7 days. 3.  Patient will perform lower body dressing with Mod A using AE PRN within 7 days. 4.  Patient will bathing while seated with Mod A using AE PRN for distal LB aspects within 7 days. 5.  Patient will perform BSC transfers with Mod A using least restrictive device within 7 days. Outcome: Progressing Towards Goal 
  
OCCUPATIONAL THERAPY TREATMENT Patient: Arabella Curiel (60 y.o. female) Date: 10/1/2020 Diagnosis: Thoracic compression fracture ([facility] [S22.000A] Bone metastases ([facility] [C79.51] <principal problem not specified> Procedure(s) (LRB): POSTERIOR CERVICAL FUSION SEGMENTAL WITH INSTRUMENTATION C6-T5/ T2,T3 LAMINECTOMY/ ARTHRODESIS C6-T5 (O-ARM) (N/A) SPINE LUMBAR LAMINECTOMY WITH INSTRUMENTATION AND IMAGE GUIDANCE (N/A) 2 Days Post-Op Precautions: Fall, Spinal(Log Roll; Soft Collar, no lifting >5lbs) Chart, occupational therapy assessment, plan of care, and goals were reviewed. ASSESSMENT Patient continues with skilled OT services and is progressing towards goals. Patient received supine in bed, agreeable to session with encouragement and education regarding importance of upright positioning and movement for optimal recovery. Patient requiring very slow pace and much reassurance 2/2 anxiety and fear of increased pain.  Patient able to come to EOB with cues for technique and significant assist for upright trunk. Once EOB, patient with good balance with UE support of mattress and able to tolerate seated position for approx 10 minutes in prep for seated ADLs. Agreeable to attempting stand, requiring max x2 on first trial and mod x2 for second with bed elevated. Patient fatiguing quickly, requiring rest breaks between tasks. Patient able to take several small sidesteps towards [facility] where she returned to supine. Overall, brighter affect at conclusion of session with patient seeming encouraged by progress. To facilitate pain management, encouraged gentle shoulder exercises to relieve tension and reinforced precautions, encouraging patient to use sense of touch to locate items to side rather than attempting to turn head. Current Level of Function Impacting Discharge (ADLs): up to max x2 to stand with RW; up to total assist for lower body tasks Other factors to consider for discharge: PLAN : 
Patient continues to benefit from skilled intervention to address the above impairments. Continue treatment per established plan of care. to address goals. Recommendation for discharge: (in order for the patient to meet his/her long term goals) Therapy up to 5 days/week in SNF setting This discharge recommendation: 
Has been made in collaboration with the attending provider and/or case management IF patient discharges home will need the following DME: AE: long handled bathing and AE: long handled dressing SUBJECTIVE:  
Patient stated i'm not sure I'm up to doing much today.  OBJECTIVE DATA SUMMARY:  
Cognitive/Behavioral Status: 
Neurologic State: Agitated Orientation Level: Oriented X4 Cognition: Follows commands; Appropriate decision making; Appropriate for age attention/concentration Perception: Appears intact Perseveration: No perseveration noted Safety/Judgement: Insight into deficits; Awareness of environment; Fall prevention Functional Mobility and Transfers for ADLs: 
Bed Mobility: 
Rolling: Maximum assistance Supine to Sit: Maximum assistance(R sidelying to sit; max A of 1, CGA of another) Sit to Supine: Moderate assistance;Assist x2(sit to R sidelying) Transfers: 
Sit to Stand: Maximum assistance;Assist x2(improved to mod A x 2 w/elevated bed height) Stand to Sit: Moderate assistance;Assist x2 Balance: 
Sitting: Intact Standing: Impaired; With support Standing - Static: Poor;Constant support Standing - Dynamic : Poor;Constant support ADL Intervention: Lower Body Dressing Assistance Dressing Assistance: Total assistance(dependent) Socks: Total assistance (dependent) Cues: Verbal cues provided;Visual cues provided;Physical assistance Cognitive Retraining Organizing/Sequencing: Breaking task down Safety/Judgement: Insight into deficits; Awareness of environment; Fall prevention Reinforced education on cervical spine precautions and protection of surgical site. Patient instructed and indicated understanding the benefits of maintaining activity tolerance, functional mobility, and independence with self care tasks during acute stay  to ensure safe return home and to baseline. Encouraged patient to increase frequency and duration OOB, not sitting longer than 30 mins without marching/walking with staff, be out of bed for all meals, perform daily ADLs (as approved by RN/MD regarding bathing etc), and performing functional mobility to/from bathroom. Patient instruction and indicated understanding on body mechanics, ergonomics and gravitational force on the spine during different body positions to plan activities in prep for return home to complete basic ADLs, instrumental ADLs and back to work safely. Pain: 
Patient reporting moderate post-op pain with movement. Activity Tolerance:  
Fair and requires rest breaks Please refer to the flowsheet for vital signs taken during this treatment. After treatment patient left in no apparent distress:  
Supine in bed, Call bell within reach, and Side rails x 3 
 
COMMUNICATION/COLLABORATION:  
The patients plan of care was discussed with: Physical therapist and Registered nurse. Tyson Cortes OT Time Calculation: 32 mins

## 2020-10-01 NOTE — PROGRESS NOTES
Problem: Falls - Risk of 
Goal: *Absence of Falls Description: Document Sonido Dialloccasin Fall Risk and appropriate interventions in the flowsheet. Outcome: Progressing Towards Goal 
Note: Fall Risk Interventions: 
Mobility Interventions: Patient to call before getting OOB Medication Interventions: Patient to call before getting OOB, Teach patient to arise slowly Elimination Interventions: Call light in reach History of Falls Interventions: Door open when patient unattended, Room close to nurse's station Problem: Patient Education: Go to Patient Education Activity Goal: Patient/Family Education Outcome: Progressing Towards Goal 
  
Problem: Pressure Injury - Risk of 
Goal: *Prevention of pressure injury Description: Document Shan Scale and appropriate interventions in the flowsheet. Outcome: Progressing Towards Goal 
Note: Pressure Injury Interventions: 
Sensory Interventions: Turn and reposition approx. every two hours (pillows and wedges if needed), Pressure redistribution bed/mattress (bed type), Minimize linen layers, Keep linens dry and wrinkle-free, Assess changes in LOC Moisture Interventions: Check for incontinence Q2 hours and as needed, Absorbent underpads, Maintain skin hydration (lotion/cream), Minimize layers Activity Interventions: Pressure redistribution bed/mattress(bed type), Increase time out of bed Mobility Interventions: Float heels, HOB 30 degrees or less, Pressure redistribution bed/mattress (bed type) Nutrition Interventions: Document food/fluid/supplement intake Friction and Shear Interventions: HOB 30 degrees or less, Minimize layers Problem: Patient Education: Go to Patient Education Activity Goal: Patient/Family Education Outcome: Progressing Towards Goal 
  
Problem: Breathing Pattern - Ineffective Goal: *Absence of hypoxia Outcome: Progressing Towards Goal 
Goal: *Use of effective breathing techniques Outcome: Progressing Towards Goal 
Goal: *PALLIATIVE CARE:  Alleviation of Dyspnea Outcome: Progressing Towards Goal 
  
Problem: Patient Education: Go to Patient Education Activity Goal: Patient/Family Education Outcome: Progressing Towards Goal 
  
Problem: Patient Education: Go to Patient Education Activity Goal: Patient/Family Education Outcome: Progressing Towards Goal

## 2020-10-01 NOTE — PROGRESS NOTES
Supportive follow up visit with patient on Ortho. No family/friends present. Patient was lying inclined in her bed. She appeared to be in good spirits. Ms. Vijaya Pelayo shared that her paty has and is helping her cope with her diagnosis. She is recovering from surgery before she is to begin her treatment for cancer. Her  has not visited, but has called and prayed with her over the phone. She is a member of [facility]. se expressed no spiritual needs or concerns at this time. Provided bedside presence, affirmation of hope that comes through paty, and assurance of prayer.  available as needed. Sherryle Claude, MPS, [facility], Staff  [facility]  Paging Service  [phone])

## 2020-10-01 NOTE — PROGRESS NOTES
Problem: Falls - Risk of 
Goal: *Absence of Falls Description: Document Idania [place] Fall Risk and appropriate interventions in the flowsheet. Outcome: Progressing Towards Goal 
Note: Fall Risk Interventions: 
Mobility Interventions: Bed/chair exit alarm, Communicate number of staff needed for ambulation/transfer, Patient to call before getting OOB, Utilize walker, cane, or other assistive device, Utilize gait belt for transfers/ambulation Medication Interventions: Utilize gait belt for transfers/ambulation, Patient to call before getting OOB, Assess postural VS orthostatic hypotension Elimination Interventions: Call light in reach, Toileting schedule/hourly rounds, Patient to call for help with toileting needs History of Falls Interventions: Vital signs minimum Q4HRs X 24 hrs (comment for end date), Room close to nurse's station, Door open when patient unattended Problem: Patient Education: Go to Patient Education Activity Goal: Patient/Family Education Outcome: Progressing Towards Goal 
  
Problem: Pressure Injury - Risk of 
Goal: *Prevention of pressure injury Description: Document Shan Scale and appropriate interventions in the flowsheet. Outcome: Progressing Towards Goal 
Note: Pressure Injury Interventions: 
Sensory Interventions: Turn and reposition approx. every two hours (pillows and wedges if needed), Minimize linen layers, Float heels, Assess changes in LOC Moisture Interventions: Check for incontinence Q2 hours and as needed, Minimize layers, Absorbent underpads Activity Interventions: Pressure redistribution bed/mattress(bed type), Increase time out of bed Mobility Interventions: Float heels, HOB 30 degrees or less, PT/OT evaluation, Turn and reposition approx. every two hours(pillow and wedges) Nutrition Interventions: Document food/fluid/supplement intake Friction and Shear Interventions: HOB 30 degrees or less, Lift sheet, Minimize layers Problem: Patient Education: Go to Patient Education Activity Goal: Patient/Family Education Outcome: Progressing Towards Goal 
  
Problem: Breathing Pattern - Ineffective Goal: *Absence of hypoxia Outcome: Progressing Towards Goal 
Goal: *Use of effective breathing techniques Outcome: Progressing Towards Goal 
Goal: *PALLIATIVE CARE:  Alleviation of Dyspnea Outcome: Progressing Towards Goal 
  
Problem: Patient Education: Go to Patient Education Activity Goal: Patient/Family Education Outcome: Progressing Towards Goal 
  
Problem: Patient Education: Go to Patient Education Activity Goal: Patient/Family Education Outcome: Progressing Towards Goal

## 2020-10-01 NOTE — PROGRESS NOTES
Orthopedic End of Shift Note Bedside shift change report given to Chelly Hunter RN (oncoming nurse) by Janusz Brady RN (offgoing nurse). Report included the following information SBAR, Kardex, OR Summary, Procedure Summary, Intake/Output, MAR, Accordion and Recent Results. POD# 2 Significant issues during shift: None Issues for Physician to address: None Activity This Shift 
(check all that apply) [] chair 
[] dangle 
 [] bathroom 
[] bedside commode [] hallway [x] bedrest  
Nausea/Vomiting [] yes [x] no    
Voiding Status [] void [] [place] [] I&O Cath Bowel Movements [] yes [x] no Foot Pumps or SCD [x] yes [] no Ice Pack [] yes    [x] no Incentive Spirometer [] yes [x] no Volume:     
Telemetry Monitoring   [x] yes [] no Rhythm:  
Supplemental O2 [x] yes [] no Sat off O2:   %

## 2020-10-01 NOTE — PROGRESS NOTES
TRANSFER - IN REPORT: 
 
Verbal report received from [facility]) on Rocíoy Mercury  being received from Ortho(unit) for urgent transfer Report consisted of patients Situation, Background, Assessment and  
Recommendations(SBAR). Information from the following report(s) SBAR, Kardex, MAR, Recent Results and Cardiac Rhythm afib was reviewed with the receiving nurse. Opportunity for questions and clarification was provided. Assessment completed upon patients arrival to unit and care assumed.

## 2020-10-01 NOTE — PROGRESS NOTES
Awake alert Ox3  Good progress  Awaiting placement at [facility]  She was able to stand and bear weight today  Stable neuro exam  afeb vss  Increase activity gradually

## 2020-10-01 NOTE — PROGRESS NOTES
Hospitalist Progress Note NAME: Marija Camilo :  1945 MRN:  [id number] Assessment / Plan: 
 
New onset rapid atrial fibrillation 
-nursing reports that coreg was held since yesterday duet to low BP 
-discussed with Dr Jewel Villa (covering hospitalist this evening) -transfer to stepdown 
-start diltiazem drip 
-hold AC in the setting of recent neurosurgery  
-consult cardiology Acute pathologic thoracic compression fracture POA from metastatic breast cancer Bone scan noted- Multiple skeletal metastases MRI C spine noted for severe spinal stenosis with Compression fractures MRI noted for Mid thoracic Compression fracture 
 
-S/P Segmental posterior cervicothoracic fusion C5 to T5, and arthrodesis C5 to T5 with cancellous bone chips and DBM putty, and partial laminectomy T2  20 
-continue neurotin and cymbalta for neurpathic component to her LUE pain 
  
History of left breast cancer , now recurrent and widely metastatic  
-s/p mastectomy and systemic chemotherapy 
-CT chest and abdomen in 2020  showed evidence of osseous metastasis and patient was advised outpatient follow-up but patient did not follow-up with any oncologist 
- pathology demonstrates ER + mBC 
-appreciate Dr Jaswinder Ledezma input. Recommend starting Letrozole History of COPD not in exacerbation 
-continue home inhalers 
  
Chronic systolic congestive heart failure POA Hypotension H/o Hypertension 
-coreg, entresto and lasix held due to soft BP 
 
GERD 
-Continue PPI 
  
Code Status: Full code Surrogate Decision Maker: Daughter 
  
DVT Prophylaxis: Lovenox Baseline: From home, independent of ADLs 30.0 - 39.9 Obese / Body mass index is 33.32 kg/m². Subjective: Chief Complaint / Reason for Physician Visit : 
F/U CHF, low BP, mets breast cancer Review of Systems: 
Symptom Y/N Comments  Symptom Y/N Comments Fever/Chills n   Chest Pain n   
Poor Appetite y   Edema n Cough n   Abdominal Pain Sputum n   Joint Pain y   
SOB/TAVAREZ n   Pruritis/Rash Nausea/vomit n   Tolerating PT/OT n Due to pain Diarrhea    Tolerating Diet y Constipation    Other y L arm burning sensation improved Could NOT obtain due to:   
 
Objective: VITALS:  
Last 24hrs VS reviewed since prior progress note. Most recent are: 
Patient Vitals for the past 24 hrs: 
 Temp Pulse Resp BP SpO2  
10/01/20 1300 98.3 °F (36.8 °C) 88 16 (!) 110/41 98 % 10/01/20 0930     95 % 10/01/20 0723 98.6 °F (37 °C) 86  (!) 110/44 95 % 10/01/20 0300 98.5 °F (36.9 °C) 85 18 (!) 125/52 94 % 09/30/20 2340 98.6 °F (37 °C) 87 18 (!) 122/43   
09/30/20 2031     96 % 09/30/20 1858 98.3 °F (36.8 °C) 83 18 (!) 104/43 95 % 09/30/20 1853    (!) 101/42   
09/30/20 1729    (!) 95/41  Intake/Output Summary (Last 24 hours) at 10/1/2020 1554 Last data filed at 10/1/2020 8479 Gross per 24 hour Intake  Output 1450 ml Net -1450 ml PHYSICAL EXAM: 
General: WD, WN. Alert, cooperative, no acute distress   
EENT:  EOMI. Anicteric sclerae. MMM  (+) soft neck collar Resp:  CTA bilaterally, no wheezing or rales. No accessory muscle use CV:  Regular  rhythm,  No edema GI:  Soft, Non distended, Non tender.  +Bowel sounds Neurologic:  Alert and oriented X 3, normal speech, Psych:   Good insight. Not anxious nor agitated Skin:  No rashes. No jaundice Reviewed most current lab test results and cultures  YES Reviewed most current radiology test results   YES Review and summation of old records today    NO Reviewed patient's current orders and MAR    YES 
PMH/SH reviewed - no change compared to H&P 
________________________________________________________________________ Care Plan discussed with: 
  Comments Patient x Family RN x Care Manager x Consultant     
                 x Multidiciplinary team rounds were held today with case manager, nursing, pharmacist and clinical coordinator. Patient's plan of care was discussed; medications were reviewed and discharge planning was addressed. ________________________________________________________________________ Total NON critical care TIME:  25   Minutes Total CRITICAL CARE TIME Spent:   Minutes non procedure based Comments >50% of visit spent in counseling and coordination of care    
________________________________________________________________________ Tevin Rendon MD  
 
Procedures: see electronic medical records for all procedures/Xrays and details which were not copied into this note but were reviewed prior to creation of Plan. LABS: 
I reviewed today's most current labs and imaging studies. Pertinent labs include: 
Recent Labs  
  09/30/20 
0259 09/29/20 
0304 WBC 10.3 9.1 HGB 9.7* 11.6 HCT 31.4* 37.2  213 Recent Labs 10/01/20 
0424 09/30/20 
7729 09/29/20 
2969  140 136  
K 4.2 4.3 4.2  104 100 CO2 30 33* 29  
GLU 99 118* 115* BUN 41* 48* 49* CREA 0.90 1.55* 1.48* CA 8.8 8.5 9.3 INR  --   --  1.0 Signed: Tevin Rendon MD

## 2020-10-02 NOTE — PROGRESS NOTES
Progress Note 
 
 
10/2/2020 12:12 PM 
NAME: Annabella Burns MRN:  [id number] Admit Diagnosis: Thoracic compression fracture ([facility] [address] [S22.000A] Bone metastases ([facility] [address] [C79.51] Assessment:   
  
- Found to have metastatic breast ca - S/p surgery - post op afib 
  
- Cath '97 mild, stress in 2019 no ischemia 
- Hx of non-ischemic cm EF 25% improved with echo 2019 Ef 50% - Sinus now with post op afib 
- DM 
- Dyslipidemia - Breast CA now with recurrence 
- COPD Dr. Scottie Pennington - s/p TKR Cardiologist:  [place] 
   
  
            Plan:    
  
Pathalogic fracture S/p neurosurgery BP on lower side Post op afib, back in sinus on amiodarone 
  
- Hold on anticoagulation until ok with neurosurgery, could do eliquis - Add amiodarone, with plan to discharge on 200mg daily - Taper dilt ggt to off today - Resume coreg 3.125mg bid, was on coreg cr 40 as outpt 
- Holding entresto for now 
- resume furosemide 20mg daily , was on 40mg as outpt 
  
   
  
 [x]? High complexity decision making was performed 
  
 
 
We discussed the expected course, resolution and complications of the diagnoses in detail. Medication risk, benefits, costs, interactions, and alternatives were discussed as indicated. I advised him to contact the office if his condition worsens, changes or fails to improve as anticipated. Patient expressed understanding with the diagnoses  and plan. Subjective:  
 
Annabella Burns denies chest pain, dyspnea. Discussed with RN events overnight. Review of Systems: 
 
Symptom Y/N Comments  Symptom Y/N Comments Fever/Chills N   Chest Pain N Poor Appetite N   Edema N   
Cough N   Abdominal Pain N Sputum N   Joint Pain N   
SOB/TAVAREZ N   Pruritis/Rash N   
Nausea/vomit N   Tolerating PT/OT Y Diarrhea N   Tolerating Diet Y Constipation N   Other Could NOT obtain due to:   
 
Objective:  
  
Physical Exam: Last 24hrs VS reviewed since prior progress note. Most recent are: 
 
Visit Vitals BP (!) 110/51 (BP 1 Location: Right arm, BP Patient Position: Supine) Pulse 81 Temp 98.5 °F (36.9 °C) Resp 20 Ht 5' 5\" (1.651 m) Wt 99.1 kg (218 lb 6.4 oz) SpO2 92% BMI 36.34 kg/m² Intake/Output Summary (Last 24 hours) at 10/2/2020 1212 Last data filed at 10/2/2020 8627 Gross per 24 hour Intake 371.21 ml Output 1490 ml Net -1118.79 ml General Appearance: Well developed, well nourished, alert & oriented x 3,  
 no acute distress. Ears/Nose/Mouth/Throat: Hearing grossly normal. 
Neck: Supple. Chest: Lungs clear to auscultation bilaterally. Cardiovascular: Regular rate and rhythm, S1S2 normal, no murmur. Abdomen: Soft, non-tender, bowel sounds are active. Extremities: No edema bilaterally. Skin: Warm and dry. PMH/SH reviewed - no change compared to H&P Data Review Telemetry: normal sinus rhythm Lab Data Personally Reviewed: 
 
Recent Labs  
  09/30/20 
7826 WBC 10.3 HGB 9.7* HCT 31.4*  No results for input(s): INR, PTP, APTT, INREXT in the last 72 hours. Recent Labs 10/01/20 
0424 09/30/20 
1889  140  
K 4.2 4.3  104 CO2 30 33* BUN 41* 48* CREA 0.90 1.55* GLU 99 118* CA 8.8 8.5 No results for input(s): CPK, CKNDX, TROIQ in the last 72 hours. No lab exists for component: CPKMB Lab Results Component Value Date/Time Cholesterol, total 168 01/16/2020 11:32 AM  
 HDL Cholesterol 52 01/16/2020 11:32 AM  
 LDL, calculated 95 01/16/2020 11:32 AM  
 Triglyceride 103 01/16/2020 11:32 AM  
 
 
No results for input(s): AP, TBIL, TP, ALB, GLOB, GGT, AML, LPSE in the last 72 hours. No lab exists for component: SGOT, GPT, AMYP, HLPSE No results for input(s): PH, PCO2, PO2 in the last 72 hours. Medications Personally Reviewed: 
 
Current Facility-Administered Medications Medication Dose Route Frequency  heparin (porcine) pf 300 Units  300 Units InterCATHeter PRN  
 letrozole [facility]) tablet 2.5 mg  2.5 mg Oral DAILY  carvediloL (COREG) tablet 3.125 mg  3.125 mg Oral BID WITH MEALS  
 [START ON 10/4/2020] gabapentin (NEURONTIN) capsule 100 mg  100 mg Oral QHS  [START ON 10/3/2020] furosemide (LASIX) tablet 20 mg  20 mg Oral DAILY  dilTIAZem (CARDIZEM) 100 mg in dextrose 5% (MBP/ADV) 100 mL infusion  0-15 mg/hr IntraVENous TITRATE  amiodarone (CORDARONE) tablet 400 mg  400 mg Oral TID  [START ON 10/5/2020] amiodarone (CORDARONE) tablet 200 mg  200 mg Oral DAILY  cholecalciferol (VITAMIN D3) (1000 Units /25 mcg) tablet 1 Tab  1,000 Units Oral DAILY  fentaNYL citrate (PF) injection 25 mcg  25 mcg IntraVENous TID PRN  
 glycopyrrolate-formoterol (BEVESPI AEROSPHERE) 9 mcg-4.8 mcg inhaler  2 Puff Inhalation BID RT  
 alcohol 62% (NOZIN) nasal  1 Ampule  1 Ampule Topical Q12H  
 fenofibrate nanocrystallized (TRICOR) tablet 48 mg  48 mg Oral DAILY  sodium chloride (NS) flush 5-40 mL  5-40 mL IntraVENous Q8H  
 sodium chloride (NS) flush 5-40 mL  5-40 mL IntraVENous PRN  
 naloxone (NARCAN) injection 0.4 mg  0.4 mg IntraVENous PRN  
 ondansetron (ZOFRAN ODT) tablet 4 mg  4 mg Oral Q4H PRN  
 bisacodyL (DULCOLAX) suppository 10 mg  10 mg Rectal DAILY PRN  
 oxyCODONE-acetaminophen (PERCOCET) 5-325 mg per tablet 2 Tab  2 Tab Oral Q4H PRN  
 albuterol (PROVENTIL VENTOLIN) nebulizer solution 2.5 mg  2.5 mg Nebulization Q4H PRN  
 budesonide (PULMICORT) 250 mcg/2ml nebulizer susp  250 mcg Nebulization BID RT  
 DULoxetine (CYMBALTA) capsule 20 mg  20 mg Oral DAILY  polyethylene glycol (MIRALAX) packet 17 g  17 g Oral DAILY  senna-docusate (PERICOLACE) 8.6-50 mg per tablet 1 Tab  1 Tab Oral DAILY  ondansetron (ZOFRAN) injection 4 mg  4 mg IntraVENous Q4H PRN  
 acetaminophen (TYLENOL) tablet 650 mg  650 mg Oral Q6H PRN  Or  
  acetaminophen (TYLENOL) suppository 650 mg  650 mg Rectal Q6H PRN  pantoprazole (PROTONIX) tablet 40 mg  40 mg Oral ACB  [Held by provider] sacubitriL-valsartan (ENTRESTO) 49-51 mg tablet 2 Tab  2 Tab Oral BID Gennette Kanner, MD

## 2020-10-02 NOTE — PROGRESS NOTES
Hospitalist Progress Note NAME: Miguel Ángel Oro :  1945 MRN:  [id number] Assessment / Plan: 
 
New onset rapid atrial fibrillation, back in NSR 
-off diltiazem drip 
-started on amiodarone  
-hold AC in the setting of recent neurosurgery. Will touch base with NSGY team - likely can start over the weekend 
-back on coreg Acute pathologic thoracic compression fracture POA from metastatic breast cancer Bone scan noted- Multiple skeletal metastases MRI C spine noted for severe spinal stenosis with Compression fractures MRI noted for Mid thoracic Compression fracture 
 
-S/P Segmental posterior cervicothoracic fusion C5 to T5, and arthrodesis C5 to T5 with cancellous bone chips and DBM putty, and partial laminectomy T2  20 
-continue neurotin and cymbalta for neurpathic component to her LUE pain 
  
History of left breast cancer , now recurrent and widely metastatic  
-s/p mastectomy and systemic chemotherapy 
-CT chest and abdomen in 2020  showed evidence of osseous metastasis and patient was advised outpatient follow-up but patient did not follow-up with any oncologist 
- pathology demonstrates ER + mBC 
-appreciate Dr Isa Martel input. Started on Letrozole History of COPD not in exacerbation 
-continue home inhalers 
  
Chronic systolic congestive heart failure POA Hypotension H/o Hypertension 
-added back coreg and lasix- initially held due to soft BP 
-still holding entresto, hoping to restart over the weekend GERD 
-Continue PPI 
  
Code Status: Full code Surrogate Decision Maker: Daughter 
  
DVT Prophylaxis: Lovenox Baseline: From home, independent of ADLs 30.0 - 39.9 Obese / Body mass index is 36.34 kg/m². Subjective: Chief Complaint / Reason for Physician Visit : 
F/U CHF, low BP, mets breast cancer Review of Systems: 
Symptom Y/N Comments  Symptom Y/N Comments Fever/Chills n   Chest Pain n   
Poor Appetite y   Edema n Cough n   Abdominal Pain Sputum n   Joint Pain y   
SOB/TAVAREZ n   Pruritis/Rash Nausea/vomit n   Tolerating PT/OT n Due to pain Diarrhea    Tolerating Diet y Constipation    Other y L arm burning sensation improved Could NOT obtain due to:   
 
Objective: VITALS:  
Last 24hrs VS reviewed since prior progress note. Most recent are: 
Patient Vitals for the past 24 hrs: 
 Temp Pulse Resp BP SpO2  
10/02/20 1100 98.5 °F (36.9 °C) 81 20 (!) 110/51 92 % 10/02/20 0930  81  (!) 119/49 92 % 10/02/20 0900  90  (!) 114/50 91 % 10/02/20 0700 98.7 °F (37.1 °C) 82 18 (!) 98/48 94 % 10/02/20 0630  82  (!) 102/41 93 % 10/02/20 0615  86   98 % 10/02/20 0430  90  (!) 105/45   
10/02/20 0330  93  (!) 99/55 96 % 10/02/20 0249 98.5 °F (36.9 °C) 84 16 (!) 103/48 95 % 10/02/20 0200  93  (!) 99/43 98 % 10/01/20 2331 98.1 °F (36.7 °C) 84 16 111/66 97 % 10/01/20 2244 98.3 °F (36.8 °C) (!) 103 16 (!) 94/49 98 % 10/01/20 2233  (!) 125   97 % 10/01/20 2218  (!) 106  100/61   
10/01/20 2146  (!) 116  (!) 105/56 96 % 10/01/20 2116  (!) 125  (!) 100/50 96 % 10/01/20 2104     96 % 10/01/20 2031 98.7 °F (37.1 °C) (!) 125 16 (!) 101/43 96 % 10/01/20 1759 98.9 °F (37.2 °C) (!) 129  120/65   
10/01/20 1755  (!) 142 16    
10/01/20 1643 97.7 °F (36.5 °C) 92 16 (!) 129/55 98 % Intake/Output Summary (Last 24 hours) at 10/2/2020 1330 Last data filed at 10/2/2020 3500 Gross per 24 hour Intake 371.21 ml Output 1490 ml Net -1118.79 ml PHYSICAL EXAM: 
General: WD, WN. Alert, cooperative, no acute distress   
EENT:  EOMI. Anicteric sclerae. MMM  (+) soft neck collar Resp:  CTA bilaterally, no wheezing or rales. No accessory muscle use CV:  Regular  rhythm,  No edema GI:  Soft, Non distended, Non tender.  +Bowel sounds Neurologic:  Alert and oriented X 3, normal speech, Psych:   Good insight. Not anxious nor agitated Skin:  No rashes. No jaundice Reviewed most current lab test results and cultures  YES Reviewed most current radiology test results   YES Review and summation of old records today    NO Reviewed patient's current orders and MAR    YES 
PMH/SH reviewed - no change compared to H&P 
________________________________________________________________________ Care Plan discussed with: 
  Comments Patient x Family RN x Care Manager x Consultant     
                 x Multidiciplinary team rounds were held today with , nursing, pharmacist and clinical coordinator. Patient's plan of care was discussed; medications were reviewed and discharge planning was addressed. ________________________________________________________________________ Total NON critical care TIME:  25   Minutes Total CRITICAL CARE TIME Spent:   Minutes non procedure based Comments >50% of visit spent in counseling and coordination of care    
________________________________________________________________________ Abdirahman Way MD  
 
Procedures: see electronic medical records for all procedures/Xrays and details which were not copied into this note but were reviewed prior to creation of Plan. LABS: 
I reviewed today's most current labs and imaging studies. Pertinent labs include: 
Recent Labs  
  09/30/20 
0259 WBC 10.3 HGB 9.7* HCT 31.4*  Recent Labs 10/01/20 
0424 09/30/20 
0075  140  
K 4.2 4.3  104 CO2 30 33* GLU 99 118* BUN 41* 48* CREA 0.90 1.55* CA 8.8 8.5 Signed: Abdirahman Way MD

## 2020-10-02 NOTE — PROGRESS NOTES
[facility] PL[facility] Pt transferred 10/1/20 from Ortho to PCU. Plan is SNF at [facility] once stable. Palliative Care is following Pt for pain control. Med Assist started a Medicaid Application with Pt DTR on 9/23/20.   
-Will need Medicaid LTSS screening to see about getting help at home or LTC in future if needed. POST form/[employer] was completed on 9/26/20. Second IM letter will be needed prior to DC 
 
CM to help with making appt prior to DC. CM will continue to monitor discharge plan. Shama Rice, MAY Ext [phone]

## 2020-10-02 NOTE — PROGRESS NOTES
Problem: Mobility Impaired (Adult and Pediatric) Goal: *Acute Goals and Plan of Care (Insert Text) Description: FUNCTIONAL STATUS PRIOR TO ADMISSION: Pt reports independence at baseline, with a significant a decline over the last 2 weeks and having multiple falls. Pt reports wearing 2.5L of O2 at night and prn during the day. HOME SUPPORT PRIOR TO ADMISSION: The patient lived alone with support from family as needed. Physical Therapy Goals Initiated 9/30/2020 1. Patient will move from supine to sit and sit to supine , scoot up and down, and roll side to side in bed with minimal assistance/contact guard assist within 7 day(s). 2.  Patient will transfer from bed to chair and chair to bed with minimal assistance/contact guard assist using the least restrictive device within 7 day(s). 3.  Patient will perform sit to stand with moderate assist within 7 day(s). 4.  Patient will ambulate with minimal assistance/contact guard assist for 20 feet with the least restrictive device within 7 day(s). Outcome: Progressing Towards Goal 
 PHYSICAL THERAPY TREATMENT Patient: Lizbeth Nicole (68 y.o. female) Date: 10/2/2020 Diagnosis: Thoracic compression fracture ([facility] [S22.000A] Bone metastases ([facility] [facility] [C79.51] Procedure(s) (LRB): POSTERIOR CERVICAL FUSION SEGMENTAL WITH INSTRUMENTATION C6-T5/ T2,T3 LAMINECTOMY/ ARTHRODESIS C6-T5 (O-ARM) (N/A) SPINE LUMBAR LAMINECTOMY WITH INSTRUMENTATION AND IMAGE GUIDANCE (N/A) 3 Days Post-Op Precautions: Fall, Spinal(Log Roll; Soft Collar, no lifting >5lbs) Chart, physical therapy assessment, plan of care and goals were reviewed. ASSESSMENT: Patient continues with skilled PT services and is progressing slowly towards goals, no gross LOB but had some knee buckling, fatigues quickly, did fair with bed mob and transfers, very weak, does fair with ther-ex, vc's for safety and proper RW use. Current Level of Function Impacting Discharge (mobility/balance): mod assist x2 PLAN : Patient continues to benefit from skilled intervention to address the above impairments. Continue treatment per established plan of care 
to address goals. Recommendation for discharge: (in order for the patient to meet his/her long term goals) Therapy up to 5 days/week in SNF setting This discharge recommendation: Has not yet been discussed the attending provider and/or case management IF patient discharges home will need the following DME: rolling walker OBJECTIVE DATA SUMMARY:  
 
Critical Behavior: 
Neurologic State: Alert Orientation Level: Oriented X4 Cognition: Appropriate safety awareness, Follows commands Safety/Judgement: Awareness of environment, Insight into deficits Functional Mobility Training: 
Bed Mobility: 
Rolling: Maximum assistance;Assist x2; Additional time Supine to Sit: Moderate assistance;Assist x2; Additional time Sit to Supine: Maximum assistance Scooting: Minimum assistance;Assist x2; Additional time Level of Assistance: Maximum assistance Interventions: Tactile cues; Verbal cues Transfers: 
Sit to Stand: Minimum assistance;Assist x2; Additional time Stand to Sit: Minimum assistance;Assist x2 Bed to Chair: Minimum assistance;Assist x2; Additional time Interventions: Tactile cues; Verbal cues Level of Assistance: Minimum assistance;Assist x2; Additional time Balance: 
Sitting: Intact; Without support Sitting - Static: Good (unsupported) Sitting - Dynamic: Not tested Standing: Impaired; Without support Standing - Static: Fair;Constant support Standing - Dynamic : Fair;Constant support Ambulation/Gait Training: 
Distance (ft): 5 Feet (ft) Assistive Device: Gait belt;Walker, rolling Ambulation - Level of Assistance: Minimal assistance;Assist x2; Additional time Gait Abnormalities: Decreased step clearance Right Side Weight Bearing: Full Left Side Weight Bearing: Full Base of Support: Widened Speed/Tamiko: Slow Step Length: Left shortened;Right shortened Therapeutic Exercises:  
sitting EXERCISE Sets Reps Active Active Assist  
Passive Comments Ankle pumps 1 10 [x] [] [] bilat Heel raises 1 10 [x] [] [] \" Toe tap 1 10 [x] [] [] \" Knee ext 1 10 [x] [] [] \" Hip flex 1 10 [x] [] [] \"  
 
Pain Ratin Activity Tolerance: Poor After treatment patient left in no apparent distress: Sitting in chair and Call bell within reach COMMUNICATION/COLLABORATION:  
The patients plan of care was discussed with: Registered nurse. Raf Saab PTA Time Calculation: 30 mins

## 2020-10-02 NOTE — PROGRESS NOTES
Bedside shift change report given to [address] (oncoming nurse) by Vini Patricio (offgoing nurse). Report included the following information SBAR, Procedure Summary, Intake/Output, MAR and Cardiac Rhythm Afib.

## 2020-10-02 NOTE — PROGRESS NOTES
Palliative Medicine Consult Jacques: [phone] ([phone]) Patient Name: Rivas Bird YOB: 1945 Date of Initial Consult: 9/23/2020 Reason for Consult: overwhelming sxs Requesting Provider: Mellisa Mclaughlin MD  
Primary Care Physician: Raymon Kwok NP 
 
 SUMMARY:  
Rivas Bird is a 67 y.o. with a past history of COPD, congestive heart failure, dyslipidemia, gastroesophageal reflux disease, who was admitted on 9/21/2020 from home with a diagnosis of compression fracture of thoracic vertebra. Current medical issues leading to Palliative Medicine involvement include: pain management, newly diagnosed spine lesions. 9/29: underwent posterior cervical fusion segmental with instrumentation cC-T5/T2, T3 laminectomy/arthrodesis C6-T5 (O-arm), spine lumbar laminectomy with instrumention and image guidance. 9/30: pain is well controlled as long as she is lying still, with percocet two 5/325mg tabs. She reports the neuropathic pain in her left upper arm and chest is much better and the ROM is better too. 10/1: pain is well controlled except when she woke up, but once the percocet started working it was much better (she had gone 15 hours between doses). She reports that her thighs remain very weak and feel very heavy. She voices fear that she will be made to leave the hospital before she is ready. 10/2: had been using percocet sparingly making the comment that it \"made her loopy. \"  Moved to stepdown unit yesterday for afib. Psychosocial: lives in low income senior apt. Has 2 dtrs and 1 son. PALLIATIVE DIAGNOSES:  
1. Generalized weakness 2. Falls 3. Fatigue 4. Weakness of extremities 5. Back pain 6. Thoracic vertebral fracture (T4) 
7. Sclerotic bony lesions 8. burning pain in left arm 9. Constipation 10. Care decisions PLAN:  
1.  Prior to visit, I completed a review of patient's medical records, including medical documentation, vital signs, MARs, and results of various labs and other diagnostics. 1. PAIN: well controlled. I offered to reduce the dose but she declined, stating that the current dose really helps when she has to get OOB. She reports that the neuropathic pain in her left chest and arm is gone; counseled pt that this is most likely  Due to the surgery, therefore I will begin titrating the gabapentin down to reduce pill burden. If the pain returns we can restart it. 1. Continue percocet two 5/325mg tabs q. 4 hours prn. If she takes this every 4 hours the total daily acetaminophen dose is 3900mg. 2. Gabapentin 100mg bid x 2 days, then q. HS x 2 days, then discontinue. If the neuropathic pain continues to improve, will discontinue this medication. 3. Discontinue Fentanyl. 4. Cymbalta 20mg daily for pain and depression. 5. Narcan 0.4mg IVP q. 2 min prn RR<8 and/or per protocol. 6. RN can call me if pain medication needs adjusting. [phone] 
2. CONSTIPATION:   last BM 9/29. 
1. Continue pericolace 1 tab daily. 2. Continue Miralax 17 gm daily. 3. GOALS OF CARE:  I have asked my  to meet with pt to discuss future options as I doubt she will ever be able to return to independent living. She states that she is aware \"it's bad\" with regards to her prognosis, is relying on her belief in God to help her through this. \"it is what it is. \" 
4. Patient has completed documents indicating DNR status. DNR order placed. 5. Initial consult note routed to primary continuity provider and/or primary health care team members 6. Communicated plan of care with: Palliative IDT[facility] Team 
 
 GOALS OF CARE / TREATMENT PREFERENCES:  
 
GOALS OF CARE:  Pending workup Patient/Health Care Proxy Stated Goals: Prolong life TREATMENT PREFERENCES:  
Code Status: DNR Advance Care Planning: 
[x] The [facility] Interdisciplinary Team has updated the ACP Navigator with Health Care Decision Maker and Patient Capacity Primary Decision Maker (Active): Hannah Teresa - Daughter - [phone] Secondary Decision Maker: Lamar Mayorga Daughter - [phone] Secondary Decision Maker: Charlie Born - Son - [phone] Advance Care Planning 9/24/2020 Patient's Healthcare Decision Maker is: Named in scanned ACP document Primary Decision Maker Name -  
Primary Decision Maker Phone Number -  
Primary Decision Maker Relationship to Patient -  
Confirm Advance Directive Yes, on file Patient Would Like to Complete Advance Directive - Does the patient have other document types Do Not Resuscitate Other Instructions: Other: As far as possible, the palliative care team has discussed with patient / health care proxy about goals of care / treatment preferences for patient. HISTORY:  
 
History obtained from: chart, patient CHIEF COMPLAINT: LE weakness and falls HPI/SUBJECTIVE: The patient is:  
[x] Verbal and participatory [] Non-participatory due to:  
 
9/21: presented to ED with acute onset of generalized fatigue, multiple falls (at least 3 falls this week), \"my legs just feel weak and I fall. \"  She started having lower back pain after falling yesterday, that is exacerbated with movement and radiates down her legs. No relieving factors. No numbness, tingling, or bowel or bladder issues. Pt was in her usual state of health until about 1 month ago when she started not feeling well, with generalized weakness involving her 4 extremities, and decreased exercise intolerance. Pt has remote h/o breast cancer in 1999, s/p chemo and mastectomy. She reports that she had follow-up imaging in early 2000's and was told she might have bone cancer, but then another oncologist said it wasn't true and she hasn't followed up since. CXR reveals new lower thoracic compression fracture and diffuse osseous mets. Chest CT reveals new lower thoracic 9/23: pt reports that she has had numbness in her left chest and arm since mastectomy, but starting 2 months ago she's started experiencing burning pain in the same area, the pain comes and goes and she doesn't know what exacerbates it, sometimes lying down will help. Since her last fall her low back has been very painful, achy. Clinical Pain Assessment (nonverbal scale for severity on nonverbal patients):  
Clinical Pain Assessment Severity: 4 Location: neck and back post op pain Character: sharp, stabbing Duration: 1 day Effect: limited mobility Factors: movement Frequency: constant Activity (Movement): Restless, excessive activity and/or withdrawal reflexes Duration: for how long has pt been experiencing pain (e.g., 2 days, 1 month, years) Frequency: how often pain is an issue (e.g., several times per day, once every few days, constant) FUNCTIONAL ASSESSMENT:  
 
Palliative Performance Scale (PPS): PPS: 30 
 
 
 PSYCHOSOCIAL/SPIRITUAL SCREENING:  
 
Palliative IDT has assessed this patient for cultural preferences / practices and a referral made as appropriate to needs (Cultural Services, Patient Advocacy, Ethics, etc.) Any spiritual / Sabianism concerns: 
[] Yes /  [x] No 
 
Caregiver Burnout: 
[] Yes /  [x] No /  [] No Caregiver Present Anticipatory grief assessment:  
[x] Normal  / [] Maladaptive ESAS Anxiety: Anxiety: 0 
 
ESAS Depression: Depression: 0 REVIEW OF SYSTEMS:  
 
Positive and pertinent negative findings in ROS are noted above in HPI. The following systems were [x] reviewed / [] unable to be reviewed as noted in HPI Other findings are noted below. Systems: constitutional, ears/nose/mouth/throat, respiratory, gastrointestinal, genitourinary, musculoskeletal, integumentary, neurologic, psychiatric, endocrine. Positive findings noted below. Modified ESAS Completed by: provider Fatigue: 3 Drowsiness: 0 Depression: 0 Pain: 4 Anxiety: 0 Nausea: 0 Anorexia: 0 Dyspnea: 0 Constipation: No  
  Stool Occurrence(s): 1 PHYSICAL EXAM:  
 
From RN flowsheet: 
Wt Readings from Last 3 Encounters:  
10/02/20 218 lb 6.4 oz (99.1 kg) 09/23/20 198 lb (89.8 kg) 04/17/20 221 lb 5.5 oz (100.4 kg) Blood pressure (!) 110/51, pulse 81, temperature 98.5 °F (36.9 °C), resp. rate 20, height 5' 5\" (1.651 m), weight 218 lb 6.4 oz (99.1 kg), SpO2 92 %. Pain Scale 1: Numeric (0 - 10) Pain Intensity 1: 0 Pain Onset 1: post-op Pain Location 1: Neck, Shoulder Pain Orientation 1: Posterior, Left Pain Description 1: Aching, Pressure Pain Intervention(s) 1: Declines Last bowel movement, if known:  
 
Constitutional: WD, WN, NAD, pleasant and cooperative, AAOx3, lying in bed with soft cervical collar on Eyes: pupils equal, anicteric ENMT: no nasal discharge, moist mucous membranes Cardiovascular: regular rhythm, distal pulses intact Respiratory: breathing not labored, symmetric Gastrointestinal: soft non-tender, +bowel sounds Musculoskeletal: no deformity Skin: warm, dry Neurologic: following commands, moving all extremities Psychiatric: full affect, no hallucinations Other: 
 
 
 HISTORY:  
 
Active Problems: 
  Bone metastases ([address] (9/21/2020) Thoracic compression fracture ([address] (9/21/2020) History of breast cancer (9/21/2020) Pain due to malignant neoplasm metastatic to bone ([address] (9/23/2020) Neuropathic pain (9/23/2020) Drug-induced constipation (9/23/2020) Weakness of both legs (9/23/2020) Acute post-operative pain (9/30/2020) Breast cancer metastasized to bone, right ([address] (10/1/2020) Past Medical History:  
Diagnosis Date  Arthralgia 8/17/2017  Arthritis  Asthma Mild to Moderate  Breast cancer ([address] 8/17/2017 Onset 1997; Refusing Mammogram 6/16/17  Cancer ([address] 1997 Breast left  Cardiomyopathy ([address] 8/17/2017 Onset: Ischemic; 25% - 50% (last EFx 45%) Continue with ACE/Lasix/coreg  Cataract 2017 Bilateral   
 Cellulitis 2017 Suspect local reaction to Pneumovax, treat with ice, NSAIDs or Tylenol  Chest pain at rest 2017  CHF (congestive heart failure) ([address] 2017 Stable, though weight up a little. To take 1 1/2 Lasix daily if swelling, 1 daily o/w  Chronic maxillary sinusitis 2017  Chronic pain Right knee  COPD (chronic obstructive pulmonary disease) ([address] 2017 Continue with inhalers  Diabetes ([address] Pre-diabetic  DJD (degenerative joint disease) 2017  Elevated BUN 2017  Esophagitis, reflux 2017  Fatigue 2017  GERD (gastroesophageal reflux disease)  Heart failure ([address] Cardiomyopathy, HX of MI   Hyperglycemia 2017  Hyperkalemia 2017  Hyperlipidemia 2017  Hypertension 2017  Ill-defined condition Vertigo  Ill-defined condition  HX of Urosepsis complicated by respiratory failure and pneumonnia  Myalgia 2017  Thromboembolus ([address] 1970  
 during 2nd pregnancy [facility] 2017  Vertigo, aural 2017  Vitamin D deficiency 2017 Past Surgical History:  
Procedure Laterality Date [facility] JONNY  
 HX GYN Left Breast Mastectomy  HX HEENT   Bilateral Cataract  HX ORTHOPAEDIC Right 2018  
 knee replacement  HX ORTHOPAEDIC Left 2018  
 wrist surgery  HX VASCULAR ACCESS Port a cath on the right Family History Problem Relation Age of Onset  Cancer Mother  Other Father History reviewed, no pertinent family history. Social History Tobacco Use  Smoking status: Former Smoker Packs/day: 2.00 Years: 25.00 Pack years: 50.00 Last attempt to quit:  Years since quittin.7  Smokeless tobacco: Never Used Substance Use Topics  Alcohol use: No  
 
Allergies Allergen Reactions  Morphine Nausea and Vomiting Current Facility-Administered Medications Medication Dose Route Frequency  heparin (porcine) pf 300 Units  300 Units InterCATHeter PRN  
 letrozole [facility]) tablet 2.5 mg  2.5 mg Oral DAILY  carvediloL (COREG) tablet 3.125 mg  3.125 mg Oral BID WITH MEALS  
 [START ON 10/4/2020] gabapentin (NEURONTIN) capsule 100 mg  100 mg Oral QHS  [START ON 10/3/2020] furosemide (LASIX) tablet 20 mg  20 mg Oral DAILY  dilTIAZem (CARDIZEM) 100 mg in dextrose 5% (MBP/ADV) 100 mL infusion  0-15 mg/hr IntraVENous TITRATE  amiodarone (CORDARONE) tablet 400 mg  400 mg Oral TID  [START ON 10/5/2020] amiodarone (CORDARONE) tablet 200 mg  200 mg Oral DAILY  cholecalciferol (VITAMIN D3) (1000 Units /25 mcg) tablet 1 Tab  1,000 Units Oral DAILY  fentaNYL citrate (PF) injection 25 mcg  25 mcg IntraVENous TID PRN  
 glycopyrrolate-formoterol (BEVESPI AEROSPHERE) 9 mcg-4.8 mcg inhaler  2 Puff Inhalation BID RT  
 alcohol 62% (NOZIN) nasal  1 Ampule  1 Ampule Topical Q12H  
 fenofibrate nanocrystallized (TRICOR) tablet 48 mg  48 mg Oral DAILY  sodium chloride (NS) flush 5-40 mL  5-40 mL IntraVENous Q8H  
 sodium chloride (NS) flush 5-40 mL  5-40 mL IntraVENous PRN  
 naloxone (NARCAN) injection 0.4 mg  0.4 mg IntraVENous PRN  
 ondansetron (ZOFRAN ODT) tablet 4 mg  4 mg Oral Q4H PRN  
 bisacodyL (DULCOLAX) suppository 10 mg  10 mg Rectal DAILY PRN  
 oxyCODONE-acetaminophen (PERCOCET) 5-325 mg per tablet 2 Tab  2 Tab Oral Q4H PRN  
 albuterol (PROVENTIL VENTOLIN) nebulizer solution 2.5 mg  2.5 mg Nebulization Q4H PRN  
 budesonide (PULMICORT) 250 mcg/2ml nebulizer susp  250 mcg Nebulization BID RT  
 DULoxetine (CYMBALTA) capsule 20 mg  20 mg Oral DAILY  polyethylene glycol (MIRALAX) packet 17 g  17 g Oral DAILY  senna-docusate (PERICOLACE) 8.6-50 mg per tablet 1 Tab  1 Tab Oral DAILY  ondansetron (ZOFRAN) injection 4 mg  4 mg IntraVENous Q4H PRN  
 acetaminophen (TYLENOL) tablet 650 mg  650 mg Oral Q6H PRN Or  
 acetaminophen (TYLENOL) suppository 650 mg  650 mg Rectal Q6H PRN  pantoprazole (PROTONIX) tablet 40 mg  40 mg Oral ACB  [Held by provider] sacubitriL-valsartan (ENTRESTO) 49-51 mg tablet 2 Tab  2 Tab Oral BID  
 
 
 
 LAB AND IMAGING FINDINGS:  
 
Lab Results Component Value Date/Time WBC 10.3 09/30/2020 02:59 AM  
 HGB 9.7 (L) 09/30/2020 02:59 AM  
 PLATELET 186 05/66/4737 02:59 AM  
 
Lab Results Component Value Date/Time Sodium 141 10/01/2020 04:24 AM  
 Potassium 4.2 10/01/2020 04:24 AM  
 Chloride 107 10/01/2020 04:24 AM  
 CO2 30 10/01/2020 04:24 AM  
 BUN 41 (H) 10/01/2020 04:24 AM  
 Creatinine 0.90 10/01/2020 04:24 AM  
 Calcium 8.8 10/01/2020 04:24 AM  
  
Lab Results Component Value Date/Time Alk. phosphatase 124 (H) 09/21/2020 11:05 AM  
 Protein, total 7.8 09/21/2020 11:05 AM  
 Albumin 3.5 09/21/2020 11:05 AM  
 Globulin 4.3 (H) 09/21/2020 11:05 AM  
 
Lab Results Component Value Date/Time INR 1.0 09/29/2020 03:04 AM  
 Prothrombin time 10.9 09/29/2020 03:04 AM  
 aPTT 24.1 09/21/2020 11:05 AM  
  
No results found for: IRON, FE, TIBC, IBCT, PSAT, FERR No results found for: PH, PCO2, PO2 No components found for: [place] No results found for: CPK, CKMB Total time:  
Counseling / coordination time, spent as noted above:  
> 50% counseling / coordination?: y 
 
Prolonged service was provided for  []30 min   []75 min in face to face time in the presence of the patient, spent as noted above. Time Start:  
Time End:  
Note: this can only be billed with 20239 (initial) or 62546 (follow up). If multiple start / stop times, list each separately.

## 2020-10-02 NOTE — PROGRESS NOTES
Ortho / Neurosurgery NP Note POD# 3  s/p POSTERIOR CERVICAL FUSION SEGMENTAL WITH INSTRUMENTATION C6-T5/ T2,T3 LAMINECTOMY/ ARTHRODESIS C6-T5 (O-ARM), SPINE LUMBAR LAMINECTOMY WITH INSTRUMENTATION AND IMAGE GUIDANCE Pt resting in bed. Alert, conversant, no significant discomfort Reports pain in neck and left shoulder relieved by prn percocet. Proud that she got up and worked with OT for 10 mins today Tolerating regular diet although not much appetite. States she is drinking ensure shakes. Denies nausea. VSS Afebrile. 2L NC. Encourage hourly use of IS while awake (PTA- wears 2L at night) Remote Tele: Noted frequent PVCs/bigeminy then returned to NSR with rare PVCs. Visit Vitals BP (!) 110/51 (BP 1 Location: Right arm, BP Patient Position: Supine) Pulse 81 Temp 98.5 °F (36.9 °C) Resp 20 Ht 5' 5\" (1.651 m) Wt 99.1 kg (218 lb 6.4 oz) SpO2 92% BMI 36.34 kg/m² Voiding status: [place] replaced  On 9/30 Output (mL) Urine Voided: 450 ml (10/01/20 2244) Last Bowel Movement Date: 09/29/20 (10/01/20 2244) Unmeasurable Output Urine Occurrence(s): 1 (09/28/20 6757) Stool Occurrence(s): 1 (09/28/20 2036) Labs Lab Results Component Value Date/Time HGB 9.7 (L) 09/30/2020 02:59 AM  
  
Lab Results Component Value Date/Time INR 1.0 09/29/2020 03:04 AM  
  
Lab Results Component Value Date/Time Sodium 141 10/01/2020 04:24 AM  
 Potassium 4.2 10/01/2020 04:24 AM  
 Chloride 107 10/01/2020 04:24 AM  
 CO2 30 10/01/2020 04:24 AM  
 Glucose 99 10/01/2020 04:24 AM  
 BUN 41 (H) 10/01/2020 04:24 AM  
 Creatinine 0.90 10/01/2020 04:24 AM  
 Calcium 8.8 10/01/2020 04:24 AM  
 
Recent Glucose Results: No results found for: GLU, [address] [place], [address]  [facility] Body mass index is 36.34 kg/m². : A BMI > 30 is classified as obesity and > 40 is classified as morbid obesity. Soft cervical collar in place.  Optifoam dressing c/d/i 
 Tape dressing at top with ome dried blood - HVAC removal site? Calves soft and supple; No pain with passive stretch LLE PF/DF 4/5, EHL intact RLE PF/DF 4/5, EHL intact SCDs for mechanical DVT proph while in bed Abd soft, LBM 9/28 PLAN: 
1) Neurovascular assessment q4 hours 2) PT/OT - soft collar, RW. Recommendations for SNF placement. 3) Pain control - scheduled tylenol, gabapentin, prn percocet 4) Expected Acute blood loss post-op anemia - Hgb 9.7 (9/30). EBL 50ml. No active signs of bleeding. 5) Readniess for discharge: 
   [x] Vital Signs stable  
 [x] Hgb stable  
 [x] + Voiding  
 [x] Wound intact, drainage minimal  
 [x] Tolerating PO intake   
 [] Cleared by PT (OT if applicable) [] Stair training completed (if applicable) [] Independent / Contact Guard Assist (household distance) [] Bed mobility [] Car transfers  
  [] ADLs [x] Adequate pain control on oral medication alone Pt on Eliquis prior to admission. Discussed with Dr. Artur Garcia and he is ok with resuming that today if hospitalist team would like to. Stable, expected post op pain. SNF placement.    
 
Kory Garcia NP

## 2020-10-02 NOTE — CONSULTS
Consult NAME: Kelvin Leavitt :  1945 MRN:  [id number] Date/Time:  10/1/2020 9:17 PM 
 
Patient PCP: Babita Ramires, NP 
________________________________________________________________________ Assessment: - Found to have metastatic breast ca - S/p surgery - post op afib 
 
- Cath  mild, stress in  no ischemia 
- Hx of non-ischemic cm EF 25% improved with echo  Ef 50% - Sinus now with post op afib 
- DM 
- Dyslipidemia - Breast CA now with recurrence 
- COPD Dr. Natalia Thornton - s/p TKR Cardiologist:  Lani Spivey Plan:  
 
Pathalogic fracture S/p neurosurgery BP on lower side Now with afib 
 
- Hold on anticoagulation until ok with neurosurgery - Add amiodarone, with plan to discharge on 200mg daily - Dilt ggt to keep HR < 120 
- Resume coreg when able 
- Holding entresto for now 
- holding furosemide for now [x]        High complexity decision making was performed Subjective: CHIEF COMPLAINT: Afib HISTORY OF PRESENT ILLNESS:    
 
Edna Rod is a 67 y.o.   female who presents with a past medical history of COPD, congestive heart failure, dyslipidemia, gastroesophageal reflux disease coming the hospital chief complaints of falls and also generalized weakness. Patient reports being in her usual state of health until about a month ago when she started not feeling well. She reports weakness is generalized involving both her arms and legs. She also reports decreased exercise intolerance. She fell at least 3 times in the last 2 weeks. She fell yesterday and started having lower back pain which is about 5 x 10, increased with movement, radiating down to the legs and without any relieving factors. She does not report any dizziness or lightheadedness. She does not report any chest pain. Does not report any focal weakness, tingling or numbness. 
  
On arrival she was noted to have a blood pressure 145/68.   On labs she was noted to have a normal CBC. BMP showed a creatinine of 1.31. LFTs are normal.  Troponin is 0.05.  proBNP is 264. Chest x-ray showed new thoracic compression fracture with diffuse osseous metastasis and clear lungs. X-ray hips were within normal limits. Post op, no chest pain, no dyspnea, does not feel afib. We were asked to consult for work up and evaluation of the above problems. Past Medical History:  
Diagnosis Date  Arthralgia 8/17/2017  Arthritis  Asthma Mild to Moderate  Breast cancer ([address] 8/17/2017 Onset 1997; Refusing Mammogram 6/16/17  Cancer ([address] 1997 Breast left  Cardiomyopathy ([address] 8/17/2017 Onset:1999 Ischemic; 25% - 50% (last EFx 45%) Continue with ACE/Lasix/coreg  Cataract 8/17/2017 Bilateral   
 Cellulitis 8/17/2017 Suspect local reaction to Pneumovax, treat with ice, NSAIDs or Tylenol  Chest pain at rest 8/17/2017  CHF (congestive heart failure) ([address] 8/17/2017 Stable, though weight up a little. To take 1 1/2 Lasix daily if swelling, 1 daily o/w  Chronic maxillary sinusitis 8/17/2017  Chronic pain Right knee  COPD (chronic obstructive pulmonary disease) ([address] 8/17/2017 Continue with inhalers  Diabetes ([address] Pre-diabetic  DJD (degenerative joint disease) 8/17/2017  Elevated BUN 8/17/2017  Esophagitis, reflux 8/17/2017  Fatigue 8/17/2017  GERD (gastroesophageal reflux disease)  Heart failure ([address] Cardiomyopathy, HX of MI 1999  Hyperglycemia 8/17/2017  Hyperkalemia 8/17/2017  Hyperlipidemia 8/17/2017  Hypertension 8/17/2017  Ill-defined condition Vertigo  Ill-defined condition 2003 HX of Urosepsis complicated by respiratory failure and pneumonnia  Myalgia 8/17/2017  Thromboembolus ([address] 1969  
 during 2nd pregnancy [facility] Ke[facility] Verneda Sale 8/17/2017  Vertigo, aural 8/17/2017  Vitamin D deficiency 8/17/2017 Past Surgical History:  
Procedure Laterality Date [address] JONNY  
 HX GYN Left Breast Mastectomy  HX HEENT  2015 Bilateral Cataract  HX ORTHOPAEDIC Right 2018  
 knee replacement  HX ORTHOPAEDIC Left 2018  
 wrist surgery  HX VASCULAR ACCESS Port a cath on the right Allergies Allergen Reactions  Morphine Nausea and Vomiting Meds:  See below Social History Tobacco Use  Smoking status: Former Smoker Packs/day: 2.00 Years: 25.00 Pack years: 50.00 Last attempt to quit:  Years since quittin.7  Smokeless tobacco: Never Used Substance Use Topics  Alcohol use: No  
  
Family History Problem Relation Age of Onset  Cancer Mother  Other Father REVIEW OF SYSTEMS:   
 []         Unable to obtain  ROS due to --- 
 [x]         Total of 12 systems reviewed as follows: Total of 12 systems reviewed as follows:   
   POSITIVE= Bold text  Negative = normal text General:  fever, chills, sweats, generalized weakness, weight loss/gain,  
   loss of appetite Eyes:    blurred vision, eye pain, loss of vision, double vision ENT:    rhinorrhea, pharyngitis Respiratory:   cough, sputum production, SOB, TAVAREZ, wheezing, pleuritic pain  
Cardiology:   chest pain, palpitations, orthopnea, PND, edema, syncope Gastrointestinal:  abdominal pain , N/V, diarrhea, dysphagia, constipation, bleeding Genitourinary:  frequency, urgency, dysuria, hematuria, incontinence Muskuloskeletal :  arthralgia, myalgia, back pain Hematology:  easy bruising, nose or gum bleeding, lymphadenopathy Dermatological: rash, ulceration, pruritis, color change / jaundice Endocrine:   hot flashes or polydipsia Neurological:  headache, dizziness, confusion, focal weakness, paresthesia, Speech difficulties, memory loss, gait difficulty Psychological: Feelings of anxiety, depression, agitation Objective:  
  
Physical Exam: Last 24hrs VS reviewed since prior progress note. Most recent are: 
 
Visit Vitals BP (!) (P) 101/43 (BP 1 Location: Right arm, BP Patient Position: At rest) Pulse (!) 125 Temp (P) 98.7 °F (37.1 °C) Resp (P) 16 Ht 5' 5\" (1.651 m) Wt 90.8 kg (200 lb 3.2 oz) SpO2 96% BMI 33.32 kg/m² Intake/Output Summary (Last 24 hours) at 10/1/2020 2117 Last data filed at 10/1/2020 2499 Gross per 24 hour Intake  Output 950 ml Net -950 ml General Appearance: Well developed, well nourished, alert & oriented x 3,  
 no acute distress. In neck brace Ears/Nose/Mouth/Throat: Pupils equal and round, Hearing grossly normal. 
Neck: Supple. JVP within normal limits. Carotids good upstrokes, with no bruit. Chest: Lungs clear to auscultation bilaterally. Cardiovascular: irregular rate and rhythm, S1S2 normal, no murmur, rubs, gallops. Abdomen: Soft, non-tender, bowel sounds are active. No organomegaly. Extremities: No edema bilaterally. Femoral pulses +2, Distal Pulses +1. Skin: Warm and dry. Neuro: CN II-XII grossly intact, Strength and sensation grossly intact. Data:  
  
Prior to Admission medications Medication Sig Start Date End Date Taking? Authorizing Provider  
acetaminophen (TYLENOL) 500 mg tablet Take 500 mg by mouth every six (6) hours as needed for Pain. Yes Provider, Historical  
furosemide (LASIX) 20 mg tablet Take 60 mg by mouth daily. Yes Provider, Historical  
omeprazole (PRILOSEC) 20 mg capsule Take 1 capsule by mouth once daily 6/9/20  Yes Landry Azar NP  
fenofibrate nanocrystallized (TRICOR) 145 mg tablet Take 1 tablet by mouth once daily 5/22/20  Yes Almaz Azar NP  
meclizine (ANTIVERT) 25 mg tablet TAKE 1 TABLET BY MOUTH EVERY 6 HOURS AS NEEDED FOR DIZZINESS 4/27/20  Yes Landry Azar NP Anoro Ellipta 62.5-25 mcg/actuation inhaler Take 1 Puff by inhalation daily.  3/23/20  Yes Other, MD Faizan  
 sacubitril-valsartan (ENTRESTO) 49 mg/51 mg tablet Take 2 Tabs by mouth two (2) times a day. Yes Other, MD Faizan  
carvedilol (COREG CR) 40 mg CR capsule Take 1 Cap by mouth daily (with breakfast). 7/2/18  Yes Beth Azar, GALLO  
cholecalciferol (VITAMIN D3) 1,000 unit cap Take 1,000 Units by mouth daily. Yes Provider, Historical  
aspirin delayed-release 81 mg tablet Take 1 Tab by mouth daily. May resume in 30 days after completing twice daily Aspirin. 6/23/17  Yes Priscella Shojewell, GALLO  
PSEUDOEPHEDRINE HCL (SINUS DECONGESTANT PO) Take 30 mg by mouth daily as needed. Yes Provider, Historical  
albuterol (PROVENTIL, VENTOLIN) 90 mcg/Actuation inhaler Take 1-2 Puffs by inhalation every four (4) hours as needed for Wheezing. 5/1/11   TORSTEN Cochran Recent Results (from the past 24 hour(s)) METABOLIC PANEL, BASIC Collection Time: 10/01/20  4:24 AM  
Result Value Ref Range Sodium 141 136 - 145 mmol/L Potassium 4.2 3.5 - 5.1 mmol/L Chloride 107 97 - 108 mmol/L  
 CO2 30 21 - 32 mmol/L Anion gap 4 (L) 5 - 15 mmol/L Glucose 99 65 - 100 mg/dL BUN 41 (H) 6 - 20 MG/DL Creatinine 0.90 0.55 - 1.02 MG/DL  
 BUN/Creatinine ratio 46 (H) 12 - 20 GFR est AA >60 >60 ml/min/1.73m2 GFR est non-AA >60 >60 ml/min/1.73m2  Calcium 8.8 8.5 - 10.1 MG/DL

## 2020-10-02 NOTE — PROGRESS NOTES
Problem: Self Care Deficits Care Plan (Adult) Goal: *Acute Goals and Plan of Care (Insert Text) Description: FUNCTIONAL STATUS PRIOR TO ADMISSION: Patient was independent and active without use of DME. Reports significant decline in past 2 weeks with multiple falls. Was driving. Mostly sedentary at home. HOME SUPPORT: Lives alone with supportive daughter nearby to assist PRN. Occupational Therapy Goals Initiated 9/30/2020 1. Patient will perform grooming tasks seated EOB for at least 5 minutes with good sitting balance to maximize within 7 days. 2.  Patient will perform upper body bathing while seated with Min A within 7 days. 3.  Patient will perform lower body dressing with Mod A using AE PRN within 7 days. 4.  Patient will bathing while seated with Mod A using AE PRN for distal LB aspects within 7 days. 5.  Patient will perform BSC transfers with Mod A using least restrictive device within 7 days. Outcome: Progressing Towards Goal 
 
OCCUPATIONAL THERAPY TREATMENT Patient: Kathie Lagos (33 y.o. female) Date: 10/2/2020 Diagnosis: Thoracic compression fracture ([facility] [S22.000A] Bone metastases ([facility] [C79.51] <principal problem not specified> Procedure(s) (LRB): POSTERIOR CERVICAL FUSION SEGMENTAL WITH INSTRUMENTATION C6-T5/ T2,T3 LAMINECTOMY/ ARTHRODESIS C6-T5 (O-ARM) (N/A) SPINE LUMBAR LAMINECTOMY WITH INSTRUMENTATION AND IMAGE GUIDANCE (N/A) 3 Days Post-Op Precautions: Fall, Spinal(Log Roll; Soft Collar, no lifting >5lbs) Chart, occupational therapy assessment, plan of care, and goals were reviewed. ASSESSMENT Patient continues with skilled OT services and is progressing towards goals. Pt noted with progressive EOB sitting balance and duration tolerance this date, functionally evidenced by ability to tolerate 10 minutes of supported EOB sitting (1-2 UE supports) for 10 minutes with 1 posterior LOB with CGA for correction.   Pt progressing with understanding of bed level shoulder rolls and scapular adduction exercises. Good mental health noted this date. Pt continues to benefit from skilled OT to address functional deficits in an attempt at maximizing pt's highest level of safe functional independence prior to discharge, with reporting therapist believing pt would benefit from SNF level rehab upon discharge. Current Level of Function Impacting Discharge (ADLs): Max A Other factors to consider for discharge: none PLAN : 
Patient continues to benefit from skilled intervention to address the above impairments. Continue treatment per established plan of care. to address goals. Recommend with staff: Bed level ADL engagement, positional changes Recommend next OT session: POC progression Recommendation for discharge: (in order for the patient to meet his/her long term goals) Therapy up to 5 days/week in SNF setting This discharge recommendation: 
Has not yet been discussed the attending provider and/or case management IF patient discharges home will need the following DME: TBD by SNF rehab SUBJECTIVE:  
Patient stated I didn't feel good this morning, but today's treatment gives me hope that I can do this.  OBJECTIVE DATA SUMMARY:  
Cognitive/Behavioral Status: 
Neurologic State: Alert Orientation Level: Oriented X4 Cognition: Appropriate safety awareness; Follows commands Perception: Appears intact Perseveration: No perseveration noted Safety/Judgement: Awareness of environment; Insight into deficits Functional Mobility and Transfers for ADLs: 
Bed Mobility: 
Rolling: Maximum assistance Supine to Sit: Moderate assistance(Mod > Max;  with head of bed minimal elevated and grab bars) Sit to Supine: Maximum assistance Balance: 
Sitting: Intact Sitting - Static: Fair (occasional) Sitting - Dynamic: Fair (occasional) ADL Intervention: 
Upper Body Bathing Bathing Assistance: Moderate assistance(Mod > Max; pt able to engage in inclined anterior aspects) Position Performed: Seated edge of bed;Long sitting on bed Lower Body Bathing Lower Body : Maximum assistance Position Performed: Seated edge of bed Adaptive Equipment: (pt educated on long handled sponge) Lower Body Dressing Assistance Socks: Total assistance (dependent) Position Performed: Seated edge of bed Adaptive Equipment Used: (educated on use of sock-aid) Cognitive Retraining Safety/Judgement: Awareness of environment; Insight into deficits Pain: 
No c/o pain Activity Tolerance:  
Fair and requires rest breaks Please refer to the flowsheet for vital signs taken during this treatment. After treatment patient left in no apparent distress:  
Supine in bed, Call bell within reach, Bed / chair alarm activated, and Side rails x 3 
 
COMMUNICATION/COLLABORATION:  
The patients plan of care was discussed with: Registered nurse. Wendy Soto OT Time Calculation: 40 mins purulent drainage

## 2020-10-02 NOTE — PROGRESS NOTES
Bedside and Verbal shift change report given to [address] (oncoming nurse) by Clarke Alvarez and Quinton Owens RN (offgoing nurse). Report included the following information SBAR, Kardex, Intake/Output, MAR, Accordion, Recent Results, Med Rec Status and Cardiac Rhythm a fib.  
 
0730: Patient awake in bed,no complaints of pain at this time. Dilt gtt at 7.5ml/hr. 5854: Dilt gtt titrated to 5ml/hr. 1319: Dilt gtt stopped. Verbally stated by Dr. Jo Torres patient converted to NSR.

## 2020-10-03 NOTE — PROGRESS NOTES
2003: 
Bedside and verbal shift change report given to JEREMY Charlton (oncoming nurse) by Rhonda Stubbs RN (offgoing nurse). Report included the following information SBAR, Kardex, Intake/Output, MAR, Recent Results and Quality Measures. Hemovac drainage 50 mL Bedside and verbal  shift change report given to Kamla Louis RN (oncoming nurse) by Angel Garcia RN (offgoing nurse). Report included the following information SBAR, Kardex, Intake/Output, MAR, Recent Results and Quality Measures.

## 2020-10-03 NOTE — PROGRESS NOTES
Hospitalist Progress Note NAME: Supa Hook :  1945 MRN:  [id number] Assessment / Plan: 
 
New onset rapid atrial fibrillation, back in NSR 
-off diltiazem drip 10/02. -started on amiodarone PO 
-will plan on starting eliquis this weekend - looks okay with NSGY 
-continue coreg 
-monitor on PCU Acute pathologic thoracic compression fracture POA from metastatic breast cancer Bone scan noted- Multiple skeletal metastases MRI C spine noted for severe spinal stenosis with Compression fractures MRI noted for Mid thoracic Compression fracture 
 
-S/P Segmental posterior cervicothoracic fusion C5 to T5, and arthrodesis C5 to T5 with cancellous bone chips and DBM putty, and partial laminectomy T2  20 
-continue neurotin and cymbalta for neurpathic component to her LUE pain 
  
History of left breast cancer , now recurrent and widely metastatic  
-s/p mastectomy and systemic chemotherapy 
-CT chest and abdomen in 2020  showed evidence of osseous metastasis and patient was advised outpatient follow-up but patient did not follow-up with any oncologist 
- pathology demonstrates ER + mBC 
-appreciate Dr Fariba Herron input. Started on Letrozole History of COPD not in exacerbation 
-continue home inhalers 
  
Chronic systolic congestive heart failure POA Hypotension, resolved H/o Hypertension 
-added back coreg and lasix- initially held due to soft BP 
-still holding entresto. Restart per cardiology GERD 
-Continue PPI 
  
Code Status: Full code Surrogate Decision Maker: Daughter 
  
DVT Prophylaxis: Lovenox Baseline: From home, independent of ADLs 30.0 - 39.9 Obese / Body mass index is 32.9 kg/m². Subjective: Chief Complaint / Reason for Physician Visit : 
F/U CHF, low BP, mets breast cancer Review of Systems: 
Symptom Y/N Comments  Symptom Y/N Comments Fever/Chills n   Chest Pain n   
Poor Appetite y   Edema n   
Cough n   Abdominal Pain Sputum n   Joint Pain y   
SOB/TAVAREZ n   Pruritis/Rash Nausea/vomit n   Tolerating PT/OT n Due to pain Diarrhea    Tolerating Diet y Constipation    Other y L arm burning sensation improved Could NOT obtain due to:   
 
Objective: VITALS:  
Last 24hrs VS reviewed since prior progress note. Most recent are: 
Patient Vitals for the past 24 hrs: 
 Temp Pulse Resp BP SpO2  
10/03/20 0805 98 °F (36.7 °C) 82 18 131/61 95 % 10/03/20 0753     96 % 10/03/20 0752     96 % 10/03/20 0302 98.5 °F (36.9 °C) 83 18 (!) 126/54 98 % 10/02/20 2259 98.5 °F (36.9 °C) 86 18 (!) 111/45 93 % 10/02/20 2256 98.5 °F (36.9 °C) 81 18  96 % 10/02/20 1936     96 % 10/02/20 1922 98.1 °F (36.7 °C) 83 18 (!) 109/57 98 % 10/02/20 1417 98.7 °F (37.1 °C) 85 20 (!) 110/47 92 % 10/02/20 1100 98.5 °F (36.9 °C) 81 20 (!) 110/51 92 % 10/02/20 0930  81  (!) 119/49 92 % Intake/Output Summary (Last 24 hours) at 10/3/2020 7587 Last data filed at 10/3/2020 5501 Gross per 24 hour Intake  Output 975 ml Net -975 ml PHYSICAL EXAM: 
General: WD, WN. Alert, cooperative, no acute distress   
EENT:  EOMI. Anicteric sclerae. MMM  (+) soft neck collar Resp:  CTA bilaterally, no wheezing or rales. No accessory muscle use CV:  Regular  rhythm,  No edema GI:  Soft, Non distended, Non tender.  +Bowel sounds Neurologic:  Alert and oriented X 3, normal speech, Psych:   Good insight. Not anxious nor agitated Skin:  No rashes. No jaundice Reviewed most current lab test results and cultures  YES Reviewed most current radiology test results   YES Review and summation of old records today    NO Reviewed patient's current orders and MAR    YES 
PMH/SH reviewed - no change compared to H&P 
________________________________________________________________________ Care Plan discussed with: 
  Comments Patient x Family RN x Care Manager x Consultant x Multidiciplinary team rounds were held today with , nursing, pharmacist and clinical coordinator. Patient's plan of care was discussed; medications were reviewed and discharge planning was addressed. ________________________________________________________________________ Total NON critical care TIME:  25   Minutes Total CRITICAL CARE TIME Spent:   Minutes non procedure based Comments >50% of visit spent in counseling and coordination of care    
________________________________________________________________________ Dameon Jules MD  
 
Procedures: see electronic medical records for all procedures/Xrays and details which were not copied into this note but were reviewed prior to creation of Plan. LABS: 
I reviewed today's most current labs and imaging studies. Pertinent labs include: 
Recent Labs 10/03/20 
0406 WBC 6.1 HGB 8.8* HCT 29.4*  
 Recent Labs 10/03/20 
0406 10/01/20 
0424  141  
K 4.6 4.2  107 CO2 35* 30  
GLU 89 99 BUN 19 41* CREA 0.56 0.90  
CA 9.9 8.8 MG 1.8  --   
 
 
Signed: Dameon Jules MD

## 2020-10-03 NOTE — PROGRESS NOTES
Spoke with neuro surgery MD Caleb Jones about the patient's drain detaching during therapy. MD ask what the drain volume was in the last 12hrs. I stated that the patient had 25ml of red bloody drainage this morning and the night nurse reported 50ml out last night. MD stated to remove the drain.

## 2020-10-03 NOTE — PROGRESS NOTES
Problem: Falls - Risk of 
Goal: *Absence of Falls Description: Document Sonido Dialloccasin Fall Risk and appropriate interventions in the flowsheet. Outcome: Progressing Towards Goal 
Note: Fall Risk Interventions: 
Mobility Interventions: OT consult for ADLs Medication Interventions: Evaluate medications/consider consulting pharmacy, Bed/chair exit alarm Elimination Interventions: Call light in reach, Toileting schedule/hourly rounds History of Falls Interventions: Vital signs minimum Q4HRs X 24 hrs (comment for end date) Problem: Patient Education: Go to Patient Education Activity Goal: Patient/Family Education Outcome: Progressing Towards Goal 
  
Problem: Pressure Injury - Risk of 
Goal: *Prevention of pressure injury Description: Document Shan Scale and appropriate interventions in the flowsheet. Outcome: Progressing Towards Goal 
Note: Pressure Injury Interventions: 
Sensory Interventions: Assess changes in LOC, Float heels, Keep linens dry and wrinkle-free, Discuss PT/OT consult with provider, Check visual cues for pain Moisture Interventions: Check for incontinence Q2 hours and as needed Activity Interventions: PT/OT evaluation Mobility Interventions: Float heels, HOB 30 degrees or less, PT/OT evaluation Nutrition Interventions: Document food/fluid/supplement intake Friction and Shear Interventions: Feet elevated on foot rest, HOB 30 degrees or less, Lift sheet, Lift team/patient mobility team 
 
  
 
 
 
  
Problem: Patient Education: Go to Patient Education Activity Goal: Patient/Family Education Outcome: Progressing Towards Goal 
  
Problem: Breathing Pattern - Ineffective Goal: *Absence of hypoxia Outcome: Progressing Towards Goal 
Goal: *Use of effective breathing techniques Outcome: Progressing Towards Goal 
Goal: *PALLIATIVE CARE:  Alleviation of Dyspnea Outcome: Progressing Towards Goal 
  
Problem: Patient Education: Go to Patient Education Activity Goal: Patient/Family Education Outcome: Progressing Towards Goal 
  
Problem: Patient Education: Go to Patient Education Activity Goal: Patient/Family Education Outcome: Progressing Towards Goal

## 2020-10-03 NOTE — PROGRESS NOTES
Cardiology Progress Note 
10/3/2020     Admit Date: 9/21/2020 Admit Diagnosis: Thoracic compression fracture ([facility] [S22.000A] Bone metastases (HCC) [C79.51]  CC: none currently 
            Assessment (per Dr Lam):   
Scout Perez to have metastatic breast ca - S/p surgery - post op afib 
  
- Cath '97 mild, stress in 2019 no ischemia 
- Hx of non-ischemic cm EF 25% improved with echo 2019 Ef 50% - Sinus now with post op afib 
- DM 
- Dyslipidemia - Breast CA now with recurrence 
- COPD Dr. Binh Nicholson - s/p TKR Cardiologist: Glen Galvez 
   
  
            Plan (per Dr Lam):    
  
Pathalogic fracture S/p neurosurgery BP on lower side Post op afib, back in sinus on amiodarone 
  
- Hold on anticoagulation until ok with neurosurgery, could do eliquis - Add amiodarone, with plan to discharge on 200mg daily - Taper dilt ggt to off today - Resume coreg 3.125mg bid, was on coreg cr 40 as outpt 
- Holding entresto for now 
- resume furosemide 20mg daily , was on 40mg as outpt  
 
10/3: VSSAF; NSR. Hg 8.8; Cr 0.6. Cardiac meds: amio 400tid; eliquis 5 bid; coreg 3.125 bid; lasix 20 PO qam;  
(entresto 49/51 bid on hold). No new cardiac recs; Restart entresto @ 24/26 bid as BP tolerates. For other plans, see orders. Hospital problem list  
 
Active Hospital Problems Diagnosis Date Noted  Breast cancer metastasized to bone, right ([address] 10/01/2020  Acute post-operative pain 09/30/2020  Pain due to malignant neoplasm metastatic to bone ([address] 09/23/2020  Neuropathic pain 09/23/2020  Drug-induced constipation 09/23/2020  Weakness of both legs 09/23/2020  Bone metastases ([address] 09/21/2020  Thoracic compression fracture ([facility] 09/21/2020  
 History of breast cancer 09/21/2020 Subjective: Marija Camilo reports Chest pain X none  consistent with:  Non-cardiac CP Atypical CP   None now  On going  Anginal CP  
 
 Dyspnea X none  at rest  with exertion    
    improved  unchanged  worse PND X none  overnight Orthopnea X none  improved  unchanged  worse Presyncope X none  improved  unchanged  worse Ambulated in hallway without symptoms   Yes Ambulated in room without symptoms  Yes Objective:   
 Physical Exam: 
Overall VSSAF;   
Visit Vitals /61 (BP 1 Location: Right arm, BP Patient Position: Supine) Pulse 82 Temp 98 °F (36.7 °C) Resp 18 Ht 5' 5\" (1.651 m) Wt 89.7 kg (197 lb 11.2 oz) SpO2 95% BMI 32.90 kg/m² Temp (24hrs), Av.4 °F (36.9 °C), Min:98 °F (36.7 °C), Max:98.7 °F (37.1 °C) Patient Vitals for the past 8 hrs: 
 Pulse 10/03/20 0805 82 Patient Vitals for the past 8 hrs: 
 Resp  
10/03/20 0805 18 Patient Vitals for the past 8 hrs: 
 BP  
10/03/20 0805 131/61 Intake/Output Summary (Last 24 hours) at 10/3/2020 1213 Last data filed at 10/3/2020 1158 Gross per 24 hour Intake  Output 975 ml Net -975 ml General Appearance: Well developed, well nourished, no acute distress. Ears/Nose/Mouth/Throat:   Normal MM; anicteric. JVP: WNL Resp:   Lungs clear to auscultation bilaterally. Nl resp effort. Cardiovascular:  RRR, S1, S2 normal, no new murmur. No gallop or rub. Abdomen:   Soft, non-tender, bowel sounds are present. Extremities: No edema bilaterally. Skin: 
Neuro: Warm and dry. A/O x3, grossly nonfocal  
 
 
cath site intact w/o hematoma or new bruit; distal pulse unchanged  Yes Data Review:    
Telemetry independently reviewed x sinus  chronic afib  parox afib  NSVT  
 
ECG independently reviewed  NSR  afib  no significant changes  NSST-Tw chgs  
 
x no new ECG provided for review Lab results reviewed as noted below. Current medications reviewed as noted below. No results for input(s): PH, PCO2, PO2 in the last 72 hours. No results for input(s): CPK, CKMB, CKNDX, TROIQ in the last 72 hours. Recent Labs 10/03/20 
0406 10/01/20 
0424  141  
K 4.6 4.2  107 CO2 35* 30 BUN 19 41* CREA 0.56 0.90 GFRAA >60 >60  
GLU 89 99  
CA 9.9 8.8 WBC 6.1  --   
HGB 8.8*  --   
HCT 29.4*  --   
  --   
 
Lab Results Component Value Date/Time Cholesterol, total 168 01/16/2020 11:32 AM  
 HDL Cholesterol 52 01/16/2020 11:32 AM  
 LDL, calculated 95 01/16/2020 11:32 AM  
 Triglyceride 103 01/16/2020 11:32 AM  
 
No results for input(s): AP, TBIL, TP, ALB, GLOB, GGT, AML, LPSE in the last 72 hours. No lab exists for component: SGOT, GPT, AMYP, HLPSE No results for input(s): INR, PTP, APTT, INREXT in the last 72 hours. No components found for: [place] Current Facility-Administered Medications Medication Dose Route Frequency  alteplase (CATHFLO) 1 mg in sterile water (preservative free) 1 mL injection  1 mg InterCATHeter ONCE  
 [START ON 10/4/2020] apixaban (ELIQUIS) tablet 5 mg  5 mg Oral BID  heparin (porcine) pf 300 Units  300 Units InterCATHeter PRN  
 letrozole [facility]) tablet 2.5 mg  2.5 mg Oral DAILY  carvediloL (COREG) tablet 3.125 mg  3.125 mg Oral BID WITH MEALS  
 [START ON 10/4/2020] gabapentin (NEURONTIN) capsule 100 mg  100 mg Oral QHS  furosemide (LASIX) tablet 20 mg  20 mg Oral DAILY  dilTIAZem (CARDIZEM) 100 mg in dextrose 5% (MBP/ADV) 100 mL infusion  0-15 mg/hr IntraVENous TITRATE  amiodarone (CORDARONE) tablet 400 mg  400 mg Oral TID  [START ON 10/5/2020] amiodarone (CORDARONE) tablet 200 mg  200 mg Oral DAILY  cholecalciferol (VITAMIN D3) (1000 Units /25 mcg) tablet 1 Tab  1,000 Units Oral DAILY  fentaNYL citrate (PF) injection 25 mcg  25 mcg IntraVENous TID PRN  
 glycopyrrolate-formoterol (BEVESPI AEROSPHERE) 9 mcg-4.8 mcg inhaler  2 Puff Inhalation BID RT  
 alcohol 62% (NOZIN) nasal  1 Ampule  1 Ampule Topical Q12H  
 fenofibrate nanocrystallized (TRICOR) tablet 48 mg  48 mg Oral DAILY  sodium chloride (NS) flush 5-40 mL  5-40 mL IntraVENous Q8H  
 sodium chloride (NS) flush 5-40 mL  5-40 mL IntraVENous PRN  
 naloxone (NARCAN) injection 0.4 mg  0.4 mg IntraVENous PRN  
 ondansetron (ZOFRAN ODT) tablet 4 mg  4 mg Oral Q4H PRN  
 bisacodyL (DULCOLAX) suppository 10 mg  10 mg Rectal DAILY PRN  
 oxyCODONE-acetaminophen (PERCOCET) 5-325 mg per tablet 2 Tab  2 Tab Oral Q4H PRN  
 albuterol (PROVENTIL VENTOLIN) nebulizer solution 2.5 mg  2.5 mg Nebulization Q4H PRN  
 budesonide (PULMICORT) 250 mcg/2ml nebulizer susp  250 mcg Nebulization BID RT  
 DULoxetine (CYMBALTA) capsule 20 mg  20 mg Oral DAILY  polyethylene glycol (MIRALAX) packet 17 g  17 g Oral DAILY  senna-docusate (PERICOLACE) 8.6-50 mg per tablet 1 Tab  1 Tab Oral DAILY  ondansetron (ZOFRAN) injection 4 mg  4 mg IntraVENous Q4H PRN  
 acetaminophen (TYLENOL) tablet 650 mg  650 mg Oral Q6H PRN Or  
 acetaminophen (TYLENOL) suppository 650 mg  650 mg Rectal Q6H PRN  pantoprazole (PROTONIX) tablet 40 mg  40 mg Oral ACB  [Held by provider] sacubitriL-valsartan (ENTRESTO) 49-51 mg tablet 2 Tab  2 Tab Oral BID Dnailo Gale MD

## 2020-10-03 NOTE — PROGRESS NOTES
Declot intervention was explained to patient. Instilling heparin was unsuccessful per primary nurse. Instilled 1mg/1ml Cathflo to [employer] ports. Port capped and labeled not to use until evaluated by VAT or Nursing Supervisor. Patient tolerated procedure well. Primary nurse, Larry Olsen RN aware. Also spoke with Nursing Supervisor about expected Cathflo removal time. Refer to [facility] and Docflow sheet for specifics. Mannie Hashimoto, RN, [facility]  Vascular Access Team

## 2020-10-03 NOTE — PROGRESS NOTES
FUNCTIONAL STATUS PRIOR TO ADMISSION: Pt reports independence at baseline, with a significant a decline over the last 2 weeks and having multiple falls. Pt reports wearing 2.5L of O2 at night and prn during the day. HOME SUPPORT PRIOR TO ADMISSION: The patient lived alone with support from family as needed. Physical Therapy Goals Initiated 9/30/2020 1. Patient will move from supine to sit and sit to supine , scoot up and down, and roll side to side in bed with minimal assistance/contact guard assist within 7 day(s). 2.  Patient will transfer from bed to chair and chair to bed with minimal assistance/contact guard assist using the least restrictive device within 7 day(s). 3.  Patient will perform sit to stand with moderate assist within 7 day(s). 4.  Patient will ambulate with minimal assistance/contact guard assist for 20 feet with the least restrictive device within 7 day(s). PHYSICAL THERAPY TREATMENT Patient: Nicole Judd (89 y.o. female) Date: 10/3/2020 Diagnosis: Thoracic compression fracture ([facility] [S22.000A] Bone metastases ([facility] [address] [C79.51] <principal problem not specified> Procedure(s) (LRB): POSTERIOR CERVICAL FUSION SEGMENTAL WITH INSTRUMENTATION C6-T5/ T2,T3 LAMINECTOMY/ ARTHRODESIS C6-T5 (O-ARM) (N/A) SPINE LUMBAR LAMINECTOMY WITH INSTRUMENTATION AND IMAGE GUIDANCE (N/A) 4 Days Post-Op Precautions: Fall, Spinal(Log Roll; Soft Collar, no lifting >5lbs) No bending, no lifting greater than 5 lbs, no twisting, log-roll technique, repositioning every 20-30 min except when sleeping, brace when OOB (if ordered) Chart, physical therapy assessment, plan of care and goals were reviewed. ASSESSMENT Patient continues with skilled PT services and is progressing towards goals. Patient appears cooperative and willing to mobilize with therapy, although requires minimal encouragement.  She required min A x 2 for bed mobility. She required verbal cues for log roll for getting out of bed. She was able to stand with min A x 2 and ambulate a short distance to the chair with RW. VSS on 2L O2 with all activity. Encouraged to ambulate further, although patient declined. Current Level of Function Impacting Discharge (mobility/balance): CGA-min A x 2 with RW Other factors to consider for discharge: history of knee buckling, breast CA with bone mets, lives alone, s/p surgery PLAN : 
Patient continues to benefit from skilled intervention to address the above impairments. Continue treatment per established plan of care. to address goals. Recommendation for discharge: (in order for the patient to meet his/her long term goals) Therapy up to 5 days/week in SNF setting This discharge recommendation: 
Has been made in collaboration with the attending provider and/or case management IF patient discharges home will need the following DME: to be determined (TBD) SUBJECTIVE:  
Patient stated It was really rough getting out of bed yesterday.  OBJECTIVE DATA SUMMARY:  
Critical Behavior: 
Neurologic State: Alert Orientation Level: Oriented X4 Cognition: Follows commands Safety/Judgement: Awareness of environment, Insight into deficits Spinal diagnosis intervention: The patient stated 2/3 back precautions when prompted. Reviewed all 3 back precautions, log roll technique, and sitting for 30 minutes at a time. Functional Mobility Training: 
 
Bed Mobility: 
Log Rolling: Minimum assistance;Assist x2 Supine to Sit: Minimum assistance;Assist x2 Scooting: Contact guard assistance; Additional time Transfers: 
Sit to Stand: Minimum assistance;Contact guard assistance;Assist x2 Stand to Sit: Contact guard assistance;Assist x2 Bed to Chair: Minimum assistance;Contact guard assistance;Assist x2 Balance: 
Sitting: Intact; Without support Standing: Impaired; With support Standing - Static: Fair Standing - Dynamic : Fair;Constant support Ambulation/Gait Training: 
Distance (ft): 6 Feet (ft) Assistive Device: Gait belt;Walker, rolling Ambulation - Level of Assistance: Minimal assistance;Assist x2 Gait Abnormalities: Decreased step clearance;Shuffling gait;Trunk sway increased Base of Support: Widened Speed/Tamiko: Pace decreased (<100 feet/min) Step Length: Right shortened;Left shortened Activity Tolerance:  
Fair and requires rest breaks Please refer to the flowsheet for vital signs taken during this treatment. After treatment patient left in no apparent distress:  
Sitting in chair and Call bell within reach COMMUNICATION/COLLABORATION:  
The patients plan of care was discussed with: Registered nurse and Case management. Jeannine Lamas PT, DPT Time Calculation: 23 mins

## 2020-10-03 NOTE — PROGRESS NOTES
ADULT PROTOCOL: JET AEROSOL  REASSESSMENT Patient  Chinmay Estevez     67 y.o.   female     10/2/2020  9:05 PM 
 
Breath Sounds Pre Procedure: Right Breath Sounds: Diminished Left Breath Sounds: Diminished Breath Sounds Post Procedure: Right Breath Sounds: Diminished Left Breath Sounds: Diminished Breathing pattern: Pre procedure Breathing Pattern: Regular Post procedure Breathing Pattern: Regular Heart Rate: Pre procedure Pulse: 79 Post procedure Pulse: 77 Resp Rate: Pre procedure Respirations: 17 Post procedure Respirations: 20 Incentive Spirometry:  Actual Volume (ml): 650 ml 
   
Cough: Pre procedure Cough: Non-productive Post procedure Cough: Non-productive Oxygen: O2 Device: Nasal cannula  2LPM 
   Changed: NO SpO2: Pre procedure SpO2: 96 %  WITH oxygen Post procedure SpO2: 97 %  WITH oxygen Nebulizer Therapy: Current medications Aerosolized Medications: Pulmicort(Bevespi) Changed: NO 
 
Problem List:  
Patient Active Problem List  
Diagnosis Code  OA (osteoarthritis) of knee M17.10  Cardiomyopathy ([facility] I42.9  Breast cancer ([facility] [address] C50.919  
 Hypertension I10  Vitamin D deficiency E55.9  Hyperlipidemia E78.5  Knee pain, bilateral M25.561, M25.562  Fatigue R53.83  
 COPD (chronic obstructive pulmonary disease) ([facility]) J44.9  Vertigo, aural H81.319  Thrush B37.0  DJD (degenerative joint disease) M19.90  Chest pain at rest R07.9  Myalgia M79.10  Cataract H26.9  Hyperkalemia E87.5  CHF (congestive heart failure) ([facility]) I50.9  Cellulitis L03.90  Chronic maxillary sinusitis J32.0  Esophagitis, reflux K21.00  Elevated BUN R79.9  Hyperglycemia R73.9  Severe obesity ([facility]) E66.01  
 Bone metastases ([facility]) C79.51  Thoracic compression fracture ([facility]) S22.000A  History of breast cancer Z85.3  Pain due to malignant neoplasm metastatic to bone (HCC) G89.3, C79.51  
 Neuropathic pain M79.2  Drug-induced constipation K59.03  
 Weakness of both legs R29.898  Acute post-operative pain G89.18  Breast cancer metastasized to bone, right ([address] C50.911, C79.51 Respiratory Therapist: Efraim Alpers

## 2020-10-04 NOTE — PROGRESS NOTES
Problem: Falls - Risk of 
Goal: *Absence of Falls Description: Document Julio C Christian Fall Risk and appropriate interventions in the flowsheet. Outcome: Progressing Towards Goal 
Note: Fall Risk Interventions: 
Mobility Interventions: OT consult for ADLs Medication Interventions: Bed/chair exit alarm Elimination Interventions: Call light in reach History of Falls Interventions: Bed/chair exit alarm Problem: Patient Education: Go to Patient Education Activity Goal: Patient/Family Education Outcome: Progressing Towards Goal 
  
Problem: Pressure Injury - Risk of 
Goal: *Prevention of pressure injury Description: Document Shan Scale and appropriate interventions in the flowsheet. Outcome: Progressing Towards Goal 
Note: Pressure Injury Interventions: 
Sensory Interventions: Assess changes in LOC Moisture Interventions: Absorbent underpads Activity Interventions: PT/OT evaluation Mobility Interventions: HOB 30 degrees or less Nutrition Interventions: Document food/fluid/supplement intake, Discuss nutritional consult with provider Friction and Shear Interventions: Foam dressings/transparent film/skin sealants Problem: Patient Education: Go to Patient Education Activity Goal: Patient/Family Education Outcome: Progressing Towards Goal 
  
Problem: Breathing Pattern - Ineffective Goal: *Absence of hypoxia Outcome: Progressing Towards Goal 
Goal: *Use of effective breathing techniques Outcome: Progressing Towards Goal 
Goal: *PALLIATIVE CARE:  Alleviation of Dyspnea Outcome: Progressing Towards Goal 
  
Problem: Patient Education: Go to Patient Education Activity Goal: Patient/Family Education Outcome: Progressing Towards Goal 
  
Problem: Patient Education: Go to Patient Education Activity Goal: Patient/Family Education Outcome: Progressing Towards Goal

## 2020-10-04 NOTE — PROGRESS NOTES
Unable to do a complete assessment on patient due to urgent transfer. Only able to assess patient's lungs. Ortho Nurse informed during report

## 2020-10-04 NOTE — PROGRESS NOTES
Problem: Mobility Impaired (Adult and Pediatric) Goal: *Acute Goals and Plan of Care (Insert Text) Description: FUNCTIONAL STATUS PRIOR TO ADMISSION: Pt reports independence at baseline, with a significant a decline over the last 2 weeks and having multiple falls. Pt reports wearing 2.5L of O2 at night and prn during the day. HOME SUPPORT PRIOR TO ADMISSION: The patient lived alone with support from family as needed. Physical Therapy Goals Initiated 9/30/2020 1. Patient will move from supine to sit and sit to supine , scoot up and down, and roll side to side in bed with minimal assistance/contact guard assist within 7 day(s). 2.  Patient will transfer from bed to chair and chair to bed with minimal assistance/contact guard assist using the least restrictive device within 7 day(s). 3.  Patient will perform sit to stand with moderate assist within 7 day(s). 4.  Patient will ambulate with minimal assistance/contact guard assist for 20 feet with the least restrictive device within 7 day(s). Outcome: Progressing Towards Goal 
PHYSICAL THERAPY TREATMENT Patient: Zia Clinton (6 y.o. female) Date: 10/4/2020 Diagnosis: Thoracic compression fracture ([facility] [address] [S22.000A] Bone metastases ([facility] [C79.51] <principal problem not specified> Procedure(s) (LRB): POSTERIOR CERVICAL FUSION SEGMENTAL WITH INSTRUMENTATION C6-T5/ T2,T3 LAMINECTOMY/ ARTHRODESIS C6-T5 (O-ARM) (N/A) SPINE LUMBAR LAMINECTOMY WITH INSTRUMENTATION AND IMAGE GUIDANCE (N/A) 5 Days Post-Op Precautions: Fall, Spinal(Log Roll; Soft Collar, no lifting >5lbs) No bending, no lifting greater than 5 lbs, no twisting, log-roll technique, repositioning every 20-30 min except when sleeping, brace when OOB (if ordered) Chart, physical therapy assessment, plan of care and goals were reviewed. ASSESSMENT Patient continues with skilled PT services and is slowly progressing towards goals.  She remains limited by pain and fair motivation to participate despite education on benefit of activity on pain complaints. She states she will only sit in the chair for an hour as any longer is too difficult. SPO2 stable on 2L NCO2 throughout. SNF appropriate Current Level of Function Impacting Discharge (mobility/balance): up to mod A for bed mobility Other factors to consider for discharge: PLAN : 
Patient continues to benefit from skilled intervention to address the above impairments. Continue treatment per established plan of care. to address goals. Recommendation for discharge: (in order for the patient to meet his/her long term goals) Therapy up to 5 days/week in SNF setting This discharge recommendation: 
Has been made in collaboration with the attending provider and/or case management IF patient discharges home will need the following DME: to be determined (TBD) SUBJECTIVE:  
Patient stated I can't go anymore and I know you are disappointed .  OBJECTIVE DATA SUMMARY:  
Critical Behavior: 
Neurologic State: Alert Orientation Level: Appropriate for age Cognition: Appropriate decision making Safety/Judgement: Awareness of environment Spinal diagnosis intervention: The patient stated 0/3 back precautions when prompted. Reviewed all 3 back precautions, log roll technique, and sitting for 30 minutes at a time. Functional Mobility Training: 
 
Bed Mobility: 
Log Rolling: Minimum assistance Supine to Sit: Moderate assistance Transfers: 
Sit to Stand: Moderate assistance Stand to Sit: Moderate assistance Balance: 
Sitting: Intact Standing: Impaired Standing - Static: Fair;Constant support Standing - Dynamic : Fair;Constant support Ambulation/Gait Training: 
Distance (ft): 8 Feet (ft) Assistive Device: Gait belt;Walker, rolling Ambulation - Level of Assistance: Minimal assistance;Assist x2 Gait Abnormalities: Shuffling gait; Decreased step clearance Base of Support: Widened Speed/Tamiko: Pace decreased (<100 feet/min) Step Length: Left shortened;Right shortened Chair follow throughout for safety Therapeutic Exercises: AROM protocol LE exercises completed in sitting, to fatigue, 2 sets Pain Rating: 
3/10 cervical spine Activity Tolerance:  
Fair Please refer to the flowsheet for vital signs taken during this treatment. After treatment patient left in no apparent distress:  
Sitting in chair and Call bell within reach COMMUNICATION/COLLABORATION:  
The patients plan of care was discussed with: Registered nurse. Zak Muñoz, PT, DPT Board-Certified Geriatric Clinical Specialist  
Certified Exercise Expert for Aging Adults Time spent: 17 minutes

## 2020-10-04 NOTE — PROGRESS NOTES
ADULT PROTOCOL: JET AEROSOL  REASSESSMENT Patient  Joey Diaz     67 y.o.   female     10/3/2020  11:13 PM 
 
Breath Sounds Pre Procedure: Right Breath Sounds: Diminished Left Breath Sounds: Diminished Breath Sounds Post Procedure: Right Breath Sounds: Diminished Left Breath Sounds: Diminished Breathing pattern: Pre procedure Breathing Pattern: Regular Post procedure Breathing Pattern: Regular Heart Rate: Pre procedure Pulse: 82 
         Post procedure Pulse: 79 Resp Rate: Pre procedure Respirations: 21 Post procedure Respirations: 22 Incentive Spirometry:  Actual Volume (ml): 650 ml 
   
Cough: Pre procedure Cough: Non-productive Post procedure Cough: Non-productive Oxygen: O2 Device: Nasal cannula  2LPM 
   Changed: NO SpO2: Pre procedure SpO2: 97 % WITH oxygen Post procedure SpO2: 96 % WITH oxygen Nebulizer Therapy: Current medications Aerosolized Medications: Pulmicort(Bevespi) Changed: NO 
 
Problem List:  
Patient Active Problem List  
Diagnosis Code  OA (osteoarthritis) of knee M17.10  Cardiomyopathy ([facility] I42.9  Breast cancer ([facility] C50.919  
 Hypertension I10  Vitamin D deficiency E55.9  Hyperlipidemia E78.5  Knee pain, bilateral M25.561, M25.562  Fatigue R53.83  
 COPD (chronic obstructive pulmonary disease) ([facility]) J44.9  Vertigo, aural H81.319  Thrush B37.0  DJD (degenerative joint disease) M19.90  Chest pain at rest R07.9  Myalgia M79.10  Cataract H26.9  Hyperkalemia E87.5  CHF (congestive heart failure) ([facility]) I50.9  Cellulitis L03.90  Chronic maxillary sinusitis J32.0  Esophagitis, reflux K21.00  Elevated BUN R79.9  Hyperglycemia R73.9  Severe obesity ([facility]) E66.01  
 Bone metastases ([facility]) C79.51  Thoracic compression fracture ([facility]) S22.000A  History of breast cancer Z85.3  Pain due to malignant neoplasm metastatic to bone (HCC) G89.3, C79.51  
 Neuropathic pain M79.2  Drug-induced constipation K59.03  
 Weakness of both legs R29.898  Acute post-operative pain G89.18  Breast cancer metastasized to bone, right ([address] C50.911, C79.51 Respiratory Therapist: Jessi So

## 2020-10-04 NOTE — PROGRESS NOTES
1917: 
Bedside and verbal shift change report given to JEREMY Hahn  (oncoming nurse) by Rosa Shay RN (offgoing nurse). Report included the following information SBAR, Kardex, Intake/Output, MAR, Med Rec Status and Quality Measures. Bedside and verbal shift change report given to Nathan Thompson RN (oncoming nurse) by JEREMY Hahn (offgoing nurse). Report included the following information SBAR, Kardex, Intake/Output, MAR, Med Rec Status and Quality Measures.

## 2020-10-04 NOTE — PROGRESS NOTES
Hospitalist Progress Note NAME: Zia Clinton :  1945 MRN:  [id number] Assessment / Plan: 
 
New onset rapid atrial fibrillation, back in NSR 
-off diltiazem drip 10/02. -started on amiodarone PO. Continue coreg 
-will plan on starting eliquis this weekend -  okay with NSGY team 
-can transfer out of PCU if bed needed Acute pathologic thoracic compression fracture POA from metastatic breast cancer Bone scan noted- Multiple skeletal metastases MRI C spine noted for severe spinal stenosis with Compression fractures MRI noted for Mid thoracic Compression fracture 
 
-S/P Segmental posterior cervicothoracic fusion C5 to T5, and arthrodesis C5 to T5 with cancellous bone chips and DBM putty, and partial laminectomy T2  20 
-continue neurotin and cymbalta for neurpathic component to her LUE pain 
-DC planning to SNF once okay with cardiology. Will send repeat COVID screen  
  
History of left breast cancer , now recurrent and widely metastatic  
-s/p mastectomy and systemic chemotherapy 
-CT chest and abdomen in 2020  showed evidence of osseous metastasis and patient was advised outpatient follow-up but patient did not follow-up with any oncologist 
- pathology demonstrates ER + mBC 
-appreciate Dr Josette Gambleocent input. Started on Letrozole History of COPD not in exacerbation 
-continue home inhalers 
  
Chronic systolic congestive heart failure POA Hypotension, resolved H/o Hypertension 
-added back coreg and lasix- initially held due to soft BP 
-still holding entresto. Restart per cardiology GERD 
-Continue PPI 
  
Code Status: Full code Surrogate Decision Maker: Daughter 
  
DVT Prophylaxis: Lovenox Baseline: From home, independent of ADLs 30.0 - 39.9 Obese / Body mass index is 32.92 kg/m². Subjective: Chief Complaint / Reason for Physician Visit : 
F/U CHF, low BP, mets breast cancer Review of Systems: Symptom Y/N Comments  Symptom Y/N Comments Fever/Chills n   Chest Pain n   
Poor Appetite y   Edema n   
Cough n   Abdominal Pain Sputum n   Joint Pain y   
SOB/TAVAREZ n   Pruritis/Rash Nausea/vomit n   Tolerating PT/OT n Due to pain Diarrhea    Tolerating Diet y Constipation    Other y L arm burning sensation improved Could NOT obtain due to:   
 
Objective: VITALS:  
Last 24hrs VS reviewed since prior progress note. Most recent are: 
Patient Vitals for the past 24 hrs: 
 Temp Pulse Resp BP SpO2  
10/04/20 0242 98.3 °F (36.8 °C) 78 16 (!) 113/43 99 % 10/03/20 2303 98 °F (36.7 °C) 77 16 (!) 109/43 100 % 10/03/20 1951 98.6 °F (37 °C) 78 18 (!) 103/45 96 % 10/03/20 1926     97 % 10/03/20 1714  79  (!) 105/46   
10/03/20 1535 98.1 °F (36.7 °C) 80 18 (!) 119/43 95 % Intake/Output Summary (Last 24 hours) at 10/4/2020 5733 Last data filed at 10/4/2020 7934 Gross per 24 hour Intake 120 ml Output 945 ml Net -825 ml PHYSICAL EXAM: 
General: WD, WN. Alert, cooperative, no acute distress   
EENT:  EOMI. Anicteric sclerae. MMM  (+) soft neck collar Resp:  CTA bilaterally, no wheezing or rales. No accessory muscle use CV:  Regular  rhythm,  No edema GI:  Soft, Non distended, Non tender.  +Bowel sounds Neurologic:  Alert and oriented X 3, normal speech, Psych:   Good insight. Not anxious nor agitated Skin:  No rashes. No jaundice Reviewed most current lab test results and cultures  YES Reviewed most current radiology test results   YES Review and summation of old records today    NO Reviewed patient's current orders and MAR    YES 
PMH/SH reviewed - no change compared to H&P 
________________________________________________________________________ Care Plan discussed with: 
  Comments Patient x Family RN x Care Manager x Consultant     
                 x Multidiciplinary team rounds were held today with case manager, nursing, pharmacist and clinical coordinator. Patient's plan of care was discussed; medications were reviewed and discharge planning was addressed. ________________________________________________________________________ Total NON critical care TIME:  25   Minutes Total CRITICAL CARE TIME Spent:   Minutes non procedure based Comments >50% of visit spent in counseling and coordination of care    
________________________________________________________________________ Parveen Murillo MD  
 
Procedures: see electronic medical records for all procedures/Xrays and details which were not copied into this note but were reviewed prior to creation of Plan. LABS: 
I reviewed today's most current labs and imaging studies. Pertinent labs include: 
Recent Labs 10/03/20 
0406 WBC 6.1 HGB 8.8* HCT 29.4*  
 Recent Labs 10/03/20 
0406   
K 4.6  CO2 35* GLU 89 BUN 19  
CREA 0.56 CA 9.9 MG 1.8 Signed: Parveen Murillo MD

## 2020-10-04 NOTE — PROGRESS NOTES
TRANSFER - OUT REPORT: 
 
Verbal report given to [facility] (name) on Rene Junior  being transferred to [facility](unit) for routine progression of care Report consisted of patients Situation, Background, Assessment and  
Recommendations(SBAR). Information from the following report(s) SBAR and Cardiac Rhythm NSR was reviewed with the receiving nurse. Lines:  
Triple Lumen 09/29/20 Left (Active) Central Line Being Utilized Yes 10/04/20 7981 Criteria for Appropriate Use Limited/no vessel suitable for conventional peripheral access 10/04/20 0242 Site Assessment Clean, dry, & intact 10/04/20 0242 Infiltration Assessment 0 10/04/20 0242 Affected Extremity/Extremities Color distal to insertion site pink (or appropriate for race) 10/04/20 0242 Date of Last Dressing Change 09/29/20 10/03/20 1951 Dressing Status Dry; Intact 10/04/20 0242 Dressing Type Disk with Chlorhexadine gluconate (CHG) 10/04/20 0242 Proximal Hub Color/Line Status White;Flushed;Capped 10/04/20 0242 Positive Blood Return (Medial Site) Yes 10/04/20 0242 Medial Hub Color/Line Status Brown;Flushed;Capped 10/04/20 0242 Positive Blood Return (Lateral Site) Yes 10/04/20 0242 Distal Hub Color/Line Status Blue;Flushed;Capped 10/04/20 0242 Positive Blood Return (Site #3) Yes 10/04/20 0242 Alcohol Cap Used Yes 10/04/20 0242 Opportunity for questions and clarification was provided. Patient transported with: 
 O2 @ 2 liters

## 2020-10-04 NOTE — PROGRESS NOTES
Cardiology Progress Note 
10/4/2020     Admit Date: 9/21/2020 Admit Diagnosis: Thoracic compression fracture ([address] [S22.000A] Bone metastases (HCC) [C79.51]  CC: none currently 
            Assessment (per Dr Lam):   
Deb [facility] to have metastatic breast ca - S/p surgery - post op afib 
  
- Cath '97 mild, stress in 2019 no ischemia 
- Hx of non-ischemic cm EF 25% improved with echo 2019 Ef 50% - Sinus now with post op afib 
- DM 
- Dyslipidemia - Breast CA now with recurrence 
- COPD Dr. Beba Caldera - s/p TKR Cardiologist: Yolanda Acosta 
   
  
            Plan (per Dr Lam):    
  
Pathalogic fracture S/p neurosurgery BP on lower side Post op afib, back in sinus on amiodarone 
  
- Hold on anticoagulation until ok with neurosurgery, could do eliquis - Add amiodarone, with plan to discharge on 200mg daily - Taper dilt ggt to off today - Resume coreg 3.125mg bid, was on coreg cr 40 as outpt 
- Holding entresto for now 
- resume furosemide 20mg daily , was on 40mg as outpt  
 
10/3: VSSAF; NSR. Hg 8.8; Cr 0.6. Cardiac meds: amio 400 tid; eliquis 5 bid; coreg 3.125 bid; lasix 20 PO qam;  
(entresto 49/51 bid on hold). No new cardiac recs; Restart entresto @ 24/26 bid as BP tolerates. 10/4: BPs 100s-130s; off tele. No labs. Cardiac meds as above. No new cardiac recs. For other plans, see orders. Hospital problem list  
 
Active Hospital Problems Diagnosis Date Noted  Breast cancer metastasized to bone, right ([address] 10/01/2020  Acute post-operative pain 09/30/2020  Pain due to malignant neoplasm metastatic to bone ([address] 09/23/2020  Neuropathic pain 09/23/2020  Drug-induced constipation 09/23/2020  Weakness of both legs 09/23/2020  Bone metastases ([address] 09/21/2020  Thoracic compression fracture ([address] 09/21/2020  
 History of breast cancer 09/21/2020 Subjective: Arabella Soda reports Chest pain X none  consistent with:  Non-cardiac CP Atypical CP None now  On going  Anginal CP Dyspnea X none  at rest  with exertion    
    improved  unchanged  worse PND X none  overnight Orthopnea X none  improved  unchanged  worse Presyncope X none  improved  unchanged  worse Ambulated in hallway without symptoms   Yes Ambulated in room without symptoms  Yes Objective:   
 Physical Exam: 
Overall VSSAF;   
Visit Vitals /71 (BP 1 Location: Right arm, BP Patient Position: At rest) Pulse 83 Temp 98.7 °F (37.1 °C) Resp 18 Ht 5' 5\" (1.651 m) Wt 89.7 kg (197 lb 12.8 oz) SpO2 97% BMI 32.92 kg/m² Temp (24hrs), Av.4 °F (36.9 °C), Min:98 °F (36.7 °C), Max:98.8 °F (37.1 °C) Patient Vitals for the past 8 hrs: 
 Pulse 10/04/20 0947 83  
10/04/20 0850 82 Patient Vitals for the past 8 hrs: 
 Resp 10/04/20 0947 18  
10/04/20 0850 18 Patient Vitals for the past 8 hrs: 
 BP  
10/04/20 0947 111/71  
10/04/20 0850 (!) 137/53 Intake/Output Summary (Last 24 hours) at 10/4/2020 1138 Last data filed at 10/4/2020 0940 Gross per 24 hour Intake 120 ml Output 1245 ml Net -1125 ml General Appearance: Well developed, well nourished, no acute distress. Ears/Nose/Mouth/Throat:   Normal MM; anicteric. JVP: WNL Resp:   Lungs clear to auscultation bilaterally. Nl resp effort. Cardiovascular:  RRR, S1, S2 normal, no new murmur. No gallop or rub. Abdomen:   Soft, non-tender, bowel sounds are present. Extremities: No edema bilaterally. Skin: 
Neuro: Warm and dry. A/O x3, grossly nonfocal  
 
 
cath site intact w/o hematoma or new bruit; distal pulse unchanged  Yes Data Review:    
Telemetry none  sinus  chronic afib  parox afib  NSVT  
 
ECG independently reviewed  NSR  afib  no significant changes  NSST-Tw chgs  
 
x no new ECG provided for review Lab results reviewed as noted below.   Current medications reviewed as noted below. No results for input(s): PH, PCO2, PO2 in the last 72 hours. No results for input(s): CPK, CKMB, CKNDX, TROIQ in the last 72 hours. Recent Labs 10/03/20 
0406   
K 4.6  CO2 35* BUN 19  
CREA 0.56 GFRAA >60  
GLU 89  
CA 9.9 WBC 6.1 HGB 8.8* HCT 29.4*  
 Lab Results Component Value Date/Time Cholesterol, total 168 01/16/2020 11:32 AM  
 HDL Cholesterol 52 01/16/2020 11:32 AM  
 LDL, calculated 95 01/16/2020 11:32 AM  
 Triglyceride 103 01/16/2020 11:32 AM  
 
No results for input(s): AP, TBIL, TP, ALB, GLOB, GGT, AML, LPSE in the last 72 hours. No lab exists for component: SGOT, GPT, AMYP, HLPSE No results for input(s): INR, PTP, APTT, INREXT, INREXT in the last 72 hours. No components found for: [place] Current Facility-Administered Medications Medication Dose Route Frequency  apixaban (ELIQUIS) tablet 5 mg  5 mg Oral BID  heparin (porcine) pf 300 Units  300 Units InterCATHeter PRN  
 letrozole [facility]) tablet 2.5 mg  2.5 mg Oral DAILY  carvediloL (COREG) tablet 3.125 mg  3.125 mg Oral BID WITH MEALS  gabapentin (NEURONTIN) capsule 100 mg  100 mg Oral QHS  furosemide (LASIX) tablet 20 mg  20 mg Oral DAILY  amiodarone (CORDARONE) tablet 400 mg  400 mg Oral TID  [START ON 10/5/2020] amiodarone (CORDARONE) tablet 200 mg  200 mg Oral DAILY  cholecalciferol (VITAMIN D3) (1000 Units /25 mcg) tablet 1 Tab  1,000 Units Oral DAILY  fentaNYL citrate (PF) injection 25 mcg  25 mcg IntraVENous TID PRN  
 glycopyrrolate-formoterol (BEVESPI AEROSPHERE) 9 mcg-4.8 mcg inhaler  2 Puff Inhalation BID RT  
 alcohol 62% (NOZIN) nasal  1 Ampule  1 Ampule Topical Q12H  
 fenofibrate nanocrystallized (TRICOR) tablet 48 mg  48 mg Oral DAILY  sodium chloride (NS) flush 5-40 mL  5-40 mL IntraVENous Q8H  
 sodium chloride (NS) flush 5-40 mL  5-40 mL IntraVENous PRN  
  naloxone (NARCAN) injection 0.4 mg  0.4 mg IntraVENous PRN  
 ondansetron (ZOFRAN ODT) tablet 4 mg  4 mg Oral Q4H PRN  
 bisacodyL (DULCOLAX) suppository 10 mg  10 mg Rectal DAILY PRN  
 oxyCODONE-acetaminophen (PERCOCET) 5-325 mg per tablet 2 Tab  2 Tab Oral Q4H PRN  
 albuterol (PROVENTIL VENTOLIN) nebulizer solution 2.5 mg  2.5 mg Nebulization Q4H PRN  
 budesonide (PULMICORT) 250 mcg/2ml nebulizer susp  250 mcg Nebulization BID RT  
 DULoxetine (CYMBALTA) capsule 20 mg  20 mg Oral DAILY  polyethylene glycol (MIRALAX) packet 17 g  17 g Oral DAILY  senna-docusate (PERICOLACE) 8.6-50 mg per tablet 1 Tab  1 Tab Oral DAILY  ondansetron (ZOFRAN) injection 4 mg  4 mg IntraVENous Q4H PRN  
 acetaminophen (TYLENOL) tablet 650 mg  650 mg Oral Q6H PRN Or  
 acetaminophen (TYLENOL) suppository 650 mg  650 mg Rectal Q6H PRN  pantoprazole (PROTONIX) tablet 40 mg  40 mg Oral ACB  [Held by provider] sacubitriL-valsartan (ENTRESTO) 49-51 mg tablet 2 Tab  2 Tab Oral BID Rachel Negron MD

## 2020-10-05 NOTE — PROGRESS NOTES
Ortho / Neurosurgery NP Note POD# 6  s/p POSTERIOR CERVICAL FUSION SEGMENTAL WITH INSTRUMENTATION C6-T5/ T2,T3 LAMINECTOMY/ ARTHRODESIS C6-T5 (O-ARM), SPINE LUMBAR LAMINECTOMY WITH INSTRUMENTATION AND IMAGE GUIDANCE  
10/1 - Transferred to Tele for new onset Afib with RVR. 10/4 - Transferred back to Ortho. Started on Elqiuis (ok per Neurosurgery) Pt resting in bed. Overall feeling well. Alert, conversant, no significant discomfort Posterior incisional/neck pain well controlled with prn oxycodone. Tolerating regular diet, fair appetite. States she is drinking ensure shakes. Denies nausea. VSS Afebrile. 2L NC. (PTA- wears 2L at night) Encourage use of IS while awake Remote Tele: NSR with occasional PVCs. Visit Vitals BP 97/78 (BP 1 Location: Right arm, BP Patient Position: At rest) Pulse 73 Temp 98.3 °F (36.8 °C) Resp 16 Ht 5' 5\" (1.651 m) Wt 89.7 kg (197 lb 12.8 oz) SpO2 96% BMI 32.92 kg/m² Voiding status: [place] replaced on 9/30 by IM for POUR Output (mL) Urine Voided: 450 ml (10/01/20 2244) Last Bowel Movement Date: 09/29/20 (10/04/20 1938) Unmeasurable Output Urine Occurrence(s): 1 (09/28/20 8412) Stool Occurrence(s): 1 (09/28/20 2036) Labs Lab Results Component Value Date/Time HGB 8.9 (L) 10/05/2020 03:53 AM  
  
Lab Results Component Value Date/Time INR 1.0 09/29/2020 03:04 AM  
  
Lab Results Component Value Date/Time Sodium 141 10/05/2020 03:53 AM  
 Potassium 4.0 10/05/2020 03:53 AM  
 Chloride 103 10/05/2020 03:53 AM  
 CO2 35 (H) 10/05/2020 03:53 AM  
 Glucose 83 10/05/2020 03:53 AM  
 BUN 19 10/05/2020 03:53 AM  
 Creatinine 0.68 10/05/2020 03:53 AM  
 Calcium 9.0 10/05/2020 03:53 AM  
 
Recent Glucose Results:  
Lab Results Component Value Date/Time GLU 83 10/05/2020 03:53 AM  
 
 
 
 
Body mass index is 32.92 kg/m². : A BMI > 30 is classified as obesity and > 40 is classified as morbid obesity. Left IJ capped - if PIV works, can remove? Soft cervical collar in place. Optifoam dressing c/d/i Tape dressing at top with old dried blood - HVAC removal site? Calves soft and supple; No pain with passive stretch LLE PF/DF 4/5, EHL intact RLE PF/DF 4/5, EHL intact SCDs for mechanical DVT proph while in bed Abd soft, +flatus, LBM 9/28 PLAN: 
1) Neurovascular assessment q4 hours 2) PT/OT - soft collar, RW. Recommendations for SNF placement. 3) Pain control - scheduled gabapentin, prn percocet 4) Expected Acute blood loss post-op anemia - Hgb 8.9 today. EBL 50ml. No active signs of bleeding. 5) Post-op Constipation - LBM 9/28. Will try milk of mag today. 6) Readniess for discharge: 
   [x] Vital Signs stable  
 [x] Hgb stable  
 [x] + Voiding  
 [x] Wound intact, drainage minimal  
 [x] Tolerating PO intake   
 [] Cleared by PT (OT if applicable) [] Stair training completed (if applicable) [] Independent / Contact Guard Assist (household distance) [] Bed mobility [] Car transfers  
  [] ADLs [x] Adequate pain control on oral medication alone Cleared for discharge from neurosurgery perspective.   
Plans to discharge to [facility],[address] pending COVID test.  
 
 
Nayeli Singh NP

## 2020-10-05 NOTE — PROGRESS NOTES
PARRISH 1) [address] [facility],[address] 2) [facility] @ 1:00pm 
3) call report  going to room 113 Pt's COVID test has resulted negative. MD ready for d/c. MAY contacted [address] [place] [address],[address] who confirmed they can accept patient today. [facility] arranged for 1:00pm. MAY informed bedside nurse and facility. CM made room visit with patient and informed her of transport time. Medicare pt has received, reviewed, and signed 2nd IM letter informing them of their right to appeal the discharge. Signed copy has been placed on pt bedside chart. CM contacted patient's daughter/primary decision maker Celine Minnie [phone] to inform her of d/c. [place], [address]

## 2020-10-05 NOTE — PROGRESS NOTES
[facility] to [address] [place] [address] 67 y.o.   female [address] 33   Room: [address] [place] [address] [address] [facility]  Unit Phone# :  [phone] 
[facility] 3 ORTHOPEDICS 
[address] [place] [address] Dept: [phone] Loc: [id number] SITUATION Admitted:  9/21/2020         Attending Provider:  Remy Olmstead MD    
 
Consultations:  IP CONSULT TO NEUROSURGERY 
IP CONSULT TO PALLIATIVE CARE - PROVIDER 
IP CONSULT TO CARDIOLOGY 
 
PCP:  Marina Kraft NP   [phone] Treatment Team: Attending Provider: Remy Olmstead MD; Consulting Provider: Radames Simmonds, MD; Care Manager: Merlin Boot; Utilization Review: Tavares Anderson RN; Consulting Provider: Anita Nettles MD; Consulting Provider: Polly Baumgarten, NP; Medical Student: David Chan; Student Nurse: Miki Thomas Consulting Provider: Alannah Cha MD; Consulting Provider: Cecilia Nova MD; Care Manager: Agustin Oswald; Care Manager: Priscilla Strickland; Care Manager: Mer Lima; Staff Nurse: Tyrell Pineda Admitting Dx: Thoracic compression fracture ([facility] [address] [G64.471X] Bone metastases ([address] [C79.51] Principal Problem: <principal problem not specified> 
 
6 Days Post-Op of  
Procedure(s): POSTERIOR CERVICAL FUSION SEGMENTAL WITH INSTRUMENTATION C6-T5/ T2,T3 LAMINECTOMY/ ARTHRODESIS C6-T5 (O-ARM) SPINE LUMBAR LAMINECTOMY WITH INSTRUMENTATION AND IMAGE GUIDANCE  
BY: Anita Nettles MD             ON: 9/29/2020 Code Status: DNR Advance Directives:  
Advance Care Planning 9/24/2020 Patient's Healthcare Decision Maker is: Named in scanned ACP document Primary Decision Maker Name -  
Primary Decision Maker Phone Number -  
Primary Decision Maker Relationship to Patient -  
 Confirm Advance Directive Yes, on file Patient Would Like to Complete Advance Directive - Does the patient have other document types Do Not Resuscitate (Send w/patient) Not Received Isolation:  There are currently no Active Isolations       MDRO: No current active infections Pain Medications given:  Percocet 5-325 mg per 2 tab    Last dose: 10/5/2020 at  951 Special Equipment needed: No  Type of equipment: 
 
(Not currently on dialysis) (Not currently on dialysis) (Not currently on dialysis) BACKGROUND Allergies: Allergies Allergen Reactions  Morphine Nausea and Vomiting Past Medical History:  
Diagnosis Date  Arthralgia 8/17/2017  Arthritis  Asthma Mild to Moderate  Breast cancer ([address] 8/17/2017 Onset 1997; Refusing Mammogram 6/16/17  Cancer ([address] 1997 Breast left  Cardiomyopathy ([address] 8/17/2017 Onset:1999 Ischemic; 25% - 50% (last EFx 45%) Continue with ACE/Lasix/coreg  Cataract 8/17/2017 Bilateral   
 Cellulitis 8/17/2017 Suspect local reaction to Pneumovax, treat with ice, NSAIDs or Tylenol  Chest pain at rest 8/17/2017  CHF (congestive heart failure) ([address] 8/17/2017 Stable, though weight up a little. To take 1 1/2 Lasix daily if swelling, 1 daily o/w  Chronic maxillary sinusitis 8/17/2017  Chronic pain Right knee  COPD (chronic obstructive pulmonary disease) ([address] 8/17/2017 Continue with inhalers  Diabetes ([address] Pre-diabetic  DJD (degenerative joint disease) 8/17/2017  Elevated BUN 8/17/2017  Esophagitis, reflux 8/17/2017  Fatigue 8/17/2017  GERD (gastroesophageal reflux disease)  Heart failure ([address] Cardiomyopathy, HX of MI 1999  Hyperglycemia 8/17/2017  Hyperkalemia 8/17/2017  Hyperlipidemia 8/17/2017  Hypertension 8/17/2017  Ill-defined condition Vertigo  Ill-defined condition 2003 HX of Urosepsis complicated by respiratory failure and pneumonnia  Myalgia 8/17/2017  Thromboembolus ([address] 1969  
 during 2nd pregnancy Kelvin Morgan 8/17/2017  Vertigo, aural 8/17/2017  Vitamin D deficiency 8/17/2017 Past Surgical History:  
Procedure Laterality Date [address] [facility] JONNY  
 HX GYN Left Breast Mastectomy  HX HEENT  2015 Bilateral Cataract  HX ORTHOPAEDIC Right 06/2018  
 knee replacement  HX ORTHOPAEDIC Left 11/2018  
 wrist surgery  HX VASCULAR ACCESS Port a cath on the right Medications Prior to Admission Medication Sig  
 acetaminophen (TYLENOL) 500 mg tablet Take 500 mg by mouth every six (6) hours as needed for Pain.  furosemide (LASIX) 20 mg tablet Take 60 mg by mouth daily.  omeprazole (PRILOSEC) 20 mg capsule Take 1 capsule by mouth once daily  fenofibrate nanocrystallized (TRICOR) 145 mg tablet Take 1 tablet by mouth once daily  meclizine (ANTIVERT) 25 mg tablet TAKE 1 TABLET BY MOUTH EVERY 6 HOURS AS NEEDED FOR DIZZINESS  Anoro Ellipta 62.5-25 mcg/actuation inhaler Take 1 Puff by inhalation daily.  sacubitril-valsartan (ENTRESTO) 49 mg/51 mg tablet Take 2 Tabs by mouth two (2) times a day.  carvedilol (COREG CR) 40 mg CR capsule Take 1 Cap by mouth daily (with breakfast).  cholecalciferol (VITAMIN D3) 1,000 unit cap Take 1,000 Units by mouth daily.  aspirin delayed-release 81 mg tablet Take 1 Tab by mouth daily. May resume in 30 days after completing twice daily Aspirin.  PSEUDOEPHEDRINE HCL (SINUS DECONGESTANT PO) Take 30 mg by mouth daily as needed.  albuterol (PROVENTIL, VENTOLIN) 90 mcg/Actuation inhaler Take 1-2 Puffs by inhalation every four (4) hours as needed for Wheezing. Hard scripts included in transfer packet yes Vaccinations: There is no immunization history on file for this patient. Readmission Risks:    Known Risks: Falls and pressure injuries The Charlson CoMorbitiy Index tool is an evidenced based tool that has more automatic generated information. The tool looks at many different items such as the age of the patient, how many times they were admitted in the last calendar year, current length of stay in the hospital and their diagnosis. All of these items are pulled automatically from information documented in the chart from various places and will generate a score that predicts whether a patient is at low (less than 13), medium (13-20) or high (21 or greater) risk of being readmitted. ASSESSMENT Temp: 99.2 °F (37.3 °C) (10/05/20 1214) Pulse (Heart Rate): 80 (10/05/20 1214) Resp Rate: 16 (10/05/20 1214)           BP: 132/63 (10/05/20 1214) O2 Sat (%): 93 % (10/05/20 1214) Weight: 89.7 kg (197 lb 12.8 oz)    Height: 5' 5\" (165.1 cm) (09/28/20 1415) If above not within 1 hour of discharge: 
 
BP:_____  P:____  R:____ T:_____ O2 Sat: ___%  O2: ______ Active Orders Diet DIET REGULAR Orientation: oriented to time, place, person and situation Active Behaviors: None Active Lines/Drains:  (Peg Tube / Bangura / CL or S/L?): no 
 
Urinary Status: Bangura, Retention     Last BM: Last Bowel Movement Date: 09/29/20 Skin Integrity: Incision (comment) Mobility: Very limited Weight Bearing Status: WBAT (Weight Bearing as Tolerated) Gait Training Assistive Device: Gait belt, Walker, rolling Ambulation - Level of Assistance: Minimal assistance, Assist x2 Distance (ft): 8 Feet (ft) Lab Results Component Value Date/Time Glucose 83 10/05/2020 03:53 AM  
 Hemoglobin A1c 5.6 01/16/2020 11:32 AM  
 INR 1.0 09/29/2020 03:04 AM  
 INR 1.0 09/21/2020 11:05 AM  
 HGB 8.9 (L) 10/05/2020 03:53 AM  
 HGB 8.8 (L) 10/03/2020 04:06 AM  
  
  RECOMMENDATION See After Visit Summary (AVS) for: · Discharge instructions · After [address] · Special equipment needed (entered pre-discharge by Care Management) · Medication Reconciliation · Follow up Appointment(s) Report given/sent by:  Brijesh Anderson Verbal report given to: MONICA Fernandez Estimated discharge time:  10/5/2020 at 1300

## 2020-10-05 NOTE — PROGRESS NOTES
Palliative Medicine [place]: [phone] [facility]: [phone]) Patient seen for assessment of needs and for emotional support. Patient not complaining of any pain, she shared that the current pain regimen appears to be working for her. She indicated some emotional stress related to family dynamics and was able to benefit from support in this arena. She relies on her paty in God and prayer to help her get through the difficult times. We also talked about where she may go following rehab, as it may be that she will need someone to assist her following that and while she is getting treatment for her cancer. Patient is open, trusting, able to benefit from support. She was very thankful for this visit and felt \"heard\". We talked about \"what if's\" regarding her family. She knows she is going to rehab, indicated that she does not like being \"pushed\" to do things. We discussed how it is a fine line between encouragement to go beyond vs \"feeling pushed\". Patient is awaiting placement at SNF. No further palliative needs at this time.

## 2020-10-05 NOTE — PROGRESS NOTES
Orthopedic End of Shift Note Bedside shift change report given to Wally Gallego RN (oncoming nurse) by Myles Silverio RN (offgoing nurse). Report included the following information SBAR, Kardex, OR Summary, Intake/Output, MAR and Recent Results. Significant issues during shift: Pain control Issues for Physician to address: Pain management and discharge plan Activity This Shift 
(check all that apply) [x] chair 
[] dangle 
 [] bathroom 
[] bedside commode [] hallway 
[] bedrest  
Nausea/Vomiting [] yes [x] no    
Voiding Status [] void [x] Bangura [] I&O Cath Bowel Movements [] yes [x] no Foot Pumps or SCD [] yes [x] no Ice Pack [] yes    [x] no Incentive Spirometer [x] yes [] no   
Telemetry Monitoring   [x] yes [] no Rhythm: NSR Supplemental O2 [x] yes [] no Sat O2:  93 %

## 2020-10-05 NOTE — PROGRESS NOTES
Hospitalist Progress Note NAME: India Almanzar :  1945 MRN:  [id number] Assessment / Plan: 
 
Acute pathologic thoracic compression fracture POA from metastatic breast cancer Bone scan noted- Multiple skeletal metastases MRI C spine noted for severe spinal stenosis with Compression fractures MRI noted for Mid thoracic Compression fracture 
 
-S/P Segmental posterior cervicothoracic fusion C5 to T5, and arthrodesis C5 to T5 with cancellous bone chips and DBM putty, and partial laminectomy T2  20 
-continue cymbalta for neurpathic component to her LUE pain 
-percocet prn 
-PT OT eval and treat 
-sierra removed 10/05. Follow PVRs and if >400cc, re insert sierra 
-DC planning to SNF. COVID NEG screen 
  
History of left breast cancer , now recurrent and widely metastatic  
-s/p mastectomy and systemic chemotherapy 
-CT chest and abdomen in 2020  showed evidence of osseous metastasis and patient was advised outpatient follow-up but patient did not follow-up with any oncologist 
- pathology demonstrates ER + mBC 
-appreciate Dr Ivana Hernandez input. Started on Letrozole New onset rapid atrial fibrillation, back in NSR 
-off diltiazem drip 10/02. -started on amiodarone PO. Continue coreg at lower dose 
-continue eliquis (new med) History of COPD not in exacerbation 
-continue home inhalers 
  
Chronic systolic congestive heart failure POA Hypotension, resolved H/o Hypertension 
-added back coreg and lasix- initially held due to soft BP 
-continue holding entresto. Follow up with Cardiology as OP 
 
GERD 
-Continue PPI 
  
Code Status: Full code Surrogate Decision Maker: Daughter 
  
DVT Prophylaxis: Lovenox Baseline: From home, independent of ADLs 30.0 - 39.9 Obese / Body mass index is 32.92 kg/m². Subjective: Chief Complaint / Reason for Physician Visit : 
F/U CHF, low BP, mets breast cancer Review of Systems: Symptom Y/N Comments  Symptom Y/N Comments Fever/Chills n   Chest Pain n   
Poor Appetite y   Edema n   
Cough n   Abdominal Pain Sputum n   Joint Pain y   
SOB/TAVAREZ n   Pruritis/Rash Nausea/vomit n   Tolerating PT/OT n Due to pain Diarrhea    Tolerating Diet y Constipation    Other y L arm burning sensation improved Could NOT obtain due to:   
 
Objective: VITALS:  
Last 24hrs VS reviewed since prior progress note. Most recent are: 
Patient Vitals for the past 24 hrs: 
 Temp Pulse Resp BP SpO2  
10/05/20 0825     96 % 10/05/20 0802 98.3 °F (36.8 °C) 73 16 97/78 93 % 10/05/20 0323 98.1 °F (36.7 °C) 73 17 106/67 96 % 10/05/20 0012 98.1 °F (36.7 °C) 67 17 110/67 98 % 10/04/20 2120     98 % 10/04/20 1938 98.7 °F (37.1 °C) 80 18 136/71 97 % 10/04/20 1555 98.9 °F (37.2 °C) 91 18 (!) 107/41 93 % 10/04/20 1304 98.3 °F (36.8 °C) 83 18 134/69 99 % Intake/Output Summary (Last 24 hours) at 10/5/2020 1139 Last data filed at 10/5/2020 8402 Gross per 24 hour Intake  Output 800 ml Net -800 ml PHYSICAL EXAM: 
General: WD, WN. Alert, cooperative, no acute distress   
EENT:  EOMI. Anicteric sclerae. MMM  (+) soft neck collar Resp:  CTA bilaterally, no wheezing or rales. No accessory muscle use CV:  Regular  rhythm,  No edema GI:  Soft, Non distended, Non tender.  +Bowel sounds Neurologic:  Alert and oriented X 3, normal speech, Psych:   Good insight. Not anxious nor agitated Skin:  No rashes. No jaundice Reviewed most current lab test results and cultures  YES Reviewed most current radiology test results   YES Review and summation of old records today    NO Reviewed patient's current orders and MAR    YES 
PMH/SH reviewed - no change compared to H&P 
________________________________________________________________________ Care Plan discussed with: 
  Comments Patient x Family RN x Care Manager x Consultant x Multidiciplinary team rounds were held today with , nursing, pharmacist and clinical coordinator. Patient's plan of care was discussed; medications were reviewed and discharge planning was addressed. ________________________________________________________________________ Total NON critical care TIME:  25   Minutes Total CRITICAL CARE TIME Spent:   Minutes non procedure based Comments >50% of visit spent in counseling and coordination of care    
________________________________________________________________________ Tiffanie Craft MD  
 
Procedures: see electronic medical records for all procedures/Xrays and details which were not copied into this note but were reviewed prior to creation of Plan. LABS: 
I reviewed today's most current labs and imaging studies. Pertinent labs include: 
Recent Labs 10/05/20 
7207 10/03/20 
0406 WBC 6.2 6.1 HGB 8.9* 8.8* HCT 30.1* 29.4*  
 245 Recent Labs 10/05/20 
9921 10/03/20 
0406  139  
K 4.0 4.6  103 CO2 35* 35* GLU 83 89 BUN 19 19 CREA 0.68 0.56 CA 9.0 9.9 MG 1.8 1.8 Signed: Tiffanie Craft MD

## 2020-10-05 NOTE — DISCHARGE INSTRUCTIONS
Patient Education     Hue Enriquez M.D. What can I do?  The only exercise you should do is walking. Start by walking twice a day for 5 minutes, then increase by  2-3 minutes every day until you reach 25 minutes twice a day. DO NOT sit for long periods of time (20 minutes at a time for the first couple of weeks, then gradually increase.  No heavy lifting (5 lbs max); no straining, twisting, or bending.  No driving until your physician tells you it is ok. It is, however, ok to ride short distances in a car.  If you are required to wear a back brace, you may remove it when you are sleeping unless your doctor has advised against it.  If you are required to wear a cervical collar, you must sleep in it. You can remove it only for showers. What can I eat?  You may resume your regular home diet as tolerated. If your throat is sore, you may want to eat soft food for the first few days. When can I take a shower?  You may shower leaving on your occlusive (waterproof) dressing on allowing water to run over the dressing. The dressing may be removed in 5 days. The small pieces of tape (steri strips)  underneath it should stay on. Let water run over them, then pat dry gently.  Do not take baths or soak in pools.  You may remove your brace for showering. Medications:   Check with your physician before taking any anti-inflammatory medicines (Advil, Aleve, ibuprofen, aspirin).  Take prescription medicine as directed. DO NOT take more than prescribed. Call your physician if the prescribed dose is not sufficiently controlling your pain.  It is important to have regular bowel movements. Pain medications may cause constipation. Drink plenty of fluids, get enough fiber in your diet, and use stool softeners and drink prune juice to help prevent constipation. Do not take laxatives if at all possible except in severe situations.   It can result in a vicious cycle of constipation and diarrhea.  Do not put any ointments or creams on your incision unless directed to do so by your physician.  Tobacco products should be avoided during the postoperative phase. When do I see the physician again?  Please call your physicians office as soon as you get home to schedule your 1st post operative appointment. You should be seen approximately two weeks after your surgery. At this appointment you will see your doctors Assistant for a wound check and to answer any questions you may have.  You will see your physician approximately six weeks after your surgery.  If you had a fusion, you will need to get an order for xrays to be taken prior to the six week appointment. These should be done on the day of the appointment or 1-2 days before.  If you need the number to your doctors office, please request it from your nurse or see below. NOTIFY YOUR PHYSICIAN OF ANY OF THE FOLLOWING:     Fever above 101º for 24 hrs.  Nausea or vomiting.  Inability to urinate   Loss of bowel or bladder function (sudden onset of incontinence)   Changes in sensation (numbness, tingling, color change) in your extremities   Pain not relieved by your medicine.  Redness, swelling or drainage from your incision   Persistent pain in the calf of either leg   Development of severe pain      FOR APPOINTMENTS OR QUESTIONS CALL:    [facility] [address] [facility]  [phone]                 Fatigue: Care Instructions  Your Care Instructions     Fatigue is a feeling of tiredness, exhaustion, or lack of energy. You may feel fatigue because of too much or not enough activity. It can also come from stress, lack of sleep, boredom, and poor diet. Many medical problems, such as viral infections, can cause fatigue. Emotional problems, especially depression, are often the cause of fatigue. Fatigue is most often a symptom of another problem. Treatment for fatigue depends on the cause. For example, if you have fatigue because you have a certain health problem, treating this problem also treats your fatigue. If depression or anxiety is the cause, treatment may help. Follow-up care is a key part of your treatment and safety. Be sure to make and go to all appointments, and call your doctor if you are having problems. It's also a good idea to know your test results and keep a list of the medicines you take. How can you care for yourself at home? · Get regular exercise. But don't overdo it. Go back and forth between rest and exercise. · Get plenty of rest.  · Eat a healthy diet. Do not skip meals, especially breakfast.  · Reduce your use of caffeine, tobacco, and alcohol. Caffeine is most often found in coffee, tea, cola drinks, and chocolate. · Limit medicines that can cause fatigue. This includes tranquilizers and cold and allergy medicines. When should you call for help? Watch closely for changes in your health, and be sure to contact your doctor if:    · You have new symptoms such as fever or a rash.     · Your fatigue gets worse.     · You have been feeling down, depressed, or hopeless. Or you may have lost interest in things that you usually enjoy.     · You are not getting better as expected. Where can you learn more? Go to http://www.gray.com/  Enter [id number] in the search box to learn more about \"Fatigue: Care Instructions. \"  Current as of: June 26, 2019               Content Version: 12.6  © 5318-4444 [employer]. Care instructions adapted under license by [employer] (which disclaims liability or warranty for this information). If you have questions about a medical condition or this instruction, always ask your healthcare professional. [place] [address] any warranty or liability for your use of this information.                  HOSPITALIST DISCHARGE INSTRUCTIONS    NAME: Joey Diaz   :     MRN:  [id number]     Date/Time:  10/5/2020 11:36 AM    ADMIT DATE: 2020   DISCHARGE DATE: 10/5/2020     Attending Physician: Toma Hernández MD    Medications: Per above medication reconciliation. Pain Management: per above medications    Recommended diet: Cardiac Diet    Recommended activity: PT/OT Eval and Treat    Indwelling devices: Sierra removed 10/05. Monitor UOP. Check PVR and if >400cc, re insert sierra    Supplemental Oxygen: 2 LNC,  wean as tolerated    Required Lab work: Weekly BMP    Glucose management:  None    Code status: DNR        Outside physician follow up: Follow-up Information     Follow up With Specialties Details Why Contact Info    Amauri Azar NP Nurse Practitioner   [facility] [address] [place] [address]  [address] [address] [phone]      [facility] EMERGENCY DEPT Emergency Medicine  As needed, If symptoms worsen [address] [facility]    Anita Nettles MD Neurosurgery Schedule an appointment as soon as possible for a visit in 2 weeks  [address] [facility] [address] [facility]  [address] [facility]  [phone]               Skilled nursing facility/ SNF MD responsible for above on discharge. Information obtained by :  I understand that if any problems occur once I am at home I am to contact my physician. I understand and acknowledge receipt of the instructions indicated above.                                                                                                                                            Physician's or R.N.'s Signature                                                                  Date/Time                                                                                                                                              Patient or Repres

## 2020-10-05 NOTE — PROGRESS NOTES
Progress Note 
 
 
10/5/2020 12:12 PM 
NAME: Lizbeth Nicole MRN:  [id number] Admit Diagnosis: Thoracic compression fracture ([facility] [address] [S22.000A] Bone metastases ([facility] [address] [C79.51] Assessment:   
  
- Found to have metastatic breast ca - S/p surgery - post op afib 
  
- Cath '97 mild, stress in 2019 no ischemia 
- Hx of non-ischemic cm EF 25% improved with echo 2019 Ef 50% - Sinus now with post op afib 
- DM 
- Dyslipidemia - Breast CA now with recurrence 
- COPD Dr. Terrance Myers - s/p TKR Cardiologist:  [place] 
   
  
            Plan:    
  
Pathalogic fracture S/p neurosurgery BP on lower side post op Post op afib, back in sinus on amiodarone, CHADS score of 3 
  
- Cont added eliquis, all risk/benefits/alternatives reviewed. - Cont added amiodarone 200mg daily 
- Cont coreg 3.125mg bid, was on coreg cr 40 as outpt 
- Holding entresto for now, resume entresto 24/26 when able, was on 49/51 as outpt 
- Cont furosemide 20mg daily , was on 40mg as outpt, increase as needed Stable for rehab, they can monitor bp there Follow up with Dr. López Parsons in two weeks 
  
   
  
 [x]? High complexity decision making was performed 
  
 
 
We discussed the expected course, resolution and complications of the diagnoses in detail. Medication risk, benefits, costs, interactions, and alternatives were discussed as indicated. I advised him to contact the office if his condition worsens, changes or fails to improve as anticipated. Patient expressed understanding with the diagnoses  and plan. Subjective:  
 
Lizbeth Nicole denies chest pain, dyspnea. Discussed with RN events overnight. Review of Systems: 
 
Symptom Y/N Comments  Symptom Y/N Comments Fever/Chills N   Chest Pain N Poor Appetite N   Edema N   
Cough N   Abdominal Pain N Sputum N   Joint Pain N   
SOB/TAVAREZ N   Pruritis/Rash N   
Nausea/vomit N   Tolerating PT/OT Y Diarrhea N   Tolerating Diet Y   
 Constipation N   Other Could NOT obtain due to:   
 
Objective:  
  
Physical Exam: 
 
Last 24hrs VS reviewed since prior progress note. Most recent are: 
 
Visit Vitals /67 Pulse 73 Temp 98.1 °F (36.7 °C) Resp 17 Ht 5' 5\" (1.651 m) Wt 89.7 kg (197 lb 12.8 oz) SpO2 96% BMI 32.92 kg/m² Intake/Output Summary (Last 24 hours) at 10/5/2020 0700 Last data filed at 10/5/2020 0315 Gross per 24 hour Intake  Output 1125 ml Net -1125 ml General Appearance: Well developed, well nourished, alert & oriented x 3,  
 no acute distress. Ears/Nose/Mouth/Throat: Hearing grossly normal. 
Neck: Supple. Chest: Lungs clear to auscultation bilaterally. Cardiovascular: Regular rate and rhythm, S1S2 normal, no murmur. Abdomen: Soft, non-tender, bowel sounds are active. Extremities: No edema bilaterally. Skin: Warm and dry. PMH/SH reviewed - no change compared to H&P Data Review Telemetry: normal sinus rhythm Lab Data Personally Reviewed: 
 
Recent Labs 10/05/20 
7869 10/03/20 
0406 WBC 6.2 6.1 HGB 8.9* 8.8* HCT 30.1* 29.4*  
 245 No results for input(s): INR, PTP, APTT, INREXT, INREXT in the last 72 hours. Recent Labs 10/05/20 
3308 10/03/20 
0406  139  
K 4.0 4.6  103 CO2 35* 35* BUN 19 19 CREA 0.68 0.56 GLU 83 89  
CA 9.0 9.9 MG 1.8 1.8 No results for input(s): CPK, CKNDX, TROIQ in the last 72 hours. No lab exists for component: CPKMB Lab Results Component Value Date/Time Cholesterol, total 168 01/16/2020 11:32 AM  
 HDL Cholesterol 52 01/16/2020 11:32 AM  
 LDL, calculated 95 01/16/2020 11:32 AM  
 Triglyceride 103 01/16/2020 11:32 AM  
 
 
No results for input(s): AP, TBIL, TP, ALB, GLOB, GGT, AML, LPSE in the last 72 hours. No lab exists for component: SGOT, GPT, AMYP, HLPSE No results for input(s): PH, PCO2, PO2 in the last 72 hours. Medications Personally Reviewed: Current Facility-Administered Medications Medication Dose Route Frequency  apixaban (ELIQUIS) tablet 5 mg  5 mg Oral BID  heparin (porcine) pf 300 Units  300 Units InterCATHeter PRN  
 letrozole [facility]) tablet 2.5 mg  2.5 mg Oral DAILY  carvediloL (COREG) tablet 3.125 mg  3.125 mg Oral BID WITH MEALS  gabapentin (NEURONTIN) capsule 100 mg  100 mg Oral QHS  furosemide (LASIX) tablet 20 mg  20 mg Oral DAILY  amiodarone (CORDARONE) tablet 200 mg  200 mg Oral DAILY  cholecalciferol (VITAMIN D3) (1000 Units /25 mcg) tablet 1 Tab  1,000 Units Oral DAILY  fentaNYL citrate (PF) injection 25 mcg  25 mcg IntraVENous TID PRN  
 glycopyrrolate-formoterol (BEVESPI AEROSPHERE) 9 mcg-4.8 mcg inhaler  2 Puff Inhalation BID RT  
 alcohol 62% (NOZIN) nasal  1 Ampule  1 Ampule Topical Q12H  
 fenofibrate nanocrystallized (TRICOR) tablet 48 mg  48 mg Oral DAILY  sodium chloride (NS) flush 5-40 mL  5-40 mL IntraVENous Q8H  
 sodium chloride (NS) flush 5-40 mL  5-40 mL IntraVENous PRN  
 naloxone (NARCAN) injection 0.4 mg  0.4 mg IntraVENous PRN  
 ondansetron (ZOFRAN ODT) tablet 4 mg  4 mg Oral Q4H PRN  
 bisacodyL (DULCOLAX) suppository 10 mg  10 mg Rectal DAILY PRN  
 oxyCODONE-acetaminophen (PERCOCET) 5-325 mg per tablet 2 Tab  2 Tab Oral Q4H PRN  
 albuterol (PROVENTIL VENTOLIN) nebulizer solution 2.5 mg  2.5 mg Nebulization Q4H PRN  
 budesonide (PULMICORT) 250 mcg/2ml nebulizer susp  250 mcg Nebulization BID RT  
 DULoxetine (CYMBALTA) capsule 20 mg  20 mg Oral DAILY  polyethylene glycol (MIRALAX) packet 17 g  17 g Oral DAILY  senna-docusate (PERICOLACE) 8.6-50 mg per tablet 1 Tab  1 Tab Oral DAILY  ondansetron (ZOFRAN) injection 4 mg  4 mg IntraVENous Q4H PRN  
 acetaminophen (TYLENOL) tablet 650 mg  650 mg Oral Q6H PRN Or  
 acetaminophen (TYLENOL) suppository 650 mg  650 mg Rectal Q6H PRN  pantoprazole (PROTONIX) tablet 40 mg  40 mg Oral ACB  [Held by provider] sacubitriL-valsartan (ENTRESTO) 49-51 mg tablet 2 Tab  2 Tab Oral BID Jackelin Barnhart MD

## 2020-10-05 NOTE — DISCHARGE SUMMARY
Hospitalist Discharge Summary Patient ID: 
India Almanzar [id number] 
11 y.o. 
1945 PCP on record: Lizandro Huston NP Admit date: 9/21/2020 Discharge date and time: 10/5/2020 DISCHARGE DIAGNOSIS: 
 
Acute pathologic thoracic compression fracture POA from metastatic breast cancer 
-S/P Segmental posterior cervicothoracic fusion C5 to T5, and arthrodesis C5 to T5 with cancellous bone chips and DBM putty, and partial laminectomy T2  9/29/20 History of left breast cancer 1990s, now recurrent and widely metastatic New onset rapid atrial fibrillation, back in NSR History of COPD not in exacerbation Chronic systolic congestive heart failure POA Hypotension, resolved H/o Hypertension GERD 
 
 
CONSULTATIONS: 
IP CONSULT TO NEUROSURGERY 
IP CONSULT TO PALLIATIVE CARE - PROVIDER 
IP CONSULT TO CARDIOLOGY Excerpted HPI from H&P of Elvia Devine MD: 
CHIEF COMPLAINT: Generalized weakness and falls 
  
HISTORY OF PRESENT ILLNESS:    
Mariola Shirley is a 67 y.o.   female who presents with a past medical history of COPD, congestive heart failure, dyslipidemia, gastroesophageal reflux disease coming the hospital chief complaints of falls and also generalized weakness. Patient reports being in her usual state of health until about a month ago when she started not feeling well. She reports weakness is generalized involving both her arms and legs. She also reports decreased exercise intolerance. She fell at least 3 times in the last 2 weeks. She fell yesterday and started having lower back pain which is about 5 x 10, increased with movement, radiating down to the legs and without any relieving factors. She does not report any dizziness or lightheadedness. She does not report any chest pain. Does not report any focal weakness, tingling or numbness. 
  
On arrival she was noted to have a blood pressure 145/68.   On labs she was noted to have a normal CBC. BMP showed a creatinine of 1.31. LFTs are normal.  Troponin is 0.05.  proBNP is 264. Chest x-ray showed new thoracic compression fracture with diffuse osseous metastasis and clear lungs. X-ray hips were within normal limits. ______________________________________________________________________ DISCHARGE SUMMARY/HOSPITAL COURSE:  for full details see H&P, daily progress notes, labs, consult notes. Acute pathologic thoracic compression fracture POA from metastatic breast cancer Bone scan noted- Multiple skeletal metastases MRI C spine noted for severe spinal stenosis with Compression fractures MRI noted for Mid thoracic Compression fracture 
  
-S/P Segmental posterior cervicothoracic fusion C5 to T5, and arthrodesis C5 to T5 with cancellous bone chips and DBM putty, and partial laminectomy T2  9/29/20 
-continue cymbalta for neurpathic component to her LUE pain 
-percocet prn 
-PT OT eval and treat 
-sierra removed 10/05. Follow PVRs and if >400cc, re insert sierra 
-DC planning to SNF. COVID NEG screen 
  
History of left breast cancer 1990s, now recurrent and widely metastatic  
-s/p mastectomy and systemic chemotherapy 
-CT chest and abdomen in April 2020  showed evidence of osseous metastasis and patient was advised outpatient follow-up but patient did not follow-up with any oncologist 
-9/29 pathology demonstrates ER + mBC 
-appreciate Dr Kevin West input. Started on Letrozole 
  
New onset rapid atrial fibrillation, back in NSR 
-off diltiazem drip 10/02. -started on amiodarone PO. Continue coreg at lower dose 
-continue eliquis (new med) 
  History of COPD not in exacerbation 
-continue home inhalers 
  
Chronic systolic congestive heart failure POA Hypotension, resolved H/o Hypertension 
-added back coreg and lasix- initially held due to soft BP 
-continue holding entresto.  Follow up with Cardiology as OP 
  
GERD 
-Continue PPI 
 
 
 
 _______________________________________________________________________ Patient seen and examined by me on discharge day. Pertinent Findings: 
Gen:    Not in distress Chest: Clear lungs CVS:   Regular rhythm. No edema Abd:  Soft, not distended, not tender Neuro:  Alert, Oriented x 4, grossly non focal exam 
_______________________________________________________________________ DISCHARGE MEDICATIONS:  
Current Discharge Medication List  
  
START taking these medications Details  
amiodarone (CORDARONE) 200 mg tablet Take 1 Tab by mouth daily for 60 days. Qty: 30 Tab, Refills: 1  
  
apixaban (ELIQUIS) 5 mg tablet Take 1 Tab by mouth two (2) times a day for 60 days. Qty: 60 Tab, Refills: 0  
  
carvediloL (COREG) 3.125 mg tablet Take 1 Tab by mouth two (2) times daily (with meals) for 60 days. Qty: 60 Tab, Refills: 1 DULoxetine (CYMBALTA) 20 mg capsule Take 1 Cap by mouth daily for 30 days. Qty: 30 Cap, Refills: 0  
  
letrozole (FEMARA) 2.5 mg tablet Take 1 Tab by mouth daily for 60 days. Qty: 30 Tab, Refills: 1  
  
oxyCODONE-acetaminophen (PERCOCET) 5-325 mg per tablet Take 2 Tabs by mouth every four (4) hours as needed for Pain for up to 14 days. Max Daily Amount: 12 Tabs. Qty: 10 Tab, Refills: 0 Associated Diagnoses: Compression fracture of thoracic vertebra, initial encounter, unspecified thoracic vertebral level (HCC)  
  
polyethylene glycol (MIRALAX) 17 gram packet Take 1 Packet by mouth daily. Qty:    
  
senna-docusate (PERICOLACE) 8.6-50 mg per tablet Take 1 Tab by mouth daily for 60 days. Qty: 30 Tab, Refills: 1 CONTINUE these medications which have CHANGED Details  
furosemide (LASIX) 20 mg tablet Take 1 Tab by mouth daily for 60 days. Qty: 30 Tab, Refills: 0 CONTINUE these medications which have NOT CHANGED Details  
acetaminophen (TYLENOL) 500 mg tablet Take 500 mg by mouth every six (6) hours as needed for Pain. omeprazole (PRILOSEC) 20 mg capsule Take 1 capsule by mouth once daily 
Qty: 90 Cap, Refills: 3  
  
fenofibrate nanocrystallized (TRICOR) 145 mg tablet Take 1 tablet by mouth once daily 
Qty: 90 Tab, Refills: 3 Anoro Ellipta 62.5-25 mcg/actuation inhaler Take 1 Puff by inhalation daily. cholecalciferol (VITAMIN D3) 1,000 unit cap Take 1,000 Units by mouth daily. albuterol (PROVENTIL, VENTOLIN) 90 mcg/Actuation inhaler Take 1-2 Puffs by inhalation every four (4) hours as needed for Wheezing. Qty: 17 g, Refills: 0 STOP taking these medications  
  
 meclizine (ANTIVERT) 25 mg tablet Comments:  
Reason for Stopping:   
   
 sacubitril-valsartan (ENTRESTO) 49 mg/51 mg tablet Comments:  
Reason for Stopping:   
   
 carvedilol (COREG CR) 40 mg CR capsule Comments:  
Reason for Stopping:   
   
 aspirin delayed-release 81 mg tablet Comments:  
Reason for Stopping: PSEUDOEPHEDRINE HCL (SINUS DECONGESTANT PO) Comments:  
Reason for Stopping:   
   
 fluticasone propionate (FLOVENT HFA) 220 mcg/actuation inhaler Comments:  
Reason for Stopping: My Recommended Diet, Activity, Wound Care, and follow-up labs are listed in the patient's Discharge Insturctions which I have personally completed and reviewed. Risk of deterioration: Low 
 
Condition at Discharge:  Stable 
_____________________________________________________________________ Disposition SNF/LTC 
____________________________________________________________________ Care Plan discussed with:  
Patient, Family, RN, Care Manager, Consultant 
 
____________________________________________________________________ Code Status: DNR/DNI 
____________________________________________________________________ Condition at Discharge:  Stable 
_____________________________________________________________________ Follow up with: PCP : Orr Miri, NP Follow-up Information Follow up With Specialties Details Why Contact Info Edward Flores NP Nurse Practitioner   [address] [facility] 
[phone] [facility] EMERGENCY DEPT Emergency Medicine  As needed, If symptoms worsen [address] [facility] 
[phone] Dion Brandt MD Neurosurgery Schedule an appointment as soon as possible for a visit in 2 weeks  [address] [place] [address] 
[phone] [facility] [address] [facility] 
[phone] Total time in minutes spent coordinating this discharge (includes going over instructions, follow-up, prescriptions, and preparing report for sign off to her PCP) :  35 minutes Signed: 
Myla Reid MD

## 2020-10-05 NOTE — PROGRESS NOTES
Transition of Care Plan to SNF/Rehab 
 
SNF/Rehab Transition: 
Patient has been accepted to [address] [place] [address],[address] and meets criteria for admission. Patient will transported by [facility] and expected to leave at 1:00pm. 
 
Communication to Patient/Family: Met with patient and daughter, Briseyda Mckinney (identified care giver) and they are agreeable to the transition plan. Communication to SNF/Rehab: 
Bedside RN, Garry Guzman, has been notified to update the transition plan to the facility and call report (phone number [phone]). Discharge information has been updated on the AVS. Discharge instructions to be fax'd to facility at [facility] # [phone]). Nursing Please include all hard scripts for controlled substances, med rec and dc summary, and AVS in packet. Reviewed and confirmed with facility, [address] [place] [address],[address], can manage the patient care needs for the following:  
 
[place] Link with (X) only those applicable: 
 
Medication: 
[x]  Medications will be available at the facility []  IV Antibiotics [x]  Controlled Substance - hard copy to be sent with patient  
[]  Weekly Labs Documents: 
[x] Hard RX [x] MAR [x] Kardex [x] AVS 
[x]Transfer Summary 
[x]Discharge Equipment: 
[]  CPAP/BiPAP []  Wound Vacuum 
[]  Bangura or Urinary Device 
[]  PICC/Central Line 
[]  Nebulizer 
[]  Ventilator Treatment: 
[]Isolation (for MRSA, VRE, etc.) []Surgical Drain Management []Tracheostomy Care 
[]Dressing Changes []Dialysis with transportation and chair time. []PEG Care 
[x]Oxygen 2L NC [x]Daily Weights for Heart Failure Dietary: 
[]Any diet limitations []Tube Feedings []Total Parenteral Management (TPN) Eligible for Medicaid Long Term Services and Supports Yes: 
[] Eligible for medical assistance or will become eligible within 180 days and UAI completed. [] Provider/Patient and/or support system has requested screening. [] UAI copy provided to patient or responsible party. [] UAI unavailable at discharge will send once processed to SNF provider. [x] UAI unavailable at discharged mailed to patient No:  
[] Private pay and is not financially eligible for Medicaid within the next 180 days. [] Reside out-of-state. [] A residents of a state owned/operated facility that is licensed 
by [employer] or [facility] 
[] Enrollment in [facility] hospice services 
[] [address] [place] [address] 
[] Patient /Family declines to have screening completed or provide financial information for screening Financial Resources: 
Medicaid   
[x] Initiated and application pending  
[] Full coverage Advanced Care Plan: 
[x]Surrogate Decision Maker of Care Rasheed Elaine (daughter) [phone] [x]POA [x]Communicated Code Status DNR Other Enrique Negrete, [address] [facility]

## 2020-10-08 NOTE — OP NOTES
[address] 
OPERATIVE REPORT Name:  Mayito Bello 
MR#:  [id number] :  1945 ACCOUNT #:  [de-identified] DATE OF SERVICE:  2020 AMENDED REPORT:  Revised 10/07/2020 at 13:31/bjs PREOPERATIVE DIAGNOSIS:  Metastatic spinal disease, secondary to history of breast cancer. POSTOPERATIVE DIAGNOSIS:  Metastatic spinal disease, secondary to history of breast cancer. PROCEDURE PERFORMED:  Segmental posterior cervicothoracic fusion C5 to T5, and arthrodesis C5 to T5 with cancellous bone chips and DBM putty, and partial laminectomy T2, and use of neuronavigation/O-arm. SURGEON:  Sima Lundborg, MD 
  
ASSISTANT:  Stevie Kauffman SA 
  
ANESTHESIA:  General. 
  
COMPLICATIONS:  None. SPECIMENS REMOVED:  Bone/tumor. IMPLANTS:  [employer]tronic lateral mass cervical screws and pedicle screws and rods. ESTIMATED BLOOD LOSS:  Minimal. 
  
OPERATIVE PROCEDURE:  Review of imaging studies revealed a kyphotic deformity at T1 to T2 secondary to metastatic disease to the spine. In an effort to stabilize the kyphosis, after discussing with the patient risks, benefits, and alternatives, she was taken to the operating room where she was induced under general endotracheal anesthesia, placed on the operating table/Tono table with the head secured in the three-point Barron head escoto. Posterior cervicothoracic region was then shaved, scrubbed, prepped, and draped in usual sterile fashion. A time-out was performed to identify the correct patient and procedure. Appropriate antibiotics were administered and sequential stockings were applied for DVT prophylaxis. A midline vertical incision was made centered over the midline at the beginning of the lower cervical spine down to the approximate C6 area. The muscle was taken down in subperiosteal fashion on either side.   A towel clamp was applied to spinous process at C7 and x-ray obtained to confirm position. Self retaining retractors were inserted into the edges of the incision. The navigation array was applied to the spinous process of T6. The O-arm was brought into the operative field. A spin was performed after performing preliminary x-rays. Under loupe magnification, lateral mass screws were placed in the C5 and C6 lateral masses on either side respectively without complications. A 14-mm long screws were chosen. I did perform a partial laminectomy at T2, but not a complete laminectomy as the bone was rather soft and involved with tumor. When using the navigation system, pedicle screws were placed in T4 and T5. I could not put them in T1, T2, and T3 because of their involvement significantly with metastatic disease. The screws felt like they were embedded in the bone in the thoracic region quite strongly, although the bone was a little softer on the left side at T4. Two rods, 150-mm long, were contoured to accommodate the kyphosis. I did not make an attempt to pull back on the vertebral bodies, so the alignment was left in place as it was preoperatively. Screw caps were used to lock the rods to the screws. The entire constructs felt quite strong on either side. I decorticated at multiple levels including the facet joints at C4-C5 and C5-C6, and then also at T4-T5 and T3-T4 and placed cancellous bone chips with DBM Anita putty to aid in the arthrodesis. The area had been irrigated copiously with Irrisept irrigation several times during the operation and then irrigated with saline. A drain was placed and brought out through a separate stab incision. The fascia was closed with 0 Vicryl and the subcutaneous tissue with 3-0 interrupted inverted Vicryl sutures and staples were applied to the skin edges. Sterile dressing was applied. The needle, sponge, and instrument count were correct at the end of the procedure.   The patient was taken out of the head escoto and turned on to the stretcher in stable condition. Throughout the entire procedure, physiologic neuromonitoring was performed including SSEP and motor evokes and they remained stable throughout the entire procedure, although the motor evokes were diminished in the lower extremities. The Bangura flipped indicating and confirming the presence of the leakage that she had before surgery. MD MELVIN [id number] 
D:  09/29/2020 13:20 
T:  09/29/2020 18:01 
JOB #:  [id number] CC:  Celestine Khan MD

## 2020-10-09 NOTE — PROGRESS NOTES
Community Care Team medical review documentation for patient in [facility]     Patient was discharged from [facility] on 10/5/20 to [facility] ([facility]). Information included in this progress note has been provided to SNF. Discharge Diagnosis: Acute pathologic thoracic compression fracture POA from metastatic breast cancer  PCP : Kalani Davis NP  ACP:  ACP on File , Pt was DNR/DNI in hospital POSt on file but no DDNR on file  Medication Changes at Discharge:   Start:amiodarone, Eliquis, Coreg, Cymbalta; Femara; Percocet; miralax; pericolace  Change: lasix  Stop:Meclizine, Entresto, Coreg CR; Aspirin DR , Pseudoephedrine, flovent  Diet:   Follow up Appointment Recommendations:   Jaclyn Layton MD Neurosurgery Schedule an appointment as soon as possible for a visit in 2 weeks   [address]  [phone]     Follow up with PCP 3-5 days after discharge from [facility]  PTA Functional Status: HOME SUPPORT PRIOR TO ADMISSION: The patient lived alone with support from family as needed. FUNCTIONAL STATUS PRIOR TO ADMISSION: Pt reports independence at baseline, with a significant a decline over the last 2 weeks and having multiple falls. Pt reports wearing 2.5L of O2 at night and prn during the day. Community Care Team will follow up weekly with [facility] until discharge. Medications were not reconciled and general patient assessment was not completed during this [facility] outreach.       Breanna Gardner RN

## 2020-10-15 NOTE — PROGRESS NOTES
Community Care Team Documentation for Patient in [facility]  Subsequent Follow up     Patient remains at [facility] ([facility]). See previous [facility] Team notes. Spoke with SNF team. SNF medical update: Upper back/neck staples care started 10/11/20. Soft Cervical collar to neck at all times. O2 93-95% @ 2LPM via NC;  daughter reports they have applied for Medicaid and are hoping for approval for personal care upon DC. PT/OT update: Currently ambulating  15 feet with rolling walker and CGA, CGA for transfers,  bed mobility SBA, upper body min assist, Lower body max assist.  ELOS 2 weeks plan for discharge to home with [place] and possible personal care aid; pt has a rolling walker at home. Medications were not reconciled and general patient assessment was not completed during this skilled nursing facility outreach.      Amadou Bravo RN

## 2020-10-19 NOTE — TELEPHONE ENCOUNTER
Reached out to pt. Daughter, Daisha Leija, HIPAA verified by two patient identifiers. Discussed pt will be doing letrozole only and once out of the SNF we will discuss kisqali. She will call at a later date for an appointment      She also asked if we are giving her a medication for her bones directly. I assured here we will discuss this also once out of SNF. She stated understanding and thanked me for the call.

## 2020-10-23 NOTE — PROGRESS NOTES
Community Care Team Documentation for Patient in [facility]  Subsequent Follow up     Patient remains at [facility] ([facility]). See previous [facility] Team notes. Spoke with SNF team.SNF medical update:  Neurosurgery apt. Scheduled  (Dr. Sonido Tran) on 10/16/20, f/u 12/1/20 1:00 PM.  New Order Percocet 5-325 mg, 2 tab. Q.4hrs, prn 10/20/20-10/30/30, Entresto 24-26mg 1 tab, bid, r/t HF started on 10/18/20, KCL ER 10meg, daily, started on 10/17/20.  PT, OT update: Currently ambulating  CGA w/ walker, CGA for transfer, upper body SBA, Lower body Min-Mod A. O2 97-99% @ 2LPM via NC.   ELOS 2 more weeks. plan to discharge home , daughter reports they have applied for Medicaid and are hoping for approval for personal care upon DC, HH TBD, pt has a RW at home. Medications were not reconciled and general patient assessment was not completed during this skilled nursing facility outreach.      Ihsan Elizondo RN

## 2020-11-10 NOTE — TELEPHONE ENCOUNTER
Patient daughter shanice called and wants to know if the patient still needs her hormone shots being that she is not out of rehab, she would like a callback    [phone]

## 2020-11-10 NOTE — TELEPHONE ENCOUNTER
Returned call to pt daughter. HIPAA verified by two patient identifiers. She stated pt IS out of the rehab. Center and wanted us to order the kisqali now. I advised we can not do that, we need to reassess pt and evaluate her to determine if she can get put on kisqali due to cardiac issues that can occur as well as kidney function and diarrhea possibly Also she may need bone targeted medication as well. Pt daughter was not happy but understood the risks and need for consents etc.. She was very adamant that she does not want to bring her mom to the office and only get 5 minutes with the provider, bringing her mom in the office is a hassle and she wants to make sure they get plenty of time with the provider. I assured her to please bring all her questions with her to the visit and it will get answered and time will be spent with her and her mother and also I touched on if bringing the pt to appointments is difficult then quality of life needs to be addressed as well. She stated understanding and thanked me for the call.

## 2020-11-11 NOTE — PROGRESS NOTES
Subjective:  Ms. Lauro Lee is a pleasant 79-year-old lady, who comes in today with her daughter in attendance. She is transitioning her care from [facility], where she was admitted from October 5-November 7. Further discussion with Ms. Lauro Lee revealed that the day prior to her admission to the emergency room she had sustained a fall at home. She was unable to get up and finally got to call the rescue squad, who came in, got her up and brought her to the emergency room. While in the emergency room an MRI of the thoracic spine revealed an acute moderate compression fracture of mid thoracic vertebral body and mild superior endplate compression fracture at L4. CT of her cervical spine revealed that she has metastatic disease through T1, T2 and T3, with severe spinal stenosis and impingement on the cord. She was subsequently taken to the operating room, at which time she underwent segmental posterior cervical thoracic fusion of C5-T5 and arthrodesis of C5-T5 with cancellous bone chip and DBM putty and partial laminectomy of T2. There were no intraoperative complications. Postoperatively she did reasonably well. She was seen in consultation by Dr. Tete Brannon, oncologist, who started her on Letrozole. Once stable from her surgery, she was transferred to [facility]. Since her discharge from [facility], she is doing reasonably well. Her daughter tells me that they finally got home health and she will start having some PT, OT, as well as nursing care at home. She tells me that her legs have remained weak and she can walk with her walker, but needs assistance. Therefore, she does spend a great deal of time in her wheelchair. She is hoping with the physical therapy she will regain some of her strength. She has been eating well. She is able to fix her meal.    
 
Today she specifically denies headaches, dizziness or blurred vision. Denies chest pain or palpitations.   Denies shortness of breath, cough, wheezing, PND or orthopnea. Denies ankle edema. Past Medical History:  
Diagnosis Date  Arthralgia 8/17/2017  Arthritis  Asthma Mild to Moderate  Breast cancer ([address] 8/17/2017 Onset 1997; Refusing Mammogram 6/16/17  Cancer ([address] 1997 Breast left  Cardiomyopathy ([address] 8/17/2017 Onset:1999 Ischemic; 25% - 50% (last EFx 45%) Continue with ACE/Lasix/coreg  Cataract 8/17/2017 Bilateral   
 Cellulitis 8/17/2017 Suspect local reaction to Pneumovax, treat with ice, NSAIDs or Tylenol  Chest pain at rest 8/17/2017  CHF (congestive heart failure) ([address] 8/17/2017 Stable, though weight up a little. To take 1 1/2 Lasix daily if swelling, 1 daily o/w  Chronic maxillary sinusitis 8/17/2017  Chronic pain Right knee  COPD (chronic obstructive pulmonary disease) ([address] 8/17/2017 Continue with inhalers  Diabetes ([address] Pre-diabetic  DJD (degenerative joint disease) 8/17/2017  Elevated BUN 8/17/2017  Esophagitis, reflux 8/17/2017  Fatigue 8/17/2017  GERD (gastroesophageal reflux disease)  Heart failure ([address] Cardiomyopathy, HX of MI 1999  Hyperglycemia 8/17/2017  Hyperkalemia 8/17/2017  Hyperlipidemia 8/17/2017  Hypertension 8/17/2017  Ill-defined condition Vertigo  Ill-defined condition 2003 HX of Urosepsis complicated by respiratory failure and pneumonnia  Myalgia 8/17/2017  Thromboembolus ([address] 1969  
 during 2nd pregnancy Tiffanie Sanchez Pal 8/17/2017  Vertigo, aural 8/17/2017  Vitamin D deficiency 8/17/2017 Past Surgical History:  
Procedure Laterality Date [address] JNON[place]  
 HX GYN Left Breast Mastectomy  HX HEENT  2015 Bilateral Cataract  HX ORTHOPAEDIC Right 06/2018  
 knee replacement  HX ORTHOPAEDIC Left 11/2018  
 wrist surgery  HX VASCULAR ACCESS Port a cath on the right Current Outpatient Medications on File Prior to Visit Medication Sig Dispense Refill  sacubitriL-valsartan (Entresto) 24-26 mg tablet Take 1 Tab by mouth two (2) times a day.  DULoxetine (Cymbalta) 20 mg capsule Take 20 mg by mouth daily.  meclizine (ANTIVERT) 12.5 mg tablet Take  by mouth three (3) times daily as needed for Dizziness.  potassium chloride SR (K-TAB) 20 mEq tablet Take  by mouth.  aspirin delayed-release 81 mg tablet Take  by mouth daily.  furosemide (LASIX) 20 mg tablet Take 1 Tab by mouth daily for 60 days. (Patient taking differently: Take 20 mg by mouth three (3) times daily.) 30 Tab 0  
 amiodarone (CORDARONE) 200 mg tablet Take 1 Tab by mouth daily for 60 days. 30 Tab 1  
 apixaban (ELIQUIS) 5 mg tablet Take 1 Tab by mouth two (2) times a day for 60 days. 60 Tab 0  carvediloL (COREG) 3.125 mg tablet Take 1 Tab by mouth two (2) times daily (with meals) for 60 days. (Patient taking differently: Take 40 mg by mouth daily.) 60 Tab 1  
 letrozole (FEMARA) 2.5 mg tablet Take 1 Tab by mouth daily for 60 days. 30 Tab 1  polyethylene glycol (MIRALAX) 17 gram packet Take 1 Packet by mouth daily.  acetaminophen (TYLENOL) 500 mg tablet Take 500 mg by mouth every six (6) hours as needed for Pain.  omeprazole (PRILOSEC) 20 mg capsule Take 1 capsule by mouth once daily 90 Cap 3  
 fenofibrate nanocrystallized (TRICOR) 145 mg tablet Take 1 tablet by mouth once daily 90 Tab 3  Anoro Ellipta 62.5-25 mcg/actuation inhaler Take 1 Puff by inhalation daily.  cholecalciferol (VITAMIN D3) 1,000 unit cap Take 1,000 Units by mouth daily.  albuterol (PROVENTIL, VENTOLIN) 90 mcg/Actuation inhaler Take 1-2 Puffs by inhalation every four (4) hours as needed for Wheezing. 17 g 0  
 senna-docusate (PERICOLACE) 8.6-50 mg per tablet Take 1 Tab by mouth daily for 60 days. 30 Tab 1 No current facility-administered medications on file prior to visit. Allergies Allergen Reactions  Morphine Nausea and Vomiting Physical Examination: 
GENERAL:  Pleasant lady in no acute distress. She is alert and oriented. She answers my questions appropriately. VITALS:  Blood Pressure:  114/68. Pulse:  84. Temperature:  98. O2 sat:  96.  Weight:  188. HEENT:  Normocephalic, atraumatic. NECK:  Supple without adenopathy. CHEST:  Lungs clear to auscultation, no rales or wheezes. CV:  Heart regular rhythm without murmur. ABDOMEN:  Non tender. BACK:  She has a well healed incision. EXTREMITIES:  No edema or calf tenderness. Distal pulses were present. Impression: 1. Status post posterior cervical thoracic fusion C5-T5, arthrodesis C5-T5 and partial laminectomy at T2. 
2. History of left breast cancer in 1990s, now recurrent and widely metastatic. 3. New onset of atrial fibrillation. She is now back in normal sinus rhythm. 4. COPD. Plan: 1. She apparently had some blood work done yesterday, so therefore I did not repeat any studies today. 2. She will continue with her current regimen and I will continue to follow her every three months.

## 2020-11-11 NOTE — PROGRESS NOTES
Noel Singer is a 67 y.o. female Chief Complaint Patient presents with  Transitions Of Care  
  discharged from [facility] 10/5/21 11/7/20 due to surgery from having a fall Visit Vitals /68 (BP 1 Location: Right arm, BP Patient Position: Sitting) Pulse 84 Temp 98 °F (36.7 °C) (Oral) Resp 16 Ht 5' 5\" (1.651 m) Wt 188 lb (85.3 kg) SpO2 96% BMI 31.28 kg/m² Health Maintenance Due Topic Date Due  
 DTaP/Tdap/Td series (1 - Tdap) 06/15/1965  Shingrix Vaccine Age 50> (1 of 2) 06/15/1994  GLAUCOMA SCREENING Q2Y  06/15/2009  Bone Densitometry (Dexa) Screening  06/15/2009  Pneumococcal 65+ years (1 of 1 - PPSV23) 06/15/2009  Pneumococcal 65+ yrs at Risk Vaccine (1 of 2 - PCV13) 06/15/2009  Flu Vaccine (1) 09/01/2020 1. Have you been to the ER, urgent care clinic since your last visit? Hospitalized since your last visit? No 
 
2. Have you seen or consulted any other health care providers outside of the [address] [place] [address] since your last visit? Include any pap smears or colon screening.  No

## 2020-11-11 NOTE — PATIENT INSTRUCTIONS

## 2020-11-13 NOTE — PROGRESS NOTES
Community Care Team Documentation for Patient in [facility]  Subsequent Follow up     Patient remains at [facility] ([facility]). See previous [facility] Team notes. Spoke with SNF team.  Disposition:  Patient discharged to home [facility], 11/7/20. Made neurosurgery apt. w/ Alicia Higgins on 10/16/20, f/u 12/1/20 1 pm, PCP f/u with [email or web address]. PT has a RW at home, [facility]    Medications were not reconciled and general patient assessment was not completed during this skilled nursing facility outreach.      Dean SULLIVANN, RN

## 2020-11-17 NOTE — TELEPHONE ENCOUNTER
Patients daughter called, the patient is stating she is in a lot of pain. It is under her right breast and goes through to her back. They are requesting lidocaine patches per the home health nurse. The patient is going to her oncologist on Monday but they want something to hold her over until then. The patients preferred pharmacy is [facility] at [address].    CB: [phone]

## 2020-11-20 NOTE — PROGRESS NOTES
Marija Camilo is a 67 y.o. female here for met breast cancer. She was in [facility] for fall from 9/21/20 - 10/5/20. She then went to [facility] from 10/5/20 - 11/7/20 She has been having pain under right breast that radiates through to back. Uses Tylenol and Lidocaine patches and helps. She states she is not sure if pain in back is from cancer or rolling her wheelchair.

## 2020-11-23 NOTE — PROGRESS NOTES
[address] 
at [place] [address], [facility], [address] 
[phone] Oncology progress Note Patient: Joanne Carter MRN: [id number]  SSN: [id number] YOB: 1945  Age: 67 y.o. Sex: female Diagnosis 1. Right sided breast carcinoma metastatic to the bone, liver and lung % NC 60% Her 2 -ve History of breast cancer in the 1990's. Left mastectomy with LN dissection ( negative LN) at [facility] - Dr. Rowdy Serna. Subjective:  
  
Joanne Carter is a 67 y.o. female who I am seeing in follow up for metastatic cancer. Ms. Jaguar Henley received treatment for left sided breast cancer in the 1990's. She received systemic chemotherapy and mastectomy. She was noted to have sclerotic lesions in the bone in April and then in Sep 2020. She is suffering with bone pains in the back. She underwent a laminectomy from T2 - T4. Pathology shows ER +ve mBC. She has been taking Letrozole. She denies new symptoms. She is undergoing PT. Review of Systems: 
 
Bone pains No dyspnea No chest pain No diarrhea No heir loss Weakness in b/l LE Past Medical History:  
Diagnosis Date  Arthralgia 8/17/2017  Arthritis  Asthma Mild to Moderate  Breast cancer ([address] 8/17/2017 Onset 1997; Refusing Mammogram 6/16/17  Cancer ([address] 1997 Breast left  Cardiomyopathy ([address] 8/17/2017 Onset:1999 Ischemic; 25% - 50% (last EFx 45%) Continue with ACE/Lasix/coreg  Cataract 8/17/2017 Bilateral   
 Cellulitis 8/17/2017 Suspect local reaction to Pneumovax, treat with ice, NSAIDs or Tylenol  Chest pain at rest 8/17/2017  CHF (congestive heart failure) ([address] 8/17/2017 Stable, though weight up a little. To take 1 1/2 Lasix daily if swelling, 1 daily o/w  Chronic maxillary sinusitis 8/17/2017  Chronic pain Right knee  COPD (chronic obstructive pulmonary disease) ([address] 2017 Continue with inhalers  Diabetes ([address] Pre-diabetic  DJD (degenerative joint disease) 2017  Elevated BUN 2017  Esophagitis, reflux 2017  Fatigue 2017  GERD (gastroesophageal reflux disease)  Heart failure ([address] Cardiomyopathy, HX of MI 1999  Hyperglycemia 2017  Hyperkalemia 2017  Hyperlipidemia 2017  Hypertension 2017  Ill-defined condition Vertigo  Ill-defined condition  HX of Urosepsis complicated by respiratory failure and pneumonnia  Myalgia 2017  Thromboembolus ([address] 1969  
 during 2nd pregnancy [facility] Jaki Loring 2017  Vertigo, aural 2017  Vitamin D deficiency 2017 Past Surgical History:  
Procedure Laterality Date [facility] JONNY  
 HX GYN Left Breast Mastectomy  HX HEENT   Bilateral Cataract  HX ORTHOPAEDIC Right 2018  
 knee replacement  HX ORTHOPAEDIC Left 2018  
 wrist surgery  HX VASCULAR ACCESS Port a cath on the right Family History Problem Relation Age of Onset  Cancer Mother  Other Father Social History Tobacco Use  Smoking status: Former Smoker Packs/day: 2.00 Years: 25.00 Pack years: 50.00 Last attempt to quit:  Years since quittin.9  Smokeless tobacco: Never Used Substance Use Topics  Alcohol use: No  
  
Prior to Admission medications Medication Sig Start Date End Date Taking? Authorizing Provider  
traMADoL (ULTRAM) 50 mg tablet Take 1 Tab by mouth every six (6) hours as needed for Pain for up to 30 days. Max Daily Amount: 200 mg. 20 Yes Candie Azar NP  
sacubitriL-valsartan (Entresto) 24-26 mg tablet Take 1 Tab by mouth two (2) times a day.    Yes Provider, Historical  
 meclizine (ANTIVERT) 12.5 mg tablet Take  by mouth three (3) times daily as needed for Dizziness. Yes Provider, Historical  
potassium chloride SR (K-TAB) 20 mEq tablet Take  by mouth. Yes Provider, Historical  
aspirin delayed-release 81 mg tablet Take  by mouth daily. Yes Provider, Historical  
furosemide (LASIX) 20 mg tablet Take 1 Tab by mouth daily for 60 days. Patient taking differently: Take 20 mg by mouth three (3) times daily. 10/5/20 12/4/20 Yes Yury Santiago MD  
amiodarone (CORDARONE) 200 mg tablet Take 1 Tab by mouth daily for 60 days. 10/6/20 12/5/20 Yes Yury Santiago MD  
apixaban (ELIQUIS) 5 mg tablet Take 1 Tab by mouth two (2) times a day for 60 days. 10/5/20 12/4/20 Yes Yury Santiago MD  
carvediloL (COREG) 3.125 mg tablet Take 1 Tab by mouth two (2) times daily (with meals) for 60 days. Patient taking differently: Take 40 mg by mouth daily. 10/6/20 12/5/20 Yes Yury Santiago MD  
letrozole [facility]) 2.5 mg tablet Take 1 Tab by mouth daily for 60 days. 10/6/20 12/5/20 Yes Yury Santiago MD  
polyethylene glycol (MIRALAX) 17 gram packet Take 1 Packet by mouth daily. 10/6/20  Yes Yury Santiago MD  
senna-docusate (PERICOLACE) 8.6-50 mg per tablet Take 1 Tab by mouth daily for 60 days. 10/6/20 12/5/20 Yes Yury Santiago MD  
acetaminophen (TYLENOL) 500 mg tablet Take 500 mg by mouth every six (6) hours as needed for Pain. Yes Provider, Historical  
omeprazole (PRILOSEC) 20 mg capsule Take 1 capsule by mouth once daily 6/9/20  Yes Nadeau-Deckert, Delories Hodgkin, NP  
fenofibrate nanocrystallized (TRICOR) 145 mg tablet Take 1 tablet by mouth once daily 5/22/20  Yes Kalani Davis NP Anoro Ellipta 62.5-25 mcg/actuation inhaler Take 1 Puff by inhalation daily. 3/23/20  Yes Other, MD Faizan  
cholecalciferol (VITAMIN D3) 1,000 unit cap Take 1,000 Units by mouth daily.    Yes Provider, Historical  
 albuterol (PROVENTIL, VENTOLIN) 90 mcg/Actuation inhaler Take 1-2 Puffs by inhalation every four (4) hours as needed for Wheezing. 5/1/11  Yes Veda Bolton., PA  
DULoxetine (Cymbalta) 20 mg capsule Take 20 mg by mouth daily. Provider, Historical  
  
 
 
 
 
Allergies Allergen Reactions  Morphine Nausea and Vomiting Objective:  
 
Vitals:  
 11/23/20 1333 BP: 117/71 Pulse: 81 Temp: 97 °F (36.1 °C) TempSrc: Temporal  
SpO2: 93% Weight: 188 lb (85.3 kg) Height: 5' 5\" (1.651 m) Physical Exam: 
 
GENERAL: alert, cooperative, no distress, appears stated age EYE: conjunctivae/corneas clear. PERRL, EOM's intact LYMPHATIC: Cervical, supraclavicular, and axillary nodes normal.  
THROAT & NECK: Neck collar in place LUNG: clear to auscultation bilaterally HEART: regular rate and rhythm, S1, S2 normal, no murmur, click, rub or gallop ABDOMEN: soft, non-tender. Bowel sounds normal. No masses,  no organomegaly EXTREMITIES:  extremities normal, atraumatic, no cyanosis or edema SKIN: Normal. 
NEUROLOGIC: AOx3. Gait normal. Reflexes and motor strength normal and symmetric. Cranial nerves 2-12 and sensation grossly intact. CT Results (most recent): 
Results from [facility] Encounter encounter on 04/17/20 CT ABD PELV W CONT Narrative EXAM:  CTA CHEST W OR W WO CONT, CT ABD PELV W CONT INDICATION:   RUQ pain, R thoracic back pain, SOB, h/o DVT 
 
COMPARISON: 8/26/2013. CHEST CTA: 
 
TECHNIQUE:  
Precontrast  images were obtained to localize the volume for acquisition. Multislice helical CT arteriography was performed from the diaphragm to the 
thoracic inlet during uneventful rapid bolus of 100 cc Isovue-370. Lung and soft 
tissue windows were generated. Coronal and sagittal images were generated and 3D post processing consisting of coronal maximum intensity images was performed. CT dose reduction was achieved through use of a standardized protocol tailored for this examination and automatic exposure control for dose modulation. FINDINGS: 
CHEST: 
THYROID: No nodule. MEDIASTINUM: No mass or lymphadenopathy. DOROTHY: No mass or lymphadenopathy. THORACIC AORTA: No dissection or aneurysm. MAIN PULMONARY ARTERY: There is no evidence of pulmonary embolism. TRACHEA/BRONCHI: Patent. ESOPHAGUS: No wall thickening or dilatation. HEART: Normal in size. PLEURA: No effusion or pneumothorax. LUNGS: No nodule, mass, or airspace disease. BONES: Degenerative changes are seen in the thoracic spine. Tiny sclerotic foci 
are scattered throughout the thoracic vertebral bodies. Impression IMPRESSION: No acute process or evidence of pulmonary embolism. Tiny sclerotic 
foci are scattered throughout the vertebral bodies. ABDOMEN/PELVIC CT: 
 
TECHNIQUE:  
Following the uneventful intravenous administration of 100 cc Isovue-370, thin 
axial images were obtained through the abdomen and pelvis. Coronal and sagittal 
reconstructions were generated. Oral contrast was not administered. CT dose 
reduction was achieved through use of a standardized protocol tailored for this 
examination and automatic exposure control for dose modulation. FINDINGS:  
LIVER: No mass or biliary dilatation. GALLBLADDER: Unremarkable. SPLEEN: No mass. PANCREAS: No mass or ductal dilatation. ADRENALS: Unremarkable. KIDNEYS: Left renal cysts, the largest of which measures 2.8 cm. Scarring left 
kidney. STOMACH: Unremarkable. SMALL BOWEL: No dilatation or wall thickening. COLON: Sigmoid diverticulosis. APPENDIX: Unremarkable. PERITONEUM: No ascites or pneumoperitoneum. RETROPERITONEUM: No lymphadenopathy or aortic aneurysm. REPRODUCTIVE ORGANS: The uterus is surgically absent. URINARY BLADDER: No mass or calculus. BONES: Sclerotic lesions are noted in the iliac wings bilaterally, sacrum, and 
lumbar spine. ADDITIONAL COMMENTS: N/A IMPRESSION: 
 No acute abnormality. Sclerotic foci are noted concerning for osseous metastatic 
disease. Correlation with bone scan is recommended. I personally reviewed CT and x-rays. Sclerotic lesions. Vertebral fracture Lab Results Component Value Date/Time WBC 6.2 10/05/2020 03:53 AM  
 HGB (POC) 12.7 09/13/2017 11:20 AM  
 HGB 8.9 (L) 10/05/2020 03:53 AM  
 HCT (POC) 39.3 09/13/2017 11:20 AM  
 HCT 30.1 (L) 10/05/2020 03:53 AM  
 PLATELET 413 50/73/4432 03:53 AM  
 MCV 97.4 10/05/2020 03:53 AM  
 
 
 
Lab Results Component Value Date/Time Sodium 141 10/05/2020 03:53 AM  
 Potassium 4.0 10/05/2020 03:53 AM  
 Chloride 103 10/05/2020 03:53 AM  
 CO2 35 (H) 10/05/2020 03:53 AM  
 Anion gap 3 (L) 10/05/2020 03:53 AM  
 Glucose 83 10/05/2020 03:53 AM  
 BUN 19 10/05/2020 03:53 AM  
 Creatinine 0.68 10/05/2020 03:53 AM  
 BUN/Creatinine ratio 28 (H) 10/05/2020 03:53 AM  
 GFR est AA >60 10/05/2020 03:53 AM  
 GFR est non-AA >60 10/05/2020 03:53 AM  
 Calcium 9.0 10/05/2020 03:53 AM  
 Bilirubin, total 0.5 09/21/2020 11:05 AM  
 Alk. phosphatase 124 (H) 09/21/2020 11:05 AM  
 Protein, total 7.8 09/21/2020 11:05 AM  
 Albumin 3.5 09/21/2020 11:05 AM  
 Globulin 4.3 (H) 09/21/2020 11:05 AM  
 A-G Ratio 0.8 (L) 09/21/2020 11:05 AM  
 ALT (SGPT) 25 09/21/2020 11:05 AM  
 AST (SGOT) 31 09/21/2020 11:05 AM  
 
 
 
Assessment: 1. Right sided breast carcinoma metastatic to the bone, liver and lung ECOG PS 1 Prognosis: Guarded This is our best current assessment. Cancers respond differently to treatment. Overall prognosis depends on many factors including other conditions, cancer stage, side effects, and other unforeseen events. Goal of therapy: Palliative Expected response to treatment:  Good: Anticipate prolonged, long-term control of cancer Treatment benefits and harms:  We discussed potential short term side effects to include:anemia, alopecia and fatigue Long term side effects of treatment:  none Quality of life: Quality of life concerns have been addressed. Treatment as outlined is expected to have minimal impact on patients quality of life. History of breast cancer in the 1990's. Left mastectomy with LN dissection ( negative LN) at [facility] - Dr. Yvette Pablo. Had chemotherapy, but does not remember the oncologist. States she never took a pill following chemotherapy or surgery. She has been on Letrozole No side effects Tolerating well Will add Ribociclib at this time. Ribociclib/Letrozole has been associated with significant improvement in PFS among patient with ER +ve mBC. The duration of this treatment plan will be until progression, intolerable side effects, or patient choice. Patient will be meeting with navigation services to discuss any financial barriers to care/estimated cost of care. The patient's emotional well being was addressed during this office visit and patient seems to be coping well with the diagnosis and the treatment. Common Side Effects of Chemotherapy Decreased Blood Counts Your blood counts can decrease temporarily due to chemotherapy, they will recover over time. This is an expected side effect that your Doctor will be monitoring. 
- If you experience fevers (temperature >100.4°F), bleeding or unexplained bruising, please call the office right away Risk of Infection Your white blood cells can decrease temporarily due to chemotherapy and can put you at higher risk of infection. Washing hands frequently with soap and avoiding sick contacts can reduce your risk of infection. 
- If you experience fevers (temperature >100.4°F), shaking chills, or any signs of infection, please call the office immediately Anemia Chemotherapy can cause your red blood cells to temporarily decrease; this is an expected side effect that your Doctor will be monitoring. - You may experience fatigue if this occurs, please notify the office if you experience bleeding, shortness of breath with minimal exertion or at rest, rapid heartbeat, or feeling as though you may lose consciousness. Hair Loss Chemotherapy can affect your hair follicles and cause you to lose hair. This can occur on your scalp hair but also all over your body including eyebrows and eye lashes Nausea  You have been prescribed nausea medication to take if needed. Please follow the directions given to you by your Doctor. - Please call the office if the medications you have been given are not relieving nausea. Vomiting Make sure you are taking anti-nausea medication as prescribed. Eating small amounts of bland foods frequently can help. - Please call the office right away if you are vomiting more than 4 times per day or are unable to keep down food or fluids Diarrhea Eating small amounts of bland foods frequently can help, increase your fluid intake. It is usually ok to take Imodium for diarrhea. - Please call the office right away if you experience more than 4 episodes of watery diarrhea or if you are feeling dehydrated. You can reach Medical Oncology at [facility] with further questions or concerns at: [phone]. 
- Calls during normal business hours will reach our office. 
- Calls after hours or on the weekend will reach an answering service and the on-call Oncologist will return your call. 2. Cancer related bone pains Start Oxycodone 5 Palliative care referral 
Jenise Santoro Pt has been screened for all eligibility/ineligibility criteria for Strata and has been determined eligible for this protocol. Informed consent was reviewed by RN with patient prior to signing. All questions were answered adequately by RN. Patient signed consent today for study strata. A copy of ICF given to patient. No additional questions at this time. Plan: 1. Pain control 2. Continue Letrozole and start Navin Nice 3. Strata for NGS 
4. Invitae for BRCA 1/2 presence. Signed By: Carolina De Dios MD   
 November 23, 2020

## 2020-11-23 NOTE — PATIENT INSTRUCTIONS
We will send Dot Mandes to a speciality pharmacy that will mail your medication out to you. This depends on which pharmacy your insurance company prefers to get the medication from, so this may be [facility], [facility], or [facility] (it all depends on your insurance). If the co-pay is high we can work on getting you co-pay assistance if qualifies. We will try to get the Dot Mandes and letrozole sent from the specialty pharmacy both to your home however you may have to continue to get letrozole from your local pharmacy. You will need an EKG before starting kisqali and will also need labs. You can walk into [facility] before 2pm Monday through Friday to get the EKG done and you can go to whichever [facility] you prefer, there is one in [facility] you can get same day as EKG. You will need a repeat EKG 2 weeks after starting the drug as well as labs.

## 2020-11-23 NOTE — PROGRESS NOTES
MARY FROM DR Cristal Edward VITAMIN D 25 HYDROXY. Chemotherapy education packet provided to the patient and reviewed. Consent was also obtained. All questions and concerns were addressed. Time spent with patient approximately 15 minutes. Patient daughter was present during visit as well. Pt does not have any teeth and  said she is cleared for xgeva. We will set this appt. up 2 weeks after starting kisqali so we can see her the same day and get blood work drawn 2 weeks after starting drug while she is at the infusion center for xgeva. Aware of EKG and blood work needed now and again 2 weeks after starting.

## 2020-11-23 NOTE — LETTER
11/24/20 Patient: Zia Clinton YOB: 1945 Date of Visit: 11/23/2020 [address] [address] VIA In Basket Dear Jo Corcoran NP, Thank you for referring Ms. Iris Riedel to [address] [place] [address] for evaluation. My notes for this consultation are attached. If you have questions, please do not hesitate to call me. I look forward to following your patient along with you.  
 
 
Sincerely, 
 
Jaxson Love MD

## 2020-11-24 NOTE — TELEPHONE ENCOUNTER
spoke with Jenny Ferrara, they will follow pt with EKG etc. While on kisali. They will contact pt to get EKG set up.

## 2020-11-24 NOTE — TELEPHONE ENCOUNTER
Pt needs kisqali copack. Will [facility] to [facility]. Pt has medicaid now but not the card. Her number is  [id number] for medicaid. VORIGNACIA FROM DR Serenity Townsend RIBOCICLIB-LETROZOLE (KISQALI FEMARA CO-PACK) 600MG/DAY ([id number])-2.5MG TAB TAKE 3 TABS BY MOUTH ONCE DAILY X 21 DAYS THEN OFF 7 DAYS, LETROZOLE WILL BE DAILY D 91 R 11 (28 DAY SUPPLY OF LETROZOLE). There is a major severe risk noted with kisqali and amiodarone of proloniging QT interval.    Do we still order this?

## 2020-11-30 NOTE — TELEPHONE ENCOUNTER
Spoke with pt. HIPAA verified by two patient identifiers. Pt had to double the tramadol over the weekend after speaking with on-call MD, use a lidocaine patch for her back pain, right side middl,  It was effective but we will order oxyocodone now for her and refer to palliative. She is aware to take miralax every other day (she was hesitant due to already having constipation issues but stated understanding). She has a standing appt. For EKG on 12/11 but we may need to postpone that due to she needs the EKG 2 weeks after starting kisqali and she is not on this just yet, waiting a PA. Pt had home health draw labs on Tuesday and also Cardiology decreased her amiodorone to 100mg daily.

## 2020-11-30 NOTE — TELEPHONE ENCOUNTER
Patient called and left message on [address] [facility] stating she had questions. No further information given.

## 2020-12-01 NOTE — TELEPHONE ENCOUNTER
Patient called and left message on [address] [facility] requesting a return call from Gildardo Osorio RN. Would like to know if blood work is back and wanted to also let office know that insurance approved medication. Wants to get started.  Please return call

## 2020-12-01 NOTE — TELEPHONE ENCOUNTER
Labs are okay to start kisqali. She does not need to start vitamin d weekly but we will discuss at next appt. Message sent to Whit Valenzuela to schedule pt for labs and xgeva. Pt is aware to let Cardiology know she is starting kisqali tomm. And will need EKG in 2 weeks. Pt stated understanding. Thanked me for the call.

## 2020-12-01 NOTE — TELEPHONE ENCOUNTER
Called Krys to inquire on faxing us labs. It will be faxed now. Co-pay amount is pending per Ebony Kuhn.

## 2020-12-08 NOTE — TELEPHONE ENCOUNTER
Spoke to patient and she informed me that she did call her cardiologist and is awaiting his response.

## 2020-12-08 NOTE — TELEPHONE ENCOUNTER
May called from [facility] stating the patient is on Eliquis 5mg twice a day. Yesterday the patient had 3 nose bleeds. The patient did not take her eliquis today and has not had a nose bleed. They want to know If Abel wants them to hold the Eliquis for the next few days.   CB: [phone]

## 2020-12-15 NOTE — PROGRESS NOTES
Lizbeth Nicole is a 67 y.o. female here for met breast cancer. Treatment today:  Cycle 1 Day 1 Xgeva She is on Kisqali & Letrozole. 1. Have you been to the ER, urgent care clinic since your last visit? Hospitalized since your last visit? no 
 
2. Have you seen or consulted any other health care providers outside of the [address] [place] [address] since your last visit? Include any pap smears or colon screening. No 
 
On third week of Parveen Parcel. Started December 2nd. She does get dry heaves on occasion after taking pills. Not everyday. Some constipation for the last 6 weeks. Did have bowel movement this morning. She is not sleeping very well at night. Is going to try Seroquel.

## 2020-12-15 NOTE — TELEPHONE ENCOUNTER
Pt left a VM on nurses line asking for a refill on oxycodone.5mg. she was taking it every 6 hours x 2 weeks but then has been taking it every 4 hours. Pt takes it for pain of her neck and bilateral sides of chest.  Pt has 4 pills left. We will refill this tomorrow. Pt aware.

## 2020-12-16 NOTE — PROGRESS NOTES
Outpatient Infusion Center Short Visit Progress Note 1500 Patient admitted to [facility] ambulatory in stable condition. Assessment completed. No new concerns voiced. Covid Screening 1. Do you have any symptoms of COVID-19? SOB, coughing, fever, or generally not feeling well ? NO 
2. Have you been exposed to COVID-19 recently? NO 
3. Have you had any recent contact with family/friend that has a pending COVID test? NO Vital Signs: 
Visit Vitals /62 (BP 1 Location: Left arm, BP Patient Position: Sitting) Pulse 69 Temp 97.6 °F (36.4 °C) Resp 16 Wt 82.6 kg (182 lb 1.6 oz) SpO2 93% Breastfeeding No  
BMI 30.30 kg/m² Peripheral stick to right ac; + blood return noted, labs drawn and sent for processing. Patient has lab ivette results on file from 12/03/2020. Md said they could be used for today's injection. Lab Results: 
Calcium 9.2 Creatinine 1.24 Medications: 
Medications Administered   
 denosumab (XGEVA) injection 120 mg   
 Admin Date 
12/16/2020 Action Given Dose 
120 mg Route SubCUTAneous Administered By 
Juan Nassar Patient tolerated treatment well. Patient discharged from [place] [address] ambulatory in no distress at 1540. Patient aware of next appointment. Future Appointments Date Time Provider Isabel Andrea 12/30/2020  3:00 PM [place] INFUSION NURSE 1 [facility] REG  
1/13/2021  3:00 PM CHAIR 2 [address] REG  
1/27/2021  3:00 PM CHAIR 3 [address] REG  
2/10/2021  1:00 PM Charlette Azar, GALLO [facility]AM BS AMB

## 2020-12-16 NOTE — LETTER
12/28/2020 Patient: Zia Clinton YOB: 1945 Date of Visit: 12/16/2020 [facility] [address] [address] [employer] Dear Jo Corcoran NP, Thank you for referring Ms. Iris Riedel to [address] [place] [address] for evaluation. My notes for this consultation are attached. If you have questions, please do not hesitate to call me. I look forward to following your patient along with you.  
 
 
Sincerely, 
 
Melissa Zepeda NP

## 2020-12-16 NOTE — PROGRESS NOTES
Called pt to see if she can come back to sign the STRATA form since I was not present during her visit today it did not get signed. Also to inform her that we will refill her pain medication and will have palliative care reach back out to her to set up an appt. As to Erika Zhu wants her to establish care now for pain/symptom management with them. When they initially called, pt had too much going on.

## 2020-12-16 NOTE — PROGRESS NOTES
[address] [facility] [place] 
at [facility], [address], [address] [facility] [address], [address] 
[phone] Follow-up Note Patient: Joey Diaz MRN: [id number]  SSN: [id number] YOB: 1945  Age: 67 y.o. Sex: female Diagnosis: 1. 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 Left mastectomy with LN dissection (negative LN) at [facility] - Dr. Pilar Stewart. Treatment: 1. Letrozole, Ribociclib - started 12/2/2020 Subjective:  
  
Joey Diaz is a 67 y.o. female who I am seeing in follow up for metastatic cancer. Ms. Renetta Auguste received treatment for left sided breast cancer in the 1990's. She received systemic chemotherapy and mastectomy. She was noted to have sclerotic lesions in the bone in April and then in Sep 2020. She is suffering with bone pains in the back. She underwent a laminectomy from T2 - T4. Pathology shows ER +ve mBC. She has been taking Letrozole. She started Pansy Boers on 12/2/2020 and is tolerating it fairly well. She reports some nausea after taking the medication and has been having difficulty sleeping. The pain in her neck and sides of her chest is better. She feels better. Review of Systems: 
 
Bone pains Nausea No dyspnea No chest pain No diarrhea No heir loss Weakness in b/l LE Past Medical History:  
Diagnosis Date  Arthralgia 8/17/2017  Arthritis  Asthma Mild to Moderate  Breast cancer ([address] 8/17/2017 Onset 1997; Refusing Mammogram 6/16/17  Cancer ([address] 1997 Breast left  Cardiomyopathy ([address] 8/17/2017 Onset:1999 Ischemic; 25% - 50% (last EFx 45%) Continue with ACE/Lasix/coreg  Cataract 8/17/2017 Bilateral   
 Cellulitis 8/17/2017 Suspect local reaction to Pneumovax, treat with ice, NSAIDs or Tylenol  Chest pain at rest 8/17/2017  CHF (congestive heart failure) ([address] 2017 Stable, though weight up a little. To take 1 1/2 Lasix daily if swelling, 1 daily o/w  Chronic maxillary sinusitis 2017  Chronic pain Right knee  COPD (chronic obstructive pulmonary disease) ([address] 2017 Continue with inhalers  Diabetes ([address] Pre-diabetic  DJD (degenerative joint disease) 2017  Elevated BUN 2017  Esophagitis, reflux 2017  Fatigue 2017  GERD (gastroesophageal reflux disease)  Heart failure ([address] Cardiomyopathy, HX of MI   Hyperglycemia 2017  Hyperkalemia 2017  Hyperlipidemia 2017  Hypertension 2017  Ill-defined condition Vertigo  Ill-defined condition  HX of Urosepsis complicated by respiratory failure and pneumonnia  Myalgia 2017  Thromboembolus ([address] 1969  
 during 2nd pregnancy [facility] 2017  Vertigo, aural 2017  Vitamin D deficiency 2017 Past Surgical History:  
Procedure Laterality Date [address] [facility] JONNY  
 HX GYN Left Breast Mastectomy  HX HEENT   Bilateral Cataract  HX ORTHOPAEDIC Right 2018  
 knee replacement  HX ORTHOPAEDIC Left 2018  
 wrist surgery  HX VASCULAR ACCESS Port a cath on the right Family History Problem Relation Age of Onset  Cancer Mother  Other Father Social History Tobacco Use  Smoking status: Former Smoker Packs/day: 2.00 Years: 25.00 Pack years: 50.00 Quit date:  Years since quittin.9  Smokeless tobacco: Never Used Substance Use Topics  Alcohol use: No  
  
Prior to Admission medications Medication Sig Start Date End Date Taking? Authorizing Provider oxyCODONE IR (ROXICODONE) 5 mg immediate release tablet Take 1 Tab by mouth every four (4) hours as needed for Pain for up to 30 days. Max Daily Amount: 30 mg. 11/30/20 12/30/20 Yes Armin Quiroga NP  
ribociclib-letrozole (Kisqali Femara Co-Pack) 600 mg/day(200 mg x 3)-2.5 mg tab Take 600 mg by mouth daily. Take kisqali daily x 21 days then off 7 days, letrozole is daily 11/24/20  Yes Chinyere Branch MD  
sacubitriL-valsartan Sherrie Ludillan) 24-26 mg tablet Take 1 Tab by mouth two (2) times a day. Yes Provider, Historical  
DULoxetine (Cymbalta) 20 mg capsule Take 20 mg by mouth daily. Yes Provider, Historical  
meclizine (ANTIVERT) 12.5 mg tablet Take  by mouth three (3) times daily as needed for Dizziness. Yes Provider, Historical  
potassium chloride SR (K-TAB) 20 mEq tablet Take  by mouth. Yes Provider, Historical  
aspirin delayed-release 81 mg tablet Take  by mouth daily. Yes Provider, Historical  
polyethylene glycol (MIRALAX) 17 gram packet Take 1 Packet by mouth daily. 10/6/20  Yes Rhina Scott MD  
acetaminophen (TYLENOL) 500 mg tablet Take 500 mg by mouth every six (6) hours as needed for Pain. Yes Provider, Historical  
omeprazole (PRILOSEC) 20 mg capsule Take 1 capsule by mouth once daily 6/9/20  Yes Cirilo Azar NP  
fenofibrate nanocrystallized (TRICOR) 145 mg tablet Take 1 tablet by mouth once daily 5/22/20  Yes Hallie Church NP Anoro Ellipta 62.5-25 mcg/actuation inhaler Take 1 Puff by inhalation daily. 3/23/20  Yes Carl, MD Faizan  
cholecalciferol (VITAMIN D3) 1,000 unit cap Take 1,000 Units by mouth daily. Yes Provider, Historical  
albuterol (PROVENTIL, VENTOLIN) 90 mcg/Actuation inhaler Take 1-2 Puffs by inhalation every four (4) hours as needed for Wheezing. 5/1/11  Yes TORSTEN Ferrari Allergies Allergen Reactions  Morphine Nausea and Vomiting Objective:  
 
Visit Vitals /62 Pulse 69  
 Temp 97.6 °F (36.4 °C) Resp 16 Ht 5' 5\" (1.651 m) Wt 182 lb (82.6 kg) SpO2 93% BMI 30.29 kg/m² Pain Scale: 0 - No pain/10 Pain Location:  
 
 
Physical Exam: 
 
GENERAL: alert, cooperative, no distress, appears stated age EYE: conjunctivae/corneas clear. LYMPHATIC: Cervical, supraclavicular, and axillary nodes normal.  
LUNG: clear to auscultation bilaterally HEART: regular rate and rhythm ABDOMEN: soft, non-tender. EXTREMITIES:  extremities normal, atraumatic, no cyanosis or edema SKIN: Normal. 
NEUROLOGIC: AOx3. Gait normal.  
 
 
Physical exam and ROS has been modified from a prior visit to make it relevant and current CT Results (most recent): 
Results from [facility] [place]counter encounter on 04/17/20 CT ABD PELV W CONT Narrative EXAM:  CTA CHEST W OR W WO CONT, CT ABD PELV W CONT INDICATION:   RUQ pain, R thoracic back pain, SOB, h/o DVT 
 
COMPARISON: 8/26/2013. CHEST CTA: 
 
TECHNIQUE:  
Precontrast  images were obtained to localize the volume for acquisition. Multislice helical CT arteriography was performed from the diaphragm to the 
thoracic inlet during uneventful rapid bolus of 100 cc Isovue-370. Lung and soft 
tissue windows were generated. Coronal and sagittal images were generated and 3D post processing consisting of coronal maximum intensity images was performed. CT dose reduction was achieved through use of a standardized protocol tailored 
for this examination and automatic exposure control for dose modulation. FINDINGS: 
CHEST: 
THYROID: No nodule. MEDIASTINUM: No mass or lymphadenopathy. DOROTHY: No mass or lymphadenopathy. THORACIC AORTA: No dissection or aneurysm. MAIN PULMONARY ARTERY: There is no evidence of pulmonary embolism. TRACHEA/BRONCHI: Patent. ESOPHAGUS: No wall thickening or dilatation. HEART: Normal in size. PLEURA: No effusion or pneumothorax. LUNGS: No nodule, mass, or airspace disease. BONES: Degenerative changes are seen in the thoracic spine. Tiny sclerotic foci 
are scattered throughout the thoracic vertebral bodies. Impression IMPRESSION: No acute process or evidence of pulmonary embolism. Tiny sclerotic 
foci are scattered throughout the vertebral bodies. ABDOMEN/PELVIC CT: 
 
TECHNIQUE:  
Following the uneventful intravenous administration of 100 cc Isovue-370, thin 
axial images were obtained through the abdomen and pelvis. Coronal and sagittal 
reconstructions were generated. Oral contrast was not administered. CT dose 
reduction was achieved through use of a standardized protocol tailored for this 
examination and automatic exposure control for dose modulation. FINDINGS:  
LIVER: No mass or biliary dilatation. GALLBLADDER: Unremarkable. SPLEEN: No mass. PANCREAS: No mass or ductal dilatation. ADRENALS: Unremarkable. KIDNEYS: Left renal cysts, the largest of which measures 2.8 cm. Scarring left 
kidney. STOMACH: Unremarkable. SMALL BOWEL: No dilatation or wall thickening. COLON: Sigmoid diverticulosis. APPENDIX: Unremarkable. PERITONEUM: No ascites or pneumoperitoneum. RETROPERITONEUM: No lymphadenopathy or aortic aneurysm. REPRODUCTIVE ORGANS: The uterus is surgically absent. URINARY BLADDER: No mass or calculus. BONES: Sclerotic lesions are noted in the iliac wings bilaterally, sacrum, and 
lumbar spine. ADDITIONAL COMMENTS: N/A IMPRESSION: 
No acute abnormality. Sclerotic foci are noted concerning for osseous metastatic 
disease. Correlation with bone scan is recommended. Lab Results Component Value Date/Time WBC 6.2 10/05/2020 03:53 AM  
 HGB (POC) 12.7 09/13/2017 11:20 AM  
 HGB 8.9 (L) 10/05/2020 03:53 AM  
 Hematocrit (POC) 37 12/16/2020 03:20 PM  
 HCT 30.1 (L) 10/05/2020 03:53 AM  
 PLATELET 916 08/74/6224 03:53 AM  
 MCV 97.4 10/05/2020 03:53 AM  
 
 
 
Lab Results Component Value Date/Time  Sodium 141 10/05/2020 03:53 AM  
 Potassium 4.0 10/05/2020 03:53 AM  
 Chloride 103 10/05/2020 03:53 AM  
 CO2 35 (H) 10/05/2020 03:53 AM  
 Anion gap 3 (L) 10/05/2020 03:53 AM  
 Glucose 83 10/05/2020 03:53 AM  
 BUN 19 10/05/2020 03:53 AM  
 Creatinine 0.68 10/05/2020 03:53 AM  
 BUN/Creatinine ratio 28 (H) 10/05/2020 03:53 AM  
 GFR est AA >60 10/05/2020 03:53 AM  
 GFR est non-AA >60 10/05/2020 03:53 AM  
 Calcium 9.0 10/05/2020 03:53 AM  
 Bilirubin, total 0.5 09/21/2020 11:05 AM  
 Alk. phosphatase 124 (H) 09/21/2020 11:05 AM  
 Protein, total 7.8 09/21/2020 11:05 AM  
 Albumin 3.5 09/21/2020 11:05 AM  
 Globulin 4.3 (H) 09/21/2020 11:05 AM  
 A-G Ratio 0.8 (L) 09/21/2020 11:05 AM  
 ALT (SGPT) 25 09/21/2020 11:05 AM  
 AST (SGOT) 31 09/21/2020 11:05 AM  
 
 
 
Assessment: 1. 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 Left mastectomy with LN dissection ( negative LN) at [facility] - Dr. Briseyda Camejo. Had chemotherapy, but does not remember the oncologist. States she never took a pill following chemotherapy or surgery. Letrozole/Ribociclib - started 12/2/2020 Tolerating well 
+ mild nausea Blood counts are acceptable 2. Cancer related bone pains On Oxycodone 5 mg po q4hr as needed - Rx refilled Palliative care referral 
Joce Reyna 3. Nausea From chemotherapy Compazine as needed Plan:  
 
 
> Continue Letrozole and Kisqali 
> Continue Xgeva 
> Continue Oxycodone - Rx refilled today 
> Labs in [facility] 
> Compazine as needed 
> Palliative Medicine referral 
> Follow-up in 4 weeks I performed a history and physical examination of the patient and discussed his management with the NPP. I reviewed the NPP note and agree with the documented findings and plan of care. The patient was seen in conjunction with Ms. Narendra Hartley. Signed by: Angelo Edmond MD 
                   December 28, 2020

## 2020-12-17 NOTE — TELEPHONE ENCOUNTER
Pt very upset that she did not get to sign the consent for strata yesterday. She is limited with transportation and could not come back yesterday. She will be back on 12/30. I informed her we can sign it then and will see if  can expedite the path department to send out the specimen STAT. If it does not get done, then it is okay. We will cross the bridge when we get there if she progresses on kisqali. She stated understanding and just said she felt overwhelmed with everything but after speaking with me she felt better.

## 2020-12-24 NOTE — TELEPHONE ENCOUNTER
Called the patient and verified ID x 2. The patient stated the last time she saw Dr. Vickie Rodriguez they discussed nursing services and finding possible contacts for someone to come out and that she has a nurse, PT and OT coming to her house however she will be discharged from all three on 1/4/21 and asked if Dr. Vickie Rodriguez could help with continuing these visits. Asked who the company is that is seeing her and the patient stated that it is [employer]. The patient also stated that she has been trying to get an aide however they have not had any luck finding one in [place]. Asked what the patient needs assistance with and the patient stated that she is in a wheelchair and is still \"wobbly\" and uses a walker to ambulate and needs assistance to gain strength to walk on her own. The patient also stated that she needs help with \"light cleaning\" since she is ain a wheelchair. Informed the patient that Dr. Vickie Rodriguez and the team will be made aware of the above information and they will touch base with her next week. The patient verbalized understanding and denied any questions or concerns.

## 2020-12-24 NOTE — TELEPHONE ENCOUNTER
Outgoing call placed to patient to schedule new patient appointment - per patient she has difficulty with transportation and requested to make visit on the day that she next come in to see Dr. Rukhsana Mir or into [facility] - appointment schedule for 1/13/2021 @ 1:30 PM - as she has an [facility] appt @ 3 pm

## 2020-12-24 NOTE — TELEPHONE ENCOUNTER
Message received on nurses vm. She is asking about a nurse coming out to her after the one she has is discharged.

## 2020-12-28 NOTE — TELEPHONE ENCOUNTER
homehealth left voicemail stating they wasn't able to draw her labs today at her visit and wants to know if they can be done wed 12/30 at her appointment

## 2020-12-29 NOTE — PROGRESS NOTES
Jackson Encarnacion is a 67 y.o. female here for follow up for met right breast cancer. She is on Letrozole and Kisqali.

## 2020-12-29 NOTE — TELEPHONE ENCOUNTER
ONCOLOGY NURSE NAVIGATOR    Returned call to pt re:concerns about [employer] discharging from service. Reports having increased LE edema, denies SOB, \"not anymore than usual\"  Having some swelling in hands, unable to answer whether pants feel tighter. Is not weighing self daily \"my scale doesn't work\"       [address] [facility] [place] 60 day recert next week. PT/OT/SN currently following. SN unable to draw blood yesterday, will have drawn in [facility] tomorrow prior to Med Onc vs.  Advised of above, will look into obtaining scale through insurance. Requested SW, state they do not have SW. Having difficulty obtaining Medicaid personal care attendant, Bowen Orozco states she recently contacted 7 agencies and no Medicaid hours available. Contacted dtr, Cecily, per pt request.  Update given. Provided email from [id number] [facility] for [employer] care attendant. Dtr states as appt 12N today with Ms. De Dios DSS re: approved hours and how to obtain. Dtr states [facility]HA DC last week, encouraged to call [employer] and ask for reinstatement. 3 tabs K+ left, advised these orders would come from cardiologist. Message left with Garland Andino nurse.

## 2020-12-30 NOTE — PROGRESS NOTES
[facility] Lab Draw Note: 
Arrived - 5892 Visit Vitals BP (!) 97/53 (BP 1 Location: Right arm, BP Patient Position: Sitting) Pulse 77 Temp 97.6 °F (36.4 °C) Resp 18 Wt 82.7 kg (182 lb 6.4 oz) BMI 30.35 kg/m² Labs drawn peripherally from ac of R  arm - Pt left at 1515 - Tolerated well. Pt denies any acute problems/changes. Discharged from [facility] ambulatory. No distress. See [facility] for pending lab results.

## 2020-12-30 NOTE — PROGRESS NOTES
EKG DONE IN THE OFFICE WITH PANCHITO. PER MARY FROM DR Barrios Patella NM BONE SCAN [address] [facility] BODY as a one time order.

## 2020-12-30 NOTE — PROGRESS NOTES
[address] 
at [place] [address] [address] [facility], [address] 
[phone] Follow-up Note Patient: Noel Singer MRN: [id number]  SSN: [id number] YOB: 1945  Age: 67 y.o. Sex: female Diagnosis: 1. 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 Left mastectomy with LN dissection (negative LN) at [facility] - Dr. Arabella Clemens. Treatment: 1. Letrozole, Ribociclib - started 12/2/2020 cycle 2 day 1 Subjective:  
  
Noel Singer is a 67 y.o. female who I am seeing in follow up for metastatic cancer. Ms. Vijaya Pelayo received treatment for left sided breast cancer in the 1990's. She received systemic chemotherapy and mastectomy. She was noted to have sclerotic lesions in the bone in April and then in Sep 2020. She is suffering with bone pains in the back. She underwent a laminectomy from T2 - T4. Pathology shows ER +ve mBC. She has been taking Letrozole. She started Gurpreet Hacking on 12/2/2020 and is tolerating it fairly well. She reports some nausea after taking the medication and has been having difficulty sleeping. The pain in her neck and sides of her chest is intermittent. She feels better. She is complaining of bilateral lower extremity edema. She says this improves when her legs are elevated. Review of Systems: 
 
+Bone pains 
+ Nausea No dyspnea No chest pain No diarrhea No heir loss 
+ Weakness in b/l LE 
+ Bilateral lower extremity edema Past Medical History:  
Diagnosis Date  Arthralgia 8/17/2017  Arthritis  Asthma Mild to Moderate  Breast cancer ([address] 8/17/2017 Onset 1997; Refusing Mammogram 6/16/17  Cancer ([address] 1997 Breast left  Cardiomyopathy ([facility] 8/17/2017 Onset:1999 Ischemic; 25% - 50% (last EFx 45%) Continue with ACE/Lasix/coreg  Cataract 2017 Bilateral   
 Cellulitis 2017 Suspect local reaction to Pneumovax, treat with ice, NSAIDs or Tylenol  Chest pain at rest 2017  CHF (congestive heart failure) ([address] 2017 Stable, though weight up a little. To take 1 1/2 Lasix daily if swelling, 1 daily o/w  Chronic maxillary sinusitis 2017  Chronic pain Right knee  COPD (chronic obstructive pulmonary disease) ([address] 2017 Continue with inhalers  Diabetes ([address] Pre-diabetic  DJD (degenerative joint disease) 2017  Elevated BUN 2017  Esophagitis, reflux 2017  Fatigue 2017  GERD (gastroesophageal reflux disease)  Heart failure ([address] Cardiomyopathy, HX of MI   Hyperglycemia 2017  Hyperkalemia 2017  Hyperlipidemia 2017  Hypertension 2017  Ill-defined condition Vertigo  Ill-defined condition  HX of Urosepsis complicated by respiratory failure and pneumonnia  Myalgia 2017  Thromboembolus ([address] 1969  
 during 2nd pregnancy [facility] 2017  Vertigo, aural 2017  Vitamin D deficiency 2017 Past Surgical History:  
Procedure Laterality Date Fred 13 JONNY  
 HX GYN Left Breast Mastectomy  HX HEENT   Bilateral Cataract  HX ORTHOPAEDIC Right 2018  
 knee replacement  HX ORTHOPAEDIC Left 2018  
 wrist surgery  HX VASCULAR ACCESS Port a cath on the right Family History Problem Relation Age of Onset  Cancer Mother  Other Father Social History Tobacco Use  Smoking status: Former Smoker Packs/day: 2.00 Years: 25.00 Pack years: 50.00 Quit date:  Years since quittin.0  Smokeless tobacco: Never Used Substance Use Topics  Alcohol use: No  
  
Prior to Admission medications Medication Sig Start Date End Date Taking? Authorizing Provider amiodarone (PACERONE) 100 mg tablet Take 100 mg by mouth daily.    Yes Provider, Historical  
prochlorperazine (COMPAZINE) 10 mg tablet Take 0.5 Tabs by mouth every six (6) hours as needed for Nausea. 12/16/20   Kapinos, Griffin Gosselin, NP  
oxyCODONE IR (ROXICODONE) 5 mg immediate release tablet Take 1 Tab by mouth every four (4) hours as needed for Pain for up to 14 days. Max Daily Amount: 30 mg. 12/16/20 12/30/20  Nina Covington NP  
ribociclib-letrozole (Kisqali Femara Co-Pack) 600 mg/day(200 mg x 3)-2.5 mg tab Take 600 mg by mouth daily. Take kisqali daily x 21 days then off 7 days, letrozole is daily 11/24/20   Roman Villanueva MD  
sacubitriL-valsartan Laverna Gala) 24-26 mg tablet Take 1 Tab by mouth two (2) times a day. Provider, Historical  
DULoxetine (Cymbalta) 20 mg capsule Take 20 mg by mouth daily. Provider, Historical  
meclizine (ANTIVERT) 12.5 mg tablet Take  by mouth three (3) times daily as needed for Dizziness. Provider, Historical  
potassium chloride SR (K-TAB) 20 mEq tablet Take  by mouth. Provider, Historical  
aspirin delayed-release 81 mg tablet Take  by mouth daily. Provider, Historical  
polyethylene glycol (MIRALAX) 17 gram packet Take 1 Packet by mouth daily. 10/6/20   Benedict Cardona MD  
acetaminophen (TYLENOL) 500 mg tablet Take 500 mg by mouth every six (6) hours as needed for Pain. Provider, Historical  
omeprazole (PRILOSEC) 20 mg capsule Take 1 capsule by mouth once daily 6/9/20   Ely Azar NP  
fenofibrate nanocrystallized (TRICOR) 145 mg tablet Take 1 tablet by mouth once daily 5/22/20   Ely Azar NP Anoro Ellipta 62.5-25 mcg/actuation inhaler Take 1 Puff by inhalation daily. 3/23/20   Other, MD Faizan  
cholecalciferol (VITAMIN D3) 1,000 unit cap Take 1,000 Units by mouth daily.     Provider, Historical  
 albuterol (PROVENTIL, VENTOLIN) 90 mcg/Actuation inhaler Take 1-2 Puffs by inhalation every four (4) hours as needed for Wheezing. 5/1/11   TORSTEN Villa Allergies Allergen Reactions  Morphine Nausea and Vomiting Objective:  
 
Visit Vitals /62 Pulse 69 Temp 97.6 °F (36.4 °C) Ht 5' 5\" (1.651 m) Wt 182 lb (82.6 kg) SpO2 93% BMI 30.29 kg/m² Pain Scale: 0 - No pain/10 Pain Location:  
 
 
Physical Exam: 
 
GENERAL: alert, cooperative, no distress, appears stated age EYE: conjunctivae/corneas clear. LYMPHATIC: Cervical, supraclavicular, and axillary nodes normal.  
LUNG: clear to auscultation bilaterally HEART: regular rate and rhythm ABDOMEN: soft, non-tender. EXTREMITIES: bilateral lower extremity edema L >.R 
SKIN: Normal.  
NEUROLOGIC: AOx3. Derril Lauri Physical exam and ROS has been modified from a prior visit to make it relevant and current CT Results (most recent): 
Results from [facility] Encounter encounter on 04/17/20 CT ABD PELV W CONT Narrative EXAM:  CTA CHEST W OR W WO CONT, CT ABD PELV W CONT INDICATION:   RUQ pain, R thoracic back pain, SOB, h/o DVT 
 
COMPARISON: 8/26/2013. CHEST CTA: 
 
TECHNIQUE:  
Precontrast  images were obtained to localize the volume for acquisition. Multislice helical CT arteriography was performed from the diaphragm to the 
thoracic inlet during uneventful rapid bolus of 100 cc Isovue-370. Lung and soft 
tissue windows were generated. Coronal and sagittal images were generated and 3D post processing consisting of coronal maximum intensity images was performed. CT dose reduction was achieved through use of a standardized protocol tailored 
for this examination and automatic exposure control for dose modulation. FINDINGS: 
CHEST: 
THYROID: No nodule. MEDIASTINUM: No mass or lymphadenopathy. DOROTHY: No mass or lymphadenopathy. THORACIC AORTA: No dissection or aneurysm. MAIN PULMONARY ARTERY: There is no evidence of pulmonary embolism. TRACHEA/BRONCHI: Patent. ESOPHAGUS: No wall thickening or dilatation. HEART: Normal in size. PLEURA: No effusion or pneumothorax. LUNGS: No nodule, mass, or airspace disease. BONES: Degenerative changes are seen in the thoracic spine. Tiny sclerotic foci 
are scattered throughout the thoracic vertebral bodies. Impression IMPRESSION: No acute process or evidence of pulmonary embolism. Tiny sclerotic 
foci are scattered throughout the vertebral bodies. ABDOMEN/PELVIC CT: 
 
TECHNIQUE:  
Following the uneventful intravenous administration of 100 cc Isovue-370, thin 
axial images were obtained through the abdomen and pelvis. Coronal and sagittal 
reconstructions were generated. Oral contrast was not administered. CT dose 
reduction was achieved through use of a standardized protocol tailored for this 
examination and automatic exposure control for dose modulation. FINDINGS:  
LIVER: No mass or biliary dilatation. GALLBLADDER: Unremarkable. SPLEEN: No mass. PANCREAS: No mass or ductal dilatation. ADRENALS: Unremarkable. KIDNEYS: Left renal cysts, the largest of which measures 2.8 cm. Scarring left 
kidney. STOMACH: Unremarkable. SMALL BOWEL: No dilatation or wall thickening. COLON: Sigmoid diverticulosis. APPENDIX: Unremarkable. PERITONEUM: No ascites or pneumoperitoneum. RETROPERITONEUM: No lymphadenopathy or aortic aneurysm. REPRODUCTIVE ORGANS: The uterus is surgically absent. URINARY BLADDER: No mass or calculus. BONES: Sclerotic lesions are noted in the iliac wings bilaterally, sacrum, and 
lumbar spine. ADDITIONAL COMMENTS: N/A IMPRESSION: 
No acute abnormality. Sclerotic foci are noted concerning for osseous metastatic 
disease. Correlation with bone scan is recommended. Lab Results Component Value Date/Time  WBC 2.2 (L) 12/30/2020 03:13 PM  
 HGB (POC) 12.7 09/13/2017 11:20 AM  
 HGB 10.2 (L) 12/30/2020 03:13 PM  
 Hematocrit (POC) 37 12/16/2020 03:20 PM  
 HCT 34.2 (L) 12/30/2020 03:13 PM  
 PLATELET 386 89/83/9539 03:13 PM  
 .2 (H) 12/30/2020 03:13 PM  
 
 
 
Lab Results Component Value Date/Time Sodium 137 12/30/2020 03:13 PM  
 Potassium 4.1 12/30/2020 03:13 PM  
 Chloride 106 12/30/2020 03:13 PM  
 CO2 29 12/30/2020 03:13 PM  
 Anion gap 2 (L) 12/30/2020 03:13 PM  
 Glucose 95 12/30/2020 03:13 PM  
 BUN 16 12/30/2020 03:13 PM  
 Creatinine 1.35 (H) 12/30/2020 03:13 PM  
 BUN/Creatinine ratio 12 12/30/2020 03:13 PM  
 GFR est AA 46 (L) 12/30/2020 03:13 PM  
 GFR est non-AA 38 (L) 12/30/2020 03:13 PM  
 Calcium 7.8 (L) 12/30/2020 03:13 PM  
 Bilirubin, total 0.3 12/30/2020 03:13 PM  
 Alk. phosphatase 76 12/30/2020 03:13 PM  
 Protein, total 6.7 12/30/2020 03:13 PM  
 Albumin 2.9 (L) 12/30/2020 03:13 PM  
 Globulin 3.8 12/30/2020 03:13 PM  
 A-G Ratio 0.8 (L) 12/30/2020 03:13 PM  
 ALT (SGPT) 22 12/30/2020 03:13 PM  
 AST (SGOT) 34 12/30/2020 03:13 PM  
 
 
 
Assessment: 1. 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 Left mastectomy with LN dissection ( negative LN) at [facility] - Dr. Carlos Harry. Had chemotherapy, but does not remember the oncologist. States she never took a pill following chemotherapy or surgery. Letrozole/Ribociclib - started 12/2/2020 Tolerating well 
+ mild nausea Blood counts are acceptable EKG completed in the office - scanned into EMR and sent to Dr. López Parsons 2. Cancer related bone pains On Oxycodone 5 mg po q4hr as needed - Rx refilled Palliative care referral 
Delbert Fields 3. Nausea From chemotherapy Compazine as needed Plan:  
 
 
> Continue Letrozole and Kisqali 
> Continue Xgeva 
> Labs in [facility] 
> Palliative Medicine referral 
> EKG completed today - printed out and scanned into the EMR 
 > NM bone scan ordered 
> Follow-up in 4 weeks I performed a history and physical examination of the patient and discussed his management with the NPP. I reviewed the NPP note and agree with the documented findings and plan of care. The patient was seen in conjunction with Ms. Arteaga. Signed by: Karen Hernandez MD 
                   December 30, 2020

## 2020-12-30 NOTE — LETTER
12/30/2020 Patient: Supa Hook YOB: 1945 Date of Visit: 12/30/2020 GALLO Benitez [address] [address] [address] [employer] Dear Meka Shirley NP, Thank you for referring Ms. Walker  to [address] [place] [address] for evaluation. My notes for this consultation are attached. If you have questions, please do not hesitate to call me. I look forward to following your patient along with you.  
 
 
Sincerely, 
 
Gurpreet Feng MD

## 2020-12-31 NOTE — TELEPHONE ENCOUNTER
Pt aware. Will refill potassium x 1 and cardiology is to follow. Pt lasix got increased to 40mg BID yesterday by Cardiology. Pt also needs a refill on pain medication. VORB: POTASSIUM CHLORIDE (K-DUR) 20MEQ TAKE 1 TABLET BY MOUTH DAILY DISPENSE 30 REFILL 0  (from [facility] placed pt on this).

## 2020-12-31 NOTE — TELEPHONE ENCOUNTER
----- Message from Oneida Moore MD sent at 12/31/2020  9:21 AM EST -----  Gr 1 neutropenia. Continue Ribociclib. Also let her know EKG is ok. Refill Oxycodone.

## 2020-12-31 NOTE — PROGRESS NOTES
Called pt. HIPAA verified by two patient identifiers. Lasix 20mg is now 40mg BID. We will refill her potassium now and Cardiology will follow up with that.

## 2021-01-01 ENCOUNTER — OFFICE VISIT (OUTPATIENT)
Dept: PALLATIVE CARE | Age: 76
End: 2021-01-01
Payer: MEDICARE

## 2021-01-01 ENCOUNTER — HOSPITAL ENCOUNTER (OUTPATIENT)
Dept: INFUSION THERAPY | Age: 76
End: 2021-01-01

## 2021-01-01 ENCOUNTER — TELEPHONE (OUTPATIENT)
Dept: INTERNAL MEDICINE CLINIC | Age: 76
End: 2021-01-01

## 2021-01-01 ENCOUNTER — HOSPITAL ENCOUNTER (INPATIENT)
Age: 76
LOS: 9 days | Discharge: HOSPICE | DRG: 682 | End: 2021-03-02
Attending: EMERGENCY MEDICINE | Admitting: SURGERY
Payer: MEDICARE

## 2021-01-01 ENCOUNTER — HOSPITAL ENCOUNTER (OUTPATIENT)
Dept: INFUSION THERAPY | Age: 76
Discharge: HOME | End: 2021-01-13
Payer: MEDICARE

## 2021-01-01 ENCOUNTER — OFFICE VISIT (OUTPATIENT)
Dept: ONCOLOGY | Age: 76
End: 2021-01-01
Payer: MEDICARE

## 2021-01-01 ENCOUNTER — TELEPHONE (OUTPATIENT)
Dept: ONCOLOGY | Age: 76
End: 2021-01-01

## 2021-01-01 ENCOUNTER — APPOINTMENT (OUTPATIENT)
Dept: ULTRASOUND IMAGING | Age: 76
DRG: 682 | End: 2021-01-01
Attending: INTERNAL MEDICINE
Payer: MEDICARE

## 2021-01-01 ENCOUNTER — APPOINTMENT (OUTPATIENT)
Dept: GENERAL RADIOLOGY | Age: 76
DRG: 682 | End: 2021-01-01
Attending: GENERAL ACUTE CARE HOSPITAL
Payer: MEDICARE

## 2021-01-01 ENCOUNTER — TELEPHONE (OUTPATIENT)
Dept: PALLATIVE CARE | Age: 76
End: 2021-01-01

## 2021-01-01 ENCOUNTER — APPOINTMENT (OUTPATIENT)
Dept: NON INVASIVE DIAGNOSTICS | Age: 76
DRG: 682 | End: 2021-01-01
Attending: NURSE PRACTITIONER
Payer: MEDICARE

## 2021-01-01 ENCOUNTER — APPOINTMENT (OUTPATIENT)
Dept: GENERAL RADIOLOGY | Age: 76
DRG: 682 | End: 2021-01-01
Attending: EMERGENCY MEDICINE
Payer: MEDICARE

## 2021-01-01 ENCOUNTER — HOSPITAL ENCOUNTER (OUTPATIENT)
Dept: NUCLEAR MEDICINE | Age: 76
Discharge: HOME | End: 2021-01-21
Attending: INTERNAL MEDICINE
Payer: MEDICARE

## 2021-01-01 ENCOUNTER — APPOINTMENT (OUTPATIENT)
Dept: GENERAL RADIOLOGY | Age: 76
DRG: 682 | End: 2021-01-01
Attending: INTERNAL MEDICINE
Payer: MEDICARE

## 2021-01-01 ENCOUNTER — HOSPITAL ENCOUNTER (INPATIENT)
Age: 76
LOS: 1 days | DRG: 951 | End: 2021-03-03
Attending: FAMILY MEDICINE | Admitting: FAMILY MEDICINE
Payer: OTHER MISCELLANEOUS

## 2021-01-01 ENCOUNTER — VIRTUAL VISIT (OUTPATIENT)
Dept: PALLATIVE CARE | Age: 76
End: 2021-01-01
Payer: MEDICARE

## 2021-01-01 ENCOUNTER — HOSPITAL ENCOUNTER (OUTPATIENT)
Dept: INFUSION THERAPY | Age: 76
Discharge: HOME | End: 2021-01-27
Payer: MEDICARE

## 2021-01-01 ENCOUNTER — HOSPICE ADMISSION (OUTPATIENT)
Dept: HOSPICE | Facility: HOSPICE | Age: 76
End: 2021-01-01
Payer: MEDICARE

## 2021-01-01 VITALS
TEMPERATURE: 97.4 F | BODY MASS INDEX: 30.42 KG/M2 | HEIGHT: 65 IN | HEART RATE: 67 BPM | SYSTOLIC BLOOD PRESSURE: 111 MMHG | OXYGEN SATURATION: 100 % | RESPIRATION RATE: 18 BRPM | WEIGHT: 182.6 LBS | DIASTOLIC BLOOD PRESSURE: 56 MMHG

## 2021-01-01 VITALS
RESPIRATION RATE: 14 BRPM | TEMPERATURE: 96.8 F | SYSTOLIC BLOOD PRESSURE: 48 MMHG | OXYGEN SATURATION: 97 % | HEART RATE: 74 BPM | DIASTOLIC BLOOD PRESSURE: 30 MMHG

## 2021-01-01 VITALS
RESPIRATION RATE: 16 BRPM | DIASTOLIC BLOOD PRESSURE: 52 MMHG | TEMPERATURE: 97.3 F | SYSTOLIC BLOOD PRESSURE: 92 MMHG | OXYGEN SATURATION: 96 % | HEART RATE: 66 BPM

## 2021-01-01 VITALS
HEIGHT: 65 IN | HEART RATE: 78 BPM | BODY MASS INDEX: 28.06 KG/M2 | OXYGEN SATURATION: 98 % | WEIGHT: 168.43 LBS | SYSTOLIC BLOOD PRESSURE: 112 MMHG | RESPIRATION RATE: 19 BRPM | TEMPERATURE: 98 F | DIASTOLIC BLOOD PRESSURE: 58 MMHG

## 2021-01-01 VITALS
RESPIRATION RATE: 18 BRPM | WEIGHT: 181.2 LBS | BODY MASS INDEX: 30.19 KG/M2 | TEMPERATURE: 97.2 F | SYSTOLIC BLOOD PRESSURE: 98 MMHG | OXYGEN SATURATION: 89 % | HEART RATE: 70 BPM | DIASTOLIC BLOOD PRESSURE: 62 MMHG | HEIGHT: 65 IN

## 2021-01-01 VITALS
SYSTOLIC BLOOD PRESSURE: 92 MMHG | RESPIRATION RATE: 16 BRPM | BODY MASS INDEX: 30.39 KG/M2 | HEART RATE: 66 BPM | HEIGHT: 65 IN | TEMPERATURE: 97.3 F | OXYGEN SATURATION: 96 % | DIASTOLIC BLOOD PRESSURE: 52 MMHG

## 2021-01-01 DIAGNOSIS — C50.911 BREAST CANCER METASTASIZED TO BONE, RIGHT (HCC): ICD-10-CM

## 2021-01-01 DIAGNOSIS — Z09 CHEMOTHERAPY FOLLOW-UP EXAMINATION: ICD-10-CM

## 2021-01-01 DIAGNOSIS — C50.911 BREAST CANCER METASTASIZED TO BONE, RIGHT (HCC): Primary | ICD-10-CM

## 2021-01-01 DIAGNOSIS — G89.3 PAIN DUE TO MALIGNANT NEOPLASM METASTATIC TO BONE (HCC): ICD-10-CM

## 2021-01-01 DIAGNOSIS — R11.0 CHEMOTHERAPY-INDUCED NAUSEA: ICD-10-CM

## 2021-01-01 DIAGNOSIS — C79.51 PAIN DUE TO MALIGNANT NEOPLASM METASTATIC TO BONE (HCC): ICD-10-CM

## 2021-01-01 DIAGNOSIS — C50.919 MALIGNANT NEOPLASM OF BREAST IN FEMALE, ESTROGEN RECEPTOR POSITIVE, UNSPECIFIED LATERALITY, UNSPECIFIED SITE OF BREAST (HCC): ICD-10-CM

## 2021-01-01 DIAGNOSIS — G89.3 PAIN DUE TO MALIGNANT NEOPLASM METASTATIC TO BONE (HCC): Primary | ICD-10-CM

## 2021-01-01 DIAGNOSIS — M54.32 SCIATICA OF LEFT SIDE: ICD-10-CM

## 2021-01-01 DIAGNOSIS — Z71.89 GOALS OF CARE, COUNSELING/DISCUSSION: ICD-10-CM

## 2021-01-01 DIAGNOSIS — K59.03 DRUG-INDUCED CONSTIPATION: ICD-10-CM

## 2021-01-01 DIAGNOSIS — T45.1X5A CHEMOTHERAPY-INDUCED NAUSEA: ICD-10-CM

## 2021-01-01 DIAGNOSIS — C79.51 BREAST CANCER METASTASIZED TO BONE, RIGHT (HCC): Primary | ICD-10-CM

## 2021-01-01 DIAGNOSIS — C50.919 MALIGNANT NEOPLASM OF BREAST IN FEMALE, ESTROGEN RECEPTOR POSITIVE, UNSPECIFIED LATERALITY, UNSPECIFIED SITE OF BREAST (HCC): Primary | ICD-10-CM

## 2021-01-01 DIAGNOSIS — C79.51 BREAST CANCER METASTASIZED TO BONE, RIGHT (HCC): ICD-10-CM

## 2021-01-01 DIAGNOSIS — Z51.5 END OF LIFE CARE: ICD-10-CM

## 2021-01-01 DIAGNOSIS — E83.51 HYPOCALCEMIA: ICD-10-CM

## 2021-01-01 DIAGNOSIS — D64.9 ANEMIA, UNSPECIFIED TYPE: ICD-10-CM

## 2021-01-01 DIAGNOSIS — Z17.0 MALIGNANT NEOPLASM OF BREAST IN FEMALE, ESTROGEN RECEPTOR POSITIVE, UNSPECIFIED LATERALITY, UNSPECIFIED SITE OF BREAST (HCC): ICD-10-CM

## 2021-01-01 DIAGNOSIS — E87.5 HYPERKALEMIA: ICD-10-CM

## 2021-01-01 DIAGNOSIS — R79.89 BLOOD CREATININE INCREASED COMPARED WITH PRIOR MEASUREMENT: ICD-10-CM

## 2021-01-01 DIAGNOSIS — G89.3 CANCER RELATED PAIN: ICD-10-CM

## 2021-01-01 DIAGNOSIS — M54.9 UPPER BACK PAIN: ICD-10-CM

## 2021-01-01 DIAGNOSIS — I95.9 HYPOTENSION, UNSPECIFIED HYPOTENSION TYPE: Primary | ICD-10-CM

## 2021-01-01 DIAGNOSIS — Z17.0 MALIGNANT NEOPLASM OF BREAST IN FEMALE, ESTROGEN RECEPTOR POSITIVE, UNSPECIFIED LATERALITY, UNSPECIFIED SITE OF BREAST (HCC): Primary | ICD-10-CM

## 2021-01-01 DIAGNOSIS — R53.1 WEAKNESS: ICD-10-CM

## 2021-01-01 DIAGNOSIS — C79.51 PAIN DUE TO MALIGNANT NEOPLASM METASTATIC TO BONE (HCC): Primary | ICD-10-CM

## 2021-01-01 LAB
25(OH)D3 SERPL-MCNC: 21.3 NG/ML (ref 30–100)
ABO + RH BLD: NORMAL
ALBUMIN SERPL-MCNC: 2.2 G/DL (ref 3.5–5)
ALBUMIN SERPL-MCNC: 2.7 G/DL (ref 3.5–5)
ALBUMIN SERPL-MCNC: 2.9 G/DL (ref 3.5–5)
ALBUMIN SERPL-MCNC: 2.9 G/DL (ref 3.5–5)
ALBUMIN SERPL-MCNC: 3.3 G/DL (ref 3.5–5)
ALBUMIN/GLOB SERPL: 0.7 {RATIO} (ref 1.1–2.2)
ALBUMIN/GLOB SERPL: 1.4 {RATIO} (ref 1.1–2.2)
ALP SERPL-CCNC: 42 U/L (ref 45–117)
ALP SERPL-CCNC: 43 U/L (ref 45–117)
ALP SERPL-CCNC: 59 U/L (ref 45–117)
ALP SERPL-CCNC: 70 U/L (ref 45–117)
ALT SERPL-CCNC: 23 U/L (ref 12–78)
ALT SERPL-CCNC: 24 U/L (ref 12–78)
ALT SERPL-CCNC: 24 U/L (ref 12–78)
ALT SERPL-CCNC: 968 U/L (ref 12–78)
ANION GAP BLD CALC-SCNC: 17 MMOL/L (ref 10–20)
ANION GAP BLD CALC-SCNC: 17 MMOL/L (ref 10–20)
ANION GAP SERPL CALC-SCNC: 11 MMOL/L (ref 5–15)
ANION GAP SERPL CALC-SCNC: 12 MMOL/L (ref 5–15)
ANION GAP SERPL CALC-SCNC: 5 MMOL/L (ref 5–15)
ANION GAP SERPL CALC-SCNC: 6 MMOL/L (ref 5–15)
ANION GAP SERPL CALC-SCNC: 6 MMOL/L (ref 5–15)
ANION GAP SERPL CALC-SCNC: 7 MMOL/L (ref 5–15)
ANION GAP SERPL CALC-SCNC: 8 MMOL/L (ref 5–15)
ANION GAP SERPL CALC-SCNC: 9 MMOL/L (ref 5–15)
APPEARANCE UR: ABNORMAL
ARTERIAL PATENCY WRIST A: ABNORMAL
ARTERIAL PATENCY WRIST A: YES
AST SERPL-CCNC: 52 U/L (ref 15–37)
AST SERPL-CCNC: 53 U/L (ref 15–37)
AST SERPL-CCNC: 62 U/L (ref 15–37)
AST SERPL-CCNC: >2000 U/L (ref 15–37)
ATRIAL RATE: 70 BPM
ATRIAL RATE: 89 BPM
BACTERIA SPEC CULT: NORMAL
BACTERIA SPEC CULT: NORMAL
BACTERIA URNS QL MICRO: ABNORMAL /HPF
BASE DEFICIT BLD-SCNC: 13 MMOL/L
BASE DEFICIT BLD-SCNC: 3 MMOL/L
BASE DEFICIT BLD-SCNC: 6 MMOL/L
BASE DEFICIT BLD-SCNC: 7 MMOL/L
BASOPHILS # BLD: 0 K/UL (ref 0–0.1)
BASOPHILS # BLD: 0.1 K/UL (ref 0–0.1)
BASOPHILS NFR BLD: 0 % (ref 0–1)
BASOPHILS NFR BLD: 1 % (ref 0–1)
BASOPHILS NFR BLD: 1 % (ref 0–1)
BASOPHILS NFR BLD: 2 % (ref 0–1)
BASOPHILS NFR BLD: 3 % (ref 0–1)
BDY SITE: ABNORMAL
BILIRUB DIRECT SERPL-MCNC: 0.1 MG/DL (ref 0–0.2)
BILIRUB SERPL-MCNC: 0.4 MG/DL (ref 0.2–1)
BILIRUB SERPL-MCNC: 0.5 MG/DL (ref 0.2–1)
BILIRUB SERPL-MCNC: 0.6 MG/DL (ref 0.2–1)
BILIRUB SERPL-MCNC: 3.2 MG/DL (ref 0.2–1)
BILIRUB UR QL: NEGATIVE
BLD PROD TYP BPU: NORMAL
BLD PROD TYP BPU: NORMAL
BLOOD GROUP ANTIBODIES SERPL: NORMAL
BPU ID: NORMAL
BPU ID: NORMAL
BUN BLD-MCNC: 24 MG/DL (ref 9–20)
BUN BLD-MCNC: 25 MG/DL (ref 9–20)
BUN SERPL-MCNC: 16 MG/DL (ref 6–20)
BUN SERPL-MCNC: 17 MG/DL (ref 6–20)
BUN SERPL-MCNC: 23 MG/DL (ref 6–20)
BUN SERPL-MCNC: 24 MG/DL (ref 6–20)
BUN SERPL-MCNC: 27 MG/DL (ref 6–20)
BUN SERPL-MCNC: 30 MG/DL (ref 6–20)
BUN SERPL-MCNC: 31 MG/DL (ref 6–20)
BUN SERPL-MCNC: 43 MG/DL (ref 6–20)
BUN SERPL-MCNC: 55 MG/DL (ref 6–20)
BUN SERPL-MCNC: 65 MG/DL (ref 6–20)
BUN SERPL-MCNC: 72 MG/DL (ref 6–20)
BUN SERPL-MCNC: 74 MG/DL (ref 6–20)
BUN SERPL-MCNC: 75 MG/DL (ref 6–20)
BUN SERPL-MCNC: 80 MG/DL (ref 6–20)
BUN SERPL-MCNC: 85 MG/DL (ref 6–20)
BUN/CREAT SERPL: 11 (ref 12–20)
BUN/CREAT SERPL: 13 (ref 12–20)
BUN/CREAT SERPL: 14 (ref 12–20)
BUN/CREAT SERPL: 15 (ref 12–20)
BUN/CREAT SERPL: 21 (ref 12–20)
BUN/CREAT SERPL: 27 (ref 12–20)
BUN/CREAT SERPL: 28 (ref 12–20)
BUN/CREAT SERPL: 28 (ref 12–20)
BUN/CREAT SERPL: 29 (ref 12–20)
BUN/CREAT SERPL: 29 (ref 12–20)
BUN/CREAT SERPL: 31 (ref 12–20)
BUN/CREAT SERPL: 31 (ref 12–20)
CA-I BLD-MCNC: 0.89 MMOL/L (ref 1.12–1.32)
CA-I BLD-MCNC: 0.93 MMOL/L (ref 1.12–1.32)
CA-I BLD-SCNC: 0.93 MMOL/L (ref 1.12–1.32)
CA-I BLD-SCNC: 0.94 MMOL/L (ref 1.12–1.32)
CA-I BLD-SCNC: 1.15 MMOL/L (ref 1.12–1.32)
CA-I BLD-SCNC: 1.15 MMOL/L (ref 1.12–1.32)
CALCIUM SERPL-MCNC: 5.7 MG/DL (ref 8.5–10.1)
CALCIUM SERPL-MCNC: 6.2 MG/DL (ref 8.5–10.1)
CALCIUM SERPL-MCNC: 6.2 MG/DL (ref 8.5–10.1)
CALCIUM SERPL-MCNC: 6.3 MG/DL (ref 8.5–10.1)
CALCIUM SERPL-MCNC: 6.3 MG/DL (ref 8.5–10.1)
CALCIUM SERPL-MCNC: 6.6 MG/DL (ref 8.5–10.1)
CALCIUM SERPL-MCNC: 6.7 MG/DL (ref 8.5–10.1)
CALCIUM SERPL-MCNC: 6.8 MG/DL (ref 8.5–10.1)
CALCIUM SERPL-MCNC: 6.8 MG/DL (ref 8.5–10.1)
CALCIUM SERPL-MCNC: 6.9 MG/DL (ref 8.5–10.1)
CALCIUM SERPL-MCNC: 6.9 MG/DL (ref 8.5–10.1)
CALCIUM SERPL-MCNC: 7 MG/DL (ref 8.5–10.1)
CALCIUM SERPL-MCNC: 7 MG/DL (ref 8.5–10.1)
CALCIUM SERPL-MCNC: 7.3 MG/DL (ref 8.5–10.1)
CALCIUM SERPL-MCNC: 7.4 MG/DL (ref 8.5–10.1)
CALCIUM SERPL-MCNC: 8.1 MG/DL (ref 8.5–10.1)
CALCULATED P AXIS, ECG09: 66 DEGREES
CALCULATED P AXIS, ECG09: 71 DEGREES
CALCULATED R AXIS, ECG10: -10 DEGREES
CALCULATED R AXIS, ECG10: -30 DEGREES
CALCULATED T AXIS, ECG11: -126 DEGREES
CALCULATED T AXIS, ECG11: 178 DEGREES
CHLORIDE BLD-SCNC: 101 MMOL/L (ref 98–107)
CHLORIDE BLD-SCNC: 102 MMOL/L (ref 98–107)
CHLORIDE SERPL-SCNC: 102 MMOL/L (ref 97–108)
CHLORIDE SERPL-SCNC: 103 MMOL/L (ref 97–108)
CHLORIDE SERPL-SCNC: 104 MMOL/L (ref 97–108)
CHLORIDE SERPL-SCNC: 105 MMOL/L (ref 97–108)
CHLORIDE SERPL-SCNC: 106 MMOL/L (ref 97–108)
CHLORIDE SERPL-SCNC: 106 MMOL/L (ref 97–108)
CHLORIDE SERPL-SCNC: 107 MMOL/L (ref 97–108)
CHLORIDE SERPL-SCNC: 107 MMOL/L (ref 97–108)
CHLORIDE SERPL-SCNC: 108 MMOL/L (ref 97–108)
CHLORIDE SERPL-SCNC: 108 MMOL/L (ref 97–108)
CHLORIDE SERPL-SCNC: 109 MMOL/L (ref 97–108)
CHLORIDE UR-SCNC: 24 MMOL/L
CO2 BLD-SCNC: 23 MMOL/L (ref 21–32)
CO2 BLD-SCNC: 23 MMOL/L (ref 21–32)
CO2 SERPL-SCNC: 20 MMOL/L (ref 21–32)
CO2 SERPL-SCNC: 20 MMOL/L (ref 21–32)
CO2 SERPL-SCNC: 22 MMOL/L (ref 21–32)
CO2 SERPL-SCNC: 22 MMOL/L (ref 21–32)
CO2 SERPL-SCNC: 23 MMOL/L (ref 21–32)
CO2 SERPL-SCNC: 24 MMOL/L (ref 21–32)
CO2 SERPL-SCNC: 25 MMOL/L (ref 21–32)
CO2 SERPL-SCNC: 26 MMOL/L (ref 21–32)
CO2 SERPL-SCNC: 26 MMOL/L (ref 21–32)
CO2 SERPL-SCNC: 27 MMOL/L (ref 21–32)
CO2 SERPL-SCNC: 27 MMOL/L (ref 21–32)
CO2 SERPL-SCNC: 28 MMOL/L (ref 21–32)
CO2 SERPL-SCNC: 30 MMOL/L (ref 21–32)
COLOR UR: ABNORMAL
COMMENT, HOLDF: NORMAL
COMMENT, HOLDF: NORMAL
CREAT BLD-MCNC: 1.6 MG/DL (ref 0.6–1.3)
CREAT BLD-MCNC: 1.7 MG/DL (ref 0.6–1.3)
CREAT SERPL-MCNC: 1.2 MG/DL (ref 0.55–1.02)
CREAT SERPL-MCNC: 1.2 MG/DL (ref 0.55–1.02)
CREAT SERPL-MCNC: 1.75 MG/DL (ref 0.55–1.02)
CREAT SERPL-MCNC: 1.77 MG/DL (ref 0.55–1.02)
CREAT SERPL-MCNC: 1.94 MG/DL (ref 0.55–1.02)
CREAT SERPL-MCNC: 2.01 MG/DL (ref 0.55–1.02)
CREAT SERPL-MCNC: 2.02 MG/DL (ref 0.55–1.02)
CREAT SERPL-MCNC: 2.02 MG/DL (ref 0.55–1.02)
CREAT SERPL-MCNC: 2.26 MG/DL (ref 0.55–1.02)
CREAT SERPL-MCNC: 2.32 MG/DL (ref 0.55–1.02)
CREAT SERPL-MCNC: 2.59 MG/DL (ref 0.55–1.02)
CREAT SERPL-MCNC: 2.61 MG/DL (ref 0.55–1.02)
CREAT SERPL-MCNC: 2.74 MG/DL (ref 0.55–1.02)
CREAT SERPL-MCNC: 2.74 MG/DL (ref 0.55–1.02)
CREAT SERPL-MCNC: 2.77 MG/DL (ref 0.55–1.02)
CREAT UR-MCNC: 57.8 MG/DL
CROSSMATCH RESULT,%XM: NORMAL
CROSSMATCH RESULT,%XM: NORMAL
DIAGNOSIS, 93000: NORMAL
DIAGNOSIS, 93000: NORMAL
DIFFERENTIAL METHOD BLD: ABNORMAL
ECHO AO ROOT DIAM: 3.08 CM
ECHO AV AREA PEAK VELOCITY: 2.37 CM2
ECHO AV AREA/BSA PEAK VELOCITY: 1.3 CM2/M2
ECHO AV CUSP MM: 1.68 CM
ECHO AV PEAK GRADIENT: 10.67 MMHG
ECHO AV PEAK VELOCITY: 163.35 CM/S
ECHO EST RA PRESSURE: 10 MMHG
ECHO LA AREA 4C: 13.38 CM2
ECHO LA TO AORTIC ROOT RATIO: 0.88
ECHO LA TO AORTIC ROOT RATIO: 0.88
ECHO LA VOL 2C: 64.45 ML (ref 22–52)
ECHO LA VOL 4C: 26.09 ML (ref 22–52)
ECHO LA VOL BP: 50.7 ML (ref 22–52)
ECHO LA VOL/BSA BIPLANE: 27.63 ML/M2 (ref 16–28)
ECHO LA VOLUME INDEX A2C: 35.13 ML/M2 (ref 16–28)
ECHO LA VOLUME INDEX A4C: 14.22 ML/M2 (ref 16–28)
ECHO LVOT DIAM: 2.09 CM
ECHO LVOT PEAK GRADIENT: 5.05 MMHG
ECHO LVOT PEAK VELOCITY: 112.37 CM/S
ECHO MV A VELOCITY: 93.68 CM/S
ECHO MV E DECELERATION TIME (DT): 206.52 MS
ECHO MV E VELOCITY: 94.68 CM/S
ECHO MV E/A RATIO: 1.01
ECHO RIGHT VENTRICULAR SYSTOLIC PRESSURE (RVSP): 35.74 MMHG
ECHO TV REGURGITANT MAX VELOCITY: 253.4 CM/S
ECHO TV REGURGITANT MAX VELOCITY: 505.83 CM/S
ECHO TV REGURGITANT PEAK GRADIENT: 25.74 MMHG
EOSINOPHIL # BLD: 0 K/UL (ref 0–0.4)
EOSINOPHIL # BLD: 0.1 K/UL (ref 0–0.4)
EOSINOPHIL NFR BLD: 0 % (ref 0–7)
EOSINOPHIL NFR BLD: 2 % (ref 0–7)
EPITH CASTS URNS QL MICRO: ABNORMAL /LPF
ERYTHROCYTE [DISTWIDTH] IN BLOOD BY AUTOMATED COUNT: 18.7 % (ref 11.5–14.5)
ERYTHROCYTE [DISTWIDTH] IN BLOOD BY AUTOMATED COUNT: 21 % (ref 11.5–14.5)
ERYTHROCYTE [DISTWIDTH] IN BLOOD BY AUTOMATED COUNT: 21.3 % (ref 11.5–14.5)
ERYTHROCYTE [DISTWIDTH] IN BLOOD BY AUTOMATED COUNT: 21.5 % (ref 11.5–14.5)
ERYTHROCYTE [DISTWIDTH] IN BLOOD BY AUTOMATED COUNT: 21.8 % (ref 11.5–14.5)
ERYTHROCYTE [DISTWIDTH] IN BLOOD BY AUTOMATED COUNT: 21.8 % (ref 11.5–14.5)
ERYTHROCYTE [DISTWIDTH] IN BLOOD BY AUTOMATED COUNT: 22 % (ref 11.5–14.5)
ERYTHROCYTE [DISTWIDTH] IN BLOOD BY AUTOMATED COUNT: 22.3 % (ref 11.5–14.5)
ERYTHROCYTE [DISTWIDTH] IN BLOOD BY AUTOMATED COUNT: 22.6 % (ref 11.5–14.5)
ERYTHROCYTE [DISTWIDTH] IN BLOOD BY AUTOMATED COUNT: 23.4 % (ref 11.5–14.5)
ERYTHROCYTE [DISTWIDTH] IN BLOOD BY AUTOMATED COUNT: 24 % (ref 11.5–14.5)
FOLATE SERPL-MCNC: 2.1 NG/ML (ref 5–21)
FOLATE SERPL-MCNC: 2.1 NG/ML (ref 5–21)
GAS FLOW.O2 O2 DELIVERY SYS: ABNORMAL L/MIN
GAS FLOW.O2 SETTING OXYMISER: 15 L/M
GAS FLOW.O2 SETTING OXYMISER: 4 L/M
GAS FLOW.O2 SETTING OXYMISER: 4 L/M
GAS FLOW.O2 SETTING OXYMISER: 5 L/M
GLOBULIN SER CALC-MCNC: 2.1 G/DL (ref 2–4)
GLOBULIN SER CALC-MCNC: 3.2 G/DL (ref 2–4)
GLOBULIN SER CALC-MCNC: 3.9 G/DL (ref 2–4)
GLOBULIN SER CALC-MCNC: 4.2 G/DL (ref 2–4)
GLUCOSE BLD STRIP.AUTO-MCNC: 154 MG/DL (ref 65–100)
GLUCOSE BLD STRIP.AUTO-MCNC: 219 MG/DL (ref 65–100)
GLUCOSE BLD-MCNC: 103 MG/DL (ref 65–100)
GLUCOSE BLD-MCNC: 103 MG/DL (ref 65–100)
GLUCOSE SERPL-MCNC: 103 MG/DL (ref 65–100)
GLUCOSE SERPL-MCNC: 108 MG/DL (ref 65–100)
GLUCOSE SERPL-MCNC: 118 MG/DL (ref 65–100)
GLUCOSE SERPL-MCNC: 138 MG/DL (ref 65–100)
GLUCOSE SERPL-MCNC: 146 MG/DL (ref 65–100)
GLUCOSE SERPL-MCNC: 161 MG/DL (ref 65–100)
GLUCOSE SERPL-MCNC: 172 MG/DL (ref 65–100)
GLUCOSE SERPL-MCNC: 173 MG/DL (ref 65–100)
GLUCOSE SERPL-MCNC: 189 MG/DL (ref 65–100)
GLUCOSE SERPL-MCNC: 192 MG/DL (ref 65–100)
GLUCOSE SERPL-MCNC: 210 MG/DL (ref 65–100)
GLUCOSE SERPL-MCNC: 235 MG/DL (ref 65–100)
GLUCOSE SERPL-MCNC: 239 MG/DL (ref 65–100)
GLUCOSE SERPL-MCNC: 89 MG/DL (ref 65–100)
GLUCOSE SERPL-MCNC: 96 MG/DL (ref 65–100)
GLUCOSE UR STRIP.AUTO-MCNC: NEGATIVE MG/DL
HCO3 BLD-SCNC: 15.6 MMOL/L (ref 22–26)
HCO3 BLD-SCNC: 20.3 MMOL/L (ref 22–26)
HCO3 BLD-SCNC: 22.6 MMOL/L (ref 22–26)
HCO3 BLD-SCNC: 22.7 MMOL/L (ref 22–26)
HCT VFR BLD AUTO: 17.1 % (ref 35–47)
HCT VFR BLD AUTO: 22.3 % (ref 35–47)
HCT VFR BLD AUTO: 24.1 % (ref 35–47)
HCT VFR BLD AUTO: 28.3 % (ref 35–47)
HCT VFR BLD AUTO: 29 % (ref 35–47)
HCT VFR BLD AUTO: 29.9 % (ref 35–47)
HCT VFR BLD AUTO: 30.3 % (ref 35–47)
HCT VFR BLD AUTO: 30.6 % (ref 35–47)
HCT VFR BLD AUTO: 32.4 % (ref 35–47)
HCT VFR BLD AUTO: 34 % (ref 35–47)
HCT VFR BLD AUTO: 34.3 % (ref 35–47)
HCT VFR BLD CALC: 36 % (ref 35–47)
HCT VFR BLD CALC: 36 % (ref 35–47)
HEMOCCULT STL QL: NEGATIVE
HGB BLD-MCNC: 10 G/DL (ref 11.5–16)
HGB BLD-MCNC: 10.4 G/DL (ref 11.5–16)
HGB BLD-MCNC: 10.5 G/DL (ref 11.5–16)
HGB BLD-MCNC: 10.9 G/DL (ref 11.5–16)
HGB BLD-MCNC: 5.5 G/DL (ref 11.5–16)
HGB BLD-MCNC: 7.2 G/DL (ref 11.5–16)
HGB BLD-MCNC: 7.7 G/DL (ref 11.5–16)
HGB BLD-MCNC: 9.3 G/DL (ref 11.5–16)
HGB BLD-MCNC: 9.4 G/DL (ref 11.5–16)
HGB BLD-MCNC: 9.7 G/DL (ref 11.5–16)
HGB BLD-MCNC: 9.8 G/DL (ref 11.5–16)
HGB UR QL STRIP: ABNORMAL
HISTORY CHECKED?,CKHIST: NORMAL
HISTORY CHECKED?,CKHIST: NORMAL
HYALINE CASTS URNS QL MICRO: ABNORMAL /LPF (ref 0–5)
IMM GRANULOCYTES # BLD AUTO: 0 K/UL (ref 0–0.04)
IMM GRANULOCYTES NFR BLD AUTO: 0 % (ref 0–0.5)
IRON SATN MFR SERPL: 22 % (ref 20–50)
IRON SERPL-MCNC: 24 UG/DL (ref 35–150)
KETONES UR QL STRIP.AUTO: NEGATIVE MG/DL
LACTATE BLD-SCNC: 0.38 MMOL/L (ref 0.4–2)
LACTATE SERPL-SCNC: 0.3 MMOL/L (ref 0.4–2)
LEUKOCYTE ESTERASE UR QL STRIP.AUTO: ABNORMAL
LYMPHOCYTES # BLD: 0.4 K/UL (ref 0.8–3.5)
LYMPHOCYTES # BLD: 0.5 K/UL (ref 0.8–3.5)
LYMPHOCYTES # BLD: 0.6 K/UL (ref 0.8–3.5)
LYMPHOCYTES # BLD: 0.7 K/UL (ref 0.8–3.5)
LYMPHOCYTES # BLD: 1 K/UL (ref 0.8–3.5)
LYMPHOCYTES # BLD: 1.2 K/UL (ref 0.8–3.5)
LYMPHOCYTES # BLD: 1.9 K/UL (ref 0.8–3.5)
LYMPHOCYTES NFR BLD: 11 % (ref 12–49)
LYMPHOCYTES NFR BLD: 24 % (ref 12–49)
LYMPHOCYTES NFR BLD: 25 % (ref 12–49)
LYMPHOCYTES NFR BLD: 32 % (ref 12–49)
LYMPHOCYTES NFR BLD: 33 % (ref 12–49)
LYMPHOCYTES NFR BLD: 36 % (ref 12–49)
LYMPHOCYTES NFR BLD: 5 % (ref 12–49)
MAGNESIUM SERPL-MCNC: 1.1 MG/DL (ref 1.6–2.4)
MAGNESIUM SERPL-MCNC: 1.9 MG/DL (ref 1.6–2.4)
MAGNESIUM SERPL-MCNC: 2.1 MG/DL (ref 1.6–2.4)
MAGNESIUM SERPL-MCNC: 2.2 MG/DL (ref 1.6–2.4)
MAGNESIUM SERPL-MCNC: 2.2 MG/DL (ref 1.6–2.4)
MCH RBC QN AUTO: 32.4 PG (ref 26–34)
MCH RBC QN AUTO: 32.6 PG (ref 26–34)
MCH RBC QN AUTO: 32.8 PG (ref 26–34)
MCH RBC QN AUTO: 32.9 PG (ref 26–34)
MCH RBC QN AUTO: 33 PG (ref 26–34)
MCH RBC QN AUTO: 33.1 PG (ref 26–34)
MCH RBC QN AUTO: 33.3 PG (ref 26–34)
MCH RBC QN AUTO: 33.5 PG (ref 26–34)
MCH RBC QN AUTO: 33.7 PG (ref 26–34)
MCH RBC QN AUTO: 34.1 PG (ref 26–34)
MCH RBC QN AUTO: 34.4 PG (ref 26–34)
MCHC RBC AUTO-ENTMCNC: 30.9 G/DL (ref 30–36.5)
MCHC RBC AUTO-ENTMCNC: 31.8 G/DL (ref 30–36.5)
MCHC RBC AUTO-ENTMCNC: 32 G/DL (ref 30–36.5)
MCHC RBC AUTO-ENTMCNC: 32.1 G/DL (ref 30–36.5)
MCHC RBC AUTO-ENTMCNC: 32.2 G/DL (ref 30–36.5)
MCHC RBC AUTO-ENTMCNC: 32.3 G/DL (ref 30–36.5)
MCHC RBC AUTO-ENTMCNC: 32.3 G/DL (ref 30–36.5)
MCHC RBC AUTO-ENTMCNC: 32.4 G/DL (ref 30–36.5)
MCHC RBC AUTO-ENTMCNC: 32.4 G/DL (ref 30–36.5)
MCHC RBC AUTO-ENTMCNC: 32.7 G/DL (ref 30–36.5)
MCHC RBC AUTO-ENTMCNC: 32.9 G/DL (ref 30–36.5)
MCV RBC AUTO: 100.3 FL (ref 80–99)
MCV RBC AUTO: 100.4 FL (ref 80–99)
MCV RBC AUTO: 100.7 FL (ref 80–99)
MCV RBC AUTO: 102.1 FL (ref 80–99)
MCV RBC AUTO: 102.5 FL (ref 80–99)
MCV RBC AUTO: 102.7 FL (ref 80–99)
MCV RBC AUTO: 104.9 FL (ref 80–99)
MCV RBC AUTO: 104.9 FL (ref 80–99)
MCV RBC AUTO: 105.5 FL (ref 80–99)
MCV RBC AUTO: 106.6 FL (ref 80–99)
MCV RBC AUTO: 106.7 FL (ref 80–99)
METAMYELOCYTES NFR BLD MANUAL: 1 %
MONOCYTES # BLD: 0.1 K/UL (ref 0–1)
MONOCYTES # BLD: 0.2 K/UL (ref 0–1)
MONOCYTES # BLD: 0.2 K/UL (ref 0–1)
MONOCYTES # BLD: 0.3 K/UL (ref 0–1)
MONOCYTES # BLD: 0.8 K/UL (ref 0–1)
MONOCYTES NFR BLD: 1 % (ref 5–13)
MONOCYTES NFR BLD: 11 % (ref 5–13)
MONOCYTES NFR BLD: 3 % (ref 5–13)
MONOCYTES NFR BLD: 5 % (ref 5–13)
MONOCYTES NFR BLD: 6 % (ref 5–13)
MONOCYTES NFR BLD: 7 % (ref 5–13)
MONOCYTES NFR BLD: 9 % (ref 5–13)
NEUTS BAND NFR BLD MANUAL: 1 %
NEUTS BAND NFR BLD MANUAL: 1 %
NEUTS SEG # BLD: 1.2 K/UL (ref 1.8–8)
NEUTS SEG # BLD: 1.3 K/UL (ref 1.8–8)
NEUTS SEG # BLD: 5.9 K/UL (ref 1.8–8)
NEUTS SEG # BLD: 8.4 K/UL (ref 1.8–8)
NEUTS SEG # BLD: 9 K/UL (ref 1.8–8)
NEUTS SEG NFR BLD: 48 % (ref 32–75)
NEUTS SEG NFR BLD: 56 % (ref 32–75)
NEUTS SEG NFR BLD: 60 % (ref 32–75)
NEUTS SEG NFR BLD: 69 % (ref 32–75)
NEUTS SEG NFR BLD: 73 % (ref 32–75)
NEUTS SEG NFR BLD: 81 % (ref 32–75)
NEUTS SEG NFR BLD: 93 % (ref 32–75)
NITRITE UR QL STRIP.AUTO: NEGATIVE
NRBC # BLD: 0 K/UL (ref 0–0.01)
NRBC # BLD: 0.02 K/UL (ref 0–0.01)
NRBC # BLD: 0.03 K/UL (ref 0–0.01)
NRBC # BLD: 0.04 K/UL (ref 0–0.01)
NRBC # BLD: 0.05 K/UL (ref 0–0.01)
NRBC # BLD: 0.06 K/UL (ref 0–0.01)
NRBC # BLD: 0.79 K/UL (ref 0–0.01)
NRBC # BLD: 1.75 K/UL (ref 0–0.01)
NRBC # BLD: 2.46 K/UL (ref 0–0.01)
NRBC BLD-RTO: 0 PER 100 WBC
NRBC BLD-RTO: 1.3 PER 100 WBC
NRBC BLD-RTO: 1.5 PER 100 WBC
NRBC BLD-RTO: 1.7 PER 100 WBC
NRBC BLD-RTO: 19.5 PER 100 WBC
NRBC BLD-RTO: 2 PER 100 WBC
NRBC BLD-RTO: 2.7 PER 100 WBC
NRBC BLD-RTO: 22.2 PER 100 WBC
NRBC BLD-RTO: 9.9 PER 100 WBC
O2/TOTAL GAS SETTING VFR VENT: 100 %
O2/TOTAL GAS SETTING VFR VENT: 40 %
P-R INTERVAL, ECG05: 174 MS
P-R INTERVAL, ECG05: 206 MS
PCO2 BLD: 42.3 MMHG (ref 35–45)
PCO2 BLD: 45.1 MMHG (ref 35–45)
PCO2 BLD: 46.7 MMHG (ref 35–45)
PCO2 BLD: 65.4 MMHG (ref 35–45)
PH BLD: 7.15 [PH] (ref 7.35–7.45)
PH BLD: 7.15 [PH] (ref 7.35–7.45)
PH BLD: 7.25 [PH] (ref 7.35–7.45)
PH BLD: 7.34 [PH] (ref 7.35–7.45)
PH UR STRIP: 5 [PH] (ref 5–8)
PHOSPHATE SERPL-MCNC: 1.8 MG/DL (ref 2.6–4.7)
PHOSPHATE SERPL-MCNC: 2.4 MG/DL (ref 2.6–4.7)
PHOSPHATE SERPL-MCNC: 2.6 MG/DL (ref 2.6–4.7)
PHOSPHATE SERPL-MCNC: 3.8 MG/DL (ref 2.6–4.7)
PHOSPHATE SERPL-MCNC: 4 MG/DL (ref 2.6–4.7)
PHOSPHATE SERPL-MCNC: 4.2 MG/DL (ref 2.6–4.7)
PLATELET # BLD AUTO: 107 K/UL (ref 150–400)
PLATELET # BLD AUTO: 116 K/UL (ref 150–400)
PLATELET # BLD AUTO: 120 K/UL (ref 150–400)
PLATELET # BLD AUTO: 124 K/UL (ref 150–400)
PLATELET # BLD AUTO: 157 K/UL (ref 150–400)
PLATELET # BLD AUTO: 161 K/UL (ref 150–400)
PLATELET # BLD AUTO: 262 K/UL (ref 150–400)
PLATELET # BLD AUTO: 86 K/UL (ref 150–400)
PLATELET # BLD AUTO: 86 K/UL (ref 150–400)
PLATELET # BLD AUTO: 95 K/UL (ref 150–400)
PLATELET # BLD AUTO: 97 K/UL (ref 150–400)
PLATELET COMMENTS,PCOM: ABNORMAL
PMV BLD AUTO: 10.5 FL (ref 8.9–12.9)
PMV BLD AUTO: 10.6 FL (ref 8.9–12.9)
PMV BLD AUTO: 10.8 FL (ref 8.9–12.9)
PMV BLD AUTO: 10.9 FL (ref 8.9–12.9)
PMV BLD AUTO: 10.9 FL (ref 8.9–12.9)
PMV BLD AUTO: 11.1 FL (ref 8.9–12.9)
PMV BLD AUTO: 11.2 FL (ref 8.9–12.9)
PMV BLD AUTO: 11.6 FL (ref 8.9–12.9)
PMV BLD AUTO: 11.7 FL (ref 8.9–12.9)
PMV BLD AUTO: 12.7 FL (ref 8.9–12.9)
PMV BLD AUTO: 12.9 FL (ref 8.9–12.9)
PO2 BLD: 107 MMHG (ref 80–100)
PO2 BLD: 49 MMHG (ref 80–100)
PO2 BLD: 83 MMHG (ref 80–100)
PO2 BLD: 91 MMHG (ref 80–100)
POTASSIUM BLD-SCNC: 4.5 MMOL/L (ref 3.5–5.1)
POTASSIUM BLD-SCNC: 4.5 MMOL/L (ref 3.5–5.1)
POTASSIUM SERPL-SCNC: 4 MMOL/L (ref 3.5–5.1)
POTASSIUM SERPL-SCNC: 4.2 MMOL/L (ref 3.5–5.1)
POTASSIUM SERPL-SCNC: 4.4 MMOL/L (ref 3.5–5.1)
POTASSIUM SERPL-SCNC: 4.7 MMOL/L (ref 3.5–5.1)
POTASSIUM SERPL-SCNC: 4.9 MMOL/L (ref 3.5–5.1)
POTASSIUM SERPL-SCNC: 5 MMOL/L (ref 3.5–5.1)
POTASSIUM SERPL-SCNC: 5 MMOL/L (ref 3.5–5.1)
POTASSIUM SERPL-SCNC: 5.1 MMOL/L (ref 3.5–5.1)
POTASSIUM SERPL-SCNC: 5.2 MMOL/L (ref 3.5–5.1)
POTASSIUM SERPL-SCNC: 5.3 MMOL/L (ref 3.5–5.1)
POTASSIUM SERPL-SCNC: 5.7 MMOL/L (ref 3.5–5.1)
POTASSIUM SERPL-SCNC: 5.8 MMOL/L (ref 3.5–5.1)
POTASSIUM SERPL-SCNC: 6 MMOL/L (ref 3.5–5.1)
PROT SERPL-MCNC: 5 G/DL (ref 6.4–8.2)
PROT SERPL-MCNC: 5.4 G/DL (ref 6.4–8.2)
PROT SERPL-MCNC: 6.6 G/DL (ref 6.4–8.2)
PROT SERPL-MCNC: 7.1 G/DL (ref 6.4–8.2)
PROT UR STRIP-MCNC: NEGATIVE MG/DL
PROT UR-MCNC: 232 MG/DL (ref 0–11.9)
PROT/CREAT UR-RTO: 4
PTH-INTACT SERPL-MCNC: 976.9 PG/ML (ref 18.4–88)
Q-T INTERVAL, ECG07: 398 MS
Q-T INTERVAL, ECG07: 458 MS
QRS DURATION, ECG06: 118 MS
QRS DURATION, ECG06: 88 MS
QTC CALCULATION (BEZET), ECG08: 484 MS
QTC CALCULATION (BEZET), ECG08: 494 MS
RBC # BLD AUTO: 1.63 M/UL (ref 3.8–5.2)
RBC # BLD AUTO: 2.09 M/UL (ref 3.8–5.2)
RBC # BLD AUTO: 2.26 M/UL (ref 3.8–5.2)
RBC # BLD AUTO: 2.82 M/UL (ref 3.8–5.2)
RBC # BLD AUTO: 2.84 M/UL (ref 3.8–5.2)
RBC # BLD AUTO: 2.91 M/UL (ref 3.8–5.2)
RBC # BLD AUTO: 3.01 M/UL (ref 3.8–5.2)
RBC # BLD AUTO: 3.05 M/UL (ref 3.8–5.2)
RBC # BLD AUTO: 3.16 M/UL (ref 3.8–5.2)
RBC # BLD AUTO: 3.24 M/UL (ref 3.8–5.2)
RBC # BLD AUTO: 3.25 M/UL (ref 3.8–5.2)
RBC #/AREA URNS HPF: ABNORMAL /HPF (ref 0–5)
RBC MORPH BLD: ABNORMAL
RETICS # AUTO: 0.02 M/UL (ref 0.02–0.08)
RETICS/RBC NFR AUTO: 0.8 % (ref 0.7–2.1)
SAMPLES BEING HELD,HOLD: NORMAL
SAMPLES BEING HELD,HOLD: NORMAL
SAO2 % BLD: 82 % (ref 92–97)
SAO2 % BLD: 92 % (ref 92–97)
SAO2 % BLD: 95 % (ref 92–97)
SAO2 % BLD: 96 % (ref 92–97)
SERVICE CMNT-IMP: ABNORMAL
SERVICE CMNT-IMP: NORMAL
SERVICE CMNT-IMP: NORMAL
SODIUM BLD-SCNC: 136 MMOL/L (ref 136–145)
SODIUM BLD-SCNC: 136 MMOL/L (ref 136–145)
SODIUM SERPL-SCNC: 134 MMOL/L (ref 136–145)
SODIUM SERPL-SCNC: 136 MMOL/L (ref 136–145)
SODIUM SERPL-SCNC: 136 MMOL/L (ref 136–145)
SODIUM SERPL-SCNC: 138 MMOL/L (ref 136–145)
SODIUM SERPL-SCNC: 138 MMOL/L (ref 136–145)
SODIUM SERPL-SCNC: 139 MMOL/L (ref 136–145)
SODIUM SERPL-SCNC: 139 MMOL/L (ref 136–145)
SODIUM SERPL-SCNC: 140 MMOL/L (ref 136–145)
SODIUM SERPL-SCNC: 141 MMOL/L (ref 136–145)
SODIUM UR-SCNC: 19 MMOL/L
SP GR UR REFRACTOMETRY: 1.01 (ref 1–1.03)
SPECIMEN EXP DATE BLD: NORMAL
SPECIMEN TYPE: ABNORMAL
STATUS OF UNIT,%ST: NORMAL
STATUS OF UNIT,%ST: NORMAL
TIBC SERPL-MCNC: 108 UG/DL (ref 250–450)
TOTAL CELLS COUNTED SPEC: 50
TOTAL RESP. RATE, ITRR: 16
TOTAL RESP. RATE, ITRR: 19
TROPONIN I SERPL-MCNC: <0.05 NG/ML
TROPONIN I SERPL-MCNC: <0.05 NG/ML
UA: UC IF INDICATED,UAUC: ABNORMAL
UNIT DIVISION, %UDIV: 0
UNIT DIVISION, %UDIV: 0
UROBILINOGEN UR QL STRIP.AUTO: 0.2 EU/DL (ref 0.2–1)
UUN UR-MCNC: 417 MG/DL
VENTRICULAR RATE, ECG03: 70 BPM
VENTRICULAR RATE, ECG03: 89 BPM
VIT B12 SERPL-MCNC: 522 PG/ML (ref 193–986)
VIT B12 SERPL-MCNC: 575 PG/ML (ref 193–986)
WBC # BLD AUTO: 1.4 K/UL (ref 3.6–11)
WBC # BLD AUTO: 1.7 K/UL (ref 3.6–11)
WBC # BLD AUTO: 1.8 K/UL (ref 3.6–11)
WBC # BLD AUTO: 11.1 K/UL (ref 3.6–11)
WBC # BLD AUTO: 2 K/UL (ref 3.6–11)
WBC # BLD AUTO: 2.2 K/UL (ref 3.6–11)
WBC # BLD AUTO: 2.6 K/UL (ref 3.6–11)
WBC # BLD AUTO: 3.9 K/UL (ref 3.6–11)
WBC # BLD AUTO: 3.9 K/UL (ref 3.6–11)
WBC # BLD AUTO: 8 K/UL (ref 3.6–11)
WBC # BLD AUTO: 9 K/UL (ref 3.6–11)
WBC MORPH BLD: ABNORMAL
WBC URNS QL MICRO: ABNORMAL /HPF (ref 0–4)

## 2021-01-01 PROCEDURE — 81001 URINALYSIS AUTO W/SCOPE: CPT

## 2021-01-01 PROCEDURE — 84300 ASSAY OF URINE SODIUM: CPT

## 2021-01-01 PROCEDURE — 74011250636 HC RX REV CODE- 250/636: Performed by: NURSE PRACTITIONER

## 2021-01-01 PROCEDURE — 51798 US URINE CAPACITY MEASURE: CPT

## 2021-01-01 PROCEDURE — G8400 PT W/DXA NO RESULTS DOC: HCPCS | Performed by: INTERNAL MEDICINE

## 2021-01-01 PROCEDURE — 74011250637 HC RX REV CODE- 250/637: Performed by: SURGERY

## 2021-01-01 PROCEDURE — 77010033678 HC OXYGEN DAILY

## 2021-01-01 PROCEDURE — 82306 VITAMIN D 25 HYDROXY: CPT

## 2021-01-01 PROCEDURE — 74011250637 HC RX REV CODE- 250/637: Performed by: NURSE PRACTITIONER

## 2021-01-01 PROCEDURE — 85025 COMPLETE CBC W/AUTO DIFF WBC: CPT

## 2021-01-01 PROCEDURE — 85045 AUTOMATED RETICULOCYTE COUNT: CPT

## 2021-01-01 PROCEDURE — 80053 COMPREHEN METABOLIC PANEL: CPT

## 2021-01-01 PROCEDURE — G8536 NO DOC ELDER MAL SCRN: HCPCS | Performed by: INTERNAL MEDICINE

## 2021-01-01 PROCEDURE — 74011000250 HC RX REV CODE- 250: Performed by: FAMILY MEDICINE

## 2021-01-01 PROCEDURE — 85027 COMPLETE CBC AUTOMATED: CPT

## 2021-01-01 PROCEDURE — 1101F PT FALLS ASSESS-DOCD LE1/YR: CPT | Performed by: INTERNAL MEDICINE

## 2021-01-01 PROCEDURE — P9016 RBC LEUKOCYTES REDUCED: HCPCS

## 2021-01-01 PROCEDURE — 71045 X-RAY EXAM CHEST 1 VIEW: CPT

## 2021-01-01 PROCEDURE — 74011000250 HC RX REV CODE- 250: Performed by: NURSE PRACTITIONER

## 2021-01-01 PROCEDURE — 2709999900 HC NON-CHARGEABLE SUPPLY

## 2021-01-01 PROCEDURE — 74011250637 HC RX REV CODE- 250/637: Performed by: INTERNAL MEDICINE

## 2021-01-01 PROCEDURE — 84100 ASSAY OF PHOSPHORUS: CPT

## 2021-01-01 PROCEDURE — 65660000001 HC RM ICU INTERMED STEPDOWN

## 2021-01-01 PROCEDURE — 99285 EMERGENCY DEPT VISIT HI MDM: CPT

## 2021-01-01 PROCEDURE — 74011000258 HC RX REV CODE- 258: Performed by: NURSE PRACTITIONER

## 2021-01-01 PROCEDURE — 96372 THER/PROPH/DIAG INJ SC/IM: CPT

## 2021-01-01 PROCEDURE — 99232 SBSQ HOSP IP/OBS MODERATE 35: CPT | Performed by: INTERNAL MEDICINE

## 2021-01-01 PROCEDURE — 74011636637 HC RX REV CODE- 636/637: Performed by: INTERNAL MEDICINE

## 2021-01-01 PROCEDURE — 94640 AIRWAY INHALATION TREATMENT: CPT

## 2021-01-01 PROCEDURE — 80048 BASIC METABOLIC PNL TOTAL CA: CPT

## 2021-01-01 PROCEDURE — 74011250636 HC RX REV CODE- 250/636: Performed by: FAMILY MEDICINE

## 2021-01-01 PROCEDURE — 74011250636 HC RX REV CODE- 250/636: Performed by: INTERNAL MEDICINE

## 2021-01-01 PROCEDURE — 77030026918 HC ADMN ST IV BLD BD -A

## 2021-01-01 PROCEDURE — 74011000250 HC RX REV CODE- 250: Performed by: INTERNAL MEDICINE

## 2021-01-01 PROCEDURE — 5A09357 ASSISTANCE WITH RESPIRATORY VENTILATION, LESS THAN 24 CONSECUTIVE HOURS, CONTINUOUS POSITIVE AIRWAY PRESSURE: ICD-10-PCS | Performed by: INTERNAL MEDICINE

## 2021-01-01 PROCEDURE — 74011250636 HC RX REV CODE- 250/636: Performed by: GENERAL ACUTE CARE HOSPITAL

## 2021-01-01 PROCEDURE — 36415 COLL VENOUS BLD VENIPUNCTURE: CPT

## 2021-01-01 PROCEDURE — 82803 BLOOD GASES ANY COMBINATION: CPT

## 2021-01-01 PROCEDURE — 74011000258 HC RX REV CODE- 258: Performed by: INTERNAL MEDICINE

## 2021-01-01 PROCEDURE — 94660 CPAP INITIATION&MGMT: CPT

## 2021-01-01 PROCEDURE — 92610 EVALUATE SWALLOWING FUNCTION: CPT

## 2021-01-01 PROCEDURE — 99233 SBSQ HOSP IP/OBS HIGH 50: CPT | Performed by: INTERNAL MEDICINE

## 2021-01-01 PROCEDURE — 74011250636 HC RX REV CODE- 250/636: Performed by: EMERGENCY MEDICINE

## 2021-01-01 PROCEDURE — 82962 GLUCOSE BLOOD TEST: CPT

## 2021-01-01 PROCEDURE — G0463 HOSPITAL OUTPT CLINIC VISIT: HCPCS | Performed by: INTERNAL MEDICINE

## 2021-01-01 PROCEDURE — G8417 CALC BMI ABV UP PARAM F/U: HCPCS | Performed by: INTERNAL MEDICINE

## 2021-01-01 PROCEDURE — 83735 ASSAY OF MAGNESIUM: CPT

## 2021-01-01 PROCEDURE — 99214 OFFICE O/P EST MOD 30 MIN: CPT | Performed by: INTERNAL MEDICINE

## 2021-01-01 PROCEDURE — G8432 DEP SCR NOT DOC, RNG: HCPCS | Performed by: INTERNAL MEDICINE

## 2021-01-01 PROCEDURE — 94760 N-INVAS EAR/PLS OXIMETRY 1: CPT

## 2021-01-01 PROCEDURE — 74011250637 HC RX REV CODE- 250/637: Performed by: GENERAL ACUTE CARE HOSPITAL

## 2021-01-01 PROCEDURE — P9045 ALBUMIN (HUMAN), 5%, 250 ML: HCPCS | Performed by: SURGERY

## 2021-01-01 PROCEDURE — 86923 COMPATIBILITY TEST ELECTRIC: CPT

## 2021-01-01 PROCEDURE — 74011636637 HC RX REV CODE- 636/637: Performed by: GENERAL ACUTE CARE HOSPITAL

## 2021-01-01 PROCEDURE — 30233N1 TRANSFUSION OF NONAUTOLOGOUS RED BLOOD CELLS INTO PERIPHERAL VEIN, PERCUTANEOUS APPROACH: ICD-10-PCS | Performed by: INTERNAL MEDICINE

## 2021-01-01 PROCEDURE — 84156 ASSAY OF PROTEIN URINE: CPT

## 2021-01-01 PROCEDURE — A9503 TC99M MEDRONATE: HCPCS

## 2021-01-01 PROCEDURE — 84484 ASSAY OF TROPONIN QUANT: CPT

## 2021-01-01 PROCEDURE — 3336500001 HSPC ELECTION

## 2021-01-01 PROCEDURE — 82746 ASSAY OF FOLIC ACID SERUM: CPT

## 2021-01-01 PROCEDURE — 82436 ASSAY OF URINE CHLORIDE: CPT

## 2021-01-01 PROCEDURE — 0656 HSPC GENERAL INPATIENT

## 2021-01-01 PROCEDURE — 87086 URINE CULTURE/COLONY COUNT: CPT

## 2021-01-01 PROCEDURE — 80069 RENAL FUNCTION PANEL: CPT

## 2021-01-01 PROCEDURE — 93005 ELECTROCARDIOGRAM TRACING: CPT

## 2021-01-01 PROCEDURE — 74011000250 HC RX REV CODE- 250: Performed by: EMERGENCY MEDICINE

## 2021-01-01 PROCEDURE — 97161 PT EVAL LOW COMPLEX 20 MIN: CPT

## 2021-01-01 PROCEDURE — 97530 THERAPEUTIC ACTIVITIES: CPT

## 2021-01-01 PROCEDURE — 99356 PR PROLONGED SVC I/P OR OBS SETTING 1ST HOUR: CPT | Performed by: INTERNAL MEDICINE

## 2021-01-01 PROCEDURE — 36600 WITHDRAWAL OF ARTERIAL BLOOD: CPT

## 2021-01-01 PROCEDURE — 74011000258 HC RX REV CODE- 258: Performed by: EMERGENCY MEDICINE

## 2021-01-01 PROCEDURE — 82607 VITAMIN B-12: CPT

## 2021-01-01 PROCEDURE — 99231 SBSQ HOSP IP/OBS SF/LOW 25: CPT | Performed by: INTERNAL MEDICINE

## 2021-01-01 PROCEDURE — 80076 HEPATIC FUNCTION PANEL: CPT

## 2021-01-01 PROCEDURE — 65610000006 HC RM INTENSIVE CARE

## 2021-01-01 PROCEDURE — 93306 TTE W/DOPPLER COMPLETE: CPT

## 2021-01-01 PROCEDURE — 83550 IRON BINDING TEST: CPT

## 2021-01-01 PROCEDURE — 77030005513 HC CATH URETH FOL11 MDII -B

## 2021-01-01 PROCEDURE — 83605 ASSAY OF LACTIC ACID: CPT

## 2021-01-01 PROCEDURE — 74011000250 HC RX REV CODE- 250: Performed by: GENERAL ACUTE CARE HOSPITAL

## 2021-01-01 PROCEDURE — G8754 DIAS BP LESS 90: HCPCS | Performed by: INTERNAL MEDICINE

## 2021-01-01 PROCEDURE — 74011000258 HC RX REV CODE- 258: Performed by: GENERAL ACUTE CARE HOSPITAL

## 2021-01-01 PROCEDURE — 74011000258 HC RX REV CODE- 258: Performed by: SURGERY

## 2021-01-01 PROCEDURE — 87040 BLOOD CULTURE FOR BACTERIA: CPT

## 2021-01-01 PROCEDURE — G8427 DOCREV CUR MEDS BY ELIG CLIN: HCPCS | Performed by: INTERNAL MEDICINE

## 2021-01-01 PROCEDURE — 1090F PRES/ABSN URINE INCON ASSESS: CPT | Performed by: INTERNAL MEDICINE

## 2021-01-01 PROCEDURE — G8510 SCR DEP NEG, NO PLAN REQD: HCPCS | Performed by: INTERNAL MEDICINE

## 2021-01-01 PROCEDURE — 74011250636 HC RX REV CODE- 250/636: Performed by: SURGERY

## 2021-01-01 PROCEDURE — 65270000015 HC RM PRIVATE ONCOLOGY

## 2021-01-01 PROCEDURE — 84540 ASSAY OF URINE/UREA-N: CPT

## 2021-01-01 PROCEDURE — 65660000000 HC RM CCU STEPDOWN

## 2021-01-01 PROCEDURE — P9047 ALBUMIN (HUMAN), 25%, 50ML: HCPCS | Performed by: NURSE PRACTITIONER

## 2021-01-01 PROCEDURE — 74011000250 HC RX REV CODE- 250: Performed by: SURGERY

## 2021-01-01 PROCEDURE — G8752 SYS BP LESS 140: HCPCS | Performed by: INTERNAL MEDICINE

## 2021-01-01 PROCEDURE — 80047 BASIC METABLC PNL IONIZED CA: CPT

## 2021-01-01 PROCEDURE — 99223 1ST HOSP IP/OBS HIGH 75: CPT | Performed by: INTERNAL MEDICINE

## 2021-01-01 PROCEDURE — 97116 GAIT TRAINING THERAPY: CPT

## 2021-01-01 PROCEDURE — 82272 OCCULT BLD FECES 1-3 TESTS: CPT

## 2021-01-01 PROCEDURE — P9047 ALBUMIN (HUMAN), 25%, 50ML: HCPCS | Performed by: GENERAL ACUTE CARE HOSPITAL

## 2021-01-01 PROCEDURE — 76770 US EXAM ABDO BACK WALL COMP: CPT

## 2021-01-01 PROCEDURE — 77030038269 HC DRN EXT URIN PURWCK BARD -A

## 2021-01-01 PROCEDURE — 77010033711 HC HIGH FLOW OXYGEN

## 2021-01-01 PROCEDURE — 36430 TRANSFUSION BLD/BLD COMPNT: CPT

## 2021-01-01 PROCEDURE — 83970 ASSAY OF PARATHORMONE: CPT

## 2021-01-01 PROCEDURE — 96360 HYDRATION IV INFUSION INIT: CPT

## 2021-01-01 PROCEDURE — 86901 BLOOD TYPING SEROLOGIC RH(D): CPT

## 2021-01-01 RX ORDER — PANTOPRAZOLE SODIUM 40 MG/1
40 TABLET, DELAYED RELEASE ORAL
Status: DISCONTINUED | OUTPATIENT
Start: 2021-01-01 | End: 2021-01-01

## 2021-01-01 RX ORDER — SODIUM POLYSTYRENE SULFONATE 15 G/60ML
45 SUSPENSION ORAL; RECTAL
Status: COMPLETED | OUTPATIENT
Start: 2021-01-01 | End: 2021-01-01

## 2021-01-01 RX ORDER — ALBUMIN HUMAN 250 G/1000ML
25 SOLUTION INTRAVENOUS ONCE
Status: COMPLETED | OUTPATIENT
Start: 2021-01-01 | End: 2021-01-01

## 2021-01-01 RX ORDER — ONDANSETRON 2 MG/ML
4 INJECTION INTRAMUSCULAR; INTRAVENOUS
Status: DISCONTINUED | OUTPATIENT
Start: 2021-01-01 | End: 2021-01-01 | Stop reason: HOSPADM

## 2021-01-01 RX ORDER — HYDROMORPHONE HYDROCHLORIDE 1 MG/ML
0.4 INJECTION, SOLUTION INTRAMUSCULAR; INTRAVENOUS; SUBCUTANEOUS
Status: DISCONTINUED | OUTPATIENT
Start: 2021-01-01 | End: 2021-01-01

## 2021-01-01 RX ORDER — VANCOMYCIN/0.9 % SOD CHLORIDE 1.5G/250ML
1500 PLASTIC BAG, INJECTION (ML) INTRAVENOUS ONCE
Status: COMPLETED | OUTPATIENT
Start: 2021-01-01 | End: 2021-01-01

## 2021-01-01 RX ORDER — SODIUM BICARBONATE 1 MEQ/ML
50 SYRINGE (ML) INTRAVENOUS ONCE
Status: COMPLETED | OUTPATIENT
Start: 2021-01-01 | End: 2021-01-01

## 2021-01-01 RX ORDER — ACETAMINOPHEN 650 MG/1
650 SUPPOSITORY RECTAL
Status: DISCONTINUED | OUTPATIENT
Start: 2021-01-01 | End: 2021-01-01 | Stop reason: HOSPADM

## 2021-01-01 RX ORDER — ONDANSETRON 2 MG/ML
4 INJECTION INTRAMUSCULAR; INTRAVENOUS 3 TIMES DAILY
Status: DISCONTINUED | OUTPATIENT
Start: 2021-01-01 | End: 2021-01-01

## 2021-01-01 RX ORDER — AMIODARONE HYDROCHLORIDE 200 MG/1
100 TABLET ORAL DAILY
Status: DISCONTINUED | OUTPATIENT
Start: 2021-01-01 | End: 2021-01-01

## 2021-01-01 RX ORDER — ALBUMIN HUMAN 250 G/1000ML
12.5 SOLUTION INTRAVENOUS ONCE
Status: COMPLETED | OUTPATIENT
Start: 2021-01-01 | End: 2021-01-01

## 2021-01-01 RX ORDER — OXYCODONE HYDROCHLORIDE 5 MG/1
5 TABLET ORAL
Qty: 60 TAB | Refills: 0 | Status: SHIPPED | OUTPATIENT
Start: 2021-01-01 | End: 2021-01-01 | Stop reason: SDUPTHER

## 2021-01-01 RX ORDER — DEXTROSE 50 % IN WATER (D50W) INTRAVENOUS SYRINGE
25 ONCE
Status: COMPLETED | OUTPATIENT
Start: 2021-01-01 | End: 2021-01-01

## 2021-01-01 RX ORDER — GLYCOPYRROLATE 0.2 MG/ML
0.2 INJECTION INTRAMUSCULAR; INTRAVENOUS
Status: DISCONTINUED | OUTPATIENT
Start: 2021-01-01 | End: 2021-01-01 | Stop reason: HOSPADM

## 2021-01-01 RX ORDER — DULOXETIN HYDROCHLORIDE 20 MG/1
20 CAPSULE, DELAYED RELEASE ORAL
Qty: 30 CAP | Refills: 5 | Status: SHIPPED | OUTPATIENT
Start: 2021-01-01

## 2021-01-01 RX ORDER — LEVOFLOXACIN 5 MG/ML
500 INJECTION, SOLUTION INTRAVENOUS
Status: DISCONTINUED | OUTPATIENT
Start: 2021-01-01 | End: 2021-01-01

## 2021-01-01 RX ORDER — GLYCOPYRROLATE 0.2 MG/ML
0.2 INJECTION INTRAMUSCULAR; INTRAVENOUS EVERY 4 HOURS
Status: DISCONTINUED | OUTPATIENT
Start: 2021-01-01 | End: 2021-03-04 | Stop reason: HOSPADM

## 2021-01-01 RX ORDER — DULOXETIN HYDROCHLORIDE 20 MG/1
20 CAPSULE, DELAYED RELEASE ORAL
Qty: 30 CAP | Refills: 0 | Status: SHIPPED | OUTPATIENT
Start: 2021-01-01 | End: 2021-01-01 | Stop reason: SDUPTHER

## 2021-01-01 RX ORDER — ACETAMINOPHEN 325 MG/1
650 TABLET ORAL
Status: DISCONTINUED | OUTPATIENT
Start: 2021-01-01 | End: 2021-03-04 | Stop reason: HOSPADM

## 2021-01-01 RX ORDER — SODIUM POLYSTYRENE SULFONATE 15 G/60ML
15 SUSPENSION ORAL; RECTAL
Status: COMPLETED | OUTPATIENT
Start: 2021-01-01 | End: 2021-01-01

## 2021-01-01 RX ORDER — SODIUM CHLORIDE 9 MG/ML
250 INJECTION, SOLUTION INTRAVENOUS AS NEEDED
Status: DISCONTINUED | OUTPATIENT
Start: 2021-01-01 | End: 2021-01-01

## 2021-01-01 RX ORDER — ALBUTEROL SULFATE 0.83 MG/ML
1.25 SOLUTION RESPIRATORY (INHALATION)
Status: COMPLETED | OUTPATIENT
Start: 2021-01-01 | End: 2021-01-01

## 2021-01-01 RX ORDER — HYDROMORPHONE HYDROCHLORIDE 1 MG/ML
0.2 INJECTION, SOLUTION INTRAMUSCULAR; INTRAVENOUS; SUBCUTANEOUS
Status: DISCONTINUED | OUTPATIENT
Start: 2021-01-01 | End: 2021-01-01

## 2021-01-01 RX ORDER — LORAZEPAM 2 MG/ML
1 INJECTION INTRAMUSCULAR EVERY 4 HOURS
Status: DISCONTINUED | OUTPATIENT
Start: 2021-01-01 | End: 2021-03-04 | Stop reason: HOSPADM

## 2021-01-01 RX ORDER — FLUTICASONE PROPIONATE 50 MCG
2 SPRAY, SUSPENSION (ML) NASAL DAILY
Status: DISCONTINUED | OUTPATIENT
Start: 2021-01-01 | End: 2021-01-01

## 2021-01-01 RX ORDER — HYDROMORPHONE HYDROCHLORIDE 1 MG/ML
0.5 INJECTION, SOLUTION INTRAMUSCULAR; INTRAVENOUS; SUBCUTANEOUS
Status: DISCONTINUED | OUTPATIENT
Start: 2021-01-01 | End: 2021-01-01 | Stop reason: HOSPADM

## 2021-01-01 RX ORDER — NOREPINEPHRINE BITARTRATE/D5W 8 MG/250ML
.5-3 PLASTIC BAG, INJECTION (ML) INTRAVENOUS
Status: DISCONTINUED | OUTPATIENT
Start: 2021-01-01 | End: 2021-01-01

## 2021-01-01 RX ORDER — FACIAL-BODY WIPES
10 EACH TOPICAL DAILY PRN
Status: DISCONTINUED | OUTPATIENT
Start: 2021-01-01 | End: 2021-03-04 | Stop reason: HOSPADM

## 2021-01-01 RX ORDER — MAGNESIUM SULFATE HEPTAHYDRATE 40 MG/ML
2 INJECTION, SOLUTION INTRAVENOUS ONCE
Status: COMPLETED | OUTPATIENT
Start: 2021-01-01 | End: 2021-01-01

## 2021-01-01 RX ORDER — IPRATROPIUM BROMIDE AND ALBUTEROL SULFATE 2.5; .5 MG/3ML; MG/3ML
3 SOLUTION RESPIRATORY (INHALATION)
Status: DISCONTINUED | OUTPATIENT
Start: 2021-01-01 | End: 2021-01-01

## 2021-01-01 RX ORDER — PROMETHAZINE HYDROCHLORIDE 25 MG/1
12.5 TABLET ORAL
Status: DISCONTINUED | OUTPATIENT
Start: 2021-01-01 | End: 2021-01-01

## 2021-01-01 RX ORDER — CALCIUM GLUCONATE 94 MG/ML
1 INJECTION, SOLUTION INTRAVENOUS
Status: DISCONTINUED | OUTPATIENT
Start: 2021-01-01 | End: 2021-01-01

## 2021-01-01 RX ORDER — OXYCODONE HYDROCHLORIDE 5 MG/1
5 TABLET ORAL
Status: DISCONTINUED | OUTPATIENT
Start: 2021-01-01 | End: 2021-01-01

## 2021-01-01 RX ORDER — SODIUM POLYSTYRENE SULFONATE 15 G/60ML
45 SUSPENSION ORAL; RECTAL
Status: ACTIVE | OUTPATIENT
Start: 2021-01-01 | End: 2021-01-01

## 2021-01-01 RX ORDER — HYDROMORPHONE HYDROCHLORIDE 1 MG/ML
0.2 INJECTION, SOLUTION INTRAMUSCULAR; INTRAVENOUS; SUBCUTANEOUS ONCE
Status: COMPLETED | OUTPATIENT
Start: 2021-01-01 | End: 2021-01-01

## 2021-01-01 RX ORDER — OXYCODONE HYDROCHLORIDE 5 MG/1
5 TABLET ORAL
Qty: 90 TAB | Refills: 0 | OUTPATIENT
Start: 2021-01-01 | End: 2021-01-01

## 2021-01-01 RX ORDER — FUROSEMIDE 20 MG/1
60 TABLET ORAL DAILY
COMMUNITY

## 2021-01-01 RX ORDER — METOPROLOL TARTRATE 5 MG/5ML
2.5 INJECTION INTRAVENOUS
Status: DISCONTINUED | OUTPATIENT
Start: 2021-01-01 | End: 2021-01-01 | Stop reason: HOSPADM

## 2021-01-01 RX ORDER — CALCIUM CARBONATE 200(500)MG
200 TABLET,CHEWABLE ORAL
Status: DISCONTINUED | OUTPATIENT
Start: 2021-01-01 | End: 2021-01-01

## 2021-01-01 RX ORDER — ALBUTEROL SULFATE 0.83 MG/ML
2.5 SOLUTION RESPIRATORY (INHALATION)
Status: DISCONTINUED | OUTPATIENT
Start: 2021-01-01 | End: 2021-01-01

## 2021-01-01 RX ORDER — LORAZEPAM 2 MG/ML
0.5 INJECTION INTRAMUSCULAR
Status: DISCONTINUED | OUTPATIENT
Start: 2021-01-01 | End: 2021-01-01

## 2021-01-01 RX ORDER — LETROZOLE 2.5 MG/1
2.5 TABLET, FILM COATED ORAL DAILY
COMMUNITY

## 2021-01-01 RX ORDER — CALCIUM CARBONATE 200(500)MG
1 TABLET,CHEWABLE ORAL DAILY
COMMUNITY

## 2021-01-01 RX ORDER — ALBUMIN HUMAN 50 G/1000ML
50 SOLUTION INTRAVENOUS ONCE
Status: COMPLETED | OUTPATIENT
Start: 2021-01-01 | End: 2021-01-01

## 2021-01-01 RX ORDER — HYDROMORPHONE HYDROCHLORIDE 1 MG/ML
1 INJECTION, SOLUTION INTRAMUSCULAR; INTRAVENOUS; SUBCUTANEOUS EVERY 4 HOURS
Status: DISCONTINUED | OUTPATIENT
Start: 2021-01-01 | End: 2021-03-04 | Stop reason: HOSPADM

## 2021-01-01 RX ORDER — PREDNISONE 20 MG/1
40 TABLET ORAL
Status: DISCONTINUED | OUTPATIENT
Start: 2021-01-01 | End: 2021-01-01

## 2021-01-01 RX ORDER — SODIUM CHLORIDE 0.9 % (FLUSH) 0.9 %
5-10 SYRINGE (ML) INJECTION AS NEEDED
Status: CANCELLED | OUTPATIENT
Start: 2021-01-01 | End: 2021-01-01

## 2021-01-01 RX ORDER — FOLIC ACID 1 MG/1
1 TABLET ORAL DAILY
Status: DISCONTINUED | OUTPATIENT
Start: 2021-01-01 | End: 2021-01-01

## 2021-01-01 RX ORDER — MOMETASONE FUROATE 50 UG/1
2 SPRAY, METERED NASAL DAILY
COMMUNITY

## 2021-01-01 RX ORDER — DULOXETIN HYDROCHLORIDE 20 MG/1
CAPSULE, DELAYED RELEASE ORAL
Qty: 30 CAP | Refills: 0 | Status: SHIPPED | OUTPATIENT
Start: 2021-01-01

## 2021-01-01 RX ORDER — LEVOFLOXACIN 5 MG/ML
750 INJECTION, SOLUTION INTRAVENOUS
Status: COMPLETED | OUTPATIENT
Start: 2021-01-01 | End: 2021-01-01

## 2021-01-01 RX ORDER — ENOXAPARIN SODIUM 100 MG/ML
1 INJECTION SUBCUTANEOUS
Status: COMPLETED | OUTPATIENT
Start: 2021-01-01 | End: 2021-01-01

## 2021-01-01 RX ORDER — LORAZEPAM 2 MG/ML
0.5 INJECTION INTRAMUSCULAR
Status: DISCONTINUED | OUTPATIENT
Start: 2021-01-01 | End: 2021-01-01 | Stop reason: HOSPADM

## 2021-01-01 RX ORDER — OXYCODONE HYDROCHLORIDE 5 MG/1
5 TABLET ORAL
Qty: 90 TAB | Refills: 0 | Status: SHIPPED | OUTPATIENT
Start: 2021-01-01 | End: 2021-03-06

## 2021-01-01 RX ORDER — HEPARIN SODIUM 5000 [USP'U]/ML
5000 INJECTION, SOLUTION INTRAVENOUS; SUBCUTANEOUS EVERY 8 HOURS
Status: DISCONTINUED | OUTPATIENT
Start: 2021-01-01 | End: 2021-01-01

## 2021-01-01 RX ORDER — ENOXAPARIN SODIUM 100 MG/ML
40 INJECTION SUBCUTANEOUS DAILY
Status: DISCONTINUED | OUTPATIENT
Start: 2021-01-01 | End: 2021-01-01

## 2021-01-01 RX ORDER — HYDROMORPHONE HYDROCHLORIDE 1 MG/ML
1 INJECTION, SOLUTION INTRAMUSCULAR; INTRAVENOUS; SUBCUTANEOUS
Status: DISCONTINUED | OUTPATIENT
Start: 2021-01-01 | End: 2021-03-04 | Stop reason: HOSPADM

## 2021-01-01 RX ORDER — SODIUM CHLORIDE 0.9 % (FLUSH) 0.9 %
5-40 SYRINGE (ML) INJECTION AS NEEDED
Status: DISCONTINUED | OUTPATIENT
Start: 2021-01-01 | End: 2021-01-01 | Stop reason: HOSPADM

## 2021-01-01 RX ORDER — IPRATROPIUM BROMIDE AND ALBUTEROL SULFATE 2.5; .5 MG/3ML; MG/3ML
3 SOLUTION RESPIRATORY (INHALATION)
Status: COMPLETED | OUTPATIENT
Start: 2021-01-01 | End: 2021-01-01

## 2021-01-01 RX ORDER — CARVEDILOL PHOSPHATE 40 MG/1
40 CAPSULE, EXTENDED RELEASE ORAL
COMMUNITY

## 2021-01-01 RX ORDER — SODIUM BICARBONATE IN D5W 150/1000ML
PLASTIC BAG, INJECTION (ML) INTRAVENOUS CONTINUOUS
Status: DISCONTINUED | OUTPATIENT
Start: 2021-01-01 | End: 2021-01-01

## 2021-01-01 RX ORDER — MORPHINE SULFATE 2 MG/ML
2 INJECTION, SOLUTION INTRAMUSCULAR; INTRAVENOUS
Status: DISCONTINUED | OUTPATIENT
Start: 2021-01-01 | End: 2021-01-01

## 2021-01-01 RX ORDER — SODIUM CHLORIDE 0.9 % (FLUSH) 0.9 %
5-40 SYRINGE (ML) INJECTION EVERY 8 HOURS
Status: DISCONTINUED | OUTPATIENT
Start: 2021-01-01 | End: 2021-01-01

## 2021-01-01 RX ORDER — OXYCODONE HYDROCHLORIDE 5 MG/1
5 TABLET ORAL
Qty: 60 TAB | Refills: 0 | Status: CANCELLED | OUTPATIENT
Start: 2021-01-01 | End: 2021-01-01

## 2021-01-01 RX ORDER — DULOXETIN HYDROCHLORIDE 20 MG/1
20 CAPSULE, DELAYED RELEASE ORAL
Status: DISCONTINUED | OUTPATIENT
Start: 2021-01-01 | End: 2021-01-01

## 2021-01-01 RX ORDER — SODIUM CHLORIDE 9 MG/ML
75 INJECTION, SOLUTION INTRAVENOUS CONTINUOUS
Status: DISCONTINUED | OUTPATIENT
Start: 2021-01-01 | End: 2021-01-01

## 2021-01-01 RX ORDER — CALCIUM CARBONATE 200(500)MG
400 TABLET,CHEWABLE ORAL
Status: DISCONTINUED | OUTPATIENT
Start: 2021-01-01 | End: 2021-01-01

## 2021-01-01 RX ORDER — SODIUM CHLORIDE, SODIUM LACTATE, POTASSIUM CHLORIDE, CALCIUM CHLORIDE 600; 310; 30; 20 MG/100ML; MG/100ML; MG/100ML; MG/100ML
75 INJECTION, SOLUTION INTRAVENOUS CONTINUOUS
Status: DISCONTINUED | OUTPATIENT
Start: 2021-01-01 | End: 2021-01-01

## 2021-01-01 RX ORDER — POTASSIUM CHLORIDE 29.8 MG/ML
20 INJECTION INTRAVENOUS AS NEEDED
Status: DISCONTINUED | OUTPATIENT
Start: 2021-01-01 | End: 2021-01-01

## 2021-01-01 RX ORDER — ACETAMINOPHEN 325 MG/1
650 TABLET ORAL
Status: DISCONTINUED | OUTPATIENT
Start: 2021-01-01 | End: 2021-01-01

## 2021-01-01 RX ORDER — SODIUM CHLORIDE 9 MG/ML
50 INJECTION, SOLUTION INTRAVENOUS CONTINUOUS
Status: DISCONTINUED | OUTPATIENT
Start: 2021-01-01 | End: 2021-01-01

## 2021-01-01 RX ORDER — POLYETHYLENE GLYCOL 3350 17 G/17G
17 POWDER, FOR SOLUTION ORAL DAILY PRN
Status: DISCONTINUED | OUTPATIENT
Start: 2021-01-01 | End: 2021-01-01

## 2021-01-01 RX ORDER — ACETAMINOPHEN 650 MG/1
650 SUPPOSITORY RECTAL
Status: DISCONTINUED | OUTPATIENT
Start: 2021-01-01 | End: 2021-03-04 | Stop reason: HOSPADM

## 2021-01-01 RX ORDER — FUROSEMIDE 10 MG/ML
20 INJECTION INTRAMUSCULAR; INTRAVENOUS ONCE
Status: COMPLETED | OUTPATIENT
Start: 2021-01-01 | End: 2021-01-01

## 2021-01-01 RX ORDER — HEPARIN 100 UNIT/ML
500 SYRINGE INTRAVENOUS AS NEEDED
Status: CANCELLED | OUTPATIENT
Start: 2021-01-01

## 2021-01-01 RX ORDER — OXYCODONE HYDROCHLORIDE 5 MG/1
5 TABLET ORAL
Qty: 90 TAB | Refills: 0 | Status: SHIPPED | OUTPATIENT
Start: 2021-01-01 | End: 2021-01-01

## 2021-01-01 RX ORDER — HYDROMORPHONE HYDROCHLORIDE 1 MG/ML
0.5 INJECTION, SOLUTION INTRAMUSCULAR; INTRAVENOUS; SUBCUTANEOUS
Status: DISCONTINUED | OUTPATIENT
Start: 2021-01-01 | End: 2021-01-01

## 2021-01-01 RX ORDER — SODIUM CHLORIDE 9 MG/ML
10 INJECTION INTRAMUSCULAR; INTRAVENOUS; SUBCUTANEOUS AS NEEDED
Status: CANCELLED | OUTPATIENT
Start: 2021-01-01

## 2021-01-01 RX ORDER — LORAZEPAM 2 MG/ML
1 INJECTION INTRAMUSCULAR
Status: DISCONTINUED | OUTPATIENT
Start: 2021-01-01 | End: 2021-03-04 | Stop reason: HOSPADM

## 2021-01-01 RX ORDER — FUROSEMIDE 10 MG/ML
INJECTION INTRAMUSCULAR; INTRAVENOUS
Status: DISPENSED
Start: 2021-01-01 | End: 2021-01-01

## 2021-01-01 RX ADMIN — HEPARIN SODIUM 5000 UNITS: 5000 INJECTION INTRAVENOUS; SUBCUTANEOUS at 15:05

## 2021-01-01 RX ADMIN — ONDANSETRON 4 MG: 2 INJECTION INTRAMUSCULAR; INTRAVENOUS at 12:13

## 2021-01-01 RX ADMIN — Medication 1 AMPULE: at 21:00

## 2021-01-01 RX ADMIN — OXYCODONE 5 MG: 5 TABLET ORAL at 21:07

## 2021-01-01 RX ADMIN — DULOXETINE 20 MG: 20 CAPSULE, DELAYED RELEASE ORAL at 22:05

## 2021-01-01 RX ADMIN — Medication 1 AMPULE: at 08:40

## 2021-01-01 RX ADMIN — OXYCODONE 5 MG: 5 TABLET ORAL at 10:37

## 2021-01-01 RX ADMIN — Medication 1 AMPULE: at 21:11

## 2021-01-01 RX ADMIN — MAGNESIUM SULFATE HEPTAHYDRATE 2 G: 40 INJECTION, SOLUTION INTRAVENOUS at 10:31

## 2021-01-01 RX ADMIN — Medication: at 21:07

## 2021-01-01 RX ADMIN — CALCIUM GLUCONATE 2 G: 98 INJECTION, SOLUTION INTRAVENOUS at 06:12

## 2021-01-01 RX ADMIN — GLYCOPYRROLATE AND FORMOTEROL FUMARATE 2 PUFF: 9; 4.8 AEROSOL, METERED RESPIRATORY (INHALATION) at 20:21

## 2021-01-01 RX ADMIN — OXYCODONE 5 MG: 5 TABLET ORAL at 08:23

## 2021-01-01 RX ADMIN — Medication: at 18:19

## 2021-01-01 RX ADMIN — CEFEPIME HYDROCHLORIDE 2 G: 2 INJECTION, POWDER, FOR SOLUTION INTRAVENOUS at 22:55

## 2021-01-01 RX ADMIN — HYDROMORPHONE HYDROCHLORIDE 1 MG: 1 INJECTION, SOLUTION INTRAMUSCULAR; INTRAVENOUS; SUBCUTANEOUS at 16:19

## 2021-01-01 RX ADMIN — CALCIUM GLUCONATE 2 G: 98 INJECTION, SOLUTION INTRAVENOUS at 10:40

## 2021-01-01 RX ADMIN — HEPARIN SODIUM 5000 UNITS: 5000 INJECTION INTRAVENOUS; SUBCUTANEOUS at 05:21

## 2021-01-01 RX ADMIN — Medication: at 22:38

## 2021-01-01 RX ADMIN — CALCIUM GLUCONATE 1 G: 98 INJECTION, SOLUTION INTRAVENOUS at 09:52

## 2021-01-01 RX ADMIN — SODIUM ZIRCONIUM CYCLOSILICATE 10 G: 10 POWDER, FOR SUSPENSION ORAL at 11:04

## 2021-01-01 RX ADMIN — GLYCOPYRROLATE AND FORMOTEROL FUMARATE 2 PUFF: 9; 4.8 AEROSOL, METERED RESPIRATORY (INHALATION) at 08:49

## 2021-01-01 RX ADMIN — DULOXETINE 20 MG: 20 CAPSULE, DELAYED RELEASE ORAL at 21:02

## 2021-01-01 RX ADMIN — Medication 20 ML: at 05:26

## 2021-01-01 RX ADMIN — GLYCOPYRROLATE 0.2 MG: 0.2 INJECTION INTRAMUSCULAR; INTRAVENOUS at 19:50

## 2021-01-01 RX ADMIN — OXYCODONE 5 MG: 5 TABLET ORAL at 22:04

## 2021-01-01 RX ADMIN — Medication 10 ML: at 21:54

## 2021-01-01 RX ADMIN — LORAZEPAM 1 MG: 2 INJECTION INTRAMUSCULAR; INTRAVENOUS at 17:06

## 2021-01-01 RX ADMIN — GLYCOPYRROLATE AND FORMOTEROL FUMARATE 2 PUFF: 9; 4.8 AEROSOL, METERED RESPIRATORY (INHALATION) at 20:25

## 2021-01-01 RX ADMIN — Medication 10 ML: at 02:53

## 2021-01-01 RX ADMIN — AMIODARONE HYDROCHLORIDE 0.5 MG/MIN: 50 INJECTION, SOLUTION INTRAVENOUS at 03:27

## 2021-01-01 RX ADMIN — Medication 10 ML: at 00:48

## 2021-01-01 RX ADMIN — ALBUTEROL SULFATE 1.25 MG: 2.5 SOLUTION RESPIRATORY (INHALATION) at 11:40

## 2021-01-01 RX ADMIN — LORAZEPAM 1 MG: 2 INJECTION INTRAMUSCULAR; INTRAVENOUS at 16:19

## 2021-01-01 RX ADMIN — SODIUM POLYSTYRENE SULFONATE 45 G: 15 SUSPENSION ORAL; RECTAL at 20:10

## 2021-01-01 RX ADMIN — Medication: at 22:49

## 2021-01-01 RX ADMIN — PHENYLEPHRINE HYDROCHLORIDE 70 MCG/MIN: 10 INJECTION INTRAVENOUS at 19:21

## 2021-01-01 RX ADMIN — METHYLPREDNISOLONE SODIUM SUCCINATE 40 MG: 40 INJECTION, POWDER, FOR SOLUTION INTRAMUSCULAR; INTRAVENOUS at 06:29

## 2021-01-01 RX ADMIN — Medication: at 17:45

## 2021-01-01 RX ADMIN — LORAZEPAM 1 MG: 2 INJECTION INTRAMUSCULAR; INTRAVENOUS at 08:08

## 2021-01-01 RX ADMIN — ONDANSETRON 4 MG: 2 INJECTION INTRAMUSCULAR; INTRAVENOUS at 11:00

## 2021-01-01 RX ADMIN — GLYCOPYRROLATE AND FORMOTEROL FUMARATE 2 PUFF: 9; 4.8 AEROSOL, METERED RESPIRATORY (INHALATION) at 07:34

## 2021-01-01 RX ADMIN — Medication: at 19:39

## 2021-01-01 RX ADMIN — Medication 1 AMPULE: at 21:54

## 2021-01-01 RX ADMIN — LORAZEPAM 1 MG: 2 INJECTION INTRAMUSCULAR; INTRAVENOUS at 12:06

## 2021-01-01 RX ADMIN — OXYCODONE 5 MG: 5 TABLET ORAL at 08:32

## 2021-01-01 RX ADMIN — HYDROMORPHONE HYDROCHLORIDE 1 MG: 1 INJECTION, SOLUTION INTRAMUSCULAR; INTRAVENOUS; SUBCUTANEOUS at 04:19

## 2021-01-01 RX ADMIN — GLYCOPYRROLATE 0.2 MG: 0.2 INJECTION INTRAMUSCULAR; INTRAVENOUS at 08:07

## 2021-01-01 RX ADMIN — AMIODARONE HYDROCHLORIDE 0.5 MG/MIN: 50 INJECTION, SOLUTION INTRAVENOUS at 16:22

## 2021-01-01 RX ADMIN — FOLIC ACID 1 MG: 1 TABLET ORAL at 09:33

## 2021-01-01 RX ADMIN — GLYCOPYRROLATE 0.2 MG: 0.2 INJECTION, SOLUTION INTRAMUSCULAR; INTRAVENOUS at 15:15

## 2021-01-01 RX ADMIN — PHENYLEPHRINE HYDROCHLORIDE 95 MCG/MIN: 10 INJECTION INTRAVENOUS at 09:11

## 2021-01-01 RX ADMIN — DULOXETINE 20 MG: 20 CAPSULE, DELAYED RELEASE ORAL at 21:54

## 2021-01-01 RX ADMIN — HUMAN INSULIN 10 UNITS: 100 INJECTION, SOLUTION SUBCUTANEOUS at 18:54

## 2021-01-01 RX ADMIN — ONDANSETRON 4 MG: 2 INJECTION INTRAMUSCULAR; INTRAVENOUS at 10:07

## 2021-01-01 RX ADMIN — Medication 1 AMPULE: at 09:45

## 2021-01-01 RX ADMIN — GLYCOPYRROLATE AND FORMOTEROL FUMARATE 2 PUFF: 9; 4.8 AEROSOL, METERED RESPIRATORY (INHALATION) at 19:21

## 2021-01-01 RX ADMIN — Medication: at 09:17

## 2021-01-01 RX ADMIN — Medication 10 ML: at 22:50

## 2021-01-01 RX ADMIN — AMIODARONE HYDROCHLORIDE 0.5 MG/MIN: 50 INJECTION, SOLUTION INTRAVENOUS at 20:40

## 2021-01-01 RX ADMIN — DEXTROSE MONOHYDRATE 25 G: 25 INJECTION, SOLUTION INTRAVENOUS at 09:43

## 2021-01-01 RX ADMIN — Medication 20 ML: at 05:09

## 2021-01-01 RX ADMIN — Medication: at 17:11

## 2021-01-01 RX ADMIN — Medication: at 20:46

## 2021-01-01 RX ADMIN — Medication 1 AMPULE: at 21:13

## 2021-01-01 RX ADMIN — ENOXAPARIN SODIUM 80 MG: 100 INJECTION SUBCUTANEOUS at 17:20

## 2021-01-01 RX ADMIN — Medication 10 ML: at 14:53

## 2021-01-01 RX ADMIN — AMIODARONE HYDROCHLORIDE 150 MG: 1.5 INJECTION, SOLUTION INTRAVENOUS at 17:29

## 2021-01-01 RX ADMIN — Medication: at 21:08

## 2021-01-01 RX ADMIN — PANTOPRAZOLE SODIUM 40 MG: 40 TABLET, DELAYED RELEASE ORAL at 10:07

## 2021-01-01 RX ADMIN — POTASSIUM PHOSPHATE, MONOBASIC POTASSIUM PHOSPHATE, DIBASIC: 224; 236 INJECTION, SOLUTION, CONCENTRATE INTRAVENOUS at 09:32

## 2021-01-01 RX ADMIN — CALCIUM GLUCONATE 2 G: 98 INJECTION, SOLUTION INTRAVENOUS at 08:46

## 2021-01-01 RX ADMIN — HYDROMORPHONE HYDROCHLORIDE 1 MG: 1 INJECTION, SOLUTION INTRAMUSCULAR; INTRAVENOUS; SUBCUTANEOUS at 00:02

## 2021-01-01 RX ADMIN — PHENYLEPHRINE HYDROCHLORIDE 90 MCG/MIN: 10 INJECTION INTRAVENOUS at 04:48

## 2021-01-01 RX ADMIN — GLYCOPYRROLATE AND FORMOTEROL FUMARATE 2 PUFF: 9; 4.8 AEROSOL, METERED RESPIRATORY (INHALATION) at 07:37

## 2021-01-01 RX ADMIN — OXYCODONE 5 MG: 5 TABLET ORAL at 07:16

## 2021-01-01 RX ADMIN — PHENYLEPHRINE HYDROCHLORIDE 70 MCG/MIN: 10 INJECTION INTRAVENOUS at 13:21

## 2021-01-01 RX ADMIN — PHENYLEPHRINE HYDROCHLORIDE 65 MCG/MIN: 10 INJECTION INTRAVENOUS at 11:01

## 2021-01-01 RX ADMIN — OXYCODONE 5 MG: 5 TABLET ORAL at 13:04

## 2021-01-01 RX ADMIN — SODIUM CHLORIDE 1000 ML: 900 INJECTION, SOLUTION INTRAVENOUS at 20:10

## 2021-01-01 RX ADMIN — IPRATROPIUM BROMIDE AND ALBUTEROL SULFATE 3 ML: .5; 3 SOLUTION RESPIRATORY (INHALATION) at 17:27

## 2021-01-01 RX ADMIN — HEPARIN SODIUM 5000 UNITS: 5000 INJECTION INTRAVENOUS; SUBCUTANEOUS at 15:37

## 2021-01-01 RX ADMIN — GLYCOPYRROLATE 0.2 MG: 0.2 INJECTION INTRAMUSCULAR; INTRAVENOUS at 12:06

## 2021-01-01 RX ADMIN — OXYCODONE 5 MG: 5 TABLET ORAL at 02:00

## 2021-01-01 RX ADMIN — FOLIC ACID 1 MG: 1 TABLET ORAL at 08:35

## 2021-01-01 RX ADMIN — HYDROMORPHONE HYDROCHLORIDE 1 MG: 1 INJECTION, SOLUTION INTRAMUSCULAR; INTRAVENOUS; SUBCUTANEOUS at 12:06

## 2021-01-01 RX ADMIN — Medication: at 09:00

## 2021-01-01 RX ADMIN — AMIODARONE HYDROCHLORIDE 100 MG: 200 TABLET ORAL at 10:07

## 2021-01-01 RX ADMIN — DEXTROSE MONOHYDRATE 25 G: 500 INJECTION PARENTERAL at 09:32

## 2021-01-01 RX ADMIN — Medication 10 ML: at 05:02

## 2021-01-01 RX ADMIN — FOLIC ACID 1 MG: 1 TABLET ORAL at 09:16

## 2021-01-01 RX ADMIN — FOLIC ACID 1 MG: 1 TABLET ORAL at 08:22

## 2021-01-01 RX ADMIN — AMIODARONE HYDROCHLORIDE 100 MG: 200 TABLET ORAL at 13:14

## 2021-01-01 RX ADMIN — AMIODARONE HYDROCHLORIDE 100 MG: 200 TABLET ORAL at 09:17

## 2021-01-01 RX ADMIN — Medication 10 ML: at 06:15

## 2021-01-01 RX ADMIN — Medication 1 AMPULE: at 09:00

## 2021-01-01 RX ADMIN — GLYCOPYRROLATE AND FORMOTEROL FUMARATE 2 PUFF: 9; 4.8 AEROSOL, METERED RESPIRATORY (INHALATION) at 19:20

## 2021-01-01 RX ADMIN — OXYCODONE 5 MG: 5 TABLET ORAL at 12:38

## 2021-01-01 RX ADMIN — Medication 10 ML: at 14:00

## 2021-01-01 RX ADMIN — HEPARIN SODIUM 5000 UNITS: 5000 INJECTION INTRAVENOUS; SUBCUTANEOUS at 06:16

## 2021-01-01 RX ADMIN — DEXTROSE MONOHYDRATE 25 G: 25 INJECTION, SOLUTION INTRAVENOUS at 09:17

## 2021-01-01 RX ADMIN — Medication 10 ML: at 05:07

## 2021-01-01 RX ADMIN — HYDROMORPHONE HYDROCHLORIDE 1 MG: 1 INJECTION, SOLUTION INTRAMUSCULAR; INTRAVENOUS; SUBCUTANEOUS at 17:08

## 2021-01-01 RX ADMIN — PHENYLEPHRINE HYDROCHLORIDE 70 MCG/MIN: 10 INJECTION INTRAVENOUS at 10:36

## 2021-01-01 RX ADMIN — PREDNISONE 40 MG: 20 TABLET ORAL at 08:22

## 2021-01-01 RX ADMIN — OXYCODONE 5 MG: 5 TABLET ORAL at 13:14

## 2021-01-01 RX ADMIN — GLYCOPYRROLATE AND FORMOTEROL FUMARATE 2 PUFF: 9; 4.8 AEROSOL, METERED RESPIRATORY (INHALATION) at 20:15

## 2021-01-01 RX ADMIN — HEPARIN SODIUM 5000 UNITS: 5000 INJECTION INTRAVENOUS; SUBCUTANEOUS at 06:15

## 2021-01-01 RX ADMIN — FOLIC ACID 1 MG: 1 TABLET ORAL at 10:07

## 2021-01-01 RX ADMIN — DEXTROSE MONOHYDRATE 25 G: 25 INJECTION, SOLUTION INTRAVENOUS at 18:56

## 2021-01-01 RX ADMIN — HEPARIN SODIUM 5000 UNITS: 5000 INJECTION INTRAVENOUS; SUBCUTANEOUS at 14:24

## 2021-01-01 RX ADMIN — HEPARIN SODIUM 5000 UNITS: 5000 INJECTION INTRAVENOUS; SUBCUTANEOUS at 14:57

## 2021-01-01 RX ADMIN — PREDNISONE 40 MG: 20 TABLET ORAL at 08:35

## 2021-01-01 RX ADMIN — HEPARIN SODIUM 5000 UNITS: 5000 INJECTION INTRAVENOUS; SUBCUTANEOUS at 21:03

## 2021-01-01 RX ADMIN — FOLIC ACID 1 MG: 1 TABLET ORAL at 08:39

## 2021-01-01 RX ADMIN — Medication: at 10:35

## 2021-01-01 RX ADMIN — METHYLPREDNISOLONE SODIUM SUCCINATE 40 MG: 40 INJECTION, POWDER, FOR SOLUTION INTRAMUSCULAR; INTRAVENOUS at 06:15

## 2021-01-01 RX ADMIN — ACETAMINOPHEN 650 MG: 325 TABLET ORAL at 08:38

## 2021-01-01 RX ADMIN — CALCIUM GLUCONATE 2 G: 98 INJECTION, SOLUTION INTRAVENOUS at 09:31

## 2021-01-01 RX ADMIN — FLUTICASONE PROPIONATE 2 SPRAY: 50 SPRAY, METERED NASAL at 08:30

## 2021-01-01 RX ADMIN — GLYCOPYRROLATE AND FORMOTEROL FUMARATE 2 PUFF: 9; 4.8 AEROSOL, METERED RESPIRATORY (INHALATION) at 19:35

## 2021-01-01 RX ADMIN — LEVOFLOXACIN 750 MG: 5 INJECTION, SOLUTION INTRAVENOUS at 23:41

## 2021-01-01 RX ADMIN — METHYLPREDNISOLONE SODIUM SUCCINATE 40 MG: 40 INJECTION, POWDER, FOR SOLUTION INTRAMUSCULAR; INTRAVENOUS at 18:47

## 2021-01-01 RX ADMIN — OXYCODONE 5 MG: 5 TABLET ORAL at 16:52

## 2021-01-01 RX ADMIN — Medication 1 AMPULE: at 01:34

## 2021-01-01 RX ADMIN — HYDROMORPHONE HYDROCHLORIDE 1 MG: 1 INJECTION, SOLUTION INTRAMUSCULAR; INTRAVENOUS; SUBCUTANEOUS at 19:50

## 2021-01-01 RX ADMIN — SODIUM CHLORIDE, POTASSIUM CHLORIDE, SODIUM LACTATE AND CALCIUM CHLORIDE 75 ML/HR: 600; 310; 30; 20 INJECTION, SOLUTION INTRAVENOUS at 05:15

## 2021-01-01 RX ADMIN — HEPARIN SODIUM 5000 UNITS: 5000 INJECTION INTRAVENOUS; SUBCUTANEOUS at 06:11

## 2021-01-01 RX ADMIN — GLYCOPYRROLATE AND FORMOTEROL FUMARATE 2 PUFF: 9; 4.8 AEROSOL, METERED RESPIRATORY (INHALATION) at 08:27

## 2021-01-01 RX ADMIN — Medication 10 ML: at 21:55

## 2021-01-01 RX ADMIN — OXYCODONE 5 MG: 5 TABLET ORAL at 18:48

## 2021-01-01 RX ADMIN — HEPARIN SODIUM 5000 UNITS: 5000 INJECTION INTRAVENOUS; SUBCUTANEOUS at 14:18

## 2021-01-01 RX ADMIN — FOLIC ACID 1 MG: 1 TABLET ORAL at 08:36

## 2021-01-01 RX ADMIN — PHENYLEPHRINE HYDROCHLORIDE 85 MCG/MIN: 10 INJECTION INTRAVENOUS at 10:45

## 2021-01-01 RX ADMIN — ALBUMIN (HUMAN) 25 G: 0.25 INJECTION, SOLUTION INTRAVENOUS at 20:22

## 2021-01-01 RX ADMIN — Medication 10 ML: at 21:03

## 2021-01-01 RX ADMIN — SODIUM CHLORIDE 75 ML/HR: 9 INJECTION, SOLUTION INTRAVENOUS at 03:06

## 2021-01-01 RX ADMIN — SODIUM CHLORIDE 1000 ML: 9 INJECTION, SOLUTION INTRAVENOUS at 21:55

## 2021-01-01 RX ADMIN — HUMAN INSULIN 10 UNITS: 100 INJECTION, SOLUTION SUBCUTANEOUS at 09:44

## 2021-01-01 RX ADMIN — ONDANSETRON 4 MG: 2 INJECTION INTRAMUSCULAR; INTRAVENOUS at 17:33

## 2021-01-01 RX ADMIN — AMIODARONE HYDROCHLORIDE 100 MG: 200 TABLET ORAL at 09:32

## 2021-01-01 RX ADMIN — HEPARIN SODIUM 5000 UNITS: 5000 INJECTION INTRAVENOUS; SUBCUTANEOUS at 05:13

## 2021-01-01 RX ADMIN — Medication 10 ML: at 14:05

## 2021-01-01 RX ADMIN — Medication 10 ML: at 21:00

## 2021-01-01 RX ADMIN — CALCIUM CARBONATE (ANTACID) CHEW TAB 500 MG 400 MG: 500 CHEW TAB at 10:06

## 2021-01-01 RX ADMIN — HUMAN INSULIN 10 UNITS: 100 INJECTION, SOLUTION SUBCUTANEOUS at 09:17

## 2021-01-01 RX ADMIN — SODIUM CHLORIDE 75 ML/HR: 9 INJECTION, SOLUTION INTRAVENOUS at 14:10

## 2021-01-01 RX ADMIN — HYDROMORPHONE HYDROCHLORIDE 0.2 MG: 1 INJECTION, SOLUTION INTRAMUSCULAR; INTRAVENOUS; SUBCUTANEOUS at 14:39

## 2021-01-01 RX ADMIN — CALCIUM GLUCONATE 1 G: 98 INJECTION, SOLUTION INTRAVENOUS at 19:08

## 2021-01-01 RX ADMIN — Medication: at 18:11

## 2021-01-01 RX ADMIN — Medication: at 08:30

## 2021-01-01 RX ADMIN — ONDANSETRON 4 MG: 2 INJECTION INTRAMUSCULAR; INTRAVENOUS at 18:33

## 2021-01-01 RX ADMIN — PANTOPRAZOLE SODIUM 40 MG: 40 TABLET, DELAYED RELEASE ORAL at 08:39

## 2021-01-01 RX ADMIN — Medication 10 ML: at 05:14

## 2021-01-01 RX ADMIN — Medication: at 22:00

## 2021-01-01 RX ADMIN — Medication: at 09:34

## 2021-01-01 RX ADMIN — Medication 10 ML: at 06:12

## 2021-01-01 RX ADMIN — OXYCODONE 5 MG: 5 TABLET ORAL at 21:02

## 2021-01-01 RX ADMIN — SODIUM CHLORIDE, POTASSIUM CHLORIDE, SODIUM LACTATE AND CALCIUM CHLORIDE 75 ML/HR: 600; 310; 30; 20 INJECTION, SOLUTION INTRAVENOUS at 00:10

## 2021-01-01 RX ADMIN — DEXTROSE MONOHYDRATE 4 MCG/MIN: 5 INJECTION, SOLUTION INTRAVENOUS at 22:39

## 2021-01-01 RX ADMIN — SODIUM BICARBONATE 50 MEQ: 84 INJECTION INTRAVENOUS at 22:04

## 2021-01-01 RX ADMIN — Medication: at 22:26

## 2021-01-01 RX ADMIN — CALCIUM GLUCONATE 1 G: 98 INJECTION, SOLUTION INTRAVENOUS at 22:39

## 2021-01-01 RX ADMIN — OXYCODONE 5 MG: 5 TABLET ORAL at 05:18

## 2021-01-01 RX ADMIN — METHYLPREDNISOLONE SODIUM SUCCINATE 40 MG: 40 INJECTION, POWDER, FOR SOLUTION INTRAMUSCULAR; INTRAVENOUS at 21:03

## 2021-01-01 RX ADMIN — AMIODARONE HYDROCHLORIDE 100 MG: 200 TABLET ORAL at 08:36

## 2021-01-01 RX ADMIN — Medication: at 10:10

## 2021-01-01 RX ADMIN — SODIUM CHLORIDE 500 ML: 9 INJECTION, SOLUTION INTRAVENOUS at 23:05

## 2021-01-01 RX ADMIN — HYDROMORPHONE HYDROCHLORIDE 0.2 MG: 1 INJECTION, SOLUTION INTRAMUSCULAR; INTRAVENOUS; SUBCUTANEOUS at 00:30

## 2021-01-01 RX ADMIN — Medication 10 ML: at 03:03

## 2021-01-01 RX ADMIN — OXYCODONE 5 MG: 5 TABLET ORAL at 22:59

## 2021-01-01 RX ADMIN — ALBUMIN (HUMAN) 50 G: 12.5 INJECTION, SOLUTION INTRAVENOUS at 10:31

## 2021-01-01 RX ADMIN — GLYCOPYRROLATE AND FORMOTEROL FUMARATE 2 PUFF: 9; 4.8 AEROSOL, METERED RESPIRATORY (INHALATION) at 08:03

## 2021-01-01 RX ADMIN — METHYLPREDNISOLONE SODIUM SUCCINATE 40 MG: 40 INJECTION, POWDER, FOR SOLUTION INTRAMUSCULAR; INTRAVENOUS at 14:57

## 2021-01-01 RX ADMIN — Medication: at 03:27

## 2021-01-01 RX ADMIN — CEFEPIME HYDROCHLORIDE 2 G: 2 INJECTION, POWDER, FOR SOLUTION INTRAVENOUS at 23:02

## 2021-01-01 RX ADMIN — SODIUM CHLORIDE 75 ML/HR: 9 INJECTION, SOLUTION INTRAVENOUS at 10:10

## 2021-01-01 RX ADMIN — PHENYLEPHRINE HYDROCHLORIDE 30 MCG/MIN: 10 INJECTION INTRAVENOUS at 21:58

## 2021-01-01 RX ADMIN — SODIUM BICARBONATE 50 MEQ: 84 INJECTION INTRAVENOUS at 11:04

## 2021-01-01 RX ADMIN — HEPARIN SODIUM 5000 UNITS: 5000 INJECTION INTRAVENOUS; SUBCUTANEOUS at 13:15

## 2021-01-01 RX ADMIN — OXYCODONE 5 MG: 5 TABLET ORAL at 03:34

## 2021-01-01 RX ADMIN — SODIUM CHLORIDE 75 ML/HR: 9 INJECTION, SOLUTION INTRAVENOUS at 09:32

## 2021-01-01 RX ADMIN — OXYCODONE 5 MG: 5 TABLET ORAL at 18:15

## 2021-01-01 RX ADMIN — OXYCODONE 5 MG: 5 TABLET ORAL at 15:37

## 2021-01-01 RX ADMIN — HUMAN INSULIN 10 UNITS: 100 INJECTION, SOLUTION SUBCUTANEOUS at 09:33

## 2021-01-01 RX ADMIN — ACETAMINOPHEN 650 MG: 325 TABLET ORAL at 14:05

## 2021-01-01 RX ADMIN — AMIODARONE HYDROCHLORIDE 100 MG: 200 TABLET ORAL at 08:34

## 2021-01-01 RX ADMIN — FLUTICASONE PROPIONATE 2 SPRAY: 50 SPRAY, METERED NASAL at 08:27

## 2021-01-01 RX ADMIN — FOLIC ACID 1 MG: 1 TABLET ORAL at 20:59

## 2021-01-01 RX ADMIN — GLYCOPYRROLATE AND FORMOTEROL FUMARATE 2 PUFF: 9; 4.8 AEROSOL, METERED RESPIRATORY (INHALATION) at 08:29

## 2021-01-01 RX ADMIN — DULOXETINE 20 MG: 20 CAPSULE, DELAYED RELEASE ORAL at 21:00

## 2021-01-01 RX ADMIN — FLUTICASONE PROPIONATE 2 SPRAY: 50 SPRAY, METERED NASAL at 08:38

## 2021-01-01 RX ADMIN — OXYCODONE 5 MG: 5 TABLET ORAL at 19:08

## 2021-01-01 RX ADMIN — Medication: at 09:14

## 2021-01-01 RX ADMIN — PANTOPRAZOLE SODIUM 40 MG: 40 TABLET, DELAYED RELEASE ORAL at 09:16

## 2021-01-01 RX ADMIN — HYDROMORPHONE HYDROCHLORIDE 0.2 MG: 1 INJECTION, SOLUTION INTRAMUSCULAR; INTRAVENOUS; SUBCUTANEOUS at 17:45

## 2021-01-01 RX ADMIN — LORAZEPAM 1 MG: 2 INJECTION INTRAMUSCULAR; INTRAVENOUS at 04:19

## 2021-01-01 RX ADMIN — METHYLPREDNISOLONE SODIUM SUCCINATE 40 MG: 40 INJECTION, POWDER, FOR SOLUTION INTRAMUSCULAR; INTRAVENOUS at 21:02

## 2021-01-01 RX ADMIN — OXYCODONE 5 MG: 5 TABLET ORAL at 10:46

## 2021-01-01 RX ADMIN — HEPARIN SODIUM 5000 UNITS: 5000 INJECTION INTRAVENOUS; SUBCUTANEOUS at 06:29

## 2021-01-01 RX ADMIN — GLYCOPYRROLATE 0.2 MG: 0.2 INJECTION INTRAMUSCULAR; INTRAVENOUS at 16:19

## 2021-01-01 RX ADMIN — FLUTICASONE PROPIONATE 2 SPRAY: 50 SPRAY, METERED NASAL at 08:40

## 2021-01-01 RX ADMIN — AMIODARONE HYDROCHLORIDE 100 MG: 200 TABLET ORAL at 09:50

## 2021-01-01 RX ADMIN — Medication: at 08:27

## 2021-01-01 RX ADMIN — Medication: at 08:41

## 2021-01-01 RX ADMIN — AMIODARONE HYDROCHLORIDE 100 MG: 200 TABLET ORAL at 08:23

## 2021-01-01 RX ADMIN — PANTOPRAZOLE SODIUM 40 MG: 40 TABLET, DELAYED RELEASE ORAL at 08:34

## 2021-01-01 RX ADMIN — Medication 10 ML: at 13:22

## 2021-01-01 RX ADMIN — Medication 10 ML: at 21:33

## 2021-01-01 RX ADMIN — GLYCOPYRROLATE AND FORMOTEROL FUMARATE 2 PUFF: 9; 4.8 AEROSOL, METERED RESPIRATORY (INHALATION) at 09:50

## 2021-01-01 RX ADMIN — ONDANSETRON 4 MG: 2 INJECTION INTRAMUSCULAR; INTRAVENOUS at 23:53

## 2021-01-01 RX ADMIN — CALCIUM GLUCONATE 1 G: 98 INJECTION, SOLUTION INTRAVENOUS at 09:00

## 2021-01-01 RX ADMIN — OXYCODONE 5 MG: 5 TABLET ORAL at 16:22

## 2021-01-01 RX ADMIN — GLYCOPYRROLATE AND FORMOTEROL FUMARATE 2 PUFF: 9; 4.8 AEROSOL, METERED RESPIRATORY (INHALATION) at 20:13

## 2021-01-01 RX ADMIN — SODIUM CHLORIDE 75 ML/HR: 9 INJECTION, SOLUTION INTRAVENOUS at 13:03

## 2021-01-01 RX ADMIN — METHYLPREDNISOLONE SODIUM SUCCINATE 40 MG: 40 INJECTION, POWDER, FOR SOLUTION INTRAMUSCULAR; INTRAVENOUS at 10:06

## 2021-01-01 RX ADMIN — HYDROMORPHONE HYDROCHLORIDE 0.2 MG: 1 INJECTION, SOLUTION INTRAMUSCULAR; INTRAVENOUS; SUBCUTANEOUS at 08:35

## 2021-01-01 RX ADMIN — GLYCOPYRROLATE 0.2 MG: 0.2 INJECTION INTRAMUSCULAR; INTRAVENOUS at 04:19

## 2021-01-01 RX ADMIN — CALCIUM GLUCONATE 1 G: 98 INJECTION, SOLUTION INTRAVENOUS at 09:32

## 2021-01-01 RX ADMIN — OXYCODONE 5 MG: 5 TABLET ORAL at 04:29

## 2021-01-01 RX ADMIN — DULOXETINE 20 MG: 20 CAPSULE, DELAYED RELEASE ORAL at 21:55

## 2021-01-01 RX ADMIN — LORAZEPAM 1 MG: 2 INJECTION INTRAMUSCULAR; INTRAVENOUS at 00:02

## 2021-01-01 RX ADMIN — Medication: at 22:22

## 2021-01-01 RX ADMIN — ALBUMIN (HUMAN) 12.5 G: 0.25 INJECTION, SOLUTION INTRAVENOUS at 19:00

## 2021-01-01 RX ADMIN — MAGNESIUM SULFATE HEPTAHYDRATE 2 G: 40 INJECTION, SOLUTION INTRAVENOUS at 07:26

## 2021-01-01 RX ADMIN — AMIODARONE HYDROCHLORIDE 0.5 MG/MIN: 50 INJECTION, SOLUTION INTRAVENOUS at 10:55

## 2021-01-01 RX ADMIN — VANCOMYCIN HYDROCHLORIDE 1500 MG: 10 INJECTION, POWDER, LYOPHILIZED, FOR SOLUTION INTRAVENOUS at 02:33

## 2021-01-01 RX ADMIN — OXYCODONE 5 MG: 5 TABLET ORAL at 22:43

## 2021-01-01 RX ADMIN — FLUTICASONE PROPIONATE 2 SPRAY: 50 SPRAY, METERED NASAL at 09:18

## 2021-01-01 RX ADMIN — ALBUMIN (HUMAN) 50 G: 12.5 INJECTION, SOLUTION INTRAVENOUS at 17:50

## 2021-01-01 RX ADMIN — HEPARIN SODIUM 5000 UNITS: 5000 INJECTION INTRAVENOUS; SUBCUTANEOUS at 21:07

## 2021-01-01 RX ADMIN — Medication 1 AMPULE: at 08:49

## 2021-01-01 RX ADMIN — Medication 30 ML: at 13:15

## 2021-01-01 RX ADMIN — PHENYLEPHRINE HYDROCHLORIDE 95 MCG/MIN: 10 INJECTION INTRAVENOUS at 04:09

## 2021-01-01 RX ADMIN — CALCIUM GLUCONATE 2 G: 98 INJECTION, SOLUTION INTRAVENOUS at 01:59

## 2021-01-01 RX ADMIN — SODIUM POLYSTYRENE SULFONATE 15 G: 15 SUSPENSION ORAL; RECTAL at 09:32

## 2021-01-01 RX ADMIN — HEPARIN SODIUM 5000 UNITS: 5000 INJECTION INTRAVENOUS; SUBCUTANEOUS at 22:50

## 2021-01-01 RX ADMIN — Medication 10 ML: at 20:45

## 2021-01-01 RX ADMIN — PANTOPRAZOLE SODIUM 40 MG: 40 TABLET, DELAYED RELEASE ORAL at 08:22

## 2021-01-01 RX ADMIN — METHYLPREDNISOLONE SODIUM SUCCINATE 40 MG: 40 INJECTION, POWDER, FOR SOLUTION INTRAMUSCULAR; INTRAVENOUS at 09:17

## 2021-01-01 RX ADMIN — POLYETHYLENE GLYCOL 3350 17 G: 17 POWDER, FOR SOLUTION ORAL at 10:06

## 2021-01-01 RX ADMIN — LORAZEPAM 0.5 MG: 2 INJECTION INTRAMUSCULAR at 14:33

## 2021-01-01 RX ADMIN — HYDROMORPHONE HYDROCHLORIDE 0.5 MG: 1 INJECTION, SOLUTION INTRAMUSCULAR; INTRAVENOUS; SUBCUTANEOUS at 14:32

## 2021-01-01 RX ADMIN — Medication 1 AMPULE: at 10:39

## 2021-01-01 RX ADMIN — PHENYLEPHRINE HYDROCHLORIDE 80 MCG/MIN: 10 INJECTION INTRAVENOUS at 20:44

## 2021-01-01 RX ADMIN — SODIUM CHLORIDE 75 ML/HR: 9 INJECTION, SOLUTION INTRAVENOUS at 04:09

## 2021-01-01 RX ADMIN — GLYCOPYRROLATE AND FORMOTEROL FUMARATE 2 PUFF: 9; 4.8 AEROSOL, METERED RESPIRATORY (INHALATION) at 09:00

## 2021-01-01 RX ADMIN — Medication 10 ML: at 18:19

## 2021-01-01 RX ADMIN — Medication 1 AMPULE: at 22:04

## 2021-01-01 RX ADMIN — Medication: at 18:51

## 2021-01-01 RX ADMIN — ALBUMIN (HUMAN) 50 G: 12.5 INJECTION, SOLUTION INTRAVENOUS at 08:03

## 2021-01-01 RX ADMIN — FLUTICASONE PROPIONATE 2 SPRAY: 50 SPRAY, METERED NASAL at 09:33

## 2021-01-01 RX ADMIN — HYDROMORPHONE HYDROCHLORIDE 1 MG: 1 INJECTION, SOLUTION INTRAMUSCULAR; INTRAVENOUS; SUBCUTANEOUS at 08:08

## 2021-01-01 RX ADMIN — LORAZEPAM 1 MG: 2 INJECTION INTRAMUSCULAR; INTRAVENOUS at 19:50

## 2021-01-01 RX ADMIN — SODIUM POLYSTYRENE SULFONATE 15 G: 15 SUSPENSION ORAL; RECTAL at 09:16

## 2021-01-01 RX ADMIN — FUROSEMIDE 20 MG: 10 INJECTION, SOLUTION INTRAMUSCULAR; INTRAVENOUS at 17:30

## 2021-01-01 RX ADMIN — GLYCOPYRROLATE 0.2 MG: 0.2 INJECTION INTRAMUSCULAR; INTRAVENOUS at 00:02

## 2021-01-01 RX ADMIN — OXYCODONE 5 MG: 5 TABLET ORAL at 14:57

## 2021-01-01 RX ADMIN — POTASSIUM PHOSPHATE, MONOBASIC AND POTASSIUM PHOSPHATE, DIBASIC: 224; 236 INJECTION, SOLUTION, CONCENTRATE INTRAVENOUS at 14:35

## 2021-01-01 RX ADMIN — ONDANSETRON 4 MG: 2 INJECTION INTRAMUSCULAR; INTRAVENOUS at 13:58

## 2021-01-01 RX ADMIN — OXYCODONE 5 MG: 5 TABLET ORAL at 06:35

## 2021-01-01 RX ADMIN — METHYLPREDNISOLONE SODIUM SUCCINATE 40 MG: 40 INJECTION, POWDER, FOR SOLUTION INTRAMUSCULAR; INTRAVENOUS at 08:39

## 2021-01-01 RX ADMIN — HEPARIN SODIUM 5000 UNITS: 5000 INJECTION INTRAVENOUS; SUBCUTANEOUS at 21:55

## 2021-01-01 RX ADMIN — Medication 1 AMPULE: at 08:27

## 2021-01-01 RX ADMIN — MAGNESIUM SULFATE HEPTAHYDRATE 2 G: 40 INJECTION, SOLUTION INTRAVENOUS at 16:57

## 2021-01-01 RX ADMIN — DENOSUMAB 120 MG: 120 INJECTION SUBCUTANEOUS at 16:23

## 2021-01-01 RX ADMIN — OXYCODONE 5 MG: 5 TABLET ORAL at 08:38

## 2021-01-01 RX ADMIN — ALBUTEROL SULFATE 1.25 MG: 2.5 SOLUTION RESPIRATORY (INHALATION) at 08:49

## 2021-01-01 RX ADMIN — Medication 1 AMPULE: at 08:41

## 2021-01-07 NOTE — TELEPHONE ENCOUNTER
Patient called wanting to know if she can do a virtual for her appointment on 2/10/2021. Patient states she has been having issues getting transportation. Please advise.   CB: [phone]

## 2021-01-13 NOTE — PROGRESS NOTES
Palliative Medicine Office Visit Palliative Medicine Nurse Check In 
[phone] Patient Name: Meera Bull YOB: 1945 Date of Office Visit: 1/13/2021 Patient states: \"  \" 
 
From Check In Sheet (scanned in Media): 
Has a medical provider talked with you about cardiopulmonary resuscitation (CPR)? [x] Yes   [] No   [] Unable to obtain Nurse reminder to complete or update ACP FlowSheet: 
 
Is ACP on the Problem List?    [x] Yes    [] No 
IF ACP Document is ON FILE; Nurse to place ACP on Problem List  
 
Is there an ACP Note in Chart Review/Note? [x] Yes    [] No  
If NO: ALERT PROVIDER Primary Decision Maker (Active): Billy Hernandez - Daughter - [phone] Secondary Decision Maker: Guillermo Deborah Daughter - [phone] Secondary Decision Maker: Karol [facility] - [phone] Advance Care Planning 1/13/2021 Patient's Healthcare Decision Maker is: Named in scanned ACP document Primary Decision Maker Name -  
Primary Decision Maker Phone Number -  
Primary Decision Maker Relationship to Patient -  
Confirm Advance Directive Yes, on file Patient Would Like to Complete Advance Directive - Does the patient have other document types - Is there anything that we should know about you as a person in order to provide you the best care possible? Have you been to the ER, urgent care clinic since your last visit? [] Yes   [x] No   [] Unable to obtain Have you been hospitalized since your last visit? [] Yes   [x] No   [] Unable to obtain Have you seen or consulted any other health care providers outside of the [address] [place] [address] since your last visit? [] Yes   [x] No   [] Unable to obtain Functional status (describe):  
 
 
 
Last BM:  
 
 accessed (date): 1/13/2021 Bottle review (for opioid pain medication): 
Medication 1:  
Date filled:  
Directions:  
# filled: # left: # pills taking per day: Last dose taken: 
 
Medication 2:  
Date filled:  
Directions:  
# filled: # left: # pills taking per day: 
Last dose taken: 
 
Medication 3:  
Date filled:  
Directions:  
# filled: # left: # pills taking per day: 
Last dose taken: 
 
Medication 4:  
Date filled:  
Directions:  
# filled: # left: # pills taking per day: 
Last dose taken:

## 2021-01-13 NOTE — PROGRESS NOTES
Outpatient [facility]fusion [facility] - Chemotherapy Progress Note 32 [phone] 16 - Pt admit to [facility] for Xgeva in stable condition. Assessment completed. Patient denied having any symptoms of COVID-19, i.e. SOB, coughing, fever, or generally not feeling well. Also denies having been exposed to COVID-19 recently or having had any recent contact with family/friend that has a pending COVID test.  
 
Labs drawn peripherally R AC. Patient Vitals for the past 12 hrs: 
 Temp Pulse Resp BP SpO2  
01/13/21 1540 97.4 °F (36.3 °C) 67 18 (!) 111/56 100 % Recent Results (from the past 12 hour(s)) CBC W/O DIFF Collection Time: 01/13/21  3:46 PM  
Result Value Ref Range WBC 3.9 3.6 - 11.0 K/uL  
 RBC 3.24 (L) 3.80 - 5.20 M/uL  
 HGB 10.5 (L) 11.5 - 16.0 g/dL HCT 34.0 (L) 35.0 - 47.0 % .9 (H) 80.0 - 99.0 FL  
 MCH 32.4 26.0 - 34.0 PG  
 MCHC 30.9 30.0 - 36.5 g/dL  
 RDW 18.7 (H) 11.5 - 14.5 % PLATELET 804 347 - 658 K/uL MPV 10.5 8.9 - 12.9 FL  
 NRBC 0.0 0  WBC ABSOLUTE NRBC 0.00 0.00 - 0.01 K/uL HEPATIC FUNCTION PANEL Collection Time: 01/13/21  3:46 PM  
Result Value Ref Range Protein, total 7.1 6.4 - 8.2 g/dL Albumin 2.9 (L) 3.5 - 5.0 g/dL Globulin 4.2 (H) 2.0 - 4.0 g/dL A-G Ratio 0.7 (L) 1.1 - 2.2 Bilirubin, total 0.5 0.2 - 1.0 MG/DL Bilirubin, direct 0.1 0.0 - 0.2 MG/DL Alk. phosphatase 70 45 - 117 U/L  
 AST (SGOT) 53 (H) 15 - 37 U/L  
 ALT (SGPT) 24 12 - 78 U/L  
MAGNESIUM Collection Time: 01/13/21  3:46 PM  
Result Value Ref Range Magnesium 2.1 1.6 - 2.4 mg/dL METABOLIC PANEL, BASIC Collection Time: 01/13/21  3:46 PM  
Result Value Ref Range Sodium 134 (L) 136 - 145 mmol/L Potassium 4.4 3.5 - 5.1 mmol/L Chloride 102 97 - 108 mmol/L  
 CO2 26 21 - 32 mmol/L Anion gap 6 5 - 15 mmol/L Glucose 103 (H) 65 - 100 mg/dL BUN 24 (H) 6 - 20 MG/DL  Creatinine 1.75 (H) 0.55 - 1.02 MG/DL  
 BUN/Creatinine ratio 14 12 - 20    
 GFR est AA 34 (L) >60 ml/min/1.73m2 GFR est non-AA 28 (L) >60 ml/min/1.73m2 Calcium 7.3 (L) 8.5 - 10.1 MG/DL  
POC CHEM8 Collection Time: 01/13/21  3:50 PM  
Result Value Ref Range Calcium, ionized (POC) 0.93 (L) 1.12 - 1.32 mmol/L Sodium (POC) 136 136 - 145 mmol/L Potassium (POC) 4.5 3.5 - 5.1 mmol/L Chloride (POC) 101 98 - 107 mmol/L  
 CO2 (POC) 23 21 - 32 mmol/L Anion gap (POC) 17 10 - 20 mmol/L Glucose (POC) 103 (H) 65 - 100 mg/dL BUN (POC) 24 (H) 9 - 20 mg/dL Creatinine (POC) 1.7 (H) 0.6 - 1.3 mg/dL GFRAA, POC 35 (L) >60 ml/min/1.73m2 GFRNA, POC 29 (L) >60 ml/min/1.73m2 Hematocrit (POC) 36 35.0 - 47.0 % Comment Comment Not Indicated. POC CHEM8 Collection Time: 01/13/21  3:55 PM  
Result Value Ref Range Calcium, ionized (POC) 0.89 (L) 1.12 - 1.32 mmol/L Sodium (POC) 136 136 - 145 mmol/L Potassium (POC) 4.5 3.5 - 5.1 mmol/L Chloride (POC) 102 98 - 107 mmol/L  
 CO2 (POC) 23 21 - 32 mmol/L Anion gap (POC) 17 10 - 20 mmol/L Glucose (POC) 103 (H) 65 - 100 mg/dL BUN (POC) 25 (H) 9 - 20 mg/dL Creatinine (POC) 1.6 (H) 0.6 - 1.3 mg/dL GFRAA, POC 38 (L) >60 ml/min/1.73m2 GFRNA, POC 31 (L) >60 ml/min/1.73m2 Hematocrit (POC) 36 35.0 - 47.0 % Comment Comment Not Indicated. I stat run x2 and sample sent to lab. Corrected calcium: 8.18. Called to GALLO Castro at Dr. Mervin Vasquez office. To hold Xgeva today and have pt increase Tums to 5-6/day, instead of the current 2. Pt verbalizes understanding. 1655 - D/c home in no distress. Pt aware of next [facility] appointment scheduled for: 1/27/21 at 1500. Future Appointments Date Time Provider Isabel Andrea 1/21/2021 11:30 AM [facility]J RM [facility]  
1/21/2021  2:30 PM [facility] NM RM 2 [facility] REG  
1/27/2021  3:00 PM CHAIR 3 [address] REG  
1/27/2021  3:15 PM Aisha Hughes MD [facility]/NATACHA SULLIVAN AMB 2/8/2021  2:30 PM Clark Tang MD [facility] BS AMB  
2/10/2021  1:00 PM Sintia Azar, NP [facility] BS AMB  
2/10/2021  3:00 PM CHAIR 3 [address] REG  
2/24/2021  3:00 PM CHAIR 3 [address] REG

## 2021-01-13 NOTE — PATIENT INSTRUCTIONS
Dear Jhony Gan , It was a pleasure seeing you today in [facility] clinic We will see you again in 4 weeks virtually or inperson If labs or imaging tests have been ordered for you today, please call the office  at [phone] 48 hours after completion to obtain the results. This is the plan we talked about: 1. Mid back pain from bony mets 
-You have been taking oxycodone 5 mg every 4 hours for this pain and this is helping. Have provided you with a refill for the same today. We talked about opioid safe storage and you will have your daughter purchase a medication safe today. 2.  Neuropathy 
-You have nerve pain in your left leg and also some sharp pain in your chest wall. Please start duloxetine 20 mg at bedtime. 3. Constipation 
-Constipation is a common side effect of pain medications as they slow your bowels. -Please start Pericolace 2 tabs daily and increase to 2 tabs two times a day if needed. This is necessary to keep your bowels soft. - Add Miralax every other day as a laxative. - Goal is to have soft bowel movements every day or every other day. - Please call us if you do not have bowel movements for more than 3 days. 4.  Atrial fibrillation, leg edema 
-She is on amiodarone daily, she has some pedal edema and her furosemide was increased. She has not followed up with her cardiologist in a while. Have recommended that she make an appointment with her cardiologist Dr. Adelita Ash as soon as possible. 5.  ACP 
-You have an AMD naming your daughters and son as you medical power of . We talked about your wishes regarding CPR and intubation, you wish to remain a full code for now. 6.  Disease understanding and psychosocial support 
-You have very good understanding of your disease, you want prolongation of life with good quality of life. Your paty in God is helping you fight this guerra. This is what you have shared with us about Advance Care Planning: 
 
  Primary Decision Maker (Active): Lizzeth Moreau - Daughter - [phone] Secondary Decision Maker: Sheba Dunbar Daughter - [phone] Secondary Decision Maker: Cynthia Nieves - Son - [phone] Advance Care Planning 1/13/2021 Patient's Healthcare Decision Maker is: Named in scanned ACP document Primary Decision Maker Name -  
Primary Decision Maker Phone Number -  
Primary Decision Maker Relationship to Patient -  
Confirm Advance Directive Yes, on file Patient Would Like to Complete Advance Directive - Does the patient have other document types - The Palliative Medicine Team is here to support you and your family.   
 
 
Sincerely, 
 
 
Maryanne Goldman MD and the Palliative Medicine Team

## 2021-01-13 NOTE — PROGRESS NOTES
Palliative Medicine Outpatient Services [place]: [phone]) Patient Name: Jacky Hinojosa YOB: 1945 Date of Current Visit: 01/13/21 Location of Current Visit:   
[] [facility] Office 
[] [facility] Office [x] [facility] Office 
[] Home 
[]Synchronous real-time A/V virtual visit Date of Initial Visit: 1/13/2021 Referral from: Dr. Juhi Mancia Primary Care Physician: Paz Lopez NP 
  
 SUMMARY:  
Jacky Hinojosa is a 67y.o. year old with a history of A. fib, breast cancer % IA 60% Her 2 -ve, initial diagnosis in the 1990s status post left mastectomy chemo and radiation, found to have metastatic disease in the bones, liver at the lung in September 2020, status post laminectomy T1-T4, who was referred to Palliative Medicine by Dr. Juhi Mancia for management of symptoms and psychosocial support. 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 She was recently approved for 40 hours/week Medicaid aide. She is independent in all daily activities but does need supervision for shower. Uses a wheelchair for doctor's appointments. Current treatment-Kisquali plus letrozole Initial Referral Intake note reviewed PALLIATIVE DIAGNOSES:  
 
  ICD-10-CM ICD-9-CM 1. Malignant neoplasm of breast in female, estrogen receptor positive, unspecified laterality, unspecified site of breast ([facility] [address]  C50.919 174.9 oxyCODONE IR (ROXICODONE) 5 mg immediate release tablet Z17.0 V86.0 2. Upper back pain  M54.9 724.5 3. Sciatica of left side  M54.32 724.3 4. Drug-induced constipation  K59.03 564.09   
  E980.5 PLAN:  
Patient Instructions Dear Jacky Hinojosa , It was a pleasure seeing you today in [facility] clinic We will see you again in 4 weeks virtually or inperson If labs or imaging tests have been ordered for you today, please call the office  at [phone] 48 hours after completion to obtain the results. This is the plan we talked about: 1. Mid back pain from bony mets 
-You have been taking oxycodone 5 mg every 4 hours for this pain and this is helping. Have provided you with a refill for the same today. We talked about opioid safe storage and you will have your daughter purchase a medication safe today. 2.  Neuropathy 
-You have nerve pain in your left leg and also some sharp pain in your chest wall. Please start duloxetine 20 mg at bedtime. 3. Constipation 
-Constipation is a common side effect of pain medications as they slow your bowels. -Please start Pericolace 2 tabs daily and increase to 2 tabs two times a day if needed. This is necessary to keep your bowels soft. - Add Miralax every other day as a laxative. - Goal is to have soft bowel movements every day or every other day. - Please call us if you do not have bowel movements for more than 3 days. 4.  ACP 
-You have an AMD naming your daughters and son as you medical power of . We talked about your wishes regarding CPR and intubation, you wish to remain a full code for now. 5.  Disease understanding and psychosocial support 
-You have very good understanding of your disease, you want prolongation of life with good quality of life. Your paty in God is helping you fight this guerra. This is what you have shared with us about Advance Care Planning: 
 
  Primary Decision Maker (Active): Thierno Oconnell - Daughter - [phone] Secondary Decision Maker: Jared Robert Daughter - [phone] Secondary Decision Maker: Car Celaya - Son - [phone] Advance Care Planning 1/13/2021 Patient's Healthcare Decision Maker is: Named in scanned ACP document Primary Decision Maker Name -  
Primary Decision Maker Phone Number -  
 Primary Decision Maker Relationship to Patient -  
Confirm Advance Directive Yes, on file  
Patient Would Like to Complete Advance Directive -  
Does the patient have other document types -  
 
 
 
 
The Palliative Medicine Team is here to support you and your family.  
 
 
Sincerely, 
 
 
Batsheva Jacob MD and the Palliative Medicine Team 
 
 
 GOALS OF CARE / TREATMENT PREFERENCES:  
[====Goals of Care====] 
GOALS OF CARE: 
Patient / health care proxy stated goals: See Patient Instructions / Summary 
 
TREATMENT PREFERENCES:  
Code Status:  [x] Attempt Resuscitation       [] Do Not Attempt Resuscitation 
 
Advance Care Planning: 
[x] The [employer] Interdisciplinary Team has updated the ACP Navigator with Decision Maker and Patient Capacity 
 
  Primary Decision Maker (Active): Cecily Panchal - Daughter - [phone] 
  Secondary Decision Maker: Lucretia Falk - Daughter - [phone] 
  Secondary Decision Maker: Eulogio Dumont - Son - [phone] 
Advance Care Planning 1/13/2021  
Patient's Healthcare Decision Maker is: Named in scanned ACP document  
Primary Decision Maker Name -  
Primary Decision Maker Phone Number -  
Primary Decision Maker Relationship to Patient -  
Confirm Advance Directive Yes, on file  
Patient Would Like to Complete Advance Directive -  
Does the patient have other document types -  
 
 
Other: 
(If patient appropriate for POST, consider using PALLPOST smart phrase here) 
 
The palliative care team has discussed with patient / health care proxy about goals of care / treatment preferences for patient. 
[====Goals of Care====] 
 
 PRESCRIPTIONS GIVEN:  
 
Medications Ordered Today  
Medications  
• DULoxetine (Cymbalta) 20 mg capsule  
  Sig: Take 1 Cap by mouth nightly.  
  Dispense:  30 Cap  
  Refill:  0  
• oxyCODONE IR (ROXICODONE) 5 mg immediate release tablet  
  Sig: Take 1 Tab by mouth every four (4) hours as needed for Pain for up to 15 days. Max Daily Amount: 30 mg.  
 Dispense:  90 Tab Refill:  0  
  
 
 
 FOLLOW UP: Future Appointments Date Time Provider Isabel Antonyisti 1/13/2021  3:00 PM CHAIR 2 [address] REG  
1/21/2021 11:30 AM [facility]J [facility]  
1/21/2021  2:30 PM [facility]  
1/27/2021  3:00 PM CHAIR 3 [address] REG  
1/27/2021  3:15 PM Hawa Snell MD [facility]  AMB  
2/10/2021  1:00 PM María Azar NP [facility] BS AMB  
2/10/2021  3:00 PM CHAIR 3 [facility]  
2/24/2021  3:00 PM CHAIR 3 [facility] PHYSICIANS INVOLVED IN CARE:  
Patient Care Team: 
Genevieve Rico NP as PCP - General (Nurse Practitioner) Genevieve Rico NP as PCP - [facility] Lazarus Rash, MD as Physician (Cardiology) Hawa Snell MD as Physician (Hematology and Oncology) Marisela Han MD as Physician (Palliative Medicine) HISTORY:  
Reviewed patient-completed ESAS and advance care planning form. Reviewed patient record in prescription monitoring program. 
 
CHIEF COMPLAINT: No chief complaint on file. HPI/SUBJECTIVE: The patient is: [x] Verbal / [] Nonverbal  
 
 
 Pleasant woman seen in clinic alone. She uses a wheelchair. Her grandson Sam Osorio drove her to the appointment. At home, she is able to walk without assistance and do light housework and make small meals. She does need supervision for showers to make sure she does not fall. She has an extended shower chair. She has very good understanding of her disease and wants to fight as long as possible. She feels good overall. She does have some mid back pain for which she takes oxycodone every 4 hours. She has a hard time remembering taking her other medications and we talked about different ways to go about doing that. She tells me that her daughter has many questions about her medical condition and I am happy to talk with her during next visit. Clinical Pain Assessment (nonverbal scale for nonverbal patients):  
[++++ Clinical Pain Assessment++++] [++++Pain Severity++++]: Pain: 4 
[++++Pain Character++++]: throbbing 
[++++Pain Duration++++]: weeks [++++Pain Effect++++]: functional 
[++++Pain Factors++++]: none in particular 
[++++Pain Frequency++++]: on and off 
[++++Pain Location++++]: upper mid back [++++ Clinical Pain Assessment++++] FUNCTIONAL ASSESSMENT:  
 
Palliative Performance Scale (PPS): PPS: 70 PSYCHOSOCIAL/SPIRITUAL SCREENING:  
 
Any spiritual / Episcopal concerns: 
[] Yes /  [x] No 
 
Caregiver Burnout: 
[] Yes /  [x] No /  [] No Caregiver Present Anticipatory grief assessment:  
[x] Normal  / [] Maladaptive ESAS Anxiety: Anxiety: 0 
 
ESAS Depression: Depression: 0 REVIEW OF SYSTEMS:  
 
The following systems were [x] reviewed / [] unable to be reviewed Systems: constitutional, ears/nose/mouth/throat, respiratory, gastrointestinal, genitourinary, musculoskeletal, integumentary, neurologic, psychiatric, endocrine. Positive findings noted below. Modified ESAS Completed by: provider Fatigue: 7 Drowsiness: 5 Depression: 0 Pain: 4 Anxiety: 0 Nausea: 4 Anorexia: 3 Dyspnea: 0 Best Well-Bein Constipation: Yes Other Problem (Comment): 0 PHYSICAL EXAM:  
 
Wt Readings from Last 3 Encounters:  
21 181 lb 3.2 oz (82.2 kg) 20 182 lb 6.4 oz (82.7 kg) 20 182 lb (82.6 kg) Blood pressure 98/62, pulse 70, temperature 97.2 °F (36.2 °C), temperature source Temporal, resp. rate 18, height 5' 5\" (1.651 m), weight 181 lb 3.2 oz (82.2 kg), SpO2 (!) 89 %. Last bowel movement: See Nursing Note Constitutional: Alert, oriented Eyes: pupils equal, anicteric ENMT: no nasal discharge, moist mucous membranes Cardiovascular: regular rhythm, distal pulses intact Respiratory: breathing not labored, symmetric Gastrointestinal: soft non-tender, +bowel sounds Musculoskeletal: no deformity, no tenderness to palpation, bilateral trace pedal edema Skin: warm, dry Neurologic: following commands, moving all extremities Psychiatric: full affect, no hallucinations Other: 
 
 
 HISTORY:  
 
Past Medical History:  
Diagnosis Date  Arthralgia 2017  Arthritis  Asthma Mild to Moderate  Breast cancer ([address] 2017 Onset ; Refusing Mammogram 17  Cancer ([address]  Breast left  Cardiomyopathy ([address] 2017 Onset: Ischemic; 25% - 50% (last EFx 45%) Continue with ACE/Lasix/coreg  Cataract 2017 Bilateral   
 Cellulitis 2017 Suspect local reaction to Pneumovax, treat with ice, NSAIDs or Tylenol  Chest pain at rest 2017  CHF (congestive heart failure) ([address] 2017 Stable, though weight up a little. To take 1 1/2 Lasix daily if swelling, 1 daily o/w  Chronic maxillary sinusitis 2017  Chronic pain Right knee  COPD (chronic obstructive pulmonary disease) ([address] 2017 Continue with inhalers  Diabetes ([address] Pre-diabetic  DJD (degenerative joint disease) 2017  Elevated BUN 2017  Esophagitis, reflux 2017  Fatigue 2017  GERD (gastroesophageal reflux disease)  Heart failure ([address] Cardiomyopathy, HX of MI   Hyperglycemia 2017  Hyperkalemia 2017  Hyperlipidemia 2017  Hypertension 2017  Ill-defined condition Vertigo  Ill-defined condition  HX of Urosepsis complicated by respiratory failure and pneumonnia  Myalgia 2017  Thromboembolus ([address] 1970  
 during 2nd pregnancy [facility] 2017  Vertigo, aural 2017  Vitamin D deficiency 2017 Past Surgical History:  
Procedure Laterality Date [facility] JONNY  
 HX GYN Left Breast Mastectomy  HX HEENT   Bilateral Cataract  HX ORTHOPAEDIC Right 2018  
 knee replacement  HX ORTHOPAEDIC Left 2018  
 wrist surgery  HX VASCULAR ACCESS Port a cath on the right Family History Problem Relation Age of Onset  Cancer Mother  Other Father History reviewed, no pertinent family history. Social History Tobacco Use  Smoking status: Former Smoker Packs/day: 2.00 Years: 25.00 Pack years: 50.00 Quit date:  Years since quittin.0  Smokeless tobacco: Never Used Substance Use Topics  Alcohol use: No  
 
Allergies Allergen Reactions  Morphine Nausea and Vomiting Current Outpatient Medications Medication Sig  carvedilol (Coreg CR) 40 mg CR capsule Take 40 mg by mouth daily (with breakfast).  furosemide (LASIX) 20 mg tablet Take 60 mg by mouth daily.  mometasone (Nasonex) 50 mcg/actuation nasal spray 2 Sprays daily.  letrozole (FEMARA) 2.5 mg tablet Take 2.5 mg by mouth daily.  calcium carbonate (TUMS) 200 mg calcium (500 mg) chew Take 1 Tab by mouth daily.  DULoxetine (Cymbalta) 20 mg capsule Take 1 Cap by mouth nightly.  oxyCODONE IR (ROXICODONE) 5 mg immediate release tablet Take 1 Tab by mouth every four (4) hours as needed for Pain for up to 15 days. Max Daily Amount: 30 mg.  
 oxyCODONE IR (ROXICODONE) 5 mg immediate release tablet Take 1 Tab by mouth every four (4) hours as needed for Pain for up to 14 days. Max Daily Amount: 30 mg.  potassium chloride SR (K-TAB) 20 mEq tablet Take 1 Tab by mouth daily.  ribociclib-letrozole (Kisqali Femara Co-Pack) 600 mg/day(200 mg x 3)-2.5 mg tab Take 600 mg by mouth daily. Take kisqali daily x 21 days then off 7 days, letrozole is daily  meclizine (ANTIVERT) 12.5 mg tablet Take  by mouth three (3) times daily as needed for Dizziness.  aspirin delayed-release 81 mg tablet Take  by mouth daily.  acetaminophen (TYLENOL) 500 mg tablet Take 500 mg by mouth every six (6) hours as needed for Pain.  omeprazole (PRILOSEC) 20 mg capsule Take 1 capsule by mouth once daily  fenofibrate nanocrystallized (TRICOR) 145 mg tablet Take 1 tablet by mouth once daily  Anoro Ellipta 62.5-25 mcg/actuation inhaler Take 1 Puff by inhalation daily.  cholecalciferol (VITAMIN D3) 1,000 unit cap Take 1,000 Units by mouth daily.  amiodarone (PACERONE) 100 mg tablet Take 100 mg by mouth daily.   prochlorperazine (COMPAZINE) 10 mg tablet Take 0.5 Tabs by mouth every six (6) hours as needed for Nausea.  sacubitriL-valsartan (Entresto) 24-26 mg tablet Take 1 Tab by mouth two (2) times a day.  polyethylene glycol (MIRALAX) 17 gram packet Take 1 Packet by mouth daily.  albuterol (PROVENTIL, VENTOLIN) 90 mcg/Actuation inhaler Take 1-2 Puffs by inhalation every four (4) hours as needed for Wheezing. No current facility-administered medications for this visit. LAB and other DATA REVIEWED:  
 
Lab Results Component Value Date/Time WBC 2.2 (L) 12/30/2020 03:13 PM  
 HGB 10.2 (L) 12/30/2020 03:13 PM  
 PLATELET 718 57/36/2225 03:13 PM  
 
Lab Results Component Value Date/Time Sodium 137 12/30/2020 03:13 PM  
 Potassium 4.1 12/30/2020 03:13 PM  
 Chloride 106 12/30/2020 03:13 PM  
 CO2 29 12/30/2020 03:13 PM  
 BUN 16 12/30/2020 03:13 PM  
 Creatinine 1.35 (H) 12/30/2020 03:13 PM  
 Calcium 7.8 (L) 12/30/2020 03:13 PM  
 Magnesium 1.9 12/30/2020 03:13 PM  
 Phosphorus 1.7 (L) 12/30/2020 03:13 PM  
  
Lab Results Component Value Date/Time Alk. phosphatase 76 12/30/2020 03:13 PM  
 Protein, total 6.7 12/30/2020 03:13 PM  
 Albumin 2.9 (L) 12/30/2020 03:13 PM  
 Globulin 3.8 12/30/2020 03:13 PM  
 
Lab Results Component Value Date/Time INR 1.0 09/29/2020 03:04 AM  
 Prothrombin time 10.9 09/29/2020 03:04 AM  
 aPTT 24.1 09/21/2020 11:05 AM  
  
No results found for: IRON, FE, TIBC, IBCT, PSAT, FERR Detail chart reviewed. Other specialists and referral provider notes reviewed. CONTROLLED SUBSTANCES SAFETY ASSESSMENT (IF ON CONTROLLED SUBSTANCES):  
 
Reviewed opioid safety handout:  [x] Yes   [] No 
24 hour opioid dose >150mg morphine equivalent/day:  [] Yes   [] No 
Benzodiazepines:  [] Yes   [] No 
Sleep apnea:  [] Yes   [] No 
Urine Toxicology Testing within last 6 months:  [] Yes   [] No 
History of or new aberrant medication taking behaviors:  [] Yes   [x] No 
Has Narcan been prescribed [x] Yes   [] No 
 
   
 
Total time: 70 minutes Counseling / coordination time: 60 minutes 
> 50% counseling / coordination?:  Yes Please note that this dictation was completed with [employer], the [employer] voice recognition software. Quite often unanticipated grammatical, syntax, homophones, and other interpretive errors are inadvertently transcribed by the computer software. Please disregard these errors. Please excuse any errors that have escaped final proofreading

## 2021-01-26 NOTE — PROGRESS NOTES
Deandra Hinojosa is a 67 y.o. female here for follow up for met right breast cancer. She is on Letrozole and Kisqali. 1. Have you been to the ER, urgent care clinic since your last visit? Hospitalized since your last visit? no 
 
2. Have you seen or consulted any other health care providers outside of the [address] [place] [address] since your last visit? Include any pap smears or colon screening. No 
 
Pt presents in wheelchair. States she has been seeing Dr Rivera Doing and put her on Duloxetine and has helped her sleep and neuropathy. She started her 3rd cycle of Antonio Lucero today.

## 2021-01-27 NOTE — PROGRESS NOTES
[address] [facility] [place] 
at [facility] [address], [address] 
[phone] Follow-up Note Patient: Kay Cortes MRN: [id number]  SSN: [id number] YOB: 1945  Age: 67 y.o. Sex: female Diagnosis: 1. 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 Left mastectomy with LN dissection (negative LN) at [facility] - Dr. Kristina Martin. Treatment: 1. Letrozole, Ribociclib - started 12/2/2020 Subjective:  
  
Kay Cortes is a 67 y.o. female who I am seeing in follow up for metastatic cancer. Ms. Brad Zendejas received treatment for left sided breast cancer in the 1990's. She received systemic chemotherapy and mastectomy. She was noted to have sclerotic lesions in the bone in April and then in Sep 2020. She is suffering with bone pains in the back. She underwent a laminectomy from T2 - T4. Pathology shows ER +ve mBC. She has been taking Letrozole. She started Clau Spurr on 12/2/2020 and is tolerating it fairly well. She reports some nausea after taking the medication and continues to have difficulty sleeping. She is in a wheelchair and is accompanied by her grandson. She did have an appointment with palliative and they are managing her symptoms at this time. Review of Systems: 
 
+Bone pains 
+ Nausea No dyspnea No chest pain No diarrhea No heir loss 
+ Weakness in b/l LE 
+ Bilateral lower extremity edema ROS was pulled in from a previous visit. No changes were made d/t symptoms remain the same. Past Medical History:  
Diagnosis Date  Arthralgia 8/17/2017  Arthritis  Asthma Mild to Moderate  Breast cancer ([address] 8/17/2017 Onset 1997; Refusing Mammogram 6/16/17  Cancer ([address] 1997 Breast left  Cardiomyopathy ([address] 8/17/2017 Onset: Ischemic; 25% - 50% (last EFx 45%) Continue with ACE/Lasix/coreg  Cataract 2017 Bilateral   
 Cellulitis 2017 Suspect local reaction to Pneumovax, treat with ice, NSAIDs or Tylenol  Chest pain at rest 2017  CHF (congestive heart failure) ([address] 2017 Stable, though weight up a little. To take 1 1/2 Lasix daily if swelling, 1 daily o/w  Chronic maxillary sinusitis 2017  Chronic pain Right knee  COPD (chronic obstructive pulmonary disease) ([address] 2017 Continue with inhalers  Diabetes ([address] Pre-diabetic  DJD (degenerative joint disease) 2017  Elevated BUN 2017  Esophagitis, reflux 2017  Fatigue 2017  GERD (gastroesophageal reflux disease)  Heart failure ([address] Cardiomyopathy, HX of MI   Hyperglycemia 2017  Hyperkalemia 2017  Hyperlipidemia 2017  Hypertension 2017  Ill-defined condition Vertigo  Ill-defined condition  HX of Urosepsis complicated by respiratory failure and pneumonnia  Myalgia 2017  Thromboembolus ([address] 1969  
 during 2nd pregnancy [facility] 2017  Vertigo, aural 2017  Vitamin D deficiency 2017 Past Surgical History:  
Procedure Laterality Date [facility] JONNY  
 HX GYN Left Breast Mastectomy  HX HEENT   Bilateral Cataract  HX ORTHOPAEDIC Right 2018  
 knee replacement  HX ORTHOPAEDIC Left 2018  
 wrist surgery  HX VASCULAR ACCESS Port a cath on the right Family History Problem Relation Age of Onset  Cancer Mother  Other Father Social History Tobacco Use  Smoking status: Former Smoker Packs/day: 2.00 Years: 25.00 Pack years: 50.00 Quit date:  Years since quittin.0  Smokeless tobacco: Never Used Substance Use Topics  Alcohol use: No  
  
Prior to Admission medications Medication Sig Start Date End Date Taking? Authorizing Provider  
carvedilol (Coreg CR) 40 mg CR capsule Take 40 mg by mouth daily (with breakfast). Yes Provider, Historical  
furosemide (LASIX) 20 mg tablet Take 60 mg by mouth daily. Yes Provider, Historical  
mometasone (Nasonex) 50 mcg/actuation nasal spray 2 Sprays daily. Yes Provider, Historical  
letrozole (FEMARA) 2.5 mg tablet Take 2.5 mg by mouth daily. Yes Provider, Historical  
calcium carbonate (TUMS) 200 mg calcium (500 mg) chew Take 1 Tab by mouth daily. Yes Provider, Historical  
DULoxetine (Cymbalta) 20 mg capsule Take 1 Cap by mouth nightly. 1/13/21  Yes Crsital Thrasher MD  
oxyCODONE IR (ROXICODONE) 5 mg immediate release tablet Take 1 Tab by mouth every four (4) hours as needed for Pain for up to 15 days. Max Daily Amount: 30 mg. 1/13/21 1/28/21 Yes Cristal Thrasher MD  
potassium chloride SR (K-TAB) 20 mEq tablet Take 1 Tab by mouth daily. 12/31/20  Yes Aisha Hughes MD  
amiodarone (PACERONE) 100 mg tablet Take 100 mg by mouth daily.    Yes Provider, Historical  
prochlorperazine (COMPAZINE) 10 mg tablet Take 0.5 Tabs by mouth every six (6) hours as needed for Nausea. 12/16/20  Yes Viridiana Quiroga, NP  
ribociclib-letrozole (Kisqali Femara Co-Pack) 600 mg/day(200 mg x 3)-2.5 mg tab Take 600 mg by mouth daily. Take kisqali daily x 21 days then off 7 days, letrozole is daily 11/24/20  Yes Aisha Hughes MD  
sacubitriL-valsartan Ray Duffel) 24-26 mg tablet Take 1 Tab by mouth two (2) times a day. Yes Provider, Historical  
meclizine (ANTIVERT) 12.5 mg tablet Take  by mouth three (3) times daily as needed for Dizziness. Yes Provider, Historical  
aspirin delayed-release 81 mg tablet Take  by mouth daily. Yes Provider, Historical  
polyethylene glycol (MIRALAX) 17 gram packet Take 1 Packet by mouth daily.  10/6/20  Yes Lizzy Van MD  
 acetaminophen (TYLENOL) 500 mg tablet Take 500 mg by mouth every six (6) hours as needed for Pain. Yes Provider, Historical  
omeprazole (PRILOSEC) 20 mg capsule Take 1 capsule by mouth once daily 6/9/20  Yes Rosalee Azar NP  
fenofibrate nanocrystallized (TRICOR) 145 mg tablet Take 1 tablet by mouth once daily 5/22/20  Yes Davis Lucas NP Anoro Ellipta 62.5-25 mcg/actuation inhaler Take 1 Puff by inhalation daily. 3/23/20  Yes Other, MD Faizan  
cholecalciferol (VITAMIN D3) 1,000 unit cap Take 1,000 Units by mouth daily. Yes Provider, Historical  
albuterol (PROVENTIL, VENTOLIN) 90 mcg/Actuation inhaler Take 1-2 Puffs by inhalation every four (4) hours as needed for Wheezing. 5/1/11  Yes Majo Jaramillo., PA Allergies Allergen Reactions  Morphine Nausea and Vomiting Objective:  
 
Visit Vitals BP (!) 92/52 Pulse 66 Temp 97.3 °F (36.3 °C) Resp 16 Ht 5' 5\" (1.651 m) SpO2 96% BMI 30.39 kg/m² Pain Scale: 2/10 Pain Location:  
 
 
Physical Exam: 
 
GENERAL: alert, cooperative, no distress, appears stated age EYE: conjunctivae/corneas clear. LYMPHATIC: Cervical, supraclavicular, and axillary nodes normal.  
LUNG: clear to auscultation bilaterally HEART: regular rate and rhythm ABDOMEN: soft, non-tender. EXTREMITIES: bilateral lower extremity edema L >.R 
SKIN: Normal.  
NEUROLOGIC: AOx3. Meka Maynard Physical exam and ROS has been modified from a prior visit to make it relevant and current CT Results (most recent): 
Results from [facility] Encounter encounter on 04/17/20 CT ABD PELV W CONT Narrative EXAM:  CTA CHEST W OR W WO CONT, CT ABD PELV W CONT INDICATION:   RUQ pain, R thoracic back pain, SOB, h/o DVT 
 
COMPARISON: 8/26/2013. CHEST CTA: 
 
TECHNIQUE:  
Precontrast  images were obtained to localize the volume for acquisition.  
Multislice helical CT arteriography was performed from the diaphragm to the 
 thoracic inlet during uneventful rapid bolus of 100 cc Isovue-370. Lung and soft 
tissue windows were generated. Coronal and sagittal images were generated and 3D post processing consisting of coronal maximum intensity images was performed. CT dose reduction was achieved through use of a standardized protocol tailored 
for this examination and automatic exposure control for dose modulation. FINDINGS: 
CHEST: 
THYROID: No nodule. MEDIASTINUM: No mass or lymphadenopathy. DOROTHY: No mass or lymphadenopathy. THORACIC AORTA: No dissection or aneurysm. MAIN PULMONARY ARTERY: There is no evidence of pulmonary embolism. TRACHEA/BRONCHI: Patent. ESOPHAGUS: No wall thickening or dilatation. HEART: Normal in size. PLEURA: No effusion or pneumothorax. LUNGS: No nodule, mass, or airspace disease. BONES: Degenerative changes are seen in the thoracic spine. Tiny sclerotic foci 
are scattered throughout the thoracic vertebral bodies. Impression IMPRESSION: No acute process or evidence of pulmonary embolism. Tiny sclerotic 
foci are scattered throughout the vertebral bodies. ABDOMEN/PELVIC CT: 
 
TECHNIQUE:  
Following the uneventful intravenous administration of 100 cc Isovue-370, thin 
axial images were obtained through the abdomen and pelvis. Coronal and sagittal 
reconstructions were generated. Oral contrast was not administered. CT dose 
reduction was achieved through use of a standardized protocol tailored for this 
examination and automatic exposure control for dose modulation. FINDINGS:  
LIVER: No mass or biliary dilatation. GALLBLADDER: Unremarkable. SPLEEN: No mass. PANCREAS: No mass or ductal dilatation. ADRENALS: Unremarkable. KIDNEYS: Left renal cysts, the largest of which measures 2.8 cm. Scarring left 
kidney. STOMACH: Unremarkable. SMALL BOWEL: No dilatation or wall thickening. COLON: Sigmoid diverticulosis. APPENDIX: Unremarkable. PERITONEUM: No ascites or pneumoperitoneum. RETROPERITONEUM: No lymphadenopathy or aortic aneurysm. REPRODUCTIVE ORGANS: The uterus is surgically absent. URINARY BLADDER: No mass or calculus. BONES: Sclerotic lesions are noted in the iliac wings bilaterally, sacrum, and 
lumbar spine. ADDITIONAL COMMENTS: N/A IMPRESSION: 
No acute abnormality. Sclerotic foci are noted concerning for osseous metastatic 
disease. Correlation with bone scan is recommended. Lab Results Component Value Date/Time WBC 2.6 (L) 01/27/2021 02:55 PM  
 HGB (POC) 12.7 09/13/2017 11:20 AM  
 HGB 10.9 (L) 01/27/2021 02:55 PM  
 Hematocrit (POC) 36 01/13/2021 03:55 PM  
 HCT 34.3 (L) 01/27/2021 02:55 PM  
 PLATELET 621 94/47/3963 02:55 PM  
 .5 (H) 01/27/2021 02:55 PM  
 
 
 
Lab Results Component Value Date/Time Sodium 134 (L) 01/13/2021 03:46 PM  
 Potassium 4.4 01/13/2021 03:46 PM  
 Chloride 102 01/13/2021 03:46 PM  
 CO2 26 01/13/2021 03:46 PM  
 Anion gap 6 01/13/2021 03:46 PM  
 Glucose 103 (H) 01/13/2021 03:46 PM  
 BUN 24 (H) 01/13/2021 03:46 PM  
 Creatinine 1.75 (H) 01/13/2021 03:46 PM  
 BUN/Creatinine ratio 14 01/13/2021 03:46 PM  
 GFR est AA 34 (L) 01/13/2021 03:46 PM  
 GFR est non-AA 28 (L) 01/13/2021 03:46 PM  
 Calcium 7.3 (L) 01/13/2021 03:46 PM  
 Bilirubin, total 0.5 01/13/2021 03:46 PM  
 Alk. phosphatase 70 01/13/2021 03:46 PM  
 Protein, total 7.1 01/13/2021 03:46 PM  
 Albumin 2.9 (L) 01/13/2021 03:46 PM  
 Globulin 4.2 (H) 01/13/2021 03:46 PM  
 A-G Ratio 0.7 (L) 01/13/2021 03:46 PM  
 ALT (SGPT) 24 01/13/2021 03:46 PM  
 AST (SGOT) 53 (H) 01/13/2021 03:46 PM  
 
 
 
Assessment: 1. 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 Left mastectomy with LN dissection ( negative LN) at [facility] - Dr. Hilda Hurt. Had chemotherapy, but does not remember the oncologist. States she never took a pill following chemotherapy or surgery. Letrozole/Ribociclib - started 12/2/2020 Tolerating well 
+ mild nausea Blood counts are acceptable 2. Cancer related bone pains Managed by palliative Evelyn Elaine 3. Nausea From chemotherapy Compazine as needed Plan:  
 
 
> Continue Letrozole and Kisqali 
> Continue Xgeva ( If calcium is WNL) 
> Labs in [facility] 
> Follow-up in 4 weeks I performed a history and physical examination of the patient and discussed his management with the NPP. I reviewed the NPP note and agree with the documented findings and plan of care. The patient was seen in conjunction with Ms. Arteaga. Signed by: Soledad Delgado MD 
                   February 7, 2021

## 2021-01-27 NOTE — LETTER
2/7/2021 Patient: Kolby Lind YOB: 1945 Date of Visit: 1/27/2021 [address] [address] [address] [employer] Dear Charles Amado NP, Thank you for referring Ms. Crys Bower to [address] [place] [address] for evaluation. My notes for this consultation are attached. If you have questions, please do not hesitate to call me. I look forward to following your patient along with you.  
 
 
Sincerely, 
 
Mickey Matute MD

## 2021-01-27 NOTE — PROGRESS NOTES
Outpatient Infusion Center Short Visit Progress Note 1500 Patient admitted to [facility] for Xgeva/labs ambulatory in stable condition. Assessment completed. No new concerns voiced. Covid Screening 1. Do you have any symptoms of COVID-19? SOB, coughing, fever, or generally not feeling well ? NO 
2. Have you been exposed to COVID-19 recently? NO 
3. Have you had any recent contact with family/friend that has a pending COVID test? NO Vital Signs: 
Visit Vitals BP (!) 92/52 (BP 1 Location: Right arm, BP Patient Position: Sitting) Pulse 66 Temp 97.3 °F (36.3 °C) Resp 16 SpO2 96% Peripheral stick to right forearm; labs drawn and sent for processing. Lab Results: 
Recent Results (from the past 12 hour(s)) CBC WITH AUTOMATED DIFF Collection Time: 01/27/21  2:55 PM  
Result Value Ref Range WBC 2.6 (L) 3.6 - 11.0 K/uL  
 RBC 3.25 (L) 3.80 - 5.20 M/uL  
 HGB 10.9 (L) 11.5 - 16.0 g/dL HCT 34.3 (L) 35.0 - 47.0 % .5 (H) 80.0 - 99.0 FL  
 MCH 33.5 26.0 - 34.0 PG  
 MCHC 31.8 30.0 - 36.5 g/dL RDW 21.0 (H) 11.5 - 14.5 % PLATELET 577 010 - 802 K/uL MPV 10.8 8.9 - 12.9 FL  
 NRBC 0.0 0  WBC ABSOLUTE NRBC 0.00 0.00 - 0.01 K/uL NEUTROPHILS 48 32 - 75 % LYMPHOCYTES 36 12 - 49 % MONOCYTES 11 5 - 13 % EOSINOPHILS 2 0 - 7 % BASOPHILS 3 (H) 0 - 1 % IMMATURE GRANULOCYTES 0 0.0 - 0.5 % ABS. NEUTROPHILS 1.3 (L) 1.8 - 8.0 K/UL  
 ABS. LYMPHOCYTES 1.0 0.8 - 3.5 K/UL  
 ABS. MONOCYTES 0.3 0.0 - 1.0 K/UL  
 ABS. EOSINOPHILS 0.1 0.0 - 0.4 K/UL  
 ABS. BASOPHILS 0.1 0.0 - 0.1 K/UL  
 ABS. IMM. GRANS. 0.0 0.00 - 0.04 K/UL  
 DF AUTOMATED METABOLIC PANEL, COMPREHENSIVE Collection Time: 01/27/21  2:55 PM  
Result Value Ref Range Sodium 136 136 - 145 mmol/L Potassium 4.2 3.5 - 5.1 mmol/L Chloride 103 97 - 108 mmol/L  
 CO2 28 21 - 32 mmol/L Anion gap 5 5 - 15 mmol/L Glucose 108 (H) 65 - 100 mg/dL  BUN 23 (H) 6 - 20 MG/DL  
 Creatinine 2.01 (H) 0.55 - 1.02 MG/DL  
 BUN/Creatinine ratio 11 (L) 12 - 20 GFR est AA 29 (L) >60 ml/min/1.73m2 GFR est non-AA 24 (L) >60 ml/min/1.73m2 Calcium 8.1 (L) 8.5 - 10.1 MG/DL Bilirubin, total 0.4 0.2 - 1.0 MG/DL  
 ALT (SGPT) 24 12 - 78 U/L  
 AST (SGOT) 52 (H) 15 - 37 U/L Alk. phosphatase 59 45 - 117 U/L Protein, total 6.6 6.4 - 8.2 g/dL Albumin 2.7 (L) 3.5 - 5.0 g/dL Globulin 3.9 2.0 - 4.0 g/dL A-G Ratio 0.7 (L) 1.1 - 2.2 MAGNESIUM Collection Time: 01/27/21  2:55 PM  
Result Value Ref Range Magnesium 1.9 1.6 - 2.4 mg/dL PHOSPHORUS Collection Time: 01/27/21  2:55 PM  
Result Value Ref Range Phosphorus 2.6 2.6 - 4.7 MG/DL Patient labs within parameters for treatment; corrected calcium 9.4. Medications: 
Medications Administered   
 denosumab (XGEVA) injection 120 mg   
 Admin Date 
01/27/2021 Action Given Dose 
120 mg Route SubCUTAneous Administered By 
Sujatha Posada Patient tolerated treatment well. Patient discharged from [place] [address] ambulatory in no distress at 1630. Patient aware of next appointment. Future Appointments Date Time Provider Isabel Andrea 2/8/2021  2:30 PM Minesh Johnson MD [facility] BS AMB  
2/10/2021  1:00 PM Shelby Azar NP [facility] BS AMB  
2/10/2021  3:00 PM CHAIR 3 [address] REG  
2/24/2021  3:00 PM CHAIR 3 [address] REG  
2/26/2021  3:15 PM Victor Hugo Parker MD [facility] BS AMB

## 2021-01-28 NOTE — TELEPHONE ENCOUNTER
Requested Prescriptions     Pending Prescriptions Disp Refills    oxyCODONE IR (ROXICODONE) 5 mg immediate release tablet 90 Tab 0     Sig: Take 1 Tab by mouth every four (4) hours as needed for Pain for up to 15 days. Max Daily Amount: 30 mg.

## 2021-01-28 NOTE — TELEPHONE ENCOUNTER
Triage for Controlled Substance Refill Request    Pain Diagnosis: _Malignant neoplasm of breast in female, estrogen receptor positive, unspecified laterality, unspecified site of breast    Last Outpatient Visit: _1/13/2021  Next Outpatient Visit: _2/8/2021    Reason for refill needed outside of office visit? -Appointment not scheduled prior to need for scheduled refill      Pharmacy: [address]      Medication:oxyCODONE IR (ROXICODONE) 5 mg  Dose and directions: Take 1 Tab by mouth every four (4) hours  Number dispensed:90  Date filled ( or Pharmacy):1/14/2021  #left:     reviewed: _yes     Date of Urine Drug Screen:  _n/a     Opioid Safety Handout given:  _yes     Appropriate for refill:  _yes     Action:  _ please refill

## 2021-02-04 NOTE — TELEPHONE ENCOUNTER
Called Cecily to remind about the office visit. Cecily states that patient will have no transportation to do an in person visit to please switch visit to virtual.  Asked if patient was able to do virtual and Cecily states she has an I phone. Cecily to make patient aware. Cecily states that she has to know about appts for transportation  Business days in advance. Whiles talking to [facility], patient calling me back. Patient confirmed that she wants a Virtual Visit due to lack of transportation. Patient also states that she needs to speak to nurse regarding the depression medication. States since taking that medication, her Vision has changed  States she has some vision loss, vision is cloudy, changing. Advised patient that nurse would call her back to discuss this change. Please call patient back at [phone].

## 2021-02-04 NOTE — TELEPHONE ENCOUNTER
Called patient to advise/confirm upcoming appt with Dr. Janessa Danielson on 2/8/2021     at 2:30pm at [facility]. No answer so left voicemail. Also advised to please bring in your Drivers License and Insurance Card with you to appointment as well as any medication in the original container with you to appt.

## 2021-02-04 NOTE — TELEPHONE ENCOUNTER
Returned call to patient who reports that in the last week she has had blurred vision, when reading or seeing through out the day, believes it is because of Cymbalta 20 mg , 1 tab by mouth at night , medication started 2 weeks ago , reports the medication is helping with her neuropathy and sleep at night, wants an alternative medication but also something that will help with neuropathy and sleep patient informs she will speak with physician regarding this on visit scheduled Monday

## 2021-02-08 NOTE — PROGRESS NOTES
Palliative Medicine Outpatient Services 
[place]: [phone] ([phone]) 
 
Patient Name: Bree Dumont 
YOB: 1945 
 
Date of Current Visit: 02/09/21 
Location of Current Visit:   
[] [facility] Office 
[] [facility] Office 
[] [facility] Office 
[] Home 
[x]Synchronous real-time A/V virtual visit 
 
Date of Initial Visit: 1/13/2021 
Referral from: Dr. Jernigan 
Primary Care Physician: Rich Azar NP 
  
 SUMMARY:  
Bree Dumont is a 75 y.o. year old with a history of A. fib, breast cancer % AK 60% Her 2 -ve, initial diagnosis in the 1990s status post left mastectomy chemo and radiation, found to have metastatic disease in the bones, liver at the lung in September 2020, status post laminectomy T1-T4, who was referred to Palliative Medicine by Dr. Jernigan for management of symptoms and psychosocial support.  
 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  She was recently approved for 40 hours/week Medicaid aide.  She is independent in all daily activities but does need supervision for shower.  Uses a wheelchair for doctor's appointments. 
 
Current treatment-Kisquali plus letrozole 
 
 
 PALLIATIVE DIAGNOSES:  
 
  ICD-10-CM ICD-9-CM   
1. Pain due to malignant neoplasm metastatic to bone (HCC)  G89.3 338.3 oxyCODONE IR (ROXICODONE) 5 mg immediate release tablet  
 C79.51    
2. Sciatica of left side  M54.32 724.3   
3. Drug-induced constipation  K59.03 564.09   
  E980.5   
4. Malignant neoplasm of breast in female, estrogen receptor positive, unspecified laterality, unspecified site of breast (HCC)  C50.919 174.9   
 Z17.0 V86.0   
 
 
 
 PLAN:  
Patient Instructions  
 
Dear Bree Duomnt , 
 
It was a pleasure seeing you today in [facility] clinic 
 
We will see you again in 4 weeks inperson when your daughter is visiting 
 
 If labs or imaging tests have been ordered for you today, please call the office  at [phone] 48 hours after completion to obtain the results. This is the plan we talked about: 1. Mid back pain from bony mets 
-You have been taking oxycodone 5 mg every 4 hours for this pain and this is helping. Have provided you with a refill for the same today. We talked about opioid safe storage practices. 2.  Neuropathy 
-You have nerve pain in your left leg and also some sharp pain in your chest wall. Ple we started you on duloxetine 20 mg at bedtime and you think this is been helpful. You want to continue the same. Refills provided 3. Constipation 
-Constipation is a common side effect of pain medications as they slow your bowels. -Please continue Pericolace 2 tabs daily and increase to 2 tabs two times a day if needed. This is necessary to keep your bowels soft. - Add Miralax every other day as a laxative. - Goal is to have soft bowel movements every day or every other day. - Please call us if you do not have bowel movements for more than 3 days. 4.  Atrial fibrillation, leg edema 
-She is on amiodarone daily, she has some pedal edema and her furosemide was increased. She has not followed up with her cardiologist in a while. Have recommended that she make an appointment with her cardiologist Dr. Brissa Coelho as soon as possible. 5.  ACP 
-You have an AMD naming your daughters and son as you medical power of . We talked about your wishes regarding CPR and intubation, you wish to remain a full code for now. 6.  Disease understanding and psychosocial support 
-You have very good understanding of your disease, you want prolongation of life with good quality of life. Your paty in God is helping you fight this guerra. This is what you have shared with us about Advance Care Planning: 
 
  Primary Decision Maker (Active): Marisol Combs - Daughter - [phone] Secondary Decision Maker: Awais Stockton Daughter - [phone] Secondary Decision Maker: Jani Pinedo - [phone] Advance Care Planning 1/13/2021 Patient's Healthcare Decision Maker is: Named in scanned ACP document Primary Decision Maker Name -  
Primary Decision Maker Phone Number -  
Primary Decision Maker Relationship to Patient -  
Confirm Advance Directive Yes, on file Patient Would Like to Complete Advance Directive - Does the patient have other document types - The Palliative Medicine Team is here to support you and your family. Sincerely, 
 
 
Valeri Ferrara MD and the Palliative Medicine Team 
 
 
 GOALS OF CARE / TREATMENT PREFERENCES:  
[====Goals of Care====] GOALS OF CARE: 
Patient / health care proxy stated goals: See Patient Instructions / Summary TREATMENT PREFERENCES:  
Code Status:  [x] Attempt Resuscitation       [] Do Not Attempt Resuscitation Advance Care Planning: 
[x] [facility] Interdisciplinary Team has updated the ACP Navigator with Decision Maker and Patient Capacity Primary Decision Maker (Active): Leandro Black - Daughter - [phone] Secondary Decision Maker: Awais Stockton Daughter - [phone] Secondary Decision Maker: Jani Pinedo - [phone] Advance Care Planning 2/8/2021 Patient's Healthcare Decision Maker is: Named in scanned ACP document Primary Decision Maker Name -  
Primary Decision Maker Phone Number -  
Primary Decision Maker Relationship to Patient -  
Confirm Advance Directive Yes, on file Patient Would Like to Complete Advance Directive - Does the patient have other document types Other (comment) Other: 
(If patient appropriate for POST, consider using PALLPOST smart phrase here) The palliative care team has discussed with patient / health care proxy about goals of care / treatment preferences for patient. 
[====Goals of Care====]  PRESCRIPTIONS GIVEN:  
 
 Medications Ordered Today Medications  oxyCODONE IR (ROXICODONE) 5 mg immediate release tablet Sig: Take 1 Tab by mouth every four (4) hours as needed for Pain for up to 14 days. Max Daily Amount: 30 mg. Dispense:  60 Tab Refill:  0  
 DULoxetine (Cymbalta) 20 mg capsule Sig: Take 1 Cap by mouth nightly. Dispense:  30 Cap Refill:  5 FOLLOW UP: Future Appointments Date Time Provider Isabel Andrea 2/24/2021  3:00 PM CHAIR 3 [address] REG  
2/26/2021  3:15 PM [facility]  
3/24/2021  3:00 PM CHAIR 3 [facility] REG  
4/21/2021  3:00 PM CHAIR 3 [address] REG  
5/19/2021  3:00 PM CHAIR 3 [address] REG PHYSICIANS INVOLVED IN CARE:  
Patient Care Team: 
Lindsay Saavedra NP as PCP - General (Nurse Practitioner) Lindsay Saavedra NP as [facility]CP [facility] Provider Julio Brothers MD as Physician (Cardiology) Brando Dobson MD as Physician (Hematology and Oncology) Stefania Barrios MD as Physician (Palliative Medicine) HISTORY:  
Reviewed patient-completed ESAS and advance care planning form. Reviewed patient record in prescription monitoring program. 
 
CHIEF COMPLAINT: No chief complaint on file. HPI/SUBJECTIVE: The patient is: [x] Verbal / [] Nonverbal  
 
Patient seen at home virtually.   See above 
 
--------- 
 
 
 Pleasant woman seen in clinic alone. She uses a wheelchair. Her grandson Taran Pinto drove her to the appointment. At home, she is able to walk without assistance and do light housework and make small meals. She does need supervision for showers to make sure she does not fall. She has an extended shower chair. She has very good understanding of her disease and wants to fight as long as possible. She feels good overall. She does have some mid back pain for which she takes oxycodone every 4 hours. She has a hard time remembering taking her other medications and we talked about different ways to go about doing that. She tells me that her daughter has many questions about her medical condition and I am happy to talk with her during next visit. Clinical Pain Assessment (nonverbal scale for nonverbal patients):  
[++++ Clinical Pain Assessment++++] [++++Pain Severity++++]: Pain: 4 
[++++Pain Character++++]: throbbing 
[++++Pain Duration++++]: weeks [++++Pain Effect++++]: functional 
[++++Pain Factors++++]: none in particular 
[++++Pain Frequency++++]: on and off 
[++++Pain Location++++]: upper mid back [++++ Clinical Pain Assessment++++] FUNCTIONAL ASSESSMENT:  
 
Palliative Performance Scale (PPS): PPS: 70 PSYCHOSOCIAL/SPIRITUAL SCREENING:  
 
Any spiritual / Baptism concerns: 
[] Yes /  [x] No 
 
Caregiver Burnout: 
[] Yes /  [x] No /  [] No Caregiver Present Anticipatory grief assessment:  
[x] Normal  / [] Maladaptive ESAS Anxiety: Anxiety: 0 
 
ESAS Depression: Depression: 2 REVIEW OF SYSTEMS:  
 
The following systems were [x] reviewed / [] unable to be reviewed Systems: constitutional, ears/nose/mouth/throat, respiratory, gastrointestinal, genitourinary, musculoskeletal, integumentary, neurologic, psychiatric, endocrine. Positive findings noted below. Modified ESAS Completed by: provider Fatigue: 5 Drowsiness: 5 Depression: 2 Pain: 4 Anxiety: 0 Nausea: 4 Anorexia: 4 Dyspnea: 4 [facility] Well-Bein Constipation: Yes Other Problem (Comment): 0 PHYSICAL EXAM:  
 
Wt Readings from Last 3 Encounters:  
21 182 lb 9.6 oz (82.8 kg) 21 181 lb 3.2 oz (82.2 kg) 20 182 lb 6.4 oz (82.7 kg) There were no vitals taken for this visit. Last bowel movement: See Nursing Note Constitutional   
[x] Appears well-developed and well-nourished in no apparent distress   
[] Abnormal: 
Mental status [x] Alert and awake [x] Oriented to person/place/time 
[x] Able to follow commands 
[] Abnormal:  
Eyes 
[x] EOM normal  
[x] Sclera normal  
[x] No visible ocular discharge 
[] Abnormal:  
HENT [x] Normocephalic, atraumatic [x] Mouth/Throat: Moist mucous membranes  
[x] External Ears normal 
[] Abnormal: 
Neck [x] No visualized mass 
[] Abnormal: 
Pulmonary/Chest  
[x] Respiratory effort normal 
[x] No visualized signs of difficulty breathing or respiratory distress 
[] Abnormal: Musculoskeletal 
[x] Normal gait with no signs of ataxia [x] Normal range of motion of neck [] Abnormal: 
Neurological:  
[x] No facial asymmetry (Cranial nerve 7 motor function) [x] No gaze palsy 
[] Abnormal:  
Skin 
[x] No significant exanthematous lesions or discoloration noted on facial skin 
[] Abnormal: Psychiatric [x] Normal affect [x] No hallucinations [] Abnormal: 
 
Other pertinent observable physical exam findings: 
 
Due to this being a TeleHealth evaluation, many elements of the physical examination are unable to be assessed. HISTORY:  
 
Past Medical History:  
Diagnosis Date  Arthralgia 2017  Arthritis  Asthma Mild to Moderate  Breast cancer ([address] 2017 Onset ; Refusing Mammogram 17  Cancer ([facility]  Breast left  Cardiomyopathy ([facility] 2017 Onset: Ischemic; 25% - 50% (last EFx 45%) Continue with ACE/Lasix/coreg  Cataract 2017  Bilateral   
  Cellulitis 2017 Suspect local reaction to Pneumovax, treat with ice, NSAIDs or Tylenol  Chest pain at rest 2017  CHF (congestive heart failure) ([address] 2017 Stable, though weight up a little. To take 1 1/2 Lasix daily if swelling, 1 daily o/w  Chronic maxillary sinusitis 2017  Chronic pain Right knee  COPD (chronic obstructive pulmonary disease) ([address] 2017 Continue with inhalers  Diabetes ([address] Pre-diabetic  DJD (degenerative joint disease) 2017  Elevated BUN 2017  Esophagitis, reflux 2017  Fatigue 2017  GERD (gastroesophageal reflux disease)  Heart failure ([address] Cardiomyopathy, HX of MI   Hyperglycemia 2017  Hyperkalemia 2017  Hyperlipidemia 2017  Hypertension 2017  Ill-defined condition Vertigo  Ill-defined condition  HX of Urosepsis complicated by respiratory failure and pneumonnia  Myalgia 2017  Thromboembolus ([address] 1969  
 during 2nd pregnancy Connye Sole Raoul Chough 2017  Vertigo, aural 2017  Vitamin D deficiency 2017 Past Surgical History:  
Procedure Laterality Date [facility]  
 HX GYN Left Breast Mastectomy  HX HEENT   Bilateral Cataract  HX ORTHOPAEDIC Right 2018  
 knee replacement  HX ORTHOPAEDIC Left 2018  
 wrist surgery  HX VASCULAR ACCESS Port a cath on the right Family History Problem Relation Age of Onset  Cancer Mother  Other Father History reviewed, no pertinent family history. Social History Tobacco Use  Smoking status: Former Smoker Packs/day: 2.00 Years: 25.00 Pack years: 50.00 Quit date:  Years since quittin.1  Smokeless tobacco: Never Used Substance Use Topics  Alcohol use: No  
 
Allergies Allergen Reactions  Morphine Nausea and Vomiting Current Outpatient Medications Medication Sig  
  oxyCODONE IR (ROXICODONE) 5 mg immediate release tablet Take 1 Tab by mouth every four (4) hours as needed for Pain for up to 14 days. Max Daily Amount: 30 mg.  
 DULoxetine (Cymbalta) 20 mg capsule Take 1 Cap by mouth nightly.  carvedilol (Coreg CR) 40 mg CR capsule Take 40 mg by mouth daily (with breakfast).  furosemide (LASIX) 20 mg tablet Take 60 mg by mouth daily.  letrozole (FEMARA) 2.5 mg tablet Take 2.5 mg by mouth daily.  calcium carbonate (TUMS) 200 mg calcium (500 mg) chew Take 1 Tab by mouth daily.  potassium chloride SR (K-TAB) 20 mEq tablet Take 1 Tab by mouth daily.  amiodarone (PACERONE) 100 mg tablet Take 100 mg by mouth daily.   ribociclib-letrozole (Kisqali [employer]-Pack) 600 mg/day(200 mg x 3)-2.5 mg tab Take 600 mg by mouth daily. Take kisqali daily x 21 days then off 7 days, letrozole is daily  sacubitriL-valsartan (Entresto) 24-26 mg tablet Take 1 Tab by mouth two (2) times a day.  meclizine (ANTIVERT) 12.5 mg tablet Take  by mouth three (3) times daily as needed for Dizziness.  aspirin delayed-release 81 mg tablet Take  by mouth daily.  acetaminophen (TYLENOL) 500 mg tablet Take 500 mg by mouth every six (6) hours as needed for Pain.  omeprazole (PRILOSEC) 20 mg capsule Take 1 capsule by mouth once daily  fenofibrate nanocrystallized (TRICOR) 145 mg tablet Take 1 tablet by mouth once daily  Anoro Ellipta 62.5-25 mcg/actuation inhaler Take 1 Puff by inhalation daily.  cholecalciferol (VITAMIN D3) 1,000 unit cap Take 1,000 Units by mouth daily.  DULoxetine (CYMBALTA) 20 mg capsule TAKE 1 CAPSULE BY MOUTH NIGHTLY  mometasone (Nasonex) 50 mcg/actuation nasal spray 2 Sprays daily.  prochlorperazine (COMPAZINE) 10 mg tablet Take 0.5 Tabs by mouth every six (6) hours as needed for Nausea.  polyethylene glycol (MIRALAX) 17 gram packet Take 1 Packet by mouth daily.  albuterol (PROVENTIL, VENTOLIN) 90 mcg/Actuation inhaler Take 1-2 Puffs by inhalation every four (4) hours as needed for Wheezing. No current facility-administered medications for this visit. LAB and other DATA REVIEWED:  
 
Lab Results Component Value Date/Time WBC 2.6 (L) 01/27/2021 02:55 PM  
 HGB 10.9 (L) 01/27/2021 02:55 PM  
 PLATELET 869 24/04/0824 02:55 PM  
 
Lab Results Component Value Date/Time Sodium 136 01/27/2021 02:55 PM  
 Potassium 4.2 01/27/2021 02:55 PM  
 Chloride 103 01/27/2021 02:55 PM  
 CO2 28 01/27/2021 02:55 PM  
 BUN 23 (H) 01/27/2021 02:55 PM  
 Creatinine 2.01 (H) 01/27/2021 02:55 PM  
 Calcium 8.1 (L) 01/27/2021 02:55 PM  
 Magnesium 1.9 01/27/2021 02:55 PM  
 Phosphorus 2.6 01/27/2021 02:55 PM  
  
Lab Results Component Value Date/Time Alk. phosphatase 59 01/27/2021 02:55 PM  
 Protein, total 6.6 01/27/2021 02:55 PM  
 Albumin 2.7 (L) 01/27/2021 02:55 PM  
 Globulin 3.9 01/27/2021 02:55 PM  
 
Lab Results Component Value Date/Time INR 1.0 09/29/2020 03:04 AM  
 Prothrombin time 10.9 09/29/2020 03:04 AM  
 aPTT 24.1 09/21/2020 11:05 AM  
  
No results found for: IRON, FE, TIBC, IBCT, PSAT, FERR Detail chart reviewed. Other specialists and referral provider notes reviewed. CONTROLLED SUBSTANCES SAFETY ASSESSMENT (IF ON CONTROLLED SUBSTANCES):  
 
Reviewed opioid safety handout:  [x] Yes   [] No 
24 hour opioid dose >150mg morphine equivalent/day:  [] Yes   [] No 
Benzodiazepines:  [] Yes   [] No 
Sleep apnea:  [] Yes   [] No 
Urine Toxicology Testing within last 6 months:  [] Yes   [] No 
History of or new aberrant medication taking behaviors:  [] Yes   [x] No 
Has Narcan been prescribed [x] Yes   [] No 
 
   
 
Total time: 40 minutes Counseling / coordination time: 30 minutes 
> 50% counseling / coordination?:  Yes 
 
 Please note that this dictation was completed with [employer], the computer voice recognition software. Quite often unanticipated grammatical, syntax, homophones, and other interpretive errors are inadvertently transcribed by the computer software. Please disregard these errors. Please excuse any errors that have escaped final proofreading Consent: 
He and/or health care decision maker is aware that that he may receive a bill for this telehealth service, depending on his insurance coverage, and has provided verbal consent to proceed: Yes CPT Codes 31951-07605 for Established Patients may apply to this Telehealth VisitTime-based coding, delete if not needed: I spent at least 40 minutes with this established patient, and >50% of the time was spent counseling and/or coordinating care regarding b 
Pursuant to the emergency declaration under the [address] and the [facility] waiver authority and the [facility] and [employer] General Act, this Virtual  Visit was conducted, with patient's consent, to reduce the patient's risk of exposure to COVID-19 and provide continuity of care for an established patient. Services were provided through a video synchronous discussion virtually to substitute for in-person clinic visit.

## 2021-02-08 NOTE — PATIENT INSTRUCTIONS
Dear Brandon Melara , It was a pleasure seeing you today in [facility] clinic We will see you again in 4 weeks inperson when your daughter is visiting If labs or imaging tests have been ordered for you today, please call the office  at [phone] 48 hours after completion to obtain the results. This is the plan we talked about: 1. Mid back pain from bony mets 
-You have been taking oxycodone 5 mg every 4 hours for this pain and this is helping. Have provided you with a refill for the same today. We talked about opioid safe storage practices. 2.  Neuropathy 
-You have nerve pain in your left leg and also some sharp pain in your chest wall. Ple we started you on duloxetine 20 mg at bedtime and you think this is been helpful. You want to continue the same. Refills provided 3. Constipation 
-Constipation is a common side effect of pain medications as they slow your bowels. -Please continue Pericolace 2 tabs daily and increase to 2 tabs two times a day if needed. This is necessary to keep your bowels soft. - Add Miralax every other day as a laxative. - Goal is to have soft bowel movements every day or every other day. - Please call us if you do not have bowel movements for more than 3 days. 4.  Atrial fibrillation, leg edema 
-She is on amiodarone daily, she has some pedal edema and her furosemide was increased. She has not followed up with her cardiologist in a while. Have recommended that she make an appointment with her cardiologist Dr. Adelita Russell as soon as possible. 5.  ACP 
-You have an AMD naming your daughters and son as you medical power of . We talked about your wishes regarding CPR and intubation, you wish to remain a full code for now. 6.  Disease understanding and psychosocial support 
-You have very good understanding of your disease, you want prolongation of life with good quality of life. Your paty in God is helping you fight this guerra. This is what you have shared with us about Advance Care Planning: 
 
  Primary Decision Maker (Active): Nissa Barron - Daughter - [phone] Secondary Decision Maker: Sarahi Landa Daughter - [phone] Secondary Decision Maker: Meghan Wyman - Son - [phone] Advance Care Planning 1/13/2021 Patient's Healthcare Decision Maker is: Named in scanned ACP document Primary Decision Maker Name -  
Primary Decision Maker Phone Number -  
Primary Decision Maker Relationship to Patient -  
Confirm Advance Directive Yes, on file Patient Would Like to Complete Advance Directive - Does the patient have other document types - The Palliative Medicine Team is here to support you and your family.   
 
 
Sincerely, 
 
 
Tonie Johnson MD and the Palliative Medicine Team

## 2021-02-08 NOTE — PROGRESS NOTES
Palliative Medicine Office Visit Palliative Medicine Nurse Check In 
[phone] Patient Name: Nathan Montoya YOB: 1945 Date of Office Visit: 2/8/2021 Patient states: \"  \" 
 
From Check In Sheet (scanned in Media): 
Has a medical provider talked with you about cardiopulmonary resuscitation (CPR)? [x] Yes   [] No   [] Unable to obtain Nurse reminder to complete or update ACP FlowSheet: 
 
Is ACP on the Problem List?    [x] Yes    [] No 
IF ACP Document is ON FILE; Nurse to place ACP on Problem List  
 
Is there an ACP Note in Chart Review/Note? [x] Yes    [] No  
If NO: ALERT PROVIDER Primary Decision Maker (Active): Jamey Hammond - Daughter - [phone] Secondary Decision Maker: Ry Driver Daughter - [phone] Secondary Decision Maker: Rk Grissom - Son - [phone] Advance Care Planning 2/8/2021 Patient's Healthcare Decision Maker is: Named in scanned ACP document Primary Decision Maker Name -  
Primary Decision Maker Phone Number -  
Primary Decision Maker Relationship to Patient -  
Confirm Advance Directive Yes, on file Patient Would Like to Complete Advance Directive - Does the patient have other document types Other (comment) Is there anything that we should know about you as a person in order to provide you the best care possible? Have you been to the ER, urgent care clinic since your last visit? [] Yes   [x] No   [] Unable to obtain Have you been hospitalized since your last visit? [] Yes   [x] No   [] Unable to obtain Have you seen or consulted any other health care providers outside of the [address] [place] [address] since your last visit? [] Yes   [x] No   [] Unable to obtain Functional status (describe):  
 
 
 
Last BM: 2/8/2021  accessed (date): 2/8/2021 Bottle review (for opioid pain medication): 
Medication 1:  
Date filled:  
Directions:  
# filled: # left: # pills taking per day: 
Last dose taken: 
 
Medication 2:  
Date filled:  
Directions:  
# filled: # left: # pills taking per day: 
Last dose taken: 
 
Medication 3:  
Date filled:  
Directions:  
# filled: # left: # pills taking per day: 
Last dose taken: 
 
Medication 4:  
Date filled:  
Directions:  
# filled: # left: # pills taking per day: 
Last dose taken:

## 2021-02-08 NOTE — TELEPHONE ENCOUNTER
Requested Prescriptions     Pending Prescriptions Disp Refills    oxyCODONE IR (ROXICODONE) 5 mg immediate release tablet 60 Tab 0     Sig: Take 1 Tab by mouth every four (4) hours as needed for Pain for up to 14 days. Max Daily Amount: 30 mg.

## 2021-02-09 NOTE — TELEPHONE ENCOUNTER
Okay that is fine. She has been on kisqali since 12/2. So is okay to get labs every month now. Please fix schedule/beacon.

## 2021-02-09 NOTE — TELEPHONE ENCOUNTER
Patient called and stated she would like her 2/10 appointment canceled and she will come for both on 2/24

## 2021-02-19 NOTE — TELEPHONE ENCOUNTER
Triage for Controlled Substance Refill Request    Pain Diagnosis: _Pain due to malignant neoplasm metastatic to bone    Last Outpatient Visit: _2/8/2021    Next Outpatient Visit: _3/8/2021    Reason for refill needed outside of office visit? -Appointment not scheduled prior to need for scheduled refill      Pharmacy: [address]        Medication:oxyCODONE IR (ROXICODONE) 5 mg  Dose and directions: Take 1 Tab by mouth every four (4) hours   Number dispensed:60  Date filled ( or Pharmacy):2/8/2021  #left:     reviewed: _yes     Date of Urine Drug Screen:  _n/a    Opioid Safety Handout given:  _yes     Appropriate for refill:  _yes     Action:  _ please refill

## 2021-02-19 NOTE — TELEPHONE ENCOUNTER
The patient  stating Dr. Rustam Egan office is transferring Oxycodone prescription to Dr. Jerrica Degroot. She is going to have her daughter pick it up after work today at [facility] in [place].

## 2021-02-21 PROBLEM — A41.9 SEPSIS (HCC): Status: ACTIVE | Noted: 2021-01-01

## 2021-02-22 PROBLEM — N39.0 URINARY TRACT INFECTION DUE TO KLEBSIELLA SPECIES: Status: ACTIVE | Noted: 2021-01-01

## 2021-02-22 PROBLEM — Z51.12 ENCOUNTER FOR MONOCLONAL ANTIBODY TREATMENT FOR MALIGNANCY: Status: ACTIVE | Noted: 2020-01-01

## 2021-02-22 PROBLEM — E44.1 MILD PROTEIN-CALORIE MALNUTRITION (HCC): Chronic | Status: ACTIVE | Noted: 2021-01-01

## 2021-02-22 PROBLEM — E87.5 HYPERKALEMIA: Status: RESOLVED | Noted: 2017-08-17 | Resolved: 2021-01-01

## 2021-02-22 PROBLEM — T45.1X5A PANCYTOPENIA DUE TO ANTINEOPLASTIC CHEMOTHERAPY (HCC): Status: ACTIVE | Noted: 2021-01-01

## 2021-02-22 PROBLEM — I42.9 CARDIOMYOPATHY (HCC): Status: RESOLVED | Noted: 2017-08-17 | Resolved: 2021-01-01

## 2021-02-22 PROBLEM — R57.1 HYPOVOLEMIC SHOCK (HCC): Status: ACTIVE | Noted: 2021-01-01

## 2021-02-22 PROBLEM — C78.00 BREAST CANCER METASTASIZED TO LUNG (HCC): Status: ACTIVE | Noted: 2020-01-01

## 2021-02-22 PROBLEM — N18.9 ACUTE KIDNEY INJURY SUPERIMPOSED ON CKD (HCC): Chronic | Status: ACTIVE | Noted: 2021-01-01

## 2021-02-22 PROBLEM — Z98.890 S/P LAMINECTOMY: Status: ACTIVE | Noted: 2020-01-01

## 2021-02-22 PROBLEM — R65.20 SEVERE SEPSIS WITH ACUTE ORGAN DYSFUNCTION (HCC): Status: ACTIVE | Noted: 2021-01-01

## 2021-02-22 PROBLEM — D61.810 PANCYTOPENIA DUE TO ANTINEOPLASTIC CHEMOTHERAPY (HCC): Status: ACTIVE | Noted: 2021-01-01

## 2021-02-22 PROBLEM — L03.90 CELLULITIS: Status: RESOLVED | Noted: 2017-08-17 | Resolved: 2021-01-01

## 2021-02-22 PROBLEM — R73.9 HYPERGLYCEMIA: Status: RESOLVED | Noted: 2017-08-17 | Resolved: 2021-01-01

## 2021-02-22 PROBLEM — C79.81: Status: ACTIVE | Noted: 2020-01-01

## 2021-02-22 PROBLEM — C50.919 BREAST CANCER METASTASIZED TO LUNG (HCC): Status: ACTIVE | Noted: 2020-01-01

## 2021-02-22 PROBLEM — N39.0 URINARY TRACT INFECTION DUE TO KLEBSIELLA SPECIES: Status: RESOLVED | Noted: 2021-01-01 | Resolved: 2021-01-01

## 2021-02-22 PROBLEM — E83.51 HYPOCALCEMIA: Status: ACTIVE | Noted: 2021-01-01

## 2021-02-22 PROBLEM — B96.89 URINARY TRACT INFECTION DUE TO KLEBSIELLA SPECIES: Status: ACTIVE | Noted: 2021-01-01

## 2021-02-22 PROBLEM — B96.89 URINARY TRACT INFECTION DUE TO KLEBSIELLA SPECIES: Status: RESOLVED | Noted: 2021-01-01 | Resolved: 2021-01-01

## 2021-02-22 PROBLEM — N18.4 KIDNEY DISEASE, CHRONIC, STAGE IV (GFR 15-29 ML/MIN) (HCC): Status: ACTIVE | Noted: 2021-01-01

## 2021-02-22 PROBLEM — I50.9 CHF (CONGESTIVE HEART FAILURE) (HCC): Status: RESOLVED | Noted: 2017-08-17 | Resolved: 2021-01-01

## 2021-02-22 PROBLEM — Z79.82 LONG TERM (CURRENT) USE OF ASPIRIN: Chronic | Status: ACTIVE | Noted: 2021-01-01

## 2021-02-22 PROBLEM — R07.9 CHEST PAIN AT REST: Status: RESOLVED | Noted: 2017-08-17 | Resolved: 2021-01-01

## 2021-02-22 PROBLEM — N17.9 ACUTE KIDNEY INJURY SUPERIMPOSED ON CKD (HCC): Chronic | Status: ACTIVE | Noted: 2021-01-01

## 2021-02-22 PROBLEM — G89.18 ACUTE POST-OPERATIVE PAIN: Status: RESOLVED | Noted: 2020-01-01 | Resolved: 2021-01-01

## 2021-02-22 PROBLEM — E83.39 HYPOPHOSPHATEMIA: Status: ACTIVE | Noted: 2021-01-01

## 2021-02-22 PROBLEM — C50.919 BREAST CANCER METASTASIZED TO LIVER (HCC): Chronic | Status: ACTIVE | Noted: 2020-01-01

## 2021-02-22 PROBLEM — B37.0 THRUSH: Status: RESOLVED | Noted: 2017-08-17 | Resolved: 2021-01-01

## 2021-02-22 PROBLEM — C78.7 BREAST CANCER METASTASIZED TO LIVER (HCC): Chronic | Status: ACTIVE | Noted: 2020-01-01

## 2021-02-22 PROBLEM — T45.1X5A: Status: ACTIVE | Noted: 2021-01-01

## 2021-02-22 PROBLEM — Z96.651 S/P TOTAL KNEE ARTHROPLASTY, RIGHT: Status: ACTIVE | Noted: 2017-06-22

## 2021-02-22 NOTE — ED PROVIDER NOTES
EMERGENCY DEPARTMENT HISTORY AND PHYSICAL EXAM 
 
 
Date: 2/21/2021 Patient Name: Lizeth Jarquin History of Presenting Illness Chief Complaint Patient presents with  Hypotension  Lethargy History Provided By: Patient HPI: Lizeth Jarquin, 67 y.o. female presents to the ED with cc of lethargy. 29-year-old female with a history of breast cancer with gerber metastatic disease, CHF (EF 50% in 2019) and chronic pain presents emergency department with a chief complaint of lethargy. Patient reports increasing lethargy and weakness. Patient reports she has been being visited by physical therapy and they report her blood pressures have been low. States her blood pressure normally runs in the 100s to 110s. Patient denies any fever chills. Denies headache. Denies chest pain or shortness of breath or cough. Denies abdominal pain, nausea, vomiting or diarrhea, does report decreased appetite. Denies urinary symptoms. No dark stools. There are no other complaints, changes, or physical findings at this time. PCP: Danielle Holly NP No current facility-administered medications on file prior to encounter. Current Outpatient Medications on File Prior to Encounter Medication Sig Dispense Refill  oxyCODONE IR (ROXICODONE) 5 mg immediate release tablet Take 1 Tab by mouth every four (4) hours as needed for Pain for up to 15 days. Max Daily Amount: 30 mg. 90 Tab 0  
 DULoxetine (CYMBALTA) 20 mg capsule TAKE 1 CAPSULE BY MOUTH NIGHTLY 30 Cap 0  
 DULoxetine (Cymbalta) 20 mg capsule Take 1 Cap by mouth nightly. 30 Cap 5  carvedilol (Coreg CR) 40 mg CR capsule Take 40 mg by mouth daily (with breakfast).  furosemide (LASIX) 20 mg tablet Take 60 mg by mouth daily.  mometasone (Nasonex) 50 mcg/actuation nasal spray 2 Sprays daily.  letrozole (FEMARA) 2.5 mg tablet Take 2.5 mg by mouth daily.     
 calcium carbonate (TUMS) 200 mg calcium (500 mg) chew Take 1 Tab by mouth daily.  potassium chloride SR (K-TAB) 20 mEq tablet Take 1 Tab by mouth daily. 30 Tab 0  
 amiodarone (PACERONE) 100 mg tablet Take 100 mg by mouth daily.   prochlorperazine (COMPAZINE) 10 mg tablet Take 0.5 Tabs by mouth every six (6) hours as needed for Nausea. 60 Tab 3  
 ribociclib-letrozole (Kisqali Femara Co-Pack) 600 mg/day(200 mg x 3)-2.5 mg tab Take 600 mg by mouth daily. Take kisqali daily x 21 days then off 7 days, letrozole is daily 91 Tab 11  
 sacubitriL-valsartan (Entresto) 24-26 mg tablet Take 1 Tab by mouth two (2) times a day.  meclizine (ANTIVERT) 12.5 mg tablet Take  by mouth three (3) times daily as needed for Dizziness.  aspirin delayed-release 81 mg tablet Take  by mouth daily.  polyethylene glycol (MIRALAX) 17 gram packet Take 1 Packet by mouth daily.  acetaminophen (TYLENOL) 500 mg tablet Take 500 mg by mouth every six (6) hours as needed for Pain.  omeprazole (PRILOSEC) 20 mg capsule Take 1 capsule by mouth once daily 90 Cap 3  
 fenofibrate nanocrystallized (TRICOR) 145 mg tablet Take 1 tablet by mouth once daily 90 Tab 3  Anoro Ellipta 62.5-25 mcg/actuation inhaler Take 1 Puff by inhalation daily.  cholecalciferol (VITAMIN D3) 1,000 unit cap Take 1,000 Units by mouth daily.  albuterol (PROVENTIL, VENTOLIN) 90 mcg/Actuation inhaler Take 1-2 Puffs by inhalation every four (4) hours as needed for Wheezing. 17 g 0 Past History Past Medical History: 
Past Medical History:  
Diagnosis Date  Arthralgia 8/17/2017  Arthritis  Asthma Mild to Moderate  Breast cancer ([address] 8/17/2017 Onset 1997; Refusing Mammogram 6/16/17  Cancer ([facility] 1997 Breast left  Cardiomyopathy ([facility] 8/17/2017 Onset:1999 Ischemic; 25% - 50% (last EFx 45%) Continue with ACE/Lasix/coreg  Cataract 8/17/2017 Bilateral   
 Cellulitis 8/17/2017  Suspect local reaction to Pneumovax, treat with ice, NSAIDs or Tylenol  Chest pain at rest 2017  CHF (congestive heart failure) ([address] 2017 Stable, though weight up a little. To take 1 1/2 Lasix daily if swelling, 1 daily o/w  Chronic maxillary sinusitis 2017  Chronic pain Right knee  COPD (chronic obstructive pulmonary disease) ([address] 2017 Continue with inhalers  Diabetes ([address] Pre-diabetic  DJD (degenerative joint disease) 2017  Elevated BUN 2017  Esophagitis, reflux 2017  Fatigue 2017  GERD (gastroesophageal reflux disease)  Heart failure ([address] Cardiomyopathy, HX of MI   Hyperglycemia 2017  Hyperkalemia 2017  Hyperlipidemia 2017  Hypertension 2017  Ill-defined condition Vertigo  Ill-defined condition  HX of Urosepsis complicated by respiratory failure and pneumonnia  Myalgia 2017  Thromboembolus ([address] 1969  
 during 2nd pregnancy Hailey Ruiz Abler 2017  Vertigo, aural 2017  Vitamin D deficiency 2017 Past Surgical History: 
Past Surgical History:  
Procedure Laterality Date [address] [facility] JONNY  
 HX GYN Left Breast Mastectomy  HX HEENT   Bilateral Cataract  HX ORTHOPAEDIC Right 2018  
 knee replacement  HX ORTHOPAEDIC Left 2018  
 wrist surgery  HX VASCULAR ACCESS Port a cath on the right Family History: 
Family History Problem Relation Age of Onset  Cancer Mother  Other Father Social History: 
Social History Tobacco Use  Smoking status: Former Smoker Packs/day: 2.00 Years: 25.00 Pack years: 50.00 Quit date:  Years since quittin.1  Smokeless tobacco: Never Used Substance Use Topics  Alcohol use: No  
 Drug use: No  
 
 
Allergies: Allergies Allergen Reactions  Morphine Nausea and Vomiting Review of Systems Review of Systems Constitutional: Positive for appetite change. Negative for activity change, chills and fever. HENT: Negative for facial swelling and voice change. Eyes: Negative for redness. Respiratory: Negative for cough, shortness of breath and wheezing. Cardiovascular: Negative for chest pain and leg swelling. Gastrointestinal: Negative for abdominal pain, diarrhea, nausea and vomiting. Genitourinary: Negative for decreased urine volume. Musculoskeletal: Negative for gait problem. Skin: Negative for pallor and rash. Neurological: Positive for weakness. Negative for tremors and facial asymmetry. Psychiatric/Behavioral: Negative for agitation. All other systems reviewed and are negative. Physical Exam  
Physical Exam 
Vitals signs and nursing note reviewed. Constitutional:   
   Comments: 13-year-old female, resting in bed, appears nontoxic HENT:  
   Head: Normocephalic and atraumatic. Neck: Musculoskeletal: Normal range of motion. Cardiovascular:  
   Rate and Rhythm: Normal rate and regular rhythm. Heart sounds: No murmur. No friction rub. No gallop. Pulmonary:  
   Effort: Pulmonary effort is normal.  
   Breath sounds: Normal breath sounds. Abdominal:  
   Palpations: Abdomen is soft. Tenderness: There is no abdominal tenderness. Musculoskeletal: Normal range of motion. Skin: 
   General: Skin is warm. Capillary Refill: Capillary refill takes 2 to 3 seconds. Coloration: Skin is pale. Neurological:  
   General: No focal deficit present. Mental Status: She is alert. Psychiatric:     
   Mood and Affect: Mood normal.  
 
 
 
Diagnostic Study Results Labs - Recent Results (from the past 12 hour(s)) TROPONIN I Collection Time: 02/21/21  8:37 PM  
Result Value Ref Range Troponin-I, Qt. <0.05 <0.05 ng/mL CBC WITH AUTOMATED DIFF Collection Time: 02/21/21  8:37 PM  
Result Value Ref Range WBC 2.0 (L) 3.6 - 11.0 K/uL  
 RBC 2.26 (L) 3.80 - 5.20 M/uL  HGB 7.7 (L) 11.5 - 16.0 g/dL HCT 24.1 (L) 35.0 - 47.0 % .6 (H) 80.0 - 99.0 FL  
 MCH 34.1 (H) 26.0 - 34.0 PG  
 MCHC 32.0 30.0 - 36.5 g/dL RDW 21.8 (H) 11.5 - 14.5 % PLATELET 550 (L) 147 - 400 K/uL MPV 11.6 8.9 - 12.9 FL  
 NRBC 2.0 (H) 0  WBC ABSOLUTE NRBC 0.04 (H) 0.00 - 0.01 K/uL NEUTROPHILS 60 32 - 75 % LYMPHOCYTES 32 12 - 49 % MONOCYTES 6 5 - 13 % EOSINOPHILS 0 0 - 7 % BASOPHILS 2 (H) 0 - 1 % IMMATURE GRANULOCYTES 0 0.0 - 0.5 % TOTAL CELLS COUNTED 50 ABS. NEUTROPHILS 1.3 (L) 1.8 - 8.0 K/UL  
 ABS. LYMPHOCYTES 0.6 (L) 0.8 - 3.5 K/UL  
 ABS. MONOCYTES 0.1 0.0 - 1.0 K/UL  
 ABS. EOSINOPHILS 0.0 0.0 - 0.4 K/UL  
 ABS. BASOPHILS 0.0 0.0 - 0.1 K/UL  
 ABS. IMM. GRANS. 0.0 0.00 - 0.04 K/UL  
 DF MANUAL    
 RBC COMMENTS ANISOCYTOSIS 2+ 
    
 RBC COMMENTS MACROCYTOSIS 
PRESENT 
    
METABOLIC PANEL, COMPREHENSIVE Collection Time: 02/21/21  8:37 PM  
Result Value Ref Range Sodium 136 136 - 145 mmol/L Potassium 4.2 3.5 - 5.1 mmol/L Chloride 104 97 - 108 mmol/L  
 CO2 24 21 - 32 mmol/L Anion gap 8 5 - 15 mmol/L Glucose 118 (H) 65 - 100 mg/dL BUN 31 (H) 6 - 20 MG/DL Creatinine 2.26 (H) 0.55 - 1.02 MG/DL  
 BUN/Creatinine ratio 14 12 - 20 GFR est AA 26 (L) >60 ml/min/1.73m2 GFR est non-AA 21 (L) >60 ml/min/1.73m2 Calcium 6.2 (LL) 8.5 - 10.1 MG/DL Bilirubin, total 0.6 0.2 - 1.0 MG/DL  
 ALT (SGPT) 23 12 - 78 U/L  
 AST (SGOT) 62 (H) 15 - 37 U/L Alk. phosphatase 43 (L) 45 - 117 U/L Protein, total 5.4 (L) 6.4 - 8.2 g/dL Albumin 2.2 (L) 3.5 - 5.0 g/dL Globulin 3.2 2.0 - 4.0 g/dL A-G Ratio 0.7 (L) 1.1 - 2.2 SAMPLES BEING HELD Collection Time: 02/21/21  8:47 PM  
Result Value Ref Range SAMPLES BEING HELD BL RD 2SST COMMENT Add-on orders for these samples will be processed based on acceptable specimen integrity and analyte stability, which may vary by analyte. EKG, 12 LEAD, INITIAL  Collection Time: 02/21/21 9:14 PM  
Result Value Ref Range Ventricular Rate 70 BPM  
 Atrial Rate 70 BPM  
 P-R Interval 174 ms QRS Duration 88 ms Q-T Interval 458 ms QTC Calculation (Bezet) 494 ms Calculated P Axis 71 degrees Calculated R Axis -10 degrees Calculated T Axis -126 degrees Diagnosis Normal sinus rhythm ST & T wave abnormality, consider inferior ischemia ST & T wave abnormality, consider anterolateral ischemia When compared with ECG of 21-SEP-2020 10:57, 
premature ventricular complexes are no longer present T wave inversion now evident in Anterolateral leads OCCULT BLOOD, STOOL Collection Time: 02/21/21 10:36 PM  
Result Value Ref Range Occult blood, stool Negative NEG Radiologic Studies -  
XR CHEST PORT Final Result Patchy bibasilar atelectasis and interstitial prominence. Correlate clinically  
and follow-up as appropriate. CT Results  (Last 48 hours) None CXR Results  (Last 48 hours) 02/21/21 2058  XR CHEST PORT Final result Impression:  Patchy bibasilar atelectasis and interstitial prominence. Correlate clinically  
and follow-up as appropriate. Narrative:  INDICATION:  hypotension / AMS EXAM: Chest single view. COMPARISON: 9/29/2020. FINDINGS: A single frontal view of the chest at 2036 hours shows mild bibasilar  
interstitial prominence and patchy atelectasis. Central venous catheter on the  
right is stable. .  The heart, mediastinum and pulmonary vasculature are stable . The bony thorax is unremarkable for age, except for stable cervical thoracic  
metallic rods. . . Medical Decision Making I am the first provider for this patient. I reviewed the vital signs, available nursing notes, past medical history, past surgical history, family history and social history. Vital Signs-Reviewed the patient's vital signs.  
Patient Vitals for the past 12 hrs: 
 Temp Pulse Resp BP SpO2  
02/21/21 2230  74 17 (!) 90/42 99 % 02/21/21 2215  75 19 (!) 95/51 (!) 89 % 02/21/21 2200  73 15 (!) 93/35 98 % 02/21/21 2145  81 13 (!) 74/38 98 % 02/21/21 2130  77 17 (!) 90/40 99 % 02/21/21 2115  71 14 (!) 83/33 97 % 02/21/21 2045 98.7 °F (37.1 °C) 77 18 (!) 78/37 98 % 02/21/21 2010  74 15    
02/21/21 2005  75 17  95 % 02/21/21 1954  77 18 (!) 72/43 95 % Records Reviewed: Nursing Notes, Old Medical Records, Previous electrocardiograms, Previous Radiology Studies and Previous Laboratory Studies Provider Notes (Medical Decision Making):  
 
67-year-old female presents emergency department with weakness. Patient reports low blood pressures at home. Patient is hypotensive in the ED, 76 systolic in triage, improved to 90s by my reassessment. Denies infectious symptoms. Differential includes hypovolemia, sepsis, cardiogenic shock, hemorrhage. We will check basic labs, cultures and point-of-care lactate. Bolus fluids. Low threshold for empiric antibiotics although patient denies infectious symptoms now. ED Course:  
Initial assessment performed. The patients presenting problems have been discussed, and they are in agreement with the care plan formulated and outlined with them. I have encouraged them to ask questions as they arise throughout their visit. ED Course as of Feb 21 2254 Landry Ruth Feb 21, 2021 2135 Preliminary EKG interpreted by me. Shows sinus rhythm with a HR of 70. No ST elevations. There are ST depressions which are unchanged from prior EKGs.  
 [MB] 2150 CBC shows new microcytic anemia. There is leukopenia as well. Rectal exam chaperoned by ED tech shows excoriated skin, no hemorrhoids and brown stool. Patient does report some episodes of black stool in the past, will send type and screen. [MB] 2152 Patient's CMP shows mild elevation of patient's CKD.   Mild transaminitis although patient's AST has been elevated in the past.  She is severely hypocalcemic at 6.2 we will give calcium gluconate. [MB] 2153 Chest x-ray shows interstitial opacities. Will give another 1.5 L of fluid to complete 30 cc/kg bolus. Will give broad-spectrum antibiotics given patient's unexplained hypotension. Will order Levophed drip. She actually appears quote well despite her systolic BP in the 05B. She has a wide pulse pressure and I will titrate levophed to a systolic BP of 90 vs. MAP. [MB] 2155 Sepsis reassessment performed. [MB] ED Course User Index [MB] Estiven Andrade MD  
 
Critical Care Time: CRITICAL CARE NOTE : 
 
8:29 PM 
 
IMPENDING DETERIORATION -Cardiovascular ASSOCIATED RISK FACTORS - Hypotension and Shock MANAGEMENT- Bedside Assessment INTERPRETATION -  Xrays, ECG and Blood Pressure INTERVENTIONS - hemodynamic mngmt CASE REVIEW - Hospitalist/Intensivist, Nursing and Family TREATMENT RESPONSE -Stable PERFORMED BY - Self NOTES   : 
I have spent 43 minutes of critical care time involved in lab review, consultations with specialist, family decision- making, bedside attention and documentation. This time excludes time spent in any separate billed procedures. During this entire length of time I was immediately available to the patient . Wally Russell MD 
 
 
Disposition: 
 
Admitted Diagnosis Clinical Impression: 1. Hypotension, unspecified hypotension type 2. Weakness 3. Hypocalcemia 4. Anemia, unspecified type Attestations: 
 
Wally Russell MD 
 
Please note that this dictation was completed with [employer], the computer voice recognition software. Quite often unanticipated grammatical, syntax, homophones, and other interpretive errors are inadvertently transcribed by the computer software. Please disregard these errors. Please excuse any errors that have escaped final proofreading. Thank you.

## 2021-02-22 NOTE — CONSULTS
[facility] [place] 
at [facility] [address], [address] 
[phone] Follow-up Note Patient: Elsa Deluca MRN: [id number]  SSN: [id number] YOB: 1945  Age: 67 y.o. Sex: female Diagnosis: 1. 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 Left mastectomy with LN dissection (negative LN) at [facility] - Dr. Mitra Mclain. Treatment: 1. Letrozole, Ribociclib - started 12/2/2020 Subjective:  
  
Elsa Deluca is a 67 y.o. female with a history of metastatic cancer. Ms. Denisse Fair received treatment for left sided breast cancer in the 1990's. She received systemic chemotherapy and mastectomy. She was noted to have sclerotic lesions in the bone in April and then in Sep 2020. She is suffering with bone pains in the back. She underwent a laminectomy from T2 - T4. Pathology shows ER +ve mBC. She has been taking Letrozole. She started Doalis Hire on 12/2/2020. She was brought in to the ER yesterday with compliant of hypotension and lethargy. She is currently in the ICU on levophed. Mucous membranes are dry. She says she is weak and not sure how she got dehydrated. Review of Systems: 
 
ROS is negative except what is mentioned in the HPI Past Medical History:  
Diagnosis Date  Acute kidney injury superimposed on CKD ([address] 2/22/2021  Arthralgia 8/17/2017  Arthritis  Asthma Mild to Moderate  Breast cancer ([address] 8/17/2017 Onset 1997; Refusing Mammogram 6/16/17  Breast cancer metastasized to liver ([address] 9/1/2020  Breast cancer metastasized to lung ([address] 9/1/2020  Cancer ([address] 1997 Breast left  Cardiomyopathy ([address] 8/17/2017 Onset:1999 Ischemic; 25% - 50% (last EFx 45%) Continue with ACE/Lasix/coreg  Cataract 8/17/2017  Bilateral   
  Cellulitis 8/17/2017 Suspect local reaction to Pneumovax, treat with ice, NSAIDs or Tylenol  Chest pain at rest 8/17/2017  CHF (congestive heart failure) ([address] 8/17/2017 Stable, though weight up a little. To take 1 1/2 Lasix daily if swelling, 1 daily o/w  Chronic maxillary sinusitis 8/17/2017  Chronic pain Right knee  COPD (chronic obstructive pulmonary disease) ([address] 8/17/2017 Continue with inhalers  Diabetes ([address] Pre-diabetic  DJD (degenerative joint disease) 8/17/2017  Elevated BUN 8/17/2017  Encounter for monoclonal antibody treatment for malignancy 2/22/2021  
 ([id number])-  Hector Class  Esophagitis, reflux 8/17/2017  Fatigue 8/17/2017  GERD (gastroesophageal reflux disease)  Heart failure ([address] Cardiomyopathy, HX of MI 1999  Hyperglycemia 8/17/2017  Hyperkalemia 8/17/2017  Hyperlipidemia 8/17/2017  Hypertension 8/17/2017  Hypocalcemia 2/22/2021  Hypovolemic shock ([address] 2/22/2021  Ill-defined condition Vertigo  Ill-defined condition 2003 HX of Urosepsis complicated by respiratory failure and pneumonnia  Kidney disease, chronic, stage IV (GFR 15-29 ml/min) ([address] 2/22/2021  Long term (current) use of aspirin 2/22/2021 ASA 81 mg Daily  Malignant neoplasm metastatic to breast ([address] 12/1/2020  Mild protein-calorie malnutrition ([address] 2/22/2021  Myalgia 8/17/2017  Pancytopenia due to antineoplastic chemotherapy ([address] 2/22/2021  S/P laminectomy 9/29/2020  
 ([id number])- S/P Segmental posterior cervicothoracic fusion C5 to T5, and arthrodesis C5 to T5 with cancellous bone chips and DBM putty, and partial laminectomy T2  Metastatic disease through T1, T2 and T3, with severe spinal stenosis and impingement on the cord.  S/P left mastectomy 1/1/1997  
 (1997)- Left breast cancer % [place] 60% Her 2 -ve, initial diagnosis in the 1990s  status post left mastectomy chemo and radiation  S/P total knee arthroplasty, right 2017  
 ([id number])  Thromboembolus ([address] 1969  
 during 2nd pregnancy [facility] 2017  Vertigo, aural 2017  Vitamin D deficiency 2017 Past Surgical History:  
Procedure Laterality Date [address] [facility] JONNY  
 HX GYN Left Breast Mastectomy  HX HEENT  2015 Bilateral Cataract  HX ORTHOPAEDIC Right 2018  
 knee replacement  HX ORTHOPAEDIC Left 2018  
 wrist surgery  HX VASCULAR ACCESS Port a cath on the right Family History Problem Relation Age of Onset  Cancer Mother  Other Father Social History Tobacco Use  Smoking status: Former Smoker Packs/day: 2.00 Years: 25.00 Pack years: 50.00 Quit date:  Years since quittin.1  Smokeless tobacco: Never Used Substance Use Topics  Alcohol use: No  
  
Prior to Admission medications Medication Sig Start Date End Date Taking? Authorizing Provider  
oxyCODONE IR (ROXICODONE) 5 mg immediate release tablet Take 1 Tab by mouth every four (4) hours as needed for Pain for up to 15 days. Max Daily Amount: 30 mg. 2/19/21 3/6/21 Yes Charly Felder MD  
DULoxetine (CYMBALTA) 20 mg capsule TAKE 1 CAPSULE BY MOUTH NIGHTLY 21  Yes Malgorzata Concepcion MD  
DULoxetine (Cymbalta) 20 mg capsule Take 1 Cap by mouth nightly. 21  Yes Malgorzata Concepcion MD  
carvedilol (Coreg CR) 40 mg CR capsule Take 40 mg by mouth daily (with breakfast). Yes Provider, Historical  
furosemide (LASIX) 20 mg tablet Take 60 mg by mouth daily. Yes Provider, Historical  
mometasone (Nasonex) 50 mcg/actuation nasal spray 2 Sprays daily. Yes Provider, Historical  
letrozole (FEMARA) 2.5 mg tablet Take 2.5 mg by mouth daily. Yes Provider, Historical  
calcium carbonate (TUMS) 200 mg calcium (500 mg) chew Take 1 Tab by mouth daily.    Yes Provider, Historical  
 potassium chloride SR (K-TAB) 20 mEq tablet Take 1 Tab by mouth daily. 12/31/20  Yes Alen You MD  
amiodarone (PACERONE) 100 mg tablet Take 100 mg by mouth daily.    Yes Provider, Historical  
prochlorperazine (COMPAZINE) 10 mg tablet Take 0.5 Tabs by mouth every six (6) hours as needed for Nausea. 12/16/20  Yes Duncan Quiroga NP  
ribociclib-letrozole (Kisqali Femara Co-Pack) 600 mg/day(200 mg x 3)-2.5 mg tab Take 600 mg by mouth daily. Take kisqali daily x 21 days then off 7 days, letrozole is daily 11/24/20  Yes Alen You MD  
sacubitriL-valsartan Luane Hurst) 24-26 mg tablet Take 1 Tab by mouth two (2) times a day. Yes Provider, Historical  
meclizine (ANTIVERT) 12.5 mg tablet Take  by mouth three (3) times daily as needed for Dizziness. Yes Provider, Historical  
aspirin delayed-release 81 mg tablet Take  by mouth daily. Yes Provider, Historical  
polyethylene glycol (MIRALAX) 17 gram packet Take 1 Packet by mouth daily. 10/6/20  Yes Haroon Pineda MD  
acetaminophen (TYLENOL) 500 mg tablet Take 500 mg by mouth every six (6) hours as needed for Pain. Yes Provider, Historical  
omeprazole (PRILOSEC) 20 mg capsule Take 1 capsule by mouth once daily 6/9/20  Yes Uriel Azar NP  
fenofibrate nanocrystallized (TRICOR) 145 mg tablet Take 1 tablet by mouth once daily 5/22/20  Yes Alvin Merida NP Anoro Ellipta 62.5-25 mcg/actuation inhaler Take 1 Puff by inhalation daily. 3/23/20  Yes Faizan Henry MD  
cholecalciferol (VITAMIN D3) 1,000 unit cap Take 1,000 Units by mouth daily. Yes Provider, Historical  
albuterol (PROVENTIL, VENTOLIN) 90 mcg/Actuation inhaler Take 1-2 Puffs by inhalation every four (4) hours as needed for Wheezing. 5/1/11  Yes TORSTEN Duenas Allergies Allergen Reactions  Morphine Nausea and Vomiting Objective:  
 
Visit Vitals BP (!) 106/38 Pulse 79 Temp 98.7 °F (37.1 °C) Resp 17  
 Ht 5' 5\" (1.651 m) Wt 167 lb 8.8 oz (76 kg) SpO2 95% Breastfeeding No  
BMI 27.88 kg/m² Physical Exam: 
 
GENERAL: alert, cooperative, no distress, appears stated age EYE: conjunctivae/corneas clear. LYMPHATIC: Cervical, supraclavicular, and axillary nodes normal.  
LUNG: clear to auscultation bilaterally HEART: regular rate and rhythm ABDOMEN: soft, non-tender. EXTREMITIES: bilateral lower extremity edema L >.R 
SKIN: Normal.  
NEUROLOGIC: AOx3. [facility] Physical exam was pulled in from a previous visit. No changes were made d/t physical exam remains the same. CT Results (most recent): 
Results from [facility] encounter on 04/17/20 CT ABD PELV W CONT Narrative EXAM:  CTA CHEST W OR W WO CONT, CT ABD PELV W CONT INDICATION:   RUQ pain, R thoracic back pain, SOB, h/o DVT 
 
COMPARISON: 8/26/2013. CHEST CTA: 
 
TECHNIQUE:  
Precontrast  images were obtained to localize the volume for acquisition. Multislice helical CT arteriography was performed from the diaphragm to the 
thoracic inlet during uneventful rapid bolus of 100 cc Isovue-370. Lung and soft 
tissue windows were generated. Coronal and sagittal images were generated and 3D post processing consisting of coronal maximum intensity images was performed. CT dose reduction was achieved through use of a standardized protocol tailored 
for this examination and automatic exposure control for dose modulation. FINDINGS: 
CHEST: 
THYROID: No nodule. MEDIASTINUM: No mass or lymphadenopathy. DOROTHY: No mass or lymphadenopathy. THORACIC AORTA: No dissection or aneurysm. MAIN PULMONARY ARTERY: There is no evidence of pulmonary embolism. TRACHEA/BRONCHI: Patent. ESOPHAGUS: No wall thickening or dilatation. HEART: Normal in size. PLEURA: No effusion or pneumothorax. LUNGS: No nodule, mass, or airspace disease. BONES: Degenerative changes are seen in the thoracic spine. Tiny sclerotic foci are scattered throughout the thoracic vertebral bodies. Impression IMPRESSION: No acute process or evidence of pulmonary embolism. Tiny sclerotic 
foci are scattered throughout the vertebral bodies. ABDOMEN/PELVIC CT: 
 
TECHNIQUE:  
Following the uneventful intravenous administration of 100 cc Isovue-370, thin 
axial images were obtained through the abdomen and pelvis. Coronal and sagittal 
reconstructions were generated. Oral contrast was not administered. CT dose 
reduction was achieved through use of a standardized protocol tailored for this 
examination and automatic exposure control for dose modulation. FINDINGS:  
LIVER: No mass or biliary dilatation. GALLBLADDER: Unremarkable. SPLEEN: No mass. PANCREAS: No mass or ductal dilatation. ADRENALS: Unremarkable. KIDNEYS: Left renal cysts, the largest of which measures 2.8 cm. Scarring left 
kidney. STOMACH: Unremarkable. SMALL BOWEL: No dilatation or wall thickening. COLON: Sigmoid diverticulosis. APPENDIX: Unremarkable. PERITONEUM: No ascites or pneumoperitoneum. RETROPERITONEUM: No lymphadenopathy or aortic aneurysm. REPRODUCTIVE ORGANS: The uterus is surgically absent. URINARY BLADDER: No mass or calculus. BONES: Sclerotic lesions are noted in the iliac wings bilaterally, sacrum, and 
lumbar spine. ADDITIONAL COMMENTS: N/A IMPRESSION: 
No acute abnormality. Sclerotic foci are noted concerning for osseous metastatic 
disease. Correlation with bone scan is recommended. Lab Results Component Value Date/Time WBC 1.8 (L) 02/22/2021 05:02 AM  
 HGB (POC) 12.7 09/13/2017 11:20 AM  
 HGB 7.2 (L) 02/22/2021 05:02 AM  
 Hematocrit (POC) 36 01/13/2021 03:55 PM  
 HCT 22.3 (L) 02/22/2021 05:02 AM  
 PLATELET 95 (L) 43/31/4187 05:02 AM  
 .7 (H) 02/22/2021 05:02 AM  
 
 
 
Lab Results Component Value Date/Time  Sodium 139 02/22/2021 05:02 AM  
 Potassium 4.2 02/22/2021 05:02 AM  
 Chloride 108 02/22/2021 05:02 AM  
  CO2 22 02/22/2021 05:02 AM  
 Anion gap 9 02/22/2021 05:02 AM  
 Glucose 96 02/22/2021 05:02 AM  
 BUN 27 (H) 02/22/2021 05:02 AM  
 Creatinine 1.94 (H) 02/22/2021 05:02 AM  
 BUN/Creatinine ratio 14 02/22/2021 05:02 AM  
 GFR est AA 30 (L) 02/22/2021 05:02 AM  
 GFR est non-AA 25 (L) 02/22/2021 05:02 AM  
 Calcium 5.7 (LL) 02/22/2021 05:02 AM  
 Bilirubin, total 0.6 02/21/2021 08:37 PM  
 Alk. phosphatase 43 (L) 02/21/2021 08:37 PM  
 Protein, total 5.4 (L) 02/21/2021 08:37 PM  
 Albumin 2.2 (L) 02/21/2021 08:37 PM  
 Globulin 3.2 02/21/2021 08:37 PM  
 A-G Ratio 0.7 (L) 02/21/2021 08:37 PM  
 ALT (SGPT) 23 02/21/2021 08:37 PM  
 AST (SGOT) 62 (H) 02/21/2021 08:37 PM  
 
 
 
Assessment:  
 
1. Sepsis 
 
Hypovolemic 
Currently on Levophed  
Fluids  
 
Managed by ICU intensivist 
 
 
2. 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 
 
ECOG PS 1 
 
Prognosis: Guarded 
Goal of therapy: Palliative 
 
History of breast cancer in the 1990's. Left mastectomy with LN dissection ( negative LN) at [facility] - Dr. Stringer. 
Had chemotherapy, but does not remember the oncologist. States she never took a pill following chemotherapy or surgery. 
 
Letrozole/Ribociclib - started 12/2/2020 Hold while inpatient 
Tolerating well 
+ mild nausea 
Blood counts are acceptable 
 
 Cancer related bone pains 
 
Managed by palliative 
Xgeva 
 
3. Pancytopenia 
 
Macrocytic anemia 
Combination of acute/ chronic illness 
Transfuse to keep Hgb > 7 g/dL 
 
Vitamin B12 and folate lab in the AM 
Iron profile in the AM 
 
Thrombocytopenia - observation 
 
Neutropenia - ANC 1.3 - observation - Hold Kisqali 
 
4. Hypocalcemia 
 
Had denosumab 1/27 
Receiving calcium gluconate inpatient 
 
 
Plan:  
 
> Continue Letrozole and Hold Kisqali 
> Transfuse to keep Hgb > 7 g/dL 
> supportive care 
> Palliative consult for symptom management 
> Feel free to contact our office with any questions 
 
Signed by: Pamela Arteaga NP 
 February 22, 2021

## 2021-02-22 NOTE — PROGRESS NOTES
Pharmacy Automatic Renal Dosing Protocol - Antimicrobials Indication for Antimicrobials: Sepsis Current Regimen of Each Antimicrobial: 
Cefepime 2 gm IV q8h (Start Date ; Day # 1) Levofloxacin 750 mg IV q24h (Start Date ; Day # 1) Vancomycin 1500 mg IV once, then 1000 mg IV q36h (Start Date ; Day # 1) Previous Antimicrobial Therapy: 
 
Goal Level: VANCOMYCIN TROUGH GOAL RANGE Vancomycin Trough: 15 - 20 mcg/mL  (AUC: 400 - 600 mg/hr/Liter/day) Date Dose & Interval Measured (mcg/mL) Extrapolated (mcg/mL) Date & time of next level:  
 
Significant Cultures:  
 paired blood: pending Radiology / Imaging results: (X-ray, CT scan or MRI):  
 
Paralysis, amputations, malnutrition: n/a Labs: 
Recent Labs  
  21 CREA 2.26* BUN 31* WBC 2.0* Temp (24hrs), Av.7 °F (37.1 °C), Min:98.7 °F (37.1 °C), Max:98.7 °F (37.1 °C) Is the Patient on Dialysis? No 
 
Creatinine Clearance (mL/min):  
CrCl (Actual Body Weight): 25.8 CrCl (Adjusted Body Weight): 21.7 CrCl (Ideal Body Weight): 19.1 Impression/Plan:  
Cefepime dose adjusted to 2 gm IV every 24 hours Levofloxacin dose adjusted to 750 mg IV once, then 500 mg IV every 24 hours Vancomycin 1500 mg IV loading dose, followed by 1000 mg IV every 36 hours (predicted trough 14.6; ) Antimicrobial stop date TBD Pharmacy will follow daily and adjust medications as appropriate for renal function and/or serum levels. Thank you, TRI EdwardsD

## 2021-02-22 NOTE — WOUND CARE
Wound Care consult for the Sacrum assessment for the wound that was present on admission. Patient was admitted to the CCU today for hypotension and lethargy. Patient is alert, talkative and moves well from side to side. A family member was present during this exam with the patient's permission. Assessment: the skin is not open but there is some denuded / pink / red skin that is blanchable and a scar from a traumatic accident when she was younger - sat on glass. WOUND POA CONDITIONS Wound Sacrum Mid scar from a traumatic injury with impaled glass. (Active) Wound Image: photo of sacrum scar and the Pure wick. 02/22/21 1546 Wound Etiology Traumatic 02/22/21 1546 Dressing Status New dressing applied 02/22/21 1546 Cleansed Soap and water 02/22/21 1546 Dressing/Treatment Zinc paste 02/22/21 1546 Wound Assessment Pink/red 02/22/21 1546 Drainage Amount None 02/22/21 1546 Wound Odor None 02/22/21 1546 Sintia-Wound/Incision Assessment Denuded 02/22/21 1546 Wound Thickness Description Partial thickness 02/22/21 1546 Treatment:  The sacrum was cleansed and a new layer of zinc oxide cream applied. The urine wet chux was changed out for a dry new one. Continue the zinc oxide.   
Jacques Ness RN, BSN, [employer]

## 2021-02-22 NOTE — PROGRESS NOTES
2335- Verbal report received from [facility] on pt being admitted to CCU 2538.  
0033- Pt transferred into [phone]. Admission assess completed, see flowsheet. Pt A&Ox4, SR, on room air. Pt skin intact with exception of red excoriation to sacrum with several open areas. Pt reports having a few episodes of diarrhea over the last few days. Zinc cream applied. Purwick in place. Primary Nurse Erich Tomlinson and Polina Jasso RN performed a dual skin assessment on this patient Impairment noted- see wound doc flow sheet. Shan score is 16. 
0150- Spoke to Saw regarding pt home pain meds. Orders received from Saw NP for oxycodone 5mg Q4hr as needed for pain. 5393- Oxycodone given per [facility] for pain. 1186Jacquiline Kary NP notified of mag 1.1, orders received for 2gram of Mag. End of Shift Note Bedside shift change report given to [facility] TAYLA [facility] (oncoming nurse) by Erich Tomlinson (offgoing nurse). Report included the following information SBAR, Kardex, Intake/Output, MAR and Recent Results Shift worked:  7p-7a Shift summary and any significant changes:  
  Pt admitted overnight. Am labs noted and orders received. Concerns for physician to address:  none at this time Zone phone for oncoming shift:   [id number] Activity: 
Activity Level: Bed Rest 
Number times ambulated in hallways past shift: 0 Number of times OOB to chair past shift: 0 Cardiac:  
Cardiac Monitoring: Yes     
Cardiac Rhythm: Normal sinus rhythm Access:  
Current line(s): PIV and port Genitourinary:  
Urinary status: incontinent and external catheter Respiratory:  
O2 Device: Nasal cannula Chronic home O2 use?: YES Incentive spirometer at bedside: NO 
  
GI: 
Last Bowel Movement Date: 02/21/21 Current diet:  DIET NPO With Sips of Clear Fluids, With [employer] Passing flatus: NO Tolerating current diet: YES Pain Management:  
Patient states pain is manageable on current regimen: YES Skin: 
Shan Score: 16 Interventions: float heels, PT/OT consult, limit briefs, internal/external urinary devices and nutritional support Patient Safety: 
Fall Score: Total Score: 4 Interventions: bed/chair alarm, assistive device (walker, cane, etc), gripper socks and pt to call before getting OOB High Fall Risk: Yes Length of Stay: 
Expected LOS: - - - Actual LOS: 1 [facility]

## 2021-02-22 NOTE — ED NOTES
2010 - Port a cath accessed at this time - last time accessed 15 years ago - 1 L NS started on pump at 999ml/hr 2043 - Portable XR at bedside for imaging

## 2021-02-22 NOTE — PROGRESS NOTES
Care Management: 
 
Transition of Care Plan: 
 
Disposition: SNF versus home with HH. Patient did ask about Hospice. Patient is open to [facility] for SN and PT. I spoke with Lana Guerra at [place] and they do have a Hospice Program if that is what patient needs and can easily be transferred over. Follow up appointments: PCP and specialists as indicated. Dr Addison Carmichael and Dr Pat Alvarez. DME needed:TBD Transportation at [address] [facility] and she does have Medicaid if needed. Keys or means to access home:  Patient has keys, her DTR has keys. IM Medicare letter: Will need second IM letter. Caregiver Contact: Kings Mckeon is her DTR and at this point the one to contact. Discharge Caregiver contacted prior to discharge? Reason for Admission:   Sepsis Has metastatic breast cancer. Dr Addison Carmichael is oncologist ad Dr Pta Alvarez is cardiologist.  
            
Marah Overcast Score:     40 PCP: First and Last name: Dr Mendez Rob NP Name of Practice: 
 Are you a current patient: Yes Approximate date of last visit:  
 Can you do a virtual visit with your PCP:  YES Resources/supports as identified by patient/family:   # kids and approved for aides through Medicaid 40 hours a week. Top Challenges facing patient (as identified by patient/family and CM): Finances/Medication cost?   Has her insurance with secondary. Transportation? Family or Medicaid benefits Living arrangements? Lives in a senior apartment ground floor. Self-care/ADL's/Cognition? Has been independent with ADL's but has recently been approved for aides to assist with ADL's. Working on getting an aide but at this time there is no aides available in the system to assist patient per her DTR. Plan for utilizing home health:    TBD General Advance Care Planning (ACP) Conversation Date of Conversation: 2/22/21 Patient is her own decision maker but if unable to make her own decisions she has an ACP on chart. Healthcare Decision Maker:  
  Primary Decision Maker (Active): Tom Thomson - Daughter - [phone] Secondary Decision Maker: Patricia Mccord Daughter - [phone] Secondary Decision Maker: Nabil Po - Son - [phone] Full Code. Care Management Interventions PCP Verified by CM: Yes Mode of Transport at Discharge: Other (see comment)(Family) Transition of Care Consult (CM Consult): (Patient asked about hospice at discharge.) Current Support Network: Lives Alone Confirm Follow Up Transport: Family CM spoke with [facility] and patient. Patient would like to return home to her senior apartment with Hospice. She has 02, wheelchair and a walker. She uses 02 at night. Chart reviewed and we will cont to follow for discharge needs as appropriate. Horacio Aviles RN [facility] 1739

## 2021-02-22 NOTE — PROGRESS NOTES
Pharmacy  Enoxaparin (Lovenox®) Monitoring Indication: VTE prophylaxis Current Dose: Enoxaparin 40 mg subcutaneously every 24 hours Creatinine Clearance (mL/min): 19.1 ml/min Labs: 
Recent Labs  
  02/21/21 2037 CREA 2.26* HGB 7.7* * Wt Readings from Last 1 Encounters:  
02/21/21 77.1 kg (170 lb) Ht Readings from Last 1 Encounters:  
02/21/21 165.1 cm (65\") Impression/Plan:  
Change to heparin 5000 units SC q8hr per protocol for CrCl <20 ml/min Thanks, Akash Sagastume, PHARMD

## 2021-02-22 NOTE — PROGRESS NOTES
[facility] 
 
ICU Intensivist- Critical Care Progress Note Name: Adelina Pendleton : 1945 MRN: [id number] Admit: 2021  7:55 PM   
 
Diagnosis:  
 
Principal Problem: Hypovolemic shock Adverse reaction to antineoplastic drug Problem List: Hypocalcemia Bone metastases Hypophosphatemia History of breast cancer S/P thoracic laminectomy Breast cancer metastasized to liver Breast cancer metastasized to lung Thoracic pathological vertebral fracture Malignant neoplasm metastatic to breast 
  Severe sepsis with acute organ dysfunction Acute kidney injury superimposed on CKD (IV) Pancytopenia due to antineoplastic chemotherapy Pain due to malignant neoplasm metastatic to bone Breast cancer metastasized to bone (Thoracic spine) Encounter for monoclonal antibody treatment for malignancy Kidney disease, chronic, stage IV (GFR 15-29 ml/min) COPD (chronic obstructive pulmonary disease) Long term (current) use of aspirin DJD (degenerative joint disease) S/P total knee arthroplasty, right Mild protein-calorie malnutrition Drug-induced constipation OA (osteoarthritis) of knee Chronic maxillary sinusitis Weakness of both legs S/P left mastectomy Vitamin D deficiency Knee pain, bilateral 
  Esophagitis, reflux Chest pain at rest 
  Neuropathic pain Hyperglycemia Hyperlipidemia Severe obesity Breast cancer Hyperkalemia Hypertension Vertigo, aural 
  Cataract Fatigue Myalgia ICU Comprehensive Plan of Care:  
 
Plans for this Shift:  
1. Hypovolemic shock due to recent diarrhea volume losses- expand intravascular volume using isotonic crystalloids and colloids (Albumin). Vasopressor infusion drip (Norepinephrine), until volume resuscitation goals are achieved. Enteral rehydration will be encouraged.  
 
2. Adverse effect to antineoplastic chemotherapy agent (Letrozole)- pancytopenia, diarrhea, fatigue, myalgias, headaches, diarrhea. 3. AMAN superimposed upon CKD (Stage IV), with dehydration- aggressive expansion of intravascular volume using isotonic IV crystalloids and colloids (Albumin). Enteral rehydration encouraged. 4. Hypophosphatemia/Hypocalcemia- electrolytes being repleted. 5. Severe sepsis unlikely, awaiting culture results- will discontinue empiric IV antibiotics (Levoquin, vancomycin). 6. Metastatic breast carcinoma to bone (thoracic spine), metachronous lesion right breast- Oncology consultation. Subjective:  
 
Progress Note:  
2/22/2021 4:19 PM  
 
Reason for ICU Admission: Hypovolemic shock History of Recent Events:  
Josesito Buckner is a 67year-old obese W/F former smoker (48 Pack*yrs, quit 1989) with HTN, NIDDM, HLD, transient cardiomyopathy (only during chemotherapy, now resolved), asthma, COPD, s/p Left modified radical mastectomy (1997), s/p thoracic laminectomy (T3) and fusion (C5 to T5) for metastatic breast CA to bone with severe spinal stenos sand cord impingement ([id number]), s/p Right knee TKA ([id number]), Vitamin D deficiency, chronic maxillary sinusitis, and cataract. Patient is currently on monoclonal antibody therapy for malignancy Leodis Guadalupe), and Letrozole. She reports recent diarrhea, fatigue, myalgias, and headaches. Severe dehydration prompted [facility] evaluation at [facility]/ED, where hypovolemic shock and pancytopenic was diagnosed. She was empirically started on broad-spectrum IV antibiotics, as well as enteric precautions, pending routine surveillance cultures for possible, occult, severe sepsis with acute organ dysfunction. Most likely, this myriad of symptoms and clinical findings can be attributed to an adverse effect of Letrozole.  
 
Past Medical History:  
 
 has a past medical history of Acute kidney injury superimposed on CKD ([address] (2/22/2021), Arthralgia (8/17/2017), Arthritis, Asthma, Breast cancer ([address] (8/17/2017), Breast cancer metastasized to liver [facility]) (9/1/2020), Breast cancer metastasized to lung [facility]) (9/1/2020), Cancer ([address] (1997), Cardiomyopathy ([address] (8/17/2017), Cataract (8/17/2017), Cellulitis (8/17/2017), Chest pain at rest (8/17/2017), CHF (congestive heart failure) ([address] (8/17/2017), Chronic maxillary sinusitis (8/17/2017), Chronic pain, COPD (chronic obstructive pulmonary disease) ([address] (8/17/2017), Diabetes ([address] DJD (degenerative joint disease) (8/17/2017), Elevated BUN (8/17/2017), Encounter for monoclonal antibody treatment for malignancy (2/22/2021), Esophagitis, reflux (8/17/2017), Fatigue (8/17/2017), GERD (gastroesophageal reflux disease), Heart failure ([address] Hyperglycemia (8/17/2017), Hyperkalemia (8/17/2017), Hyperlipidemia (8/17/2017), Hypertension (8/17/2017), Hypocalcemia (2/22/2021), Hypovolemic shock ([address] (2/22/2021), Ill-defined condition, Ill-defined condition (2003), Kidney disease, chronic, stage IV (GFR 15-29 ml/min) ([address] (2/22/2021), Long term (current) use of aspirin (2/22/2021), Malignant neoplasm metastatic to breast ([address] (12/1/2020), Mild protein-calorie malnutrition ([address] (2/22/2021), Myalgia (8/17/2017), Pancytopenia due to antineoplastic chemotherapy ([address] (2/22/2021), S/P laminectomy (9/29/2020), S/P left mastectomy (1/1/1997), S/P total knee arthroplasty, right (6/22/2017), Thromboembolus ([facility] (1969), Thrush (8/17/2017), Vertigo, aural (8/17/2017), and Vitamin D deficiency (8/17/2017). Past Surgical History:  
 
 has a past surgical history that includes hx gyn (1988); hx gyn; hx vascular access; hx orthopaedic (Right, 06/2018); hx orthopaedic (Left, 11/2018); and hx heent (2015). Current Medications:  
 
Current Facility-Administered Medications Medication Dose Route Frequency  alcohol 62% (NOZIN) nasal  1 Ampule  1 Ampule Topical Q12H  
 zinc oxide-cod liver oil (DESITIN) 40 % paste   Topical PRN  
 oxyCODONE IR (ROXICODONE) tablet 5 mg 5 mg Oral Q4H PRN  
 glycopyrrolate-formoterol (BEVESPI AEROSPHERE) 9 mcg-4.8 mcg inhaler  2 Puff Inhalation BID  DULoxetine (CYMBALTA) capsule 20 mg  20 mg Oral QHS  fluticasone propionate (FLONASE) 50 mcg/actuation nasal spray 2 Spray  2 Spray Both Nostrils DAILY  albuterol (PROVENTIL VENTOLIN) nebulizer solution 2.5 mg  2.5 mg Inhalation Q4H PRN  
 lactated Ringers infusion  75 mL/hr IntraVENous CONTINUOUS  
 potassium phosphate 30 mmol in 0.9% sodium chloride 250 mL infusion   IntraVENous ONCE  
 magnesium sulfate 2 g/50 ml IVPB (premix or compounded)  2 g IntraVENous ONCE  zinc oxide-cod liver oil (DESITIN) 40 % paste   Topical BID and QHS  sodium chloride (NS) flush 5-40 mL  5-40 mL IntraVENous Q8H  
 sodium chloride (NS) flush 5-40 mL  5-40 mL IntraVENous PRN  
 acetaminophen (TYLENOL) tablet 650 mg  650 mg Oral Q6H PRN Or  
 acetaminophen (TYLENOL) suppository 650 mg  650 mg Rectal Q6H PRN  polyethylene glycol (MIRALAX) packet 17 g  17 g Oral DAILY PRN  promethazine (PHENERGAN) tablet 12.5 mg  12.5 mg Oral Q6H PRN Or  
 ondansetron (ZOFRAN) injection 4 mg  4 mg IntraVENous Q6H PRN  potassium chloride 20 mEq in 50 ml IVPB  20 mEq IntraVENous PRN  
 NOREPINephrine (LEVOPHED) 8 mg in 5% dextrose 250mL (32 mcg/mL) infusion  0.5-30 mcg/min IntraVENous TITRATE  heparin (porcine) injection 5,000 Units  5,000 Units SubCUTAneous Q8H  
 cefepime (MAXIPIME) 2 g in 0.9% sodium chloride (MBP/ADV) 100 mL MBP  2 g IntraVENous Q24H  
 [START ON 2/23/2021] levoFLOXacin (LEVAQUIN) 500 mg in D5W IVPB  500 mg IntraVENous Q48H  
 [START ON 2/23/2021] vancomycin (VANCOCIN) 1,000 mg in 0.9% sodium chloride 250 mL (VIAL-MATE)  1,000 mg IntraVENous Q36H Allergies/Social/Family History: Allergies Allergen Reactions  Morphine Nausea and Vomiting Social History Tobacco Use  Smoking status: Former Smoker Packs/day: 2.00 Years: 25.00 Pack years: 50.00 Quit date:  Years since quittin.1  Smokeless tobacco: Never Used Substance Use Topics  Alcohol use: No  
  
Family History Problem Relation Age of Onset  Cancer Mother  Other Father Review of Systems: A comprehensive review of systems was negative. Objective:  
Vital Signs: 
Visit Vitals BP (!) 102/44 (BP 1 Location: Right arm, BP Patient Position: At rest) Pulse 76 Temp 98.5 °F (36.9 °C) Resp 16 Ht 5' 5\" (1.651 m) Wt 76 kg (167 lb 8.8 oz) SpO2 100% Breastfeeding No  
BMI 27.88 kg/m² O2 Flow Rate (L/min): 2 l/min O2 Device: Nasal cannula Temp (24hrs), Av.6 °F (37 °C), Min:98.2 °F (36.8 °C), Max:99.1 °F (37.3 °C) Intake/Output:  
 
Intake/Output Summary (Last 24 hours) at 2021 1619 Last data filed at 2021 1600 Gross per 24 hour Intake 5516.57 ml Output 850 ml Net 4666.57 ml Physical Exam: 
General:   Alert, cooperative, no distress, appears stated age. Eyes:   Conjunctivae pale/corneas clear. PERRL, EOMs intact. Ears:   Normal external ear canals bilaterally. Nose:   No drainage or sinus tenderness. Mouth/Throat:  Dry, sticky, mucous membranes. Dentition normal.   
Neck:  Symmetrical, trachea midline, no JVD or carotid bruit. Back:    No CVA tenderness to percussion. Lungs:    Clear to auscultation bilaterally. Heart:   Regular rate and rhythm. S1, S2 normal, without murmur, click, rub or gallop. Abdomen:    Normoactive bowel sounds. Soft, non-tender, no masses or organomegaly. Extremities:  Atraumatic, no cyanosis or edema. Vascular:  Pulses 1+ and symmetric UE/LE bilat Skin:  Palecolor, non-diaphoretic, delayeded capillary refill (Greater than 3 seconds). No rashes or lesions. Lymph nodes:  No Lymphadenopathy. Neurologic:  CN II-XII intact. Normal strength. LABS AND  DATA: Personally reviewed Recent Labs  
  21 
0502 21 WBC 1.8* 2.0* HGB 7.2* 7.7* HCT 22.3* 24.1* PLT 95* 107* Recent Labs  
  02/22/21 
0502 02/21/21 2037  136  
K 4.2 4.2  104 CO2 22 24 BUN 27* 31* CREA 1.94* 2.26* GLU 96 118* CA 5.7* 6.2*  
MG 1.1*  --   
PHOS 1.8*  --   
 
Recent Labs  
  02/21/21 2037 AP 43* TP 5.4* ALB 2.2*  
GLOB 3.2 No results for input(s): INR, PTP, APTT, INREXT in the last 72 hours. No results for input(s): PHI, PCO2I, PO2I, FIO2I in the last 72 hours. Recent Labs  
  02/21/21 2037 TROIQ <0.05 Ventilator Settings: 
Mode Rate Tidal Volume Pressure FiO2 PEEP Peak airway pressure:     
Minute ventilation:     
 
 
MEDS: Reviewed RADIOLOGY: 
Xr Chest [facility] Result Date: 2/21/2021 Patchy bibasilar atelectasis and interstitial prominence. Correlate clinically and follow-up as appropriate. Assessment:  
 
Hospital Problems  Date Reviewed: 2/22/2021 Codes Class Noted POA Breast cancer metastasized to liver [facility]) (Chronic) ICD-10-CM: C50.919, C78.7 ICD-9-CM: 174.9, 197.7 End Stage 9/1/2020 Yes Breast cancer metastasized to lung [facility]) ICD-10-CM: C50.919, C78.00 ICD-9-CM: 174.9, 197.0 End Stage 9/1/2020 Yes Pancytopenia due to antineoplastic chemotherapy [facility]) ICD-10-CM: D61.810, T45.1X5A 
ICD-9-CM: 284.11, E933.1 Acute 2/22/2021 Yes Overview Addendum 2/22/2021  8:14 AM by Asia Dobson MD  
  ([id number])- Letrozole initiated Kidney disease, chronic, stage IV (GFR 15-29 ml/min) (HCC) ICD-10-CM: N18.4 ICD-9-CM: 585.4 End Stage 2/22/2021 Yes Acute kidney injury superimposed on CKD (HCC) (Chronic) ICD-10-CM: N17.9, N18.9 ICD-9-CM: 866.00, 585.9 Acute 2/22/2021 Yes Long term (current) use of aspirin (Chronic) ICD-10-CM: E90.84 ICD-9-CM: V58.66 Chronic 2/22/2021 Yes Overview Signed 2/22/2021  7:20 AM by Asia Dobson MD  
  ASA 81 mg Daily Hypovolemic shock ([address] ICD-10-CM: R57.1 ICD-9-CM: 785.59 Acute 2/22/2021 Yes Hypocalcemia ICD-10-CM: E83.51 
ICD-9-CM: 275.41 Acute 2/22/2021 Yes Mild protein-calorie malnutrition (HCC) (Chronic) ICD-10-CM: E44.1 ICD-9-CM: 263.1 Health Concern 2/22/2021 Yes Encounter for monoclonal antibody treatment for malignancy ICD-10-CM: Z51.12 
ICD-9-CM: V58.12, 199.1 Chronic 12/16/2020 Yes Overview Signed 2/22/2021  7:53 AM by Zoila Newby MD  
  ([id number])- Marva Rolle Malignant neoplasm metastatic to breast [facility]) ICD-10-CM: X75.96 ICD-9-CM: 198.81  12/1/2020 Yes Overview Signed 2/22/2021  7:48 AM by Zoila Newby MD  
  (Dec/2020)- 
Metachronous breast carcinoma, Right breast 
  
  
   
 S/P left mastectomy (Chronic) ICD-10-CM: W22.94 ICD-9-CM: V45.71 End Stage 1/1/1997 Yes Overview Signed 2/22/2021  7:42 AM by Zoila Newby MD  
  ([id number])- Left breast cancer % MD 60% Her 2 -ve, initial diagnosis in the 1990s  
status post left mastectomy chemo and radiation S/P laminectomy ICD-10-CM: E49.948 ICD-9-CM: V45.89 End Stage 9/29/2020 Unknown Overview Signed 2/22/2021  8:02 AM by Zoila Newby MD  
  ([id number])- 
S/P Segmental posterior cervicothoracic fusion C5 to T5, and arthrodesis C5 to T5 with cancellous bone chips and DBM putty, and partial laminectomy T2 Metastatic disease through T1, T2 and T3, with severe spinal stenosis and impingement on the cord. S/P total knee arthroplasty, right ICD-10-CM: I21.530 ICD-9-CM: V43.65 End Stage 6/22/2017 Yes Overview Signed 2/22/2021  7:24 AM by Zoila Newby MD  
  ([id number]) Urinary tract infection due to Klebsiella species ICD-10-CM: N39.0, B96.89 
ICD-9-CM: 599.0, 041.84  2/22/2021 Unknown Severe sepsis with acute organ dysfunction (HCC) ICD-10-CM: A41.9, R65.20 ICD-9-CM: 038.9, 995.92  2/21/2021 Unknown Multidisciplinary Rounds Completed: Yes ABCDEF Bundle/Checklist 
Pain Medications: Oxycodone Target RASS: 0 - Alert & Calm - Spontaneously pays attention to caregiver Sedation Medications: None CAM-ICU:  Negative Discussed Plan of Care (goals of care): Yes Addressed Code Status: Full Code CARDIOVASCULAR Cardiac Gtts: Norepinephrine SBP Goal of: > 90 mmHg MAP Goal of: > 65 mmHg Transfusion Trigger (Hgb): <7 g/dL RESPIRATORY Vent Goals:  
Head of bed > 30 degrees Aggressive bronchopulmonary hygiene DVT Prophylaxis (if no, list reason): SCD's or Sequential Compression Device SPO2 Goal: > 92% Pulmonary toilet: Incentive Spirometry GI/ [place] Catheter Present: Yes ANTIBIOTICS Antibiotics: 
Levofloxacin Vancomycin T/L/D Tubes: None Lines: Peripheral IV Drains: [place] Catheter SPECIAL EQUIPMENT None DISPOSITION Stay in ICU CRITICAL CARE CONSULTANT NOTE I provided a face-to-face bedside physician/patient encounter, greater than the usual and customary amount normally needed, due to the moderate complexity of medical decision-making required. I reviewed and interpreted patient data including clinical events, labs, images, vital signs, I/O's, and examined patient. I have actively participated in multi-disciplinary discussions (Oncology. Clinical Pharmacist, Case Management, CCU Nursing staff) regarding the case in formulating an optimal therapeutic plan, and effecting a management strategy for this patient. NOTE OF PERSONAL INVOLVEMENT IN CARE This patient has a moderte probability of imminent, clinically significant deterioration, which requires the highest level of preparedness to intervene urgently. I participated in the decision-making and personally managed, or directed the management of, a myriad of life and organ supporting interventions which required my frequent-interval clinical reassessments, in order to treat or prevent imminent deterioration. I personally spent 90 minutes of critical care time.   This is time spent at this critically ill patient's bedside actively involved in patient care as well as the coordination of care and discussions with the patient's family. This does not include any procedural time which has been billed separately. Quentin Villar MD, FACS Staff Intensivist 
[facility] 
2/22/2021

## 2021-02-22 NOTE — PROGRESS NOTES
Weaning down levo to maintain SBP of 90. 
1303 BP 86/40. Levophed drip up to 3mcg/min. 1400 echo tech in to do echocardiogram. 
1455 NP for Dr. Rosalee Perez in to see patient. 1900 End of Shift Note Bedside shift change report given to Mariposa Newby RN (oncoming nurse) by Keith Connolly RN (offgoing nurse). Report included the following information SBAR, Kardex, MAR, Accordion and Recent Results Shift worked:  7am to [address] Shift summary and any significant changes:  
 Electrolyte replacement, BP management Concerns for physician to address:  none at this time Zone phone for oncoming shift:  n/a Activity: 
Activity Level: Other (comment)(as tolerated) Number times ambulated in hallways past shift: 0 Number of times OOB to chair past shift: 0 Cardiac:  
Cardiac Monitoring: Yes     
Cardiac Rhythm: Normal sinus rhythm Access:  
Current line(s): PIV and port Genitourinary:  
Urinary status: voiding, incontinent and external catheter Respiratory:  
O2 Device: Nasal cannula Chronic home O2 use?: yes at night. Incentive spirometer at bedside: NO 
  
GI: 
Last Bowel Movement Date: 02/21/21 Current diet:  DIET REGULAR Passing flatus: YES Tolerating current diet: NO 
% Diet Eaten: 5 % Pain Management:  
Patient states pain is manageable on current regimen: YES Skin: 
Shan Score: 15 Interventions: turn team and float heels Patient Safety: 
Fall Score: Total Score: 3 Interventions: bed/chair alarm, gripper socks and pt to call before getting OOB High Fall Risk: Yes Length of Stay: 
Expected LOS: 4d 19h Actual LOS: 1 Keith Connolly RN

## 2021-02-22 NOTE — H&P
[facility] 
 
ICU TEAM Progress Note 
 
Name: Bree Dumont  
: 1945  
MRN: [id number]  
Date: 2021   
 
Assessment:  
 
ICU Problems: 
Sepsis 
1. Shock, hypovolemic, septic 
2. Acute blood loss anemia 
 
ICU Comprehensive Plan of Care:  
 
Plans for this Shift:  
 
1. Shock, hypovolemic, septic 
a. IVF 
b. NE vasopressor for goal MAP > 65 
c. Broad spectrum Abx 
2. Acute blood loss anemia 
a. Type and screen 
b. PRBC 
3. COVID Treatment: 
a. Test pending 
4. SBP Goal of: > 90 mmHg 
5. MAP Goal of: > 65 mmHg 
6. Norepinephrine - For above SBP/MAP goals 
7. IVFs: LR 
8. Transfusion Trigger (Hgb): <7 g/dL 
9. Respiratory Goals: 
a. Head of bed > 30 degrees 
b. Incentive spirometry 
10. Pulmonary toilet: Incentive Spirometry  
11. SpO2 Goal: > 92% via NC 
12. Keep K>4; Mg>2  
13. PT/OT: PT consulted and on board and OT consulted and on board  
14. Discussed Plan of Care/Code Status: Full Code 
15. Appreciate Consultants Input none 
16. Discussed Care Plan with Bedside RN 
17. Documentation of Current Medications 
18. Rest of Plan Below: 
 
F - Feeding:  Pending NPO at this time 
A - Analgesia: None 
S - Sedation: None 
T - DVT Prophylaxis: SCD's or Sequential Compression Device and Lovenox  
H - Head of Bed: > 30 Degrees 
U - Ulcer Prophylaxis: Not at this time  
G - Glycemic Control: Insulin 
S - Spontaneous Breathing Trial: No 
B - Bowel Regimen: MiraLax 
I - Indwelling Catheter: 
 Tubes: None 
Lines: Peripheral IV and Port a cath 
Drains: None 
D - De-escalation of Antibiotics: Cefepime 
Levofloxacin 
Vancomycin 
 
Subjective:  
Progress Note: 2021   
 
Reason for ICU Admission: Sepsis  
 
HPI: 
Per ED note: 
75-year-old female with a history of breast cancer, osteosarcoma, CHF (EF 50% in 2019) and chronic pain presents emergency department with a chief complaint of lethargy.  Patient reports increasing lethargy and weakness.  Patient reports she has been being visited by physical  therapy and they report her blood pressures have been low. States her blood pressure normally runs in the 100s to 110s.   
Called by ED to eval for ICU admission d/t hypotension req vasopressor support. Overnight Events:  
2/21/2021 Presents to ED, ICU admission POD: 
* No surgery found * S/P:  
 
 
Active Problem List:  
 
Problem List  Date Reviewed: 12/16/2020 Codes Class Sepsis ([facility] ICD-10-CM: A41.9 ICD-9-CM: 038.9, 995.91 Breast cancer metastasized to bone, right (HCC) ICD-10-CM: C50.911, C79.51 
ICD-9-CM: 174.9, 198.5 Acute post-operative pain ICD-10-CM: G89.18 
ICD-9-CM: 338.18 Pain due to malignant neoplasm metastatic to bone (HCC) ICD-10-CM: G89.3, C79.51 
ICD-9-CM: 338.3 Neuropathic pain ICD-10-CM: M79.2 ICD-9-CM: 729.2 Drug-induced constipation ICD-10-CM: K59.03 
ICD-9-CM: 564.09, E980.5 Weakness of both legs ICD-10-CM: R29.898 ICD-9-CM: 729.89 Bone metastases ([facility] ICD-10-CM: C79.51 
ICD-9-CM: 198.5 Thoracic compression fracture ([facility] ICD-10-CM: S22.000A ICD-9-CM: 805.2 History of breast cancer ICD-10-CM: Z85.3 ICD-9-CM: V10.3 Severe obesity (HCC) ICD-10-CM: E66.01 
ICD-9-CM: 278.01 Cardiomyopathy ([facility] ICD-10-CM: I42.9 ICD-9-CM: 425.4 Overview Signed 8/17/2017  3:47 PM by Stormy Freeman Onset:1999 Ischemic; 25% - 50% (last EFx 45%) Continue with ACE/Lasix/coreg Breast cancer ([facility] ICD-10-CM: C50.919 ICD-9-CM: 174.9 Overview Signed 8/17/2017  3:51 PM by Stormy Freeman Onset 1997; Refusing Mammogram 6/16/17 Hypertension ICD-10-CM: I10 
ICD-9-CM: 401.9 Vitamin D deficiency ICD-10-CM: E55.9 ICD-9-CM: 268.9 Hyperlipidemia ICD-10-CM: E78.5 ICD-9-CM: 272.4 Knee pain, bilateral ICD-10-CM: M25.561, M25.562 ICD-9-CM: 719.46 Fatigue ICD-10-CM: R53.83 ICD-9-CM: 780.79  COPD (chronic obstructive pulmonary disease) ([facility] ICD-10-CM: J44.9 ICD-9-CM: 638 Overview Signed 8/17/2017  3:53 PM by Carolina Simpson Continue with inhalers Vertigo, aural ICD-10-CM: H81.319 ICD-9-CM: 386.19 Thrush ICD-10-CM: B37.0 ICD-9-CM: 112.0 DJD (degenerative joint disease) ICD-10-CM: M19.90 ICD-9-CM: 715.90 Chest pain at rest ICD-10-CM: R07.9 ICD-9-CM: 786.50 Myalgia ICD-10-CM: M79.10 ICD-9-CM: 729.1 Cataract ICD-10-CM: H26.9 ICD-9-CM: 366.9 Overview Signed 8/17/2017  4:08 PM by Carolina Simpson Bilateral 
 
  
  
   
 Hyperkalemia ICD-10-CM: E87.5 ICD-9-CM: 276.7 CHF (congestive heart failure) (HCC) ICD-10-CM: I50.9 ICD-9-CM: 428.0 Overview Signed 8/17/2017  4:16 PM by Carolina Simpson Stable, though weight up a little. To take 1 1/2 Lasix daily if swelling, 1 daily o/w Cellulitis ICD-10-CM: L03.90 ICD-9-CM: 682.9 Overview Signed 8/17/2017  4:18 PM by Carolina Simpson Suspect local reaction to Pneumovax, treat with ice, NSAIDs or Tylenol Chronic maxillary sinusitis ICD-10-CM: J32.0 ICD-9-CM: 473.0 Esophagitis, reflux ICD-10-CM: K21.00 ICD-9-CM: 530.11 Elevated BUN ICD-10-CM: R79.9 ICD-9-CM: 790.6 Hyperglycemia ICD-10-CM: R73.9 ICD-9-CM: 790.29   
   
 OA (osteoarthritis) of knee ICD-10-CM: M17.10 ICD-9-CM: 715.36 Past Medical History:  
 
 has a past medical history of Arthralgia (8/17/2017), Arthritis, Asthma, Breast cancer ([facility] (8/17/2017), Cancer ([facility] (1997), Cardiomyopathy ([facility] (8/17/2017), Cataract (8/17/2017), Cellulitis (8/17/2017), Chest pain at rest (8/17/2017), CHF (congestive heart failure) ([facility] (8/17/2017), Chronic maxillary sinusitis (8/17/2017), Chronic pain, COPD (chronic obstructive pulmonary disease) ([facility] (8/17/2017), Diabetes ([facility] DJD (degenerative joint disease) (8/17/2017), Elevated BUN (8/17/2017), Esophagitis, reflux (8/17/2017), Fatigue (8/17/2017), GERD (gastroesophageal reflux disease), Heart failure ([facility] Hyperglycemia (8/17/2017), Hyperkalemia (8/17/2017), Hyperlipidemia (8/17/2017), Hypertension (8/17/2017), Ill-defined condition, Ill-defined condition (2003), Myalgia (8/17/2017), Thromboembolus ([facility] (1969), Thrush (8/17/2017), Vertigo, aural (8/17/2017), and Vitamin D deficiency (8/17/2017). Past Surgical History:  
 
 has a past surgical history that includes hx gyn (1988); hx gyn; hx vascular access; hx orthopaedic (Right, 06/2018); hx orthopaedic (Left, 11/2018); and hx heent (2015). Home Medications:  
 
Prior to Admission medications Medication Sig Start Date End Date Taking? Authorizing Provider  
oxyCODONE IR (ROXICODONE) 5 mg immediate release tablet Take 1 Tab by mouth every four (4) hours as needed for Pain for up to 15 days. Max Daily Amount: 30 mg. 2/19/21 3/6/21  Sangeetha Felder MD  
DULoxetine (CYMBALTA) 20 mg capsule TAKE 1 CAPSULE BY MOUTH NIGHTLY 2/9/21   Cliff Bal MD  
DULoxetine (Cymbalta) 20 mg capsule Take 1 Cap by mouth nightly. 2/8/21   Cliff Bal MD  
carvedilol (Coreg CR) 40 mg CR capsule Take 40 mg by mouth daily (with breakfast). Provider, Historical  
furosemide (LASIX) 20 mg tablet Take 60 mg by mouth daily. Provider, Historical  
mometasone (Nasonex) 50 mcg/actuation nasal spray 2 Sprays daily. Provider, Historical  
letrozole (FEMARA) 2.5 mg tablet Take 2.5 mg by mouth daily. Provider, Historical  
calcium carbonate (TUMS) 200 mg calcium (500 mg) chew Take 1 Tab by mouth daily. Provider, Historical  
potassium chloride SR (K-TAB) 20 mEq tablet Take 1 Tab by mouth daily. 12/31/20   Dylon Robison MD  
amiodarone (PACERONE) 100 mg tablet Take 100 mg by mouth daily.     Provider, Historical  
prochlorperazine (COMPAZINE) 10 mg tablet Take 0.5 Tabs by mouth every six (6) hours as needed for Nausea.  12/16/20   Branden Quiroga Officer, NP  
ribociclib-letrozole (Kisqali Femara Co-Pack) 600 mg/day(200 mg x 3)-2.5 mg tab Take 600 mg by mouth daily. Take kisqali daily x 21 days then off 7 days, letrozole is daily 20   Fatmata Ruvalcaba MD  
sacubitriL-valsartan Sussy Little) 24-26 mg tablet Take 1 Tab by mouth two (2) times a day. Provider, Historical  
meclizine (ANTIVERT) 12.5 mg tablet Take  by mouth three (3) times daily as needed for Dizziness. Provider, Historical  
aspirin delayed-release 81 mg tablet Take  by mouth daily. Provider, Historical  
polyethylene glycol (MIRALAX) 17 gram packet Take 1 Packet by mouth daily. 10/6/20   Rizwan Knight MD  
acetaminophen (TYLENOL) 500 mg tablet Take 500 mg by mouth every six (6) hours as needed for Pain. Provider, Historical  
omeprazole (PRILOSEC) 20 mg capsule Take 1 capsule by mouth once daily 20   Mariam Azar NP  
fenofibrate nanocrystallized (TRICOR) 145 mg tablet Take 1 tablet by mouth once daily 20   Mariam Azar NP Anoro Ellipta 62.5-25 mcg/actuation inhaler Take 1 Puff by inhalation daily. 3/23/20   Other, MD Faizan  
cholecalciferol (VITAMIN D3) 1,000 unit cap Take 1,000 Units by mouth daily. Provider, Historical  
albuterol (PROVENTIL, VENTOLIN) 90 mcg/Actuation inhaler Take 1-2 Puffs by inhalation every four (4) hours as needed for Wheezing. 11   TORSTEN Johnson Allergies/Social/Family History: Allergies Allergen Reactions  Morphine Nausea and Vomiting Social History Tobacco Use  Smoking status: Former Smoker Packs/day: 2.00 Years: 25.00 Pack years: 50.00 Quit date:  Years since quittin.1  Smokeless tobacco: Never Used Substance Use Topics  Alcohol use: No  
  
Family History Problem Relation Age of Onset  Cancer Mother  Other Father Review of Systems: A comprehensive review of systems was negative except for: Constitutional: positive for chills, fatigue and anorexia Gastrointestinal: positive for nausea and diarrhea Objective:  
Vital Signs: 
Visit Vitals BP (!) 78/37 (BP 1 Location: Right arm, BP Patient Position: At rest) Pulse 77 Temp 98.7 °F (37.1 °C) Resp 18 Ht 5' 5\" (1.651 m) Wt 77.1 kg (170 lb) SpO2 98% BMI 28.29 kg/m² O2 Device: Room air Temp (24hrs), Av.7 °F (37.1 °C), Min:98.7 °F (37.1 °C), Max:98.7 °F (37.1 °C) Intake/Output:  
No intake or output data in the 24 hours ending 21 Physical Exam: 
 
General:  Alert, cooperative, well noursished, well developed, appears stated age Eyes:  Sclera anicteric. Pupils equally round and reactive to light. Mouth/Throat: Mucous membranes normal, oral pharynx clear Neck: Supple Lungs:   Clear to auscultation bilaterally, good effort CV:  Regular rate and rhythm,no murmur, click, rub or gallop Abdomen:   Soft, non-tender. bowel sounds normal. non-distended Extremities: No cyanosis or edema Skin: Skin color, texture, turgor normal. no acute rash or lesions Lymph nodes: Cervical and supraclavicular normal  
Musculoskeletal: No swelling or deformity Lines/Devices:  Intact, no erythema, drainage or tenderness Psych: Alert and oriented, normal mood affect given the setting LABS AND  DATA: Personally reviewed Recent Labs  
  21 WBC 2.0* HGB 7.7* HCT 24.1*  
* Recent Labs  
  21   
K 4.2  CO2 24 BUN 31* CREA 2.26* * CA 6.2* Recent Labs  
  21 AP 43* TP 5.4* ALB 2.2*  
GLOB 3.2 No results for input(s): INR, PTP, APTT, INREXT, INREXT in the last 72 hours. No results for input(s): PHI, PCO2I, PO2I, FIO2I in the last 72 hours. Recent Labs  
  21 TROIQ <0.05 Hemodynamics:  
PAP:   CO:    
Wedge:   CI:    
CVP:    SVR:    
  PVR:    
 
Ventilator Settings: 
Mode Rate Tidal Volume Pressure FiO2 PEEP Peak airway pressure:     
Minute ventilation:     
 
 
MEDS: Reviewed Chest X-Ray: CXR Results  (Last 48 hours) 02/21/21 2058  XR CHEST PORT Final result Impression:  Patchy bibasilar atelectasis and interstitial prominence. Correlate clinically  
and follow-up as appropriate. Narrative:  INDICATION:  hypotension / AMS EXAM: Chest single view. COMPARISON: 9/29/2020. FINDINGS: A single frontal view of the chest at 2036 hours shows mild bibasilar  
interstitial prominence and patchy atelectasis. Central venous catheter on the  
right is stable. .  The heart, mediastinum and pulmonary vasculature are stable . The bony thorax is unremarkable for age, except for stable cervical thoracic  
metallic rods. . . 2/21/21 EKG Normal sinus rhythm ST & T wave abnormality, consider inferior ischemia ST & T wave abnormality, consider anterolateral ischemia When compared with ECG of 21-SEP-2020 10:57,  
premature ventricular complexes are no longer present T wave inversion now evident in Anterolateral leads Confirmed by FABI Barrow ([id number]) on 2/21/2021 11:10:40 PM  
 
ECHO: 
Pending Multidisciplinary Rounds Completed:  Pending ABCDEF Bundle/Checklist Completed: 
Yes SPECIAL EQUIPMENT None DISPOSITION Stay in ICU CRITICAL CARE CONSULTANT NOTE I had a face to face encounter with the patient, reviewed and interpreted patient data including clinical events, labs, images, vital signs, I/O's, and examined patient. I have discussed the case and the plan and management of the patient's care with the consulting services, the bedside nurses and the respiratory therapist.   
 

## 2021-02-22 NOTE — ED NOTES
2230 - ED visit d/t AMS / weakness / hypotension - Hx of breast cancer (L) arm restrictions due to surgical interventions / osteosarcoma with active tx by oncology / CHF /chronic pain / recent back surgery s/p mechanical fall - Family brought pt into ED due change in mental status / hypotension / general weakness /  
 
Currently A/Ox4 - speaking in full sentences Denies fever / chills / coughing / chest pain / active AMS / headache / Abdominal pain / Nausea / Vomiting / Diarrhea / dark stools 2335 - TRANSFER - OUT REPORT: 
 
Verbal report given to Elle (name) on Jerica Baldemar  being transferred to CCU(unit) for routine progression of care Report consisted of patients Situation, Background, Assessment and  
Recommendations(SBAR). Information from the following report(s) SBAR, Kardex, ED Summary, Intake/Output and MAR was reviewed with the receiving nurse. Lines:  
Venous Access Device 09/21/97 Upper chest (subclavicular area, right (Active) Peripheral IV 02/21/21 Right Antecubital (Active) Site Assessment Clean, dry, & intact 02/21/21 2158 Phlebitis Assessment 0 02/21/21 2158 Infiltration Assessment 0 02/21/21 2158 Dressing Status Clean, dry, & intact 02/21/21 2158 Dressing Type Tape;Transparent 02/21/21 2158 Hub Color/Line Status Pink;Flushed;Patent 02/21/21 2158 Alcohol Cap Used No 02/21/21 2158 Opportunity for questions and clarification was provided. Patient transported with: 
 Monitor

## 2021-02-23 PROBLEM — D64.9 SYMPTOMATIC ANEMIA: Status: ACTIVE | Noted: 2021-01-01

## 2021-02-23 PROBLEM — E83.42 HYPOMAGNESEMIA: Status: ACTIVE | Noted: 2021-01-01

## 2021-02-23 PROBLEM — D53.9 MACROCYTIC ANEMIA: Chronic | Status: ACTIVE | Noted: 2021-01-01

## 2021-02-23 NOTE — PROGRESS NOTES
Hospitalist Progress Note NAME: Kay Cortes :  1945 MRN:  [id number] Assessment / Plan: 
Shock, likely hypovolemic: Present on admission. Patient was in ICU on pressors. She was on Albumin as well. Patient did not have a clear ssx of infection. Sepsis was less likely as per intensivist.  But patient is on cefepime will continue on that. Patient has been off pressor ,0235- Levo stopped. Reason for massive diarrhea probably was letrozole. Medications stopped. Oncology on board. We will follow up on cultures sent and any signs of infection. Continue IV fluid resuscitation keeping an eye on fluid overload. Hypophosphatemia/Hypocalcemia- electrolytes being repleted. Follow up PTH and VIt D level , Ionized  ca level Acute on chronic anemia: Hemoglobin up to 5.5. She is now on transfusion. Will check hemoglobin posttransfusion. Worsening of anemia likely from cancer and medications, fluid resuscitation dilution. No overt bleeding. Pancytopenia: Likely related to breast cancer and chemo. Hx of compression fracture likely pathological given history of osseous metastasis Osseous metastasis Patient has a history of breast cancer about 18 years ago and underwent chemotherapy with mastectomy Oncology on board Pain medication when needed COPD not in exacerbation Resume home inhalers 
systolic congestive heart failure Medications on hold for low blood pressure. Will watch for any signs of decompensation. Hypertension GERD Continue home PPI Code Status: Full code Surrogate Decision Maker: Daughter DVT Prophylaxis: Lovenox Baseline: From home, independent of ADLs Subjective: Chief Complaint / Reason for Physician Visit \"Patient was seen and examined while in ICU. She is off pressors now. She said she is feeling really much better. Only complaint that she had with blurry vision going. \". Discussed with RN events overnight. Review of Systems: Symptom Y/N Comments  Symptom Y/N Comments Fever/Chills n   Chest Pain n   
Poor Appetite    Edema Cough n   Abdominal Pain n   
Sputum    Joint Pain SOB/TAVAREZ n   Pruritis/Rash Nausea/vomit y  no diarrhea at this point  Tolerating PT/OT Diarrhea    Tolerating Diet Constipation    Other Could NOT obtain due to:   
 
Objective: VITALS:  
Last 24hrs VS reviewed since prior progress note. Most recent are: 
Patient Vitals for the past 24 hrs: 
 Temp Pulse Resp BP SpO2  
02/23/21 0800 98.4 °F (36.9 °C) 85 21 (!) 112/55 98 % 02/23/21 0700 98.4 °F (36.9 °C) 80 15 (!) 102/48 99 % 02/23/21 0648 98.9 °F (37.2 °C) 82 15 (!) 92/50 99 % 02/23/21 0630 98.4 °F (36.9 °C) 82 17 (!) 101/40 99 % 02/23/21 0530  84 17 (!) 105/55 97 % 02/23/21 0500  85 15 (!) 105/49 99 % 02/23/21 0430  90 20 (!) 109/45 98 % 02/23/21 0400 99 °F (37.2 °C) 83 14 (!) 103/48 99 % 02/23/21 0330  90 15 (!) 106/46 93 % 02/23/21 0300  83 18 (!) 98/43 99 % 02/23/21 0230  83 16 (!) 108/42 99 % 02/23/21 0200  83 17 (!) 112/47 98 % 02/23/21 0130  85 13 (!) 113/44 98 % 02/23/21 0100  85 12 (!) 111/50 98 % 02/23/21 0030  85 13 (!) 105/56 98 % 02/23/21 0000  85 14 (!) 113/52 98 % 02/22/21 2330  85 16 (!) 108/49 99 % 02/22/21 2300  83 16 (!) 105/45 99 % 02/22/21 2230  84 16 (!) 116/51 100 % 02/22/21 2200  79 14 (!) 106/50 99 % 02/22/21 2130  79 14 (!) 106/53 99 % 02/22/21 2100  80 13 (!) 105/46 100 % 02/22/21 2030  83 24 (!) 108/45 99 % 02/22/21 2026  80 18 (!) 105/43 99 % 02/22/21 2005 98.7 °F (37.1 °C) 83 18 (!) 131/45 100 % 02/22/21 1930  81 14 (!) 101/48 94 % 02/22/21 1900  82 13 (!) 98/39 100 % 02/22/21 1800  81 19 (!) 105/40 100 % 02/22/21 1700  85 13 (!) 112/51 100 % 02/22/21 1600 98.5 °F (36.9 °C) 76 16 (!) 102/44 100 % 02/22/21 1500  79 17 (!) 106/38 95 % 02/22/21 1456  78 18 (!) 104/41 94 % 02/22/21 1430  79 14 (!) 89/24 100 % 02/22/21 1400  80 13 (!) 93/39 100 % 02/22/21 1300  78 13 (!) 86/40 100 % 02/22/21 1200 98.7 °F (37.1 °C) 78 14 (!) 113/42 100 % 02/22/21 1100  73 16 (!) 101/37 100 % 02/22/21 1000  78 19 (!) 105/47 100 % 02/22/21 0900  74 13 (!) 98/36 94 % Intake/Output Summary (Last 24 hours) at 2/23/2021 2622 Last data filed at 2/23/2021 0800 Gross per 24 hour Intake 3981.32 ml Output 1800 ml Net 2181.32 ml PHYSICAL EXAM: 
General: WD, WN. Alert, cooperative, no acute distress patient looks pale EENT:  EOMI. Anicteric sclerae. MMM Resp:  CTA bilaterally, no wheezing or rales. No accessory muscle use CV:  Regular  rhythm,  No edema GI:  Soft, Non distended, Non tender.  +Bowel sounds Neurologic:  Alert and oriented X 3, normal speech, No lower extremity edema. Reviewed most current lab test results and cultures  YES Reviewed most current radiology test results   YES Review and summation of old records today    NO Reviewed patient's current orders and MAR    YES 
PMH/ reviewed - no change compared to H&P 
________________________________________________________________________ Care Plan discussed with: 
  Comments Patient y Family RN y   
Care Manager Consultant Multidiciplinary team rounds were held today with , nursing, pharmacist and clinical coordinator. Patient's plan of care was discussed; medications were reviewed and discharge planning was addressed. ________________________________________________________________________ Total NON critical care TIME:  35   Minutes Total CRITICAL CARE TIME Spent:   Minutes non procedure based Comments >50% of visit spent in counseling and coordination of care    
________________________________________________________________________ Enma Bates, MD  
 
 Procedures: see electronic medical records for all procedures/Xrays and details which were not copied into this note but were reviewed prior to creation of Plan. LABS: 
I reviewed today's most current labs and imaging studies. Pertinent labs include: 
Recent Labs  
  02/23/21 
0410 02/22/21 
0502 02/21/21 2037 WBC 1.4* 1.8* 2.0* HGB 5.5* 7.2* 7.7* HCT 17.1* 22.3* 24.1*  
PLT 86* 95* 107* Recent Labs  
  02/23/21 
0409 02/22/21 
0502 02/21/21 2037  139 136  
K 4.0 4.2 4.2 * 108 104 CO2 23 22 24 GLU 89 96 118* BUN 17 27* 31* CREA 1.20* 1.94* 2.26* CA 6.2* 5.7* 6.2*  
MG 1.9 1.1*  --   
PHOS  --  1.8*  --   
ALB  --   --  2.2* TBILI  --   --  0.6 ALT  --   --  23 Signed:  Kaylyn Cortez MD

## 2021-02-23 NOTE — PROGRESS NOTES
ABG reviewed. Acute hypercapnic respiratory failure. Start CPAP, IV steroids, change albuterol to DuoNeb treating COPD. Follow chest x-ray. 20 mg IV Lasix given she is fluid overloaded.

## 2021-02-23 NOTE — PROGRESS NOTES
[address] [facility] 
at [place] [address] [address] [facility], [address] 
[phone] Follow-up Note Patient: Smith Herron MRN: [id number]  SSN: [id number] YOB: 1945  Age: 67 y.o. Sex: female Diagnosis: 1. 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 Left mastectomy with LN dissection (negative LN) at [facility] - Dr. Richie Bashir. Treatment: 1. Letrozole, Ribociclib - started 12/2/2020 Subjective:  
  
Smith Herron is a 67 y.o. female with a history of metastatic cancer. Ms. Mackenzie Faulkner received treatment for left sided breast cancer in the 1990's. She received systemic chemotherapy and mastectomy. She was noted to have sclerotic lesions in the bone in April and then in Sep 2020. She is suffering with bone pains in the back. She underwent a laminectomy from T2 - T4. Pathology shows ER +ve mBC. She has been taking Letrozole. She started Lonza Don on 12/2/2020. She was brought in to the ER yesterday with compliant of hypotension and lethargy. She is currently in the ICU on levophed. Mucous membranes are dry. She says she is weak and not sure how she got dehydrated. Review of Systems: 
 
ROS is negative except what is mentioned in the HPI Past Medical History:  
Diagnosis Date  Acute kidney injury superimposed on CKD ([address] 2/22/2021  Adverse reaction to antineoplastic drug 2/22/2021  
 ([id number])- Letrozole initiated  Pancytopenia, diarrhea, myalgias  Arthralgia 8/17/2017  Arthritis  Asthma Mild to Moderate  Breast cancer ([address] 8/17/2017 Onset 1997; Refusing Mammogram 6/16/17  Breast cancer metastasized to liver ([address] 9/1/2020  Breast cancer metastasized to lung ([address] 9/1/2020  Cancer ([address] 1997 Breast left  Cardiomyopathy ([address] 8/17/2017 Onset:1999 Ischemic; 25% - 50% (last EFx 45%) Continue with ACE/Lasix/coreg  Cataract 8/17/2017 Bilateral   
 Cellulitis 8/17/2017 Suspect local reaction to Pneumovax, treat with ice, NSAIDs or Tylenol  Chest pain at rest 8/17/2017  CHF (congestive heart failure) ([address] 8/17/2017 Stable, though weight up a little. To take 1 1/2 Lasix daily if swelling, 1 daily o/w  Chronic maxillary sinusitis 8/17/2017  Chronic pain Right knee  COPD (chronic obstructive pulmonary disease) ([address] 8/17/2017 Continue with inhalers  Diabetes ([address] Pre-diabetic  DJD (degenerative joint disease) 8/17/2017  Elevated BUN 8/17/2017  Encounter for monoclonal antibody treatment for malignancy 2/22/2021  
 ([id number])-  Kourtney Plan  Esophagitis, reflux 8/17/2017  Fatigue 8/17/2017  GERD (gastroesophageal reflux disease)  Heart failure ([address] Cardiomyopathy, HX of MI 1999  Hyperglycemia 8/17/2017  Hyperkalemia 8/17/2017  Hyperlipidemia 8/17/2017  Hypertension 8/17/2017  Hypocalcemia 2/22/2021  Hypomagnesemia 2/21/2021  Hypophosphatemia 2/22/2021  Hypovolemic shock ([address] 2/22/2021  Ill-defined condition Vertigo  Ill-defined condition 2003 HX of Urosepsis complicated by respiratory failure and pneumonnia  Kidney disease, chronic, stage IV (GFR 15-29 ml/min) ([address] 2/22/2021  Long term (current) use of aspirin 2/22/2021 ASA 81 mg Daily  Macrocytic anemia 2/21/2021  Malignant neoplasm metastatic to breast ([address] 12/1/2020  Mild protein-calorie malnutrition ([address] 2/22/2021  Myalgia 8/17/2017  Pancytopenia due to antineoplastic chemotherapy ([address] 2/22/2021  S/P laminectomy 9/29/2020 ([id number])- S/P Segmental posterior cervicothoracic fusion C5 to T5, and arthrodesis C5 to T5 with cancellous bone chips and DBM putty, and partial laminectomy T2  Metastatic disease through T1, T2 and T3, with severe spinal stenosis and impingement on the cord.  S/P left mastectomy 1997  
 ()- Left breast cancer % ND 60% Her 2 -ve, initial diagnosis in the   status post left mastectomy chemo and radiation  S/P total knee arthroplasty, right 2017  
 ([id number])  Symptomatic anemia 2021  Thromboembolus ([address] 1969  
 during 2nd pregnancy [facility] 2017  Vertigo, aural 2017  Vitamin D deficiency 2017 Past Surgical History:  
Procedure Laterality Date [address] JONNY  
 HX GYN Left Breast Mastectomy  HX HEENT   Bilateral Cataract  HX ORTHOPAEDIC Right 2018  
 knee replacement  HX ORTHOPAEDIC Left 2018  
 wrist surgery  HX VASCULAR ACCESS Port a cath on the right Family History Problem Relation Age of Onset  Cancer Mother  Other Father Social History Tobacco Use  Smoking status: Former Smoker Packs/day: 2.00 Years: 25.00 Pack years: 50.00 Quit date:  Years since quittin.1  Smokeless tobacco: Never Used Substance Use Topics  Alcohol use: No  
  
Prior to Admission medications Medication Sig Start Date End Date Taking? Authorizing Provider  
oxyCODONE IR (ROXICODONE) 5 mg immediate release tablet Take 1 Tab by mouth every four (4) hours as needed for Pain for up to 15 days. Max Daily Amount: 30 mg. 2/19/21 3/6/21 Yes Charly Felder MD  
DULoxetine (CYMBALTA) 20 mg capsule TAKE 1 CAPSULE BY MOUTH NIGHTLY 21  Yes Valeri Ferrara MD  
DULoxetine (Cymbalta) 20 mg capsule Take 1 Cap by mouth nightly.  21  Yes Valeri Ferrara MD  
 carvedilol (Coreg CR) 40 mg CR capsule Take 40 mg by mouth daily (with breakfast). Yes Provider, Historical  
furosemide (LASIX) 20 mg tablet Take 60 mg by mouth daily. Yes Provider, Historical  
mometasone (Nasonex) 50 mcg/actuation nasal spray 2 Sprays daily. Yes Provider, Historical  
letrozole (FEMARA) 2.5 mg tablet Take 2.5 mg by mouth daily. Yes Provider, Historical  
calcium carbonate (TUMS) 200 mg calcium (500 mg) chew Take 1 Tab by mouth daily. Yes Provider, Historical  
potassium chloride SR (K-TAB) 20 mEq tablet Take 1 Tab by mouth daily. 12/31/20  Yes Joellen Hill MD  
amiodarone (PACERONE) 100 mg tablet Take 100 mg by mouth daily.    Yes Provider, Historical  
prochlorperazine (COMPAZINE) 10 mg tablet Take 0.5 Tabs by mouth every six (6) hours as needed for Nausea. 12/16/20  Yes Kaykay Quiroga NP  
ribociclib-letrozole (Kisqali Femara Co-Pack) 600 mg/day(200 mg x 3)-2.5 mg tab Take 600 mg by mouth daily. Take kisqali daily x 21 days then off 7 days, letrozole is daily 11/24/20  Yes Joellen Hill MD  
sacubitriL-valsartan [facility]) 24-26 mg tablet Take 1 Tab by mouth two (2) times a day. Yes Provider, Historical  
meclizine (ANTIVERT) 12.5 mg tablet Take  by mouth three (3) times daily as needed for Dizziness. Yes Provider, Historical  
aspirin delayed-release 81 mg tablet Take  by mouth daily. Yes Provider, Historical  
polyethylene glycol (MIRALAX) 17 gram packet Take 1 Packet by mouth daily. 10/6/20  Yes Anastacio Rico MD  
acetaminophen (TYLENOL) 500 mg tablet Take 500 mg by mouth every six (6) hours as needed for Pain.    Yes Provider, Historical  
omeprazole (PRILOSEC) 20 mg capsule Take 1 capsule by mouth once daily 6/9/20  Yes Eulogio Azar NP  
fenofibrate nanocrystallized (TRICOR) 145 mg tablet Take 1 tablet by mouth once daily 5/22/20  Yes He Montero NP  
 Anoro Ellipta 62.5-25 mcg/actuation inhaler Take 1 Puff by inhalation daily. 3/23/20  Yes Other, MD Faizan  
cholecalciferol (VITAMIN D3) 1,000 unit cap Take 1,000 Units by mouth daily. Yes Provider, Lydia  
albuterol (PROVENTIL, VENTOLIN) 90 mcg/Actuation inhaler Take 1-2 Puffs by inhalation every four (4) hours as needed for Wheezing. 5/1/11  Yes TORSTEN Whelan Allergies Allergen Reactions  Morphine Nausea and Vomiting Objective:  
 
Visit Vitals BP (!) 110/52 Pulse 80 Temp 99 °F (37.2 °C) Resp 13 Ht 5' 5\" (1.651 m) Wt 167 lb 8.8 oz (76 kg) SpO2 98% Breastfeeding No  
BMI 27.88 kg/m² Physical Exam: 
 
GENERAL: alert, cooperative, no distress, appears stated age EYE: conjunctivae/corneas clear. LYMPHATIC: Cervical, supraclavicular, and axillary nodes normal.  
LUNG: clear to auscultation bilaterally HEART: regular rate and rhythm ABDOMEN: soft, non-tender. EXTREMITIES: bilateral lower extremity edema L >.R 
SKIN: see wound care note about sacral skin discoloration NEUROLOGIC: AOx3. CT Results (most recent): 
[place] [facility] [place]17[place]20 CT ABD PELV W CONT Narrative EXAM:  CTA CHEST W OR W WO CONT, CT ABD PELV W CONT INDICATION:   RUQ pain, R thoracic back pain, SOB, h/o DVT 
 
COMPARISON: 8/26/2013. CHEST CTA: 
 
TECHNIQUE:  
Precontrast  images were obtained to localize the volume for acquisition. Multislice helical CT arteriography was performed from the diaphragm to the 
thoracic inlet during uneventful rapid bolus of 100 cc Isovue-370. Lung and soft 
tissue windows were generated. Coronal and sagittal images were generated and 3D post processing consisting of coronal maximum intensity images was performed. CT dose reduction was achieved through use of a standardized protocol tailored 
for this examination and automatic exposure control for dose modulation.    
 
FINDINGS: 
CHEST: 
 THYROID: No nodule. MEDIASTINUM: No mass or lymphadenopathy. DOROTHY: No mass or lymphadenopathy. THORACIC AORTA: No dissection or aneurysm. MAIN PULMONARY ARTERY: There is no evidence of pulmonary embolism. TRACHEA/BRONCHI: Patent. ESOPHAGUS: No wall thickening or dilatation. HEART: Normal in size. PLEURA: No effusion or pneumothorax. LUNGS: No nodule, mass, or airspace disease. BONES: Degenerative changes are seen in the thoracic spine. Tiny sclerotic foci 
are scattered throughout the thoracic vertebral bodies. Impression IMPRESSION: No acute process or evidence of pulmonary embolism. Tiny sclerotic 
foci are scattered throughout the vertebral bodies. ABDOMEN/PELVIC CT: 
 
TECHNIQUE:  
Following the uneventful intravenous administration of 100 cc Isovue-370, thin 
axial images were obtained through the abdomen and pelvis. Coronal and sagittal 
reconstructions were generated. Oral contrast was not administered. CT dose 
reduction was achieved through use of a standardized protocol tailored for this 
examination and automatic exposure control for dose modulation. FINDINGS:  
LIVER: No mass or biliary dilatation. GALLBLADDER: Unremarkable. SPLEEN: No mass. PANCREAS: No mass or ductal dilatation. ADRENALS: Unremarkable. KIDNEYS: Left renal cysts, the largest of which measures 2.8 cm. Scarring left 
kidney. STOMACH: Unremarkable. SMALL BOWEL: No dilatation or wall thickening. COLON: Sigmoid diverticulosis. APPENDIX: Unremarkable. PERITONEUM: No ascites or pneumoperitoneum. RETROPERITONEUM: No lymphadenopathy or aortic aneurysm. REPRODUCTIVE ORGANS: The uterus is surgically absent. URINARY BLADDER: No mass or calculus. BONES: Sclerotic lesions are noted in the iliac wings bilaterally, sacrum, and 
lumbar spine. ADDITIONAL COMMENTS: N/A IMPRESSION: 
No acute abnormality. Sclerotic foci are noted concerning for osseous metastatic 
disease. Correlation with bone scan is recommended. Lab Results Component Value Date/Time WBC 1.4 (L) 02/23/2021 04:10 AM  
 HGB (POC) 12.7 09/13/2017 11:20 AM  
 HGB 5.5 (LL) 02/23/2021 04:10 AM  
 Hematocrit (POC) 36 01/13/2021 03:55 PM  
 HCT 17.1 (LL) 02/23/2021 04:10 AM  
 PLATELET 86 (L) 91/01/1055 04:10 AM  
 .9 (H) 02/23/2021 04:10 AM  
 
 
 
Lab Results Component Value Date/Time Sodium 141 02/23/2021 04:09 AM  
 Potassium 4.0 02/23/2021 04:09 AM  
 Chloride 109 (H) 02/23/2021 04:09 AM  
 CO2 23 02/23/2021 04:09 AM  
 Anion gap 9 02/23/2021 04:09 AM  
 Glucose 89 02/23/2021 04:09 AM  
 BUN 17 02/23/2021 04:09 AM  
 Creatinine 1.20 (H) 02/23/2021 04:09 AM  
 BUN/Creatinine ratio 14 02/23/2021 04:09 AM  
 GFR est AA 53 (L) 02/23/2021 04:09 AM  
 GFR est non-AA 44 (L) 02/23/2021 04:09 AM  
 Calcium 6.2 (LL) 02/23/2021 04:09 AM  
 Bilirubin, total 0.6 02/21/2021 08:37 PM  
 Alk. phosphatase 43 (L) 02/21/2021 08:37 PM  
 Protein, total 5.4 (L) 02/21/2021 08:37 PM  
 Albumin 2.2 (L) 02/21/2021 08:37 PM  
 Globulin 3.2 02/21/2021 08:37 PM  
 A-G Ratio 0.7 (L) 02/21/2021 08:37 PM  
 ALT (SGPT) 23 02/21/2021 08:37 PM  
 AST (SGOT) 62 (H) 02/21/2021 08:37 PM  
 
 
 
Assessment: 1. Hypotension/ hypovolemic - in ICU - symptoms improving Hypovolemic Currently off of Levophed Fluids Managed by ICU intensivist 
 
 
2. 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 Left mastectomy with LN dissection ( negative LN) at [facility] - Dr. Van River. Had chemotherapy, but does not remember the oncologist. States she never took a pill following chemotherapy or surgery. Letrozole/Ribociclib - started 12/2/2020 Hold while inpatient 3. Cancer related bone pains Managed by palliative Liliana Allison - received 1/27/2021 4. Pancytopenia Macrocytic anemia Combination of acute/ chronic illness Transfuse to keep Hgb > 7 g/dL Vitamin B12 - normal 
 
folate deficiency -  Start folic acid 1 mg daily Iron sats 22 % Thrombocytopenia - observation Neutropenia - ANC 1.3 - observation - Peri Hilton 4. Hypocalcemia Had denosumab 1/27/2021 Receiving calcium gluconate inpatient 5. Diarrhea Per patient started 4 days prior to admission Better today Plan:  
 
> Hold Letrozole and Kisqali 
> Transfuse to keep Hgb > 7 g/dL 
> supportive care 
> Palliative consult for symptom management 
> Start folic acid 1 mg daily 
> Feel free to contact our office with any questions Signed by: Suzan Jones NP February 23, 2021

## 2021-02-23 NOTE — PROGRESS NOTES
1930- Bedside and verbal report received from Bharti Mann 112 stopped. 0298- Critical hgb 5.5 and calcium of 6.2 received from lab. Awaiting mag result. Marko Flores NP updated with critical lab, order received for 2g calcium and will be placing orders for blood. 7374- 1 unit PRBCs started. End of Shift Note Bedside shift change report given to [address] (oncoming nurse) by Hilda Pickett (offgoing nurse). Report included the following information SBAR, Kardex, Intake/Output, MAR, Recent Results and Cardiac Rhythm SR Shift worked:  7p-7a Shift summary and any significant changes:  
  1 unit PRBC's given for hgb 5.5 Concerns for physician to address:  none at this time Zone phone for oncoming shift:   [id number] Activity: 
Activity Level: Bed Rest 
Number times ambulated in hallways past shift: 0 Number of times OOB to chair past shift: 0 Cardiac:  
Cardiac Monitoring: Yes     
Cardiac Rhythm: Normal sinus rhythm Access:  
Current line(s): PIV and port Genitourinary:  
Urinary status: incontinent and external catheter Respiratory:  
O2 Device: Nasal cannula Chronic home O2 use?: YES Incentive spirometer at bedside: NO 
  
GI: 
Last Bowel Movement Date: 02/21/21 Current diet:  DIET REGULAR Passing flatus: YES Tolerating current diet: YES 
% Diet Eaten: 5 % Pain Management:  
Patient states pain is manageable on current regimen: YES Skin: 
Shan Score: 14 Interventions: float heels, increase time out of bed, internal/external urinary devices and nutritional support Patient Safety: 
Fall Score: Total Score: 3 Interventions: bed/chair alarm, gripper socks and pt to call before getting OOB High Fall Risk: Yes Length of Stay: 
Expected LOS: 4d 19h Actual LOS: 2 Hilda Pickett

## 2021-02-23 NOTE — PROGRESS NOTES
0730 - Bedside verbal report received from off going shift. 0800 - Assessment complete, see summary for details. Resting quietly with eyes closed, easily awakens to voice. Oriented x4, follows all commands. C/O chronic abdominal/generalilzed pain 3-4:10, requesting \"pain medication\" to \"stay on top of the pain. \"  Re-positions self with assistance, encouraged turning off sacral pressure points. Refusing heel elevator and SCD's stating that both hurt her lower legs too much. Call bell within reach. Will continue to closely monitor. 8068 - Given Roxicodone 5 mg PO for C/O pain. 1200 - Assessment complete, see summary for details. No changes noted from previous assessment. 1255 - Transfusion PRBC initiated, see flow sheet for details. 1314 - Given Roxicodone 5 mg PO for C/O pain. 1535 - TRANSFER - OUT REPORT: 
 
Verbal report given to Page Garcia RN(name) on [facility]  being transferred to Step Down(unit) for routine progression of care Report consisted of patients Situation, Background, Assessment and  
Recommendations(SBAR). Information from the following report(s) SBAR, Kardex, MAR, Cardiac Rhythm SR and Quality Measures was reviewed with the receiving nurse. Lines:  
Venous Access Device Mark cath 02/21/21 Upper chest (subclavicular area, right (Active) Central Line Being Utilized Yes 02/23/21 1200 Criteria for Appropriate Use Hemodynamically unstable, requiring monitoring lines, vasopressors, or volume resuscitation 02/23/21 1200 Site Assessment Clean, dry, & intact 02/23/21 1200 Date of Last Dressing Change 02/21/21 02/23/21 1200 Dressing Status Clean, dry, & intact 02/23/21 1200 Dressing Type Disk with Chlorhexadine gluconate (CHG); Transparent 02/23/21 1200 Action Taken Open ports on tubing capped 02/22/21 2005 Positive Blood Return (Medial Site) Yes 02/23/21 0800 Peripheral IV 02/21/21 Right Antecubital (Active) Site Assessment Clean, dry, & intact 02/23/21 1200  
Phlebitis Assessment 0 02/23/21 1200  
Infiltration Assessment 0 02/23/21 1200  
Dressing Status Clean, dry, & intact 02/23/21 1200  
Dressing Type Transparent 02/23/21 1200  
Hub Color/Line Status Pink;Infusing 02/23/21 1200  
Action Taken Open ports on tubing capped 02/22/21 2005  
Alcohol Cap Used Yes 02/23/21 1200  
  
 
Opportunity for questions and clarification was provided.   
 
Patient transported with: 
 Monitor 
O2 @ 2 liters 
Patient-specific medications from Pharmacy 
Registered Nurse 
Tech

## 2021-02-23 NOTE — PROGRESS NOTES
Nutrition Note Chart reviewed, case discussed during CCU rounds. Pt sleeping soundly at time of visit. Left menu at bedside. Called her earlier and discussed placing meal orders according to likes and dislikes (she mainly wants foods like mashed potatoes, mac n cheese, etc) she is on a regular diet so can absolutely have these, she just needs to call and order. Despite receiving preferred foods for lunch RN reports she ate very little, will add PO supplements for her to try. Electronically signed by Shama Rawls RD, [address]  on 2/23/2021 at 1:46 PM 
 
Contact: [phone]

## 2021-02-23 NOTE — PROGRESS NOTES
PT note:  
 
Orders received and acknowledged. Chart reviewed and noted patient's Hgb is 5.5. She did receive a unit of PRBC starting at 12;55. Will follow up tomorrow for PT evaluation.   
 
Sarah Villarreal, PT, DPT

## 2021-02-23 NOTE — PROGRESS NOTES
RAPID RESPONSE TEAM 
 
Responded to overhead RRT to 2290 Upon arrival, pt hypoxic mid 80s, tachycardic 120s, grey in color. Pt recent move out of CCU, admitted for likely hypovolemic shock. Hx CHF, COPD. Pt had just finished 1U PRBC. Pt put on NRB mask 15L with improvement to mid 90s%. Crackles noted to pt BLL, mild wheezes bilaterally. ABG and CXR ordered per protocol. Spoke with Dr Maryam Yoo via phone and received orders for 20mg IV Lasix, duo nebs, solu medrol and CPAP 14/80%. Pt to remain in room at this time. Patient Vitals for the past 4 hrs: 
 Temp Pulse Resp BP SpO2  
02/23/21 1721     99 % 02/23/21 1715  (!) 120 25 128/88 (!) 87 % 02/23/21 1706 98.1 °F (36.7 °C) (!) 118 23 124/66 (!) 89 % 02/23/21 1659  (!) 105 24 (!) 151/84 (!) 89 % 02/23/21 1657  99 18 (!) 141/91 90 % 02/23/21 1651 98.1 °F (36.7 °C) 90 14 (!) 151/84 93 % 02/23/21 1635  91 17 127/72 95 % 02/23/21 1500 99 °F (37.2 °C) 80 13 (!) 110/52 98 % 02/23/21 1400 98.9 °F (37.2 °C) 83 15 (!) 101/35 95 % ABG: 
 
PH             7.149 PCO2       65.4 PO2          107 HCO3       22.7 
sO2           96 Please call with any questions or concerns. Luis Laurent Rapid Response JEREMY Lara

## 2021-02-23 NOTE — PROGRESS NOTES
TRANSFER - IN REPORT: 
 
Verbal report received from [employer] (name) on [place]  being received from CCU (unit) for routine progression of care Report consisted of patients Situation, Background, Assessment and  
Recommendations(SBAR). Information from the following report(s) SBAR was reviewed with the receiving nurse. Opportunity for questions and clarification was provided. Assessment completed upon patients arrival to unit and care assumed.

## 2021-02-23 NOTE — PROGRESS NOTES
[facility] 
 
ICU Intensivist- Critical Care Progress Note Name: Jerica Mcdermott : 1945 MRN: [id number] Admit: 2021  7:55 PM   
 
Diagnosis:  
 
Principal Problem: 
  Symptomatic anemia Adverse reaction to antineoplastic drug 
  
Problem List: Hypocalcemia Bone metastases Hypomagnesemia Macrocytic anemia Hypophosphatemia Hypovolemic shock History of breast cancer S/P thoracic laminectomy Breast cancer metastasized to liver Breast cancer metastasized to lung Thoracic pathological vertebral fracture Malignant neoplasm metastatic to breast 
  Severe sepsis with acute organ dysfunction Acute kidney injury superimposed on CKD (IV) Pancytopenia due to antineoplastic chemotherapy Pain due to malignant neoplasm metastatic to bone Breast cancer metastasized to bone (Thoracic spine) Encounter for monoclonal antibody treatment for malignancy Kidney disease, chronic, stage IV (GFR 15-29 ml/min) COPD (chronic obstructive pulmonary disease) Long term (current) use of aspirin DJD (degenerative joint disease) S/P total knee arthroplasty, right Mild protein-calorie malnutrition Drug-induced constipation OA (osteoarthritis) of knee Chronic maxillary sinusitis Weakness of both legs S/P left mastectomy Vitamin D deficiency Knee pain, bilateral 
  Esophagitis, reflux Chest pain at rest 
  Neuropathic pain Hyperglycemia Hyperlipidemia Severe obesity Breast cancer Hyperkalemia Hypertension Vertigo, aural 
  Cataract Fatigue Myalgia 
  
ICU Comprehensive Plan of Care:  
  
Plans for this Shift:  
1. Hypovolemic shock due to symptomatic anemia and recent diarrhea volume losses- substantially resolved after intravascular volume expansion (3L- crystalloids and 2L-colloids). Vasopressors discontinued, 2-unit pRBC trasnfused. Enteral rehydration still will continue to be encouraged. May transfer to floor today, discussed with Hospitalist. 
  
2. Adverse effect to antineoplastic chemotherapy agent (Letrozole)- pancytopenia, diarrhea, fatigue, myalgias, headaches, diarrhea. 
  
3. AMAN superimposed upon CKD (Stage IV), with dehydration- substantially improved with aggressive expansion of intravascular volume (3L- crystalloid, 2L- Albumin, 2u- pRBC). Enteral rehydration still to be encouraged encouraged. 
  
4. Hypophosphatemia/Hypocalcemia/Hypomagnesemia- electrolytes being actively repleted. 5. Metastatic breast carcinoma to bone (thoracic spine), metachronous lesion right breast- as per Oncology consultation. 
  
 
 
Subjective:  
 
Progress Note:  
2/23/2021  
7:54 AM  
 
Reason for ICU Admission: Hypovolemic shock Overnight Events:  
Symptomatic anemia, transfused Past Medical History:  
 
 has a past medical history of Acute kidney injury superimposed on CKD ([address] (2/22/2021), Adverse reaction to antineoplastic drug (2/22/2021), Arthralgia (8/17/2017), Arthritis, Asthma, Breast cancer ([address] (8/17/2017), Breast cancer metastasized to liver ([address] (9/1/2020), Breast cancer metastasized to lung ([address] (9/1/2020), Cancer ([address] (1997), Cardiomyopathy ([address] (8/17/2017), Cataract (8/17/2017), Cellulitis (8/17/2017), Chest pain at rest (8/17/2017), CHF (congestive heart failure) ([address] (8/17/2017), Chronic maxillary sinusitis (8/17/2017), Chronic pain, COPD (chronic obstructive pulmonary disease) ([address] (8/17/2017), Diabetes ([address] DJD (degenerative joint disease) (8/17/2017), Elevated BUN (8/17/2017), Encounter for monoclonal antibody treatment for malignancy (2/22/2021), Esophagitis, reflux (8/17/2017), Fatigue (8/17/2017), GERD (gastroesophageal reflux disease), Heart failure ([address] Hyperglycemia (8/17/2017), Hyperkalemia (8/17/2017), Hyperlipidemia (8/17/2017), Hypertension (8/17/2017), Hypocalcemia (2/22/2021), Hypophosphatemia (2/22/2021), Hypovolemic shock ([facility] (2/22/2021), Ill-defined condition, Ill-defined condition (2003), Kidney disease, chronic, stage IV (GFR 15-29 ml/min) ([facility] (2/22/2021), Long term (current) use of aspirin (2/22/2021), Malignant neoplasm metastatic to breast ([facility] (12/1/2020), Mild protein-calorie malnutrition ([facility] (2/22/2021), Myalgia (8/17/2017), Pancytopenia due to antineoplastic chemotherapy ([facility] (2/22/2021), S/P laminectomy (9/29/2020), S/P left mastectomy (1/1/1997), S/P total knee arthroplasty, right (6/22/2017), Thromboembolus ([facility] (1969), Thrush (8/17/2017), Vertigo, aural (8/17/2017), and Vitamin D deficiency (8/17/2017). Past Surgical History:  
 
 has a past surgical history that includes hx gyn (1988); hx gyn; hx vascular access; hx orthopaedic (Right, 06/2018); hx orthopaedic (Left, 11/2018); and hx heent (2015). Current Medications:  
 
Current Facility-Administered Medications Medication Dose Route Frequency  0.9% sodium chloride infusion 250 mL  250 mL IntraVENous PRN  potassium phosphate 30 mmol in 0.9% sodium chloride 250 mL infusion   IntraVENous ONCE  
 albumin human 5% (BUMINATE) solution 50 g  50 g IntraVENous ONCE  
 calcium gluconate 2 g in 0.9% sodium chloride 100 mL IVPB  2 g IntraVENous ONCE  
 alcohol 62% (NOZIN) nasal  1 Ampule  1 Ampule Topical Q12H  
 zinc oxide-cod liver oil (DESITIN) 40 % paste   Topical PRN  
 oxyCODONE IR (ROXICODONE) tablet 5 mg  5 mg Oral Q4H PRN  
 glycopyrrolate-formoterol (BEVESPI AEROSPHERE) 9 mcg-4.8 mcg inhaler  2 Puff Inhalation BID  DULoxetine (CYMBALTA) capsule 20 mg  20 mg Oral QHS  fluticasone propionate (FLONASE) 50 mcg/actuation nasal spray 2 Spray  2 Spray Both Nostrils DAILY  albuterol (PROVENTIL VENTOLIN) nebulizer solution 2.5 mg  2.5 mg Inhalation Q4H PRN  
 lactated Ringers infusion  75 mL/hr IntraVENous CONTINUOUS  
 zinc oxide-cod liver oil (DESITIN) 40 % paste   Topical BID and QHS  sodium chloride (NS) flush 5-40 mL  5-40 mL IntraVENous Q8H  
 sodium chloride (NS) flush 5-40 mL  5-40 mL IntraVENous PRN  
 acetaminophen (TYLENOL) tablet 650 mg  650 mg Oral Q6H PRN Or  
 acetaminophen (TYLENOL) suppository 650 mg  650 mg Rectal Q6H PRN  polyethylene glycol (MIRALAX) packet 17 g  17 g Oral DAILY PRN  promethazine (PHENERGAN) tablet 12.5 mg  12.5 mg Oral Q6H PRN Or  
 ondansetron (ZOFRAN) injection 4 mg  4 mg IntraVENous Q6H PRN  potassium chloride 20 mEq in 50 ml IVPB  20 mEq IntraVENous PRN  
 NOREPINephrine (LEVOPHED) 8 mg in 5% dextrose 250mL (32 mcg/mL) infusion  0.5-30 mcg/min IntraVENous TITRATE  heparin (porcine) injection 5,000 Units  5,000 Units SubCUTAneous Q8H  
 cefepime (MAXIPIME) 2 g in 0.9% sodium chloride (MBP/ADV) 100 mL MBP  2 g IntraVENous Q24H Allergies/Social/Family History: Allergies Allergen Reactions  Morphine Nausea and Vomiting Social History Tobacco Use  Smoking status: Former Smoker Packs/day: 2.00 Years: 25.00 Pack years: 50.00 Quit date:  Years since quittin.1  Smokeless tobacco: Never Used Substance Use Topics  Alcohol use: No  
  
Family History Problem Relation Age of Onset  Cancer Mother  Other Father Review of Systems: A comprehensive review of systems was negative. Objective:  
Vital Signs: 
Visit Vitals BP (!) 112/55 Pulse 85 Temp 98.4 °F (36.9 °C) Resp 21 Ht 5' 5\" (1.651 m) Wt 76 kg (167 lb 8.8 oz) SpO2 98% Breastfeeding No  
BMI 27.88 kg/m² O2 Flow Rate (L/min): 2 l/min O2 Device: Nasal cannula Temp (24hrs), Av.6 °F (37 °C), Min:98.4 °F (36.9 °C), Max:99 °F (37.2 °C) Intake/Output:  
 
Intake/Output Summary (Last 24 hours) at 2021 4473 Last data filed at 2021 0800 Gross per 24 hour Intake 3981.32 ml Output 1800 ml Net 2181.32 ml Physical Exam: 
General:   Alert, cooperative, no distress, appears stated age. Eyes:   Conjunctivae pale/corneas clear. PERRL, EOMs intact. Ears:   Normal external ear canals bilaterally. Nose:   No drainage or sinus tenderness. Mouth/Throat:  Dry, sticky, mucous membranes. Dentition normal.   
Neck:  Symmetrical, trachea midline, no JVD or carotid bruit. Back:    No CVA tenderness to percussion. Lungs:    Clear to auscultation bilaterally. Heart:   Regular rate and rhythm. S1, S2 normal, without murmur, click, rub or gallop. Abdomen:    Normoactive bowel sounds. Soft, non-tender, no masses or organomegaly. Extremities:  Atraumatic, no cyanosis or edema. Vascular:  Pulses 1+ and symmetric UE/LE bilat Skin:  Normal color, non-diaphoretic, stephanie capillary refill (Less than 2 seconds). No rashes or lesions. Lymph nodes:  No Lymphadenopathy. Neurologic:  CN II-XII intact. Normal strength. LABS AND  DATA: Personally reviewed Recent Labs  
  02/23/21 
0410 02/22/21 
0502 WBC 1.4* 1.8* HGB 5.5* 7.2* HCT 17.1* 22.3*  
PLT 86* 95* Recent Labs  
  02/23/21 
0409 02/22/21 
0502  139  
K 4.0 4.2 * 108 CO2 23 22 BUN 17 27* CREA 1.20* 1.94* GLU 89 96  
CA 6.2* 5.7* MG 1.9 1.1*  
PHOS  --  1.8* Recent Labs  
  02/21/21 2037 AP 43* TP 5.4* ALB 2.2*  
GLOB 3.2 No results for input(s): INR, PTP, APTT, INREXT in the last 72 hours. No results for input(s): PHI, PCO2I, PO2I, FIO2I in the last 72 hours. Recent Labs  
  02/21/21 2037 TROIQ <0.05 Ventilator Settings: 
Mode Rate Tidal Volume Pressure FiO2 PEEP Peak airway pressure:     
Minute ventilation:     
 
 
MEDS: Reviewed RADIOLOGY: 
No results found. Assessment:  
 
Hospital Problems  Date Reviewed: 2/22/2021 Codes Class Noted POA Breast cancer metastasized to liver [facility]) (Chronic) ICD-10-CM: C50.919, C78.7 ICD-9-CM: 174.9, 197.7 End Stage 9/1/2020 Yes Breast cancer metastasized to lung [facility]) ICD-10-CM: C50.919, C78.00 ICD-9-CM: 174.9, 197.0 End Stage 9/1/2020 Yes Pancytopenia due to antineoplastic chemotherapy [facility]) ICD-10-CM: D61.810, T45.1X5A 
ICD-9-CM: 284.11, E933.1 Acute 2/22/2021 Yes Overview Addendum 2/22/2021  8:14 AM by Christi Cruz MD  
  ([id number])- Letrozole initiated Kidney disease, chronic, stage IV (GFR 15-29 ml/min) (HCC) ICD-10-CM: N18.4 ICD-9-CM: 585.4 End Stage 2/22/2021 Yes Acute kidney injury superimposed on CKD (HCC) (Chronic) ICD-10-CM: N17.9, N18.9 ICD-9-CM: 866.00, 585.9 Acute 2/22/2021 Yes Long term (current) use of aspirin (Chronic) ICD-10-CM: D65.89 ICD-9-CM: V58.66 Chronic 2/22/2021 Yes Overview Signed 2/22/2021  7:20 AM by Christi Cruz MD  
  ASA 81 mg Daily * (Principal) Hypovolemic shock ([address] ICD-10-CM: R57.1 ICD-9-CM: 785.59 Acute 2/22/2021 Yes Hypocalcemia ICD-10-CM: E83.51 
ICD-9-CM: 275.41 Acute 2/22/2021 Yes Mild protein-calorie malnutrition (HCC) (Chronic) ICD-10-CM: E44.1 ICD-9-CM: 263.1 Health Concern 2/22/2021 Yes Hypophosphatemia ICD-10-CM: E83.39 
ICD-9-CM: 275.3 Acute 2/22/2021 No  
   
 Encounter for monoclonal antibody treatment for malignancy ICD-10-CM: Z51.12 
ICD-9-CM: V58.12, 199.1 Chronic 12/16/2020 Yes Overview Signed 2/22/2021  7:53 AM by Christi Cruz MD  
  ([id number])- Juliet Bedolla Malignant neoplasm metastatic to breast [facility]) ICD-10-CM: J68.56 ICD-9-CM: 198.81  12/1/2020 Yes Overview Signed 2/22/2021  7:48 AM by Christi Cruz MD  
  (Dec/2020)- 
Metachronous breast carcinoma, Right breast 
  
  
   
 S/P left mastectomy (Chronic) ICD-10-CM: V57.44 ICD-9-CM: V45.71 End Stage 1/1/1997 Yes Overview Signed 2/22/2021  7:42 AM by Christi Cruz MD  
  ([id number])- Left breast cancer % IN 60% Her 2 -ve, initial diagnosis in the 1990s  
status post left mastectomy chemo and radiation S/P laminectomy ICD-10-CM: U77.356 ICD-9-CM: V45.89 End Stage 9/29/2020 Yes Overview Signed 2/22/2021  8:02 AM by Brenda Porter MD  
  ([id number])- 
S/P Segmental posterior cervicothoracic fusion C5 to T5, and arthrodesis C5 to T5 with cancellous bone chips and DBM putty, and partial laminectomy T2 Metastatic disease through T1, T2 and T3, with severe spinal stenosis and impingement on the cord. S/P total knee arthroplasty, right ICD-10-CM: Q83.296 ICD-9-CM: V43.65 End Stage 6/22/2017 Yes Overview Addendum 2/22/2021  5:42 PM by Brenda Porter MD  
  ([id number]) Adverse reaction to antineoplastic drug ICD-10-CM: T45.1X5A 
ICD-9-CM: E933.1  2/22/2021 Yes Overview Signed 2/22/2021  4:43 PM by Brenda Porter MD  
  ([id number])- Letrozole initiated Pancytopenia, diarrhea, myalgias Severe sepsis with acute organ dysfunction (HCC) ICD-10-CM: A41.9, R65.20 ICD-9-CM: 038.9, 995.92  2/21/2021 Yes Multidisciplinary Rounds Completed: Yes ABCDEF Bundle/Checklist 
Pain Medications: Oxycodone Target RASS: 0 - Alert & Calm - Spontaneously pays attention to caregiver Sedation Medications: None CAM-ICU:  Negative Discussed Plan of Care (goals of care): Yes Addressed Code Status: Full Code CARDIOVASCULAR Cardiac Gtts: None SBP Goal of: > 90 mmHg MAP Goal of: > 65 mmHg Transfusion Trigger (Hgb): <7 g/dL RESPIRATORY Vent Goals:  
Head of bed > 30 degrees Aggressive bronchopulmonary hygiene DVT Prophylaxis (if no, list reason): SCD's or Sequential Compression Device SPO2 Goal: > 92% Pulmonary toilet: Incentive Spirometry GI/ [place] Catheter Present: Yes 
 
T/L/D Tubes: None Lines: Peripheral IV Drains: [place]ura Catheter SPECIAL EQUIPMENT None DISPOSITION Transfer to non-ICU bed CRITICAL CARE CONSULTANT NOTE I provided a face-to-face bedside physician/patient encounter, greater than the usual and customary amount normally needed, due to the moderate complexity of medical decision-making required. I reviewed and interpreted patient data including clinical events, labs, images, vital signs, I/O's, and examined patient. I have actively participated in multi-disciplinary discussions (Hospitalsit, Blood Bank, Oncology, Case Management, CCU Nursing Staff) regarding the case in formulating an optimal therapeutic plan, and effecting a management strategy for this patient. NOTE OF PERSONAL INVOLVEMENT IN CARE This patient has a moderate probability of imminent, clinically significant deterioration, which requires the highest level of preparedness to intervene urgently. I participated in the decision-making and personally managed, or directed the management of, a myriad of life and organ supporting interventions which required my frequent-interval clinical reassessments, in order to treat or prevent imminent deterioration. I personally spent 35 minutes of critical care time. This is time spent at this critically ill patient's bedside actively involved in patient care as well as the coordination of care and discussions with the patient's family. This does not include any procedural time which has been billed separately. Curt Duke MD, FACS Staff VICTOR M/ [address] 2/23/2021

## 2021-02-23 NOTE — INTERDISCIPLINARY ROUNDS
Interdisciplinary team rounds were held 2/23/2021 with the following team members:Care Management, Diabetes Treatment Specialist, Nursing, Nutrition, Pharmacy, Physical Therapy, Physician and Respiratory Therapy. Plan of care discussed. See clinical pathway and/or care plan for interventions and desired outcomes.

## 2021-02-24 NOTE — PROGRESS NOTES
End of Shift Note Bedside shift change report given to Daksha Fischer (oncoming nurse) by Yamilet Proctor RN (offgoing nurse). Report included the following information SBAR and Kardex Shift worked:  [id number] Shift summary and any significant changes:  
  none. Concerns for physician to address:  none.   
  
Zone phone for oncoming shift:    
  
 
 
 
Yamilet Proctor, RN

## 2021-02-24 NOTE — PROGRESS NOTES
End of Shift Note Bedside shift change report given to Cubresa (oncoming nurse) by Kamar Rowe (offgoing nurse). Report included the following information SBAR Shift worked:  [id number] Shift summary and any significant changes:  
 Pt transferred from CCU to unit. Originally on 2L NC. Had SOB RRT called, went on venti and then NRB, O2 sat mid 90s. ABGs drawn, CX-ray, lasix, steroid and nebulizers ordered. Pt put on CPAP. Called MD to verify to give pain medication. Patient threw up several minutes after taking medication. Gave zofran. She is currently 96% on NRB. Waiting for respiratory to change out CPAP mask. Folic acid was not given d/t nausea. Pt received steroid IV. Pt was receiving fluids MD ordered to d/c d/t possible fluid overload. Concerns for physician to address:   
  
Zone phone for oncoming shift:   [phone] Activity: 
Activity Level: Bed Rest 
Number times ambulated in hallways past shift: 0 Number of times OOB to chair past shift: 0 Cardiac:  
Cardiac Monitoring: Yes     
Cardiac Rhythm: Sinus tachycardia Access:  
Current line(s): PIV and port Genitourinary:  
Urinary status: external catheter Respiratory:  
O2 Device: CPAP mask Chronic home O2 use?: YES Incentive spirometer at bedside: NO 
  
GI: 
Last Bowel Movement Date: 02/21/21 Current diet:  DIET REGULAR 
DIET NUTRITIONAL SUPPLEMENTS Lunch, Dinner; Naima-Juan F DIET NUTRITIONAL SUPPLEMENTS Breakfast, Lunch; Ensure Clear Passing flatus: YES Tolerating current diet: YES 
% Diet Eaten: 25 % Pain Management:  
Patient states pain is manageable on current regimen: YES Skin: 
Shan Score: 14 Interventions: float heels, PT/OT consult, internal/external urinary devices and nutritional support Patient Safety: 
Fall Score: Total Score: 3 Interventions: bed/chair alarm and gripper socks High Fall Risk: Yes Length of Stay: 
Expected LOS: 4d 7h Actual LOS: 2 Kamar Rowe

## 2021-02-24 NOTE — PROGRESS NOTES
RAPID RESPONSE TEAM - follow up Rounded on patient due to recent RRT. Discussed with primary RN. No acute concerns, VSS, MEWS 2. Pt BP remains borderline, MD aware. May require pressors if unable to maintain. Visit Vitals BP (!) 84/53 (BP 1 Location: Right upper arm, BP Patient Position: At rest) Pulse 82 Temp 97.9 °F (36.6 °C) Resp 20 Ht 5' 5\" (1.651 m) Wt 83 kg (182 lb 15.7 oz) SpO2 96% Breastfeeding No  
BMI 30.45 kg/m² No RRT interventions indicated at this time. Please call with any questions or concerns. Ermelinda Marrero Rapid Response RN [place]

## 2021-02-24 NOTE — PROGRESS NOTES
02/23/21 1706 Vital Signs Temp 98.1 °F (36.7 °C) Temp Source Oral  
Pulse (Heart Rate) (!) 118 Heart Rate Source Monitor Resp Rate 23  
O2 Sat (%) (!) 89 % Level of Consciousness Alert /66 MAP (Monitor) 81 MEWS Score 4 Oxygen Therapy Pulse via Oximetry 119 beats per minute RRT called. MD notified.

## 2021-02-24 NOTE — PROGRESS NOTES
Problem: Falls - Risk of 
Goal: *Absence of Falls Description: Document Juan Carlos Santiago Fall Risk and appropriate interventions in the flowsheet. Outcome: Progressing Towards Goal 
Note: Fall Risk Interventions: 
Mobility Interventions: Assess mobility with egress test, Bed/chair exit alarm Medication Interventions: Bed/chair exit alarm, Patient to call before getting OOB, Teach patient to arise slowly Elimination Interventions: Bed/chair exit alarm, Call light in reach History of Falls Interventions: Bed/chair exit alarm Problem: Risk for Spread of Infection Goal: Prevent transmission of infectious organism to others Description: Prevent the transmission of infectious organisms to other patients, staff members, and visitors. Outcome: Progressing Towards Goal 
  
Problem: Pressure Injury - Risk of 
Goal: *Prevention of pressure injury Description: Document Shan Scale and appropriate interventions in the flowsheet. Outcome: Progressing Towards Goal 
Note: Pressure Injury Interventions: 
Sensory Interventions: Assess changes in LOC Moisture Interventions: Absorbent underpads, Apply protective barrier, creams and emollients Activity Interventions: Assess need for specialty bed Mobility Interventions: Assess need for specialty bed Nutrition Interventions: Document food/fluid/supplement intake, Offer support with meals,snacks and hydration Friction and Shear Interventions: Apply protective barrier, creams and emollients Problem: Breathing Pattern - Ineffective Goal: *Absence of hypoxia Outcome: Progressing Towards Goal 
Goal: *Use of effective breathing techniques Outcome: Progressing Towards Goal 
Goal: *PALLIATIVE CARE:  Alleviation of Dyspnea Outcome: Progressing Towards Goal

## 2021-02-24 NOTE — PROGRESS NOTES
Hospitalist Progress Note NAME: Muriel Hitchcock :  1945 MRN:  [id number] Assessment / Plan: 
Shock, likely hypovolemic Diarrhea, poor PO intake prior to admission Monitor BP Remains on off pressors Mentation intact despite borderline BPs Downgraded diet to Full Liquid as per pt request  
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 
GERD Chronic systolic HF 
COPD, not in exacerbation Continue home meds as above Currently BP and HF meds on hold given borderline hypotension 30.0 - 39.9 Obese / Body mass index is 30.45 kg/m². Code status: Full Prophylaxis: Heparin Recommended Disposition: TBD Subjective: Chief Complaint / Reason for Physician Visit Does not feel like eating and has been nauseous. Discussed with RN events overnight. Review of Systems: 
Symptom Y/N Comments  Symptom Y/N Comments Fever/Chills    Chest Pain n   
Poor Appetite    Edema Cough    Abdominal Pain n   
Sputum    Joint Pain SOB/TAVAREZ    Pruritis/Rash Nausea/vomit y   Tolerating PT/OT Diarrhea    Tolerating Diet Constipation    Other Could NOT obtain due to:   
 
Objective: VITALS:  
Last 24hrs VS reviewed since prior progress note. Most recent are: 
Patient Vitals for the past 24 hrs: 
 Temp Pulse Resp BP SpO2  
21 1451 97.9 °F (36.6 °C) 82 20 (!) 84/53 96 % 21 1107    (!) 80/54   
21 1105 98 °F (36.7 °C) 85 21 (!) 79/61 92 % 21 1004    (!) 90/53 91 % 21 0804     93 % 21 0753 98.7 °F (37.1 °C) 100 25 111/60 94 % 02/24/21 0303 98.8 °F (37.1 °C) 99 20 98/67 96 % 02/23/21 2243 98.6 °F (37 °C) 86 18 96/69 100 % 02/23/21 2055     94 % 02/23/21 2021     100 % 02/23/21 1945 98.6 °F (37 °C) 93 20 (!) 88/65 100 % 02/23/21 1930  96 15 95/60 99 % 02/23/21 1900  (!) 103 24 (!) 96/41 98 % 02/23/21 1846  (!) 103 24 94/61 100 % 02/23/21 1830  (!) 105 25 100/79 99 % 02/23/21 1815  94 17 103/61 99 % 02/23/21 1805  100 22 103/67 99 % 02/23/21 1800  (!) 107 20 99/70 98 % 02/23/21 1755     98 % 02/23/21 1754  (!) 114 21 113/60 99 % 02/23/21 1721     99 % 02/23/21 1715  (!) 120 25 128/88 (!) 87 % 02/23/21 1706 98.1 °F (36.7 °C) (!) 118 23 124/66 (!) 89 % 02/23/21 1659  (!) 105 24 (!) 151/84 (!) 89 % 02/23/21 1657  99 18 (!) 141/91 90 % 02/23/21 1651 98.1 °F (36.7 °C) 90 14 (!) 151/84 93 % 02/23/21 1635  91 17 127/72 95 % Intake/Output Summary (Last 24 hours) at 2/24/2021 1605 Last data filed at 2/24/2021 6015 Gross per 24 hour Intake 570 ml Output 450 ml Net 120 ml I had a face to face encounter and independently examined this patient on 2/24/2021, as outlined below: PHYSICAL EXAM: 
General: Alert, cooperative, ill appearing EENT:  EOMI. Anicteric sclerae. MMM Resp:  CTA bilaterally, no wheezing or rales. No accessory muscle use CV:  Regular  rhythm,  No edema GI:  Soft, Non distended, Non tender. +Bowel sounds Neurologic:  Alert and oriented X 3, normal speech, Psych:   Good insight. Not anxious nor agitated Skin:  Pale Reviewed most current lab test results and cultures  YES Reviewed most current radiology test results   YES Review and summation of old records today    NO Reviewed patient's current orders and MAR    YES 
PMH/SH reviewed - no change compared to H&P 
________________________________________________________________________ Care Plan discussed with: 
  Comments Patient x Family  x   
RN x Care Manager Consultant Multidiciplinary team rounds were held today with , nursing, pharmacist and clinical coordinator. Patient's plan of care was discussed; medications were reviewed and discharge planning was addressed. ________________________________________________________________________ Total NON critical care TIME:  35   Minutes Total CRITICAL CARE TIME Spent:   Minutes non procedure based Comments >50% of visit spent in counseling and coordination of care    
________________________________________________________________________ Norberto Zuluaga MD  
 
Procedures: see electronic medical records for all procedures/Xrays and details which were not copied into this note but were reviewed prior to creation of Plan. LABS: 
I reviewed today's most current labs and imaging studies. Pertinent labs include: 
Recent Labs  
  02/24/21 
0306 02/23/21 
1732 02/23/21 
0410 WBC 1.7* 3.9 1.4* HGB 9.8* 10.0* 5.5* HCT 30.3* 30.6* 17.1*  
PLT 97* 120* 86* Recent Labs  
  02/24/21 
0306 02/23/21 
0409 02/22/21 
0502 02/21/21 
2037  141 139 136  
K 4.9 4.0 4.2 4.2 * 109* 108 104 CO2 20* 23 22 24 * 89 96 118* BUN 16 17 27* 31* CREA 1.20* 1.20* 1.94* 2.26* CA 7.0*  7.4* 6.2* 5.7* 6.2*  
MG  --  1.9 1.1*  --   
PHOS  --  2.4* 1.8*  --   
ALB  --   --   --  2.2* TBILI  --   --   --  0.6 ALT  --   --   --  23 Signed: Norberto Zuluaga MD

## 2021-02-24 NOTE — PROGRESS NOTES
0700 Bedside shift change report given to Angie Portillo (oncoming nurse) by Oswaldo Reyes RN (offgoing nurse). Report included the following information SBAR, Kardex, Intake/Output and MAR.  
 
1140 Perfect served Dr. Ye Reeder about pt soft blood pressures. Dr. Ye Reeder responded \"ok\". 1900 End of Shift Note Bedside shift change report given to Oswaldo Reyes RN (oncoming nurse) by Elisha Grady RN (offgoing nurse). Report included the following information SBAR, Kardex, Intake/Output and MAR Shift worked:  [id number] Shift summary and any significant changes:  
  None Concerns for physician to address:  None [facility]   [phone] Activity: 
Activity Level: Bed Rest 
Number times ambulated in hallways past shift: 0 Number of times OOB to chair past shift: 0 Cardiac:  
Cardiac Monitoring: Yes     
Cardiac Rhythm: Normal sinus rhythm Access:  
Current line(s): port Genitourinary:  
Urinary status: voiding Respiratory:  
O2 Device: Nasal cannula Chronic home O2 use?: YES Incentive spirometer at bedside: N/A 
  
GI: 
Last Bowel Movement Date: 02/21/21 Current diet:  DIET NUTRITIONAL SUPPLEMENTS Lunch, Dinner; Naima-Juan F DIET NUTRITIONAL SUPPLEMENTS Breakfast, Lunch; Ensure Clear DIET FULL LIQUID Passing flatus: YES Tolerating current diet: YES 
% Diet Eaten: 25 % Pain Management:  
Patient states pain is manageable on current regimen: YES Skin: 
Shan Score: 15 Interventions: increase time out of bed Patient Safety: 
Fall Score: Total Score: 3 Interventions: bed/chair alarm High Fall Risk: Yes Length of Stay: 
Expected LOS: 4d 7h Actual LOS: 3 Elisha Grady RN

## 2021-02-24 NOTE — PROGRESS NOTES
Problem: Mobility Impaired (Adult and Pediatric) Goal: *Acute Goals and Plan of Care (Insert Text) Description: FUNCTIONAL STATUS PRIOR TO ADMISSION: Patient reports she has recently been mostly w/c bound in her apartment, has been working with PT using rollator but was not safe to ambulate alone. HOME SUPPORT PRIOR TO ADMISSION: Patient lives alone in a senior apartment, daughter lives nearby and stops in to assist daily. Patient reports she could dress and do sponge bath. Physical Therapy Goals Initiated 2/24/2021 1. Patient will move from supine to sit and sit to supine  in bed with minimal assistance/contact guard assist within 7 day(s). 2.  Patient will transfer from bed to chair and chair to bed with minimal assistance/contact guard assist using the least restrictive device within 7 day(s). 3.  Patient will perform sit to stand with minimal assistance/contact guard assist within 7 day(s). 4.  Patient will ambulate with minimal assistance/contact guard assist for 5 feet with the least restrictive device within 7 day(s). Outcome: Not Met PHYSICAL THERAPY EVALUATION Patient: Sarita Wang (71 y.o. female) Date: 2/24/2021 Primary Diagnosis: Sepsis ([facility] [address] [A41.9] Precautions: falls ASSESSMENT Based on the objective data described below, the patient presents with poor tolerance of activity, SOB with min exertion, and unable to assess mobility due to low BP. Patient received in bed and agreeable to participate, on 5 liters 02 with sats 90-92 at rest.  Patient declines coming to sitting position, but was able to perform LE exercises and assisted with scooting to [facility]. Attempted chair position but patient reported dizziness so returned to supine ,  BP checked and found to be 90/53. Patient is well below baseline,   is hopeful to be able to discharge home with resumption of HHPT and daughter to assist, but question if this is realistic. If patient does not progress well as inpatient will likely need rehab in SNF. Vitals:  
 02/24/21 0804 02/24/21 1004 02/24/21 1105 02/24/21 1107 BP:  (!) 90/53 (!) 79/61 (!) 80/54 BP 1 Location:  Right upper arm Right upper arm Right upper arm BP Patient Position:  At rest;Supine At rest At rest  
Pulse:   85 Resp:   21 Temp:   98 °F (36.7 °C) SpO2: 93% 91% 92% Weight:      
Height:      
 
 
Current Level of Function Impacting Discharge (mobility/balance): orthostatic, unable to assess mobility Functional Outcome Measure: The patient scored 20/100 on the Barthel outcome measure which is indicative of severe functional impairment Other factors to consider for discharge: metastatic cancer, high falls risk, low functional level prior to admission, lives alone Patient will benefit from skilled therapy intervention to address the above noted impairments. PLAN : 
Recommendations and Planned Interventions: bed mobility training, transfer training, gait training, therapeutic exercises, patient and family training/education and therapeutic activities Frequency/Duration: Patient will be followed by physical therapy:  4 times a week to address goals. Recommendation for discharge: (in order for the patient to meet his/her long term goals) To be determined: HHPT with family assistance vs rehab in SNF This discharge recommendation: A follow-up discussion with the attending provider and/or case management is planned IF patient discharges home will need the following DME: patient owns DME required for discharge SUBJECTIVE:  
Patient stated I can't do anything today but thank you for being so sweet.  OBJECTIVE DATA SUMMARY:  
HISTORY:   
Past Medical History:  
Diagnosis Date  Acute kidney injury superimposed on CKD ([address] 2/22/2021  Adverse reaction to antineoplastic drug 2/22/2021  
 ([id number])- Letrozole initiated  Pancytopenia, diarrhea, myalgias  Arthralgia 8/17/2017  Arthritis  Asthma Mild to Moderate  Breast cancer ([address] 8/17/2017 Onset 1997; Refusing Mammogram 6/16/17  Breast cancer metastasized to liver ([address] 9/1/2020  Breast cancer metastasized to lung ([address] 9/1/2020  Cancer ([address] 1997 Breast left  Cardiomyopathy ([address] 8/17/2017 Onset:1999 Ischemic; 25% - 50% (last EFx 45%) Continue with ACE/Lasix/coreg  Cataract 8/17/2017 Bilateral   
 Cellulitis 8/17/2017 Suspect local reaction to Pneumovax, treat with ice, NSAIDs or Tylenol  Chest pain at rest 8/17/2017  CHF (congestive heart failure) ([address] 8/17/2017 Stable, though weight up a little. To take 1 1/2 Lasix daily if swelling, 1 daily o/w  Chronic maxillary sinusitis 8/17/2017  Chronic pain Right knee  COPD (chronic obstructive pulmonary disease) ([address] 8/17/2017 Continue with inhalers  Diabetes ([address] Pre-diabetic  DJD (degenerative joint disease) 8/17/2017  Elevated BUN 8/17/2017  Encounter for monoclonal antibody treatment for malignancy 2/22/2021  
 ([id number])-  Norva Schooling  Esophagitis, reflux 8/17/2017  Fatigue 8/17/2017  GERD (gastroesophageal reflux disease)  Heart failure ([address] Cardiomyopathy, HX of MI 1999  Hyperglycemia 8/17/2017  Hyperkalemia 8/17/2017  Hyperlipidemia 8/17/2017  Hypertension 8/17/2017  Hypocalcemia 2/22/2021  Hypomagnesemia 2/21/2021  Hypophosphatemia 2/22/2021  Hypovolemic shock ([address] 2/22/2021  Ill-defined condition Vertigo  Ill-defined condition 2003 HX of Urosepsis complicated by respiratory failure and pneumonnia  Kidney disease, chronic, stage IV (GFR 15-29 ml/min) ([address] 2/22/2021  Long term (current) use of aspirin 2/22/2021 ASA 81 mg Daily  Macrocytic anemia 2/21/2021  Malignant neoplasm metastatic to breast ([address] 12/1/2020  Mild protein-calorie malnutrition ([address] 2/22/2021  Myalgia 8/17/2017  Pancytopenia due to antineoplastic chemotherapy ([address] 2/22/2021  S/P laminectomy 9/29/2020  
 ([id number])- S/P Segmental posterior cervicothoracic fusion C5 to T5, and arthrodesis C5 to T5 with cancellous bone chips and DBM putty, and partial laminectomy T2  Metastatic disease through T1, T2 and T3, with severe spinal stenosis and impingement on the cord.  S/P left mastectomy 1/1/1997  
 (1997)- Left breast cancer % MT 60% Her 2 -ve, initial diagnosis in the 1990s  status post left mastectomy chemo and radiation  S/P total knee arthroplasty, right 6/22/2017  
 (22Jun2021)  Symptomatic anemia 2/22/2021  Thromboembolus ([address] 1969  
 during 2nd pregnancy [facility] 8/17/2017  Vertigo, aural 8/17/2017  Vitamin D deficiency 8/17/2017 Past Surgical History:  
Procedure Laterality Date [facility] JONNY  
 HX GYN Left Breast Mastectomy  HX HEENT  2015 Bilateral Cataract  HX ORTHOPAEDIC Right 06/2018  
 knee replacement  HX ORTHOPAEDIC Left 11/2018  
 wrist surgery  HX VASCULAR ACCESS Port a cath on the right Personal factors and/or comorbidities impacting plan of care: multiple co-morbidities, metastatic cancer Home Situation Home Environment: Apartment # Steps to Enter: 0 One/Two Story Residence: One story Living Alone: Yes Support Systems: Child(vicky) Patient Expects to be Discharged to[de-identified] TEENSTREP Current DME Used/Available at Home: Shower chair, Tiffanie Shallow, rollator, Wheelchair Tub or Shower Type: Tub/Shower combination EXAMINATION/PRESENTATION/DECISION MAKING:  
Critical Behavior: 
Neurologic State: Alert Orientation Level: Oriented X4 Hearing: Auditory Auditory Impairment: None Range Of Motion: 
AROM: Generally decreased, functional 
  
  
  
  
  
  
  
Strength:   
Strength: Generally decreased, functional 
  
  
  
  
  
  
Tone & Sensation:  
Tone: Normal 
  
  
  
  
  
  
  
  
   
Coordination: 
Coordination: Generally decreased, functional 
Vision:  
  
Functional Mobility: 
Bed Mobility: 
  
Supine to Sit: (attempted chair postion, but did not tolerate) Transfers: 
  
  
     
  
     
  
  
Balance:  
  
Ambulation/Gait Training: 
  
  
  
  
  
  
  
  
  
  
  
  
  
  
  
  
  
  
 Stairs: Therapeutic Exercises: Ankle pumps, hip abd add, heel slides x 5- 10 Functional Measure: 
Barthel Index: 
 
Bathin Bladder: 5 Bowels: 10 
Groomin Dressin Feedin Mobility: 0 Stairs: 0 Toilet Use: 0 Transfer (Bed to Chair and Back): 0 Total: 20/100 The Barthel ADL Index: Guidelines 1. The index should be used as a record of what a patient does, not as a record of what a patient could do. 2. The main aim is to establish degree of independence from any help, physical or verbal, however minor and for whatever reason. 3. The need for supervision renders the patient not independent. 4. A patient's performance should be established using the best available evidence. Asking the patient, friends/relatives and nurses are the usual sources, but direct observation and common sense are also important. However direct testing is not needed. 5. Usually the patient's performance over the preceding 24-48 hours is important, but occasionally longer periods will be relevant. 6. Middle categories imply that the patient supplies over 50 per cent of the effort. 7. Use of aids to be independent is allowed. Alireza Saucedo., Barthel, D.W. (5798). Functional evaluation: the Barthel Index. [facility] (14)2. BRENDA Vilchis, Yesi Mora., Rad Hicks., [place], [address] (1999). Measuring the change indisability after inpatient rehabilitation; comparison of the responsiveness of the Barthel Index and Functional [place] Measure. Journal of Neurology, Neurosurgery, and Psychiatry, 66(4), 910-737. Oral ABIOLA White, JEFFREY Oquendo, & Kristine Sultana MSARAH. (2004.) Assessment of post-stroke quality of life in cost-effectiveness studies: The usefulness of the Barthel Index and the EuroQoL-5D. [facility], 13, 913-63 Physical Therapy Evaluation Charge Determination History Examination Presentation Decision-Making MEDIUM  Complexity : 1-2 comorbidities / personal factors will impact the outcome/ POC  LOW Complexity : 1-2 Standardized tests and measures addressing body structure, function, activity limitation and / or participation in recreation  MEDIUM Complexity : Evolving with changing characteristics  MEDIUM Complexity : FOTO score of 26-74 Based on the above components, the patient evaluation is determined to be of the following complexity level: LOW Pain Rating: 
 
 
Activity Tolerance:  
Poor After treatment patient left in no apparent distress:  
Supine in bed, Call bell within reach and Side rails x 3 
 
COMMUNICATION/EDUCATION:  
The patients plan of care was discussed with: Registered nurse. Fall prevention education was provided and the patient/caregiver indicated understanding. and Patient/family agree to work toward stated goals and plan of care.  
 
Thank you for this referral. 
 Salas Aguayo, PT Time Calculation: 39 mins

## 2021-02-25 NOTE — PROGRESS NOTES
Problem: Mobility Impaired (Adult and Pediatric) Goal: *Acute Goals and Plan of Care (Insert Text) Description: FUNCTIONAL STATUS PRIOR TO ADMISSION: Patient reports she has recently been mostly w/c bound in her apartment, has been working with PT using rollator but was not safe to ambulate alone. HOME SUPPORT PRIOR TO ADMISSION: Patient lives alone in a senior apartment, daughter lives nearby and stops in to assist daily. Patient reports she could dress and do sponge bath. Physical Therapy Goals Initiated 2/24/2021 1. Patient will move from supine to sit and sit to supine  in bed with minimal assistance/contact guard assist within 7 day(s). 2.  Patient will transfer from bed to chair and chair to bed with minimal assistance/contact guard assist using the least restrictive device within 7 day(s). 3.  Patient will perform sit to stand with minimal assistance/contact guard assist within 7 day(s). 4.  Patient will ambulate with minimal assistance/contact guard assist for 5 feet with the least restrictive device within 7 day(s). Outcome: Progressing Towards Goal 
 PHYSICAL THERAPY TREATMENT Patient: Jcarlos Box (1 y.o. female) Date: 2/25/2021 Diagnosis: Sepsis ([facility] [address] [A41.9] Symptomatic anemia Precautions:   
Chart, physical therapy assessment, plan of care and goals were reviewed. ASSESSMENT Patient continues with skilled PT services and is progressing towards goals. Patient received in bed and agreeable to participate, on 3 liters 02 with sats in mid 90s. Patient able to come to sit on EOB with min assist and sitting balance is intact, sats dropped to 86% so increased 02 to 4 liters with return to mid 90s. Patient able to sit x approx 5 minutes to work on posture, LE exercises and core stability, but fatigues quickly and requests returning to supine. Patient able to stand briefly with min assist x 2 (hand held) to place clean pads then sat and was returned to supine. Patient instructed in slow deep breathing and was able to return to 3 liters 02 and maintain sats at 96%. BP was within acceptable parameters during visit and patient with overall improved tolerance of activity, but remains weak and well below baseline, will require increased  family assistance if she discharges home, otherwise will need rehab in SNF. Current Level of Function Impacting Discharge (mobility/balance): min assist x 2 to briefly stand Other factors to consider for discharge: lives alone, below baseline, weak and deconditioned, will need family assistance if discharge is home PLAN : 
Patient continues to benefit from skilled intervention to address the above impairments. Continue treatment per established plan of care. to address goals. Recommendation for discharge: (in order for the patient to meet his/her long term goals) To be determined: home with HHPT and increased family assistance, or rehab in SNF This discharge recommendation: A follow-up discussion with the attending provider and/or case management is planned IF patient discharges home will need the following DME: patient owns DME required for discharge SUBJECTIVE:  
Patient stated my breathing is better today.  OBJECTIVE DATA SUMMARY:  
Critical Behavior: 
Neurologic State: Alert Orientation Level: Oriented X4 
  
  
 Functional Mobility Training: 
Bed Mobility: 
Rolling: Minimum assistance Supine to Sit: Minimum assistance Sit to Supine: Minimum assistance Interventions: Verbal cues Transfers: 
Sit to Stand: Minimum assistance;Assist x2 Stand to Sit: Minimum assistance;Assist x2 Balance: 
Sitting: Intact Standing: Impaired Standing - Static: Constant support; Heather Killings Ambulation/Gait Training: 
  
  
  
  
  
  
  
  
  
  
  
  
  
  
  
  
  
  
Stairs: Therapeutic Exercises: LAQ, head turns, shoulder circles x 5 Pain Rating: 
 
 
Activity Tolerance:  
Poor, desaturates with exertion and requires oxygen, and requires frequent rest breaks,fatigues quickly After treatment patient left in no apparent distress:  
Supine in bed, Call bell within reach, and Side rails x 3 
 
COMMUNICATION/COLLABORATION:  
The patients plan of care was discussed with: Registered nurse. Cesar Espana, PT Time Calculation: 38 mins

## 2021-02-25 NOTE — PROGRESS NOTES
Comprehensive Nutrition Assessment Type and Reason for Visit: Initial 
 
Nutrition Recommendations/Plan:  
RD adjusted diet from full liquids to mechanical soft per pt request 
Continue Ensure clear and Magic cup Also trying Ensure enlive and pudding Please document % meals and supplements consumed in flowsheet I/O's under intake Nutrition Assessment:     
Chart reviewed for poor PO intake and liquid diet. Pt noted for diarrhea, AMAN, acute hypercapnic respiratory failure, breast ca with mets to bone, liver and lung, pancytopenia, acute on chronic anemia, HTN, GERD, heart failure, COPD. Full liquid diet started yesterday. Pt reports poor appetite. Pt interested in mechanical soft diet for more options. She like the Ensure clear and Magic cups, will also try Ensure enlive (more substantial ONS) and pt asked about pudding as well. K elevated today, will continue monitoring to adjust diet/supplements as needed. Wt hx reveals 17lb (8%) wt loss in the past 4 months, significant for the time frame. Decreased intake and wt loss meet minimum ASPEN criteria for malnutrition. Unable to perform NFPE today as pt had just received lunch. Wt Readings from Last 15 Encounters:  
02/25/21 82 kg (180 lb 12.4 oz) 01/13/21 82.8 kg (182 lb 9.6 oz) 01/13/21 82.2 kg (181 lb 3.2 oz) 12/30/20 82.7 kg (182 lb 6.4 oz) 12/30/20 82.6 kg (182 lb) 12/16/20 82.6 kg (182 lb 1.6 oz)  
12/16/20 82.6 kg (182 lb)  
11/23/20 85.3 kg (188 lb)  
11/11/20 85.3 kg (188 lb) 10/04/20 89.7 kg (197 lb 12.8 oz) 09/23/20 89.8 kg (198 lb) 04/17/20 100.4 kg (221 lb 5.5 oz) 01/14/20 95.4 kg (210 lb 4.8 oz)  
11/22/18 83.9 kg (185 lb)  
02/14/18 88.9 kg (196 lb) ] Malnutrition Assessment: 
Malnutrition Status:  Mild malnutrition Context:  Acute illness Findings of the 6 clinical characteristics of malnutrition:  
Energy Intake:  1 - 75% or less of est energy req for 7 or more days Weight Loss:  7.00 - Greater than 7.5% over 3 months Body Fat Loss:  Unable to assess, Muscle Mass Loss:  Unable to assess, Fluid Accumulation:  1 - Mild, Extremities  Strength:  Not performed Estimated Daily Nutrient Needs: 
Energy (kcal): 1704 kcal (BMR x 1. 3AF); Weight Used for Energy Requirements: Current Protein (g): 66-82g (0.8-1g/kg); Weight Used for Protein Requirements: Current Fluid (ml/day): 1700mL; Method Used for Fluid Requirements: 1 ml/kcal 
 
 
Nutrition Related Findings:  Labs: K 5.3, vit D1 18.1, folic acid 2.1 (6/43), Ca 6.9. Meds: NS. folvite, oxycodone. BM 2/21. Trace edema BLE. Wounds:   
None Current Nutrition Therapies: DIET NUTRITIONAL SUPPLEMENTS Lunch, Dinner; Naima-Juan F DIET NUTRITIONAL SUPPLEMENTS Dinner, Breakfast; Ensure [employer] DIET MECHANICAL SOFT 
DIET NUTRITIONAL SUPPLEMENTS Breakfast; Ensure Pudding DIET NUTRITIONAL SUPPLEMENTS Lunch; Ensure Clear Anthropometric Measures: 
· Height:  5' 5\" (165.1 cm) · Current Body Wt:  82 kg (180 lb 12.4 oz) · Ideal Body Wt:  125 lbs:  144.6 % · BMI Category:  Obese class 1 (BMI 30.0-34. 9) Nutrition Diagnosis:  
· Increased nutrient needs related to catabolic illness as evidenced by (metastatic breast ca to bone, liver, lung) Nutrition Interventions:  
Food and/or Nutrient Delivery: Continue current diet, Continue oral nutrition supplement Nutrition Education and Counseling: No recommendations at this time Coordination of Nutrition Care: Continue to monitor while inpatient Goals: 
PO intake >50% meals and ONS next 4-6 days Nutrition Monitoring and Evaluation:  
Behavioral-Environmental Outcomes: Beliefs and attitudes Food/Nutrient Intake Outcomes: Food and nutrient intake, Supplement intake Physical Signs/Symptoms Outcomes: Biochemical data, GI status, Weight, Nutrition focused physical findings, Fluid status or edema Discharge Planning: Continue oral nutrition supplement, Continue current diet Electronically signed by Venita Guzman RD on 2/25/2021 at 3:40 PM 
 
Contact: [facility]-6309 Pager [phone]

## 2021-02-25 NOTE — PROGRESS NOTES
End of Shift Note Bedside shift change report given to Chrissy Alston (oncoming nurse) by Montse Causey RN (offgoing nurse). Report included the following information SBAR and Kardex Shift worked:  [id number] Shift summary and any significant changes:  
  none. Concerns for physician to address:  none.   
  
Zone phone for oncoming shift:    
  
 
 
 
Montse Causey RN

## 2021-02-25 NOTE — PROGRESS NOTES
[facility] 
at [place] [address], [address] 
[phone] Follow-up Note Patient: Cheryl Braden MRN: [id number]  SSN: [id number] YOB: 1945  Age: 67 y.o. Sex: female Diagnosis: 1. Right sided breast carcinoma metastatic to the bone, liver and lung % CO 60% Her 2 -ve History of breast cancer in the 1990's. Left mastectomy with LN dissection (negative LN) at [facility] - Dr. Latha Velazco. Treatment: 1. Letrozole, Ribociclib - started 12/2/2020 Subjective:  
  
Cheryl Braden is a 67 y.o. female with a history of metastatic cancer. Ms. Marisol Moore received treatment for left sided breast cancer in the 1990's. She received systemic chemotherapy and mastectomy. She was noted to have sclerotic lesions in the bone in April and then in Sep 2020. She is suffering with bone pains in the back. She underwent a laminectomy from T2 - T4. Pathology shows ER +ve mBC. She has been taking Letrozole. She started Delwin Range on 12/2/2020. She was brought in to the ER yesterday with compliant of hypotension and lethargy. She is currently in the ICU on levophed. Mucous membranes are dry. She says she is weak and not sure how she got dehydrated. Interval History: Ms. Marisol Moore states she still feel weak today. She requested to be on a liquid diet and says it makes her full quickly. She thinks she is to have PT today. Review of Systems: 
 
ROS is negative except what is mentioned in the HPI Past Medical History:  
Diagnosis Date  Acute kidney injury superimposed on CKD ([address] 2/22/2021  Adverse reaction to antineoplastic drug 2/22/2021  
 ([id number])- Letrozole initiated  Pancytopenia, diarrhea, myalgias  Arthralgia 8/17/2017  Arthritis  Asthma Mild to Moderate  Breast cancer ([address] 8/17/2017 Onset 1997; Refusing Mammogram 6/16/17  Breast cancer metastasized to liver ([address] 9/1/2020  Breast cancer metastasized to lung ([address] 9/1/2020  Cancer ([address] 1997 Breast left  Cardiomyopathy ([address] 8/17/2017 Onset:1999 Ischemic; 25% - 50% (last EFx 45%) Continue with ACE/Lasix/coreg  Cataract 8/17/2017 Bilateral   
 Cellulitis 8/17/2017 Suspect local reaction to Pneumovax, treat with ice, NSAIDs or Tylenol  Chest pain at rest 8/17/2017  CHF (congestive heart failure) ([address] 8/17/2017 Stable, though weight up a little. To take 1 1/2 Lasix daily if swelling, 1 daily o/w  Chronic maxillary sinusitis 8/17/2017  Chronic pain Right knee  COPD (chronic obstructive pulmonary disease) ([address] 8/17/2017 Continue with inhalers  Diabetes ([address] Pre-diabetic  DJD (degenerative joint disease) 8/17/2017  Elevated BUN 8/17/2017  Encounter for monoclonal antibody treatment for malignancy 2/22/2021  
 ([id number])-  Liliana American  Esophagitis, reflux 8/17/2017  Fatigue 8/17/2017  GERD (gastroesophageal reflux disease)  Heart failure ([address] Cardiomyopathy, HX of MI 1999  Hyperglycemia 8/17/2017  Hyperkalemia 8/17/2017  Hyperlipidemia 8/17/2017  Hypertension 8/17/2017  Hypocalcemia 2/22/2021  Hypomagnesemia 2/21/2021  Hypophosphatemia 2/22/2021  Hypovolemic shock ([address] 2/22/2021  Ill-defined condition Vertigo  Ill-defined condition 2003 HX of Urosepsis complicated by respiratory failure and pneumonnia  Kidney disease, chronic, stage IV (GFR 15-29 ml/min) ([address] 2/22/2021  Long term (current) use of aspirin 2/22/2021 ASA 81 mg Daily  Macrocytic anemia 2/21/2021  Malignant neoplasm metastatic to breast ([address] 12/1/2020  Mild protein-calorie malnutrition ([address] 2/22/2021  Myalgia 8/17/2017  Pancytopenia due to antineoplastic chemotherapy ([facility] 2/22/2021  S/P laminectomy 9/29/2020 ([id number])- S/P Segmental posterior cervicothoracic fusion C5 to T5, and arthrodesis C5 to T5 with cancellous bone chips and DBM putty, and partial laminectomy T2  Metastatic disease through T1, T2 and T3, with severe spinal stenosis and impingement on the cord.  S/P left mastectomy 1997  
 ()- Left breast cancer % NH 60% Her 2 -ve, initial diagnosis in the   status post left mastectomy chemo and radiation  S/P total knee arthroplasty, right 2017  
 ([id number])  Symptomatic anemia 2021  Thromboembolus ([address] 1969  
 during 2nd pregnancy [facility] 2017  Vertigo, aural 2017  Vitamin D deficiency 2017 Past Surgical History:  
Procedure Laterality Date [facility] JONNY  
 HX GYN Left Breast Mastectomy  HX HEENT   Bilateral Cataract  HX ORTHOPAEDIC Right 2018  
 knee replacement  HX ORTHOPAEDIC Left 2018  
 wrist surgery  HX VASCULAR ACCESS Port a cath on the right Family History Problem Relation Age of Onset  Cancer Mother  Other Father Social History Tobacco Use  Smoking status: Former Smoker Packs/day: 2.00 Years: 25.00 Pack years: 50.00 Quit date:  Years since quittin.1  Smokeless tobacco: Never Used Substance Use Topics  Alcohol use: No  
  
Prior to Admission medications Medication Sig Start Date End Date Taking? Authorizing Provider  
oxyCODONE IR (ROXICODONE) 5 mg immediate release tablet Take 1 Tab by mouth every four (4) hours as needed for Pain for up to 15 days. Max Daily Amount: 30 mg. 2/19/21 3/6/21 Yes Charly Felder MD  
DULoxetine (CYMBALTA) 20 mg capsule TAKE 1 CAPSULE BY MOUTH NIGHTLY 21  Yes Sabina Fleming MD  
DULoxetine (Cymbalta) 20 mg capsule Take 1 Cap by mouth nightly.  21  Yes Sabina Fleming MD  
 carvedilol (Coreg CR) 40 mg CR capsule Take 40 mg by mouth daily (with breakfast). Yes Provider, Historical  
furosemide (LASIX) 20 mg tablet Take 60 mg by mouth daily. Yes Provider, Historical  
mometasone (Nasonex) 50 mcg/actuation nasal spray 2 Sprays daily. Yes Provider, Historical  
letrozole (FEMARA) 2.5 mg tablet Take 2.5 mg by mouth daily. Yes Provider, Historical  
calcium carbonate (TUMS) 200 mg calcium (500 mg) chew Take 1 Tab by mouth daily. Yes Provider, Historical  
potassium chloride SR (K-TAB) 20 mEq tablet Take 1 Tab by mouth daily. 12/31/20  Yes Hawa Snell MD  
amiodarone (PACERONE) 100 mg tablet Take 100 mg by mouth daily.    Yes Provider, Historical  
prochlorperazine (COMPAZINE) 10 mg tablet Take 0.5 Tabs by mouth every six (6) hours as needed for Nausea. 12/16/20  Yes Steven Quiroga NP  
ribociclib-letrozole (Kisqali Femara Co-Pack) 600 mg/day(200 mg x 3)-2.5 mg tab Take 600 mg by mouth daily. Take kisqali daily x 21 days then off 7 days, letrozole is daily 11/24/20  Yes Hawa Snell MD  
sacubitriL-valsartan Mechele Friendly) 24-26 mg tablet Take 1 Tab by mouth two (2) times a day. Yes Provider, Historical  
meclizine (ANTIVERT) 12.5 mg tablet Take  by mouth three (3) times daily as needed for Dizziness. Yes Provider, Historical  
aspirin delayed-release 81 mg tablet Take  by mouth daily. Yes Provider, Historical  
polyethylene glycol (MIRALAX) 17 gram packet Take 1 Packet by mouth daily. 10/6/20  Yes Wanda Villa MD  
acetaminophen (TYLENOL) 500 mg tablet Take 500 mg by mouth every six (6) hours as needed for Pain.    Yes Provider, Historical  
omeprazole (PRILOSEC) 20 mg capsule Take 1 capsule by mouth once daily 6/9/20  Yes María Azar NP  
fenofibrate nanocrystallized (TRICOR) 145 mg tablet Take 1 tablet by mouth once daily 5/22/20  Yes Genevieve Rico NP  
 Anoro Ellipta 62.5-25 mcg/actuation inhaler Take 1 Puff by inhalation daily. 3/23/20  Yes Other, MD Faizan  
cholecalciferol (VITAMIN D3) 1,000 unit cap Take 1,000 Units by mouth daily. Yes Provider, Lydia  
albuterol (PROVENTIL, VENTOLIN) 90 mcg/Actuation inhaler Take 1-2 Puffs by inhalation every four (4) hours as needed for Wheezing. 5/1/11  Yes TORSTEN Ruiz Allergies Allergen Reactions  Morphine Nausea and Vomiting Objective:  
 
Visit Vitals /63 (BP 1 Location: Right upper arm, BP Patient Position: At rest) Pulse 82 Temp 97.8 °F (36.6 °C) Resp 21 Ht 5' 5\" (1.651 m) Wt 180 lb 12.4 oz (82 kg) SpO2 97% Breastfeeding No  
BMI 30.08 kg/m² Physical Exam: 
 
GENERAL: alert, cooperative, no distress, appears stated age EYE: conjunctivae/corneas clear. LYMPHATIC: Cervical, supraclavicular, and axillary nodes normal.  
LUNG: clear to auscultation bilaterally HEART: regular rate and rhythm ABDOMEN: soft, non-tender. EXTREMITIES: bilateral lower extremity edema L >.R 
SKIN: see wound care note about sacral skin discoloration NEUROLOGIC: AOx3. Physical exam was pulled in from a previous visit. No changes were made d/t physical exam remains the same. CT Results (most recent): 
Results from [facility] Encounter encounter on 04/17/20 CT ABD PELV W CONT Narrative EXAM:  CTA CHEST W OR W WO CONT, CT ABD PELV W CONT INDICATION:   RUQ pain, R thoracic back pain, SOB, h/o DVT 
 
COMPARISON: 8/26/2013. CHEST CTA: 
 
TECHNIQUE:  
Precontrast  images were obtained to localize the volume for acquisition. Multislice helical CT arteriography was performed from the diaphragm to the 
thoracic inlet during uneventful rapid bolus of 100 cc Isovue-370. Lung and soft 
tissue windows were generated. Coronal and sagittal images were generated and 3D post processing consisting of coronal maximum intensity images was performed. CT dose reduction was achieved through use of a standardized protocol tailored 
for this examination and automatic exposure control for dose modulation. FINDINGS: 
CHEST: 
THYROID: No nodule. MEDIASTINUM: No mass or lymphadenopathy. DOROTHY: No mass or lymphadenopathy. THORACIC AORTA: No dissection or aneurysm. MAIN PULMONARY ARTERY: There is no evidence of pulmonary embolism. TRACHEA/BRONCHI: Patent. ESOPHAGUS: No wall thickening or dilatation. HEART: Normal in size. PLEURA: No effusion or pneumothorax. LUNGS: No nodule, mass, or airspace disease. BONES: Degenerative changes are seen in the thoracic spine. Tiny sclerotic foci 
are scattered throughout the thoracic vertebral bodies. Impression IMPRESSION: No acute process or evidence of pulmonary embolism. Tiny sclerotic 
foci are scattered throughout the vertebral bodies. ABDOMEN/PELVIC CT: 
 
TECHNIQUE:  
Following the uneventful intravenous administration of 100 cc Isovue-370, thin 
axial images were obtained through the abdomen and pelvis. Coronal and sagittal 
reconstructions were generated. Oral contrast was not administered. CT dose 
reduction was achieved through use of a standardized protocol tailored for this 
examination and automatic exposure control for dose modulation. FINDINGS:  
LIVER: No mass or biliary dilatation. GALLBLADDER: Unremarkable. SPLEEN: No mass. PANCREAS: No mass or ductal dilatation. ADRENALS: Unremarkable. KIDNEYS: Left renal cysts, the largest of which measures 2.8 cm. Scarring left 
kidney. STOMACH: Unremarkable. SMALL BOWEL: No dilatation or wall thickening. COLON: Sigmoid diverticulosis. APPENDIX: Unremarkable. PERITONEUM: No ascites or pneumoperitoneum. RETROPERITONEUM: No lymphadenopathy or aortic aneurysm. REPRODUCTIVE ORGANS: The uterus is surgically absent. URINARY BLADDER: No mass or calculus.  
BONES: Sclerotic lesions are noted in the iliac wings bilaterally, sacrum, and 
 lumbar spine. ADDITIONAL COMMENTS: N/A IMPRESSION: 
No acute abnormality. Sclerotic foci are noted concerning for osseous metastatic 
disease. Correlation with bone scan is recommended. Lab Results Component Value Date/Time WBC 2.2 (L) 02/25/2021 02:55 AM  
 HGB (POC) 12.7 09/13/2017 11:20 AM  
 HGB 9.3 (L) 02/25/2021 02:55 AM  
 Hematocrit (POC) 36 01/13/2021 03:55 PM  
 HCT 28.3 (L) 02/25/2021 02:55 AM  
 PLATELET 86 (L) 08/29/9600 02:55 AM  
 .4 (H) 02/25/2021 02:55 AM  
 
 
 
Lab Results Component Value Date/Time Sodium 138 02/25/2021 02:55 AM  
 Potassium 5.3 (H) 02/25/2021 02:55 AM  
 Chloride 107 02/25/2021 02:55 AM  
 CO2 23 02/25/2021 02:55 AM  
 Anion gap 8 02/25/2021 02:55 AM  
 Glucose 161 (H) 02/25/2021 02:55 AM  
 BUN 30 (H) 02/25/2021 02:55 AM  
 Creatinine 2.02 (H) 02/25/2021 02:55 AM  
 BUN/Creatinine ratio 15 02/25/2021 02:55 AM  
 GFR est AA 29 (L) 02/25/2021 02:55 AM  
 GFR est non-AA 24 (L) 02/25/2021 02:55 AM  
 Calcium 6.9 (L) 02/25/2021 02:55 AM  
 Bilirubin, total 0.6 02/21/2021 08:37 PM  
 Alk. phosphatase 43 (L) 02/21/2021 08:37 PM  
 Protein, total 5.4 (L) 02/21/2021 08:37 PM  
 Albumin 2.2 (L) 02/21/2021 08:37 PM  
 Globulin 3.2 02/21/2021 08:37 PM  
 A-G Ratio 0.7 (L) 02/21/2021 08:37 PM  
 ALT (SGPT) 23 02/21/2021 08:37 PM  
 AST (SGOT) 62 (H) 02/21/2021 08:37 PM  
 
 
 
Assessment: 1. Hypotension/ hypovolemic  - resolved Patient was admitted to ICU - symptoms improving and patient has been moved to Telemetry. Hypovolemic - improved Currently off of Levophed Fluids Managed by ICU intensivist 
 
 
2. 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 Left mastectomy with LN dissection ( negative LN) at [facility] - Dr. Sterling oPp. Had chemotherapy, but does not remember the oncologist. States she never took a pill following chemotherapy or surgery. Letrozole/Ribociclib - started 12/2/2020 Hold while inpatient 3. Cancer related bone pains Managed by palliative Kd Gandara - received 1/27/2021 4. Pancytopenia Macrocytic anemia - improved Combination of acute/ chronic illness Transfuse to keep Hgb > 7 g/dL Vitamin B12 - normal 
 
folate deficiency -  Start folic acid 1 mg daily Iron sats 22 % Thrombocytopenia - observation Neutropenia - ANC 1.3 - observation - Juhi Arellano 4. Hypocalcemia Had denosumab 1/27/2021 Receiving calcium gluconate inpatient Managed by hospitalist 
 
5. Diarrhea - improved Per patient started 4 days prior to admission Better today Plan:  
 
> Hold Letrozole and Amauri Sargent. May restart Letrozole at discharge 
> Transfuse to keep Hgb > 7 g/dL 
> supportive care 
> Folic acid 1 mg daily 
> Feel free to contact our office with any questions Signed by: Stephania Rizo NP February 25, 2021

## 2021-02-25 NOTE — PROGRESS NOTES
0700 Bedside shift change report given to JEREMY Hernandez (oncoming nurse) by JEREMY Lawton (offgoing nurse). Report included the following information SBAR, Kardex, Intake/Output and MAR.  
 
End of Shift Note 
 
Bedside shift change report given to Clover MALONE RN (oncoming nurse) by Mary Knott RN (offgoing nurse).  Report included the following information SBAR, Kardex, Intake/Output and MAR 
 
Shift worked:  [id number] 
  
Shift summary and any significant changes:  
  None 
  
Concerns for physician to address:  None 
  
Zone phone for oncoming shift:   [phone] 
  
 
Activity: 
Activity Level: Bed Rest 
Number times ambulated in hallways past shift: 0 
Number of times OOB to chair past shift: 0 
 
Cardiac:  
Cardiac Monitoring: Yes     
Cardiac Rhythm: Normal sinus rhythm 
 
Access:  
Current line(s): port  
 
Genitourinary:  
Urinary status: external catheter 
 
Respiratory:  
O2 Device: Nasal cannula 
Chronic home O2 use?: YES 
Incentive spirometer at bedside: N/A 
  
GI: 
Last Bowel Movement Date: 02/21/21 
Current diet:  DIET NUTRITIONAL SUPPLEMENTS Lunch, Dinner; Magic Cups 
DIET NUTRITIONAL SUPPLEMENTS Dinner, Breakfast; Ensure Enlive 
DIET MECHANICAL SOFT 
DIET NUTRITIONAL SUPPLEMENTS Breakfast; Ensure Pudding 
DIET NUTRITIONAL SUPPLEMENTS Lunch; Ensure Clear 
Passing flatus: YES 
Tolerating current diet: YES 
% Diet Eaten: 25 % 
 
Pain Management:  
Patient states pain is manageable on current regimen: YES 
 
Skin: 
Shan Score: 16 
Interventions: increase time out of bed 
 
Patient Safety: 
Fall Score: Total Score: 3 
Interventions: gripper socks 
High Fall Risk: Yes 
 
Length of Stay: 
Expected LOS: 4d 7h 
Actual LOS: 4 
 
 
Mary Knott RN

## 2021-02-25 NOTE — PROGRESS NOTES
RAPID RESPONSE TEAM- Follow Up Rounded on patient due to recent rapid response for respiratory distress. On 5L NC. 
 
XR Results (most recent): 
Results from [facility] Encounter encounter on 02/21/21 XR CHEST PORT Narrative INDICATION: Hypotension, lethargy. Shortness of breath. Portable AP semiupright views of the chest. 
 
Direct comparison made to prior chest x-ray dated February 21, 2021. Cardiomediastinal silhouette is stable. There is pulmonary vascular prominence 
and diffuse bilateral interstitial edema. Central venous port catheter is 
unchanged in position. There is no pleural fluid. There is no pneumothorax. Impression Moderate pulmonary vascular congestive changes. Was given small dose lasix d/t low BP. Was on levophed when admitted. Spoke with primary RN Fiorella. No acute concerns at this time. BP still borderline low; closely monitor. No RRT interventions indicated at this time. Please call with any questions or concerns. Jaren Castro Visit Vitals BP (!) 99/48 Pulse 77 Temp 98 °F (36.7 °C) Resp 18 Ht 5' 5\" (1.651 m) Wt 83 kg (182 lb 15.7 oz) SpO2 99% Breastfeeding No  
BMI 30.45 kg/m²

## 2021-02-25 NOTE — PROGRESS NOTES
Hospitalist Progress Note NAME: Elsa Deluca :  1945 MRN:  [id number] Assessment / Plan: 
Shock, likely hypovolemic Diarrhea, poor PO intake prior to admission Monitor BP Remains on off pressors Mentation intact despite borderline BPs Downgraded diet to Full Liquid as per pt request  
Continue with empiric abx for now : 
BP trend has improved but worsening renal function noted AMAN Hyperkalemia Give Insulin, D50, Kayexcelate, Calcium Gluconate and nebs Restart IVF 
PO intake slowly improving Repeat labs Mobilize to chair Acute hypercapnic respiratory failure - improving CXR results noted O2 supplementation as needed - wean as tolerated : 
Decrease steroids On 2.5L NC at night - may need it during the day for a short time. Currently on 4L NC. 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 
GERD Chronic systolic HF 
COPD, not in exacerbation Continue home meds as above Currently BP and HF meds on hold given borderline hypotension 30.0 - 39.9 Obese / Body mass index is 30.08 kg/m². Code status: Full Prophylaxis: Heparin Recommended Disposition: TBD Subjective: Chief Complaint / Reason for Physician Visit Able to eat a little of her full liquid diet. Discussed with RN events overnight. Review of Systems: 
Symptom Y/N Comments  Symptom Y/N Comments Fever/Chills    Chest Pain n   
Poor Appetite    Edema Cough    Abdominal Pain n   
Sputum    Joint Pain SOB/TAVAREZ    Pruritis/Rash Nausea/vomit y   Tolerating PT/OT Diarrhea    Tolerating Diet Constipation    Other Could NOT obtain due to:   
 
Objective: VITALS:  
Last 24hrs VS reviewed since prior progress note. Most recent are: Patient Vitals for the past 24 hrs: 
 Temp Pulse Resp BP SpO2  
02/25/21 0849     97 % 02/25/21 0723 97.9 °F (36.6 °C) 83 21 108/69 95 % 02/25/21 0254 97.5 °F (36.4 °C) 82 18 (!) 107/53 98 % 02/24/21 2302 97.2 °F (36.2 °C) 79 20 98/74 100 % 02/24/21 2015     99 % 02/1945 98 °F (36.7 °C) 77 18 (!) 99/48 97 % 02/24/21 1451 97.9 °F (36.6 °C) 82 20 (!) 84/53 96 % 02/24/21 1107    (!) 80/54   
02/24/21 1105 98 °F (36.7 °C) 85 21 (!) 79/61 92 % Intake/Output Summary (Last 24 hours) at 2/25/2021 1028 Last data filed at 2/25/2021 2826 Gross per 24 hour Intake 790 ml Output 200 ml Net 590 ml I had a face to face encounter and independently examined this patient on 2/25/2021, as outlined below: PHYSICAL EXAM: 
General: Alert, cooperative, ill appearing EENT:  EOMI. Anicteric sclerae. MMM Resp:  CTA bilaterally, no wheezing or rales. No accessory muscle use CV:  Regular  rhythm,  No edema GI:  Soft, Non distended, Non tender. +Bowel sounds Neurologic:  Alert and oriented X 3, normal speech, Psych:   Good insight. Not anxious nor agitated Skin:  Pale Reviewed most current lab test results and cultures  YES Reviewed most current radiology test results   YES Review and summation of old records today    NO Reviewed patient's current orders and MAR    YES 
PMH/SH reviewed - no change compared to H&P 
________________________________________________________________________ Care Plan discussed with: 
  Comments Patient x Family  x   
RN x Care Manager Consultant Multidiciplinary team rounds were held today with , nursing, pharmacist and clinical coordinator. Patient's plan of care was discussed; medications were reviewed and discharge planning was addressed. ________________________________________________________________________ Total NON critical care TIME:  35   Minutes Total CRITICAL CARE TIME Spent:   Minutes non procedure based Comments >50% of visit spent in counseling and coordination of care    
________________________________________________________________________ Calos Reilly MD  
 
Procedures: see electronic medical records for all procedures/Xrays and details which were not copied into this note but were reviewed prior to creation of Plan. LABS: 
I reviewed today's most current labs and imaging studies. Pertinent labs include: 
Recent Labs  
  02/25/21 
0255 02/24/21 
0306 02/23/21 
1732 WBC 2.2* 1.7* 3.9 HGB 9.3* 9.8* 10.0* HCT 28.3* 30.3* 30.6* PLT 86* 97* 120* Recent Labs  
  02/25/21 
0255 02/24/21 
0306 02/23/21 
0409  140 141  
K 5.3* 4.9 4.0  
 109* 109* CO2 23 20* 23 * 146* 89 BUN 30* 16 17 CREA 2.02* 1.20* 1.20* CA 6.9* 7.0*  7.4* 6.2*  
MG 2.2  --  1.9 PHOS 4.2  --  2.4* Signed: Calos Reilly MD

## 2021-02-25 NOTE — PROGRESS NOTES
Transition of Care Plan: 
 
Disposition: Home with Resumption of Home Health ([facility]) Follow up appointments: PCP 
DME needed: None Transportation at [address] and she does have Medicaid if needed. Keys or means to access home:  Patient has keys, her DTR has keys. IM Medicare letter: Will need 2nd IM Caregiver Contact: Nancy Juan (Daughter) [phone] Discharge Caregiver contacted prior to discharge? 4:00pm- CM met with pt at bedside to discuss d/c plan. CM introduced self and role. CM inquired with pt about SNF. Pt declined. Pt stated that she would like to return home with resumption of home health with [facility]. [facility] document signed by pt and placed in pt's bedside chart. ADY orders sent to [facility] via [employer]. CM will continue to follow patient for discharge planning needs and arrange for services as deemed necessary. Penelope Birch [address] 
[phone]

## 2021-02-26 NOTE — PROGRESS NOTES
[address] INTERDISCIPLINARY ROUNDS Cardiopulmonary Care Interdisciplinary Rounds were held today to discuss patient's plan of care and outcomes. The following members were present: NP/Physician, Pharmacy, Nursing and Case Management. PLAN OF CARE:  
Continue current treatment plan.   
 
Expected Length of Stay:  4d 7h

## 2021-02-26 NOTE — PROGRESS NOTES
Problem: Mobility Impaired (Adult and Pediatric) Goal: *Acute Goals and Plan of Care (Insert Text) Description: FUNCTIONAL STATUS PRIOR TO ADMISSION: Patient reports she has recently been mostly w/c bound in her apartment, has been working with PT using rollator but was not safe to ambulate alone. HOME SUPPORT PRIOR TO ADMISSION: Patient lives alone in a senior apartment, daughter lives nearby and stops in to assist daily. Patient reports she could dress and do sponge bath. Physical Therapy Goals Initiated 2/24/2021 1. Patient will move from supine to sit and sit to supine  in bed with minimal assistance/contact guard assist within 7 day(s). 2.  Patient will transfer from bed to chair and chair to bed with minimal assistance/contact guard assist using the least restrictive device within 7 day(s). 3.  Patient will perform sit to stand with minimal assistance/contact guard assist within 7 day(s). 4.  Patient will ambulate with minimal assistance/contact guard assist for 5 feet with the least restrictive device within 7 day(s). Outcome: Progressing Towards Goal 
 PHYSICAL THERAPY TREATMENT Patient: Adelina Pendleton (83 y.o. female) Date: 2/26/2021 Diagnosis: Sepsis ([facility] [address] [A41.9] Symptomatic anemia Precautions:   
Chart, physical therapy assessment, plan of care and goals were reviewed. ASSESSMENT Patient continues with skilled PT services and is slowly progressing towards goals. Patient received in bed, very fearful of getting up to chair and required max encouragement to agree. Patient able to come to sit on EOB with min assist x 2, initially required  support but improved with time. Patient complained of being dizzy, but BP checked and patient was not orthostatic. Patient stood with hand held assist x 2 and ambulated x approx 3 feet to chair with slow shuffling gait. Patient positioned with pillows and feet elevated but immediately requested getting back to bed and was very anxious, with resp rate in the high 30s but sats at 97%. Patient educated on taking slow deep breaths and RN in room to reassure her that she would be returned to bed in 30 minutes. Patient left with call bell in reach, still below baseline and making slow progress but  note indicates she is not agreeable to SNF. If patient discharges home she will require increased assistance and HHPT. Current Level of Function Impacting Discharge (mobility/balance): min assist x 2, extra time, very anxious and tearful Other factors to consider for discharge: lives alone, below baseline, high falls risk, low functional level PLAN : 
Patient continues to benefit from skilled intervention to address the above impairments. Continue treatment per established plan of care. to address goals. Recommendation for discharge: (in order for the patient to meet his/her long term goals) To be determined: SNF vs home with HHPT and increased assistance This discharge recommendation: A follow-up discussion with the attending provider and/or case management is planned IF patient discharges home will need the following DME: patient owns DME required for discharge SUBJECTIVE:  
Patient stated I can't do this, please get me back in bed.  OBJECTIVE DATA SUMMARY:  
Critical Behavior: 
Neurologic State: Alert Orientation Level: Oriented to person Cognition: Follows commands Functional Mobility Training: 
Bed Mobility: 
Rolling: Minimum assistance Supine to Sit: Minimum assistance;Assist x2 Scooting: Contact guard assistance Transfers: 
Sit to Stand: Minimum assistance;Assist x2 Stand to Sit: Minimum assistance;Assist x2 Bed to Chair: Minimum assistance;Assist x2 Balance: 
Sitting: Intact Standing: Impaired Standing - Static: Constant support; Fair 
Standing - Dynamic : Constant support; Dearl Chamber Ambulation/Gait Training: 
Distance (ft): 3 Feet (ft) Ambulation - Level of Assistance: Minimal assistance;Assist x2; Other (comment)(hand held) Gait Abnormalities: Shuffling gait Base of Support: Widened Speed/Tamiko: Shuffled; Slow Stairs: Activity Tolerance:  
Fair, requires frequent rest breaks, and observed SOB with activity,anxious After treatment patient left in no apparent distress:  
Sitting in chair and Call bell within reach, LEs elevated COMMUNICATION/COLLABORATION:  
The patients plan of care was discussed with: Registered nurse. Jamin Esquivel, PT Time Calculation: 41 mins

## 2021-02-26 NOTE — PROGRESS NOTES
RAPID RESPONSE TEAM - follow up Rounded on patient due to recent RRT. Discussed with primary RN. No acute concerns, VSS, MEWS 1. Visit Vitals BP 94/60 (BP 1 Location: Right upper arm) Pulse 82 Temp 98 °F (36.7 °C) Resp 18 Ht 5' 5\" (1.651 m) Wt 82 kg (180 lb 12.4 oz) SpO2 94% Breastfeeding No  
BMI 30.08 kg/m² No RRT interventions indicated at this time. Please call with any questions or concerns. Khushboo Jiménez Rapid Response RN Bonilla Krause

## 2021-02-26 NOTE — PROGRESS NOTES
SPEECH PATHOLOGY BEDSIDE SWALLOW EVALUATION/DISCHARGE Patient: Kenna Smith (82 y.o. female) Date: 2/26/2021 Primary Diagnosis: Sepsis ([address] [A41.9] Precautions:     
 
ASSESSMENT : 
Based on the objective data described below, the patient presents with [facility] oral/pharyngeal swallow, and no overt s/s aspiration observed. Suspect esophageal dysphagia as patient reported reflux that has worsened, globus sensation while pointing to the upper chest, and that water helps with globus sensation. Patient reported sensation has worsened recently, and also reported that she is on more pain medication now. Discussed strategies as below, and patient verbalized understanding. Skilled acute therapy provided by a speech-language pathologist is not indicated at this time. PLAN : 
Recommendations: 
-Continue mechanical soft/thin liquid diet 
-More frequent, smaller meals 
-Liquid wash 
-Extra moisture/sauce with solids -Avoid cold liquids 
-Note reflux is being managed medically Discharge Recommendations: None SUBJECTIVE:  
Patient stated that she gets choked, however upon further questioning, \"choked\" means globus sensation in upper chest. 
 
OBJECTIVE:  
 
Past Medical History:  
Diagnosis Date  Acute kidney injury superimposed on CKD ([address] 2/22/2021  Adverse reaction to antineoplastic drug 2/22/2021  
 ([id number])- Letrozole initiated  Pancytopenia, diarrhea, myalgias  Arthralgia 8/17/2017  Arthritis  Asthma Mild to Moderate  Breast cancer ([address] 8/17/2017 Onset 1997; Refusing Mammogram 6/16/17  Breast cancer metastasized to liver ([address] 9/1/2020  Breast cancer metastasized to lung ([address] 9/1/2020  Cancer ([address] 1997 Breast left  Cardiomyopathy ([address] 8/17/2017 Onset:1999 Ischemic; 25% - 50% (last EFx 45%) Continue with ACE/Lasix/coreg  Cataract 8/17/2017 Bilateral   
 Cellulitis 8/17/2017 Suspect local reaction to Pneumovax, treat with ice, NSAIDs or Tylenol  Chest pain at rest 8/17/2017  CHF (congestive heart failure) ([address] 8/17/2017 Stable, though weight up a little. To take 1 1/2 Lasix daily if swelling, 1 daily o/w  Chronic maxillary sinusitis 8/17/2017  Chronic pain Right knee  COPD (chronic obstructive pulmonary disease) ([address] 8/17/2017 Continue with inhalers  Diabetes ([address] Pre-diabetic  DJD (degenerative joint disease) 8/17/2017  Elevated BUN 8/17/2017  Encounter for monoclonal antibody treatment for malignancy 2/22/2021  
 ([id number])-  [facility]  Esophagitis, reflux 8/17/2017  Fatigue 8/17/2017  GERD (gastroesophageal reflux disease)  Heart failure ([address] Cardiomyopathy, HX of MI 1999  Hyperglycemia 8/17/2017  Hyperkalemia 8/17/2017  Hyperlipidemia 8/17/2017  Hypertension 8/17/2017  Hypocalcemia 2/22/2021  Hypomagnesemia 2/21/2021  Hypophosphatemia 2/22/2021  Hypovolemic shock ([address] 2/22/2021  Ill-defined condition Vertigo  Ill-defined condition 2003 HX of Urosepsis complicated by respiratory failure and pneumonnia  Kidney disease, chronic, stage IV (GFR 15-29 ml/min) ([address] 2/22/2021  Long term (current) use of aspirin 2/22/2021 ASA 81 mg Daily  Macrocytic anemia 2/21/2021  Malignant neoplasm metastatic to breast ([address] 12/1/2020  Mild protein-calorie malnutrition ([address] 2/22/2021  Myalgia 8/17/2017  Pancytopenia due to antineoplastic chemotherapy ([address] 2/22/2021  S/P laminectomy 9/29/2020  
 ([id number])- S/P Segmental posterior cervicothoracic fusion C5 to T5, and arthrodesis C5 to T5 with cancellous bone chips and DBM putty, and partial laminectomy T2  Metastatic disease through T1, T2 and T3, with severe spinal stenosis and impingement on the cord.  S/P left mastectomy 1/1/1997 (1997)- Left breast cancer % MA 60% Her 2 -ve, initial diagnosis in the 1990s  status post left mastectomy chemo and radiation  S/P total knee arthroplasty, right 6/22/2017  
 ([id number])  Symptomatic anemia 2/22/2021  Thromboembolus ([facility] [address] 1969  
 during 2nd pregnancy Junior Hernández Bending 8/17/2017  Vertigo, aural 8/17/2017  Vitamin D deficiency 8/17/2017 Past Surgical History:  
Procedure Laterality Date [address] [facility] JONNY  
 HX GYN Left Breast Mastectomy  HX HEENT  2015 Bilateral Cataract  HX ORTHOPAEDIC Right 06/2018  
 knee replacement  HX ORTHOPAEDIC Left 11/2018  
 wrist surgery  HX VASCULAR ACCESS Port a cath on the right Prior Level of Function/Home Situation:  
Home Situation Home Environment: Apartment # Steps to Enter: 0 One/Two Story Residence: One story Living Alone: Yes Support Systems: Child(vicky) Patient Expects to be Discharged to[de-identified] [facility] Current DME Used/Available at Home: Shower chair, Chancy Abu, rollator, Wheelchair Tub or Shower Type: Tub/Shower combination Diet prior to admission:  
Current Diet:  Mechanical soft/thin  
Cognitive and Communication Status: 
Neurologic State: Alert Orientation Level: Oriented to person Cognition: Follows commands Oral Assessment: P.O. Trials: 
Patient Position: upright in bed Vocal quality prior to P.O.: No impairment Consistency Presented: Thin liquid How Presented: Self-fed/presented;Straw;Successive swallows Bolus Acceptance: No impairment Bolus Formation/Control: No impairment Propulsion: No impairment Oral Residue: None Initiation of Swallow: No impairment Laryngeal Elevation: Functional 
Aspiration Signs/Symptoms: None Pharyngeal Phase Characteristics: No impairment, issues, or problems Oral Phase Severity: No impairment Pharyngeal Phase Severity : No impairment NOMS:  
 The NOMS functional outcome measure was used to quantify this patient's level of swallowing impairment. Based on the NOMS, the patient was determined to be at level 5 for swallow function NOMS Swallowing Levels: 
Level 1 (CN): NPO Level 2 (CM): NPO but takes consistency in therapy Level 3 (CL): Takes less than 50% of nutrition p.o. and continues with nonoral feedings; and/or safe with mod cues; and/or max diet restriction Level 4 (CK): Safe swallow but needs mod cues; and/or mod diet restriction; and/or still requires some nonoral feeding/supplements Level 5 (CJ): Safe swallow with min diet restriction; and/or needs min cues Level 6 (CI): Independent with p.o.; rare cues; usually self cues; may need to avoid some foods or needs extra time Level 7 (CH): Independent for all p.o. JESSIE. (2003). National Outcomes Measurement System (NOMS): Adult Speech-Language Pathology User's Guide. Pain: 
Pain Scale 1: Numeric (0 - 10) Pain Intensity 1: 5 Pain Location 1: Back;Neck After treatment:  
Patient left in no apparent distress in bed, Call bell within reach and Nursing notified COMMUNICATION/EDUCATION:  
Patient was educated regarding her deficit(s) of dysphagia as this relates to her diagnosis. She demonstrated Good understanding as evidenced by verbalizing understanding. The patient's plan of care including recommendations, planned interventions, and recommended diet changes were discussed with: Registered nurse. Thank you for this referral. 
ESTELA Bell Time Calculation: 10 mins

## 2021-02-26 NOTE — ROUTINE PROCESS
End of Shift Note Bedside shift change report given to  (oncoming nurse) by Sylvie Bloch, RN (offgoing nurse). Report included the following information SBAR, Kardex, Intake/Output and Cardiac Rhythm NSR Shift worked  NIGHTS:   
  
Shift summary and any significant changes:  
   
  
Concerns for physician to address:   
  
Zone phone for oncoming shift:    
  
 
Activity: 
Activity Level: Up with Assistance Number times ambulated in hallways past shift: 0 Number of times OOB to chair past shift: 0 Cardiac:  
Cardiac Monitoring: Yes     
Cardiac Rhythm: Normal sinus rhythm Access:  
Current line(s): PIV and port Genitourinary:  
Urinary status: voiding, incontinent and external catheter Respiratory:  
O2 Device: Nasal cannula Chronic home O2 use?: YES Incentive spirometer at bedside: YES 
  
GI: 
Last Bowel Movement Date: 02/21/21 Current diet:  DIET NUTRITIONAL SUPPLEMENTS Lunch, Dinner; Naima-Juan F DIET NUTRITIONAL SUPPLEMENTS Dinner, Breakfast; Ensure [employer] DIET MECHANICAL SOFT 
DIET NUTRITIONAL SUPPLEMENTS Breakfast; Ensure Pudding DIET NUTRITIONAL SUPPLEMENTS Lunch; Ensure Clear Passing flatus: YES Tolerating current diet: YES 
% Diet Eaten: 25 % Pain Management:  
Patient states pain is manageable on current regimen: YES Skin: 
Shan Score: 16 Interventions: turn team, float heels, increase time out of bed, PT/OT consult and internal/external urinary devices Patient Safety: 
Fall Score: Total Score: 3 Interventions: bed/chair alarm, assistive device (walker, cane, etc), gripper socks, pt to call before getting OOB and stay with me (per policy) High Fall Risk: Yes Length of Stay: 
Expected LOS: 4d 7h Actual LOS: 5 Sylvie Bloch, RN

## 2021-02-26 NOTE — PROGRESS NOTES
Hospitalist Progress Note NAME: Ta Torres :  1945 MRN:  [id number] Assessment / Plan: 
Shock, likely hypovolemic Diarrhea, poor PO intake prior to admission Monitor BP Remains on off pressors Mentation intact despite borderline BPs Downgraded diet to Full Liquid as per pt request  
Continue with empiric abx for now : 
BP trend has improved but worsening renal function noted AMAN Hyperkalemia Give Insulin, D50, Kayexcelate, Calcium Gluconate and nebs Restart IVF 
PO intake slowly improving Repeat labs Mobilize to chair : 
Cr stabilized Titrate up diet K wnl Continue IVF Mobilize today Acute hypercapnic respiratory failure - improving CXR results noted O2 supplementation as needed - wean as tolerated : 
Decrease steroids On 2.5L NC at night - may need it during the day for a short time. Currently on 4L NC. 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 
GERD Chronic systolic HF 
COPD, not in exacerbation Continue home meds as above Currently BP and HF meds on hold given borderline hypotension 30.0 - 39.9 Obese / Body mass index is 29.85 kg/m². Code status: Full Prophylaxis: Heparin Recommended Disposition: TBD Subjective: Chief Complaint / Reason for Physician Visit Feeling better this morning. Has \"a little more energy. \"  Discussed with RN events overnight. Review of Systems: 
Symptom Y/N Comments  Symptom Y/N Comments Fever/Chills    Chest Pain n   
Poor Appetite    Edema Cough    Abdominal Pain n   
Sputum    Joint Pain SOB/TAVAREZ    Pruritis/Rash Nausea/vomit y   Tolerating PT/OT Diarrhea    Tolerating Diet Constipation    Other Could NOT obtain due to:   
 
Objective: VITALS:  
Last 24hrs VS reviewed since prior progress note. Most recent are: 
Patient Vitals for the past 24 hrs: 
 Temp Pulse Resp BP SpO2  
02/26/21 0809 98.3 °F (36.8 °C) 86 21 126/79 95 % 02/26/21 0800  85 18 (!) 127/93 96 % 02/26/21 0734     96 % 02/26/21 0400 98 °F (36.7 °C) 80 20 125/80 98 % 02/25/21 2242 98 °F (36.7 °C) 81 19 106/72 98 % 02/25/21 2200  83 15 97/63 92 % 02/25/21 2014     94 % 02/25/21 1929 98 °F (36.7 °C) 82 18 94/60 90 % 02/25/21 1611  96  112/78 94 % 02/25/21 1448 98.1 °F (36.7 °C) 81 18 94/66 96 % 02/25/21 1049 97.8 °F (36.6 °C) 82 21 101/63 97 % Intake/Output Summary (Last 24 hours) at 2/26/2021 7937 Last data filed at 2/26/2021 7481 Gross per 24 hour Intake 360 ml Output 600 ml Net -240 ml I had a face to face encounter and independently examined this patient on 2/26/2021, as outlined below: PHYSICAL EXAM: 
General: Alert, cooperative, ill appearing EENT:  EOMI. Anicteric sclerae. MMM Resp:  CTA bilaterally, no wheezing or rales. No accessory muscle use CV:  Regular  rhythm,  No edema GI:  Soft, Non distended, Non tender. +Bowel sounds Neurologic:  Alert and oriented X 3, normal speech, Psych:   Good insight. Not anxious nor agitated Skin:  Pale Reviewed most current lab test results and cultures  YES Reviewed most current radiology test results   YES Review and summation of old records today    NO Reviewed patient's current orders and MAR    YES 
PMH/SH reviewed - no change compared to H&P 
________________________________________________________________________ Care Plan discussed with: 
  Comments Patient x Family  x   
RN x Care Manager Consultant Multidiciplinary team rounds were held today with , nursing, pharmacist and clinical coordinator. Patient's plan of care was discussed; medications were reviewed and discharge planning was addressed. ________________________________________________________________________ Total NON critical care TIME:  35   Minutes Total CRITICAL CARE TIME Spent:   Minutes non procedure based Comments >50% of visit spent in counseling and coordination of care    
________________________________________________________________________ Adrian Gregory MD  
 
Procedures: see electronic medical records for all procedures/Xrays and details which were not copied into this note but were reviewed prior to creation of Plan. LABS: 
I reviewed today's most current labs and imaging studies. Pertinent labs include: 
Recent Labs  
  02/25/21 
0255 02/24/21 
0306 02/23/21 
1732 WBC 2.2* 1.7* 3.9 HGB 9.3* 9.8* 10.0* HCT 28.3* 30.3* 30.6* PLT 86* 97* 120* Recent Labs  
  02/26/21 
0304 02/25/21 
0255 02/24/21 
5817  138 140  
K 5.0 5.3* 4.9  
 107 109* CO2 22 23 20* * 161* 146* BUN 43* 30* 16 CREA 2.02* 2.02* 1.20* CA 6.8* 6.9* 7.0*  7.4* MG  --  2.2  --   
PHOS  --  4.2  --   
 
 
Signed: Adrian Gregory MD

## 2021-02-26 NOTE — PROGRESS NOTES
Received message from patient's nurse Jaja Arora stating : 
 
Pt c/o acid reflux she hasn't taken her Prilosec since admission 
and is requesting it 
also can you order tums she takes PRN Discussion / orders: 
 
Per home medication list patient takes Prilosec 20 mg by mouth daily Prilosec is nonformulary at hospital-ordered Protonix 40 mg by mouth daily Also ordered Tums 4 times daily as needed Please note that this note was dictated using Dragon computer voice recognition software. Quite often unanticipated grammatical, syntax, homophones, and other interpretive errors are inadvertently transcribed by the computer software. Please disregard these errors. Please excuse any errors that have escaped final proofreading.

## 2021-02-26 NOTE — PROGRESS NOTES
02/26/21 1216 Vitals Temp 98.4 °F (36.9 °C) Temp Source Oral  
Pulse (Heart Rate) (!) 104 Heart Rate Source Monitor Resp Rate 24  
O2 Sat (%) 98 % Level of Consciousness Alert  
BP (!) 125/94 MAP (Calculated) 104 MEWS Score 3  
 
MEWS 3 due to HR. Pt nauseous currently. Gave IV Zofran.

## 2021-02-27 NOTE — PROGRESS NOTES
Spoke with Dr. Evan Leventhal regarding pt being hypotensive after amio bolus. Received verbal order to proceed with Amio drip and to hold drip is SBP <85. Primary RN Luther Amaya made aware.

## 2021-02-27 NOTE — PROGRESS NOTES
Hospitalist Progress Note NAME: Sharita Caruso :  1945 MRN:  [id number] Assessment / Plan: 
Shock, likely hypovolemic Diarrhea, poor PO intake prior to admission Monitor BP Remains on off pressors Mentation intact despite borderline BPs Downgraded diet to Full Liquid as per pt request  
Continue with empiric abx for now : 
BP trend has improved but worsening renal function noted AMAN Hyperkalemia Give Insulin, D50, Kayexcelate, Calcium Gluconate and nebs Restart IVF 
PO intake slowly improving Repeat labs Mobilize to chair : 
Cr stabilized Titrate up diet K wnl Continue IVF Mobilize today : 
Cr improved K elevated again - give Insulin, D50, Kayexcelate, Calcium Gluconate and nebs Mobilize again Acute hypercapnic respiratory failure - improving CXR results noted O2 supplementation as needed - wean as tolerated : 
Decrease steroids On 2.5L NC at night - may need it during the day for a short time. Currently on 4L NC. Wean as tolerated Right sided breast carcinoma metastatic to the bone, liver and lung % HI 60% Her 2 -ve Pancytopenia Acute on chronic anemia Oncology evals on going Chemo currently being held Hgb and Plt stable - no transfusion indicated at this time HTN 
GERD Chronic systolic HF 
COPD, not in exacerbation Continue home meds as above Currently BP and HF meds on hold given borderline hypotension 30.0 - 39.9 Obese / Body mass index is 29.95 kg/m². Code status: Full Prophylaxis: Heparin Recommended Disposition: TBD Subjective: Chief Complaint / Reason for Physician Visit Not feeling well this morning. Feels weak. Expectations need to be managed. Discussed with RN events overnight. Review of Systems: 
Symptom Y/N Comments  Symptom Y/N Comments Fever/Chills    Chest Pain n   
Poor Appetite    Edema Cough    Abdominal Pain n   
Sputum    Joint Pain SOB/TAVAREZ    Pruritis/Rash Nausea/vomit y   Tolerating PT/OT Diarrhea    Tolerating Diet Constipation    Other Could NOT obtain due to:   
 
Objective: VITALS:  
Last 24hrs VS reviewed since prior progress note. Most recent are: 
Patient Vitals for the past 24 hrs: 
 Temp Pulse Resp BP SpO2  
02/27/21 0738     100 % 02/27/21 0731 97.6 °F (36.4 °C) 88 27 112/82 100 % 02/27/21 0600  82 20 114/76 100 % 02/27/21 0400 97 °F (36.1 °C) 85 13 119/83 98 % 02/27/21 0200 98.4 °F (36.9 °C) 81 21 113/79 98 % 02/27/21 0000 98 °F (36.7 °C) 86 15 113/74 97 % 02/26/21 2253 98 °F (36.7 °C) 97 22 110/78 97 % 02/26/21 1935     99 % 02/26/21 1906 98 °F (36.7 °C) 88 20 113/70 99 % 02/26/21 1811  85 23 112/74   
02/26/21 1801  87 20 106/80   
02/26/21 1520 98.4 °F (36.9 °C) (!) 106 24 (!) 121/96   
02/26/21 1216 98.4 °F (36.9 °C) (!) 104 24 (!) 125/94 98 % 02/26/21 1200 98.4 °F (36.9 °C) 85 21 120/80 98 % Intake/Output Summary (Last 24 hours) at 2/27/2021 1039 Last data filed at 2/27/2021 9311 Gross per 24 hour Intake 75 ml Output 1150 ml Net -1075 ml I had a face to face encounter and independently examined this patient on 2/27/2021, as outlined below: PHYSICAL EXAM: 
General: Alert, cooperative, ill appearing EENT:  EOMI. Anicteric sclerae. MMM Resp:  CTA bilaterally, no wheezing or rales. No accessory muscle use CV:  Regular  rhythm,  No edema GI:  Soft, Non distended, Non tender. +Bowel sounds Neurologic:  Alert and oriented X 3, normal speech, Psych:   Good insight. Not anxious nor agitated Skin:  Pale Reviewed most current lab test results and cultures  YES Reviewed most current radiology test results   YES Review and summation of old records today    NO Reviewed patient's current orders and MAR    YES 
PMH/ reviewed - no change compared to H&P 
________________________________________________________________________ Care Plan discussed with: 
  Comments Patient x Family  x   
RN x Care Manager Consultant Multidiciplinary team rounds were held today with , nursing, pharmacist and clinical coordinator. Patient's plan of care was discussed; medications were reviewed and discharge planning was addressed. ________________________________________________________________________ Total NON critical care TIME:  35   Minutes Total CRITICAL CARE TIME Spent:   Minutes non procedure based Comments >50% of visit spent in counseling and coordination of care    
________________________________________________________________________ Maciel Carter MD  
 
Procedures: see electronic medical records for all procedures/Xrays and details which were not copied into this note but were reviewed prior to creation of Plan. LABS: 
I reviewed today's most current labs and imaging studies. Pertinent labs include: 
Recent Labs  
  02/25/21 
0255 WBC 2.2* HGB 9.3* HCT 28.3*  
PLT 86* Recent Labs  
  02/27/21 
0526 02/26/21 
0304 02/25/21 
0255  138 138  
K 5.2* 5.0 5.3*  
* 107 107 CO2 23 22 23 * 138* 161* BUN 55* 43* 30* CREA 1.77* 2.02* 2.02* CA 6.9* 6.8* 6.9*  
MG  --   --  2.2 PHOS  --   --  4.2 Signed: Maciel Carter MD

## 2021-02-27 NOTE — ROUTINE PROCESS
End of Shift Note Bedside shift change report given to  (oncoming nurse) by Jaxson Correa RN (offgoing nurse). Report included the following information SBAR, Kardex and Cardiac Rhythm NSR Shift worked:  NIGHTS Shift summary and any significant changes:  
   
  
Concerns for physician to address:   
  
Zone phone for oncoming shift:    
  
 
Activity: 
Activity Level: Up with Assistance Number times ambulated in hallways past shift: 0 Number of times OOB to chair past shift: 0 Cardiac:  
Cardiac Monitoring: Yes     
Cardiac Rhythm: Normal sinus rhythm Access:  
Current line(s): PIV and port Genitourinary:  
Urinary status: voiding external cath Respiratory:  
O2 Device: Nasal cannula Chronic home O2 use?: YES Incentive spirometer at bedside: YES 
  
GI: 
Last Bowel Movement Date: 02/21/21 Current diet:  DIET NUTRITIONAL SUPPLEMENTS Lunch, Dinner; Naima-Juan F DIET NUTRITIONAL SUPPLEMENTS Dinner, Breakfast; Ensure [employer] DIET MECHANICAL SOFT 
DIET NUTRITIONAL SUPPLEMENTS Breakfast; Ensure Pudding DIET NUTRITIONAL SUPPLEMENTS Lunch; Ensure Clear Passing flatus: YES Tolerating current diet: YES 
% Diet Eaten: 25 % Pain Management:  
Patient states pain is manageable on current regimen: YES Skin: 
Shan Score: 16 Interventions: turn team, increase time out of bed, PT/OT consult, limit briefs, internal/external urinary devices and nutritional support Patient Safety: 
Fall Score: Total Score: 3 Interventions: bed/chair alarm, gripper socks, pt to call before getting OOB and stay with me (per policy) High Fall Risk: Yes Length of Stay: 
Expected LOS: 4d 7h Actual LOS: 5 Jaxson Correa RN

## 2021-02-27 NOTE — PROGRESS NOTES
0730 Bedside shift change report given to [address] [place] [id number] Dariel Park (oncoming nurse) by Ora Fonseca (offgoing nurse). Report included the following information SBAR. End of Shift Note Bedside shift change report given to Andriy Harry RN (oncoming nurse) by Jeffery Arnold (offgoing nurse). Report included the following information SBAR Shift worked:  [id number] Shift summary and any significant changes:  
  Pt has not eaten much today. She got up up to the chair for 30 minutes. Concerns for physician to address:   
  
Zone phone for oncoming shift:   [phone] Activity: 
Activity Level: Up with Assistance Number times ambulated in hallways past shift: 0 Number of times OOB to chair past shift: 1 Cardiac:  
Cardiac Monitoring: Yes     
Cardiac Rhythm: Normal sinus rhythm Access:  
Current line(s): PIV and port Genitourinary:  
Urinary status: external catheter Respiratory:  
O2 Device: Nasal cannula Chronic home O2 use?: YES Incentive spirometer at bedside: NO 
  
GI: 
Last Bowel Movement Date: 02/21/21 Current diet:  DIET NUTRITIONAL SUPPLEMENTS Lunch, Dinner; Naima-Juan F DIET NUTRITIONAL SUPPLEMENTS Dinner, Breakfast; Ensure [employer] DIET MECHANICAL SOFT 
DIET NUTRITIONAL SUPPLEMENTS Breakfast; Ensure Pudding DIET NUTRITIONAL SUPPLEMENTS Lunch; Ensure Clear Passing flatus: YES Tolerating current diet: NO 
% Diet Eaten: 25 % Pain Management:  
Patient states pain is manageable on current regimen: YES Skin: 
Shan Score: 16 Interventions: turn team, speciality bed, float heels, increase time out of bed, PT/OT consult, internal/external urinary devices and nutritional support Patient Safety: 
Fall Score: Total Score: 3 Interventions: bed/chair alarm, assistive device (walker, cane, etc), gripper socks and pt to call before getting OOB High Fall Risk: Yes Length of Stay: 
Expected LOS: 4d 7h Actual LOS: 5 Jeffery Arnold

## 2021-02-27 NOTE — PROGRESS NOTES
0700 Bedside shift change report given to Emma Langford (oncoming nurse) by Mikala Donaldson RN (offgoing nurse). Report included the following information SBAR, Kardex, Intake/Output and MAR.  
 
1602 Perfect served Dr. Nathanael Pearce about the pt being Afib and daughter being concerned about the pts HR and also the pts HR was fluctuating anywhere from the 120s to the 150s  
 
1606 Dr. Nathanael Pearce informed me to get a ekg, give one dose of lovenox, to d/c heparin, have rapid response nurse to see her and consult cardiology. 1652 Rapid response nurse Noemi seen pt ordered labs and started on amnio drip.

## 2021-02-28 NOTE — PROGRESS NOTES
1300: straight cath per Dr. Nelson Page. 150cc drained. Urine sample sent to lab End of Shift Note Bedside shift change report given to JEREMY Yeager (oncoming nurse) by Sarmad Rose RN (offgoing nurse). Report included the following information SBAR, Kardex, Intake/Output, MAR, Recent Results, Med Rec Status and Cardiac Rhythm NSR Shift worked: day Shift summary and any significant changes:  
 amio restarted. Seen by nephro and cards. 1 amp bicarb pushed in am. 
 
Kayex, r insulin, d50 and calcium gluconate given for hyperkalemia in pm. 
  
Concerns for physician to address: Increased AMS, Pt states \"reflux when she swallows\" Zone phone for oncoming shift:   
  
 
Activity: 
Activity Level: Up with Assistance Number times ambulated in hallways past shift: 0 Number of times OOB to chair past shift: 0 Cardiac:  
Cardiac Monitoring: Yes     
Cardiac Rhythm: Atrial fibrillation Access:  
Current line(s): port , piv Genitourinary:  
Urinary status: due to void and external catheter Respiratory:  
O2 Device: Nasal cannula Chronic home O2 use?: YES Incentive spirometer at bedside: NO 
  
GI: 
Last Bowel Movement Date: 02/27/21 Current diet:  DIET NUTRITIONAL SUPPLEMENTS Lunch, Dinner; Naima-Juan F DIET MECHANICAL SOFT 
DIET NUTRITIONAL SUPPLEMENTS Breakfast; Ensure Pudding DIET NUTRITIONAL SUPPLEMENTS Lunch; Ensure Clear DIET NUTRITIONAL SUPPLEMENTS Dinner, Breakfast; Nepro Passing flatus: NO Tolerating current diet: NO 
% Diet Eaten: (refused) Pain Management:  
Patient states pain is manageable on current regimen: YES Skin: 
Shan Score: 15 Interventions: increase time out of bed and internal/external urinary devices Patient Safety: 
Fall Score: Total Score: 5 Interventions: bed/chair alarm and pt to call before getting OOB High Fall Risk: Yes Length of Stay: 
Expected LOS: 4d 7h Actual LOS: 7 Sarmad Rose RN

## 2021-02-28 NOTE — PROGRESS NOTES
RAPID RESPONSE TEAM 
 
Stopped by primary RN Raf Augustine while rounding over concern of patient with hypotension, 75/37, HR 86 NSR. Earlier RRT was called due to A fib RVR, received an amio 150 mg bolus and albumin 25% 12.5 g, was to initiate amio gtt at 1 mg/hr. Upon bedside assessment, patient is alert, states she is in pain all over. Labored, air hungery respirations, crackles in R lung field. Skin cool, clammy, extremities very cold, rectal temp 97.9. . NP Purveyor notified, requested NP come to bedside, order received for additional albumin 25% 25 grams. NP arrived to bedside, order received to check ABG and initiate Bipap. NP to discuss code status with family. 2107: NP discussed code status with patient's family, code status to be DNR. Due to labile BP and possible need pressors, transfer order received for transfer to PCU. Order received for phenylephrine gtt. 2157: Patient now refusing Bipap, NP Purveyor notified, order received for 1 amp HCO3 and to initiated Bicarb gtt. Transferred patient to room 2274, care handed over to PCU RN Pilekrogen 51. Patient Vitals for the past 12 hrs: 
 Temp Pulse Resp BP SpO2  
02/27/21 2100  83 18 (!) 92/59 98 % 02/27/21 2059     98 % 02/27/21 2056 97.9 °F (36.6 °C)      
02/27/21 2045  82 20 (!) 75/36 95 % 02/27/21 2030  84 20 (!) 70/55   
02/27/21 2015  84 19 (!) 77/51   
02/27/21 2013  84 20 92/60   
02/27/21 2009  84 22 (!) 86/44   
02/27/21 2006  83 21 (!) 75/37   
02/27/21 2000  83 20 (!) 78/68   
02/27/21 1958  83 20 (!) 84/53   
02/27/21 1945  84 22 99/62 92 % 02/27/21 1930 97 °F (36.1 °C) 86 23 110/75 96 % 02/27/21 1918     95 % 02/27/21 1805  98 (!) 32 96/77   
02/27/21 1734  (!) 142 18 109/66 98 % 02/27/21 1653  (!) 134 27 109/86 98 % 02/27/21 1447 97.3 °F (36.3 °C) (!) 117 (!) 33 108/85 94 % 02/27/21 1050 98 °F (36.7 °C) (!) 114 22 115/79 100 % ABG:   
Lab Results Component Value Date/Time PHI 7.15 (LL) 02/27/2021 08:52 PM  
 PCO2I 45.1 (H) 02/27/2021 08:52 PM  
 PO2I 83 02/27/2021 08:52 PM  
 HCO3I 15.6 (L) 02/27/2021 08:52 PM  
 
 
Please call with any needs or concerns. Felipa Michelle Rapid Response JEREMY Rowe

## 2021-02-28 NOTE — PROGRESS NOTES
RT Note: Pt transferred to PCU Rm# [id number] from [address] [place] [address] currently wearing NRB mask . HR 80, RR 19, O2 sat's 100%. Pt was briefly wearing BIPAP in [address] [place] [address] but did not like it and wanted it taken off. Pt changed to NRB mask afterwards. Will keep Bipap at bedside overnite if needed. RN aware of change

## 2021-02-28 NOTE — CONSULTS
Nephrology Consult Note Papo Maldonado  
 
www. [email or web address]              Phone - [phone] Patient: Mark Dense YOB: 1945 Date- 2/28/2021 MRN: [id number] REASON FOR CONSULTATION: laura, hyperkalemia CONSULTING PHYSICIAN: DR. STEPHENSON   
ADMIT DATE:2/21/2021 PATIENT PCP:Gin Azar NP IMPRESSION & PLAN:  
?  acute kidney injury ? Hyperkalemia ? Hypotension ? Shock ? Diarrhea ? Rt Breast ca- with mets--% MS 60% Her 2 -ve 
? afib ? H/o left mastectomy in 1990 PLAN- 
· 1 amp of bicarb now · Start bicarb gtt · lokelma po x 1 
· Check bmp this pm 
· Avoid hypotension · Check urine lytes · Check renal usg · Principal Problem: ·   Symptomatic anemia (2/22/2021) ·  
· Active Problems: ·   Breast cancer metastasized to liver ([address] (9/1/2020) ·  
·   Breast cancer metastasized to lung ([address] (9/1/2020) ·  
·   Macrocytic anemia (2/21/2021) ·  
·   Pancytopenia due to antineoplastic chemotherapy ([address] (2/22/2021) ·     Overview: ([id number])- Letrozole initiated ·  
·   Kidney disease, chronic, stage IV (GFR 15-29 ml/min) ([address] (2/22/2021) ·  
·   Acute kidney injury superimposed on CKD ([address] (2/22/2021) ·  
·   Hypovolemic shock ([address] (2/22/2021) ·  
·   Hypocalcemia (2/22/2021) ·  
·   Mild protein-calorie malnutrition ([address] (2/22/2021) ·  
·   Hypophosphatemia (2/22/2021) ·  
·   Hypomagnesemia (2/21/2021) ·  
·   Severe sepsis with acute organ dysfunction ([address] (2/21/2021) ·  
·   Long term (current) use of aspirin (2/22/2021) ·     Overview: ASA 81 mg Daily ·  
·   S/P total knee arthroplasty, right (6/22/2017) ·     Overview: ([id number]) ·  
·   S/P left mastectomy (1/1/1997) ·     Overview: (1997)- ·     Left breast cancer % [place] 60% Her 2 -ve, initial diagnosis in the  
·     1990s ·     status post left mastectomy chemo and radiation · · ·   Malignant neoplasm metastatic to breast ([facility] (12/1/2020) ·     Overview: (Dec/2020)- 
·     Metachronous breast carcinoma, Right breast 
·  
·   Encounter for monoclonal antibody treatment for malignancy (12/16/2020) ·     Overview: ([id number])-  
·     [facility] ·  
·   S/P laminectomy (9/29/2020) ·     Overview: ([id number])- 
·     S/P Segmental posterior cervicothoracic fusion C5 to T5, and arthrodesis ·     C5 to T5 with cancellous bone chips and DBM putty, and partial laminectomy ·     T2 
·      
·     Metastatic disease through T1, T2 and T3, with severe spinal stenosis and  
·     impingement on the cord. ·      
·  
·   Adverse reaction to antineoplastic drug (2/22/2021) ·     Overview: ([id number])- Letrozole initiated ·      
·     Pancytopenia, diarrhea, myalgias ·  
· [x] High complexity decision making was performed 
[x] Patient is at high-risk of decompensation with multiple organ involvement Subjective: HPI: Immanuel Newton is a 67 y.o.  female. She is found to have laura and hyperkalemia - k 5.7---cr 2.3 on 2-28-21 She has been hypotensive requiring vasopressor support She was admitted with c/o diarrhea Her cr 2.26 on admission--her cr improved to 1.2 on 2-24-21 She has received vancomycin during this admission No ivp dye exposures She has h/o metastatic breast ca She has been on lasix at home Her lasix dose was increased few months ago Review of Systems:  
No c/o sob,  No c/o chest pain,  
C/o diarrhea Feels weak No c/o nausea or vomiting, No c/o  fever. A 11 point review of system was performed today. Pertinent positives and negatives are mentioned in the HPI. The reminder of the ROS is negative and noncontributory. Past Medical History:  
Diagnosis Date  Acute kidney injury superimposed on CKD ([facility] 2/22/2021  Adverse reaction to antineoplastic drug 2/22/2021  
 ([id number])- Letrozole initiated  Pancytopenia, diarrhea, myalgias  Arthralgia 8/17/2017  Arthritis  Asthma Mild to Moderate  Breast cancer ([address] 8/17/2017 Onset 1997; Refusing Mammogram 6/16/17  Breast cancer metastasized to liver ([address] 9/1/2020  Breast cancer metastasized to lung ([address] 9/1/2020  Cancer ([address] 1997 Breast left  Cardiomyopathy ([address] 8/17/2017 Onset:1999 Ischemic; 25% - 50% (last EFx 45%) Continue with ACE/Lasix/coreg  Cataract 8/17/2017 Bilateral   
 Cellulitis 8/17/2017 Suspect local reaction to Pneumovax, treat with ice, NSAIDs or Tylenol  Chest pain at rest 8/17/2017  CHF (congestive heart failure) ([address] 8/17/2017 Stable, though weight up a little. To take 1 1/2 Lasix daily if swelling, 1 daily o/w  Chronic maxillary sinusitis 8/17/2017  Chronic pain Right knee  COPD (chronic obstructive pulmonary disease) ([address] 8/17/2017 Continue with inhalers  Diabetes ([address] Pre-diabetic  DJD (degenerative joint disease) 8/17/2017  Elevated BUN 8/17/2017  Encounter for monoclonal antibody treatment for malignancy 2/22/2021  
 ([id number])-  Jonathan Dowd  Esophagitis, reflux 8/17/2017  Fatigue 8/17/2017  GERD (gastroesophageal reflux disease)  Heart failure ([address] Cardiomyopathy, HX of MI 1999  Hyperglycemia 8/17/2017  Hyperkalemia 8/17/2017  Hyperlipidemia 8/17/2017  Hypertension 8/17/2017  Hypocalcemia 2/22/2021  Hypomagnesemia 2/21/2021  Hypophosphatemia 2/22/2021  Hypovolemic shock ([address] 2/22/2021  Ill-defined condition Vertigo  Ill-defined condition 2003 HX of Urosepsis complicated by respiratory failure and pneumonnia  Kidney disease, chronic, stage IV (GFR 15-29 ml/min) ([address] 2/22/2021  Long term (current) use of aspirin 2/22/2021 ASA 81 mg Daily  Macrocytic anemia 2/21/2021  Malignant neoplasm metastatic to breast ([address] 12/1/2020  Mild protein-calorie malnutrition ([facility] 2/22/2021  Myalgia 8/17/2017  Pancytopenia due to antineoplastic chemotherapy ([facility] 2/22/2021  S/P laminectomy 9/29/2020  
 ([id number])- S/P Segmental posterior cervicothoracic fusion C5 to T5, and arthrodesis C5 to T5 with cancellous bone chips and DBM putty, and partial laminectomy T2  Metastatic disease through T1, T2 and T3, with severe spinal stenosis and impingement on the cord.  S/P left mastectomy 1/1/1997  
 (1997)- Left breast cancer % [place] 60% Her 2 -ve, initial diagnosis in the 1990s  status post left mastectomy chemo and radiation  S/P total knee arthroplasty, right 6/22/2017  
 ([id number])  Symptomatic anemia 2/22/2021  Thromboembolus ([facility] 1969  
 during 2nd pregnancy Stormy Hare Florence Peoples 8/17/2017  Vertigo, aural 8/17/2017  Vitamin D deficiency 8/17/2017 Past Surgical History:  
Procedure Laterality Date [address] [facility] JONNY  
 HX GYN Left Breast Mastectomy  HX HEENT  2015 Bilateral Cataract  HX ORTHOPAEDIC Right 06/2018  
 knee replacement  HX ORTHOPAEDIC Left 11/2018  
 wrist surgery  HX VASCULAR ACCESS Port a cath on the right Prior to Admission medications Medication Sig Start Date End Date Taking? Authorizing Provider  
oxyCODONE IR (ROXICODONE) 5 mg immediate release tablet Take 1 Tab by mouth every four (4) hours as needed for Pain for up to 15 days. Max Daily Amount: 30 mg. 2/19/21 3/6/21 Yes Charly Felder MD  
DULoxetine (CYMBALTA) 20 mg capsule TAKE 1 CAPSULE BY MOUTH NIGHTLY 2/9/21  Yes Rosy Read MD  
DULoxetine (Cymbalta) 20 mg capsule Take 1 Cap by mouth nightly. 2/8/21  Yes Rosy Read MD  
carvedilol (Coreg CR) 40 mg CR capsule Take 40 mg by mouth daily (with breakfast). Yes Provider, Historical  
furosemide (LASIX) 20 mg tablet Take 60 mg by mouth daily. Yes Provider, Historical  
mometasone (Nasonex) 50 mcg/actuation nasal spray 2 Sprays daily.    Yes Provider, Historical  
 letrozole (FEMARA) 2.5 mg tablet Take 2.5 mg by mouth daily. Yes Provider, Historical  
calcium carbonate (TUMS) 200 mg calcium (500 mg) chew Take 1 Tab by mouth daily. Yes Provider, Historical  
potassium chloride SR (K-TAB) 20 mEq tablet Take 1 Tab by mouth daily. 12/31/20  Yes Fatmata Ruvalcaba MD  
amiodarone (PACERONE) 100 mg tablet Take 100 mg by mouth daily.    Yes Provider, Historical  
prochlorperazine (COMPAZINE) 10 mg tablet Take 0.5 Tabs by mouth every six (6) hours as needed for Nausea. 12/16/20  Yes Mayi Quiroga NP  
ribociclib-letrozole (Kisqali Femara Co-Pack) 600 mg/day(200 mg x 3)-2.5 mg tab Take 600 mg by mouth daily. Take kisqali daily x 21 days then off 7 days, letrozole is daily 11/24/20  Yes Fatmata Ruvalcaba MD  
sacubitriL-valsartan Sussy Little) 24-26 mg tablet Take 1 Tab by mouth two (2) times a day. Yes Provider, Historical  
meclizine (ANTIVERT) 12.5 mg tablet Take  by mouth three (3) times daily as needed for Dizziness. Yes Provider, Historical  
aspirin delayed-release 81 mg tablet Take  by mouth daily. Yes Provider, Historical  
polyethylene glycol (MIRALAX) 17 gram packet Take 1 Packet by mouth daily. 10/6/20  Yes Rizwan Knight MD  
acetaminophen (TYLENOL) 500 mg tablet Take 500 mg by mouth every six (6) hours as needed for Pain. Yes Provider, Historical  
omeprazole (PRILOSEC) 20 mg capsule Take 1 capsule by mouth once daily 6/9/20  Yes Mariam Azar NP  
fenofibrate nanocrystallized (TRICOR) 145 mg tablet Take 1 tablet by mouth once daily 5/22/20  Yes Anaya Regalado NP Anoro Ellipta 62.5-25 mcg/actuation inhaler Take 1 Puff by inhalation daily. 3/23/20  Yes Carl, MD Faizan  
cholecalciferol (VITAMIN D3) 1,000 unit cap Take 1,000 Units by mouth daily.    Yes Provider, Historical  
 albuterol (PROVENTIL, VENTOLIN) 90 mcg/Actuation inhaler Take 1-2 Puffs by inhalation every four (4) hours as needed for Wheezing. 11  Yes TORSTEN Samaniego Allergies Allergen Reactions  Morphine Nausea and Vomiting Social History Tobacco Use  Smoking status: Former Smoker Packs/day: 2.00 Years: 25.00 Pack years: 50.00 Quit date:  Years since quittin.1  Smokeless tobacco: Never Used Substance Use Topics  Alcohol use: No  
  
Family History Problem Relation Age of Onset  Cancer Mother  Other Father Objective:   
 
Patient Vitals for the past 24 hrs: 
 Temp Pulse Resp BP SpO2  
21 0900  84 17 101/70   
21 0830  84 20 109/62 98 % 21 0745 97.6 °F (36.4 °C) 85 18 100/61   
21 0516  84  104/68   
21 0448  81  (!) 102/54   
21 0100  83  (!) 97/42   
21 2346  83  (!) 99/48   
21 2332  82  (!) 102/49   
21 2326  82  (!) 98/46   
21 2320  82 16 (!) 99/55 100 % 21 2315  81 16 (!) 93/44 100 % 21 2310  81 15 (!) 95/54 100 % 21 2305  81 15 (!) 90/53 100 % 21 2253  81  (!) 88/53   
21 2246  82  (!) 76/60   
21 2243  81  (!) 90/46   
21 2233  82  (!) 90/53   
21 2216  82  (!) 88/43   
21 2206 98.2 °F (36.8 °C) 86 16 (!) 94/28 100 % 213  82 18 (!) 94/28 100 % 218  80  (!) 74/48   
21 2120  81 29 (!) 82/37 97 % 215  81 18 (!) 65/53 98 % 02/27/21 2100  83 18 (!) 92/59 98 % 21     98 % 21 97.9 °F (36.6 °C)      
21  82 20 (!) 75/36 95 % 21  84 20 (!) 70/55   
21  84 19 (!) 77/51   
21  84 20 92/60   
21  84 22 (!) 86/44   
21 97.6 °F (36.4 °C) 83 21 (!) 75/37 97 % 21  83 20 (!) 78/68  02/27/21 1958  83 20 (!) 84/53   
02/27/21 1945  84 22 99/62 92 % 02/27/21 1930 97 °F (36.1 °C) 86 23 110/75 96 % 02/27/21 1918     95 % 02/27/21 1805  98 (!) 32 96/77   
02/27/21 1734  (!) 142 18 109/66 98 % 02/27/21 1653  (!) 134 27 109/86 98 % 02/27/21 1447 97.3 °F (36.3 °C) (!) 117 (!) 33 108/85 94 % 02/27/21 1050 98 °F (36.7 °C) (!) 114 22 115/79 100 % 02/28 0701 - 02/28 1900 In: 1182.5 [I.V.:1182.5] Out: - Last 3 Recorded Weights in this Encounter 02/25/21 2328 02/26/21 9179 02/27/21 5991 Weight: 82 kg (180 lb 12.4 oz) 81.4 kg (179 lb 6.4 oz) 81.6 kg (180 lb) Physical Exam: 
General:Alert, No distress, Eyes:No scleral icterus, No conjunctival pallor Neck:Supple,no mass palpable,no thyromegaly Lungs:Clears to auscultation Bilaterally, normal respiratory effort CVS:RRR, S1 S2 normal,  No rub, Abdomen:Soft, Non tender, No hepatosplenomegaly Extremities: ++ LE edema Skin:No rash or lesions, Warm and DRY Psych: appropriate affect :  No sierra Musculoskeletal : no redness, no joint tenderness NEURO: Normal Speech, Non focal     
 
CODE STATUS:  DNR Care Plan discussed with:  Patient, nurse Chart reviewed. 
 
y Reviewed previous records  
y Discussion with patient and/or family and questions answered ECG[de-identified] Rev:yes Xray/CT/US/MRI REV:yes Lab Data Personally Reviewed: (see below) Recent Labs  
  02/28/21 
0530 02/27/21 
1701 02/27/21 
0526 02/26/21 
0304 WBC  --  8.0  --   --   
HGB  --  10.4*  --   --   
PLT  --  157  --   --   
ANEU  --  5.9  --   --   
  --  140 138  
K 5.7*  --  5.2* 5.0  
*  --  172* 138* BUN 65*  --  55* 43* CREA 2.32*  --  1.77* 2.02* CA 6.7*  --  6.9* 6.8* Lab Results Component Value Date/Time  Color YELLOW/STRAW 02/21/2021 11:52 PM  
 Appearance CLOUDY (A) 02/21/2021 11:52 PM  
 Specific gravity 1.008 02/21/2021 11:52 PM  
 Specific gravity 1.020 06/19/2017 09:29 AM  
 pH (UA) 5.0 02/21/2021 11:52 PM  
 Protein Negative 02/21/2021 11:52 PM  
 Glucose Negative 02/21/2021 11:52 PM  
 Ketone Negative 02/21/2021 11:52 PM  
 Bilirubin Negative 02/21/2021 11:52 PM  
 Urobilinogen 0.2 02/21/2021 11:52 PM  
 Nitrites Negative 02/21/2021 11:52 PM  
 Leukocyte Esterase MODERATE (A) 02/21/2021 11:52 PM  
 Epithelial cells FEW 02/21/2021 11:52 PM  
 Bacteria 3+ (A) 02/21/2021 11:52 PM  
 WBC 20-50 02/21/2021 11:52 PM  
 RBC 0-5 02/21/2021 11:52 PM  
 
 
Lab Results Component Value Date/Time Iron 24 (L) 02/23/2021 04:09 AM  
 TIBC 108 (L) 02/23/2021 04:09 AM  
 Iron % saturation 22 02/23/2021 04:09 AM  
 
Lab Results Component Value Date/Time Culture result: MIXED UROGENITAL MIRELLA ISOLATED 02/21/2021 11:52 PM  
 Culture result: NO GROWTH 5 DAYS 02/21/2021 08:35 PM  
 Culture result: No growth (<1,000 CFU/ML) 04/17/2020 02:54 PM  
 
Prior to Admission Medications Prescriptions Last Dose Informant Patient Reported? Taking? Anoro Ellipta 62.5-25 mcg/actuation inhaler   Yes Yes Sig: Take 1 Puff by inhalation daily. DULoxetine (CYMBALTA) 20 mg capsule   No Yes Sig: TAKE 1 CAPSULE BY MOUTH NIGHTLY DULoxetine (Cymbalta) 20 mg capsule   No Yes Sig: Take 1 Cap by mouth nightly. acetaminophen (TYLENOL) 500 mg tablet   Yes Yes Sig: Take 500 mg by mouth every six (6) hours as needed for Pain. albuterol (PROVENTIL, VENTOLIN) 90 mcg/Actuation inhaler   No Yes Sig: Take 1-2 Puffs by inhalation every four (4) hours as needed for Wheezing. amiodarone (PACERONE) 100 mg tablet   Yes Yes Sig: Take 100 mg by mouth daily.   
aspirin delayed-release 81 mg tablet   Yes Yes Sig: Take  by mouth daily. calcium carbonate (TUMS) 200 mg calcium (500 mg) chew   Yes Yes Sig: Take 1 Tab by mouth daily. carvedilol (Coreg CR) 40 mg CR capsule   Yes Yes Sig: Take 40 mg by mouth daily (with breakfast). cholecalciferol (VITAMIN D3) 1,000 unit cap   Yes Yes Sig: Take 1,000 Units by mouth daily. fenofibrate nanocrystallized (TRICOR) 145 mg tablet   No Yes Sig: Take 1 tablet by mouth once daily  
furosemide (LASIX) 20 mg tablet   Yes Yes Sig: Take 60 mg by mouth daily. letrozole (FEMARA) 2.5 mg tablet   Yes Yes Sig: Take 2.5 mg by mouth daily. meclizine (ANTIVERT) 12.5 mg tablet   Yes Yes Sig: Take  by mouth three (3) times daily as needed for Dizziness. mometasone (Nasonex) 50 mcg/actuation nasal spray   Yes Yes Si Sprays daily. omeprazole (PRILOSEC) 20 mg capsule   No Yes Sig: Take 1 capsule by mouth once daily  
oxyCODONE IR (ROXICODONE) 5 mg immediate release tablet   No Yes Sig: Take 1 Tab by mouth every four (4) hours as needed for Pain for up to 15 days. Max Daily Amount: 30 mg.  
polyethylene glycol (MIRALAX) 17 gram packet   Yes Yes Sig: Take 1 Packet by mouth daily. potassium chloride SR (K-TAB) 20 mEq tablet   No Yes Sig: Take 1 Tab by mouth daily. prochlorperazine (COMPAZINE) 10 mg tablet   No Yes Sig: Take 0.5 Tabs by mouth every six (6) hours as needed for Nausea. ribociclib-letrozole (Kisqali Femara Co-Pack) 600 mg/day(200 mg x 3)-2.5 mg tab   No Yes Sig: Take 600 mg by mouth daily. Take kisqali daily x 21 days then off 7 days, letrozole is daily  
sacubitriL-valsartan (Entresto) 24-26 mg tablet   Yes Yes Sig: Take 1 Tab by mouth two (2) times a day. Facility-Administered Medications: None Imaging: 
 
Medications list Personally Reviewed   [x]      Yes     []               No   
Thank you for allowing us to participate in the care this patient. We will follow patient with you. Signed By: Sander Hartley MD 
[facility] [employer] [address] [facility] [address], [address] [place] Phone - [phone] Fax - [phone] [address] [place] [address], [address] [facility] Phone - [phone] Fax - [phone] [email or web address]

## 2021-02-28 NOTE — ACP (ADVANCE CARE PLANNING)
Advance Care Planning Note NAME: Meera Bull :  1945 MRN:  [id number] Date/Time:  2021 20:44 PM 
 
Active Diagnoses: 
Hospital Problems  Date Reviewed: 2021 Codes Class Noted POA Macrocytic anemia (Chronic) ICD-10-CM: D53.9 ICD-9-CM: 538. 9 Chronic 2021 Yes Breast cancer metastasized to liver [facility]) (Chronic) ICD-10-CM: C50.919, C78.7 ICD-9-CM: 174.9, 197.7 End Stage 2020 Yes Breast cancer metastasized to lung [facility]) ICD-10-CM: C50.919, C78.00 ICD-9-CM: 174.9, 197.0 End Stage 2020 Yes Pancytopenia due to antineoplastic chemotherapy [facility]) ICD-10-CM: D61.810, T45.1X5A 
ICD-9-CM: 284.11, E933.1 Acute 2021 Yes Overview Addendum 2021  8:14 AM by Nathan Calle MD  
  ([id number])- Letrozole initiated Kidney disease, chronic, stage IV (GFR 15-29 ml/min) (HCC) ICD-10-CM: N18.4 ICD-9-CM: 585.4 End Stage 2021 Yes Acute kidney injury superimposed on CKD (HCC) (Chronic) ICD-10-CM: N17.9, N18.9 ICD-9-CM: 866.00, 585.9 Acute 2021 Yes Hypovolemic shock ([address] ICD-10-CM: R57.1 ICD-9-CM: 785.59 Acute 2021 Yes Hypocalcemia ICD-10-CM: E83.51 
ICD-9-CM: 275.41 Acute 2021 Yes Mild protein-calorie malnutrition (HCC) (Chronic) ICD-10-CM: E44.1 ICD-9-CM: 263.1 Health Concern 2021 Yes Hypophosphatemia ICD-10-CM: E83.39 
ICD-9-CM: 275.3 Acute 2021 No  
   
 * (Principal) Symptomatic anemia ICD-10-CM: D64.9 ICD-9-CM: 049. 9 Chronic 2021 Yes Hypomagnesemia ICD-10-CM: X59.19 
ICD-9-CM: 275.2 Acute 2021 Yes Long term (current) use of aspirin (Chronic) ICD-10-CM: W47.77 ICD-9-CM: V58.66 Chronic 2021 Yes Overview Signed 2021  7:20 AM by Nathan Calle MD  
  ASA 81 mg Daily Adverse reaction to antineoplastic drug ICD-10-CM: T45.1X5A 
ICD-9-CM: E933.1  2021 Yes Overview Signed 2/22/2021  4:43 PM by Cal Jaime MD  
  ([id number])- Letrozole initiated Pancytopenia, diarrhea, myalgias Severe sepsis with acute organ dysfunction (HCC) ICD-10-CM: A41.9, R65.20 ICD-9-CM: 038.9, 995.92  2/21/2021 Yes Encounter for monoclonal antibody treatment for malignancy ICD-10-CM: Z51.12 
ICD-9-CM: V58.12, 199.1 Chronic 12/16/2020 Yes Overview Signed 2/22/2021  7:53 AM by Cal Jaime MD  
  ([id number])- Jared Koroma Malignant neoplasm metastatic to breast [facility]) ICD-10-CM: V30.62 ICD-9-CM: 198.81  12/1/2020 Yes Overview Signed 2/22/2021  7:48 AM by Cal Jaime MD  
  (Dec/2020)- 
Metachronous breast carcinoma, Right breast 
  
  
   
 S/P laminectomy ICD-10-CM: Q44.705 ICD-9-CM: V45.89 End Stage 9/29/2020 Yes Overview Signed 2/22/2021  8:02 AM by Cal Jaime MD  
  ([id number])- 
S/P Segmental posterior cervicothoracic fusion C5 to T5, and arthrodesis C5 to T5 with cancellous bone chips and DBM putty, and partial laminectomy T2 Metastatic disease through T1, T2 and T3, with severe spinal stenosis and impingement on the cord. S/P total knee arthroplasty, right ICD-10-CM: E46.603 ICD-9-CM: V43.65 End Stage 6/22/2017 Yes Overview Addendum 2/22/2021  5:42 PM by Cal Jaime MD  
  ([id number]) S/P left mastectomy (Chronic) ICD-10-CM: N50.88 ICD-9-CM: V45.71 End Stage 1/1/1997 Yes Overview Signed 2/22/2021  7:42 AM by Cal Jaime MD  
  ([id number])- Left breast cancer % [place] 60% Her 2 -ve, initial diagnosis in the 1990s  
status post left mastectomy chemo and radiation These active diagnoses are of sufficient risk that focused discussion on advance care planning is indicated in order to allow the patient to thoughtfully consider personal goals of care, and if situations arise that prevent the ability to personally give input, to ensure appropriate representation of their personal desires for different levels and aggressiveness of care. Discussion:  
Code status addressed and wants to be a DNR / DNI. Patient and daughter wants central line and vasopressors if needed. Patient and daughter would also want a feeding tube, if needed, for nutritional support. Patient has assigned her daughter Mariana Runner  as the surrogate decision maker. Persons present and participating in discussion: Margret Ascencio, [address] [place], her daughter Mariana Runner ( on the phone) Time Spent:  
Total time spent face-to-face in education and discussion:   18  minutes. Zain Darby DNP, ACNP-BC Hospitalist

## 2021-02-28 NOTE — PROGRESS NOTES
2206: TRANSFER - IN REPORT: 
 
Verbal report received from Efe Cantor RN(name) on Torrey Andrade  being received from [facility](unit) for change in patient condition(patient requiring pressors for BP control) Report consisted of patients Situation, Background, Assessment and  
Recommendations(SBAR). Information from the following report(s) SBAR, Kardex, Intake/Output, MAR, Recent Results, Med Rec Status and Cardiac Rhythm NSR was reviewed with the receiving nurse. Opportunity for questions and clarification was provided. Assessment completed upon patients arrival to unit and care assumed. 2324: Spoke with patient's daughter about patient's condition and answered the questions she had at this time. Informed patient's daughter that I would call her with any changes in the patient's condition. 0130: Spoke with patient's daughter and updated her on the patient's condition. 0700: End of Shift Note Bedside shift change report given to Elle FERGUSON RN (oncoming nurse) by Alan Bower (offgoing nurse). Report included the following information SBAR, Kardex, Intake/Output, MAR, Recent Results, Med Rec Status and Cardiac Rhythm NSR Shift worked:  7p-7a Shift summary and any significant changes:  
  Patient was started on a Kris drip and Bicarb drip. Patient's BP has been stable with Kris running at 85mcg. Patient began experiencing nausea which was unrelieved by Zofran Concerns for physician to address:  Nausea Zone phone for oncoming shift:   [phone] Activity: 
Activity Level: Up with Assistance Number times ambulated in hallways past shift: 0 Number of times OOB to chair past shift: 0 Cardiac:  
Cardiac Monitoring: Yes     
Cardiac Rhythm: Normal sinus rhythm Access:  
Current line(s): port Genitourinary:  
Urinary status: due to void and external catheter Respiratory:  
O2 Device: Nasal cannula Chronic home O2 use?: YES Incentive spirometer at bedside: NO 
  
GI: 
 Last Bowel Movement Date: 02/27/21 Current diet:  DIET NUTRITIONAL SUPPLEMENTS Lunch, Dinner; Usama DIET MECHANICAL SOFT 
DIET NUTRITIONAL SUPPLEMENTS Breakfast; Ensure Pudding DIET NUTRITIONAL SUPPLEMENTS Lunch; Ensure Clear DIET NUTRITIONAL SUPPLEMENTS Dinner, Breakfast; Nepro Passing flatus: NO Tolerating current diet: NO 
% Diet Eaten: (refused) Pain Management:  
Patient states pain is manageable on current regimen: YES Skin: 
Shan Score: 15 Interventions: increase time out of bed and internal/external urinary devices Patient Safety: 
Fall Score: Total Score: 5 Interventions: bed/chair alarm and pt to call before getting OOB High Fall Risk: Yes Length of Stay: 
Expected LOS: 4d 7h Actual LOS: 7 [facility]

## 2021-02-28 NOTE — CONSULTS
Pt seen and examined. Full consult dictated Would resume IV amiodarone to control afib: if necessary can increase IV phenylephrine

## 2021-02-28 NOTE — PROGRESS NOTES
Rapid Response Team Note Called by Rapid Response RN at  to see patient for hypotension and shortness of breath tatyana Colón is a 67 y.o. WHITE female admitted for hypovolemic shock, acute hypercapnic respiratory failure. Patient has right-sided breast carcinoma with mets to bone liver and lungs. Upon entering the room the patient is noted to be supine in bed, clearly with shortness of breath, grayish skin color. Blood pressure is 70/55 with heart rate of 83. Of note is that patient had gone into A. fib earlier today with heart rates reportedly fluctuating between 120s to 150s. Amiodarone bolus followed by drip had been ordered and a cardiology consult was placed. Patient's heart rate significantly improved and patient is back in sinus rhythm with heart rate of 83. Patient complains of shortness of breath, fatigue and having pain all over Allergies Allergen Reactions  Morphine Nausea and Vomiting Current Facility-Administered Medications Medication Dose Route Frequency  [START ON 2021] amiodarone (CORDARONE) 375 mg/250 mL D5W infusion  0.5 mg/min IntraVENous CONTINUOUS Followed by  
Eugenio Schneider amiodarone (CORDARONE) 375 mg/250 mL D5W infusion  1 mg/min IntraVENous CONTINUOUS  
 PHENYLephrine (LINDA-SYNEPHRINE) 30 mg in 0.9% sodium chloride 250 mL infusion   mcg/min IntraVENous TITRATE  sodium bicarbonate 8.4 % (1 mEq/mL) injection 50 mEq  50 mEq IntraVENous ONCE  
 sodium bicarbonate 150 mEq/1000 mL D5W (premix)   IntraVENous CONTINUOUS  
 0.9% sodium chloride infusion  75 mL/hr IntraVENous CONTINUOUS  
 methylPREDNISolone (PF) (SOLU-MEDROL) injection 40 mg  40 mg IntraVENous DAILY  pantoprazole (PROTONIX) tablet 40 mg  40 mg Oral ACB  calcium carbonate (TUMS) chewable tablet 400 mg [elemental]  400 mg Oral QID PRN  
 0.9% sodium chloride infusion 250 mL  250 mL IntraVENous PRN  
 0.9% sodium chloride infusion 250 mL  250 mL IntraVENous PRN  
 amiodarone (CORDARONE) tablet 100 mg  100 mg Oral DAILY  folic acid (FOLVITE) tablet 1 mg  1 mg Oral DAILY  albuterol-ipratropium (DUO-NEB) 2.5 MG-0.5 MG/3 ML  3 mL Nebulization Q4H PRN  
 alcohol 62% (NOZIN) nasal  1 Ampule  1 Ampule Topical Q12H  
 zinc oxide-cod liver oil (DESITIN) 40 % paste   Topical PRN  
 oxyCODONE IR (ROXICODONE) tablet 5 mg  5 mg Oral Q4H PRN  
 glycopyrrolate-formoterol (BEVESPI AEROSPHERE) 9 mcg-4.8 mcg inhaler  2 Puff Inhalation BID  DULoxetine (CYMBALTA) capsule 20 mg  20 mg Oral QHS  fluticasone propionate (FLONASE) 50 mcg/actuation nasal spray 2 Spray  2 Spray Both Nostrils DAILY  albuterol (PROVENTIL VENTOLIN) nebulizer solution 2.5 mg  2.5 mg Inhalation Q4H PRN  zinc oxide-cod liver oil (DESITIN) 40 % paste   Topical BID and QHS  sodium chloride (NS) flush 5-40 mL  5-40 mL IntraVENous Q8H  
 sodium chloride (NS) flush 5-40 mL  5-40 mL IntraVENous PRN  
 acetaminophen (TYLENOL) tablet 650 mg  650 mg Oral Q6H PRN Or  
 acetaminophen (TYLENOL) suppository 650 mg  650 mg Rectal Q6H PRN  polyethylene glycol (MIRALAX) packet 17 g  17 g Oral DAILY PRN  promethazine (PHENERGAN) tablet 12.5 mg  12.5 mg Oral Q6H PRN Or  
 ondansetron (ZOFRAN) injection 4 mg  4 mg IntraVENous Q6H PRN  potassium chloride 20 mEq in 50 ml IVPB  20 mEq IntraVENous PRN Visit Vitals BP (!) 74/48 Pulse 80 Temp 97.9 °F (36.6 °C) Resp 29 Ht 5' 5\" (1.651 m) Wt 81.6 kg (180 lb) SpO2 97% BMI 29.95 kg/m² Review of Systems Constitutional: Positive for fatigue. Respiratory: Positive for cough and shortness of breath. Cardiovascular: Positive for chest pain (states she has pain all over her body). Gastrointestinal: Negative for abdominal pain. Genitourinary: Negative for dysuria. Musculoskeletal: Positive for arthralgias and back pain. Complains of all over pain Physical Exam 
Constitutional:   
   Appearance: She is ill-appearing. Cardiovascular:  
   Rate and Rhythm: Normal rate and regular rhythm. Pulmonary:  
   Effort: Tachypnea and accessory muscle usage present. Breath sounds: Rales present. Abdominal:  
   Palpations: Abdomen is soft. Skin: 
   Coloration: Skin is pale (gray). Neurological:  
   Mental Status: She is alert. Comments: Patient is oriented to person place and situation, she could not recall the year as 2021 and stated that the year is 2020 Pertinent Recent Labs and Xrays: LAB DATA REVIEWED:   
Recent Results (from the past 24 hour(s)) METABOLIC PANEL, BASIC Collection Time: 02/27/21  5:26 AM  
Result Value Ref Range Sodium 140 136 - 145 mmol/L Potassium 5.2 (H) 3.5 - 5.1 mmol/L Chloride 109 (H) 97 - 108 mmol/L  
 CO2 23 21 - 32 mmol/L Anion gap 8 5 - 15 mmol/L Glucose 172 (H) 65 - 100 mg/dL BUN 55 (H) 6 - 20 MG/DL Creatinine 1.77 (H) 0.55 - 1.02 MG/DL  
 BUN/Creatinine ratio 31 (H) 12 - 20 GFR est AA 34 (L) >60 ml/min/1.73m2 GFR est non-AA 28 (L) >60 ml/min/1.73m2 Calcium 6.9 (L) 8.5 - 10.1 MG/DL  
CBC WITH AUTOMATED DIFF Collection Time: 02/27/21  5:01 PM  
Result Value Ref Range WBC 8.0 3.6 - 11.0 K/uL  
 RBC 3.16 (L) 3.80 - 5.20 M/uL  
 HGB 10.4 (L) 11.5 - 16.0 g/dL HCT 32.4 (L) 35.0 - 47.0 % .5 (H) 80.0 - 99.0 FL  
 MCH 32.9 26.0 - 34.0 PG  
 MCHC 32.1 30.0 - 36.5 g/dL RDW 22.6 (H) 11.5 - 14.5 % PLATELET 711 259 - 638 K/uL MPV 11.7 8.9 - 12.9 FL  
 NRBC 9.9 (H) 0  WBC ABSOLUTE NRBC 0.79 (H) 0.00 - 0.01 K/uL NEUTROPHILS 73 32 - 75 % LYMPHOCYTES 24 12 - 49 % MONOCYTES 3 (L) 5 - 13 % EOSINOPHILS 0 0 - 7 % BASOPHILS 0 0 - 1 % IMMATURE GRANULOCYTES 0 0.0 - 0.5 % ABS. NEUTROPHILS 5.9 1.8 - 8.0 K/UL  
 ABS. LYMPHOCYTES 1.9 0.8 - 3.5 K/UL  
 ABS. MONOCYTES 0.2 0.0 - 1.0 K/UL  
 ABS. EOSINOPHILS 0.0 0.0 - 0.4 K/UL  
 ABS. BASOPHILS 0.0 0.0 - 0.1 K/UL  
 ABS. IMM.  GRANS. 0.0 0.00 - 0.04 K/UL  
 DF MANUAL    
 RBC COMMENTS ANISOCYTOSIS 
POLYCHROMASIA 
    
 WBC COMMENTS ATYPICAL LYMPHOCYTES PRESENT    
TROPONIN I Collection Time: 02/27/21  5:01 PM  
Result Value Ref Range Troponin-I, Qt. <0.05 <0.05 ng/mL GLUCOSE, POC Collection Time: 02/27/21  8:48 PM  
Result Value Ref Range Glucose (POC) 154 (H) 65 - 100 mg/dL Performed by Swetha Pinedo POC EG7 Collection Time: 02/27/21  8:52 PM  
Result Value Ref Range Calcium, ionized (POC) 1.15 1.12 - 1.32 mmol/L  
 pH (POC) 7.15 (LL) 7.35 - 7.45    
 pCO2 (POC) 45.1 (H) 35.0 - 45.0 MMHG  
 pO2 (POC) 83 80 - 100 MMHG  
 HCO3 (POC) 15.6 (L) 22 - 26 MMOL/L Base deficit (POC) 13 mmol/L  
 sO2 (POC) 92 92 - 97 % Site LEFT RADIAL Device: NASAL CANNULA Flow rate (POC) 4 L/M Allens test (POC) YES Specimen type (POC) ARTERIAL Total resp. rate 16 Recent Labs  
  02/27/21 
1701 02/25/21 
0255 WBC 8.0 2.2* HGB 10.4* 9.3* HCT 32.4* 28.3*  
 86* Recent Labs  
  02/27/21 
0526 02/26/21 
0304 02/25/21 
0255  138 138  
K 5.2* 5.0 5.3*  
* 107 107 CO2 23 22 23 BUN 55* 43* 30* CREA 1.77* 2.02* 2.02* * 138* 161* CA 6.9* 6.8* 6.9*  
MG  --   --  2.2 PHOS  --   --  4.2 No results for input(s): INR, INREXT in the last 72 hours. No results for input(s): PH, PCO2, PO2 in the last 72 hours. Recent Labs  
  02/27/21 2052 02/26/21 
0334 PHI 7.15* 7.34* PO2I 83 49* PCO2I 45.1* 42.3 Recent Labs  
  02/27/21 1701 TROIQ <0.05 Lab Results Component Value Date/Time  Glucose (POC) 154 (H) 02/27/2021 08:48 PM  
 Glucose (POC) 103 (H) 01/13/2021 03:55 PM  
 Glucose (POC) 103 (H) 01/13/2021 03:50 PM  
 Glucose (POC) 115 (H) 12/16/2020 03:20 PM  
 Glucose (POC) 118 (H) 09/30/2020 09:03 PM  
 Glucose (POC) 125 (H) 09/30/2020 03:59 PM  
 Glucose (POC) 123 (H) 09/30/2020 11:26 AM  
 Glucose (POC) 115 (H) 09/30/2020 07:23 AM  
  
 
 
Recent Imaging studies(If Any) CXR Results  (Last 48 hours) 02/27/21 1728  XR CHEST PORT Final result Impression:  Mild-moderate interstitial pulmonary edema and small bilateral  
pleural effusions. Narrative:  EXAM: XR CHEST PORT INDICATION: chest pain COMPARISON: 2/23/2021 FINDINGS: A portable AP radiograph of the chest was obtained at 1711 hours. Right subclavian portacatheter position is unchanged. Posterior fixation of the  
lower cervical and upper thoracic spine is again shown. There is a  
mild-moderate. Severe interstitial pulmonary edema again demonstrated. Small  
bilateral pleural effusions are noted. Cardiac and mediastinal contours are  
stable. The bones are moderately osteopenic. Echo Results  (Last 48 hours) None CT Results  (Last 48 hours) None EKG RESULTS Procedure Component Value Units Date/Time EKG, 12 LEAD, INITIAL [[id number]] Order Status: Sent EKG, 12 LEAD, INITIAL [[id number]] Order Status: Sent EKG 12 LEAD INITIAL [[id number]] Collected: 02/21/21 2114 Order Status: Completed Updated: 02/21/21 2310 Ventricular Rate 70 BPM   
  Atrial Rate 70 BPM   
  P-R Interval 174 ms QRS Duration 88 ms Q-T Interval 458 ms QTC Calculation (Bezet) 494 ms Calculated P Axis 71 degrees Calculated R Axis -10 degrees Calculated T Axis -126 degrees Diagnosis --  
  Normal sinus rhythm ST & T wave abnormality, consider inferior ischemia ST & T wave abnormality, consider anterolateral ischemia When compared with ECG of 21-SEP-2020 10:57, 
premature ventricular complexes are no longer present T wave inversion now evident in Anterolateral leads Confirmed by FABI Stephenson ([id number]) on 2/21/2021 11:10:40 PM 
  
  
  
 
02/21/21 ECHO ADULT COMPLETE 02/22/2021 2/22/2021 Narrative · LV: Estimated LVEF is 55 - 60%. Visually measured ejection fraction.   
Normal cavity size, wall thickness and systolic function (ejection  
fraction normal). Wall motion: normal. Mild (grade 1) left ventricular  
diastolic dysfunction. · PA: Mild pulmonary hypertension. Pulmonary arterial systolic pressure is 38 mmHg. · MV: Mild mitral valve regurgitation is present. · Image quality for this study was suboptimal. 
   
  Signed by: Darrin Victor MD  
  
 
 
Recent Microbiology Data(If Any) [place] [facility] All Micro Results Procedure Component Value Units Date/Time CULTURE, BLOOD, PAIRED [[id number]] Collected: 02/21/21 2035 Order Status: Completed Specimen: Blood Updated: 02/26/21 1669 Special Requests: NO SPECIAL REQUESTS Culture result: NO GROWTH 5 DAYS     
 CULTURE, URINE [[id number]] Collected: 02/21/21 2352 Order Status: Completed Specimen: Urine Updated: 02/23/21 9064 Special Requests: --     
  NO SPECIAL REQUESTS Reflexed from [id number] Culture result:    
  MIXED UROGENITAL MIRELLA ISOLATED C. DIFFICILE AG & TOXIN A/B [[id number]] Order Status: Canceled Specimen: Stool Assessment, action, and plan Hypotension Acute respiratory distress · Ordered albumin 25% 25 g x 2 IV infusion · Ordered stat arterial blood gas which was done with following results Ref. Range 2/27/2021 20:52  
pH (POC) Latest Ref Range: 7.35 - 7.45   7.15 (LL)  
pCO2 (POC) Latest Ref Range: 35.0 - 45.0 MMHG 45.1 (H)  
pO2 (POC) Latest Ref Range: 80 - 100 MMHG 83 HCO3 (POC) Latest Ref Range: 22 - 26 MMOL/L 15.6 (L)  
sO2 (POC) Latest Ref Range: 92 - 97 % 92 Base deficit (POC) Latest Units: mmol/L 13 Specimen type (POC) Latest Units:   ARTERIAL Flow rate (POC) Latest Units: L/M 4 Site Latest Units:   LEFT RADIAL Device: Latest Units:   NASAL CANNULA Total resp. rate Latest Units:   16 Allens test (POC) Latest Units:   YES  
 
· Place patient on BiPAP · Transfer patient to stepdown unit · If blood pressure does not improve with second dose of albumin, start Kris-Synephrine drip · Arterial blood gas an hour after BiPAP · 1 amp sodium bicarb followed by bicarb drip · I spoke with patient in regards to her CODE STATUS and patient states that she does not want to be intubated and as far as CPR, she states that she does not want to be revived and be in worse shape that she is now. She states that her healthcare decision maker is her daughter Ld Rashid. I was able to reach Ms. Panchal at [phone]. I updated her on patient condition and discussed advanced care planning with her. She states that patient does have advanced directive and also an old DNR form. She states that patient has had verbalized to her that she does not want to be resuscitated if there would be a chance of her ribs breaking or that she would be in a worse state without quality of life. She states that she would like her mother's wishes to be honored and for her to be DNR. · Entered DNR order · I was also able to find a post form that was done September of last year which patient has chosen to be DNR. 
 
2156 I was contacted by rapid response nurse informing me that patient is refusing BiPAP stating that she does not want to wear it because it is uncomfortable. She has been placed on a nonrebreather mask. Signed by:  Luca Oswald DNP, ACNP-BC Critical care time unrelated to prior notes: 60 minutes Please note that this note was dictated using Dragon computer voice recognition software. Quite often unanticipated grammatical, syntax, homophones, and other interpretive errors are inadvertently transcribed by the computer software. Please disregard these errors. Please excuse any errors that have escaped final proofreading.

## 2021-02-28 NOTE — CONSULTS
[address] Name:  Rajni Clark 
MR#:  [id number] :  1945 ACCOUNT #:  [de-identified] DATE OF SERVICE:  2021 REQUESTING PHYSICIAN:  Alecia Pace MD 
 
REASON FOR CONSULTATION:  Evaluate atrial fibrillation, hypotension. CHIEF COMPLAINT:  Leg pain. HISTORY OF PRESENT ILLNESS:  The patient is a 65-year-old female with a history of nonischemic cardiomyopathy, who has been followed by me for number of years. She had a prior EF as low as 25%. She has no history of coronary artery disease. The patient has a history of breast cancer and recently was apparently found to have metastatic disease. She underwent back surgery last year. She was admitted  with persistent diarrhea and hypotension with evidence of acute kidney injury. She has been managed on the hospitalist service and her diarrhea has improved. An echocardiogram showed an EF of 55-60% with mild mitral regurgitation. She developed atrial fibrillation yesterday and was placed on IV amiodarone. Unfortunately, the patient became hypotensive and required IV phenylephrine. She is now on IV phenylephrine and has persistent rapid AFib. She is on oral amiodarone, but IV amiodarone was stopped. PAST MEDICAL HISTORY:  As noted above, metastatic breast cancer, dyslipidemia, asthma, COPD, and chronic anemia. SURGERIES:  Recent back surgery, status post GYN surgery, status post left mastectomy, status post right total knee replacement. CURRENT MEDICATIONS:  Albumin, Cymbalta, Flonase, prednisone, IV Protonix, Folvite, IV phenylephrine, IV amiodarone currently stopped. SOCIAL HISTORY:  The patient is a former smoker and does not abuse alcohol. FAMILY HISTORY:  The patient's mother had cancer. REVIEW OF SYSTEMS:  As noted above, otherwise noncontributory. PHYSICAL EXAMINATION: 
GENERAL:  Exam reveals an ill-appearing, elderly white female, in mild distress. VITAL SIGNS:  Blood pressure 133/49, pulse 125, respirations 20, temperature 98. 3. HEENT:  Pupils are equal. 
NECK:  No obvious masses or thyromegaly. No obvious cervical or supraclavicular adenopathy. No obvious bruits. Jugular venous pressure is difficult to assess. CHEST:  Clear anteriorly. SKIN:  Warm and dry. CARDIAC:  Irregularly irregular rhythm. Tachycardic. No obvious gallop or rub. ABDOMEN:  Obese, soft, nontender. No masses or organomegaly. Bowel sounds positive. EXTREMITIES:  No cyanosis or clubbing. 1+pedal edema bilaterally. Distal pulse is not well palpated. NEUROLOGIC:  No obvious gross motor deficits. LABORATORIES:  Hemoglobin 10.4, BUN 65, creatinine 2.3. EKG atrial fibrillation, rate of 131 beats per minute. Lateral T-wave inversions, low-voltage. IMPRESSION: 
1. Atrial fibrillation with rapid ventricular response. 2.  Persistent hypotension, probably due primarily to volume depletion. 3.  History of nonischemic cardiomyopathy normal EF by recent echo. 4.  Metastatic breast cancer. 5.  Recent diarrhea, resolved. 6.  Chronic obstructive pulmonary disease. 7.  Acute kidney injury. 8.  Chronic anemia. RECOMMENDATIONS:  The patient has uncontrolled atrial fibrillation in the setting of hypotension and metastatic breast cancer. I would favor resuming the IV amiodarone. The patient could be aggressively hydrated from my standpoint as she has a normal ejection fraction. I suspect she is probably intravascularly volume depleted. Would continue amiodarone IV and if necessary, we can add additional rate control medications. Thank you for this consult. MD NILAY [id number] 
D:  02/28/2021 13:19 
T:  02/28/2021 17:31 
JOB #:  [id number] CC:  Gilford Lime, MD Othella Elms, MD

## 2021-02-28 NOTE — PROGRESS NOTES
Hospitalist Progress Note NAME: Lizeth Jarquin :  1945 MRN:  [id number] Assessment / Plan: 
Shock, likely hypovolemic Diarrhea, poor PO intake prior to admission Monitor BP Remains on off pressors Mentation intact despite borderline BPs Downgraded diet to Full Liquid as per pt request  
Continue with empiric abx for now : 
BP trend has improved but worsening renal function noted AMAN Hyperkalemia Give Insulin, D50, Kayexcelate, Calcium Gluconate and nebs Restart IVF 
PO intake slowly improving Repeat labs Mobilize to chair : 
Cr stabilized Titrate up diet K wnl Continue IVF Mobilize today : 
Cr improved K elevated again - give Insulin, D50, Kayexcelate, Calcium Gluconate and nebs Mobilize again : 
Paroxsymal atrial fibrillation with RVR Unfortunately pt's HR became uncontrolled yesterday. Had to be given Amio bolus and started on drip. Due to hypotension, had to be started on Kris overnight. HR is controlled this morning. Restart PO Amio. Wean off Kris as able Renal function worse and K elevated Nephrology eval appreciated - pt started on Bicarb drip, given an amp of Bicarb and given Lokelma x1. Kidney  US pending Cardiology to follow up Acute hypercapnic respiratory failure - improving CXR results noted O2 supplementation as needed - wean as tolerated Switch to PO steroids tomorrow Right sided breast carcinoma metastatic to the bone, liver and lung % NE 60% Her 2 -ve Pancytopenia Acute on chronic anemia Oncology evals on going Chemo currently being held Hgb and Plt stable - no transfusion indicated at this time HTN 
GERD Chronic systolic HF 
COPD, not in exacerbation Continue home meds as above Currently BP and HF meds on hold given borderline hypotension 30.0 - 39.9 Obese / Body mass index is 29.95 kg/m². Code status: Full Prophylaxis: Heparin Recommended Disposition: TBD Subjective: Chief Complaint / Reason for Physician Visit Feeling weak this morning but better than yesterday evening. Discussed with RN events overnight. Review of Systems: 
Symptom Y/N Comments  Symptom Y/N Comments Fever/Chills    Chest Pain n   
Poor Appetite y   Edema Cough    Abdominal Pain n   
Sputum    Joint Pain SOB/TAVAREZ    Pruritis/Rash Nausea/vomit y   Tolerating PT/OT Diarrhea    Tolerating Diet Constipation    Other Could NOT obtain due to:   
 
Objective: VITALS:  
Last 24hrs VS reviewed since prior progress note. Most recent are: 
Patient Vitals for the past 24 hrs: 
 Temp Pulse Resp BP SpO2  
02/28/21 0900  84 17 101/70   
02/28/21 0745 97.6 °F (36.4 °C) 85 18 100/61   
02/28/21 0516  84  104/68   
02/28/21 0448  81  (!) 102/54   
02/28/21 0330 97.9 °F (36.6 °C) 82 17 (!) 101/57 100 % 02/28/21 0100  83  (!) 97/42   
02/27/21 2346  83  (!) 99/48   
02/27/21 2332  82  (!) 102/49   
02/27/21 2326  82  (!) 98/46   
02/27/21 2320  82 16 (!) 99/55 100 % 02/27/21 2315  81 16 (!) 93/44 100 % 02/27/21 2310  81 15 (!) 95/54 100 % 02/27/21 2305  81 15 (!) 90/53 100 % 02/27/21 2253  81  (!) 88/53   
02/27/21 2246  82  (!) 76/60   
02/27/21 2243  81  (!) 90/46   
02/27/21 2233  82  (!) 90/53   
02/27/21 2216  82  (!) 88/43   
02/27/21 2206 98.2 °F (36.8 °C) 86 16 (!) 94/28 100 % 02/27/21 2203  82 18 (!) 94/28 100 % 02/27/21 2158  80  (!) 74/48   
02/27/21 2120  81 29 (!) 82/37 97 % 02/27/21 2115  81 18 (!) 65/53 98 % 02/27/21 2100  83 18 (!) 92/59 98 % 02/27/21 2059     98 % 02/27/21 2056 97.9 °F (36.6 °C)      
02/27/21 2045  82 20 (!) 75/36 95 % 02/27/21 2030  84 20 (!) 70/55   
02/27/21 2015  84 19 (!) 77/51   
02/27/21 2013  84 20 92/60   
02/27/21 2009  84 22 (!) 86/44   
02/27/21 2006 97.6 °F (36.4 °C) 83 21 (!) 75/37 97 % 02/27/21 2000  83 20 (!) 78/68   
02/27/21 1958  83 20 (!) 84/53  02/27/21 1945  84 22 99/62 92 % 02/27/21 1930 97 °F (36.1 °C) 86 23 110/75 96 % 02/27/21 1918     95 % 02/27/21 1805  98 (!) 32 96/77   
02/27/21 1734  (!) 142 18 109/66 98 % 02/27/21 1653  (!) 134 27 109/86 98 % 02/27/21 1447 97.3 °F (36.3 °C) (!) 117 (!) 33 108/85 94 % 02/27/21 1050 98 °F (36.7 °C) (!) 114 22 115/79 100 % Intake/Output Summary (Last 24 hours) at 2/28/2021 1044 Last data filed at 2/28/2021 6531 Gross per 24 hour Intake 3726.22 ml Output 800 ml Net 2926.22 ml I had a face to face encounter and independently examined this patient on 2/28/2021, as outlined below: PHYSICAL EXAM: 
General: Alert, cooperative, ill appearing EENT:  EOMI. Anicteric sclerae. MMM Resp:  CTA bilaterally, no wheezing or rales. No accessory muscle use CV:  Regular  rhythm,  No edema GI:  Soft, Non distended, Non tender. +Bowel sounds Neurologic:  Alert and oriented X 3, normal speech, Psych:   Good insight. Not anxious nor agitated Skin:  Pale Reviewed most current lab test results and cultures  YES Reviewed most current radiology test results   YES Review and summation of old records today    NO Reviewed patient's current orders and MAR    YES 
PMH/SH reviewed - no change compared to H&P 
________________________________________________________________________ Care Plan discussed with: 
  Comments Patient x Family  x   
RN x Care Manager Consultant Multidiciplinary team rounds were held today with , nursing, pharmacist and clinical coordinator. Patient's plan of care was discussed; medications were reviewed and discharge planning was addressed. ________________________________________________________________________ Total NON critical care TIME:  35   Minutes Total CRITICAL CARE TIME Spent:   Minutes non procedure based Comments >50% of visit spent in counseling and coordination of care ________________________________________________________________________ Shara Barragan MD  
 
Procedures: see electronic medical records for all procedures/Xrays and details which were not copied into this note but were reviewed prior to creation of Plan. LABS: 
I reviewed today's most current labs and imaging studies. Pertinent labs include: 
Recent Labs  
  02/27/21 
1701 WBC 8.0 HGB 10.4* HCT 32.4*  
 Recent Labs  
  02/28/21 
0530 02/27/21 
0526 02/26/21 
0304  140 138  
K 5.7* 5.2* 5.0  
 109* 107 CO2 20* 23 22 * 172* 138* BUN 65* 55* 43* CREA 2.32* 1.77* 2.02* CA 6.7* 6.9* 6.8* Signed: Shara Barragan MD

## 2021-02-28 NOTE — PROGRESS NOTES
1930 Bedside report given from [address] to start amio gtt after albumin is complete  will call pharmacy and let them know so MAR will be correct when starting Amio gtt 1930 Blood Pressures improved 110/75 Albumin finishing up. 1958 Blood pressures dropping again 84/53 will keep cycling  To be sure for accuracy because they are labile . Resp breathing more labored than past 2 nights toya cared for her . Rapid Nurse called to come see pt . Amio gtt held for now for SBP<85   Heart rate WNL NSR 80s  
 
2022 More albumin ordered and running NP A Merleyoemir notified and is here to see patient . 2030 Blood pressure 70/55 NP AP at bedside NP to speak with pts daughter made a DNR Transfer  To   PCU ordered to start pressors BiPap ordered Pt refusing ,placed on 100% NRB for transfer Pts personal Wheelchair and pts belongings wallet sent to [phone] TRANSFER - OUT REPORT: 
 
Verbal report given to Jessblas Fountain (name) on Eddye Dense  being transferred to PCU 2274(unit) for urgent transfer Report consisted of patients Situation, Background, Assessment and  
Recommendations(SBAR). Information from the following report(s) SBAR and Cardiac Rhythm NSR was reviewed with the receiving nurse. Lines:  
Venous Access Device Mark cath 02/21/21 Upper chest (subclavicular area, right (Active) Central Line Being Utilized Yes 02/1944 Criteria for Appropriate Use Hemodynamically unstable, requiring monitoring lines, vasopressors, or volume resuscitation 02/1944 Site Assessment Clean, dry, & intact 02/1944 Date of Last Dressing Change 02/21/21 02/1944 Dressing Status Clean, dry, & intact 02/1944 Dressing Type Tape;Transparent 02/1944 Action Taken Open ports on tubing capped 02/1944 Date Accessed (Medial Site) 02/21/21 02/27/21 1120 Positive Blood Return (Medial Site) Yes 02/1944 Action Taken (Medial Site) Blood drawn 02/26/21 4719 Alcohol Cap Used Yes 02/27/21 3304 Peripheral IV 02/21/21 Right Antecubital (Active) Site Assessment Clean, dry, & intact 02/1944 Phlebitis Assessment 0 02/1944 Infiltration Assessment 0 02/1944 Dressing Status Clean, dry, & intact 02/1944 Dressing Type Tape;Transparent 02/1944 Hub Color/Line Status Pink;Flushed 02/1944 Action Taken Open ports on tubing capped 02/1944 Alcohol Cap Used Yes 02/1944 Opportunity for questions and clarification was provided. Patient transported with: 
 O2 @ 100 NRB liters

## 2021-03-01 NOTE — PROGRESS NOTES
Hospitalist Progress Note NAME: Jayda Linda :  1945 MRN:  [id number] Assessment / Plan: 
Shock, likely hypovolemic Diarrhea, poor PO intake prior to admission Monitor BP Remains on off pressors Mentation intact despite borderline BPs Downgraded diet to Full Liquid as per pt request  
Continue with empiric abx for now : 
BP trend has improved but worsening renal function noted AMAN Hyperkalemia Give Insulin, D50, Kayexcelate, Calcium Gluconate and nebs Restart IVF 
PO intake slowly improving Repeat labs Mobilize to chair : 
Cr stabilized Titrate up diet K wnl Continue IVF Mobilize today : 
Cr improved K elevated again - give Insulin, D50, Kayexcelate, Calcium Gluconate and nebs Mobilize again : 
Paroxsymal atrial fibrillation with RVR Unfortunately pt's HR became uncontrolled yesterday. Had to be given Amio bolus and started on drip. Due to hypotension, had to be started on Kris overnight. HR is controlled this morning. Restart PO Amio. Wean off Kris as able Renal function worse and K elevated Nephrology eval appreciated - pt started on Bicarb drip, given an amp of Bicarb and given Lokelma x1. Kidney  US pending Cardiology to follow up 3/1: 
Unfortunately pt's clinical condition has continued to worsen. Her renal function has deteriorated, K has increased again, and UOP has diminished. Renal evals appreciated - pt has declined HD and family is in agreement with honoring pt's wishes. She remains on Kris as pressor support. 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 
GERD Chronic systolic HF 
COPD, not in exacerbation Continue home meds as above Currently BP and HF meds on hold given borderline hypotension 30.0 - 39.9 Obese / Body mass index is 30.79 kg/m². Code status: Full Prophylaxis: Heparin Recommended Disposition: TBD Subjective: Chief Complaint / Reason for Physician Visit Drowsy, arousable, in distress. Discussed with RN events overnight. Review of Systems: 
Symptom Y/N Comments  Symptom Y/N Comments Fever/Chills    Chest Pain n   
Poor Appetite y   Edema Cough    Abdominal Pain n   
Sputum    Joint Pain SOB/TAVAREZ    Pruritis/Rash Nausea/vomit y   Tolerating PT/OT Diarrhea    Tolerating Diet Constipation    Other Could NOT obtain due to:   
 
Objective: VITALS:  
Last 24hrs VS reviewed since prior progress note. Most recent are: 
Patient Vitals for the past 24 hrs: 
 Temp Pulse Resp BP SpO2  
03/01/21 1615  76 11 (!) 86/53 98 % 03/01/21 1606  74  (!) 84/49   
03/01/21 1500 97.4 °F (36.3 °C) 78 18 (!) 91/55 96 % 03/01/21 1416  85  (!) 102/57   
03/01/21 1257  79 21 95/61 100 % 03/01/21 1215  77 22 (!) 92/43 98 % 03/01/21 1145  83 17 103/67 97 % 03/01/21 1115 98.3 °F (36.8 °C) 78 11 (!) 93/47 100 % 03/01/21 1047  82  (!) 87/45   
03/01/21 1034  83  (!) 80/51   
03/01/21 1000 98.2 °F (36.8 °C) 87 18 96/84 96 % 03/01/21 0930  83 13 (!) 89/51   
03/01/21 0816     92 % 03/01/21 0809 98.1 °F (36.7 °C) (!) 133 24 96/77 93 % 03/01/21 0745     92 % 03/01/21 0323  88  122/70   
03/01/21 0300 98 °F (36.7 °C) 93 22 (!) 117/93 94 % 02/28/21 2315 98.6 °F (37 °C) 83 15 101/64 98 % 02/28/21 2026     96 % 02/28/21 1930 98.5 °F (36.9 °C) 85 25 96/64 94 % 02/28/21 1915    (!) 92/57   
02/28/21 1712  99 28    
02/28/21 1700  (!) 103 (!) 37 91/62  02/28/21 1645  (!) 122 (!) 36 (!) 91/49   
02/28/21 1630  (!) 120 28 96/80  Intake/Output Summary (Last 24 hours) at 3/1/2021 1629 Last data filed at 3/1/2021 1459 Gross per 24 hour Intake 3570.24 ml Output 250 ml Net 3320.24 ml I had a face to face encounter and independently examined this patient on 3/1/2021, as outlined below: PHYSICAL EXAM: 
General: Drowsy, cooperative, ill appearing EENT:  EOMI. Anicteric sclerae. MMM Resp:  CTA bilaterally, no wheezing or rales. No accessory muscle use CV:  Irregularly irregular. No edema GI:  Soft, Non distended, Non tender. +Bowel sounds Neurologic:  Arousable, intermittently confused, follows commands when able Psych:   Fair insight Skin:  Pale Reviewed most current lab test results and cultures  YES Reviewed most current radiology test results   YES Review and summation of old records today    NO Reviewed patient's current orders and MAR    YES 
PMH/ reviewed - no change compared to H&P 
________________________________________________________________________ Care Plan discussed with: 
  Comments Patient x Family  x   
RN x Care Manager Consultant Multidiciplinary team rounds were held today with , nursing, pharmacist and clinical coordinator. Patient's plan of care was discussed; medications were reviewed and discharge planning was addressed. ________________________________________________________________________ Total NON critical care TIME:  35   Minutes Total CRITICAL CARE TIME Spent:   Minutes non procedure based Comments >50% of visit spent in counseling and coordination of care    
________________________________________________________________________ Stephanie Rm MD  
 
Procedures: see electronic medical records for all procedures/Xrays and details which were not copied into this note but were reviewed prior to creation of Plan.    
 
LABS: 
 I reviewed today's most current labs and imaging studies. Pertinent labs include: 
Recent Labs 03/01/21 
0403 02/27/21 
1701 WBC 11.1* 8.0 HGB 9.7* 10.4* HCT 29.9* 32.4*  
* 157 Recent Labs 03/01/21 
1431 03/01/21 
0403 02/28/21 
2310  139 140  
K 5.0 6.0* 5.1  106 104 CO2 30 25 27 * 189* 239* BUN 80* 74* 75* CREA 2.74* 2.74* 2.59* CA 6.8* 7.0* 6.3*  
MG  --  2.2  --   
PHOS  --  3.8  --   
ALB  --  3.3*  --   
 
 
Signed: Adam Dias MD

## 2021-03-01 NOTE — PROGRESS NOTES
Chart reviewed. Spoke with patients nurse. She is meeting with family and Palliative Care MD to discuss her options. Will continue to follow this patient.  
 
Rufina Joya, PT

## 2021-03-01 NOTE — PROGRESS NOTES
Bedside and Verbal shift change report given to Fazal Fuller (oncoming nurse) by Felix Triplett (offgoing nurse). Report included the following information SBAR, Kardex, Procedure Summary, Intake/Output, MAR and Recent Results. 0815: Patient resting in bed, has some complaints of abdominal pain. Oxycodone given with other morning medications crushed in applesauce and ice water. Patient alert with eyes opened, oriented to where she is but does fall back to sleep after communicating with nursing staff. Kris infusing at 65 mcg/min and Amio at 0.5 mg/min. Bicarb also infusing, patient on 5 liters nasal cannula. Patient repositioned in bed, wound care done to sacrum and turned on right side. 4533: Dr. Marisol Mora with nephrology at bedside, insulin/dextrose calcium and kayexalate ordered. Possibility of dialysis discussed with patient who does not want the treatment. Palliative care consult placed. 9255: Patient states pain is better at this time, appears to be resting comfortably. 1005: Attempted to give kayexalate, patient nauseous and dry heaving. Zofran given and updated Dr. Marisol Mora on patient not being able to take medication at this time. Bicarb drip stopped and normal saline started per orders 1100: Spoke with daughter on phone who also spoke with palliative care team at patient's bedside on speaker phone. Daughter to come in later today to see patient. Heart rate noted to be in sinus rhythm now, patient remains on Amio drip 1310: Palliative care team at bedside to talk with patient and patient's daughter. 1440: IV dilaudid given per MD orders. [place] catheter placed for end of life care and more closely monitoring of intake and output. Previous bladder scan with 213 ml of urine in bladder. 1605: Verified with Dr. Kaur Nassar to keep Amiodarone drip infusing at this time. Kris infusing at 70 mcg/min 1745: Patient given IV Dilaudid prior to moving and repositioning in bed, Desitin applied to sacral site and wound care done. Mouth care performed, patient taken several small sips of ice water. Kris remains infusing at 70 mcg/min

## 2021-03-01 NOTE — PALLIATIVE CARE
I spoke to patient who is lethargic, arousable, able to hold a meaningful conversation, she is consistent not wanting Dialysis, understand she may die without dialysis . Family meeting with patient and her daughter Wellington Mcburney around 1 pm today to discuss goals of care.

## 2021-03-01 NOTE — CONSULTS
Palliative Medicine Consult Jacques: [phone] ([id number]) Patient Name: Brandon Melara YOB: 1945 Date of Initial Consult:3/1/21 Reason for Consult: End stage disease Requesting Provider: Freddy Rodríguez MD 
Primary Care Physician: Cr Hurd NP 
 
 SUMMARY:  
Brandon Melara is a 67 y.o. female  with a past history of metastatic breast cancer to bones and liver and lung, initial diagnosed in 1990 s/p left mastectomy chemo and radiation, CHF and chronic pain  who was admitted on 2/21/2021 from ER with a diagnosis of hypovolemic shock R/O sepsis, presented with lethargy and weakness and was found to have low blood pressure by her visiting physical therapist, of note patient has baseline chronic hypotension with systolic in 202-211. Hospital course is notable for AMAN due to ATN from hypotension, negative cultures. shock most likely hypovolemic due to poor intake and diarrhea prior to admission . Patient has worsening renal function and hyperkalemia, blood pressure low on high dose pressors, she has refused dialysis to nephrologist , Palliative medicine is consulted for care decision for end stage disease . 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 Her two daughters Rhonda Hall and Dileep Boykin are very supportive, Everardo Joe is local and most involvede,  her son is not involved, she is independent in daily activities needs help with shower, she is recently approved for 40 hrs/week Medicaid aid Advance directives in place. 
 
  
 
 PALLIATIVE DIAGNOSES:  
1. Lethargy Makeda Gong 2. Hypovolemic shock on pressors 3. ATN, worsening renal function 4. Hyperkalemia 5. Goals of care PLAN:  
1. Prior to visit, I reviewed chart, spoke to Dr Freddy Rodríguez referring physician . 2.  Met with patient who appers lethargic , aroused quickly feeling weak, she is coherent undersatnd her medical issues. 3. Patient has refused dialysis to Dr Jaren Olivier, I clarified with patient in regards to her preference to consider dialysis to prolong to save and prolong her life , told her she may die without dialysis because of potassium and toxins will build up resulting in abnormal ryhtm, she is cognisant and wants to be comfortable and pass away peacefully, she gave me permission to talk to her daughter Katie Dejesus. 13:30 Goals of care discussion : 
· Met with patient and her daughter Vira Tony at bed side with Cynthia Fonseca ( [facility]) . · Dtr notes she respect her mom's wishes, not to pursue dialysis, though it is hard for her to accept it. · Dtr wanted us to walk her through transition to comfort care process. · We discussed starting  with stopping medication /pressors artificially supporting her blood pressure and a greater chance she may not be able to sustain her blood pressure and decline and pass away, we talked about providing medication/opioids to address pain, and other medication to control nausea, restless ness and other symptoms . · We talked about transition to hospice for end of life/comfort focus care. · Cecily would like to hold off on transition to comfort until tomorrow, because her sister Cristela Rose is expected to arrive in [facility], patient has several other family members , grand children and relatives who wants to be present during the transition process to comfort . · I have coordinated with Nursing director Uriel Jones, regarding  Request for several family members visitation to say good bye, she is going to get back to me soon . Plan : 
 
1. Continue with current level of care, do not escalate, care until family arrives tomorrow. 2. Palliative to meet with family tomorrow(two daughters), time to be decided by the family . 3. Continue with lab draws to monitor kidney function and potasium . 4. I have given heads up to our hospice liaison Mary for a possible hospice consult . 
4. Initial consult note routed to primary continuity provider and/or primary health care team members 5. Communicated plan of care with: Palliative Jig[facility] SNYDER [facility] Team 
 
 GOALS OF CARE / TREATMENT PREFERENCES:  
 
GOALS OF CARE: 
Patient/Health Care Proxy Stated Goals: Comfort TREATMENT PREFERENCES:  
Code Status: DNR Advance Care Planning: 
[] [facility] Interdisciplinary Team has updated the ACP Navigator with [address] and Patient Capacity Primary Decision Maker (Active): Annamaria Singer - Daughter - [phone] Secondary Decision Maker: Chandler Seip Daughter - [phone] Secondary Decision Maker: Meghan Patiño - Son - [phone] Advance Care Planning 2/22/2021 Patient's Healthcare Decision Maker is: -  
Primary Decision Maker Name -  
Primary Decision Maker Phone Number -  
Primary Decision Maker Relationship to Patient -  
Confirm Advance Directive Yes, on file Patient Would Like to Complete Advance Directive - Does the patient have other document types - Medical Interventions: Comfort measures Other Instructions: Other: As far as possible, the palliative care team has discussed with patient / health care proxy about goals of care / treatment preferences for patient. HISTORY:  
 
History obtained from: chart, bed side Rn  
 
CHIEF COMPLAINT: generalized pain HPI/SUBJECTIVE: The patient is:  
[] Verbal and participatory Patient is lethargic arousable, able to hold meaningful conversation . She denies any nausea or vomiting . Clinical Pain Assessment (nonverbal scale for severity on nonverbal patients):  
Clinical Pain Assessment Severity: 6 Location: all over Character: dull Frequency: constant Duration: for how long has pt been experiencing pain (e.g., 2 days, 1 month, years) Frequency: how often pain is an issue (e.g., several times per day, once every few days, constant)  FUNCTIONAL ASSESSMENT:  
 
Palliative Performance Scale (PPS): PSYCHOSOCIAL/SPIRITUAL SCREENING:  
 
Palliative IDT has assessed this patient for cultural preferences / practices and a referral made as appropriate to needs (Cultural Services, Patient Advocacy, Ethics, etc.) Any spiritual / Jain concerns: 
[] Yes /  [x] No 
 
Caregiver Burnout: 
[] Yes /  [x] No /  [] No Caregiver Present Anticipatory grief assessment:  
[x] Normal  / [] Maladaptive ESAS Anxiety: ESAS Depression:    
 
 
 REVIEW OF SYSTEMS:  
 
Positive and pertinent negative findings in ROS are noted above in HPI. The following systems were [x] reviewed limited because of patient factor / [] unable to be reviewed as noted in HPI Other findings are noted below. Systems: constitutional, ears/nose/mouth/throat, respiratory, gastrointestinal, genitourinary, musculoskeletal, integumentary, neurologic, psychiatric, endocrine. Positive findings noted below. Modified ESAS Completed by: provider Fatigue: 8 Drowsiness: 2 Pain: 6 Nausea: 0 Dyspnea: 2 Stool Occurrence(s): 1 PHYSICAL EXAM:  
 
From RN flowsheet: 
Wt Readings from Last 3 Encounters:  
03/01/21 185 lb (83.9 kg) 01/13/21 182 lb 9.6 oz (82.8 kg) 01/13/21 181 lb 3.2 oz (82.2 kg) Blood pressure (!) 102/57, pulse 85, temperature 98.3 °F (36.8 °C), resp. rate 21, height 5' 5\" (1.651 m), weight 185 lb (83.9 kg), SpO2 100 %, not currently breastfeeding. Pain Scale 1: Numeric (0 - 10) Pain Intensity 1: 0 Pain Onset 1: acute Pain Location 1: Abdomen, Generalized Pain Orientation 1: Anterior, Mid 
Pain Description 1: Aching Pain Intervention(s) 1: Medication (see MAR) Last bowel movement, if known:  
 
Constitutional: lethargic, arousable, coherent . Eyes: pupils equal, anicteric ENMT: no nasal discharge, moist mucous membranes Cardiovascular: regular rhythm, edema of lower extremities Respiratory: breathing not labored, symmetric Gastrointestinal: soft non-tender, +bowel sounds Musculoskeletal: no deformity, no tenderness to palpation Skin: warm, dry Neurologic: following commands, moving all extremities Psychiatric: unable to evaluate because of patient condition. Other: 
 
 
 HISTORY:  
 
Principal Problem: 
  Symptomatic anemia (2/22/2021) Active Problems: 
  Breast cancer metastasized to liver ([address] (9/1/2020) Breast cancer metastasized to lung ([address] (9/1/2020) Macrocytic anemia (2/21/2021) Pancytopenia due to antineoplastic chemotherapy ([address] (2/22/2021) Overview: ([id number])- Letrozole initiated Kidney disease, chronic, stage IV (GFR 15-29 ml/min) ([address] (2/22/2021) Acute kidney injury superimposed on CKD ([address] (2/22/2021) Hypovolemic shock ([address] (2/22/2021) Hypocalcemia (2/22/2021) Mild protein-calorie malnutrition ([address] (2/22/2021) Hypophosphatemia (2/22/2021) Hypomagnesemia (2/21/2021) Severe sepsis with acute organ dysfunction ([address] (2/21/2021) Long term (current) use of aspirin (2/22/2021) Overview: ASA 81 mg Daily S/P total knee arthroplasty, right (6/22/2017) Overview: ([id number]) S/P left mastectomy (1/1/1997) Overview: (1997)- Left breast cancer % [place] 60% Her 2 -ve, initial diagnosis in the  
    1990s  
    status post left mastectomy chemo and radiation Malignant neoplasm metastatic to breast ([address] (12/1/2020) Overview: (Dec/2020)- 
    Metachronous breast carcinoma, Right breast 
 
  Encounter for monoclonal antibody treatment for malignancy (12/16/2020) Overview: ([id number])- Donovan Rodrigues S/P laminectomy (9/29/2020) Overview: ([id number])- 
    S/P Segmental posterior cervicothoracic fusion C5 to T5, and arthrodesis C5 to T5 with cancellous bone chips and DBM putty, and partial laminectomy T2     Metastatic disease through T1, T2 and T3, with severe spinal stenosis and  
    impingement on the cord. Adverse reaction to antineoplastic drug (2/22/2021) Overview: ([id number])- Letrozole initiated Pancytopenia, diarrhea, myalgias Past Medical History:  
Diagnosis Date  Acute kidney injury superimposed on CKD ([address] 2/22/2021  Adverse reaction to antineoplastic drug 2/22/2021  
 ([id number])- Letrozole initiated  Pancytopenia, diarrhea, myalgias  Arthralgia 8/17/2017  Arthritis  Asthma Mild to Moderate  Breast cancer ([address] 8/17/2017 Onset 1997; Refusing Mammogram 6/16/17  Breast cancer metastasized to liver ([address] 9/1/2020  Breast cancer metastasized to lung ([address] 9/1/2020  Cancer ([address] 1997 Breast left  Cardiomyopathy ([address] 8/17/2017 Onset:1999 Ischemic; 25% - 50% (last EFx 45%) Continue with ACE/Lasix/coreg  Cataract 8/17/2017 Bilateral   
 Cellulitis 8/17/2017 Suspect local reaction to Pneumovax, treat with ice, NSAIDs or Tylenol  Chest pain at rest 8/17/2017  CHF (congestive heart failure) ([address] 8/17/2017 Stable, though weight up a little. To take 1 1/2 Lasix daily if swelling, 1 daily o/w  Chronic maxillary sinusitis 8/17/2017  Chronic pain Right knee  COPD (chronic obstructive pulmonary disease) ([address] 8/17/2017 Continue with inhalers  Diabetes ([address] Pre-diabetic  DJD (degenerative joint disease) 8/17/2017  Elevated BUN 8/17/2017  Encounter for monoclonal antibody treatment for malignancy 2/22/2021  
 ([id number])-  Jonathan Dowd  Esophagitis, reflux 8/17/2017  Fatigue 8/17/2017  GERD (gastroesophageal reflux disease)  Heart failure ([address] Cardiomyopathy, HX of MI 1999  Hyperglycemia 8/17/2017  Hyperkalemia 8/17/2017  Hyperlipidemia 8/17/2017  Hypertension 8/17/2017  Hypocalcemia 2/22/2021  Hypomagnesemia 2/21/2021  Hypophosphatemia 2/22/2021  Hypovolemic shock ([address] 2/22/2021  Ill-defined condition Vertigo  Ill-defined condition  HX of Urosepsis complicated by respiratory failure and pneumonnia  Kidney disease, chronic, stage IV (GFR 15-29 ml/min) ([address] 2021  Long term (current) use of aspirin 2021 ASA 81 mg Daily  Macrocytic anemia 2021  Malignant neoplasm metastatic to breast ([address] 2020  Mild protein-calorie malnutrition ([address] 2021  Myalgia 2017  Pancytopenia due to antineoplastic chemotherapy ([address] 2021  S/P laminectomy 2020  
 ([id number])- S/P Segmental posterior cervicothoracic fusion C5 to T5, and arthrodesis C5 to T5 with cancellous bone chips and DBM putty, and partial laminectomy T2  Metastatic disease through T1, T2 and T3, with severe spinal stenosis and impingement on the cord.  S/P left mastectomy 1997  
 ()- Left breast cancer % [place] 60% Her 2 -ve, initial diagnosis in the   status post left mastectomy chemo and radiation  S/P total knee arthroplasty, right 2017  
 (2021)  Symptomatic anemia 2021  Thromboembolus ([address] 1969  
 during 2nd pregnancy Hetal Montes 2017  Vertigo, aural 2017  Vitamin D deficiency 2017 Past Surgical History:  
Procedure Laterality Date [facility] JONNY  
 HX GYN Left Breast Mastectomy  HX HEENT   Bilateral Cataract  HX ORTHOPAEDIC Right 2018  
 knee replacement  HX ORTHOPAEDIC Left 2018  
 wrist surgery  HX VASCULAR ACCESS [place] cath on the right Family History Problem Relation Age of Onset  Cancer Mother  Other Father History reviewed, no pertinent family history. Social History Tobacco Use  Smoking status: Former Smoker Packs/day: 2.00 Years: 25.00 Pack years: 50.00 Quit date:  Years since quittin.1  Smokeless tobacco: Never Used Substance Use Topics  Alcohol use: No  
 
Allergies Allergen Reactions  Morphine Nausea and Vomiting Current Facility-Administered Medications Medication Dose Route Frequency  0.9% sodium chloride infusion  75 mL/hr IntraVENous CONTINUOUS  
 sodium polystyrene (KAYEXALATE) 15 gram/60 mL oral suspension 45 g  45 g Oral NOW  predniSONE (DELTASONE) tablet 40 mg  40 mg Oral DAILY WITH BREAKFAST  amiodarone (CORDARONE) 375 mg/250 mL D5W infusion  0.5 mg/min IntraVENous CONTINUOUS  
 PHENYLephrine (LINDA-SYNEPHRINE) 30 mg in 0.9% sodium chloride 250 mL infusion   mcg/min IntraVENous TITRATE  pantoprazole (PROTONIX) tablet 40 mg  40 mg Oral ACB  calcium carbonate (TUMS) chewable tablet 400 mg [elemental]  400 mg Oral QID PRN  
 0.9% sodium chloride infusion 250 mL  250 mL IntraVENous PRN  
 0.9% sodium chloride infusion 250 mL  250 mL IntraVENous PRN  
 amiodarone (CORDARONE) tablet 100 mg  100 mg Oral DAILY  folic acid (FOLVITE) tablet 1 mg  1 mg Oral DAILY  albuterol-ipratropium (DUO-NEB) 2.5 MG-0.5 MG/3 ML  3 mL Nebulization Q4H PRN  
 alcohol 62% (NOZIN) nasal  1 Ampule  1 Ampule Topical Q12H  
 zinc oxide-cod liver oil (DESITIN) 40 % paste   Topical PRN  
 oxyCODONE IR (ROXICODONE) tablet 5 mg  5 mg Oral Q4H PRN  
 glycopyrrolate-formoterol (BEVESPI AEROSPHERE) 9 mcg-4.8 mcg inhaler  2 Puff Inhalation BID  DULoxetine (CYMBALTA) capsule 20 mg  20 mg Oral QHS  fluticasone propionate (FLONASE) 50 mcg/actuation nasal spray 2 Spray  2 Spray Both Nostrils DAILY  albuterol (PROVENTIL VENTOLIN) nebulizer solution 2.5 mg  2.5 mg Inhalation Q4H PRN  zinc oxide-cod liver oil (DESITIN) 40 % paste   Topical BID and QHS  sodium chloride (NS) flush 5-40 mL  5-40 mL IntraVENous Q8H  
 sodium chloride (NS) flush 5-40 mL  5-40 mL IntraVENous PRN  
 acetaminophen (TYLENOL) tablet 650 mg  650 mg Oral Q6H PRN Or  
 acetaminophen (TYLENOL) suppository 650 mg  650 mg Rectal Q6H PRN  polyethylene glycol (MIRALAX) packet 17 g  17 g Oral DAILY PRN  
 promethazine (PHENERGAN) tablet 12.5 mg  12.5 mg Oral Q6H PRN Or  
 ondansetron (ZOFRAN) injection 4 mg  4 mg IntraVENous Q6H PRN  
 
 
 
 LAB AND IMAGING FINDINGS:  
 
Lab Results Component Value Date/Time WBC 11.1 (H) 03/01/2021 04:03 AM  
 HGB 9.7 (L) 03/01/2021 04:03 AM  
 PLATELET 177 (L) 22/43/5946 04:03 AM  
 
Lab Results Component Value Date/Time Sodium 139 03/01/2021 04:03 AM  
 Potassium 6.0 (H) 03/01/2021 04:03 AM  
 Chloride 106 03/01/2021 04:03 AM  
 CO2 25 03/01/2021 04:03 AM  
 BUN 74 (H) 03/01/2021 04:03 AM  
 Creatinine 2.74 (H) 03/01/2021 04:03 AM  
 Calcium 7.0 (L) 03/01/2021 04:03 AM  
 Magnesium 2.2 03/01/2021 04:03 AM  
 Phosphorus 3.8 03/01/2021 04:03 AM  
  
Lab Results Component Value Date/Time Alk. phosphatase 43 (L) 02/21/2021 08:37 PM  
 Protein, total 5.4 (L) 02/21/2021 08:37 PM  
 Albumin 3.3 (L) 03/01/2021 04:03 AM  
 Globulin 3.2 02/21/2021 08:37 PM  
 
Lab Results Component Value Date/Time INR 1.0 09/29/2020 03:04 AM  
 Prothrombin time 10.9 09/29/2020 03:04 AM  
 aPTT 24.1 09/21/2020 11:05 AM  
  
Lab Results Component Value Date/Time Iron 24 (L) 02/23/2021 04:09 AM  
 TIBC 108 (L) 02/23/2021 04:09 AM  
 Iron % saturation 22 02/23/2021 04:09 AM  
  
No results found for: PH, PCO2, PO2 No components found for: [place] No results found for: CPK, CKMB Total time:  
Counseling / coordination time, spent as noted above:  
> 50% counseling / coordination?:  
 
Prolonged service was provided for  []30 min   []75 min in face to face time in the presence of the patient, spent as noted above. Time Start:  
Time End:  
Note: this can only be billed with 65923 (initial) or 66053 (follow up). If multiple start / stop times, list each separately.

## 2021-03-01 NOTE — PROGRESS NOTES
Received message from patient's nurse [facility] stating : 
 
Wanted to inform you that patient BMP lab came back as critical for Calcium 6.3, which is trending down from yesterday morning when it was 6.6. Discussion / orders: 
 
Ordered calcium gluconate 2 g IV x1 Please note that this note was dictated using Dragon computer voice recognition software. Quite often unanticipated grammatical, syntax, homophones, and other interpretive errors are inadvertently transcribed by the computer software. Please disregard these errors. Please excuse any errors that have escaped final proofreading.

## 2021-03-01 NOTE — PROGRESS NOTES
Spiritual Care Assessment/Progress Note [address] 
 
 
NAME: Cheryl Braden      MRN: [id number] AGE: 67 y.o. SEX: female Bahai Affiliation: [facility]  
Language: [place] 3/1/2021     Total Time (in minutes): 7 Spiritual Assessment begun in [facility] 2 PROGRESSIVE CARE through conversation with: 
  
    []Patient        [] Family    [] Friend(s) Reason for Consult: Palliative Care, Initial/Spiritual Assessment Spiritual beliefs: (Please include comment if needed) 
   [] Identifies with a paty tradition:     
   [] Supported by a paty community:        
   [] Claims no spiritual orientation:       
   [] Seeking spiritual identity:            
   [] Adheres to an individual form of spirituality:       
   [x] Not able to assess:                   
 
    
Identified resources for coping:  
   [] Prayer                           
   [] Music                  [] Guided Imagery 
   [] Family/friends                 [] Pet visits [] Devotional reading                         [x] Unknown 
   [] Other:                                          
 
 
Interventions offered during this visit: (See comments for more details) Patient Interventions: Other (comment) Family/Friend(s): Other (comment) Plan of Care: 
 
 [] Support spiritual and/or cultural needs  
 [] Support AMD and/or advance care planning process    
 [] Support grieving process 
 [] Coordinate Rites and/or Rituals  
 [] Coordination with community clergy [] No spiritual needs identified at this time 
 [] Detailed Plan of Care below (See Comments)  [] Make referral to Music Therapy 
[] Make referral to Pet Therapy    
[] Make referral to Addiction services 
[] Make referral to [facility] 
[] Make referral to Spiritual Care Partner 
[] No future visits requested       
[x] Follow up upon further referrals Comments: Attempted to provide initial spiritual assessment for pt [place] on PROG CARE after receiving palliative care consult. Family was present. Pt appeared asleep. At time of visit, unable to connect to patient or family due to visit from other providers within [address] [facility]. Follow-up is indicated. [address] [place] [address] Spiritual Care Provider  Paging Service [phone])

## 2021-03-01 NOTE — PROGRESS NOTES
Speech path Patient was evaluated last week by our service and her dysphagia was thought to be esophageal in nature. We signed off. We were reconsulted but unclear why. She is now meeting with Palliative and refusing HD. We will follow up as needed.   
Jose Garcia, SLP

## 2021-03-01 NOTE — PROGRESS NOTES
Nephrology Progress Note Papo Maldonado  
 
[email or web address]. [email or web address]  Phone - [phone] Patient: Debbie Red    YOB: 1945 Date- 3/1/2021   Admit Date: 2/21/2021 CC: Follow up for  Jong, hyperkalemia IMPRESSION & PLAN:  
?  jong due to ATN from hypotension ? Hyperkalemia ? [place] acidosis · Rt Breast ca- with mets--% MO 60% Her 2 -ve · afib · H/o left mastectomy in 1990 PLAN- 
? Iv calcium gluconate ? Iv insulin + d 50 
? Kayexalate 45 g po x 1 
? Continue ivf 
? PATIENT DOESN'T WANT DIALYSIS. She understand consequence of refusing hd including DEATH. ? Consult palliative care Subjective: Interval History:  
-  k worse 6.0 POOR urine out put 
bp low on vasopressors No c/o sob,  No c/o chest pain, No c/o nausea or vomiting, No c/o  fever. Objective:  
Vitals:  
 03/01/21 0425 03/01/21 0745 03/01/21 4752 03/01/21 8873 BP:   96/77 Pulse:   (!) 133 Resp:   24 Temp:   98.1 °F (36.7 °C) SpO2:  92% 93% 92% Weight: 83.9 kg (185 lb) Height:      
  
02/28 0701 - 03/01 0700 In: 2043.4 [I.V.:2043.4] Out: - Last 3 Recorded Weights in this Encounter 02/26/21 6471 02/27/21 4183 03/01/21 0425 Weight: 81.4 kg (179 lb 6.4 oz) 81.6 kg (180 lb) 83.9 kg (185 lb) Physical exam:  
GEN: NAD NECK- Supple, no mass RESP: No wheezing, Clear b/l CVS: S1,S2  RRR 
NEURO: Normal speech, Non focal 
EXT: + Edema PSYCH: Normal Mood Chart reviewed. Pertinent Notes reviewed. Data Review : 
Recent Labs 03/01/21 
0403 02/28/21 
2310 02/28/21 
1607  140 140  
K 6.0* 5.1 5.8*  
 104 105 CO2 25 27 26 BUN 74* 75* 72* CREA 2.74* 2.59* 2.61* * 239* 210* CA 7.0* 6.3* 6.6*  
MG 2.2  --   --   
PHOS 3.8  --   --   
 
Recent Labs 03/01/21 
0403 02/27/21 
1701 WBC 11.1* 8.0 HGB 9.7* 10.4* HCT 29.9* 32.4*  
* 157 No results for input(s): FE, TIBC, PSAT, FERR in the last 72 hours. Medication list  reviewed Current Facility-Administered Medications Medication Dose Route Frequency  0.9% sodium chloride infusion  75 mL/hr IntraVENous CONTINUOUS  
 calcium gluconate 1 g in 0.9% sodium chloride 100 mL IVPB  1 g IntraVENous ONCE  
 insulin regular (NOVOLIN R, HUMULIN R) injection 10 Units  10 Units IntraVENous ONCE  
 dextrose (D50W) injection syrg 25 g  25 g IntraVENous ONCE  
 sodium polystyrene (KAYEXALATE) 15 gram/60 mL oral suspension 45 g  45 g Oral NOW  predniSONE (DELTASONE) tablet 40 mg  40 mg Oral DAILY WITH BREAKFAST  amiodarone (CORDARONE) 375 mg/250 mL D5W infusion  0.5 mg/min IntraVENous CONTINUOUS  
 PHENYLephrine (LINDA-SYNEPHRINE) 30 mg in 0.9% sodium chloride 250 mL infusion   mcg/min IntraVENous TITRATE  sodium bicarbonate 150 mEq/1000 mL D5W (premix)   IntraVENous CONTINUOUS  
 pantoprazole (PROTONIX) tablet 40 mg  40 mg Oral ACB  calcium carbonate (TUMS) chewable tablet 400 mg [elemental]  400 mg Oral QID PRN  
 0.9% sodium chloride infusion 250 mL  250 mL IntraVENous PRN  
 0.9% sodium chloride infusion 250 mL  250 mL IntraVENous PRN  
 amiodarone (CORDARONE) tablet 100 mg  100 mg Oral DAILY  folic acid (FOLVITE) tablet 1 mg  1 mg Oral DAILY  albuterol-ipratropium (DUO-NEB) 2.5 MG-0.5 MG/3 ML  3 mL Nebulization Q4H PRN  
 alcohol 62% (NOZIN) nasal  1 Ampule  1 Ampule Topical Q12H  
 zinc oxide-cod liver oil (DESITIN) 40 % paste   Topical PRN  
 oxyCODONE IR (ROXICODONE) tablet 5 mg  5 mg Oral Q4H PRN  
 glycopyrrolate-formoterol (BEVESPI AEROSPHERE) 9 mcg-4.8 mcg inhaler  2 Puff Inhalation BID  DULoxetine (CYMBALTA) capsule 20 mg  20 mg Oral QHS  fluticasone propionate (FLONASE) 50 mcg/actuation nasal spray 2 Spray  2 Spray Both Nostrils DAILY  albuterol (PROVENTIL VENTOLIN) nebulizer solution 2.5 mg  2.5 mg Inhalation Q4H PRN  
  zinc oxide-cod liver oil (DESITIN) 40 % paste   Topical BID and QHS  sodium chloride (NS) flush 5-40 mL  5-40 mL IntraVENous Q8H  
 sodium chloride (NS) flush 5-40 mL  5-40 mL IntraVENous PRN  
 acetaminophen (TYLENOL) tablet 650 mg  650 mg Oral Q6H PRN Or  
 acetaminophen (TYLENOL) suppository 650 mg  650 mg Rectal Q6H PRN  polyethylene glycol (MIRALAX) packet 17 g  17 g Oral DAILY PRN  promethazine (PHENERGAN) tablet 12.5 mg  12.5 mg Oral Q6H PRN Or  
 ondansetron (ZOFRAN) injection 4 mg  4 mg IntraVENous Q6H PRN Neeru Pryor, [address] [facility] [employer] [address] [place], [address] [facility] Phone - [phone]               Fax - [phone]

## 2021-03-01 NOTE — PROGRESS NOTES
1900:Bedside shift change report given to [facility] (oncoming nurse) by Rosa Elena Her RN (offgoing nurse). Report included the following information SBAR, Kardex, Intake/Output, MAR, Recent Results, Med Rec Status and Cardiac Rhythm NSR 
 
2150: Attempted to draw 2200 BMP blood work. Was unsuccessful. Waiting for another RN to become available so that they can try as well. 0000: Critical calcium of 6.3. Message sent to GALLO Darby. Calcium gluconate 2 g IV ordered and given. 0700: End of Shift Note Bedside shift change report given to Bhupendra Gayle RN (oncoming nurse) by Lucio Saeed (offgoing nurse). Report included the following information SBAR, Kardex, Procedure Summary, Intake/Output, MAR, Recent Results and Cardiac Rhythm NSR/ Afib Shift worked:  1019-4869 Shift summary and any significant changes:  
  Pt lethargic overnight. Critical calcium 6.3. Calcium gluconate IV given. Concerns for physician to address:  Electrolytes and kidney function Zone phone for [facility] shift:   [phone] Activity: 
Activity Level: Up with Assistance Number times ambulated in hallways past shift: 0 Number of times OOB to chair past shift: 0 Cardiac:  
Cardiac Monitoring: Yes     
Cardiac Rhythm: Normal sinus rhythm Access:  
Current line(s): port Genitourinary:  
Urinary status: external catheter Respiratory:  
O2 Device: Nasal cannula Chronic home O2 use?: NO Incentive spirometer at bedside: NO 
  
GI: 
Last Bowel Movement Date: 02/27/21 Current diet:  DIET NUTRITIONAL SUPPLEMENTS Lunch, Dinner; Naima-Juan F DIET MECHANICAL SOFT 
DIET NUTRITIONAL SUPPLEMENTS Breakfast; Ensure Pudding DIET NUTRITIONAL SUPPLEMENTS Lunch; Ensure Clear DIET NUTRITIONAL SUPPLEMENTS Dinner, Breakfast; Nepro Passing flatus: YES Tolerating current diet: YES 
% Diet Eaten: (refused) Pain Management:  
Patient states pain is manageable on current regimen: YES Skin: 
Shan Score: 16 Interventions: increase time out of bed, PT/OT consult and internal/external urinary devices Patient Safety: 
Fall Score: Total Score: 5 Interventions: bed/chair alarm, gripper socks and pt to call before getting OOB High Fall Risk: Yes Length of Stay: 
Expected LOS: 4d 7h Actual LOS: 8 [employer]

## 2021-03-01 NOTE — PROGRESS NOTES
Transition of Care Plan: 
  
Disposition: Home with Resumption of Home Health ([facility]) Follow up appointments: PCP 
DME needed: None Transportation [address] and she does have Medicaid if needed. Keys or means to access home:  Patient has keys, her DTR has keys.  IM Medicare letter: Will need 2nd IM Caregiver Contact: Lizet Pritchett (Daughter) [phone] Discharge Caregiver contacted prior to discharge? CM Pt is not medically stable and palliative is now involved due to pt is not wanting to do dialysis. CM will continue to follow and assist with d/c planning. Edin Shell.Ashok. Care Manager [facility] 
[phone]

## 2021-03-01 NOTE — PROGRESS NOTES
Cardiology Progress Note 3/1/2021 4:07 PM 
 
Admit Date: 2/21/2021 Subjective:  Events noted. Oliguria with progressive renal failure. She has decided on comfort care Visit Vitals BP (!) 84/49 Pulse 74 Temp 97.4 °F (36.3 °C) Resp 18 Comment: Simultaneous filing. User may not have seen previous data. Ht 5' 5\" (1.651 m) Wt 83.9 kg (185 lb) SpO2 96% Comment: Simultaneous filing. User may not have seen previous data. Breastfeeding No  
BMI 30.79 kg/m²  
 
02/27 1901 - 03/01 0700 In: 6033.4 [P.O.:150; I.V.:5883.4] Out: 800 [Urine:800] Objective:  
  
Physical Exam: 
VS as above Data Review:  
Labs:   
 
Hgb 9.7 Plat 116K BUN 80 Creat 2.7 Telemetry: SR R 74 Assessment: 1. Atrial fibrillation with rapid ventricular response- resolved 2. Persistent hypotension. 3.  History of nonischemic cardiomyopathy normal EF by recent echo. 4.  Metastatic breast cancer. 5.  Recent diarrhea, resolved. 6.  Chronic obstructive pulmonary disease. 7.  Acute kidney injury, worse with hyperkalemia and hypocalcemia 8. Chronic anemia. Plan: Cont IV amiodarone which can be stopped when she formally enters hospice.  Will see bita bates

## 2021-03-02 NOTE — INTERDISCIPLINARY ROUNDS
Interdisciplinary team rounds were held 3/2/2021 with the following team members:Care Management, Nursing, Nutrition, Pharmacy, Physician and Clinical Coordinator. Plan of care discussed. See clinical pathway and/or care plan for interventions and desired outcomes. Family meeting today with palliative care.

## 2021-03-02 NOTE — PROGRESS NOTES
TRANSFER - IN REPORT: 
 
Verbal report received from Arline Cardozo (name) on Torrey Andrade  being received from PCU (unit) for routine progression of care Report consisted of patients Situation, Background, Assessment and  
Recommendations(SBAR). Information from the following report(s) SBAR, Kardex, ED Summary and MAR was reviewed with the receiving nurse. Opportunity for questions and clarification was provided. Assessment deferred to night shift RN. Patient unresponsive and resting comfortably. No visual cues of distress. On 4L O2; respiratory pattern regular. Family at bedside.

## 2021-03-02 NOTE — PROGRESS NOTES
Bedside and Verbal shift change report given to Jennifer Venegas (oncoming nurse) by Edwin (off going nurse). Report included the following information SBAR, Kardex, Procedure Summary, Intake/Output, MAR and Recent Results. 0825: Patient given oral medications this morning crushed in pudding and tolerated ok. Patient more alert this morning, stating she is in generalized pain. IV Dilaudid given for pain and patient repositioned in the bed. 1035: Patient continues to complain of pain after being repositioned in the bed, oxycodone given. Palliative care team at bedside  
 
1100: Patient given Zofran for nausea 1140: Patient's family at bedside to talk with palliative care team about plan of care for patient 1415: Palliative care team at bedside to talk with family and patient, family is ready to pre medicate patient and stop all IV fluids and medications in preparation for comfort care. 1433: IV Ativan and IV Dilaudid given. Kris, Amio and continuous fluids stopped per orders. 1510: Hospice nurse at bedside to transition patient to hospice care

## 2021-03-02 NOTE — PROGRESS NOTES
Palliative Medicine [place]: [phone] ([phone]) [facility]: [phone] ([phone]) Jayda Linda is a 67 y.o. female  with a past history of metastatic breast cancer to bones and liver and lung, initial diagnosed in 1990 s/p left mastectomy chemo and radiation, CHF and chronic pain  who was admitted on 2/21/2021 from ER with a diagnosis of hypovolemic shock R/O sepsis, presented with lethargy and weakness and was found to have low blood pressure by her visiting physical therapist, of note patient has baseline chronic hypotension with systolic in 449-521. 
  
Hospital course is notable for AMAN due to ATN from hypotension, negative cultures. shock most likely hypovolemic due to poor intake and diarrhea prior to admission . 
  
Patient has worsening renal function and hyperkalemia, blood pressure low on high dose pressors, she has refused dialysis to nephrologist, Palliative medicine is consulted for care decision for end stage disease . 
  
 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 Her two daughters Jud Phillips and Dileep Boykin are very supportive, Ariella Villalpando is local and most involved,  her son is not involved, she is independent in daily activities needs help with shower, she is recently approved for 40 hrs/week Medicaid aid 
  
Advance directives in place. (From Dr Haylee Sagastume notes). AKIRA is familiar with this patient from past admission, when patient [facility] jaime Villalpando briefly at that time. Palliative team of this writer and Dr Deidra Barragan met with patient's mPOA, daughter Jud Phillips to update her on patient's condition and to share with her patient's wishes to not have dialysis for her renal functioning, that she would like to focus on comfort. Cecily validated that she would like to honor patient's wishes for comfort. Ariella Villalpando is realistic, she is aware of her mother's wishes. She shared that her sister, Norah Gaines, is on her way to [place] from [place] and will arrive tonight. She will come to the hospital on 3/2 to see patient. Patient's son is aware that patient is in critical condition and may not make it, but Cecily noted that he would not be coming to the hospital \"he can't handle seeing her this way\". Cecily would like to wait until family members have had an opportunity to spend time with patient prior to de-escalating care. We discussed that we would discuss the plan for visitation with unit nurse manager and get back to Ariella Villalpando regarding visitation for patient. Cecily would like her children and  to be able to have some time with patient today. They will be coming in later this afternoon. Names given to . Patient does have some pain issues, for which Dr Deidra aBrragan is managing medications.   
 
Palliative team will follow up tomorrow for plan and support.   
 
Cecily will contact us tomorrow morning regarding her visit to the hospital.

## 2021-03-02 NOTE — PROGRESS NOTES
Palliative Medicine [place]: [phone] ([phone]) [facility]: [phone] ([phone]) Palliative team of Dr Shabbir Martinez and this writer met with patient on her own initially and then together with her family including daughters Yue Pelayo, Gerry Santos (here from [place]), grand daughter Rachel Barr and her  Latha Renee. Patient is a little more alert than yesterday, she is able to state her needs when asked. She was complaining of \"pain all over\" in the morning. Dr Shabbir Martinez discussed management of this discomfort with bedside RNs. Patient does open eyes at times, mostly she is sleeping with eyes closed. Let patient know that her family is planning to come to visit her today and that there are several people who want to come to see her. Let her know that her daughter Ricky Campos is also coming and patient noted, \"I want to see her, I haven't seen her in a long time\". Met with family and Dr Shabbir Martinez explained that patient is a little more alert, that we could continue to give her the current care to see if anything else changes over the next few days. Family [facility]) noted that she does not want to wait anymore, that she wants to honor her mother's wish to \"be comfortable and focus on comfort\". Family want to wait for several other family members who plan to come later this morning to see patient, spend some time with her. They will let us know when they are ready to stop active treatments and to focus on comfort only. Family is supportive, appropriately grieving, no additional needs at this time. They shared that patient has always been a \"very sweet person who could also be feisty when she wanted to be\".

## 2021-03-02 NOTE — PROGRESS NOTES
1708: Scheduled IV Dilaudid and IV Ativan given. Patient appears to be very comfortable, respirations at 8, last blood pressure at 80/46 and heart rate 75. Both daughters at bedside 1750: Report given to Kristen Valdes RN on patient being transferred to room 1123 on the oncology unit for hospice care

## 2021-03-02 NOTE — HOSPICE
[employer] Good Help to Those in Need 
[phone] Inpatient Nursing Admission Patient Name: Deandra Hinojosa YOB: 1945 Age: 67 y.o. Date of Hospice Admission: 3/2/2021 Hospice Attending Elected by Patient: Francia Cueva MD 
Primary Care Physician: Yojana Brown NP Admitting RN: Ariella Aguilar RN : RUPALI Betancourt Level of Care (GIP/Routine/Respite): GIP Facility of Care: [address] [place] [address] Patient Room: [address] [place] [address] HOSPICE SUMMARY  
ER Visits/ Hospitalizations in past year: 2 Hospice Diagnosis: Hypovolemic shock ([facility] [address] [R57.1] Onset Date of Hospice Diagnosis: 2/21/2021 Summary of Disease Progression Leading to Hospice Diagnosis: 
Per Dr Betty Lanier 3/2/21: 
  
Shock, likely hypovolemic Diarrhea, poor PO intake prior to admission Monitor BP Remains on off pressors Mentation intact despite borderline BPs Downgraded diet to Full Liquid as per pt request  
 currently off antibiotics, currently on pressors. Levophed at 40 mics Plan is to make her comfort measures only this afternoon after family meeting. 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 
GERD Chronic systolic HF 
COPD, not in exacerbation 
ently BP and HF meds on hold given borderline hypotension 
  
30.0 - 39.9 Obese / Body mass index is 28.03 kg/m². 
  
Code status: Full Prophylaxis: Heparin Recommended Disposition: TBD  
  
Plan is transition to comfort measures only today, palliative care and hospice team following. Currently on Levophed and amiodarone drip.   Plan to discontinue drips once the daughter arrives Per Dr Lizeth Lynch 3/2/21: 
 
 
Brief summary of visit, full note will be placed. 
  
Family members are here to say good bye to her , they are ready to stop active treatment for transition to comfort . 
  
I have stopped pressors, amiodarone drip and I/V fluids . 
  
Comfort care orders placed , including metoprolol 2.5 mg I/V every 6 hrs prn for tachycardia. 
  
Hospice Rn Liane at bed side to support family , we will monitor for any distress and adjust medication accordingly, it is likely she may declines and decompensate quickly and die. 
  
Hospice Consult: This nurse at bedside with family as she was pre-medicated and pressors were stopped. She had been lethargic but verbal with family just prior. Spent time with children and grandchildren. Daughters remain at bedside. BP is dropping but unsure if she will linger. Admitted to hospice GIP to insure her comfort and provide support to family Co-Morbidities:  
Patient Active Problem List  
Diagnosis Code  OA (osteoarthritis) of knee M17.10  Breast cancer ([facility] [address] C50.919  
 Hypertension I10  Vitamin D deficiency E55.9  Hyperlipidemia E78.5  Knee pain, bilateral M25.561, M25.562  Fatigue R53.83  
 COPD (chronic obstructive pulmonary disease) ([facility]) J44.9  Vertigo, aural H81.319  
 DJD (degenerative joint disease) M19.90  Myalgia M79.10  Cataract H26.9  Chronic maxillary sinusitis J32.0  Esophagitis, reflux K21.00  Elevated BUN R79.9  Severe obesity (HCC) E66.01  
 Bone metastases (HCC) C79.51  Thoracic compression fracture ([facility]) S22.000A  History of breast cancer Z85.3  Pain due to malignant neoplasm metastatic to bone ([facility]) G89.3, C79.51  
 Neuropathic pain M79.2  Drug-induced constipation K59.03  
 Weakness of both legs R29.898  Breast cancer metastasized to bone, right ([facility] [address] C50.911, C79.51  Severe sepsis with acute organ dysfunction ([facility]) A41.9, R65.20  Pancytopenia due to antineoplastic chemotherapy ([facility] [address] D61.810, T45.1X5A  
 Kidney disease, chronic, stage IV (GFR 15-29 ml/min) (HCC) N18.4  Acute kidney injury superimposed on CKD (HCC) N17.9, N18.9  Long term (current) use of aspirin Z79.82  
 Hypovolemic shock (HCC) R57.1  S/P total knee arthroplasty, right B51.241  Hypocalcemia E83.51  
 Mild protein-calorie malnutrition (HCC) E44.1  S/P left mastectomy Z90.12  Breast cancer metastasized to liver ([facility] [address] C50.919, C78.7  Breast cancer metastasized to lung ([facility] [address] C50.919, C78.00  Malignant neoplasm metastatic to breast ([facility] [address] C79.81  
 Encounter for monoclonal antibody treatment for malignancy Z51.12  
 S/P laminectomy Z98.890  
 Adverse reaction to antineoplastic drug T45. 1X5A  Hypophosphatemia E83.39  Symptomatic anemia D64.9  Macrocytic anemia D53.9  Hypomagnesemia E83.42 Rationale for a prognosis of life expectancy of 6 months or less if the disease follows its normal course (Disease Specific History): Yamileth Salgado is a 67 y.o. who was admitted to [facility]. The patient's principle diagnosis of hypovolemic shock has resulted in pursuit of hospice. Functionally, the patient's Palliative Performance Scale has declined over a period of days and is estimated at 10. Objective information that support this patients limited prognosis includes: unresponsive, hypotensive, generalized pain and dyspnea. The patient/family chose comfort measures with the support of Hospice. Patient meets for GIP LOC as evidenced by:  Need for IV medications to manage pain, dyspnea and anxiety Prognosis estimated based on 03/02/21 clinical assessment is:  
[x] Few to Many Hours [] Hours to Days  
[] Few to Many Days  
[] Days to Weeks  
[] Few to Many Weeks  
[] Weeks to Months  
[] Few to Many Months ASSESSMENT Patient self-reports:  []  Yes    [x] No 
 
SYMPTOMS: unresponsive, dyspnea, generalized pain and anxiety SIGNS/PHYSICAL FINDINGS: hypotensive BP 70/38, dyspnea with use of accessory muscles and apnea of 20 seconds, feet cold and mottled KARNOFSKY: 10 
 
FAST for all dementia:   
 
Learning Assessment: 
Patient Is patient willing/able to learn? no What is the highest level of education completed? Learning preference (written material, demonstration, visual)? Learning barriers (ESOL, [place], poor vision)? Caregiver Is caregiver willing to learn care for patient? yes What is the highest level of education completed? unknown Learning preference (written material, demonstration, visual)? verbal 
Learning barriers (ESOL, [place], poor vision)? none CLINICAL INFORMATION Wt Readings from Last 3 Encounters:  
03/02/21 76.4 kg (168 lb 6.9 oz) 01/13/21 82.8 kg (182 lb 9.6 oz) 01/13/21 82.2 kg (181 lb 3.2 oz) Ht Readings from Last 3 Encounters:  
02/25/21 5' 5\" (1.651 m)  
01/27/21 5' 5\" (1.651 m)  
01/13/21 5' 5\" (1.651 m) There is no height or weight on file to calculate BMI. There were no vitals taken for this visit. LAB VALUES No results found for this visit on 03/02/21 (from the past 12 hour(s)). No results found for this visit on 03/02/21 (from the past 6 hour(s)). Lab Results Component Value Date/Time Protein, total 5.0 (L) 03/02/2021 01:20 AM  
 Albumin 2.9 (L) 03/02/2021 01:20 AM  
 
 
Currently this patient has: 
[x] Supplemental O2 [x] Peripheral IV  [] PICC    [x] PORT [x] [place] Catheter [] NG Tube   [] PEG Tube [] Ostomy   
[] AICD: Has ICD been deactivated? [] Yes [] No:______ PLAN 1. Admit GIP for management of pain, dyspnea and anxiety 2. Oxygen at 6L NC  
3. Schedule dilaudid 1mg IV every 4 hours 4. Schedule ativan 1mg IV every 4 hours 5. Schedule robinul 0.2mg Iv every 4 hours 6. Comfort set meds PRN 7. Provide emotional support to family 8. Communicate with floor nurse and hospice physician to insure patient comfort at end of life Hospice Team Frequency Orders: 
Skilled Nurse -   Daily x 7 days  MSW  1 visit for initial assessment/evaluation for family support and need for volunteer services. Khanh Olvera  1 visit for initial assessment/evaluation for spiritual support. ADVANCE CARE PLANNING (Complete in ACP Flow Sheet) Code Status: DNR Durable DNR: [x]  Yes  []  No 
Code Status Discussed/Confirmed: yes Preference for Other Life Sustaining Treatment Discussed/Confirmed:yes Hospitalization Preference:[facility] Advance Care Planning 2021 Patient's Healthcare Decision Maker is: -  
Primary Decision Maker Name -  
Primary Decision Maker Phone Number -  
Primary Decision Maker Relationship to Patient -  
Confirm Advance Directive Yes, on file Patient Would Like to Complete Advance Directive - Does the patient have other document types -  Service: [] Yes  [x]  No      [] Unknown Appropriate for Pinning Ceremony:  [] Yes     [] No 
Nondenominational: Judaism  Home: [employer] [place]  [address] DISCHARGE PLANNING 1. Discharge Plan: will likely  in the hospital but should she stabilize will go home with family and support of hospice 2. Patient/Family teaching: hospice philosophy and services to be provided. End of life symptoms and care to be provided 3. Response to patient/family teaching: daughters verbalized understanding SOCIAL/EMOTIONAL/SPIRITUAL NEEDS Spiritual Issues Identified: family has support of their  and declines  visit Psych/ Social/ Emotional Issues Identified: Daughters at bedside, emotional support provided. Pressors stopped today and she is not expected to live long. They are prepared and coping well Caregiver Support: 
[x] Provided information on End of Life Care  
[x] Material Provided: Lopez Julian From My [employer]ight or [facility]  
 
CARE COORDINATION Dr. John Harrington contacted, discharge to hospice order received Dr. Roseline Delgado contacted, agrees to serve as attending provider for hospice and provided verbal certification of terminal illness with life expectancy of 6 months or less. Orders for hospice admission, medications and plan of treatment received. Medication reconciliation completed. 
MEDS: See medication list below 
DME: Per hospital 
Supplies: Per hospital 
IDT communication to include MD, , SW, CH and support team 
 
ALLERGIES AND MEDICATIONS  
 
Allergies:  
Allergies  
Allergen Reactions  
• Morphine Nausea and Vomiting  
 
Current Facility-Administered Medications  
Medication Dose Route Frequency  
• LORazepam (ATIVAN) injection 1 mg  1 mg IntraVENous Q15MIN PRN  
• acetaminophen (TYLENOL) tablet 650 mg  650 mg Oral Q4H PRN  
 Or  
• acetaminophen (TYLENOL) solution 650 mg  650 mg Oral Q4H PRN  
 Or  
• acetaminophen (TYLENOL) suppository 650 mg  650 mg Rectal Q4H PRN  
• glycopyrrolate (ROBINUL) injection 0.2 mg  0.2 mg IntraVENous Q4H  
• bisacodyL (DULCOLAX) suppository 10 mg  10 mg Rectal DAILY PRN  
• morphine injection 2 mg  2 mg IntraVENous Q15MIN PRN  
• HYDROmorphone (PF) (DILAUDID) injection 1 mg  1 mg IntraVENous Q4H  
• LORazepam (ATIVAN) injection 1 mg  1 mg IntraVENous Q4H

## 2021-03-02 NOTE — PALLIATIVE CARE
Met with patient two daughters and patient, they have decided to transition to comfort care after family visitation is completed this afternoon. I have consulted hospice, and coordinated with case irineo for hospice. Plan : 
 
Family will call us after  family visitation is completed for transition to comfort care plan including stopping pressors and switching from amiodarone to I/V metoprolol.

## 2021-03-02 NOTE — PROGRESS NOTES
Hospitalist Progress Note NAME: Caroline Ward :  1945 MRN:  [id number] Assessment / Plan: 
Shock, likely hypovolemic Diarrhea, poor PO intake prior to admission Monitor BP Remains on off pressors Mentation intact despite borderline BPs Downgraded diet to Full Liquid as per pt request  
 currently off antibiotics, currently on pressors. Levophed at 40 mics Plan is to make her comfort measures only this afternoon after family meeting. 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 
GERD Chronic systolic HF 
COPD, not in exacerbation 
ently BP and HF meds on hold given borderline hypotension 30.0 - 39.9 Obese / Body mass index is 28.03 kg/m². Code status: Full Prophylaxis: Heparin Recommended Disposition: TBD Plan is transition to comfort measures only today, palliative care and hospice team following. Currently on Levophed and amiodarone drip. Plan to discontinue drips once the daughter arrives Subjective: Chief Complaint / Reason for Physician Visit Drowsy, arousable, in distress. Discussed with RN events overnight. Review of Systems: 
Symptom Y/N Comments  Symptom Y/N Comments Fever/Chills    Chest Pain n   
Poor Appetite y   Edema Cough    Abdominal Pain n   
Sputum    Joint Pain SOB/TAVAREZ    Pruritis/Rash Nausea/vomit y   Tolerating PT/OT Diarrhea    Tolerating Diet Constipation    Other Could NOT obtain due to:   
 
Objective: VITALS:  
Last 24hrs VS reviewed since prior progress note.  Most recent are: 
Patient Vitals for the past 24 hrs: 
 Temp Pulse Resp BP SpO2  
03/02/21 1356  81  113/62   
03/02/21 1230  78 19 123/67 98 % 03/02/21 1145 98 °F (36.7 °C) 79 17 129/72 98 % 03/02/21 1142  86  110/65   
03/02/21 1045  78  (!) 107/48   
03/02/21 1000  79 15 (!) 102/45 97 % 03/02/21 0745 98.2 °F (36.8 °C) 77 15 (!) 103/56 97 % 03/02/21 0646  75 17 (!) 88/61 98 % 03/02/21 0515  77 14 (!) 95/51 96 % 03/02/21 0500  76 16 (!) 137/58   
03/02/21 0457  73 24 (!) 94/55   
03/02/21 0300 98.8 °F (37.1 °C) 75 18 (!) 80/50 96 % 03/02/21 0030  (!) 104 16 (!) 93/57 97 % 03/01/21 2339  (!) 108  (!) 86/54   
03/01/21 2330 98.4 °F (36.9 °C) 96 18 (!) 76/46 100 % 03/01/21 2253  (!) 104  (!) 76/52   
03/01/21 2143  72  (!) 89/45   
03/01/21 2044  74  (!) 97/58   
03/01/21 1917 98.5 °F (36.9 °C) 73 13 (!) 90/52 99 % 03/01/21 1916  72  (!) 82/40   
03/01/21 1844  71  (!) 85/50   
03/01/21 1800  78 25 (!) 91/45 97 % 03/01/21 1730  77 14 (!) 93/56 97 % 03/01/21 1700  77 12 (!) 93/53 96 % 03/01/21 1630  78 15 99/66 96 % 03/01/21 1615  76 11 (!) 86/53 98 % 03/01/21 1606  74  (!) 84/49   
03/01/21 1500 97.4 °F (36.3 °C) 78 18 (!) 91/55 96 % 03/01/21 1416  85  (!) 102/57  Intake/Output Summary (Last 24 hours) at 3/2/2021 1414 Last data filed at 3/2/2021 1100 Gross per 24 hour Intake 3551.84 ml Output 950 ml Net 2601.84 ml I had a face to face encounter and independently examined this patient on 3/2/2021, as outlined below: PHYSICAL EXAM: 
General: Drowsy, cooperative, ill appearing EENT:  EOMI. Anicteric sclerae. MMM Resp:  CTA bilaterally, no wheezing or rales. No accessory muscle use CV:  Irregularly irregular. No edema GI:  Soft, Non distended, Non tender. +Bowel sounds Neurologic:  Arousable, intermittently confused, follows commands when able Psych:   Fair insight Skin:  Pale Reviewed most current lab test results and cultures  YES Reviewed most current radiology test results   YES Review and summation of old records today    NO Reviewed patient's current orders and MAR    YES 
PMH/SH reviewed - no change compared to H&P 
________________________________________________________________________ Care Plan discussed with: 
  Comments Patient x Family RN x Care Manager Consultant Multidiciplinary team rounds were held today with , nursing, pharmacist and clinical coordinator. Patient's plan of care was discussed; medications were reviewed and discharge planning was addressed. ________________________________________________________________________ Total NON critical care TIME:  35   Minutes Total CRITICAL CARE TIME Spent:   Minutes non procedure based Comments >50% of visit spent in counseling and coordination of care    
________________________________________________________________________ Noel Hoffman MD  
 
Procedures: see electronic medical records for all procedures/Xrays and details which were not copied into this note but were reviewed prior to creation of Plan. LABS: 
I reviewed today's most current labs and imaging studies. Pertinent labs include: 
Recent Labs 03/02/21 
0120 03/01/21 
0403 02/27/21 
1701 WBC 9.0 11.1* 8.0 HGB 9.4* 9.7* 10.4* HCT 29.0* 29.9* 32.4*  
* 116* 157 Recent Labs 03/02/21 
0120 03/01/21 
1431 03/01/21 
0403  140 139  
K 4.7 5.0 6.0*  
 104 106 CO2 27 30 25 * 235* 189* BUN 85* 80* 74* CREA 2.77* 2.74* 2.74* CA 6.3* 6.8* 7.0*  
MG 1.9  --  2.2 PHOS 4.0  --  3.8 ALB 2.9*  --  3.3* TBILI 3.2*  --   --   
*  --   --   
 
 
Signed: Noel Hoffman MD

## 2021-03-02 NOTE — PROGRESS NOTES
Hospice Attending Chart reviewed and discussed with Aruna Macdonald, hospice liaison at [facility]. Patient admitted to the hospital on 2/21 with acute respiratory failure, acute hypovolemic shock, suspected sepsis. Patient with metastatic breast cancer to the lung, liver, bone. Patient had been in the intensive care unit on pressor support and showing evidence of worsening renal function, encephalopathy. Patient and family clear that she would not want to proceed with dialysis. Goals were comfort and patient was transition off of pressor support, amiodarone, IV fluids on 3/2/2021. Patient needing frequent nursing assessment for symptom management, IV medications for symptom management and not safe to transition home given the transition off of pressor support. Patient admitted to inpatient level of care hospice on 3/2. Based on my discussion with Madelin Puckett, we elected to start Robinul 0.2 mg IV every 4 hours scheduled, Dilaudid 1 mg IV every 4 hours scheduled, diazepam 1 mg IV every 4 hours scheduled and every 15 minutes as needed. Our team will continue to follow closely and support this transition with hospice.

## 2021-03-02 NOTE — HOSPICE
[employer] Good Help to Those in Need 
[phone] Patient Name: Jhony Gan YOB: 1945 Age: 67 y.o. [employer] RN Note:  Hospice consult received, reviewing chart. Will follow up with Unit Nurse and Care Manager to discuss plan of care, patient status and discharge disposition within the hour. Thank you for the opportunity to be of service to this patient. \Liane Wasserman RN Clinical Nurse Liaison Yuriy Hudson [phone]

## 2021-03-02 NOTE — PROGRESS NOTES
Nephrology Progress Note Papo Maldonado  
 
[facility]  Phone - [phone] Patient: Yamileth Salgado    YOB: 1945 Date- 3/2/2021   Admit Date: 2/21/2021 CC: Follow up for  aman, hyperkalemia IMPRESSION & PLAN:  
? AMAN due to ATN from hypotension ? Hyperkalemia ? [place] acidosis · Rt Breast ca- with mets--% ID 60% Her 2 -ve · afib · H/o left mastectomy in 1990 PLAN- 
? Calcium gluconate 2 grams ? CONTINUE IVF- NACL ? Avoid hypotension ? PATIENT DOESN'T WANT DIALYSIS. She understand consequence of refusing hd including DEATH. ? Consult palliative care D/W NURSE AND PATIENT Subjective: Interval History:  
-  k improved to 4.7 Cr high but stable Urine out put slightly improved C./o sob No fever Objective:  
Vitals:  
 03/02/21 5398 03/02/21 9272 03/02/21 1475 03/02/21 0745 BP: (!) 95/51  (!) 88/61 (!) 103/56 Pulse: 77  75 77 Resp: 14  17 15 Temp:    98.2 °F (36.8 °C) SpO2: 96%  98% 97% Weight:  76.4 kg (168 lb 6.9 oz) Height:      
  
03/01 0701 - 03/02 0700 In: 2038.8 [P.O.:300; I.V.:1738.8] Out: 600 [Urine:600] Last 3 Recorded Weights in this Encounter 02/27/21 8906 03/01/21 0425 03/02/21 9961 Weight: 81.6 kg (180 lb) 83.9 kg (185 lb) 76.4 kg (168 lb 6.9 oz) Physical exam:  
GEN: nad NECK- Supple, no mass RESP: No wheezing, decreased BS b/l CVS: S1,S2  RRR 
NEURO: Normal speech,  
- RAMIREZ + 
EXT: + Edema PSYCH: Normal Mood Chart reviewed. Pertinent Notes reviewed. Data Review : 
Recent Labs 03/02/21 
0120 03/01/21 
1431 03/01/21 
0403  140 139  
K 4.7 5.0 6.0*  
 104 106 CO2 27 30 25 BUN 85* 80* 74* CREA 2.77* 2.74* 2.74* * 235* 189* CA 6.3* 6.8* 7.0*  
MG 1.9  --  2.2 PHOS 4.0  --  3.8 Recent Labs 03/02/21 
0120 03/01/21 
0403 02/27/21 
1701 WBC 9.0 11.1* 8.0 HGB 9.4* 9.7* 10.4* HCT 29.0* 29.9* 32.4*  
 * 116* 157 No results for input(s): FE, TIBC, PSAT, FERR in the last 72 hours. Medication list  reviewed Current Facility-Administered Medications Medication Dose Route Frequency  0.9% sodium chloride infusion  75 mL/hr IntraVENous CONTINUOUS  
 HYDROmorphone (PF) (DILAUDID) injection 0.2 mg  0.2 mg IntraVENous Q6H PRN  predniSONE (DELTASONE) tablet 40 mg  40 mg Oral DAILY WITH BREAKFAST  amiodarone (CORDARONE) 375 mg/250 mL D5W infusion  0.5 mg/min IntraVENous CONTINUOUS  
 PHENYLephrine (LINDA-SYNEPHRINE) 30 mg in 0.9% sodium chloride 250 mL infusion   mcg/min IntraVENous TITRATE  pantoprazole (PROTONIX) tablet 40 mg  40 mg Oral ACB  calcium carbonate (TUMS) chewable tablet 400 mg [elemental]  400 mg Oral QID PRN  
 0.9% sodium chloride infusion 250 mL  250 mL IntraVENous PRN  
 0.9% sodium chloride infusion 250 mL  250 mL IntraVENous PRN  
 amiodarone (CORDARONE) tablet 100 mg  100 mg Oral DAILY  folic acid (FOLVITE) tablet 1 mg  1 mg Oral DAILY  albuterol-ipratropium (DUO-NEB) 2.5 MG-0.5 MG/3 ML  3 mL Nebulization Q4H PRN  
 alcohol 62% (NOZIN) nasal  1 Ampule  1 Ampule Topical Q12H  
 zinc oxide-cod liver oil (DESITIN) 40 % paste   Topical PRN  
 oxyCODONE IR (ROXICODONE) tablet 5 mg  5 mg Oral Q4H PRN  
 glycopyrrolate-formoterol (BEVESPI AEROSPHERE) 9 mcg-4.8 mcg inhaler  2 Puff Inhalation BID  DULoxetine (CYMBALTA) capsule 20 mg  20 mg Oral QHS  fluticasone propionate (FLONASE) 50 mcg/actuation nasal spray 2 Spray  2 Spray Both Nostrils DAILY  albuterol (PROVENTIL VENTOLIN) nebulizer solution 2.5 mg  2.5 mg Inhalation Q4H PRN  zinc oxide-cod liver oil (DESITIN) 40 % paste   Topical BID and QHS  sodium chloride (NS) flush 5-40 mL  5-40 mL IntraVENous Q8H  
 sodium chloride (NS) flush 5-40 mL  5-40 mL IntraVENous PRN  
 acetaminophen (TYLENOL) tablet 650 mg  650 mg Oral Q6H PRN  Or  
  acetaminophen (TYLENOL) suppository 650 mg  650 mg Rectal Q6H PRN  polyethylene glycol (MIRALAX) packet 17 g  17 g Oral DAILY PRN  promethazine (PHENERGAN) tablet 12.5 mg  12.5 mg Oral Q6H PRN Or  
 ondansetron (ZOFRAN) injection 4 mg  4 mg IntraVENous Q6H PRN Janina Code, [address] [facility] [employer] [address] [facility] [address], [address] Phone - [phone]               Fax - [phone]

## 2021-03-02 NOTE — PROGRESS NOTES
1900: Bedside shift change report given to JEREMY Yeager (oncoming nurse) by Daly Ramachandran RN (offgoing nurse). Report included the following information SBAR, Kardex, Intake/Output, MAR and Recent Results. 2140: MAP 59. Kris increased to 85mcg/min 
 
2256: MAP 56. Kris increased to 90mcg/min 2340: MAP 54. Kris increased to 95 mcg/min 0030: MAP 65.  
 
0700: End of Shift Note Bedside shift change report given to Daly Ramachandran RN (oncoming nurse) by Hue Tanner (offgoing nurse). Report included the following information SBAR, Kardex, Intake/Output, MAR and Recent Results Shift worked:  [id number] Shift summary and any significant changes:  
  Pt Kris increased to 95 mcg/min. Lethargic overnight. Concerns for physician to address:  Beginning comfort care today? [facility]   xxxx Activity: 
Activity Level: Bed Rest 
Number times ambulated in hallways past shift: 0 Number of times OOB to chair past shift: 0 Cardiac:  
Cardiac Monitoring: Yes     
Cardiac Rhythm: Normal sinus rhythm Access:  
Current line(s): port Genitourinary:  
Urinary status: sierra Respiratory:  
O2 Device: Nasal cannula Chronic home O2 use?: NO Incentive spirometer at bedside: NO 
  
GI: 
Last Bowel Movement Date: 03/01/21 Current diet:  DIET NUTRITIONAL SUPPLEMENTS Lunch, Dinner; Naima-Juan F DIET MECHANICAL SOFT 
DIET NUTRITIONAL SUPPLEMENTS Breakfast; Ensure Pudding DIET NUTRITIONAL SUPPLEMENTS Lunch; Ensure Clear DIET NUTRITIONAL SUPPLEMENTS Dinner, Breakfast; Nepro Passing flatus: YES Tolerating current diet: YES 
% Diet Eaten: (refused) Pain Management:  
Patient states pain is manageable on current regimen: YES Skin: 
Shan Score: 14 Interventions: float heels, increase time out of bed and internal/external urinary devices Patient Safety: 
Fall Score: Total Score: 3 Interventions: bed/chair alarm, gripper socks and pt to call before getting OOB High Fall Risk: Yes 
 
 Length of Stay: 
Expected LOS: 4d 7h Actual LOS: 9 [employer]

## 2021-03-02 NOTE — PROGRESS NOTES
Transition of Care Plan: 
  
Disposition: TBD Follow up appointments: PCP 
DME needed: None Transportation at [address] and she does have Medicaid if needed. Keys or means to access home:  Patient has keys, her DTR has keys.       
IM Medicare letter: Will need 2nd IM  
Caregiver Contact: Cecily Panchal (Daughter) [phone] Discharge Caregiver contacted prior to discharge?     CM CM sent pt's information to [facility]. CM will continue to follow and assist with d/c planning. Luigi Hernandez.[facility]. Care Manager [id number] [address] [facility] 
[phone]

## 2021-03-02 NOTE — PROGRESS NOTES
Problem: Dyspnea Due to End of Life Goal: Demonstrate understanding of and ability to manage respiratory symptoms at end of life Outcome: Progressing Towards Goal 
  
Problem: Communication Deficit Goal: Effectively communicate symptoms, needs, and concerns Outcome: Progressing Towards Goal 
  
Problem: Imminent death Goal: Collaborate with patient/family/caregiver/interdisciplinary team to minimize and manage end of life symptoms Outcome: Progressing Towards Goal 
  
Problem: Hospice Orientation Goal: Demonstrate understanding of hospice philosophy, plan of care, and home hospice program 
Description: The patient/family/caregiver will demonstrate understanding of hospice philosophy, plan of care and the home hospice program as evidenced by participation in meeting the patient's psychosocial, spiritual, medical, and physical needs inclusive of medical supplies/equipment focusing on symptoms. Outcome: Progressing Towards Goal 
  
Problem: Potential for Skin Breakdown Goal: Demonstrate ability to care for skin, monitor areas of breakdown and demonstrate methods to prevent breakdown Description: Patient/family/caregiver will demonstrate ability to care for patient's skin, monitor for areas of breakdown, and demonstrate methods to prevent breakdown during hospice care. Outcome: Progressing Towards Goal 
  
Problem: Risk for Falls Goal: Free of falls during episode of care Description: Patient will be free of falls during episode of care. Outcome: Progressing Towards Goal 
  
Problem: Comfort Deficit Goal: Reduce/control pain Description: Patient will report that pain has been reduced or controlled through verbal and nonverbal means and that measures to promote comfort are effective. Outcome: Progressing Towards Goal 
  
Problem: Pain Goal: Verbalize satisfaction of level of comfort and symptom control Outcome: Progressing Towards Goal 
  
Problem: Anticipatory Grief Goal: Explore reactions to and verbalize acceptance of impending loss Description: Patient/family/caregiver will explore reactions to and verbalize acceptance of impending loss. Outcome: Progressing Towards Goal 
  
Problem: Anxiety/Agitation Goal: Verbalize and demonstrate ability to manage anxiety Description: The patient/family/caregiver will verbalize and demonstrate ability to manage the patient's anxiety throughout hospice care. Outcome: Progressing Towards Goal 
  
Problem: End of Life Process Goal: Demonstrate understanding of end of life processes Description: Patient/caregiver will understand end of life processes.  
Outcome: Progressing Towards Goal

## 2021-03-02 NOTE — CONSULTS
Palliative Medicine Consult Jacques: [phone]) Patient Name: Muriel Hitchcock YOB: 1945 Date of Initial Consult:3/1/21 Reason for Consult: End stage disease Requesting Provider: Myles Velazco MD 
Primary Care Physician: Bonnie Reinoso NP 
 
 SUMMARY:  
Muriel Hitchcock is a 67 y.o. female  with a past history of metastatic breast cancer to bones and liver and lung, initial diagnosed in 1990 s/p left mastectomy chemo and radiation, CHF and chronic pain  who was admitted on 2/21/2021 from ER with a diagnosis of hypovolemic shock R/O sepsis, presented with lethargy and weakness and was found to have low blood pressure by her visiting physical therapist, of note patient has baseline chronic hypotension with systolic in 412-247. Hospital course is notable for AMAN due to ATN from hypotension, negative cultures. shock most likely hypovolemic due to poor intake and diarrhea prior to admission . Patient has worsening renal function and hyperkalemia, blood pressure low on high dose pressors, she has refused dialysis to nephrologist , Palliative medicine is consulted for care decision for end stage disease . 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 Her two daughters Nancy Juan and Dileep Boykin are very supportive, Thiago Schuler is local and most involvede,  her son is not involved, she is independent in daily activities needs help with shower, she is recently approved for 40 hrs/week Medicaid aid Advance directives in place. 
 
  
 
 PALLIATIVE DIAGNOSES:  
1. Lethargy Gisela Ra 2. Hypovolemic shock on pressors 3. ATN, worsening renal function 4. Hyperkalemia 5. Goals of care 6. End of life care PLAN:  
1. Follow up on goals of care conversation . 2.  Met with patient with Kassidy Yen from 10 : 30 am- 11 am 
 · Patient is more alert and interactive, and is consistent not wanting to prolong her life with dialysis. · I told her we are waiting for her family two daughters Momo Marin and Jaja Tsang to visit to [facility] comfort focus care . · She was happy to know Jaja Tsang is visiting from out of town . 12: 30 pm-12:45 · Met with family two daughters Clau Morfin , grand daughter and her  along with Santana Stone( [facility]) · We discussed , we can wait few days to see if her kidneys responds to fluids , and I asked them if they have any question for nephrologist Dr Jain Leader , they said they do not have any questions and they want to move forward with comfort care plan after several others family members visitation is completed this afternoon. 14:15 - 14:45 Rn Rogerio Langford called me family has completed their visitation for good bye and they are ready for transition to comfort care . I met with her two daughters with hospice Rn Blas Esparza, I  explained in detail the transition to comfort focus care that include stopping I/v fluids, pressors and amiodarone drip and administering medication for pain and other symptoms like nausea , restless ness, fever etc. 
 
Family is appropriately sad, they appreciate medical team support . It is likely she may decompensate quickly and pass away . We will continue to support. Initial consult note routed to primary continuity provider and/or primary health care team members Communicated plan of care with: Palliative IDTJigar 192 Team 
 
 GOALS OF CARE / TREATMENT PREFERENCES:  
 
GOALS OF CARE: 
Patient/Health Care Proxy Stated Goals: Comfort TREATMENT PREFERENCES:  
Code Status: DNR Advance Care Planning: 
[x] [facility] Interdisciplinary Team has updated the ACP Navigator with Darren and Patient Capacity Primary Decision Maker (Active): Jasmina Olson - Daughter - [phone] Secondary Decision Maker: Sharon Ritchie Daughter - [phone] Secondary Decision Maker: Regina Joe - Son - [phone] Advance Care Planning 2/22/2021 Patient's Healthcare Decision Maker is: -  
Primary Decision Maker Name -  
Primary Decision Maker Phone Number -  
Primary Decision Maker Relationship to Patient -  
Confirm Advance Directive Yes, on file Patient Would Like to Complete Advance Directive - Does the patient have other document types - Medical Interventions: Comfort measures Other Instructions: Other: As far as possible, the palliative care team has discussed with patient / health care proxy about goals of care / treatment preferences for patient. HISTORY:  
 
History obtained from: chart, bed side Rn  
 
CHIEF COMPLAINT: generalized pain HPI/SUBJECTIVE: The patient is:  
[] Verbal and participatory Patient is lethargic arousable, able to hold meaningful conversation . She denies any nausea or vomiting . Clinical Pain Assessment (nonverbal scale for severity on nonverbal patients):  
Clinical Pain Assessment Severity: 5 Location: all over Character: dull Frequency: constant Activity (Movement): Lying quietly, normal position Duration: for how long has pt been experiencing pain (e.g., 2 days, 1 month, years) Frequency: how often pain is an issue (e.g., several times per day, once every few days, constant) FUNCTIONAL ASSESSMENT:  
 
Palliative Performance Scale (PPS): PPS: 30 
 
 
 PSYCHOSOCIAL/SPIRITUAL SCREENING:  
 
Palliative IDT has assessed this patient for cultural preferences / practices and a referral made as appropriate to needs (Cultural Services, Patient Advocacy, Ethics, etc.) Any spiritual / Confucianism concerns: 
[] Yes /  [x] No 
 
Caregiver Burnout: 
[] Yes /  [x] No /  [] No Caregiver Present Anticipatory grief assessment:  
[x] Normal  / [] Maladaptive ESAS Anxiety: ESAS Depression: REVIEW OF SYSTEMS:  
 
Positive and pertinent negative findings in ROS are noted above in HPI. The following systems were [x] reviewed limited because of patient factor / [] unable to be reviewed as noted in HPI Other findings are noted below. Systems: constitutional, ears/nose/mouth/throat, respiratory, gastrointestinal, genitourinary, musculoskeletal, integumentary, neurologic, psychiatric, endocrine. Positive findings noted below. Modified ESAS Completed by: provider Fatigue: 8 Drowsiness: 1 Pain: 5 Nausea: 4 Anorexia: 7 Dyspnea: 1 Stool Occurrence(s): 1 PHYSICAL EXAM:  
 
From RN flowsheet: 
Wt Readings from Last 3 Encounters:  
03/02/21 168 lb 6.9 oz (76.4 kg) 01/13/21 182 lb 9.6 oz (82.8 kg) 01/13/21 181 lb 3.2 oz (82.2 kg) Blood pressure (!) 112/58, pulse 78, temperature 98 °F (36.7 °C), resp. rate 19, height 5' 5\" (1.651 m), weight 168 lb 6.9 oz (76.4 kg), SpO2 98 %, not currently breastfeeding. Pain Scale 1: Adult Nonverbal Pain Scale Pain Intensity 1: 0 Pain Onset 1: acute Pain Location 1: Generalized Pain Orientation 1: Mid 
Pain Description 1: Aching Pain Intervention(s) 1: Medication (see MAR) Last bowel movement, if known:  
 
Constitutional:  More alert, coherent . Eyes: pupils equal, anicteric ENMT: no nasal discharge, moist mucous membranes Cardiovascular: regular rhythm, edema of lower extremities Respiratory: breathing not labored, symmetric Gastrointestinal: soft non-tender, +bowel sounds Musculoskeletal: no deformity, no tenderness to palpation Skin: warm, dry Neurologic: following commands, moving all extremities Psychiatric: unable to evaluate because of patient condition. Other: 
 
 
 HISTORY:  
 
Principal Problem: 
  Symptomatic anemia (2/22/2021) Active Problems: 
  Breast cancer metastasized to liver ([facility] [address] (9/1/2020) Breast cancer metastasized to lung ([facility] [facility] (9/1/2020) Macrocytic anemia (2/21/2021) Pancytopenia due to antineoplastic chemotherapy ([address] (2/22/2021) Overview: ([id number])- Letrozole initiated Kidney disease, chronic, stage IV (GFR 15-29 ml/min) ([address] (2/22/2021) Acute kidney injury superimposed on CKD ([address] (2/22/2021) Hypovolemic shock ([address] (2/22/2021) Hypocalcemia (2/22/2021) Mild protein-calorie malnutrition ([address] (2/22/2021) Hypophosphatemia (2/22/2021) Hypomagnesemia (2/21/2021) Severe sepsis with acute organ dysfunction ([address] (2/21/2021) Long term (current) use of aspirin (2/22/2021) Overview: ASA 81 mg Daily S/P total knee arthroplasty, right (6/22/2017) Overview: ([id number]) S/P left mastectomy (1/1/1997) Overview: (1997)- Left breast cancer % NC 60% Her 2 -ve, initial diagnosis in the  
    1990s  
    status post left mastectomy chemo and radiation Malignant neoplasm metastatic to breast ([address] (12/1/2020) Overview: (Dec/2020)- 
    Metachronous breast carcinoma, Right breast 
 
  Encounter for monoclonal antibody treatment for malignancy (12/16/2020) Overview: ([id number])- Jigar Guillen S/P laminectomy (9/29/2020) Overview: ([id number])- 
    S/P Segmental posterior cervicothoracic fusion C5 to T5, and arthrodesis C5 to T5 with cancellous bone chips and DBM putty, and partial laminectomy T2 Metastatic disease through T1, T2 and T3, with severe spinal stenosis and  
    impingement on the cord. Adverse reaction to antineoplastic drug (2/22/2021) Overview: ([id number])- Letrozole initiated Pancytopenia, diarrhea, myalgias Past Medical History:  
Diagnosis Date  Acute kidney injury superimposed on CKD ([address] 2/22/2021  Adverse reaction to antineoplastic drug 2/22/2021  
 ([id number])- Letrozole initiated  Pancytopenia, diarrhea, myalgias  Arthralgia 8/17/2017  Arthritis  Asthma Mild to Moderate  Breast cancer ([address] 8/17/2017 Onset 1997; Refusing Mammogram 6/16/17  Breast cancer metastasized to liver ([address] 9/1/2020  Breast cancer metastasized to lung ([address] 9/1/2020  Cancer ([address] 1997 Breast left  Cardiomyopathy ([address] 8/17/2017 Onset:1999 Ischemic; 25% - 50% (last EFx 45%) Continue with ACE/Lasix/coreg  Cataract 8/17/2017 Bilateral   
 Cellulitis 8/17/2017 Suspect local reaction to Pneumovax, treat with ice, NSAIDs or Tylenol  Chest pain at rest 8/17/2017  CHF (congestive heart failure) ([address] 8/17/2017 Stable, though weight up a little. To take 1 1/2 Lasix daily if swelling, 1 daily o/w  Chronic maxillary sinusitis 8/17/2017  Chronic pain Right knee  COPD (chronic obstructive pulmonary disease) ([address] 8/17/2017 Continue with inhalers  Diabetes ([address] Pre-diabetic  DJD (degenerative joint disease) 8/17/2017  Elevated BUN 8/17/2017  Encounter for monoclonal antibody treatment for malignancy 2/22/2021  
 ([id number])-  Diana Perfect  Esophagitis, reflux 8/17/2017  Fatigue 8/17/2017  GERD (gastroesophageal reflux disease)  Heart failure ([address] Cardiomyopathy, HX of MI 1999  Hyperglycemia 8/17/2017  Hyperkalemia 8/17/2017  Hyperlipidemia 8/17/2017  Hypertension 8/17/2017  Hypocalcemia 2/22/2021  Hypomagnesemia 2/21/2021  Hypophosphatemia 2/22/2021  Hypovolemic shock ([address] 2/22/2021  Ill-defined condition Vertigo  Ill-defined condition 2003 HX of Urosepsis complicated by respiratory failure and pneumonnia  Kidney disease, chronic, stage IV (GFR 15-29 ml/min) ([address] 2/22/2021  Long term (current) use of aspirin 2/22/2021 ASA 81 mg Daily  Macrocytic anemia 2/21/2021  Malignant neoplasm metastatic to breast ([address] 12/1/2020  Mild protein-calorie malnutrition ([address] 2/22/2021  Myalgia 8/17/2017  Pancytopenia due to antineoplastic chemotherapy ([facility] 2/22/2021  S/P laminectomy 2020  
 ([id number])- S/P Segmental posterior cervicothoracic fusion C5 to T5, and arthrodesis C5 to T5 with cancellous bone chips and DBM putty, and partial laminectomy T2  Metastatic disease through T1, T2 and T3, with severe spinal stenosis and impingement on the cord.  S/P left mastectomy 1997  
 ()- Left breast cancer % DE 60% Her 2 -ve, initial diagnosis in the   status post left mastectomy chemo and radiation  S/P total knee arthroplasty, right 2017  
 ([id number])  Symptomatic anemia 2021  Thromboembolus ([address] 1969  
 during 2nd pregnancy Monique Curl Grabiel Fujita 2017  Vertigo, aural 2017  Vitamin D deficiency 2017 Past Surgical History:  
Procedure Laterality Date [facility]  
 HX GYN Left Breast Mastectomy  HX HEENT   Bilateral Cataract  HX ORTHOPAEDIC Right 2018  
 knee replacement  HX ORTHOPAEDIC Left 2018  
 wrist surgery  HX VASCULAR ACCESS Port a cath on the right Family History Problem Relation Age of Onset  Cancer Mother  Other Father History reviewed, no pertinent family history. Social History Tobacco Use  Smoking status: Former Smoker Packs/day: 2.00 Years: 25.00 Pack years: 50.00 Quit date:  Years since quittin.1  Smokeless tobacco: Never Used Substance Use Topics  Alcohol use: No  
 
Allergies Allergen Reactions  Morphine Nausea and Vomiting No current facility-administered medications for this encounter. No current outpatient medications on file. Facility-Administered Medications Ordered in Other Encounters Medication Dose Route Frequency  LORazepam (ATIVAN) injection 1 mg  1 mg IntraVENous Q15MIN PRN  
 acetaminophen (TYLENOL) tablet 650 mg  650 mg Oral Q4H PRN Or  
 acetaminophen (TYLENOL) solution 650 mg  650 mg Oral Q4H PRN  Or  
  acetaminophen (TYLENOL) suppository 650 mg  650 mg Rectal Q4H PRN  
 glycopyrrolate (ROBINUL) injection 0.2 mg  0.2 mg IntraVENous Q4H  
 bisacodyL (DULCOLAX) suppository 10 mg  10 mg Rectal DAILY PRN  
 HYDROmorphone (PF) (DILAUDID) injection 1 mg  1 mg IntraVENous Q4H  
 LORazepam (ATIVAN) injection 1 mg  1 mg IntraVENous Q4H  
 HYDROmorphone (PF) (DILAUDID) injection 1 mg  1 mg IntraVENous Q30MIN PRN  
 
 
 
 LAB AND IMAGING FINDINGS:  
 
Lab Results Component Value Date/Time WBC 9.0 03/02/2021 01:20 AM  
 HGB 9.4 (L) 03/02/2021 01:20 AM  
 PLATELET 277 (L) 54/36/0462 01:20 AM  
 
Lab Results Component Value Date/Time Sodium 140 03/02/2021 01:20 AM  
 Potassium 4.7 03/02/2021 01:20 AM  
 Chloride 106 03/02/2021 01:20 AM  
 CO2 27 03/02/2021 01:20 AM  
 BUN 85 (H) 03/02/2021 01:20 AM  
 Creatinine 2.77 (H) 03/02/2021 01:20 AM  
 Calcium 6.3 (LL) 03/02/2021 01:20 AM  
 Magnesium 1.9 03/02/2021 01:20 AM  
 Phosphorus 4.0 03/02/2021 01:20 AM  
  
Lab Results Component Value Date/Time Alk. phosphatase 42 (L) 03/02/2021 01:20 AM  
 Protein, total 5.0 (L) 03/02/2021 01:20 AM  
 Albumin 2.9 (L) 03/02/2021 01:20 AM  
 Globulin 2.1 03/02/2021 01:20 AM  
 
Lab Results Component Value Date/Time INR 1.0 09/29/2020 03:04 AM  
 Prothrombin time 10.9 09/29/2020 03:04 AM  
 aPTT 24.1 09/21/2020 11:05 AM  
  
Lab Results Component Value Date/Time Iron 24 (L) 02/23/2021 04:09 AM  
 TIBC 108 (L) 02/23/2021 04:09 AM  
 Iron % saturation 22 02/23/2021 04:09 AM  
  
No results found for: PH, PCO2, PO2 No components found for: [place] No results found for: CPK, CKMB Total time: 75 mins Counseling / coordination time, spent as noted above:  
> 50% counseling / coordination?: yes Prolonged service was provided for  []30 min   []75 min in face to face time in the presence of the patient, spent as noted above. Time Start: 10;30   Time End: 11 am  
 
Time start 12:30 Time end   12 : 45  
 
 Time start :14:15 Time end : 14:45 Note: this can only be billed with 48660 (initial) or 84016 (follow up). If multiple start / stop times, list each separately.

## 2021-03-02 NOTE — PALLIATIVE CARE
Brief summary of visit, full note will be placed. Family members are here to say good bye to her , they are ready to stop active treatment for transition to comfort . I have stopped pressors, amiodarone drip and I/V fluids . Comfort care orders placed , including metoprolol 2.5 mg I/V every 6 hrs prn for tachycardia. Hospice Rn Liane at bed side to support family , we will monitor for any distress and adjust medication accordingly, it is likely she may declines and decompensate quickly and die.

## 2021-03-02 NOTE — HOSPICE
[facility] Good Help to Those in Need 
[phone] Patient Name: Jcarlos Box YOB: 1945 Age: 67 y.o. [facility] WARREN RN Note:  Hospice consult noted. Chart reviewed. Plan of care discussed with patients nurse & care manager. Plan is take patient off pressors this afternoon after family has had time to meet with her. I have spoken to Dr Caron Muhammad and Dr Cynthia Forbes. Patient will be given comfort medications prior to removal of pressors. She may pass quickly but if she should linger and need support of hospice for comfort we will proceed with GIP admission to hospice at that time I have spoken with daughter, Yael Black, and she is aware of the plan Thank you for the opportunity to be of service to this patient. Amelie Rodriguez RN Clinical Nurse Liaison [facility] [phone]

## 2021-03-03 NOTE — H&P
[facility]  
Good Help to Those in Need 
[phone] 
 
Patient Name: Bree Dumont 
YOB: 1945 
 
Date of Provider Hospice Visit: 03/03/21 
 
Level of Care:   [x] General Inpatient (GIP)    [] Routine   [] Respite 
 
Current Location of Care: 
[] [facility] [] [facility] [x] [facility] [] [facility] [] Hospice House ([facility]) 
 
IF [facility], patient referred from: 
[] [facility] [] [facility] [] [facility] [] [facility] [] Home [] Other:  
 
Date of Original Hospice Admission: 3/2/2021 
Hospice Medical Director at time of admission: Kade Garber 
 
Principle Hospice Diagnosis: Hypovolemic shock 
Diagnoses RELATED to the terminal prognosis: sepsis, acute hypercapnic respiratory failure, right sided cancer of the breast with mets to bone, liver, lung, pancytopenia, acute on chronic anemia, chf, copd, pain related to cancer metastatic to bone, neuropathic pain, acute renal failure.  
Other Diagnoses: HTN, GERD, cardiomyopathy 
 
 HOSPICE SUMMARY  
  
 
Bree Dumont is a 75 y.o. year old who was admitted to [facility].  
 
Patient admitted to the hospital on 2/21 with acute respiratory failure, acute hypovolemic shock and suspected sepsis with a h/o metastatic breast cancer to the lung, liver, and bone.  Patient was in the intensive care unit on pressor support and showing evidence of worsening renal function and encephalopathy and patient did not want dialysis.  She was on an amiodarone drip for a fib with RVR and required treatment for hyperkalemia.   There was a family meeting to discuss comfort care/hospice yesterday and the patient and family chose this.  Patient was admitted to hospice and pressors were discontinued   
 
The patient's principle diagnosis has resulted in obtundation, hypovolemic shock, acute respiratory failure, hypotension, hypoxia. Refer to LCD  
 
Functionally, the patient's Karnofsky and/or Palliative Performance Scale has declined over a period of two weeks and is estimated at 10%. The patient is  dependent on the following ADLs:  All ADL's 
 
Objective information that support this patients limited prognosis includes: EXAM: XR CHEST PORT 
  
INDICATION: chest pain 
  
COMPARISON: 2/23/2021 
  
FINDINGS: A portable AP radiograph of the chest was obtained at 1711 hours. Right subclavian portacatheter position is unchanged. Posterior fixation of the 
lower cervical and upper thoracic spine is again shown. There is a 
mild-moderate. Severe interstitial pulmonary edema again demonstrated. Small 
bilateral pleural effusions are noted. Cardiac and mediastinal contours are 
stable. The bones are moderately osteopenic.  
  
IMPRESSION Mild-moderate interstitial pulmonary edema and small bilateral 
pleural effusions. Component Value Flag Ref Range Units Status Sodium 140   136 - 145 mmol/L Final  
Potassium 4.7   3.5 - 5.1 mmol/L Final  
Chloride 106   97 - 108 mmol/L Final  
CO2 27   21 - 32 mmol/L Final  
Anion gap 7   5 - 15 mmol/L Final  
Glucose 192  High   65 - 100 mg/dL Final  
BUN 85  High   6 - 20 MG/DL Final  
Creatinine 2.77  High   0.55 - 1.02 MG/DL Final  
BUN/Creatinine ratio 31  High   12 - 20   Final  
GFR est AA 20  Low   >60 ml/min/1.73m2 Final  
GFR est non-AA 17  Low   >60 ml/min/1.73m2 Final  
Calcium 6.3  Low Panic   8.5 - 10.1 MG/DL Final  
Comment: RESULTS VERIFIED, PHONED TO AND READ BACK BY  
[facility] RN AT [address] Bilirubin, total 3.2  High   0.2 - 1.0 MG/DL Final  
ALT (SGPT) 968  High   12 - 78 U/L Final  
AST (SGOT) >2,000  High   15 - 37 U/L Final  
Alk. phosphatase 42  Low   45 - 117 U/L Final  
Protein, total 5.0  Low   6.4 - 8.2 g/dL Final  
Albumin 2.9  Low   3.5 - 5.0 g/dL Final  
Globulin 2.1   2.0 - 4.0 g/dL Final  
A-G Ratio 1.4   1.1 - 2.2   Final  
 
 
 
The patient/family chose comfort measures with the support of Hospice. HOSPICE DIAGNOSES Active Symptoms: 1. Hypoxia 2. Hypotension 3. Pain, nonverbal, controlled 4. Secretions 5.   Hospice Patient PLAN  
1. Patient admitted to [facility]. She is too acutely ill to travel home. She is requiring very frequent nursing assessments for symptoms with adjustments to IV medications. 2. Dilaudid 1 mg IV every 4 hours scheduled and 1 mg every 30 minutes prn 3. Lorazepam 1 mg IV every 4 hours and 1 mg every 15 minutes prn 4. Robinul 0.2 mg IV every 4 hours scheduled. 5.  and SW to support family needs 6. Disposition: likely to die in hospital 
7. Hospice Plan of care was reviewed in detail and agree with current plan of care Prognosis estimated based on 03/03/21 clinical assessment is:  
[x] Hours to Days   
[] Days to Weeks   
[] Other: 
 
Communicated plan of care with: Hospice Case Manager; Hospice IDT; Care Team 
 
 GOALS OF CARE Patient/Medical POA stated Goal of Care: Comfort/Hospice 
 
[x] I have reviewed and/or updated ACP information in the Advance Care Planning Navigator. This information is available in the [facility] [address] link in the patient's chart header. Primary Decision Maker ([address]):   Primary Decision Maker (Active): Babita Marcus - Daughter - [phone] Secondary Decision Maker: Madelin Mejia Daughter - [phone] Secondary Decision Maker: Charlie Gee - Son - [phone] Resuscitation Status: DNR If DNR is there a Durable DNR on file? : [] Yes [x] No (If no, complete Durable DNR) HISTORY History obtained from: chart, family, interdisciplinary team 
 
CHIEF COMPLAINT: Patient obtunded The patient is:  
[] Verbal 
[] Nonverbal 
[x] Unresponsive HPI/SUBJECTIVE:  Patient has been comfortable with IV meds today and is having apneic episodes of 10-20 seconds, BP 48/30. Family is with her. REVIEW OF SYSTEMS The following systems were: [] reviewed  [x] unable to be reviewed--patient obtunded Adult Non-Verbal Pain Assessment Score: 0 Face [x] 0   No particular expression or smile 
[] 1   Occasional grimace, tearing, frowning, wrinkled forehead 
[] 2   Frequent grimace, tearing, frowning, wrinkled forehead Activity (movement) [x] 0   Lying quietly, normal position 
[] 1   Seeking attention through movement or slow, cautious movement 
[] 2   Restless, excessive activity and/or withdrawal reflexes Guarding 
[x] 0   Lying quietly, no positioning of hands over areas of body 
[] 1   Splinting areas of the body, tense 
[] 2   Rigid, stiff Physiology (vital signs) [x] 0   Stable vital signs--hypotensive 
[] 1   Change in any of the following: SBP > 20mm Hg; HR > 20/minute 
[] 2   Change in any of the following: SBP > 30mm Hg; HR > 25/minute Respiratory 
[] 0   Baseline RR/SpO2, compliant with ventilator 
[] 1   RR > 10 above baseline, or 5% drop SpO2, mild asynchrony with ventilator 
[] 2   RR > 20 above baseline, or 10% drop SpO2, asynchrony with ventilator FUNCTIONAL ASSESSMENT Palliative Performance Scale (PPS): 10% PSYCHOSOCIAL/SPIRITUAL ASSESSMENT Active Problems: Hypovolemic shock ([address] (2/22/2021) Past Medical History:  
Diagnosis Date  Acute kidney injury superimposed on CKD ([address] 2/22/2021  Adverse reaction to antineoplastic drug 2/22/2021  
 ([id number])- Letrozole initiated  Pancytopenia, diarrhea, myalgias  Arthralgia 8/17/2017  Arthritis  Asthma Mild to Moderate  Breast cancer ([address] 8/17/2017 Onset 1997; Refusing Mammogram 6/16/17  Breast cancer metastasized to liver ([address] 9/1/2020  Breast cancer metastasized to lung ([address] 9/1/2020  Cancer ([address] 1997 Breast left  Cardiomyopathy ([address] 8/17/2017 Onset:1999 Ischemic; 25% - 50% (last EFx 45%) Continue with ACE/Lasix/coreg  Cataract 8/17/2017 Bilateral   
 Cellulitis 8/17/2017 Suspect local reaction to Pneumovax, treat with ice, NSAIDs or Tylenol  Chest pain at rest 8/17/2017  CHF (congestive heart failure) ([address] 8/17/2017 Stable, though weight up a little.   To take 1 1/2 Lasix daily if swelling, 1 daily o/w  Chronic maxillary sinusitis 8/17/2017  Chronic pain Right knee  COPD (chronic obstructive pulmonary disease) ([address] 8/17/2017 Continue with inhalers  Diabetes ([address] Pre-diabetic  DJD (degenerative joint disease) 8/17/2017  Elevated BUN 8/17/2017  Encounter for monoclonal antibody treatment for malignancy 2/22/2021  
 ([id number])-  Sami Baptise  Esophagitis, reflux 8/17/2017  Fatigue 8/17/2017  GERD (gastroesophageal reflux disease)  Heart failure ([address] Cardiomyopathy, HX of MI 1999  Hyperglycemia 8/17/2017  Hyperkalemia 8/17/2017  Hyperlipidemia 8/17/2017  Hypertension 8/17/2017  Hypocalcemia 2/22/2021  Hypomagnesemia 2/21/2021  Hypophosphatemia 2/22/2021  Hypovolemic shock ([address] 2/22/2021  Ill-defined condition Vertigo  Ill-defined condition 2003 HX of Urosepsis complicated by respiratory failure and pneumonnia  Kidney disease, chronic, stage IV (GFR 15-29 ml/min) ([address] 2/22/2021  Long term (current) use of aspirin 2/22/2021 ASA 81 mg Daily  Macrocytic anemia 2/21/2021  Malignant neoplasm metastatic to breast ([address] 12/1/2020  Mild protein-calorie malnutrition ([address] 2/22/2021  Myalgia 8/17/2017  Pancytopenia due to antineoplastic chemotherapy ([address] 2/22/2021  S/P laminectomy 9/29/2020  
 ([id number])- S/P Segmental posterior cervicothoracic fusion C5 to T5, and arthrodesis C5 to T5 with cancellous bone chips and DBM putty, and partial laminectomy T2  Metastatic disease through T1, T2 and T3, with severe spinal stenosis and impingement on the cord.  S/P left mastectomy 1/1/1997  
 (1997)- Left breast cancer % OK 60% Her 2 -ve, initial diagnosis in the 1990s  status post left mastectomy chemo and radiation  S/P total knee arthroplasty, right 6/22/2017  
 ([id number])  Symptomatic anemia 2/22/2021  Thromboembolus [facility] 1969  
 during 2nd pregnancy Darcy Estevez 2017  Vertigo, aural 2017  Vitamin D deficiency 2017 Past Surgical History:  
Procedure Laterality Date [address] JONNY  
 HX GYN Left Breast Mastectomy  HX HEENT   Bilateral Cataract  HX ORTHOPAEDIC Right 2018  
 knee replacement  HX ORTHOPAEDIC Left 2018  
 wrist surgery  HX VASCULAR ACCESS Port a cath on the right Social History Tobacco Use  Smoking status: Former Smoker Packs/day: 2.00 Years: 25.00 Pack years: 50.00 Quit date:  Years since quittin.1  Smokeless tobacco: Never Used Substance Use Topics  Alcohol use: No  
 
Family History Problem Relation Age of Onset  Cancer Mother  Other Father Allergies Allergen Reactions  Morphine Nausea and Vomiting Current Facility-Administered Medications Medication Dose Route Frequency  LORazepam (ATIVAN) injection 1 mg  1 mg IntraVENous Q15MIN PRN  
 acetaminophen (TYLENOL) tablet 650 mg  650 mg Oral Q4H PRN Or  
 acetaminophen (TYLENOL) solution 650 mg  650 mg Oral Q4H PRN Or  
 acetaminophen (TYLENOL) suppository 650 mg  650 mg Rectal Q4H PRN  
 glycopyrrolate (ROBINUL) injection 0.2 mg  0.2 mg IntraVENous Q4H  
 bisacodyL (DULCOLAX) suppository 10 mg  10 mg Rectal DAILY PRN  
 HYDROmorphone (PF) (DILAUDID) injection 1 mg  1 mg IntraVENous Q4H  
 LORazepam (ATIVAN) injection 1 mg  1 mg IntraVENous Q4H  
 HYDROmorphone (PF) (DILAUDID) injection 1 mg  1 mg IntraVENous Q30MIN PRN  
 
 
 PHYSICAL EXAM  
 
Wt Readings from Last 3 Encounters:  
21 76.4 kg (168 lb 6.9 oz) 21 82.8 kg (182 lb 9.6 oz) 21 82.2 kg (181 lb 3.2 oz) Visit Vitals BP (!) 48/30 (BP 1 Location: Right upper arm, BP Patient Position: At rest) Pulse 74 Temp 96.8 °F (36 °C) Resp 14 SpO2 97% Supplemental O2  [x] Yes  [] NO Last bowel movement: unknown Currently this patient has: 
[x] Peripheral IV [] PICC  [] PORT [] ICD [x] Bangura Catheter [] NG Tube   [] PEG Tube   
[] Rectal Tube [] Drain 
[] Other:  
 
Constitutional: Obtunded lying in bed with apneic episodes of 10-20 seconds Eyes: closed ENMT: Oral mucosa dry Cardiovascular: rate 120's, bounding pulse, regular Respiratory: rhonchi throughout with audible secretions, + accessory muscle use Gastrointestinal: soft, no distention Musculoskeletal:  No deformity Skin: multiple ecchymotic areas; mottling to hands and feet Neurologic:  Obtunded Psychiatric:  
Other:  
 
 
Pertinent Lab and or Imaging Tests: 
Lab Results Component Value Date/Time Sodium 140 03/02/2021 01:20 AM  
 Potassium 4.7 03/02/2021 01:20 AM  
 Chloride 106 03/02/2021 01:20 AM  
 CO2 27 03/02/2021 01:20 AM  
 Anion gap 7 03/02/2021 01:20 AM  
 Glucose 192 (H) 03/02/2021 01:20 AM  
 BUN 85 (H) 03/02/2021 01:20 AM  
 Creatinine 2.77 (H) 03/02/2021 01:20 AM  
 BUN/Creatinine ratio 31 (H) 03/02/2021 01:20 AM  
 GFR est AA 20 (L) 03/02/2021 01:20 AM  
 GFR est non-AA 17 (L) 03/02/2021 01:20 AM  
 Calcium 6.3 (LL) 03/02/2021 01:20 AM  
 
Lab Results Component Value Date/Time Protein, total 5.0 (L) 03/02/2021 01:20 AM  
 Albumin 2.9 (L) 03/02/2021 01:20 AM  
 
   
 
 Anny Galvez.  GALLO Little

## 2021-03-03 NOTE — PROGRESS NOTES
Spiritual Care Assessment/Progress Note [address] 
 
 
NAME: Margret       MRN: [id number] AGE: 67 y.o. SEX: female Evangelical Affiliation: Pito Patel  
Language: [place] 3/3/2021     Total Time (in minutes): 13 Spiritual Assessment begun in [facility] through conversation with: 
  
    []Patient        [x] Family    [] Friend(s) Reason for Consult: Death, Inpatient Spiritual beliefs: (Please include comment if needed) [x] Identifies with a paty tradition: Gnosticist    
   [] Supported by a paty community:        
   [] Claims no spiritual orientation:       
   [] Seeking spiritual identity:            
   [] Adheres to an individual form of spirituality:       
   [] Not able to assess:                   
 
    
Identified resources for coping:  
   [x] Prayer                           
   [] Music                  [] Guided Imagery [x] Family/friends                 [] Pet visits [] Devotional reading                         [] Unknown 
   [] Other:                                          
 
 
Interventions offered during this visit: (See comments for more details) Patient Interventions: Prayer (actual) Family/Friend(s): Coping skills reviewed/reinforced, Affirmation of emotions/emotional suffering, Affirmation of paty, Iconic (affirming the presence of God/Higher Power), Life review/legacy, Prayer (actual), Prayer (assurance of), Catharsis/review of pertinent events in supportive environment, Normalization of emotional/spiritual concerns Plan of Care: 
 
 [] Support spiritual and/or cultural needs  
 [] Support AMD and/or advance care planning process    
 [] Support grieving process 
 [] Coordinate Rites and/or Rituals  
 [] Coordination with community clergy [] No spiritual needs identified at this time 
 [] Detailed Plan of Care below (See Comments)  [] Make referral to Music Therapy 
[] Make referral to Pet Therapy [] Make referral to Addiction services 
[] Make referral to [facility] 
[] Make referral to Spiritual Care Partner 
[] No future visits requested       
[x] Follow up upon further referrals Comments:  
 
 responded to the Death Notification of Ms. Heena Cohen in [phone]. In the room, patient's two daughters Carol Pineda and Shreya Rogers were grieving. Per them it was expected and they were coping this moment with humors, they were grieving but laughing together in their loss.  affirmed their coping skills, a humor since it came from the firm assurance of resurrection and eternal peace.  joined in laughter but still shared deepest condolence to them. They were in good care, they had two pastors before the patient passed.  prayed for them for grief support and Godspeed for the next steps. [id number] Mike Bowen., M.S., Th.M. 
Spiritual Care Provider  Paging Service [phone])

## 2021-03-03 NOTE — HOSPICE
[facility] Good Help to Those in Need 
[phone] GIP Daily Nursing Note Patient Name: Sharita Caruso YOB: 1945 Age: 67 y.o. Date of Visit: 03/03/21 Facility of Care: [facility] Patient Room: [facility] Attending: Kanika Landa MD 
Hospice Diagnosis: Hypovolemic shock ([address] [R57.1] Level of Care: GIP Current GIP Symptoms 1. Dyspnea 2. Apnea 3. Terminal secretions ASSESSMENT & PLAN 1. Continue with dilaudid 1mg IV every 4 hours, ativan 1mg IV every 4 hours and robinul 0.2mg IV every 4 hours 2. Provide emotional support to family at bedside 3. Communicate with floor nurse and hospice physician to insure comfort at end of life and adjust medications as needed Spiritual Interventions: none Psych/ Social/ Emotional Interventions: daughters have kept bedside morales. Prepared for her passing and expressing anticipatory grief Care Coordination Needs: close communication with floor nurse, frequent bedside assessments to insure comfort Care plan and New Orders discussed / approved with Teressa Asencio NP Description History and Chart Review Narrative History of last 24 hours that demonstrates care cannot be provided in another setting: 
Requiring IV dilaudid and ativan every 4 hours What has been done to control the patient's symptoms in the last 24 hours? Dilaudid 1mg IV every 4 hours Ativan 1mg IV every 4 hours Robinul 0.2mg IV every 4 hour Does the patient currently require IV medications? yes Does the patient currently require scheduled medications? yes Does the patient currently require a PCA? no 
 
List number of doses of PRN medications in last 24 hours: 
Medication 1: 
Number of doses: 
 
Medication 2:  
Number of doses: 
 
Medication 3:  
Number of doses: Supporting documentation for GIP need for pain control: 
[x] Frequent evaluation by a doctor, nurse practitioner, nurse  
[x] Frequent medication adjustment [x] IVs that cannot be administered at home  
[x] Aggressive pain management  
[] Complicated technical delivery of medications Supporting documentation for GIP need for symptom control: 
[x]  Sudden decline necessitating intensive nursing intervention 
[x]  Uncontrolled / intractable nausea or vomiting  
[]  Pathological fractures 
[]  Advanced open wounds requiring frequent skilled care 
[] Unmanageable respiratory distress 
[] New or worsening delirium  
[] Delirium with behavior issues: Is 24 hour caregiver present due to safety concerns with agitation? (yes/no) [x] Imminent death  with skilled nursing needs documented above DISCHARGE PLANNING Daily discharge planning required for GIP 1. Discharge Plan: will likely pass in the hospital 
2. Patient/Family teaching: end of life symptoms and care provided 3. Response to patient/family teaching: daughters verbalized understanding ASSESSMENT   
KARNOFSKY: 10 Prognosis estimated based on 03/03/21 clinical assessment is:  
[x] Few to Many Hours [] Hours to Days  
[] Few to Many Days  
[] Days to Weeks  
[] Few to Many Weeks  
[] Weeks to Months  
[] Few to Many Months Quality Measure: Patient self-reports:  [] Yes    [x] No 
 
ESAS:  
Time of Assessment: 9am 
Pain (1-10):2 Fatigue (1-10): 10 Shortness of breath (1-10):5 Nausea (1-10): 0 Appetite (1-10): 0 Anxiety: (1-10): 0 Depression: (1-10):0 Well-being: (1-10): Constipation: _ Yes x No 
LAST BM:  
 
CLINICAL INFORMATION Patient Vitals for the past 12 hrs: 
 Temp Pulse Resp BP SpO2  
03/03/21 0728 96.8 °F (36 °C) 74 14 (!) 48/30 97 % Currently this patient has: 
[x] Supplemental O2 [x] IV   
[] PICC [] PORT  
[] NG Tube   
[] PEG Tube  
[] Ostomy    
[] Bangura draining doris urine 
[] Other:  
 
SIGNS/PHYSICAL FINDINGS Skin (including wound): 
[] Warm, dry, supple, intact and color normal for race 
[] Warm  
[] Dry  
[x] Cool    
[] Clammy      
[] Diaphoretic Turgor 
 [] Normal 
 [x] Decreased Color: 
 [] Pink [x] Pale 
 [] Cyanotic 
 [] Erythema [x] Jaundice [] Normal for Race 
[]  Wounds: 
 
Neuro: 
[] Lethargy 
[] Restlessness / agitation 
[] Confusion / delirium 
[] Hallucinations 
[] Responds to maximal stimulation 
[] Unresponsive 
[] Seizures Cardiac: 
[] Dyspnea on Exertion 
[] JVD [] Murmur 
[] Palpitations [x] Hypotension 
[] Hypertension 
[] Tachycardia [] Bradycardia 
[] Irregular HR 
[] Pulses Decreased 
[] Pulses Absent [x] Edema:2+ all extremities [x] Mottling: hands and feet Respiratory: 
Breath sounds:  
 [x] Diminished 
 [] Wheeze [x] Rhonchi 
 [] Rales  
[] Even and unlabored 
[x] Labored: 8 
[] Cough 
 [] Non Productive 
 [] Productive 
  [] Description:          
[] Deep suctioned  
[x] O2 at _5__ LPM 
[] High flow oxygen greater than 10 LPM 
[] Bi-Pap GI 
[] Abdomen (describe) [] Ascites 
[] Nausea 
[] Vomiting 
[] Incontinent of bowels 
[] Bowel sounds (yes/no) [] Diarrhea 
[] Constipation (see above including last bowel movement) [] Checked for impaction 
[x] Last BM Nutrition Diet: NPO Appetite:  
[] Good  
[] Fair  
[] Poor  
[] Tube Feeding  
[] Voiding 
[] Incontinent [x] Bangura Musculoskeletal 
[] Balance/Painter Unsteady [x] Weak Strength:  
 [] Normal  
 [] Limited  
 [] Decreasing Activities:  
 [] Up as tolerated 
 [x] Bedridden  
 [] Specify: 
 
SAFETY [] 24 hr. Caregiver [x] Side rails ? [x] Hospital bed  
[] Reviewed Falls & Safety ALLERGIES AND MEDICATIONS Allergies: Allergies Allergen Reactions  Morphine Nausea and Vomiting Current Facility-Administered Medications Medication Dose Route Frequency  LORazepam (ATIVAN) injection 1 mg  1 mg IntraVENous Q15MIN PRN  
 acetaminophen (TYLENOL) tablet 650 mg  650 mg Oral Q4H PRN Or  
 acetaminophen (TYLENOL) solution 650 mg  650 mg Oral Q4H PRN  Or  
 acetaminophen (TYLENOL) suppository 650 mg  650 mg Rectal Q4H PRN  
 glycopyrrolate (ROBINUL) injection 0.2 mg  0.2 mg IntraVENous Q4H  
 bisacodyL (DULCOLAX) suppository 10 mg  10 mg Rectal DAILY PRN  
 HYDROmorphone (PF) (DILAUDID) injection 1 mg  1 mg IntraVENous Q4H  
 LORazepam (ATIVAN) injection 1 mg  1 mg IntraVENous Q4H  
 HYDROmorphone (PF) (DILAUDID) injection 1 mg  1 mg IntraVENous Q30MIN PRN Visit Time In: multiple visits throughout day Visit Time Out:

## 2021-03-03 NOTE — [facility] IDG SOCIAL WORKER NOTES
Surendra Miller is a 68 y/o CF. Pt has a hospice dx of hypovolemic shock. Pt has comorbid, hx of metastatic cancer, with mets to the lung, liver, and bone. Pt is unresponsive. LCSW will provide emotional support though supportive listening and a supportive presence for Charsebastian Johnson, and family in coping with pts EOL as she is imminent. Low risk for bereavement for [facility] and family , they are accepting and realistic.

## 2021-03-03 NOTE — PROGRESS NOTES
This was an initial visit to assess needs and offer support. Upon my arrival patient's daughters and a granddaughter were at bedside lovingly ministering to her. She did not visibly respond to the knock on the door or what was happening in her room. The family was very interactive and supportive of one another. Briefly, offered support as they shared their observations on her journey. They are being supported by a local [place] and . He has been visiting and he called while I was in the room. Prayers offered with them.

## 2021-03-03 NOTE — HOSPICE
[facility] [employer] [facility] Good Help to Those in Need 
[phone] Social Work Admission Note Patient Name: Smith Herron YOB: 1945 Age: 67 y.o. Date of Visit: 03/03/21 Facility of Care: [facility] Patient Room: 1123/01 Hospice Attending: Bruno Pickett MD 
Hospice Diagnosis: Hypovolemic shock ([address] [R57.1] Level of Care:  
 [x]  GIP []  Respite 
 []  Routine Consents/NCD Documentation:  
 
Consents Reviewed: N/A Person Reviewed/Signed with: 
 
Right to NCD Reviewed: N/A 
 
NCD Requested: N/A Admission Nurse/Intake Notified NCD was requested: N/A Planned Start of Care Date:  
 
Hospice Witness Representative: 
 
Rod Orellana This LCSW visited the room of pt, daughters Jason Velarde and granddaughter were present. Pt appears imminent. Pt has a hospice dx of hypovolemic shock. Pt has comorbid, hx of metastatic cancer, with mets to the lung, liver, and bone. LCSW provided emotional support thought supportive listening as the family talked about pt,  Her loving kindness and strong paty. 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 LCSW encouraged continued reminiscing. [facility]CS[facility]. LCSW requested comfort cart for family. LCSW will continue to assess and monitor pt and family needs. Low risk for BR. ADVANCE CARE PLANNING Code Status: DNR Durable DNR: _ Yes  _ No_ Advance Care Planning 2/22/2021 Patient's Healthcare Decision Maker is: -  
Primary Decision Maker Name -  
Primary Decision Maker Phone Number -  
Primary Decision Maker Relationship to Patient -  
Confirm Advance Directive Yes, on file Patient Would Like to Complete Advance Directive - Does the patient have other document types - Relationship Status: 
[x]  Single    
[]       
[]     
[]  Domestic Partner    
[]  / 
[]  Common Law 
[]   
[]  Unknown If in a relationship, name of partner/spouse: Duration of relationship: 
 
Mandaen: Christianity  Home: [place] Resources Provided: [address] [facility] Social Work Initial Assessment Gender: 
female Race/Ethnicity: (olegario all that apply) []  [place] or [place] Native 
[]  [place] 
[]  [place] or [place] 
[]   or  
[]  Native [place] or [place] 
[x]  [place] 
[]  Unknown  Service:   
[]  Yes  
[x]  No      
[]  Unknown Appropriate for Pinning Ceremony:  
[]  Yes     
[]  No 
Is patient using VA benefits? []  Yes     
[]  No 
  
Primary Language: English  
[]   Needed 
[]   utilized during visit Ability to express thoughts/needs/feelings 
[]  Expressed thoughts/feelings/needs without difficulty 
[]  Requires extra time and cuing 
[]  Speech limited single words 
[]  Uses only gestures (eye, blinking eye or head movement/pointing) []  Unable to express thoughts/feelings/needs (speech unintelligible or inappropriate) [x]  Unresponsive, imminent Notes:  
  
Mental Status: 
[]  Alert-oriented to:   
 []  Person   
 []  Place   
 []  Time 
[]  Comatose-responds to:  
 []   Verbal stimuli  
 []  Tactile stimuli  
 []  Painful stimuli 
[]  Forgetful 
[]  Disoriented/Confused 
[]  Lethargic 
[]  Agitated 
[x]  Other (specify): Unresponsive, imminent Notes:  
  
Patients description of Illness/Current Health Status:   
[x]  Patient unable to discussUnresponsive, imminent  
,  
[]  Patient unwilling to discuss 
[]  (Specify) Knowledge/Understanding of Disease Process Patient:  
 []  Demonstrates knowledge/understanding of disease process 
 []  Demonstrates knowledge/understanding of treatment plan 
 []  Demonstrates knowledge/understanding of prognosis []  Demonstrates acceptance of prognosis []  Demonstrates knowledge/understanding of resuscitation status [x]  Other (specify),Unresponsive, imminent Caregiver: 
 [x]  Demonstrates knowledge/understanding of disease process [x]  Demonstrates knowledge/understanding of treatment plan 
 [x]  Demonstrates knowledge/understanding of prognosis [x]  Demonstrates acceptance of prognosis [x]  Demonstrates knowledge/understanding of resuscitation status 
 []  Other (specify) Notes:  
  
Patients living arrangement/care setting: 
Use the PRIOR COLUMN when the PATIENTS current health status necessitated a change in his/her primary residence. Prior Current Response  
           []             []    Patients own home/residence []             []    Home of family member/friend []             []    Boarding home  
           []             []    Assisted living facility/nursing home center []             []    Hospital/Acute care facility []             []    Skilled nursing facility []             []    Long term care facility/Nursing home  
           []             [x]    Hospice in Patient Primary Caregiver: 
[]  No Primary Caregiver Name of Primary Caregiver: Ld Rashid Relationship or Primary Caregiver:  
 []  Spouse/Significant other     
 [x]  Natural Child      
 []  Step child     
 []  Sibling 
 []  Parent 
 []  Friend/Neighbor 
 []  Community/Hinduism Volunteer 
 []  Paid help 
 []  Other (specify):___________ Notes:   
  
Family members/Significant others: 
Name:Cecily Panchal Relationship: daughter Phone Number: [phone] Actively involved in care? [x]  Yes  []  No 
 
Name: Cristela Rose Relationship: daughter Phone Number: Actively involved in care? [x]  Yes  []  No 
 
Name: Son 
Relationship: 
Phone Number: Actively involved in care? [x]  Yes  []  No 
 
Social support systems: (select ONE best description) [x]  Excellent social support system which includes three or more family members or friends 
[]  Good social support system which includes two or less members or friends 
[]  Franco chen which includes one family member or friend 
[]  Poor social support; no family members or friends; basically ALONE Notes:  
  
Emotional Status: (olegario all that apply) Patient Caregiver Response [x]                [x]    Mood/Affect stable and appropriate []                []    Angry  
              []                []    Anxious []                []    Apprehensive []                []    Avoidant  
              []                []    Clinging  
              []                []    Depressed  
              []                []    Distraught  
              []                []    Elated []                []    Euphoric  
              []                []    Fearful  
              []                []    Flat Affect  
              []                []    Helpless []                []    Hostile []                []    Impulsive []                []    Irritable  
              []                []    Labile  
              []                []    Manic  
              []                []    Restlessness []                [x]    Sad  
              []                []    Suspicious []                [x]    Tearful  
              []                []    Withdrawn Notes:  
 
Coping Skills (strengths/weakness):  
 Patient: Coping Skills (strength/weakness): Unresponsive, imminent Family/caregiver (strength/weakness): Well supported, accepting, realistic, pt is imminent [place] of care (olegario all that apply):    
[x]  No burden evident  
[]  Family must administer medications  
[]  Illness causing financial strain  
[]  Family/Support feels overwhelmed  
[]  Family/Support sleep disturbed with patients care  
[]  Patients care causes extra physical stress  of death 
[]  Illness causes changes in family lifestyle 
[]  Illness impacting family/support employment 
[]  Family experiencing increased time demands 
[]  Patients behavior endangers family 
[]  Denial of patients illness 
[]  Concern over outcome of illness/fear 
[]  Patients behavior embarrassing to family Notes:  
  
Risk Factors: (olegario all that apply):   
[x]  No burden evident  
[]  Alcohol abuse 
[]  Financial resources inadequate to meet basic needs (food/house/etc) []  Financial resources inadequate to meet health care needs (supplies/equipment/medications) 
[]  Food/nutrition resources inadequate 
[]  Home environment unsafe/inadequate for home care 
[]  Homicidal risk 
[]  Lives alone or without concerned relatives 
[]  Multiple medications/complex schedule 
[]  Physical limitations increase likelihood of falls 
[]  Plan of care/treatments complicated 
[]  Substance use/abuse 
[]  Suicidal risk 
[]  Visual impairment threatens safety/ability to perform self-care 
[]  Other (specify): Abuse/Neglect (actual/potential risks): 
[x]  No signs of abuse/neglect 
[]  History of abuse/neglect                 []  Physical          []  Sexual 
[]  History of domestic violence 
[]  Lacks adequate physical care 
[]  Lacks emotional nurturing/support 
[]  Lacks appropriate stimulation/cognitive experiences 
[]  Left alone inappropriately 
[]  Lacks necessary supervision 
[]  Inadequate or delayed medical care 
[]  Unsafe environment (i.e guns/drug use/history of violence in the home/etc.) []  Bruising or other physical signs of injury present 
[]  Other (specify): 
Notes:  
[]  Refer to child/adult protective services Current Sources of Stress (in Addition to Current Illness):  
[x]  None reported 
[]  Bills/Debt   
[]  Career/Job change   
[]   (short term) []   (long term)   
[]  Death of a child (recent) []  Death of a parent (recent) []  Death of a spouse (recent) []  Employment status changed  
[]  Family discord   
[]  Financial loss/Inadequate inther (specify):come 
[]  Job loss 
[]  Legal issues unresolved 
[]  Lifestyle change 
[]  Marital discord 
[]  Marriage within the last year 
[]  Paperwork (insurance/legal/etc) overwhelming 
[]  Separation/Divorce 
[]  Other (specify): 
Notes:  
  
Current [employer] Being Utilized 1. Interventions/Plan of Care 1. Assess social and emotional factors related to coping with end of life issues 2. Community resource planning/referral  
3. Relocation to different care setting if/when symptoms stabilize 4. Discharge Planning 1. Is imminent, will likely pass at [facility] 
 
MSW Assessment Completed by: Pauline Garcia 03/03/21 Time In: 10: 00 am      
Time Out:11:00 am

## 2021-03-03 NOTE — HSPC IDG CHAPLAIN NOTES
Patient: Jo Ann Christopher Date: 03/03/21 Time: 4:27 PM 
 
[facility]  Notes This was an initial visit to assess needs and offer support. Upon my arrival patient's daughters and a granddaughter were at bedside lovingly ministering to her. The family was very interactive and supportive of one another. Briefly, offered support as they shared their observations on her journey. They are being supported by a local Gnostic[employer] and . He has been visiting and he called while I was in the room. Prayers offered with them. Plan for next two weeks: to be available for supplemental support as needed. Signed by: Gabe Egan

## 2021-03-03 NOTE — PROGRESS NOTES
Home: [place] [address] [place]: Daughter  
[phone]

## 2021-03-04 NOTE — HOSPICE
[employer] LCSW Bereavement/Condolence Call: This LCSW called pts daughter Kimber Moreno  to offer condolences and support. Daughter very thankful family could be at bedside. Daughter reports pts passing was peaceful and pt knew family was there. LCSW shared our office # for any needs. Yesica Bautista LCSW, MSG [facility] [phone]

## 2021-03-04 NOTE — DISCHARGE SUMMARY
Hospice Discharge Summary [employer] Good Help to Those in Need Date of Admission: 3/2/2021 Date of Discharge: 3/3/21 Nadeem Vines is a 67y.o. year old who was admitted to [employer] at [facility] with a Hospice diagnosis of Hypovolemic shock ([facility] [address] [R57.1]. The patient's care was focused on comfort and the patient passed away on 3/3/21

## 2021-03-04 NOTE — PROGRESS NOTES
TOD: 03/03/2012 at [facility] [address] Hospice RN pronounced death with family at bedside. Supervisor [employer] informed.  notified. [employer] approved release. All valuables taken home by daughters except dentures to be sent to [facility] with patient. Port, PIV, and sierra removed; patient ready to be sent to [facility].

## 2021-03-06 NOTE — DISCHARGE SUMMARY
Hospitalist Discharge Summary Patient ID: 
Muriel Hitchcock [id number] 
35 y.o. 
1945 
2/21/2021 PCP on record: Bonnie Reinoso NP Admit date: 2/21/2021 Discharge date and time: 3/6/2021 DISCHARGE DIAGNOSIS: 
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 
GERD Chronic systolic HF 
COPD, not in exacerbation CONSULTATIONS: 
IP CONSULT TO HEMATOLOGY 
IP CONSULT TO NEPHROLOGY 
IP CONSULT TO CARDIOLOGY Excerpted HPI from H&P of Gloria Sevilla MD: 
71-year-old female with a history of breast cancer, osteosarcoma, CHF (EF 50% in 2019) and chronic pain presents emergency department with a chief complaint of lethargy.  Patient reports increasing lethargy and weakness.  Patient reports she has been being visited by physical therapy and they report her blood pressures have been low.  States her blood pressure normally runs in the 100s to 110s.   
Called by ED to [facility] for ICU admission d/t hypotension req vasopressor support. 
______________________________________________________________________ DISCHARGE SUMMARY/HOSPITAL COURSE:  for full details see H&P, daily progress notes, labs, consult notes. 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 
GERD Chronic systolic HF 
COPD, not in exacerbation Patient was transitioned to hospice service, Discharged under hospice care 
 
_______________________________________________________________________ Patient seen and examined by me on discharge day. Pertinent Findings: 
Gen:    Not in distress Chest: Clear lungs CVS:   Regular rhythm. No edema Abd:  Soft, not distended, not tender Neuro:  Alert, oriented x3 
_______________________________________________________________________ DISCHARGE MEDICATIONS:  
Discharge Medication List as of 3/2/2021  3:11 PM  
  
 
 
 
Follow-up Information Follow up With Specialties Details Why Contact Info [address]  Open to PT and SN. They do have a Hospice Program if that is what is indicated. [address] [facility] 
[phone] Thomas Salas NP Nurse Practitioner   [address] [phone] 
  
  
 
________________________________________________________________ 
 
 
__________________________________________________________________ Disposition IP Hospice 
 
____________________________________________________________________ Code Status: DNR/DNI 
___________________________________________________________________ Total time in minutes spent coordinating this discharge (includes going over instructions, follow-up, prescriptions, and preparing report for sign off to her PCP) :  35 minutes Signed: 
Arlen Singer MD

## 2021-03-24 ENCOUNTER — APPOINTMENT (OUTPATIENT)
Dept: INFUSION THERAPY | Age: 76
End: 2021-03-24

## 2021-04-21 ENCOUNTER — APPOINTMENT (OUTPATIENT)
Dept: INFUSION THERAPY | Age: 76
End: 2021-04-21

## 2021-05-19 ENCOUNTER — APPOINTMENT (OUTPATIENT)
Dept: INFUSION THERAPY | Age: 76
End: 2021-05-19

## (undated) DEVICE — KENDALL SCD EXPRESS SLEEVES, KNEE LENGTH, MEDIUM: Brand: KENDALL SCD

## (undated) DEVICE — Device

## (undated) DEVICE — COVER,MAYO STAND,STERILE: Brand: MEDLINE

## (undated) DEVICE — TOOL 14MH30 LEGEND 14CM 3MM: Brand: MIDAS REX ™

## (undated) DEVICE — DEVICE TRNSF SPIK STL 2008S] MICROTEK MEDICAL INC]

## (undated) DEVICE — STERILE POLYISOPRENE POWDER-FREE SURGICAL GLOVES: Brand: PROTEXIS

## (undated) DEVICE — SOLUTION IV 1000ML 0.9% SOD CHL

## (undated) DEVICE — NEEDLE HYPO 22GA L1.5IN BLK S STL HUB POLYPR SHLD REG BVL

## (undated) DEVICE — 3M™ TEGADERM™ TRANSPARENT FILM DRESSING FRAME STYLE, 1626W, 4 IN X 4-3/4 IN (10 CM X 12 CM), 50/CT 4CT/CASE: Brand: 3M™ TEGADERM™

## (undated) DEVICE — PIN EXT FIX SCHNZ 3 MM

## (undated) DEVICE — LABEL MED MRMC ORTH STRL

## (undated) DEVICE — STERILE-Z SURGICAL PATIENT COVERS CLEAR POLYETHYLENE STERILE UNIVERSAL FIT 10 PER CASE: Brand: STERILE-Z

## (undated) DEVICE — HANDLE LT SNAP ON ULT DURABLE LENS FOR TRUMPF ALC DISPOSABLE

## (undated) DEVICE — CEMENT MIXING SYSTEM WITH FEMORAL BREAKWAY NOZZLE: Brand: REVOLUTION

## (undated) DEVICE — (D)PREP SKN CHLRAPRP APPL 26ML -- CONVERT TO ITEM 371833

## (undated) DEVICE — ABDOMINAL PAD: Brand: DERMACEA

## (undated) DEVICE — Z CONVERTED USE 2107985 COVER FLROSCP W36XL28IN 4 SIDE ADH

## (undated) DEVICE — RESERVOIR SUCT 400ML SIL

## (undated) DEVICE — MARKER,SKIN,WI/RULER AND LABELS: Brand: MEDLINE

## (undated) DEVICE — Z CONVERTED USE 2271043 CONTAINER SPEC COLL 4OZ SCR ON LID PEEL PCH

## (undated) DEVICE — SYSTEM SKIN CLSR 22CM DERMBND PRINEO

## (undated) DEVICE — TOTAL TRAY, DB, 100% SILI FOLEY, 16FR 10: Brand: MEDLINE

## (undated) DEVICE — DRAIN SURG 10FR 100% SIL RND END PERF W/ TRCR

## (undated) DEVICE — REM POLYHESIVE ADULT PATIENT RETURN ELECTRODE: Brand: VALLEYLAB

## (undated) DEVICE — INFECTION CONTROL KIT SYS

## (undated) DEVICE — TUBING, SUCTION, 1/4" X 12', STRAIGHT: Brand: MEDLINE

## (undated) DEVICE — SYR 50ML LR LCK 1ML GRAD NSAF --

## (undated) DEVICE — DEVON™ KNEE AND BODY STRAP 60" X 3" (1.5 M X 7.6 CM): Brand: DEVON

## (undated) DEVICE — NEEDLE HYPO 18GA L1.5IN PNK S STL HUB POLYPR SHLD REG BVL

## (undated) DEVICE — BIPOLAR FORCEPS CORD: Brand: VALLEYLAB

## (undated) DEVICE — TUBESET BNE PREP CO2 CARBOJET

## (undated) DEVICE — PADDING CAST W6INXL4YD COT COHESIVE HND TEARABLE SPEC 100

## (undated) DEVICE — 4-PORT MANIFOLD: Brand: NEPTUNE 2

## (undated) DEVICE — SOLUTION IRRIG 1000ML H2O STRL BLT

## (undated) DEVICE — DRAPE,REIN 53X77,STERILE: Brand: MEDLINE

## (undated) DEVICE — LAMINECTOMY RICHMOND-LF: Brand: MEDLINE INDUSTRIES, INC.

## (undated) DEVICE — 450 ML BOTTLE OF 0.05% CHLORHEXIDINE GLUCONATE IN 99.95% STERILE WATER FOR IRRIGATION, USP AND APPLICATOR.: Brand: IRRISEPT ANTIMICROBIAL WOUND LAVAGE

## (undated) DEVICE — STRAP,POSITIONING,KNEE/BODY,FOAM,4X60": Brand: MEDLINE

## (undated) DEVICE — SUTURE VCRL SZ 3-0 L18IN ABSRB UD CP-2 L26MM 1/2 CIR REV J761D

## (undated) DEVICE — SUTURE ABSORBABLE MONOFILAMENT 2-0 WND CLOSURE GRN V-LOC 180 VLOCL0315

## (undated) DEVICE — CORD ELECSURG BPLR 12 FT DISP [810T818750] [ADLER INSTRUMENT CO]

## (undated) DEVICE — OPTIFOAM GENTLE SA, POSTOP, 4X12: Brand: MEDLINE

## (undated) DEVICE — SUTURE VCRL SZ 1 L27IN ABSRB VLT L36MM CT-1 1/2 CIR J341H

## (undated) DEVICE — TOOL T12MH25 LEGEND 12CM 2.5MM MH: Brand: MIDAS REX ™

## (undated) DEVICE — PLUS HANDPIECE WITH SUCTION TUBING AND TRAUMA TIP WITH SMALL SOFT CONE: Brand: SURGILAV

## (undated) DEVICE — STRYKER PERFORMANCE SERIES SAGITTAL BLADE: Brand: STRYKER PERFORMANCE SERIES

## (undated) DEVICE — Z DISCONTINUED USE 2717541 SUTURE STRATAFIX SZ 3-0 L30CM NONABSORBABLE UD L26MM FS 3/8

## (undated) DEVICE — MAGNETIC INSTR DRAPE 20X16: Brand: MEDLINE INDUSTRIES, INC.

## (undated) DEVICE — 3M™ IOBAN™ 2 ANTIMICROBIAL INCISE DRAPE 6650EZ: Brand: IOBAN™ 2

## (undated) DEVICE — AEGIS 1" DISK 4MM HOLE, PEEL OPEN: Brand: MEDLINE

## (undated) DEVICE — HOOK LOCK LATEX FREE ELASTIC BANDAGE D/L 6INX10YD

## (undated) DEVICE — STERILE POLYISOPRENE POWDER-FREE SURGICAL GLOVES WITH EMOLLIENT COATING: Brand: PROTEXIS

## (undated) DEVICE — KIT JACK TBL PT CARE

## (undated) DEVICE — SOLUTION IRRIG 3000ML 0.9% SOD CHL FLX CONT 0797208] ICU MEDICAL INC]

## (undated) DEVICE — TUBING, SUCTION, 1/4" X 10', STRAIGHT: Brand: MEDLINE

## (undated) DEVICE — GARMENT,MEDLINE,DVT,INT,CALF,MED, GEN2: Brand: MEDLINE

## (undated) DEVICE — NEEDLE HYPO 25GA L1.5IN BVL ORIENTED ECLIPSE

## (undated) DEVICE — COLLAR FOAM CERV ADJ 3X22IN --

## (undated) DEVICE — SPHERE STEALTH 12PK/TY --

## (undated) DEVICE — KIT,1200CC CANISTER,3/16"X6' TUBING: Brand: MEDLINE INDUSTRIES, INC.

## (undated) DEVICE — SUTURE MCRYL SZ 4-0 L27IN ABSRB UD L19MM PS-2 1/2 CIR PRIM Y426H

## (undated) DEVICE — FLOSEAL HEMOSTATIC MATRIX, 5 ML: Brand: FLOSEAL

## (undated) DEVICE — MAYFIELD® DISPOSABLE ADULT SKULL PIN (PLASTIC BASE): Brand: MAYFIELD®

## (undated) DEVICE — SOLUTION SURG PREP 26 CC PURPREP

## (undated) DEVICE — TABLE COVER: Brand: CONVERTORS

## (undated) DEVICE — SURGIFOAM SPNG SZ 100

## (undated) DEVICE — SUTURE VCRL SZ 0 L45CM ABSRB VLT OS-6 L36.4MM 1/2 CIR REV J711T

## (undated) DEVICE — SUTURE STRATAFIX SPRL SZ 1 L14IN ABSRB VLT L48CM CTX 1/2 SXPD2B405

## (undated) DEVICE — BONE WAX WHITE: Brand: BONE WAX WHITE